# Patient Record
Sex: MALE | Race: OTHER | NOT HISPANIC OR LATINO | URBAN - METROPOLITAN AREA
[De-identification: names, ages, dates, MRNs, and addresses within clinical notes are randomized per-mention and may not be internally consistent; named-entity substitution may affect disease eponyms.]

---

## 2018-02-18 ENCOUNTER — INPATIENT (INPATIENT)
Facility: HOSPITAL | Age: 38
LOS: 10 days | Discharge: HOME | DRG: 291 | End: 2018-03-01
Attending: GENERAL ACUTE CARE HOSPITAL | Admitting: HOSPITALIST
Payer: MEDICARE

## 2018-02-18 VITALS — OXYGEN SATURATION: 87 % | TEMPERATURE: 98 F

## 2018-02-18 DIAGNOSIS — I50.9 HEART FAILURE, UNSPECIFIED: ICD-10-CM

## 2018-02-18 LAB
ALBUMIN SERPL ELPH-MCNC: 3.1 G/DL — LOW (ref 3.3–5.2)
ALP SERPL-CCNC: 64 U/L — SIGNIFICANT CHANGE UP (ref 40–120)
ALT FLD-CCNC: 33 U/L — SIGNIFICANT CHANGE UP
ANION GAP SERPL CALC-SCNC: 12 MMOL/L — SIGNIFICANT CHANGE UP (ref 5–17)
APTT BLD: 36.5 SEC — SIGNIFICANT CHANGE UP (ref 27.5–37.4)
AST SERPL-CCNC: 38 U/L — SIGNIFICANT CHANGE UP
BASE EXCESS BLDA CALC-SCNC: 4.9 MMOL/L — HIGH (ref -2–2)
BASOPHILS # BLD AUTO: 0 K/UL — SIGNIFICANT CHANGE UP (ref 0–0.2)
BASOPHILS NFR BLD AUTO: 0.4 % — SIGNIFICANT CHANGE UP (ref 0–2)
BILIRUB SERPL-MCNC: 1.6 MG/DL — SIGNIFICANT CHANGE UP (ref 0.4–2)
BUN SERPL-MCNC: 13 MG/DL — SIGNIFICANT CHANGE UP (ref 8–20)
CALCIUM SERPL-MCNC: 8.8 MG/DL — SIGNIFICANT CHANGE UP (ref 8.6–10.2)
CHLORIDE SERPL-SCNC: 98 MMOL/L — SIGNIFICANT CHANGE UP (ref 98–107)
CO2 SERPL-SCNC: 28 MMOL/L — SIGNIFICANT CHANGE UP (ref 22–29)
CREAT SERPL-MCNC: 1.09 MG/DL — SIGNIFICANT CHANGE UP (ref 0.5–1.3)
EOSINOPHIL # BLD AUTO: 0 K/UL — SIGNIFICANT CHANGE UP (ref 0–0.5)
EOSINOPHIL NFR BLD AUTO: 1 % — SIGNIFICANT CHANGE UP (ref 0–5)
GAS PNL BLDA: SIGNIFICANT CHANGE UP
GLUCOSE SERPL-MCNC: 130 MG/DL — HIGH (ref 70–115)
HCO3 BLDA-SCNC: 29 MMOL/L — HIGH (ref 20–26)
HCT VFR BLD CALC: 49.4 % — SIGNIFICANT CHANGE UP (ref 42–52)
HGB BLD-MCNC: 15.6 G/DL — SIGNIFICANT CHANGE UP (ref 14–18)
HOROWITZ INDEX BLDA+IHG-RTO: SIGNIFICANT CHANGE UP
INR BLD: 1.9 RATIO — HIGH (ref 0.88–1.16)
LYMPHOCYTES # BLD AUTO: 0.8 K/UL — LOW (ref 1–4.8)
LYMPHOCYTES # BLD AUTO: 14.8 % — LOW (ref 20–55)
MCHC RBC-ENTMCNC: 29.9 PG — SIGNIFICANT CHANGE UP (ref 27–31)
MCHC RBC-ENTMCNC: 31.6 G/DL — LOW (ref 32–36)
MCV RBC AUTO: 94.6 FL — HIGH (ref 80–94)
MONOCYTES # BLD AUTO: 0.8 K/UL — SIGNIFICANT CHANGE UP (ref 0–0.8)
MONOCYTES NFR BLD AUTO: 14.6 % — HIGH (ref 3–10)
NEUTROPHILS # BLD AUTO: 3.6 K/UL — SIGNIFICANT CHANGE UP (ref 1.8–8)
NEUTROPHILS NFR BLD AUTO: 69 % — SIGNIFICANT CHANGE UP (ref 37–73)
NT-PROBNP SERPL-SCNC: 1803 PG/ML — HIGH (ref 0–300)
PCO2 BLDA: 59 MMHG — HIGH (ref 35–45)
PH BLDA: 7.35 — SIGNIFICANT CHANGE UP (ref 7.35–7.45)
PLATELET # BLD AUTO: 193 K/UL — SIGNIFICANT CHANGE UP (ref 150–400)
PO2 BLDA: 77 MMHG — LOW (ref 83–108)
POTASSIUM SERPL-MCNC: 4 MMOL/L — SIGNIFICANT CHANGE UP (ref 3.5–5.3)
POTASSIUM SERPL-SCNC: 4 MMOL/L — SIGNIFICANT CHANGE UP (ref 3.5–5.3)
PROT SERPL-MCNC: 7.7 G/DL — SIGNIFICANT CHANGE UP (ref 6.6–8.7)
PROTHROM AB SERPL-ACNC: 21.2 SEC — HIGH (ref 9.8–12.7)
RBC # BLD: 5.22 M/UL — SIGNIFICANT CHANGE UP (ref 4.6–6.2)
RBC # FLD: 16.1 % — HIGH (ref 11–15.6)
SAO2 % BLDA: 94 % — LOW (ref 95–99)
SODIUM SERPL-SCNC: 138 MMOL/L — SIGNIFICANT CHANGE UP (ref 135–145)
TROPONIN T SERPL-MCNC: <0.01 NG/ML — SIGNIFICANT CHANGE UP (ref 0–0.06)
WBC # BLD: 5.2 K/UL — SIGNIFICANT CHANGE UP (ref 4.8–10.8)
WBC # FLD AUTO: 5.2 K/UL — SIGNIFICANT CHANGE UP (ref 4.8–10.8)

## 2018-02-18 PROCEDURE — 71045 X-RAY EXAM CHEST 1 VIEW: CPT | Mod: 26

## 2018-02-18 PROCEDURE — 99223 1ST HOSP IP/OBS HIGH 75: CPT | Mod: AI

## 2018-02-18 PROCEDURE — 99291 CRITICAL CARE FIRST HOUR: CPT

## 2018-02-18 RX ORDER — ATORVASTATIN CALCIUM 80 MG/1
40 TABLET, FILM COATED ORAL AT BEDTIME
Qty: 0 | Refills: 0 | Status: DISCONTINUED | OUTPATIENT
Start: 2018-02-18 | End: 2018-03-01

## 2018-02-18 RX ORDER — CARVEDILOL PHOSPHATE 80 MG/1
3.12 CAPSULE, EXTENDED RELEASE ORAL
Qty: 0 | Refills: 0 | Status: DISCONTINUED | OUTPATIENT
Start: 2018-02-18 | End: 2018-03-01

## 2018-02-18 RX ORDER — NITROGLYCERIN 6.5 MG
1 CAPSULE, EXTENDED RELEASE ORAL ONCE
Qty: 0 | Refills: 0 | Status: COMPLETED | OUTPATIENT
Start: 2018-02-18 | End: 2018-02-18

## 2018-02-18 RX ORDER — AMLODIPINE BESYLATE 2.5 MG/1
10 TABLET ORAL DAILY
Qty: 0 | Refills: 0 | Status: DISCONTINUED | OUTPATIENT
Start: 2018-02-18 | End: 2018-02-21

## 2018-02-18 RX ORDER — FUROSEMIDE 40 MG
60 TABLET ORAL ONCE
Qty: 0 | Refills: 0 | Status: COMPLETED | OUTPATIENT
Start: 2018-02-18 | End: 2018-02-18

## 2018-02-18 RX ORDER — ASPIRIN/CALCIUM CARB/MAGNESIUM 324 MG
325 TABLET ORAL ONCE
Qty: 0 | Refills: 0 | Status: COMPLETED | OUTPATIENT
Start: 2018-02-18 | End: 2018-02-18

## 2018-02-18 RX ORDER — HYDRALAZINE HCL 50 MG
25 TABLET ORAL EVERY 8 HOURS
Qty: 0 | Refills: 0 | Status: DISCONTINUED | OUTPATIENT
Start: 2018-02-18 | End: 2018-02-24

## 2018-02-18 RX ORDER — SODIUM CHLORIDE 9 MG/ML
3 INJECTION INTRAMUSCULAR; INTRAVENOUS; SUBCUTANEOUS ONCE
Qty: 0 | Refills: 0 | Status: COMPLETED | OUTPATIENT
Start: 2018-02-18 | End: 2018-02-18

## 2018-02-18 RX ORDER — FUROSEMIDE 40 MG
40 TABLET ORAL EVERY 12 HOURS
Qty: 0 | Refills: 0 | Status: DISCONTINUED | OUTPATIENT
Start: 2018-02-18 | End: 2018-02-20

## 2018-02-18 RX ADMIN — ATORVASTATIN CALCIUM 40 MILLIGRAM(S): 80 TABLET, FILM COATED ORAL at 21:47

## 2018-02-18 RX ADMIN — Medication 25 MILLIGRAM(S): at 21:47

## 2018-02-18 RX ADMIN — SODIUM CHLORIDE 3 MILLILITER(S): 9 INJECTION INTRAMUSCULAR; INTRAVENOUS; SUBCUTANEOUS at 13:47

## 2018-02-18 RX ADMIN — Medication 325 MILLIGRAM(S): at 13:47

## 2018-02-18 RX ADMIN — Medication 1 INCH(S): at 13:42

## 2018-02-18 RX ADMIN — Medication 60 MILLIGRAM(S): at 13:46

## 2018-02-18 RX ADMIN — CARVEDILOL PHOSPHATE 3.12 MILLIGRAM(S): 80 CAPSULE, EXTENDED RELEASE ORAL at 19:27

## 2018-02-18 NOTE — ED ADULT NURSE NOTE - OBJECTIVE STATEMENT
Code team, RT and Dr. Leblanc at bedside.  Pt a/ox3 complaining of shortness of breath that began today.  Uses 2-3 L NC at home, oxygen saturation 87% on 4L NC.  Tachypneic upon arrival.  Pt placed on venti 50%, oxygen sat up to 99%.  Denies chest pain.  Pt states dx with CHF but does not have PMD.

## 2018-02-18 NOTE — ED ADULT NURSE REASSESSMENT NOTE - NS ED NURSE REASSESS COMMENT FT1
Assumed pt care at 1330.  Pt a&ox3, tachycardic, medications given as per orders, will continue to monitor Assumed pt care at 1330.  Pt a&ox3, tachycardic, medications given as per orders, pt one 50%nonrebreather with O2sat 99%.  Will continue to monitor

## 2018-02-18 NOTE — H&P ADULT - ASSESSMENT
37yr old male with PMH of HTN, JAN on nocturnal CPAP, non-compliant with use, morbidly obese presents to ER with worsening sob over last 2 mths. He was admitted in [facility] end of last year with similar presentation, does not know his diagnosis does not follow with any PMD/cardiology. HE got very sob today, denies any chest pain/palpitations. Denies any fever/chills/cough. In the ER< he was found to be hypoxic on NC, required 50% VM, was in mild respiratory distress. CXR shows cardiomegaly and bilateral haziness. Labs show elevated BNP, trop - normal, EKG  - tachycardia. He is being admitted for acute repsiratory distress possibly 2/2 acute on chronic CHF.     1. SOB with acute hypoxic respiratory failure - possible has chronic component as well  most likely 2/2 Acute on chronic CHF with pulmonary HTN   r/o PE - add d-dimer   Morbidly obese - with JAN, OSH  ECHO    cardio consult  Lasix iv , coreg   F/u BMP,. daily I/O, weights  check A1C      2. HTN - poorly controlled   On Hydralazine and amlodipine, does not know his doses    3. JAN on CPAP - resume nocturnal cpap    4. Coagulopahty - ? 2/2 congestive hepatopathy, not on any AC  check repeat INR in am     4. DVT px   hold pharmacological px until INR mproves  IPCs for now    5. morbid obesity - needs weight loss  Dietary eval   check A1C

## 2018-02-18 NOTE — H&P ADULT - NSHPPHYSICALEXAM_GEN_ALL_CORE
PHYSICAL EXAM:    GENERAL: NAD, morbidly obese, mild respiratory distress, able to speak complete sentences  HEAD:  Atraumatic, Normocephalic  EYES: EOMI, PERRLA, conjunctiva and sclera clear  ENMT: Moist mucous membranes, Good dentition,   NERVOUS SYSTEM:  Alert & Oriented X3, Good concentration;  CHEST/LUNG: Clear to percussion bilaterally; No rales, rhonchi, difficult to auscultate  HEART: Regular rate and rhythm; No murmurs,   ABDOMEN: Soft, Nontender, Nondistended; Bowel sounds present  EXTREMITIES:  , No clubbing, cyanosis, or 2+ pedal edema. Chronic cahnges of venous stasis, hyperkeratosis.  SKIN: No rashes or lesions

## 2018-02-18 NOTE — ED ADULT NURSE REASSESSMENT NOTE - NS ED NURSE REASSESS COMMENT FT1
Report given t o [facility] RN CC.  Pt a&ox3 resting comfortably, denies any c/o pain, no acute s/s of respiratory distress noted or reported, pt remains on 50% nonrebreather

## 2018-02-18 NOTE — ED PROVIDER NOTE - MEDICAL DECISION MAKING DETAILS
35 y/o male with hx of CHF and HTN. concerning for exacerbation of CHF. Will xray, lab, re-evaluate, and most likely admit.

## 2018-02-18 NOTE — CONSULT NOTE ADULT - ASSESSMENT
36 y/o morbidly obese male with acute heart failure and cardiomegaly   Hx of prior heart failure and diagnosis of pulmonary hypertension and non obstructive CAD   agree with lasix 40 mg IV twice daily  continue antihypertensives BB, CCB and hydralazine  Echo  obtain prior records

## 2018-02-18 NOTE — CONSULT NOTE ADULT - SUBJECTIVE AND OBJECTIVE BOX
CARDIOLOGY CONSULTATION NOTE ([facility])  Consult requested by:      Reason for Consultation:     History obtained by: Patient and medical record     obtained: No    Chief complaint:    Patient is a 37y old  Male who presents with a chief complaint of sob (18 Feb 2018 15:59)      HPI:  37yr old morbidly obese male with JAN on nocturnal CPAP, non-compliant with use, presents with worsening sob over last 2 mths associated sometimes with orthopnea. He has progressive bilateral LE edema as well. He was admitted in 12/2017 in OSH with similar presentation where apparently he had RHC/LHC that showed pulmonary hypertension and he mentioned trial of reversibility that failed with drugs. he has no significant CAD as he was he doesn't need a stent. He is on hydralazine and amlodipine for BP control and appears non compliant with follow up as out patient as he usually go to ED when feels sick. . In the ER< he was found to be hypoxic on NC, required 50% VM, was in mild respiratory distress. CXR shows cardiomegaly and bilateral haziness. Labs show elevated BNP, trop -         REVIEW OF SYMPTOMS: Cardiovascular:  as per HPI;  Respiratory:  as per HPI  Genitourinary:  No dysuria, no hematuria; Gastrointestinal:  No nausea, no vomiting. No diarrhea.  No abdominal pain. No dark color stool, no melena ; Neurological: No headache, no dizziness, no slurred speech;  Psychiatric: No agitation, no anxiety.  ALL OTHER REVIEW OF SYSTEMS ARE NEGATIVE.    ALLERGIES: Allergies    No Known Allergies    Intolerances    MEDICATIONS  (STANDING):  amLODIPine   Tablet 10 milliGRAM(s) Oral daily  atorvastatin 40 milliGRAM(s) Oral at bedtime  carvedilol 3.125 milliGRAM(s) Oral two times a day  furosemide   Injectable 40 milliGRAM(s) IV Push every 12 hours  hydrALAZINE 25 milliGRAM(s) Oral every 8 hours    MEDICATIONS  (PRN):        PAST MEDICAL HISTORY  CHF (congestive heart failure)  Hypertension      PAST SURGICAL HISTORY  No significant past surgical history      FAMILY HISTORY:  Family history unknown      SOCIAL HISTORY: smokers marijuana      Vital Signs Last 24 Hrs  T(C): 36.6 (18 Feb 2018 13:17), Max: 36.6 (18 Feb 2018 13:17)  T(F): 97.9 (18 Feb 2018 13:17), Max: 97.9 (18 Feb 2018 13:17)  HR: 112 (18 Feb 2018 13:52) (112 - 120)  BP: 151/86 (18 Feb 2018 13:52) (137/92 - 151/86)  BP(mean): --  RR: 28 (18 Feb 2018 13:52) (26 - 28)  SpO2: 98% (18 Feb 2018 13:52) (87% - 98%)      PHYSICAL EXAM:  Constitutional: Comfortable . No acute distress.   HEENT: Atraumatic and normcephalic , neck is supple . no JVD. No carotid bruit.   CNS: A&Ox3. No focal deficits.  Lymph Nodes: Cervical : Not palpable.  Respiratory: diminished air entry bilaterally  Cardiovascular: S1S2 RRR. No murmur/rubs or gallop.  Gastrointestinal: Soft non-tender and non distended . +Bowel sounds, no HSM  Extremities: No edema , DP and PT +2   Psychiatric: Calm . no agitation., mood appropriate  Skin: No skin lesions    Intake and output:     LABS:                        15.6   5.2   )-----------( 193      ( 18 Feb 2018 13:41 )             49.4     02-18    138  |  98  |  13.0  ----------------------------<  130<H>  4.0   |  28.0  |  1.09    Ca    8.8      18 Feb 2018 13:41    TPro  7.7  /  Alb  3.1<L>  /  TBili  1.6  /  DBili  x   /  AST  38  /  ALT  33  /  AlkPhos  64  02-18    CARDIAC MARKERS ( 18 Feb 2018 13:41 )  x     / <0.01 ng/mL / x     / x     / x        ;p-BNP=Serum Pro-Brain Natriuretic Peptide: 1803 pg/mL (02-18 @ 13:41)    PT/INR - ( 18 Feb 2018 13:41 )   PT: 21.2 sec;   INR: 1.90 ratio         PTT - ( 18 Feb 2018 13:41 )  PTT:36.5 sec      INTERPRETATION OF TELEMETRY:  ECG: sinus tachycardia,     RADIOLOGY & ADDITIONAL STUDIES:    X-ray:  Cardiomegaly, mild pulmonary congestion

## 2018-02-18 NOTE — H&P ADULT - HISTORY OF PRESENT ILLNESS
37yr old male with PMH of HTN, JAN on nocturnal CPAP, non-compliant with use, morbidly obese presents to ER with worsening sob over last 2 mths. He was admitted in [facility] end of last year with similar presentation, does not know his diagnosis does not follow with any PMD/cardiology. HE got very sob today, denies any chest pain/palpitations. Denies any fever/chills/cough. In the ER< he was found to be hypoxic on NC, required 50% VM, was in mild respiratory distress. CXR shows cardiomegaly and bilateral haziness. Labs show elevated BNP, trop - normal, EKG  - tachycardia. He is being admitted for acute repsiratory distress possibly 2/2 acute on chronic CHF.

## 2018-02-19 LAB
ANION GAP SERPL CALC-SCNC: 9 MMOL/L — SIGNIFICANT CHANGE UP (ref 5–17)
BUN SERPL-MCNC: 13 MG/DL — SIGNIFICANT CHANGE UP (ref 8–20)
CALCIUM SERPL-MCNC: 8.6 MG/DL — SIGNIFICANT CHANGE UP (ref 8.6–10.2)
CHLORIDE SERPL-SCNC: 97 MMOL/L — LOW (ref 98–107)
CO2 SERPL-SCNC: 34 MMOL/L — HIGH (ref 22–29)
CREAT SERPL-MCNC: 1.11 MG/DL — SIGNIFICANT CHANGE UP (ref 0.5–1.3)
GLUCOSE BLDC GLUCOMTR-MCNC: 99 MG/DL — SIGNIFICANT CHANGE UP (ref 70–99)
GLUCOSE SERPL-MCNC: 94 MG/DL — SIGNIFICANT CHANGE UP (ref 70–115)
HBA1C BLD-MCNC: 7 % — HIGH (ref 4–5.6)
HCT VFR BLD CALC: 47 % — SIGNIFICANT CHANGE UP (ref 42–52)
HGB BLD-MCNC: 14.2 G/DL — SIGNIFICANT CHANGE UP (ref 14–18)
INR BLD: 1.85 RATIO — HIGH (ref 0.88–1.16)
MAGNESIUM SERPL-MCNC: 1.7 MG/DL — SIGNIFICANT CHANGE UP (ref 1.6–2.6)
MCHC RBC-ENTMCNC: 28.6 PG — SIGNIFICANT CHANGE UP (ref 27–31)
MCHC RBC-ENTMCNC: 30.2 G/DL — LOW (ref 32–36)
MCV RBC AUTO: 94.8 FL — HIGH (ref 80–94)
PLATELET # BLD AUTO: 176 K/UL — SIGNIFICANT CHANGE UP (ref 150–400)
POTASSIUM SERPL-MCNC: 4.4 MMOL/L — SIGNIFICANT CHANGE UP (ref 3.5–5.3)
POTASSIUM SERPL-SCNC: 4.4 MMOL/L — SIGNIFICANT CHANGE UP (ref 3.5–5.3)
PROTHROM AB SERPL-ACNC: 20.6 SEC — HIGH (ref 9.8–12.7)
RBC # BLD: 4.96 M/UL — SIGNIFICANT CHANGE UP (ref 4.6–6.2)
RBC # FLD: 17 % — HIGH (ref 11–15.6)
SODIUM SERPL-SCNC: 140 MMOL/L — SIGNIFICANT CHANGE UP (ref 135–145)
WBC # BLD: 3.9 K/UL — LOW (ref 4.8–10.8)
WBC # FLD AUTO: 3.9 K/UL — LOW (ref 4.8–10.8)

## 2018-02-19 PROCEDURE — 93970 EXTREMITY STUDY: CPT | Mod: 26

## 2018-02-19 PROCEDURE — 99233 SBSQ HOSP IP/OBS HIGH 50: CPT

## 2018-02-19 PROCEDURE — 99223 1ST HOSP IP/OBS HIGH 75: CPT

## 2018-02-19 RX ORDER — INSULIN LISPRO 100/ML
VIAL (ML) SUBCUTANEOUS
Qty: 0 | Refills: 0 | Status: DISCONTINUED | OUTPATIENT
Start: 2018-02-19 | End: 2018-03-01

## 2018-02-19 RX ORDER — DEXTROSE 50 % IN WATER 50 %
25 SYRINGE (ML) INTRAVENOUS ONCE
Qty: 0 | Refills: 0 | Status: DISCONTINUED | OUTPATIENT
Start: 2018-02-19 | End: 2018-03-01

## 2018-02-19 RX ORDER — SODIUM CHLORIDE 9 MG/ML
1000 INJECTION, SOLUTION INTRAVENOUS
Qty: 0 | Refills: 0 | Status: DISCONTINUED | OUTPATIENT
Start: 2018-02-19 | End: 2018-03-01

## 2018-02-19 RX ORDER — GLUCAGON INJECTION, SOLUTION 0.5 MG/.1ML
1 INJECTION, SOLUTION SUBCUTANEOUS ONCE
Qty: 0 | Refills: 0 | Status: DISCONTINUED | OUTPATIENT
Start: 2018-02-19 | End: 2018-03-01

## 2018-02-19 RX ORDER — ASPIRIN/CALCIUM CARB/MAGNESIUM 324 MG
81 TABLET ORAL DAILY
Qty: 0 | Refills: 0 | Status: DISCONTINUED | OUTPATIENT
Start: 2018-02-19 | End: 2018-03-01

## 2018-02-19 RX ORDER — DEXTROSE 50 % IN WATER 50 %
1 SYRINGE (ML) INTRAVENOUS ONCE
Qty: 0 | Refills: 0 | Status: DISCONTINUED | OUTPATIENT
Start: 2018-02-19 | End: 2018-03-01

## 2018-02-19 RX ORDER — INFLUENZA VIRUS VACCINE 15; 15; 15; 15 UG/.5ML; UG/.5ML; UG/.5ML; UG/.5ML
0.5 SUSPENSION INTRAMUSCULAR ONCE
Qty: 0 | Refills: 0 | Status: DISCONTINUED | OUTPATIENT
Start: 2018-02-19 | End: 2018-03-01

## 2018-02-19 RX ORDER — DEXTROSE 50 % IN WATER 50 %
12.5 SYRINGE (ML) INTRAVENOUS ONCE
Qty: 0 | Refills: 0 | Status: DISCONTINUED | OUTPATIENT
Start: 2018-02-19 | End: 2018-03-01

## 2018-02-19 RX ADMIN — CARVEDILOL PHOSPHATE 3.12 MILLIGRAM(S): 80 CAPSULE, EXTENDED RELEASE ORAL at 17:40

## 2018-02-19 RX ADMIN — AMLODIPINE BESYLATE 10 MILLIGRAM(S): 2.5 TABLET ORAL at 05:23

## 2018-02-19 RX ADMIN — Medication 25 MILLIGRAM(S): at 13:14

## 2018-02-19 RX ADMIN — Medication 25 MILLIGRAM(S): at 21:02

## 2018-02-19 RX ADMIN — CARVEDILOL PHOSPHATE 3.12 MILLIGRAM(S): 80 CAPSULE, EXTENDED RELEASE ORAL at 05:23

## 2018-02-19 RX ADMIN — ATORVASTATIN CALCIUM 40 MILLIGRAM(S): 80 TABLET, FILM COATED ORAL at 21:02

## 2018-02-19 RX ADMIN — Medication 25 MILLIGRAM(S): at 05:23

## 2018-02-19 RX ADMIN — Medication 40 MILLIGRAM(S): at 13:14

## 2018-02-19 RX ADMIN — Medication 40 MILLIGRAM(S): at 01:52

## 2018-02-19 NOTE — CONSULT NOTE ADULT - SUBJECTIVE AND OBJECTIVE BOX
PULMONARY CONSULT NOTE      RUDY GOYALN-[id number]    Patient is a 37y old  Male who presents with a chief complaint of sob (18 Feb 2018 15:59)  Patient with presentation for worsening dyspnea without fever, chest pain, cough.  Non smoker.  States just got CPAP machine>not using.  Patient just came out here from [place] where apparently he also go oxygen.  It is unclear if he has O2 that functions at home>"I'm trying to get a machine".  Patient is morbidly obese-no weight or BMI measured to this point and by exam is likely with BMI greater than 60.  Patient states he has CHF.      INTERVAL HPI/OVERNIGHT EVENTS:    MEDICATIONS  (STANDING):  amLODIPine   Tablet 10 milliGRAM(s) Oral daily  atorvastatin 40 milliGRAM(s) Oral at bedtime  carvedilol 3.125 milliGRAM(s) Oral two times a day  furosemide   Injectable 40 milliGRAM(s) IV Push every 12 hours  hydrALAZINE 25 milliGRAM(s) Oral every 8 hours  influenza   Vaccine 0.5 milliLiter(s) IntraMuscular once      MEDICATIONS  (PRN):      Allergies    No Known Allergies    Intolerances        PAST MEDICAL & SURGICAL HISTORY:  CHF (congestive heart failure)  Hypertension  No significant past surgical history      FAMILY HISTORY:  Family history unknown      SOCIAL HISTORY  Smoking History: Per HPI    REVIEW OF SYSTEMS:    CONSTITUTIONAL:  As per HPI.    HEENT:  Eyes:  No diplopia or blurred vision. ENT:  No earache, sore throat or runny nose.    CARDIOVASCULAR:  No pressure, squeezing, tightness, heaviness or aching about the chest; no palpitations.    RESPIRATORY:  Per HPI    GASTROINTESTINAL:  No nausea, vomiting or diarrhea.    GENITOURINARY:  No dysuria, frequency or urgency.    MUSCULOSKELETAL:  No joint pains    SKIN:  No new lesions.    NEUROLOGIC:  No paresthesias, fasciculations, seizures or weakness.    PSYCHIATRIC:  No disorder of thought or mood.    ENDOCRINE:  No heat or cold intolerance, polyuria or polydipsia.    HEMATOLOGICAL:  No easy bruising or bleeding.     Vital Signs Last 24 Hrs  T(C): 36.5 (19 Feb 2018 10:59), Max: 36.9 (18 Feb 2018 17:49)  T(F): 97.7 (19 Feb 2018 10:59), Max: 98.5 (18 Feb 2018 17:49)  HR: 92 (19 Feb 2018 10:59) (91 - 100)  BP: 125/79 (19 Feb 2018 10:59) (104/71 - 144/96)  BP(mean): --  RR: 17 (19 Feb 2018 10:59) (17 - 26)  SpO2: 95% (19 Feb 2018 10:59) (95% - 99%)    PHYSICAL EXAMINATION:    GENERAL: The patient is a well-developed, well-nourished _____in no apparent distress.     HEENT: Head is normocephalic and atraumatic. Extraocular muscles are intact. Mucous membranes are moist.     NECK: Supple.     LUNGS: Clear but diminished throughout    HEART: Regular rate and rhythm without murmur.    ABDOMEN: Soft, nontender, and nondistended.  No hepatosplenomegaly is noted.    EXTREMITIES: Without any cyanosis, clubbing, rash, lesions; + edema.    NEUROLOGIC: Grossly intact.    SKIN: No ulceration or induration present.      LABS:                        14.2   3.9   )-----------( 176      ( 19 Feb 2018 08:26 )             47.0     02-19    140  |  97<L>  |  13.0  ----------------------------<  94  4.4   |  34.0<H>  |  1.11    Ca    8.6      19 Feb 2018 08:26  Mg     1.7     02-19    TPro  7.7  /  Alb  3.1<L>  /  TBili  1.6  /  DBili  x   /  AST  38  /  ALT  33  /  AlkPhos  64  02-18    PT/INR - ( 19 Feb 2018 08:26 )   PT: 20.6 sec;   INR: 1.85 ratio         PTT - ( 18 Feb 2018 13:41 )  PTT:36.5 sec    ABG - ( 18 Feb 2018 14:36 )  pH: 7.35  /  pCO2: 59    /  pO2: 77    / HCO3: 29    / Base Excess: 4.9   /  SaO2: 94                CARDIAC MARKERS ( 18 Feb 2018 13:41 )  x     / <0.01 ng/mL / x     / x     / x            Serum Pro-Brain Natriuretic Peptide: 1803 pg/mL (02-18-18 @ 13:41)          MICROBIOLOGY:    RADIOLOGY & ADDITIONAL STUDIES:

## 2018-02-19 NOTE — PROGRESS NOTE ADULT - SUBJECTIVE AND OBJECTIVE BOX
RUDY GOYAL    [id number]    37y      Male    CC: SOB   only mildly better, swelling      INTERVAL HPI/OVERNIGHT EVENTS: no acute events    REVIEW OF SYSTEMS:    CONSTITUTIONAL: No fever,   RESPIRATORY: No cough  CARDIOVASCULAR: No chest pain, palpitations  GASTROINTESTINAL: No abdominal or epigastric pain. No nausea, vomiting        Vital Signs Last 24 Hrs  T(C): 36.5 (19 Feb 2018 10:59), Max: 36.9 (18 Feb 2018 17:49)  T(F): 97.7 (19 Feb 2018 10:59), Max: 98.5 (18 Feb 2018 17:49)  HR: 92 (19 Feb 2018 10:59) (91 - 100)  BP: 125/79 (19 Feb 2018 10:59) (104/71 - 144/96)  BP(mean): --  RR: 17 (19 Feb 2018 10:59) (17 - 26)  SpO2: 95% (19 Feb 2018 10:59) (95% - 99%)    PHYSICAL EXAM:    GENERAL: NAD, morbidly obese , sitting up   HEENT: PERRL, +EOMI  NECK: soft, Supple,   CHEST/LUNG: Clear to percussion bilaterally, b/l rales basal   HEART: S1S2+, Regular rate and rhythm; No murmurs  EXTREMITIES:   No clubbing, cyanosis. pedal  edema 2+  NEURO: AAOX3          LABS:                        14.2   3.9   )-----------( 176      ( 19 Feb 2018 08:26 )             47.0     02-19    140  |  97<L>  |  13.0  ----------------------------<  94  4.4   |  34.0<H>  |  1.11    Ca    8.6      19 Feb 2018 08:26  Mg     1.7     02-19    TPro  7.7  /  Alb  3.1<L>  /  TBili  1.6  /  DBili  x   /  AST  38  /  ALT  33  /  AlkPhos  64  02-18    PT/INR - ( 19 Feb 2018 08:26 )   PT: 20.6 sec;   INR: 1.85 ratio         PTT - ( 18 Feb 2018 13:41 )  PTT:36.5 sec        MEDICATIONS  (STANDING):  amLODIPine   Tablet 10 milliGRAM(s) Oral daily  atorvastatin 40 milliGRAM(s) Oral at bedtime  carvedilol 3.125 milliGRAM(s) Oral two times a day  furosemide   Injectable 40 milliGRAM(s) IV Push every 12 hours  hydrALAZINE 25 milliGRAM(s) Oral every 8 hours  influenza   Vaccine 0.5 milliLiter(s) IntraMuscular once    MEDICATIONS  (PRN):      RADIOLOGY & ADDITIONAL TESTS:

## 2018-02-19 NOTE — PROGRESS NOTE ADULT - ASSESSMENT
37yr old male with PMH of HTN, JAN on nocturnal CPAP, non-compliant with use, morbidly obese presents to ER with worsening sob over last 2 mths. He was admitted in [facility] end of last year with similar presentation, does not know his diagnosis does not follow with any PMD/cardiology. HE got very sob today, denies any chest pain/palpitations. Denies any fever/chills/cough. In the ER< he was found to be hypoxic on NC, required 50% VM, was in mild respiratory distress. CXR shows cardiomegaly and bilateral haziness. Labs show elevated BNP, trop - normal, EKG  - tachycardia. He is being admitted for acute repsiratory distress possibly 2/2 acute on chronic CHF.     1. SOB with acute hypoxic hypercarbic respiratory failure - possible has chronic component as well  most likely 2/2 Acute on chronic CHF with pulmonary HTN    PE - ruled out  Morbidly obese - with JAN, OSH  ECHO  pending   cardio recs  Pulmonary consult appreciated  BIPAP nocturnal   Lasix iv , coreg   F/u BMP,. daily I/O, weights  check A1C        2. HTN - poorly controlled   On Hydralazine and amlodipine, does not know his doses    3. JAN on CPAP - resume nocturnal cpap    4. Coagulopahty - ? 2/2 congestive hepatopathy, not on any AC  check repeat INR in am     4. DVT px   hold pharmacological px until INR mproves  IPCs for now    5. morbid obesity - needs weight loss  Dietary eval     6. DM - 2   A1C - 7 meets diagnosis of DM   SSI for now  needs weight loss

## 2018-02-19 NOTE — CONSULT NOTE ADULT - ASSESSMENT
Patient with acute on chronic hypercarbic/hypoxemic respiratory failure  H/O LV dysfunction  Morbid obesity    Plan:  1. BIPAP nocturnally and PRN  2.Bronchodilators empirically  3.Cardiology evaluation  4.New to area>care management needs to assess home needs before d/c  5.May need to obtain prior medical records if possible form [facility]

## 2018-02-20 LAB
ANION GAP SERPL CALC-SCNC: 10 MMOL/L — SIGNIFICANT CHANGE UP (ref 5–17)
BUN SERPL-MCNC: 15 MG/DL — SIGNIFICANT CHANGE UP (ref 8–20)
CALCIUM SERPL-MCNC: 8.8 MG/DL — SIGNIFICANT CHANGE UP (ref 8.6–10.2)
CHLORIDE SERPL-SCNC: 96 MMOL/L — LOW (ref 98–107)
CO2 SERPL-SCNC: 33 MMOL/L — HIGH (ref 22–29)
CREAT SERPL-MCNC: 0.97 MG/DL — SIGNIFICANT CHANGE UP (ref 0.5–1.3)
GLUCOSE BLDC GLUCOMTR-MCNC: 128 MG/DL — HIGH (ref 70–99)
GLUCOSE BLDC GLUCOMTR-MCNC: 93 MG/DL — SIGNIFICANT CHANGE UP (ref 70–99)
GLUCOSE BLDC GLUCOMTR-MCNC: 94 MG/DL — SIGNIFICANT CHANGE UP (ref 70–99)
GLUCOSE BLDC GLUCOMTR-MCNC: 98 MG/DL — SIGNIFICANT CHANGE UP (ref 70–99)
GLUCOSE SERPL-MCNC: 106 MG/DL — SIGNIFICANT CHANGE UP (ref 70–115)
HCT VFR BLD CALC: 48.2 % — SIGNIFICANT CHANGE UP (ref 42–52)
HGB BLD-MCNC: 14.7 G/DL — SIGNIFICANT CHANGE UP (ref 14–18)
MCHC RBC-ENTMCNC: 30 PG — SIGNIFICANT CHANGE UP (ref 27–31)
MCHC RBC-ENTMCNC: 30.5 G/DL — LOW (ref 32–36)
MCV RBC AUTO: 98.4 FL — HIGH (ref 80–94)
PLATELET # BLD AUTO: 181 K/UL — SIGNIFICANT CHANGE UP (ref 150–400)
POTASSIUM SERPL-MCNC: 4.8 MMOL/L — SIGNIFICANT CHANGE UP (ref 3.5–5.3)
POTASSIUM SERPL-SCNC: 4.8 MMOL/L — SIGNIFICANT CHANGE UP (ref 3.5–5.3)
RBC # BLD: 4.9 M/UL — SIGNIFICANT CHANGE UP (ref 4.6–6.2)
RBC # FLD: 16.3 % — HIGH (ref 11–15.6)
SODIUM SERPL-SCNC: 139 MMOL/L — SIGNIFICANT CHANGE UP (ref 135–145)
WBC # BLD: 3.7 K/UL — LOW (ref 4.8–10.8)
WBC # FLD AUTO: 3.7 K/UL — LOW (ref 4.8–10.8)

## 2018-02-20 PROCEDURE — 93306 TTE W/DOPPLER COMPLETE: CPT | Mod: 26

## 2018-02-20 PROCEDURE — 99233 SBSQ HOSP IP/OBS HIGH 50: CPT

## 2018-02-20 PROCEDURE — 71275 CT ANGIOGRAPHY CHEST: CPT | Mod: 26

## 2018-02-20 RX ORDER — FUROSEMIDE 40 MG
10 TABLET ORAL
Qty: 500 | Refills: 0 | Status: DISCONTINUED | OUTPATIENT
Start: 2018-02-20 | End: 2018-02-23

## 2018-02-20 RX ADMIN — Medication 81 MILLIGRAM(S): at 13:12

## 2018-02-20 RX ADMIN — ATORVASTATIN CALCIUM 40 MILLIGRAM(S): 80 TABLET, FILM COATED ORAL at 22:23

## 2018-02-20 RX ADMIN — Medication 40 MILLIGRAM(S): at 05:35

## 2018-02-20 RX ADMIN — Medication 5 MG/HR: at 16:25

## 2018-02-20 RX ADMIN — AMLODIPINE BESYLATE 10 MILLIGRAM(S): 2.5 TABLET ORAL at 05:35

## 2018-02-20 RX ADMIN — CARVEDILOL PHOSPHATE 3.12 MILLIGRAM(S): 80 CAPSULE, EXTENDED RELEASE ORAL at 19:13

## 2018-02-20 RX ADMIN — Medication 25 MILLIGRAM(S): at 13:11

## 2018-02-20 RX ADMIN — Medication 25 MILLIGRAM(S): at 22:23

## 2018-02-20 RX ADMIN — CARVEDILOL PHOSPHATE 3.12 MILLIGRAM(S): 80 CAPSULE, EXTENDED RELEASE ORAL at 05:35

## 2018-02-20 RX ADMIN — Medication 25 MILLIGRAM(S): at 05:35

## 2018-02-20 NOTE — PROGRESS NOTE ADULT - ASSESSMENT
36 y/o morbidly obese male with recurrent admission for SOB , heart failure   RHC in [facility] in [place] showed moderate pulmonary HTN and moderate elevatd pCWP   no response to Nitric oxide  mixed etiology of PH   presented with acute heart failure  not diuresing well .   will start IV lasix drip 10 mg /hr .   if not responding add metolazone 5 mg daily   continue hydralazine, BB for BP control   Patient mentioned that he had a cath showing no need for coronary stents.  Echo pending

## 2018-02-20 NOTE — PROGRESS NOTE ADULT - SUBJECTIVE AND OBJECTIVE BOX
CARDIOLOGY PROGRESS NOTE   ([facility] [place])                             Reason for follow up: acute heart failure, pulmonary hypertension                            Overnight: worsening LE and scrotal edema    Subjective: more swollen, SOB this moring  Review of symptoms: Cardiac:  No chest pain. + dyspnea. No palpitations.  Respiratory:no cough. No dyspnea  Gastrointestinal: No diarrhea. No abdominal pain. No bleeding.     Past medical history: No updates.     	  Vitals:  T(C): 36.2 (02-20-18 @ 11:00), Max: 36.8 (02-19-18 @ 21:01)  HR: 94 (02-20-18 @ 13:08) (94 - 97)  BP: 112/68 (02-20-18 @ 13:08) (102/73 - 128/70)  RR: 22 (02-20-18 @ 11:00) (20 - 22)  SpO2: 95% (02-20-18 @ 11:00) (95% - 100%)  Wt(kg): --  I&O's Summary    19 Feb 2018 07:01  -  20 Feb 2018 07:00  --------------------------------------------------------  IN: 0 mL / OUT: 350 mL / NET: -350 mL    20 Feb 2018 07:01  -  20 Feb 2018 15:42  --------------------------------------------------------  IN: 240 mL / OUT: 700 mL / NET: -460 mL          PHYSICAL EXAM:  Appearance: mobridly obese, . No acute distress  HEENT:  Head and neck: Atraumatic. Normocephalic.  Normal oral mucosa  Neck: Supple, unable to appreciate  Neurologic: A & O x 3, no focal deficits  Lymphatic: No cervical lymphadenopathy  Cardiovascular: Normal S1 S2, No murmur, rubs/gallops  Respiratory:  basilar rales  Gastrointestinal:  Soft, Non-tender, + BS, no HSM  Lower Extremities: No edema, DP and PT +2  Psychiatry: Patient is calm. No agitation. Mood & affect appropriate  Skin: No rashes, No ecchymoses, No cyanosis    CURRENT MEDICATIONS:  amLODIPine   Tablet 10 milliGRAM(s) Oral daily  carvedilol 3.125 milliGRAM(s) Oral two times a day  furosemide Infusion 10 mG/Hr IV Continuous <Continuous>  hydrALAZINE 25 milliGRAM(s) Oral every 8 hours    atorvastatin  dextrose 50% Injectable  dextrose 50% Injectable  dextrose 50% Injectable  insulin lispro (HumaLOG) corrective regimen sliding scale  aspirin  chewable  dextrose 5%.  influenza   Vaccine      LABS:	 	                            14.7   3.7   )-----------( 181      ( 20 Feb 2018 06:51 )             48.2     02-20    139  |  96<L>  |  15.0  ----------------------------<  106  4.8   |  33.0<H>  |  0.97    Ca    8.8      20 Feb 2018 06:48  Mg     1.7     02-19      proBNP: Serum Pro-Brain Natriuretic Peptide: 1803 pg/mL (02-18 @ 13:41)    Lipid Profile:   HgA1c: Hemoglobin A1C, Whole Blood: 7.0 %  TSH:     TELEMETRY: 	sinus tachycardia    	    DIAGNOSTIC TESTING:  [ ] Echocardiogram:pending

## 2018-02-20 NOTE — DIETITIAN INITIAL EVALUATION ADULT. - OTHER INFO
Pt currently sleeping, lunch tray not yet consumed.  Aware pt with CHF and likely new dx of DM- current hgba1c 7.  RD to follow up when pt is awake for nutrition education. Pt reports good po intake at meals.  Pt with limited compliance to DASH/TLC meal plan, although educated in the past per pt.  Pt with CHF exacerbation and likely new dx of DM- although pt seems unaware of dx at this time.  Reviewed heart failure nutrition guidelines with pt and family and discussed importance of strict low Na diet.  Also reviewed cons cho meal plan using plate model and encouraged smaller portions at meals.  Many questions answered and meal examples provided.  Physical activity as tolerated, and gradual wt loss also encouraged.  Pt and family verbalized understanding, literature provided.

## 2018-02-20 NOTE — PROGRESS NOTE ADULT - SUBJECTIVE AND OBJECTIVE BOX
PULMONARY PROGRESS NOTE      [id number]    Patient is a 37y old  Male who presents with a chief complaint of sob (18 Feb 2018 15:59)  Patient is a 37y old  Male who presents with a chief complaint of sob (18 Feb 2018 15:59)  Patient with presentation for worsening dyspnea without fever, chest pain, cough.  Non smoker.  States just got CPAP machine>not using.  Patient just came out here from [place] where apparently he also go oxygen.  It is unclear if he has O2 that functions at home>"I'm trying to get a machine".  Patient is morbidly obese-no weight or BMI measured to this point and by exam is likely with BMI greater than 60.  Patient states he has CHF.      INTERVAL HPI/OVERNIGHT EVENTS:    MEDICATIONS  (STANDING):  amLODIPine   Tablet 10 milliGRAM(s) Oral daily  aspirin  chewable 81 milliGRAM(s) Oral daily  atorvastatin 40 milliGRAM(s) Oral at bedtime  carvedilol 3.125 milliGRAM(s) Oral two times a day  dextrose 5%. 1000 milliLiter(s) (50 mL/Hr) IV Continuous <Continuous>  dextrose 50% Injectable 12.5 Gram(s) IV Push once  dextrose 50% Injectable 25 Gram(s) IV Push once  dextrose 50% Injectable 25 Gram(s) IV Push once  furosemide   Injectable 40 milliGRAM(s) IV Push every 12 hours  hydrALAZINE 25 milliGRAM(s) Oral every 8 hours  influenza   Vaccine 0.5 milliLiter(s) IntraMuscular once  insulin lispro (HumaLOG) corrective regimen sliding scale   SubCutaneous three times a day before meals      MEDICATIONS  (PRN):  dextrose Gel 1 Dose(s) Oral once PRN Blood Glucose LESS THAN 70 milliGRAM(s)/deciliter  glucagon  Injectable 1 milliGRAM(s) IntraMuscular once PRN Glucose LESS THAN 70 milligrams/deciliter      Allergies    No Known Allergies    Intolerances        PAST MEDICAL & SURGICAL HISTORY:  CHF (congestive heart failure)  Hypertension  No significant past surgical history      SOCIAL HISTORY  Smoking History:       REVIEW OF SYSTEMS:    CONSTITUTIONAL:  No distress    HEENT:  Eyes:  No diplopia or blurred vision. ENT:  No earache, sore throat or runny nose.    CARDIOVASCULAR:  No pressure, squeezing, tightness, heaviness or aching about the chest; no palpitations.    RESPIRATORY:  No cough, shortness of breath, PND or orthopnea. Mild SOBOE    GASTROINTESTINAL:  No nausea, vomiting or diarrhea.    GENITOURINARY:  No dysuria, frequency or urgency.    NEUROLOGIC:  No paresthesias, fasciculations, seizures or weakness.    PSYCHIATRIC:  No disorder of thought or mood.    Vital Signs Last 24 Hrs  T(C): 36.2 (20 Feb 2018 11:00), Max: 36.8 (19 Feb 2018 21:01)  T(F): 97.2 (20 Feb 2018 11:00), Max: 98.2 (19 Feb 2018 21:01)  HR: 97 (20 Feb 2018 11:00) (94 - 97)  BP: 116/82 (20 Feb 2018 11:00) (102/73 - 128/70)  BP(mean): --  RR: 22 (20 Feb 2018 11:00) (20 - 22)  SpO2: 95% (20 Feb 2018 11:00) (95% - 100%)    PHYSICAL EXAMINATION:    GENERAL: The patient is awake and alert in no apparent distress.     HEENT: Head is normocephalic and atraumatic. Extraocular muscles are intact. Mucous membranes are moist.    NECK: Supple.    LUNGS: Clear to auscultation without wheezing, rales or rhonchi; respirations unlabored    HEART: Regular rate and rhythm without murmur.    ABDOMEN: Soft, nontender, and nondistended.      EXTREMITIES: Without any cyanosis, clubbing, rash, lesions or edema.    NEUROLOGIC: Grossly intact.    LABS:                        14.7   3.7   )-----------( 181      ( 20 Feb 2018 06:51 )             48.2     02-20    139  |  96<L>  |  15.0  ----------------------------<  106  4.8   |  33.0<H>  |  0.97    Ca    8.8      20 Feb 2018 06:48  Mg     1.7     02-19    TPro  7.7  /  Alb  3.1<L>  /  TBili  1.6  /  DBili  x   /  AST  38  /  ALT  33  /  AlkPhos  64  02-18    PT/INR - ( 19 Feb 2018 08:26 )   PT: 20.6 sec;   INR: 1.85 ratio         PTT - ( 18 Feb 2018 13:41 )  PTT:36.5 sec    ABG - ( 18 Feb 2018 14:36 )  pH: 7.35  /  pCO2: 59    /  pO2: 77    / HCO3: 29    / Base Excess: 4.9   /  SaO2: 94                CARDIAC MARKERS ( 18 Feb 2018 13:41 )  x     / <0.01 ng/mL / x     / x     / x            Serum Pro-Brain Natriuretic Peptide: 1803 pg/mL (02-18-18 @ 13:41)          MICROBIOLOGY:    RADIOLOGY & ADDITIONAL STUDIES: PULMONARY PROGRESS NOTE      [facility][id number]    Patient is a 37y old  Male who presents with a chief complaint of sob (18 Feb 2018 15:59)  Patient is a 37y old  Male who presents with a chief complaint of sob (18 Feb 2018 15:59)  Patient with presentation for worsening dyspnea without fever, chest pain, cough.  Non smoker.  States just got CPAP machine>not using.  Patient just came out here from [place] where apparently he also go oxygen.  It is unclear if he has O2 that functions at home>"I'm trying to get a machine".  Patient is morbidly obese-no weight or BMI measured to this point and by exam is likely with BMI greater than 60.  Patient states he has CHF.      INTERVAL HPI/OVERNIGHT EVENTS:  feels more edematous  no fever, chills, chest pain  minimal smoking hx  no asthma  was told needed 02 in [place] yr ago  baseline poor athome  MEDICATIONS  (STANDING):  amLODIPine   Tablet 10 milliGRAM(s) Oral daily  aspirin  chewable 81 milliGRAM(s) Oral daily  atorvastatin 40 milliGRAM(s) Oral at bedtime  carvedilol 3.125 milliGRAM(s) Oral two times a day  dextrose 5%. 1000 milliLiter(s) (50 mL/Hr) IV Continuous <Continuous>  dextrose 50% Injectable 12.5 Gram(s) IV Push once  dextrose 50% Injectable 25 Gram(s) IV Push once  dextrose 50% Injectable 25 Gram(s) IV Push once  furosemide   Injectable 40 milliGRAM(s) IV Push every 12 hours  hydrALAZINE 25 milliGRAM(s) Oral every 8 hours  influenza   Vaccine 0.5 milliLiter(s) IntraMuscular once  insulin lispro (HumaLOG) corrective regimen sliding scale   SubCutaneous three times a day before meals      MEDICATIONS  (PRN):  dextrose Gel 1 Dose(s) Oral once PRN Blood Glucose LESS THAN 70 milliGRAM(s)/deciliter  glucagon  Injectable 1 milliGRAM(s) IntraMuscular once PRN Glucose LESS THAN 70 milligrams/deciliter      Allergies    No Known Allergies    Intolerances        PAST MEDICAL & SURGICAL HISTORY:  CHF (congestive heart failure)  Hypertension  No significant past surgical history      SOCIAL HISTORY  Smoking History:       REVIEW OF SYSTEMS:    CONSTITUTIONAL:  No distress    HEENT:  Eyes:  No diplopia or blurred vision. ENT:  No earache, sore throat or runny nose.    CARDIOVASCULAR:  No pressure, squeezing, tightness, heaviness or aching about the chest; no palpitations.    RESPIRATORY:  see hpi    GASTROINTESTINAL:  No nausea, vomiting or diarrhea.    GENITOURINARY:  No dysuria, frequency or urgency.    NEUROLOGIC:  No paresthesias, fasciculations, seizures or weakness.    PSYCHIATRIC:  No disorder of thought or mood.    Vital Signs Last 24 Hrs  T(C): 36.2 (20 Feb 2018 11:00), Max: 36.8 (19 Feb 2018 21:01)  T(F): 97.2 (20 Feb 2018 11:00), Max: 98.2 (19 Feb 2018 21:01)  HR: 97 (20 Feb 2018 11:00) (94 - 97)  BP: 116/82 (20 Feb 2018 11:00) (102/73 - 128/70)  BP(mean): --  RR: 22 (20 Feb 2018 11:00) (20 - 22)  SpO2: 95% (20 Feb 2018 11:00) (95% - 100%)    PHYSICAL EXAMINATION:    GENERAL: The patient is awake and alert in no apparent distress.     HEENT: Head is normocephalic and atraumatic. Extraocular muscles are intact. Mucous membranes are moist.    NECK: Supple.    LUNGS: diminished air entry bilat without wheezing, rales or rhonchi; respirations unlabored    HEART: Regular rate and rhythm without murmur.    ABDOMEN: Soft, nontender, and nondistended.      EXTREMITIES: With chronic 1-2 plus stasis brawny edema.    NEUROLOGIC: Grossly intact.    LABS:                        14.7   3.7   )-----------( 181      ( 20 Feb 2018 06:51 )             48.2     02-20    139  |  96<L>  |  15.0  ----------------------------<  106  4.8   |  33.0<H>  |  0.97    Ca    8.8      20 Feb 2018 06:48  Mg     1.7     02-19    TPro  7.7  /  Alb  3.1<L>  /  TBili  1.6  /  DBili  x   /  AST  38  /  ALT  33  /  AlkPhos  64  02-18    PT/INR - ( 19 Feb 2018 08:26 )   PT: 20.6 sec;   INR: 1.85 ratio         PTT - ( 18 Feb 2018 13:41 )  PTT:36.5 sec    ABG - ( 18 Feb 2018 14:36 )  pH: 7.35  /  pCO2: 59    /  pO2: 77    / HCO3: 29    / Base Excess: 4.9   /  SaO2: 94                CARDIAC MARKERS ( 18 Feb 2018 13:41 )  x     / <0.01 ng/mL / x     / x     / x            Serum Pro-Brain Natriuretic Peptide: 1803 pg/mL (02-18-18 @ 13:41)          MICROBIOLOGY:    RADIOLOGY & ADDITIONAL STUDIES:  cxr and duplex reviewed

## 2018-02-20 NOTE — PROGRESS NOTE ADULT - ASSESSMENT
OHS / JAN, chronic hypercapnic   and hypoxemic vent failure   mild D dimer, duplex negative, will check CTA if pt able ( table limit 450, V/Q 350)  suspect Cor pulmonale, although need to exclude  LV cardiomyopathy, echo pending  check T 4, TSH  nocturnal Bipap, will need trilogy at discharge  gentle diuresis  weight loss, bariatric surgery eval as out pt given age  home 02  pulmonary follow up out pt  prognosis guarded

## 2018-02-20 NOTE — PROGRESS NOTE ADULT - SUBJECTIVE AND OBJECTIVE BOX
RUDY GOYAL    [id number]    37y      Male    CC: SOB   only mildly better, pt feels  swelling is getting worse, cannot lie down in bed  scrotal edema      INTERVAL HPI/OVERNIGHT EVENTS: no acute events    REVIEW OF SYSTEMS:    CONSTITUTIONAL: No fever,   RESPIRATORY: No cough  CARDIOVASCULAR: No chest pain, palpitations  GASTROINTESTINAL: No abdominal or epigastric pain. No nausea, vomiting      Vital Signs Last 24 Hrs  T(C): 36.2 (20 Feb 2018 11:00), Max: 36.8 (19 Feb 2018 21:01)  T(F): 97.2 (20 Feb 2018 11:00), Max: 98.2 (19 Feb 2018 21:01)  HR: 94 (20 Feb 2018 13:08) (94 - 97)  BP: 112/68 (20 Feb 2018 13:08) (102/73 - 128/70)  BP(mean): --  RR: 22 (20 Feb 2018 11:00) (20 - 22)  SpO2: 95% (20 Feb 2018 11:00) (95% - 100%)      PHYSICAL EXAM:    GENERAL: NAD, morbidly obese , sitting up   HEENT: PERRL, +EOMI  NECK: soft, Supple,   CHEST/LUNG: Clear to percussion bilaterally, b/l rales basal , difficult ausculation   HEART: S1S2+, Regular rate and rhythm; No murmurs  EXTREMITIES:   No clubbing, cyanosis. pedal  edema 2+  NEURO: AAOX3          02-19 @ 07:01  -  02-20 @ 07:00  --------------------------------------------------------  IN: 0 mL / OUT: 350 mL / NET: -350 mL        LABS:                        14.7   3.7   )-----------( 181      ( 20 Feb 2018 06:51 )             48.2     02-20    139  |  96<L>  |  15.0  ----------------------------<  106  4.8   |  33.0<H>  |  0.97    Ca    8.8      20 Feb 2018 06:48  Mg     1.7     02-19      PT/INR - ( 19 Feb 2018 08:26 )   PT: 20.6 sec;   INR: 1.85 ratio                 MEDICATIONS  (STANDING):  amLODIPine   Tablet 10 milliGRAM(s) Oral daily  aspirin  chewable 81 milliGRAM(s) Oral daily  atorvastatin 40 milliGRAM(s) Oral at bedtime  carvedilol 3.125 milliGRAM(s) Oral two times a day  dextrose 5%. 1000 milliLiter(s) (50 mL/Hr) IV Continuous <Continuous>  dextrose 50% Injectable 12.5 Gram(s) IV Push once  dextrose 50% Injectable 25 Gram(s) IV Push once  dextrose 50% Injectable 25 Gram(s) IV Push once  furosemide   Injectable 40 milliGRAM(s) IV Push every 12 hours  hydrALAZINE 25 milliGRAM(s) Oral every 8 hours  influenza   Vaccine 0.5 milliLiter(s) IntraMuscular once  insulin lispro (HumaLOG) corrective regimen sliding scale   SubCutaneous three times a day before meals    MEDICATIONS  (PRN):  dextrose Gel 1 Dose(s) Oral once PRN Blood Glucose LESS THAN 70 milliGRAM(s)/deciliter  glucagon  Injectable 1 milliGRAM(s) IntraMuscular once PRN Glucose LESS THAN 70 milligrams/deciliter      RADIOLOGY & ADDITIONAL TESTS:

## 2018-02-21 LAB
ANION GAP SERPL CALC-SCNC: 10 MMOL/L — SIGNIFICANT CHANGE UP (ref 5–17)
BUN SERPL-MCNC: 17 MG/DL — SIGNIFICANT CHANGE UP (ref 8–20)
CALCIUM SERPL-MCNC: 8.5 MG/DL — LOW (ref 8.6–10.2)
CHLORIDE SERPL-SCNC: 92 MMOL/L — LOW (ref 98–107)
CO2 SERPL-SCNC: 35 MMOL/L — HIGH (ref 22–29)
CREAT SERPL-MCNC: 0.84 MG/DL — SIGNIFICANT CHANGE UP (ref 0.5–1.3)
GLUCOSE BLDC GLUCOMTR-MCNC: 106 MG/DL — HIGH (ref 70–99)
GLUCOSE BLDC GLUCOMTR-MCNC: 137 MG/DL — HIGH (ref 70–99)
GLUCOSE BLDC GLUCOMTR-MCNC: 147 MG/DL — HIGH (ref 70–99)
GLUCOSE BLDC GLUCOMTR-MCNC: 92 MG/DL — SIGNIFICANT CHANGE UP (ref 70–99)
GLUCOSE SERPL-MCNC: 111 MG/DL — SIGNIFICANT CHANGE UP (ref 70–115)
INR BLD: 1.9 RATIO — HIGH (ref 0.88–1.16)
MAGNESIUM SERPL-MCNC: 1.7 MG/DL — SIGNIFICANT CHANGE UP (ref 1.6–2.6)
POTASSIUM SERPL-MCNC: 4.1 MMOL/L — SIGNIFICANT CHANGE UP (ref 3.5–5.3)
POTASSIUM SERPL-SCNC: 4.1 MMOL/L — SIGNIFICANT CHANGE UP (ref 3.5–5.3)
PROTHROM AB SERPL-ACNC: 21.2 SEC — HIGH (ref 9.8–12.7)
SODIUM SERPL-SCNC: 137 MMOL/L — SIGNIFICANT CHANGE UP (ref 135–145)
T4 FREE SERPL-MCNC: 1 NG/DL — SIGNIFICANT CHANGE UP (ref 0.9–1.8)
TSH SERPL-MCNC: 2.38 UIU/ML — SIGNIFICANT CHANGE UP (ref 0.27–4.2)

## 2018-02-21 PROCEDURE — 99233 SBSQ HOSP IP/OBS HIGH 50: CPT

## 2018-02-21 RX ORDER — ENOXAPARIN SODIUM 100 MG/ML
40 INJECTION SUBCUTANEOUS DAILY
Qty: 0 | Refills: 0 | Status: DISCONTINUED | OUTPATIENT
Start: 2018-02-21 | End: 2018-02-21

## 2018-02-21 RX ORDER — MAGNESIUM SULFATE 500 MG/ML
2 VIAL (ML) INJECTION ONCE
Qty: 0 | Refills: 0 | Status: COMPLETED | OUTPATIENT
Start: 2018-02-21 | End: 2018-02-21

## 2018-02-21 RX ORDER — AMLODIPINE BESYLATE 2.5 MG/1
5 TABLET ORAL DAILY
Qty: 0 | Refills: 0 | Status: DISCONTINUED | OUTPATIENT
Start: 2018-02-21 | End: 2018-02-24

## 2018-02-21 RX ADMIN — ATORVASTATIN CALCIUM 40 MILLIGRAM(S): 80 TABLET, FILM COATED ORAL at 23:19

## 2018-02-21 RX ADMIN — CARVEDILOL PHOSPHATE 3.12 MILLIGRAM(S): 80 CAPSULE, EXTENDED RELEASE ORAL at 05:59

## 2018-02-21 RX ADMIN — Medication 25 MILLIGRAM(S): at 05:59

## 2018-02-21 RX ADMIN — Medication 81 MILLIGRAM(S): at 11:24

## 2018-02-21 RX ADMIN — Medication 50 GRAM(S): at 11:24

## 2018-02-21 RX ADMIN — AMLODIPINE BESYLATE 10 MILLIGRAM(S): 2.5 TABLET ORAL at 05:59

## 2018-02-21 RX ADMIN — Medication 5 MG/HR: at 09:49

## 2018-02-21 RX ADMIN — Medication 25 MILLIGRAM(S): at 13:59

## 2018-02-21 NOTE — PROGRESS NOTE ADULT - ASSESSMENT
36y/o female with PMH of morbid obesity, hypoventilation syndrome 36y/o female with PMH of morbid obesity,  Diabetes. hypoventilation syndrome, Right sided heart failure (normal ef) admitted with Hypercapnic resp insuff / fluid overload/chf    CHF RIGHT SIDED HEART FAILURE  continue Iv lasix  good output  continue to diurese  Hydralazine 25 q8h    JAN  patient needs to follow up with pulmonary out patient for sleep study  to get fitted for cpap mask    ESSENTIAL HYPERTENSION  would decrease norvasc to 10 mg which is associated with edema  and b/p is on the low side  continue hydralazine  low na diet  Dietician to see patient to  regarding low na low carb diet    AODM  Low carb diet  Sliding scale coverage 38y/o female with PMH of morbid obesity,  Diabetes. hypoventilation syndrome, Right sided heart failure (normal ef) admitted with Hypercapnic resp insuff / fluid overload/chf    CHF RIGHT SIDED HEART FAILURE  continue Iv lasix  good output  continue to diurese  Hydralazine 25 q8h    JAN  patient needs to follow up with pulmonary out patient for sleep study  to get fitted for cpap mask    ESSENTIAL HYPERTENSION  would decrease norvasc to 10 mg which is associated with edema  and b/p is on the low side  continue hydralazine  low na diet  Dietician to see patient to  regarding low na low carb diet      Coagulopathy  repeat inr 1.9  Likely Cardiac cirrhosis  but will sent hepatitis profile in am  consider sonogram    AODM  Low carb diet  Sliding scale coverage

## 2018-02-21 NOTE — PROGRESS NOTE ADULT - ASSESSMENT
37yr old male with PMH of HTN, JAN on nocturnal CPAP, non-compliant with use, morbidly obese presents to ER with worsening sob over last 2 mths. He was admitted in [facility] end of last year with similar presentation, does not know his diagnosis does not follow with any PMD/cardiology. HE got very sob today, denies any chest pain/palpitations. Denies any fever/chills/cough. In the ER< he was found to be hypoxic on NC, required 50% VM, was in mild respiratory distress. CXR shows cardiomegaly and bilateral haziness. Labs show elevated BNP, trop - normal, EKG  - tachycardia. He is being admitted for acute repsiratory distress possibly 2/2 acute on chronic CHF.     1. SOB with acute hypoxic hypercarbic respiratory failure -  chronic component as well  most likely 2/2 Acute on chronic CHF with pulmonary HTN   Ct angio chest neg for PE  Morbidly obese - with JAN, OSH  ECHO  - EF - normal, sever pulm HTN    cardio recs  Pulmonary consult appreciated  BIPAP nocturnal   ct Lasix  infusion   F/u BMP,. daily I/O, weights  Will need pulm HTN consult on outpt once fluid balance improves.         2. HTN - better now  On Hydralazine and amlodipine,     3. JAN on CPAP - resume nocturnal cpap    4. Coagulopahty - ? 2/2 congestive hepatopathy, not on any AC  repeat INR   US Liver to .look for MEDINA cirrhosis    4. DVT px   hold pharmacological px until INR improves  IPCs for now    5. morbid obesity - needs weight loss  Dietary eval     6. DM - 2   A1C - 7 meets diagnosis of DM - blood sugars are in acceptable range.   SSI for now  needs weight loss

## 2018-02-21 NOTE — PROGRESS NOTE ADULT - SUBJECTIVE AND OBJECTIVE BOX
RUDY GOYAL    [id number]    37y      Male    CC: SOB admitted with fluid overload, acute on chronic respiratory failure, acute on chronic CHF    swelling some better, he is urinating on lasix drip  scrotal edema       INTERVAL HPI/OVERNIGHT EVENTS: no acute events    REVIEW OF SYSTEMS:    CONSTITUTIONAL: No fever,   RESPIRATORY: No cough  CARDIOVASCULAR: No chest pain, palpitations  GASTROINTESTINAL: No abdominal or epigastric pain. No nausea, vomiting          Vital Signs Last 24 Hrs  T(C): 36.7 (21 Feb 2018 10:00), Max: 36.9 (20 Feb 2018 22:14)  T(F): 98 (21 Feb 2018 10:00), Max: 98.5 (20 Feb 2018 22:14)  HR: 93 (21 Feb 2018 13:58) (91 - 98)  BP: 108/66 (21 Feb 2018 13:58) (108/66 - 128/82)  BP(mean): --  RR: 19 (21 Feb 2018 13:58) (19 - 22)  SpO2: 93% (21 Feb 2018 13:58) (92% - 99%)    PHYSICAL EXAM:    GENERAL: NAD, morbidly obese , sitting up   HEENT: PERRL, +EOMI  NECK: soft, Supple,   CHEST/LUNG: Clear to percussion bilaterally, b/l rales basal , difficult ausculation   HEART: S1S2+, Regular rate and rhythm; No murmurs  EXTREMITIES:   No clubbing, cyanosis. pedal  edema 2+  NEURO: AAOX3            02-20 @ 07:01 - 02-21 @ 07:00  --------------------------------------------------------  IN: 495 mL / OUT: 3325 mL / NET: -2830 mL    02-21 @ 07:01 - 02-21 @ 14:45  --------------------------------------------------------  IN: 40 mL / OUT: 2000 mL / NET: -1960 mL        LABS:                        14.7   3.7   )-----------( 181      ( 20 Feb 2018 06:51 )             48.2     02-21    137  |  92<L>  |  17.0  ----------------------------<  111  4.1   |  35.0<H>  |  0.84    Ca    8.5<L>      21 Feb 2018 06:57  Mg     1.7     02-21      PT/INR - ( 21 Feb 2018 06:57 )   PT: 21.2 sec;   INR: 1.90 ratio                 MEDICATIONS  (STANDING):  amLODIPine   Tablet 10 milliGRAM(s) Oral daily  aspirin  chewable 81 milliGRAM(s) Oral daily  atorvastatin 40 milliGRAM(s) Oral at bedtime  carvedilol 3.125 milliGRAM(s) Oral two times a day  dextrose 5%. 1000 milliLiter(s) (50 mL/Hr) IV Continuous <Continuous>  dextrose 50% Injectable 12.5 Gram(s) IV Push once  dextrose 50% Injectable 25 Gram(s) IV Push once  dextrose 50% Injectable 25 Gram(s) IV Push once  furosemide Infusion 10 mG/Hr (5 mL/Hr) IV Continuous <Continuous>  hydrALAZINE 25 milliGRAM(s) Oral every 8 hours  influenza   Vaccine 0.5 milliLiter(s) IntraMuscular once  insulin lispro (HumaLOG) corrective regimen sliding scale   SubCutaneous three times a day before meals    MEDICATIONS  (PRN):  dextrose Gel 1 Dose(s) Oral once PRN Blood Glucose LESS THAN 70 milliGRAM(s)/deciliter  glucagon  Injectable 1 milliGRAM(s) IntraMuscular once PRN Glucose LESS THAN 70 milligrams/deciliter      RADIOLOGY & ADDITIONAL TESTS:

## 2018-02-21 NOTE — PROGRESS NOTE ADULT - SUBJECTIVE AND OBJECTIVE BOX
[place] CARDIOLOGY-[facility]                                                        Office: [address] [address]                                                       Telephone: [phone]. Fax:[phone]                                                                               PROGRESS NOTE      Reason for follow up:  Follow up on chf  Overnight:  uneventful night                         Update: Continues on iv Lasix drop    Subjective:      Complains of:   Review of symptoms:  Cardiac:  + sob  chest pain. No dyspnea. No palpitations.  Respiratory:   No cough. No dyspnea  Gastrointestinal:   No diarrhea. No abdominal pain. No bleeding.     Past medical history: No updates.      PMH  Morbid obesity  Venous inusff  Essential hypertension    	  Vitals:  T(C): 36.7 (02-21-18 @ 10:00), Max: 36.9 (02-20-18 @ 22:14)  HR: 93 (02-21-18 @ 13:58) (91 - 98)  BP: 108/66 (02-21-18 @ 13:58) (108/66 - 128/82)  RR: 19 (02-21-18 @ 13:58) (19 - 22)  SpO2: 93% (02-21-18 @ 13:58) (92% - 99%)  Wt(kg): --  I&O's Summary    20 Feb 2018 07:01  -  21 Feb 2018 07:00  --------------------------------------------------------  IN: 495 mL / OUT: 3325 mL / NET: -2830 mL    21 Feb 2018 07:01  -  21 Feb 2018 14:18  --------------------------------------------------------  IN: 40 mL / OUT: 2000 mL / NET: -1960 mL          PHYSICAL EXAM:  Appearance: Comfortable. No acute distress  HEENT:  Head and neck: Atraumatic. Normocephalic.  Normal oral mucosa, PERRL, Neck is supple. No JVD, No carotid bruit.   Neurologic: A & O x 3, no focal deficits. EOMI , Cranial nerves are intact.  Lymphatic: No cervical lymphadenopathy  Cardiovascular: Normal S1 S2, No murmur, rubs/gallops. No JVD, No edema  Respiratory: Lungs clear to auscultation  Gastrointestinal:  Soft, Non-tender, + BS  Lower Extremities: No edema  Psychiatry: Patient is calm. No agitation. Mood & affect appropriate  Skin: No rashes/ ecchymoses/cyanosis/ulcers visualized on the face, hands or feet.    CURRENT MEDICATIONS:  amLODIPine   Tablet 10 milliGRAM(s) Oral daily  carvedilol 3.125 milliGRAM(s) Oral two times a day  furosemide Infusion 10 mG/Hr IV Continuous <Continuous>  hydrALAZINE 25 milliGRAM(s) Oral every 8 hours    atorvastatin  dextrose 50% Injectable  dextrose 50% Injectable  dextrose 50% Injectable  insulin lispro (HumaLOG) corrective regimen sliding scale  aspirin  chewable  dextrose 5%.  influenza   Vaccine      LABS:	 	                              14.7   3.7   )-----------( 181      ( 20 Feb 2018 06:51 )             48.2     02-21    137  |  92<L>  |  17.0  ----------------------------<  111  4.1   |  35.0<H>  |  0.84    Ca    8.5<L>      21 Feb 2018 06:57  Mg     1.7     02-21      proBNP: Serum Pro-Brain Natriuretic Peptide: 1803 pg/mL (02-18 @ 13:41)    Lipid Profile:   HgA1c: Hemoglobin A1C, Whole Blood: 7.0 %  TSH: Thyroid Stimulating Hormone, Serum: 2.38 uIU/mL      TELEMETRY: Reviewed    ECG:  Reviewed by me. 	    DIAGNOSTIC TESTING:  [x ] Echocardiogram: 1. Technically difficult study.   2. Left ventricular ejection fraction, by visual estimation, is 55 to   60%.   3. Normal global left ventricular systolic function.   4. Mildly increased left ventricular internal cavity size.   5. Severely enlarged right ventricle. Severely reduced RV systolic   function.   6. Right ventricular volume and pressure overload.   7. Mild mitral valve regurgitation.   8. Normal trileaflet aortic valve with normal opening.   9. Moderate-severe tricuspid regurgitation.  10. Estimated pulmonary artery systolic pressure is 59.7 mmHg assuming a   right atrial pressure of 15 mmHg, which is consistent with severe   pulmonary hypertension.  11. There is no evidence of pericardial effusion.    Venous dopplers 2/19  negative for dvt      [ ]  Catheterization:  [ ] Stress Test:    OTHER: [place] CARDIOLOGY-[facility] Practice                                                        Office: [address] [address]                                                       Telephone: [phone]. Fax:[phone]                                                                               PROGRESS NOTE      Reason for follow up:  Follow up on chf  Overnight:  uneventful night                         Update: Continues on iv Lasix drop    Subjective:      Complains of:   Review of symptoms:  Cardiac:  + sob  chest pain. No dyspnea. No palpitations.  Respiratory:   No cough. No dyspnea  Gastrointestinal:   No diarrhea. No abdominal pain. No bleeding.     Past medical history: No updates.      PMH  Congestive heart failure  right sided heart failure/pulm htn  Morbid obesity  Venous inusff  Essential hypertension  Diabetic  Hgb aic 7  	  Vitals:  T(C): 36.7 (02-21-18 @ 10:00), Max: 36.9 (02-20-18 @ 22:14)  HR: 93 (02-21-18 @ 13:58) (91 - 98)  BP: 108/66 (02-21-18 @ 13:58) (108/66 - 128/82)  RR: 19 (02-21-18 @ 13:58) (19 - 22)  SpO2: 93% (02-21-18 @ 13:58) (92% - 99%)  Wt(kg): --  I&O's Summary    20 Feb 2018 07:01  -  21 Feb 2018 07:00  --------------------------------------------------------  IN: 495 mL / OUT: 3325 mL / NET: -2830 mL    21 Feb 2018 07:01  -  21 Feb 2018 14:18  --------------------------------------------------------  IN: 40 mL / OUT: 2000 mL / NET: -1960 mL          PHYSICAL EXAM:  Appearance: Comfortable. No acute distress  HEENT:  Head and neck: Atraumatic. Normocephalic.  Normal oral mucosa, PERRL, Neck is supple. No JVD, No carotid bruit.   Neurologic: A & O x 3, no focal deficits. EOMI , Cranial nerves are intact.  Lymphatic: No cervical lymphadenopathy  Cardiovascular: Normal S1 S2, No murmur, rubs/gallops. No JVD, No edema  Respiratory: Lungs clear to auscultation  Gastrointestinal:  Soft, Non-tender, + BS  Lower Extremities: No edema  Psychiatry: Patient is calm. No agitation. Mood & affect appropriate  Skin: No rashes/ ecchymoses/cyanosis/ulcers visualized on the face, hands or feet.    CURRENT MEDICATIONS:  amLODIPine   Tablet 10 milliGRAM(s) Oral daily  carvedilol 3.125 milliGRAM(s) Oral two times a day  furosemide Infusion 10 mG/Hr IV Continuous <Continuous>  hydrALAZINE 25 milliGRAM(s) Oral every 8 hours    atorvastatin  dextrose 50% Injectable  dextrose 50% Injectable  dextrose 50% Injectable  insulin lispro (HumaLOG) corrective regimen sliding scale  aspirin  chewable  dextrose 5%.  influenza   Vaccine      LABS:	 	                              14.7   3.7   )-----------( 181      ( 20 Feb 2018 06:51 )             48.2     02-21    137  |  92<L>  |  17.0  ----------------------------<  111  4.1   |  35.0<H>  |  0.84    Ca    8.5<L>      21 Feb 2018 06:57  Mg     1.7     02-21      proBNP: Serum Pro-Brain Natriuretic Peptide: 1803 pg/mL (02-18 @ 13:41)    Lipid Profile:   HgA1c: Hemoglobin A1C, Whole Blood: 7.0 %  TSH: Thyroid Stimulating Hormone, Serum: 2.38 uIU/mL      TELEMETRY: Reviewed    ECG:  Reviewed by me. 	    DIAGNOSTIC TESTING:  [x ] Echocardiogram: 1. Technically difficult study.   2. Left ventricular ejection fraction, by visual estimation, is 55 to   60%.   3. Normal global left ventricular systolic function.   4. Mildly increased left ventricular internal cavity size.   5. Severely enlarged right ventricle. Severely reduced RV systolic   function.   6. Right ventricular volume and pressure overload.   7. Mild mitral valve regurgitation.   8. Normal trileaflet aortic valve with normal opening.   9. Moderate-severe tricuspid regurgitation.  10. Estimated pulmonary artery systolic pressure is 59.7 mmHg assuming a   right atrial pressure of 15 mmHg, which is consistent with severe   pulmonary hypertension.  11. There is no evidence of pericardial effusion.    Venous dopplers 2/19  negative for dvt      [ ]  Catheterization:  [ ] Stress Test:    OTHER: [place] CARDIOLOGY-[facility] Faculty Practice                                                        Office: [address] [address]                                                       Telephone: [phone]. Fax:[phone]                                                                               PROGRESS NOTE      Reason for follow up:  Follow up on chf  Overnight:  uneventful night                         Update: Continues on iv Lasix drop  Output 1960      Subjective:      Complains of:   Review of symptoms:  Cardiac:  + sob  chest pain. No dyspnea. No palpitations.  Respiratory:   No cough. No dyspnea  Gastrointestinal:   No diarrhea. No abdominal pain. No bleeding.     Past medical history: No updates.      PMH  Congestive heart failure  right sided heart failure/pulm htn  Had reported rhc pulm htn  c -> told he did not need stents  JAN (does not have machine at home)/chronic hypoventilation   Morbid obesity  Venous inusff  Essential hypertension  Diabetic  Hgb aic 7  	  Vitals:  T(C): 36.7 (02-21-18 @ 10:00), Max: 36.9 (02-20-18 @ 22:14)  HR: 93 (02-21-18 @ 13:58) (91 - 98)  BP: 108/66 (02-21-18 @ 13:58) (108/66 - 128/82)  RR: 19 (02-21-18 @ 13:58) (19 - 22)  SpO2: 93% (02-21-18 @ 13:58) (92% - 99%)  Wt(kg): --  I&O's Summary    20 Feb 2018 07:01  -  21 Feb 2018 07:00  --------------------------------------------------------  IN: 495 mL / OUT: 3325 mL / NET: -2830 mL    21 Feb 2018 07:01  -  21 Feb 2018 14:18  --------------------------------------------------------  IN: 40 mL / OUT: 2000 mL / NET: -1960 mL    PHYSICAL EXAM:  Appearance in nad  HEENT: Lymphatic: No cervical lymphadenopathy  Cardiovascular: Normal S1 S2 decreased  Respiratory: Decreased lung sounds  Gastrointestinal:  Soft, Non-tender, + BS  Lower Extremities: No edema  Psychiatry: Patient is calm. No agitation. Mood & affect appropriate  Skin: No rashes/ ecchymoses/cyanosis/ulcers visualized on the face, hands or feet.  Neurologic: A & O x 3, no focal deficits. EOMI , Cranial nerves are intact.  Psychiatry: Patient is calm. No agitation. Mood & affect appropriate    CURRENT MEDICATIONS:  amLODIPine   Tablet 10 milliGRAM(s) Oral daily  carvedilol 3.125 milliGRAM(s) Oral two times a day  furosemide Infusion 10 mG/Hr IV Continuous <Continuous>  hydrALAZINE 25 milliGRAM(s) Oral every 8 hours  aspirin  chewable    atorvastatin  insulin lispro (HumaLOG) corrective regimen sliding scale      LABS:	 	                        14.7   3.7   )-----------( 181      ( 20 Feb 2018 06:51 )             48.2     02-21    137  |  92<L>  |  17.0  ----------------------------<  111  4.1   |  35.0<H>  |  0.84    Ca    8.5<L>      21 Feb 2018 06:57  Mg     1.7     02-21      ProBNP: Serum Pro-Brain Natriuretic Peptide: 1803 pg/mL (02-18 @ 13:41)    Lipid Profile:   HgA1c: Hemoglobin A1C, Whole Blood: 7.0 %  TSH: Thyroid Stimulating Hormone, Serum: 2.38 uIU/mL       Telemetry  Cm nsr  No arrhythmia   2 desaturations down to 89      DIAGNOSTIC TESTING:  [x ] Echocardiogram: 1. Technically difficult study.   2. Left ventricular ejection fraction, by visual estimation, is 55 to   60%.   3. Normal global left ventricular systolic function.   4. Mildly increased left ventricular internal cavity size.   5. Severely enlarged right ventricle. Severely reduced RV systolic   function.   6. Right ventricular volume and pressure overload.   7. Mild mitral valve regurgitation.   8. Normal trileaflet aortic valve with normal opening.   9. Moderate-severe tricuspid regurgitation.  10. Estimated pulmonary artery systolic pressure is 59.7 mmHg assuming a   right atrial pressure of 15 mmHg, which is consistent with severe   pulmonary hypertension.  11. There is no evidence of pericardial effusion.    Venous dopplers 2/19  negative for dvt      [ ]  Catheterization:  [ ] Stress Test:    OTHER: [place] CARDIOLOGY-[facility] Faculty Practice                                                        Office: [address] [address]                                                       Telephone: [phone]. Fax:[phone]                                                                               PROGRESS NOTE      Reason for follow up:  Follow up on chf  Overnight:  uneventful night                         Update: Continues on iv Lasix drop  Output 1960      Subjective:      Complains of:   Review of symptoms:  Cardiac:  + sob  chest pain. No dyspnea. No palpitations.  Respiratory:   No cough. No dyspnea  Gastrointestinal:   No diarrhea. No abdominal pain. No bleeding.     Past medical history: No updates.      PMH  Congestive heart failure  right sided heart failure/pulm htn  Had reported rhc pulm htn  c -> told he did not need stents  JAN (does not have machine at home)/chronic hypoventilation   Morbid obesity  Venous inusff  Essential hypertension  Diabetic  Hgb aic 7  	  Vitals:  T(C): 36.7 (02-21-18 @ 10:00), Max: 36.9 (02-20-18 @ 22:14)  HR: 93 (02-21-18 @ 13:58) (91 - 98)  BP: 108/66 (02-21-18 @ 13:58) (108/66 - 128/82)  RR: 19 (02-21-18 @ 13:58) (19 - 22)  SpO2: 93% (02-21-18 @ 13:58) (92% - 99%)  Wt(kg): --  I&O's Summary    20 Feb 2018 07:01  -  21 Feb 2018 07:00  --------------------------------------------------------  IN: 495 mL / OUT: 3325 mL / NET: -2830 mL    21 Feb 2018 07:01  -  21 Feb 2018 14:18  --------------------------------------------------------  IN: 40 mL / OUT: 2000 mL / NET: -1960 mL    PHYSICAL EXAM:  Appearance in nad  HEENT: Lymphatic: No cervical lymphadenopathy  Cardiovascular: Normal S1 S2 decreased  Respiratory: Decreased lung sounds  Gastrointestinal:  Soft, Non-tender, + BS  Lower Extremities: No edema  Psychiatry: Patient is calm. No agitation. Mood & affect appropriate  Skin: Warm dry  1 plus pitting edema  patient states much less than yesterday  Neurologic: A & O x 3, no focal deficits. EOMI , Cranial nerves are intact.  Psychiatry: Patient is calm. No agitation. Mood & affect appropriate    CURRENT MEDICATIONS:  amLODIPine   Tablet 10 milliGRAM(s) Oral daily  carvedilol 3.125 milliGRAM(s) Oral two times a day  furosemide Infusion 10 mG/Hr IV Continuous <Continuous>  hydrALAZINE 25 milliGRAM(s) Oral every 8 hours  aspirin  chewable    atorvastatin  insulin lispro (HumaLOG) corrective regimen sliding scale      LABS:	 	                        14.7   3.7   )-----------( 181      ( 20 Feb 2018 06:51 )             48.2     02-21    137  |  92<L>  |  17.0  ----------------------------<  111  4.1   |  35.0<H>  |  0.84    Ca    8.5<L>      21 Feb 2018 06:57  Mg     1.7     02-21      ProBNP: Serum Pro-Brain Natriuretic Peptide: 1803 pg/mL (02-18 @ 13:41)    Lipid Profile:   HgA1c: Hemoglobin A1C, Whole Blood: 7.0 %  TSH: Thyroid Stimulating Hormone, Serum: 2.38 uIU/mL       Telemetry  Cm nsr  No arrhythmia   2 desaturations down to 89    DIAGNOSTIC TESTING:  [x ] Echocardiogram: 1. Technically difficult study.   2. Left ventricular ejection fraction, by visual estimation, is 55 to   60%.   3. Normal global left ventricular systolic function.   4. Mildly increased left ventricular internal cavity size.   5. Severely enlarged right ventricle. Severely reduced RV systolic   function.   6. Right ventricular volume and pressure overload.   7. Mild mitral valve regurgitation.   8. Normal trileaflet aortic valve with normal opening.   9. Moderate-severe tricuspid regurgitation.  10. Estimated pulmonary artery systolic pressure is 59.7 mmHg assuming a   right atrial pressure of 15 mmHg, which is consistent with severe   pulmonary hypertension.  11. There is no evidence of pericardial effusion.    Venous dopplers 2/19  negative for dvt  Ct chest 2/20 no pe [place] CARDIOLOGY-[facility] Faculty Practice                                                        Office: [address] [address]                                                       Telephone: [phone]. Fax:[phone]                                                                               PROGRESS NOTE      Reason for follow up:  Follow up on chf  Overnight:  uneventful night                         Update: Continues on iv Lasix drop  Output 1960      Subjective:      Complains of:   Review of symptoms:  Cardiac:  + sob  chest pain. ++ dyspnea. No palpitations.  Respiratory:   No cough. No dyspnea  Gastrointestinal:   No diarrhea. No abdominal pain. No bleeding.     Past medical history: No updates.      PMH  Congestive heart failure  right sided heart failure/pulm htn  Had reported rhc pulm htn  [facility] -> told he did not need stents  JAN (does not have machine at home)/chronic hypoventilation   Morbid obesity  Venous inusff  Essential hypertension  Diabetic  Hgb aic 7  	  Vitals:  T(C): 36.7 (02-21-18 @ 10:00), Max: 36.9 (02-20-18 @ 22:14)  HR: 93 (02-21-18 @ 13:58) (91 - 98)  BP: 108/66 (02-21-18 @ 13:58) (108/66 - 128/82)  RR: 19 (02-21-18 @ 13:58) (19 - 22)  SpO2: 93% (02-21-18 @ 13:58) (92% - 99%)  Wt(kg): --  I&O's Summary    20 Feb 2018 07:01  -  21 Feb 2018 07:00  --------------------------------------------------------  IN: 495 mL / OUT: 3325 mL / NET: -2830 mL    21 Feb 2018 07:01  -  21 Feb 2018 14:18  --------------------------------------------------------  IN: 40 mL / OUT: 2000 mL / NET: -1960 mL    PHYSICAL EXAM:  Appearance in nad  HEENT: Lymphatic: No cervical lymphadenopathy  Cardiovascular: Normal S1 S2 decreased  Respiratory: Decreased lung sounds  Gastrointestinal:  Soft, Non-tender, + BS 2Plus scrotal swelling  Lower Extremities: No edema  Psychiatry: Patient is calm. No agitation. Mood & affect appropriate  Skin: Warm dry  1 plus pitting edema  patient states much less than yesterday  Neurologic: A & O x 3, no focal deficits. EOMI , Cranial nerves are intact.  Psychiatry: Patient is calm. No agitation. Mood & affect appropriate    CURRENT MEDICATIONS:  amLODIPine   Tablet 10 milliGRAM(s) Oral daily  carvedilol 3.125 milliGRAM(s) Oral two times a day  furosemide Infusion 10 mG/Hr IV Continuous <Continuous>  hydrALAZINE 25 milliGRAM(s) Oral every 8 hours  aspirin  chewable    atorvastatin  insulin lispro (HumaLOG) corrective regimen sliding scale      LABS:	 	                        14.7   3.7   )-----------( 181      ( 20 Feb 2018 06:51 )             48.2     02-21    137  |  92<L>  |  17.0  ----------------------------<  111  4.1   |  35.0<H>  |  0.84    Ca    8.5<L>      21 Feb 2018 06:57  Mg     1.7     02-21      ProBNP: Serum Pro-Brain Natriuretic Peptide: 1803 pg/mL (02-18 @ 13:41)    Lipid Profile:   HgA1c: Hemoglobin A1C, Whole Blood: 7.0 %  TSH: Thyroid Stimulating Hormone, Serum: 2.38 uIU/mL       Telemetry  Cm nsr  No arrhythmia   2 desaturations down to 89    DIAGNOSTIC TESTING:  [x ] Echocardiogram: 1. Technically difficult study.   2. Left ventricular ejection fraction, by visual estimation, is 55 to   60%.   3. Normal global left ventricular systolic function.   4. Mildly increased left ventricular internal cavity size.   5. Severely enlarged right ventricle. Severely reduced RV systolic   function.   6. Right ventricular volume and pressure overload.   7. Mild mitral valve regurgitation.   8. Normal trileaflet aortic valve with normal opening.   9. Moderate-severe tricuspid regurgitation.  10. Estimated pulmonary artery systolic pressure is 59.7 mmHg assuming a   right atrial pressure of 15 mmHg, which is consistent with severe   pulmonary hypertension.  11. There is no evidence of pericardial effusion.    Venous dopplers 2/19  negative for dvt  Ct chest 2/20 no pe

## 2018-02-22 LAB
ALBUMIN SERPL ELPH-MCNC: 2.8 G/DL — LOW (ref 3.3–5.2)
ALP SERPL-CCNC: 54 U/L — SIGNIFICANT CHANGE UP (ref 40–120)
ALT FLD-CCNC: 23 U/L — SIGNIFICANT CHANGE UP
ANION GAP SERPL CALC-SCNC: 8 MMOL/L — SIGNIFICANT CHANGE UP (ref 5–17)
AST SERPL-CCNC: 30 U/L — SIGNIFICANT CHANGE UP
BILIRUB DIRECT SERPL-MCNC: 0.4 MG/DL — HIGH (ref 0–0.3)
BILIRUB INDIRECT FLD-MCNC: 1.1 MG/DL — HIGH (ref 0.2–1)
BILIRUB SERPL-MCNC: 1.5 MG/DL — SIGNIFICANT CHANGE UP (ref 0.4–2)
BUN SERPL-MCNC: 15 MG/DL — SIGNIFICANT CHANGE UP (ref 8–20)
CALCIUM SERPL-MCNC: 8.5 MG/DL — LOW (ref 8.6–10.2)
CHLORIDE SERPL-SCNC: 92 MMOL/L — LOW (ref 98–107)
CO2 SERPL-SCNC: 38 MMOL/L — HIGH (ref 22–29)
CREAT SERPL-MCNC: 0.79 MG/DL — SIGNIFICANT CHANGE UP (ref 0.5–1.3)
GLUCOSE BLDC GLUCOMTR-MCNC: 103 MG/DL — HIGH (ref 70–99)
GLUCOSE BLDC GLUCOMTR-MCNC: 125 MG/DL — HIGH (ref 70–99)
GLUCOSE BLDC GLUCOMTR-MCNC: 81 MG/DL — SIGNIFICANT CHANGE UP (ref 70–99)
GLUCOSE BLDC GLUCOMTR-MCNC: 98 MG/DL — SIGNIFICANT CHANGE UP (ref 70–99)
GLUCOSE SERPL-MCNC: 109 MG/DL — SIGNIFICANT CHANGE UP (ref 70–115)
HBV SURFACE AB SER-ACNC: SIGNIFICANT CHANGE UP
HBV SURFACE AG SER-ACNC: SIGNIFICANT CHANGE UP
HCT VFR BLD CALC: 48 % — SIGNIFICANT CHANGE UP (ref 42–52)
HCV AB S/CO SERPL IA: 0.17 S/CO — SIGNIFICANT CHANGE UP
HCV AB SERPL-IMP: SIGNIFICANT CHANGE UP
HGB BLD-MCNC: 14.8 G/DL — SIGNIFICANT CHANGE UP (ref 14–18)
MAGNESIUM SERPL-MCNC: 1.8 MG/DL — SIGNIFICANT CHANGE UP (ref 1.6–2.6)
MCHC RBC-ENTMCNC: 28.6 PG — SIGNIFICANT CHANGE UP (ref 27–31)
MCHC RBC-ENTMCNC: 30.8 G/DL — LOW (ref 32–36)
MCV RBC AUTO: 92.8 FL — SIGNIFICANT CHANGE UP (ref 80–94)
PLATELET # BLD AUTO: 181 K/UL — SIGNIFICANT CHANGE UP (ref 150–400)
POTASSIUM SERPL-MCNC: 4 MMOL/L — SIGNIFICANT CHANGE UP (ref 3.5–5.3)
POTASSIUM SERPL-SCNC: 4 MMOL/L — SIGNIFICANT CHANGE UP (ref 3.5–5.3)
PROT SERPL-MCNC: 7.3 G/DL — SIGNIFICANT CHANGE UP (ref 6.6–8.7)
RBC # BLD: 5.17 M/UL — SIGNIFICANT CHANGE UP (ref 4.6–6.2)
RBC # FLD: 16.3 % — HIGH (ref 11–15.6)
SODIUM SERPL-SCNC: 138 MMOL/L — SIGNIFICANT CHANGE UP (ref 135–145)
WBC # BLD: 3.7 K/UL — LOW (ref 4.8–10.8)
WBC # FLD AUTO: 3.7 K/UL — LOW (ref 4.8–10.8)

## 2018-02-22 PROCEDURE — 99233 SBSQ HOSP IP/OBS HIGH 50: CPT

## 2018-02-22 PROCEDURE — 76705 ECHO EXAM OF ABDOMEN: CPT | Mod: 26

## 2018-02-22 PROCEDURE — 99232 SBSQ HOSP IP/OBS MODERATE 35: CPT

## 2018-02-22 RX ADMIN — Medication 81 MILLIGRAM(S): at 13:58

## 2018-02-22 RX ADMIN — ATORVASTATIN CALCIUM 40 MILLIGRAM(S): 80 TABLET, FILM COATED ORAL at 21:47

## 2018-02-22 RX ADMIN — Medication 25 MILLIGRAM(S): at 13:58

## 2018-02-22 RX ADMIN — CARVEDILOL PHOSPHATE 3.12 MILLIGRAM(S): 80 CAPSULE, EXTENDED RELEASE ORAL at 18:10

## 2018-02-22 RX ADMIN — Medication 5 MG/HR: at 18:10

## 2018-02-22 NOTE — PROGRESS NOTE ADULT - SUBJECTIVE AND OBJECTIVE BOX
RUDY GOYAL    [id number]    37y      Male    CC: SOB admitted with fluid overload, acute on chronic respiratory failure, acute on chronic CHF    swelling some better, he is urinating on lasix drip  scrotal edema is persistent, unable to get out of bed, not able to ambulate.       INTERVAL HPI/OVERNIGHT EVENTS: no acute events    REVIEW OF SYSTEMS:    CONSTITUTIONAL: No fever,   RESPIRATORY: No cough  CARDIOVASCULAR: No chest pain, palpitations  GASTROINTESTINAL: No abdominal or epigastric pain. No nausea, vomiting    Vital Signs Last 24 Hrs  T(C): 36.7 (22 Feb 2018 10:28), Max: 37.2 (21 Feb 2018 23:15)  T(F): 98.1 (22 Feb 2018 10:28), Max: 99 (21 Feb 2018 23:15)  HR: 98 (22 Feb 2018 10:28) (88 - 98)  BP: 120/88 (22 Feb 2018 10:28) (100/52 - 120/88)  BP(mean): --  RR: 20 (22 Feb 2018 10:28) (18 - 21)  SpO2: 95% (22 Feb 2018 09:12) (93% - 100%)          PHYSICAL EXAM:    GENERAL: NAD, morbidly obese , sitting up   HEENT: PERRL, +EOMI  NECK: soft, Supple,   CHEST/LUNG: Clear to percussion bilaterally, b/l rales basal , difficult ausculation   HEART: S1S2+, Regular rate and rhythm; No murmurs  EXTREMITIES:   No clubbing, cyanosis. pedal  edema 2+  NEURO: AAOX3        02-21 @ 07:01 - 02-22 @ 07:00  --------------------------------------------------------  IN: 340 mL / OUT: 4750 mL / NET: -4410 mL    02-22 @ 07:01 - 02-22 @ 13:39  --------------------------------------------------------  IN: 10 mL / OUT: 2050 mL / NET: -2040 mL        LABS:                        14.8   3.7   )-----------( 181      ( 22 Feb 2018 06:55 )             48.0     02-22    138  |  92<L>  |  15.0  ----------------------------<  109  4.0   |  38.0<H>  |  0.79    Ca    8.5<L>      22 Feb 2018 06:55  Mg     1.8     02-22    TPro  7.3  /  Alb  2.8<L>  /  TBili  1.5  /  DBili  0.4<H>  /  AST  30  /  ALT  23  /  AlkPhos  54  02-22    PT/INR - ( 21 Feb 2018 06:57 )   PT: 21.2 sec;   INR: 1.90 ratio                 MEDICATIONS  (STANDING):  amLODIPine   Tablet 5 milliGRAM(s) Oral daily  aspirin  chewable 81 milliGRAM(s) Oral daily  atorvastatin 40 milliGRAM(s) Oral at bedtime  carvedilol 3.125 milliGRAM(s) Oral two times a day  dextrose 5%. 1000 milliLiter(s) (50 mL/Hr) IV Continuous <Continuous>  dextrose 50% Injectable 12.5 Gram(s) IV Push once  dextrose 50% Injectable 25 Gram(s) IV Push once  dextrose 50% Injectable 25 Gram(s) IV Push once  furosemide Infusion 10 mG/Hr (5 mL/Hr) IV Continuous <Continuous>  hydrALAZINE 25 milliGRAM(s) Oral every 8 hours  influenza   Vaccine 0.5 milliLiter(s) IntraMuscular once  insulin lispro (HumaLOG) corrective regimen sliding scale   SubCutaneous three times a day before meals    MEDICATIONS  (PRN):  dextrose Gel 1 Dose(s) Oral once PRN Blood Glucose LESS THAN 70 milliGRAM(s)/deciliter  glucagon  Injectable 1 milliGRAM(s) IntraMuscular once PRN Glucose LESS THAN 70 milligrams/deciliter      RADIOLOGY & ADDITIONAL TESTS:

## 2018-02-22 NOTE — PROGRESS NOTE ADULT - ASSESSMENT
37yr old male with PMH of HTN, JAN on nocturnal CPAP, non-compliant with use, morbidly obese presents to ER with worsening sob over last 2 mths. He was admitted in [facility] end of last year with similar presentation, does not know his diagnosis does not follow with any PMD/cardiology. HE got very sob today, denies any chest pain/palpitations. Denies any fever/chills/cough. In the ER< he was found to be hypoxic on NC, required 50% VM, was in mild respiratory distress. CXR shows cardiomegaly and bilateral haziness. Labs show elevated BNP, trop - normal, EKG  - tachycardia. He is being admitted for acute repsiratory distress possibly 2/2 acute on chronic CHF.     1. SOB with acute hypoxic hypercarbic respiratory failure -  chronic component as well  most likely 2/2 Acute on chronic CHF with pulmonary HTN   Ct angio chest neg for PE  Morbidly obese - with JAN, OSH  ECHO  - EF - normal, sever pulm HTN    cardio recs  Pulmonary consult appreciated  BIPAP nocturnal   ct Lasix  infusion   F/u BMP,. daily I/O, weights      2. HTN - better now  On Hydralazine and amlodipine,     3. JAN on CPAP - resume nocturnal cpap  severe pulm HTN - consider pulm eval pulmonary vasodilators?     4. Coagulopahty - ? 2/2 congestive hepatopathy, not on any AC  repeat INR   US Liver show fatty change no e/o cirrhosis.     4. DVT px   hold pharmacological px until INR improves  IPCs for now    5. morbid obesity - needs weight loss  Dietary eval     6. DM - 2   A1C - 7 meets diagnosis of DM - blood sugars are in acceptable range.   SSI for now  needs weight loss

## 2018-02-22 NOTE — PROGRESS NOTE ADULT - SUBJECTIVE AND OBJECTIVE BOX
PULMONARY PROGRESS NOTE      [id number]    Patient is a 37y old  Male who presents with a chief complaint of sob (18 Feb 2018 15:59)      INTERVAL HPI/OVERNIGHT EVENTS:  On IV lasix  Diuresing well  ON nocturnal BIPAP  No dyspnea at this time  Far more alert than initial evaluation    MEDICATIONS  (STANDING):  amLODIPine   Tablet 5 milliGRAM(s) Oral daily  aspirin  chewable 81 milliGRAM(s) Oral daily  atorvastatin 40 milliGRAM(s) Oral at bedtime  carvedilol 3.125 milliGRAM(s) Oral two times a day  dextrose 5%. 1000 milliLiter(s) (50 mL/Hr) IV Continuous <Continuous>  dextrose 50% Injectable 12.5 Gram(s) IV Push once  dextrose 50% Injectable 25 Gram(s) IV Push once  dextrose 50% Injectable 25 Gram(s) IV Push once  furosemide Infusion 10 mG/Hr (5 mL/Hr) IV Continuous <Continuous>  hydrALAZINE 25 milliGRAM(s) Oral every 8 hours  influenza   Vaccine 0.5 milliLiter(s) IntraMuscular once  insulin lispro (HumaLOG) corrective regimen sliding scale   SubCutaneous three times a day before meals      MEDICATIONS  (PRN):  dextrose Gel 1 Dose(s) Oral once PRN Blood Glucose LESS THAN 70 milliGRAM(s)/deciliter  glucagon  Injectable 1 milliGRAM(s) IntraMuscular once PRN Glucose LESS THAN 70 milligrams/deciliter      Allergies    No Known Allergies    Intolerances        PAST MEDICAL & SURGICAL HISTORY:  CHF (congestive heart failure)  Hypertension  No significant past surgical history      SOCIAL HISTORY  Smoking History:       REVIEW OF SYSTEMS:    CONSTITUTIONAL:  No distress    HEENT:  Eyes:  No diplopia or blurred vision. ENT:  No earache, sore throat or runny nose.    CARDIOVASCULAR:  No pressure, squeezing, tightness, heaviness or aching about the chest; no palpitations.    RESPIRATORY: + MUÑOZ    GASTROINTESTINAL:  No nausea, vomiting or diarrhea.    GENITOURINARY:  No dysuria, frequency or urgency.    MUSCULOSKELETAL:  No joint pain    SKIN:  No new lesions.    NEUROLOGIC:  No paresthesias, fasciculations, seizures or weakness.    PSYCHIATRIC:  No disorder of thought or mood.    ENDOCRINE:  No heat or cold intolerance, polyuria or polydipsia.    HEMATOLOGICAL:  No easy bruising or bleeding.     Vital Signs Last 24 Hrs  T(C): 36.7 (22 Feb 2018 10:28), Max: 37.2 (21 Feb 2018 23:15)  T(F): 98.1 (22 Feb 2018 10:28), Max: 99 (21 Feb 2018 23:15)  HR: 98 (22 Feb 2018 10:28) (88 - 98)  BP: 120/88 (22 Feb 2018 10:28) (100/52 - 120/88)  BP(mean): --  RR: 20 (22 Feb 2018 10:28) (18 - 21)  SpO2: 95% (22 Feb 2018 09:12) (93% - 100%)    PHYSICAL EXAMINATION:    GENERAL: The patient is awake and alert in no apparent distress.     HEENT: Head is normocephalic and atraumatic. Extraocular muscles are intact. Mucous membranes are moist.    NECK: Supple.    LUNGS: Clear to auscultation without wheezing, rales or rhonchi; respirations unlabored    HEART: Regular rate and rhythm without murmur.    ABDOMEN: Soft, nontender, and nondistended.      EXTREMITIES: Without any cyanosis, clubbing, rash, lesions; +edema.    NEUROLOGIC: Grossly intact.    SKIN: No ulceration or induration present.      LABS:                        14.8   3.7   )-----------( 181      ( 22 Feb 2018 06:55 )             48.0     02-22    138  |  92<L>  |  15.0  ----------------------------<  109  4.0   |  38.0<H>  |  0.79    Ca    8.5<L>      22 Feb 2018 06:55  Mg     1.8     02-22    TPro  7.3  /  Alb  2.8<L>  /  TBili  1.5  /  DBili  0.4<H>  /  AST  30  /  ALT  23  /  AlkPhos  54  02-22    PT/INR - ( 21 Feb 2018 06:57 )   PT: 21.2 sec;   INR: 1.90 ratio                             MICROBIOLOGY:    RADIOLOGY & ADDITIONAL STUDIES:

## 2018-02-22 NOTE — PROGRESS NOTE ADULT - ASSESSMENT
Edematous state improved  OHS on nocturnal BIPAP  Pulmonary hypertension secondary to obesity, chronic hypoxemia and OHS poorly treated as outpatient    Plan:  1.Per cardiology  2.Continue nocturnal BIPAP

## 2018-02-23 LAB
ANION GAP SERPL CALC-SCNC: 9 MMOL/L — SIGNIFICANT CHANGE UP (ref 5–17)
BASE EXCESS BLDA CALC-SCNC: 18.6 MMOL/L — HIGH (ref -2–2)
BLOOD GAS COMMENTS ARTERIAL: SIGNIFICANT CHANGE UP
BUN SERPL-MCNC: 14 MG/DL — SIGNIFICANT CHANGE UP (ref 8–20)
CALCIUM SERPL-MCNC: 9 MG/DL — SIGNIFICANT CHANGE UP (ref 8.6–10.2)
CHLORIDE SERPL-SCNC: 89 MMOL/L — LOW (ref 98–107)
CO2 SERPL-SCNC: 41 MMOL/L — HIGH (ref 22–29)
CREAT SERPL-MCNC: 0.86 MG/DL — SIGNIFICANT CHANGE UP (ref 0.5–1.3)
GAS PNL BLDA: SIGNIFICANT CHANGE UP
GLUCOSE BLDC GLUCOMTR-MCNC: 100 MG/DL — HIGH (ref 70–99)
GLUCOSE BLDC GLUCOMTR-MCNC: 102 MG/DL — HIGH (ref 70–99)
GLUCOSE BLDC GLUCOMTR-MCNC: 109 MG/DL — HIGH (ref 70–99)
GLUCOSE BLDC GLUCOMTR-MCNC: 94 MG/DL — SIGNIFICANT CHANGE UP (ref 70–99)
GLUCOSE SERPL-MCNC: 100 MG/DL — SIGNIFICANT CHANGE UP (ref 70–115)
HCO3 BLDA-SCNC: 43 MMOL/L — HIGH (ref 20–26)
HOROWITZ INDEX BLDA+IHG-RTO: SIGNIFICANT CHANGE UP
INR BLD: 1.79 RATIO — HIGH (ref 0.88–1.16)
PCO2 BLDA: 74 MMHG — CRITICAL HIGH (ref 35–45)
PH BLDA: 7.42 — SIGNIFICANT CHANGE UP (ref 7.35–7.45)
PO2 BLDA: 72 MMHG — LOW (ref 83–108)
POTASSIUM SERPL-MCNC: 4 MMOL/L — SIGNIFICANT CHANGE UP (ref 3.5–5.3)
POTASSIUM SERPL-SCNC: 4 MMOL/L — SIGNIFICANT CHANGE UP (ref 3.5–5.3)
PROTHROM AB SERPL-ACNC: 19.9 SEC — HIGH (ref 9.8–12.7)
SAO2 % BLDA: 95 % — SIGNIFICANT CHANGE UP (ref 95–99)
SODIUM SERPL-SCNC: 139 MMOL/L — SIGNIFICANT CHANGE UP (ref 135–145)

## 2018-02-23 PROCEDURE — 99232 SBSQ HOSP IP/OBS MODERATE 35: CPT

## 2018-02-23 PROCEDURE — 99233 SBSQ HOSP IP/OBS HIGH 50: CPT

## 2018-02-23 RX ORDER — FUROSEMIDE 40 MG
80 TABLET ORAL
Qty: 0 | Refills: 0 | Status: DISCONTINUED | OUTPATIENT
Start: 2018-02-23 | End: 2018-02-25

## 2018-02-23 RX ADMIN — CARVEDILOL PHOSPHATE 3.12 MILLIGRAM(S): 80 CAPSULE, EXTENDED RELEASE ORAL at 18:44

## 2018-02-23 RX ADMIN — Medication 80 MILLIGRAM(S): at 18:44

## 2018-02-23 RX ADMIN — CARVEDILOL PHOSPHATE 3.12 MILLIGRAM(S): 80 CAPSULE, EXTENDED RELEASE ORAL at 05:24

## 2018-02-23 RX ADMIN — Medication 25 MILLIGRAM(S): at 05:24

## 2018-02-23 RX ADMIN — Medication 25 MILLIGRAM(S): at 14:36

## 2018-02-23 RX ADMIN — ATORVASTATIN CALCIUM 40 MILLIGRAM(S): 80 TABLET, FILM COATED ORAL at 21:50

## 2018-02-23 RX ADMIN — Medication 81 MILLIGRAM(S): at 14:36

## 2018-02-23 RX ADMIN — AMLODIPINE BESYLATE 5 MILLIGRAM(S): 2.5 TABLET ORAL at 05:24

## 2018-02-23 RX ADMIN — Medication 25 MILLIGRAM(S): at 21:50

## 2018-02-23 NOTE — CHART NOTE - NSCHARTNOTEFT_GEN_A_CORE
Aware nutrition consult for education.  Pt educated in detail about cons cho meal plan and heart failure nutrition education on (2/20)- refer to nutrition assessment.  Spoke with pt this morning to reinforce education and pt reports good understanding at this time and is without questions.  MD aware.  RD CDE to remain available for further nutrition/diabetes self management education once pt is fully aware of diabetes diagnosis.

## 2018-02-23 NOTE — PROGRESS NOTE ADULT - ASSESSMENT
Assess    OHS/JAN  MO with atelectasis and shunt  Cor pulmonale    Plan:    Diuresis  Nocturnal bipap here and cpap at home  OP FU  ABG and [place]

## 2018-02-23 NOTE — PROGRESS NOTE ADULT - SUBJECTIVE AND OBJECTIVE BOX
PULMONARY PROGRESS NOTE      [id number]    Patient is a 37y old  Male who presents with a chief complaint of sob (18 Feb 2018 15:59)  Non smoker  JAN - non-compliant with CPAP at home  Morbid obesity    INTERVAL HPI/OVERNIGHT EVENTS:  On IV lasix  Diuresing well  ON nocturnal BIPAP  No dyspnea at this time  Far more alert than initial evaluation    MEDICATIONS  (STANDING):  amLODIPine   Tablet 5 milliGRAM(s) Oral daily  aspirin  chewable 81 milliGRAM(s) Oral daily  atorvastatin 40 milliGRAM(s) Oral at bedtime  carvedilol 3.125 milliGRAM(s) Oral two times a day  dextrose 5%. 1000 milliLiter(s) (50 mL/Hr) IV Continuous <Continuous>  dextrose 50% Injectable 12.5 Gram(s) IV Push once  dextrose 50% Injectable 25 Gram(s) IV Push once  dextrose 50% Injectable 25 Gram(s) IV Push once  furosemide Infusion 10 mG/Hr (5 mL/Hr) IV Continuous <Continuous>  hydrALAZINE 25 milliGRAM(s) Oral every 8 hours  influenza   Vaccine 0.5 milliLiter(s) IntraMuscular once  insulin lispro (HumaLOG) corrective regimen sliding scale   SubCutaneous three times a day before meals      MEDICATIONS  (PRN):  dextrose Gel 1 Dose(s) Oral once PRN Blood Glucose LESS THAN 70 milliGRAM(s)/deciliter  glucagon  Injectable 1 milliGRAM(s) IntraMuscular once PRN Glucose LESS THAN 70 milligrams/deciliter      Allergies    No Known Allergies    Intolerances        PAST MEDICAL & SURGICAL HISTORY:  CHF (congestive heart failure)  Hypertension  No significant past surgical history      SOCIAL HISTORY  Smoking History:       REVIEW OF SYSTEMS:    CONSTITUTIONAL:  No distress    HEENT:  Eyes:  No diplopia or blurred vision. ENT:  No earache, sore throat or runny nose.    CARDIOVASCULAR:  No pressure, squeezing, tightness, heaviness or aching about the chest; no palpitations.    RESPIRATORY: + MUÑOZ    GASTROINTESTINAL:  No nausea, vomiting or diarrhea.    GENITOURINARY:  No dysuria, frequency or urgency.    MUSCULOSKELETAL:  No joint pain    SKIN:  No new lesions.    NEUROLOGIC:  No paresthesias, fasciculations, seizures or weakness.    PSYCHIATRIC:  No disorder of thought or mood.    ENDOCRINE:  No heat or cold intolerance, polyuria or polydipsia.    HEMATOLOGICAL:  No easy bruising or bleeding.     Vital Signs Last 24 Hrs  T(C): 36.7 (22 Feb 2018 10:28), Max: 37.2 (21 Feb 2018 23:15)  T(F): 98.1 (22 Feb 2018 10:28), Max: 99 (21 Feb 2018 23:15)  HR: 98 (22 Feb 2018 10:28) (88 - 98)  BP: 120/88 (22 Feb 2018 10:28) (100/52 - 120/88)  BP(mean): --  RR: 20 (22 Feb 2018 10:28) (18 - 21)  SpO2: 95% (22 Feb 2018 09:12) (93% - 100%)    PHYSICAL EXAMINATION:    GENERAL: The patient is awake and alert in no apparent distress.     HEENT: Head is normocephalic and atraumatic. Extraocular muscles are intact. Mucous membranes are moist.    NECK: Supple.    LUNGS: Clear to auscultation without wheezing, rales or rhonchi; respirations unlabored    HEART: Regular rate and rhythm without murmur.    ABDOMEN: Soft, nontender, and nondistended.      EXTREMITIES: Without any cyanosis, clubbing, rash, lesions; +edema.    NEUROLOGIC: Grossly intact.    SKIN: No ulceration or induration present.      LABS:                        14.8   3.7   )-----------( 181      ( 22 Feb 2018 06:55 )             48.0     02-22    138  |  92<L>  |  15.0  ----------------------------<  109  4.0   |  38.0<H>  |  0.79    Ca    8.5<L>      22 Feb 2018 06:55  Mg     1.8     02-22    TPro  7.3  /  Alb  2.8<L>  /  TBili  1.5  /  DBili  0.4<H>  /  AST  30  /  ALT  23  /  AlkPhos  54  02-22    PT/INR - ( 21 Feb 2018 06:57 )   PT: 21.2 sec;   INR: 1.90 ratio             RADIOLOGY & ADDITIONAL STUDIES:    CTA on 2/20/18  IMPRESSION:      Mild diffuse mediastinal lymphadenopathy.    No evidence of central pulmonary emboli. Cannot exclude subsegmental   emboli.    Diffuse pleural thickening at the right lung base with adjacent   atelectasis or fibrosis in the right lower lobe..      DAGOBERTO MINA M.D., ATTENDING RADIOLOGIST  This document has been electronically signed. Feb 20 2018  4:10PM      Echo on 2/20/18   1. Technically difficult study.   2. Left ventricular ejection fraction, by visual estimation, is 55 to   60%.   3. Normal global left ventricular systolicfunction.   4. Mildly increased left ventricular internal cavity size.   5. Severely enlarged right ventricle. Severely reduced RV systolic   function.   6. Right ventricular volume and pressure overload.   7. Mild mitral valve regurgitation.   8. Normal trileaflet aortic valve with normal opening.   9. Moderate-severe tricuspid regurgitation.  10. Estimated pulmonary artery systolic pressure is 59.7 mmHg assuming a   right atrial pressure of 15 mmHg, which is consistent with severe   pulmonary hypertension.  11. There is no evidence of pericardial effusion.    MD Kelvin Electronically signed on 2/20/2018 at 6:12:05 PM PULMONARY PROGRESS NOTE      [id number]    Patient is a 37y old  Male who presents with a chief complaint of sob (18 Feb 2018 15:59)  Non smoker  JAN - non-compliant with CPAP at home  Morbid obesity    INTERVAL HPI/OVERNIGHT EVENTS:  On IV lasix  Diuresing well  ON nocturnal BIPAP  No dyspnea at this time  Far more alert than initial evaluation    MEDICATIONS  (STANDING):  amLODIPine   Tablet 5 milliGRAM(s) Oral daily  aspirin  chewable 81 milliGRAM(s) Oral daily  atorvastatin 40 milliGRAM(s) Oral at bedtime  carvedilol 3.125 milliGRAM(s) Oral two times a day  dextrose 5%. 1000 milliLiter(s) (50 mL/Hr) IV Continuous <Continuous>  dextrose 50% Injectable 12.5 Gram(s) IV Push once  dextrose 50% Injectable 25 Gram(s) IV Push once  dextrose 50% Injectable 25 Gram(s) IV Push once  furosemide Infusion 10 mG/Hr (5 mL/Hr) IV Continuous <Continuous>  hydrALAZINE 25 milliGRAM(s) Oral every 8 hours  influenza   Vaccine 0.5 milliLiter(s) IntraMuscular once  insulin lispro (HumaLOG) corrective regimen sliding scale   SubCutaneous three times a day before meals      MEDICATIONS  (PRN):  dextrose Gel 1 Dose(s) Oral once PRN Blood Glucose LESS THAN 70 milliGRAM(s)/deciliter  glucagon  Injectable 1 milliGRAM(s) IntraMuscular once PRN Glucose LESS THAN 70 milligrams/deciliter      Allergies    No Known Allergies    Intolerances        PAST MEDICAL & SURGICAL HISTORY:  CHF (congestive heart failure)  Hypertension  No significant past surgical history      SOCIAL HISTORY  Smoking History:       REVIEW OF SYSTEMS:    CONSTITUTIONAL:  No distress    HEENT:  Eyes:  No diplopia or blurred vision. ENT:  No earache, sore throat or runny nose.    CARDIOVASCULAR:  No pressure, squeezing, tightness, heaviness or aching about the chest; no palpitations.    RESPIRATORY: + MUÑOZ    GASTROINTESTINAL:  No nausea, vomiting or diarrhea.    GENITOURINARY:  No dysuria, frequency or urgency.    MUSCULOSKELETAL:  No joint pain    SKIN:  No new lesions.    NEUROLOGIC:  No paresthesias, fasciculations, seizures or weakness.    PSYCHIATRIC:  No disorder of thought or mood.    ENDOCRINE:  No heat or cold intolerance, polyuria or polydipsia.    HEMATOLOGICAL:  No easy bruising or bleeding.     Vital Signs Last 24 Hrs  T(C): 36.7 (22 Feb 2018 10:28), Max: 37.2 (21 Feb 2018 23:15)  T(F): 98.1 (22 Feb 2018 10:28), Max: 99 (21 Feb 2018 23:15)  HR: 98 (22 Feb 2018 10:28) (88 - 98)  BP: 120/88 (22 Feb 2018 10:28) (100/52 - 120/88)  BP(mean): --  RR: 20 (22 Feb 2018 10:28) (18 - 21)  SpO2: 95% (22 Feb 2018 09:12) (93% - 100%)    PHYSICAL EXAMINATION:    GENERAL: The patient is awake and alert in no apparent distress.     HEENT: Head is normocephalic and atraumatic. Extraocular muscles are intact. Mucous membranes are moist.    NECK: Supple.    LUNGS: Clear to auscultation without wheezing, rales or rhonchi; respirations unlabored    HEART: Regular rate and rhythm without murmur.    ABDOMEN: Soft, nontender, and nondistended.      EXTREMITIES: Without any cyanosis, clubbing, rash, lesions; +edema.    NEUROLOGIC: Grossly intact.    SKIN: No ulceration or induration present.      LABS:                        14.8   3.7   )-----------( 181      ( 22 Feb 2018 06:55 )             48.0     02-22    138  |  92<L>  |  15.0  ----------------------------<  109  4.0   |  38.0<H>  |  0.79    Ca    8.5<L>      22 Feb 2018 06:55  Mg     1.8     02-22    TPro  7.3  /  Alb  2.8<L>  /  TBili  1.5  /  DBili  0.4<H>  /  AST  30  /  ALT  23  /  AlkPhos  54  02-22    PT/INR - ( 21 Feb 2018 06:57 )   PT: 21.2 sec;   INR: 1.90 ratio             RADIOLOGY & ADDITIONAL STUDIES:    Leg duplex on 2/19/18: Negative    CTA on 2/20/18  IMPRESSION:      Mild diffuse mediastinal lymphadenopathy.    No evidence of central pulmonary emboli. Cannot exclude subsegmental   emboli.    Diffuse pleural thickening at the right lung base with adjacent   atelectasis or fibrosis in the right lower lobe..      DAGOBERTO MINA M.D., ATTENDING RADIOLOGIST  This document has been electronically signed. Feb 20 2018  4:10PM      Echo on 2/20/18   1. Technically difficult study.   2. Left ventricular ejection fraction, by visual estimation, is 55 to   60%.   3. Normal global left ventricular systolicfunction.   4. Mildly increased left ventricular internal cavity size.   5. Severely enlarged right ventricle. Severely reduced RV systolic   function.   6. Right ventricular volume and pressure overload.   7. Mild mitral valve regurgitation.   8. Normal trileaflet aortic valve with normal opening.   9. Moderate-severe tricuspid regurgitation.  10. Estimated pulmonary artery systolic pressure is 59.7 mmHg assuming a   right atrial pressure of 15 mmHg, which is consistent with severe   pulmonary hypertension.  11. There is no evidence of pericardial effusion.    MD Kelvin Electronically signed on 2/20/2018 at 6:12:05 PM

## 2018-02-23 NOTE — PROGRESS NOTE ADULT - SUBJECTIVE AND OBJECTIVE BOX
CARDIOLOGY PROGRESS NOTE   ([facility])                             Reason for follow up: acute diastolic heart failure, pulmonary hypertension                            Overnight: worsening LE and scrotal edema    Subjective: more swollen, SOB this moring  Review of symptoms: Cardiac:  No chest pain. dyspnea improved. No palpitations.  Respiratory:no cough. No dyspnea  Gastrointestinal: No diarrhea. No abdominal pain. No bleeding.     Past medical history: No updates.     	  Vitals:  Vital Signs Last 24 Hrs  T(C): 37.2 (02-23-18 @ 16:59), Max: 37.3 (02-23-18 @ 08:11)  T(F): 98.9 (02-23-18 @ 16:59), Max: 99.2 (02-23-18 @ 08:11)  HR: 97 (02-23-18 @ 16:59) (87 - 97)  BP: 119/64 (02-23-18 @ 16:59) (111/74 - 127/72)  BP(mean): --  RR: 20 (02-23-18 @ 16:59) (20 - 22)  SpO2: 97% (02-23-18 @ 16:59) (95% - 97%)        I&O's Summary    22 Feb 2018 07:01  -  23 Feb 2018 07:00  --------------------------------------------------------  IN: 345 mL / OUT: 8850 mL / NET: -8505 mL    23 Feb 2018 07:01  -  23 Feb 2018 18:57  --------------------------------------------------------  IN: 15 mL / OUT: 2400 mL / NET: -2385 mL            PHYSICAL EXAM:  Appearance: mobridly obese, . No acute distress  HEENT:  Head and neck: Atraumatic. Normocephalic.  Normal oral mucosa  Neck: Supple, unable to appreciate  Neurologic: A & O x 3, no focal deficits  Lymphatic: No cervical lymphadenopathy  Cardiovascular: Normal S1 S2, No murmur, rubs/gallops  Respiratory:  no rales, rhonchi or wheezes  Gastrointestinal:  Soft, Non-tender, + BS, no HSM  Lower Extremities: =2 edema, DP and PT +2  Psychiatry: Patient is calm. No agitation. Mood & affect appropriate  Skin: No rashes, No ecchymoses, No cyanosis    CURRENT MEDICATIONS:  amLODIPine   Tablet 10 milliGRAM(s) Oral daily  carvedilol 3.125 milliGRAM(s) Oral two times a day  furosemide Infusion 10 mG/Hr IV Continuous <Continuous>  hydrALAZINE 25 milliGRAM(s) Oral every 8 hours    atorvastatin  dextrose 50% Injectable  dextrose 50% Injectable  dextrose 50% Injectable  insulin lispro (HumaLOG) corrective regimen sliding scale  aspirin  chewable  dextrose 5%.  influenza   Vaccine      LABS:	 	                            14.7   3.7   )-----------( 181      ( 20 Feb 2018 06:51 )             48.2     02-20    139  |  96<L>  |  15.0  ----------------------------<  106  4.8   |  33.0<H>  |  0.97    Ca    8.8      20 Feb 2018 06:48  Mg     1.7     02-19      proBNP: Serum Pro-Brain Natriuretic Peptide: 1803 pg/mL (02-18 @ 13:41)    Lipid Profile:   HgA1c: Hemoglobin A1C, Whole Blood: 7.0 %  TSH:     TELEMETRY: 	sinus tachycardia    	    DIAGNOSTIC TESTING:  [ ] Echocardiogram:pending CARDIOLOGY PROGRESS NOTE   ([facility])                             Reason for follow up: acute diastolic heart failure, pulmonary hypertension                            Overnight: worsening LE and scrotal edema    Subjective: more swollen, SOB this moring  Review of symptoms: Cardiac:  No chest pain. dyspnea improved. No palpitations.  Respiratory:no cough. No dyspnea  Gastrointestinal: No diarrhea. No abdominal pain. No bleeding.     Past medical history: No updates.     	  Vitals:  Vital Signs Last 24 Hrs  T(C): 37.2 (02-23-18 @ 16:59), Max: 37.3 (02-23-18 @ 08:11)  T(F): 98.9 (02-23-18 @ 16:59), Max: 99.2 (02-23-18 @ 08:11)  HR: 97 (02-23-18 @ 16:59) (87 - 97)  BP: 119/64 (02-23-18 @ 16:59) (111/74 - 127/72)  BP(mean): --  RR: 20 (02-23-18 @ 16:59) (20 - 22)  SpO2: 97% (02-23-18 @ 16:59) (95% - 97%)        I&O's Summary    22 Feb 2018 07:01  -  23 Feb 2018 07:00  --------------------------------------------------------  IN: 345 mL / OUT: 8850 mL / NET: -8505 mL    23 Feb 2018 07:01  -  23 Feb 2018 18:57  --------------------------------------------------------  IN: 15 mL / OUT: 2400 mL / NET: -2385 mL            PHYSICAL EXAM:  Appearance: mobridly obese, . No acute distress  HEENT:  Head and neck: Atraumatic. Normocephalic.  Normal oral mucosa  Neck: Supple, unable to appreciate  Neurologic: A & O x 3, no focal deficits  Lymphatic: No cervical lymphadenopathy  Cardiovascular: Normal S1 S2, No murmur, rubs/gallops  Respiratory:  no rales, rhonchi or wheezes  Gastrointestinal:  Soft, Non-tender, + BS, no HSM  Lower Extremities: =2 edema, DP and PT +2  Psychiatry: Patient is calm. No agitation. Mood & affect appropriate  Skin: No rashes, No ecchymoses, No cyanosis        LABS:	 	                                  14.8   3.7   )-----------( 181      ( 22 Feb 2018 06:55 )             48.0   N=x    ; L=x        23 Feb 2018 05:50    139    |  89     |  14.0   ----------------------------<  100    4.0     |  41.0   |  0.86     Ca    9.0        23 Feb 2018 05:50  Mg     1.8       22 Feb 2018 06:55    TPro  7.3    /  Alb  2.8    /  TBili  1.5    /  DBili  0.4    /  AST  30     /  ALT  23     /  AlkPhos  54     22 Feb 2018 06:55      Hepatic panel: 22 Feb 2018 06:55  7.3   | 2.8                            1.5   | 1.5  /0.4                            30    | 23                                /54   \par                                     proBNP: Serum Pro-Brain Natriuretic Peptide: 1803 pg/mL (02-18 @ 13:41)    Lipid Profile:   HgA1c: Hemoglobin A1C, Whole Blood: 7.0 %  TSH:     TELEMETRY: 	sinus tachycardia    	    DIAGNOSTIC TESTING:  [ ] Echocardiogram   1. Technically difficult study.   2. Left ventricular ejection fraction, by visual estimation, is 55 to   60%.   3. Normal global left ventricular systolic function.   4. Mildly increased left ventricular internal cavity size.   5. Severely enlarged right ventricle. Severely reduced RV systolic   function.   6. Right ventricular volume and pressure overload.   7. Mild mitral valve regurgitation.   8. Normal trileaflet aortic valve with normal opening.   9. Moderate-severe tricuspid regurgitation.  10. Estimated pulmonary artery systolic pressure is 59.7 mmHg assuming a   right atrial pressure of 15 mmHg, which is consistent with severe   pulmonary hypertension.  11. There is no evidence of pericardial effusion.

## 2018-02-23 NOTE — PROGRESS NOTE ADULT - ASSESSMENT
38 y/o morbidly obese male with recurrent admission for SOB , heart failure   RHC in [facility] in [place] showed moderate pulmonary HTN and moderate elevatd pCWP   no response to Nitric oxide  mixed etiology of PH   presented with acute heart failure  not diuresing well .   will start IV lasix drip 10 mg /hr .   if not responding add metolazone 5 mg daily   continue hydralazine, BB for BP control   Patient mentioned that he had a cath showing no need for coronary stents.  Echo pending 36 y/o morbidly obese male with recurrent admission for SOB , heart failure   Acute right sided heart failure likely 2nry to severe pulmonary HT  Right heart failure likely 2ry to JAN and morbid obesity , diastolic dysfunction is possible  ( given elevated PCWP on prior RHC)   switch to lasix 80 mg IV twice daily   if not responding add metolazone 5 mg daily   continue hydralazine, BB for BP control   , pulmonary workup ( sleep apnea) as outpatient  need to be compliant with outpatient follow up and diuretic therapy

## 2018-02-23 NOTE — PHYSICAL THERAPY INITIAL EVALUATION ADULT - ADDITIONAL COMMENTS
Pt lives alone in a private home. 3 steps to enter with handrails, no steps inside. Pt was independent PTA without assist device. Pt does not own DME.

## 2018-02-23 NOTE — PHYSICAL THERAPY INITIAL EVALUATION ADULT - ASSISTIVE DEVICE FOR TRANSFER: GAIT, REHAB EVAL
Pt ambulated 150 feet total, 20 feet without RW and 130 feet with RW. Pt independent for ambulation with and without RW, limited only by MUÑOZ (SPO2 85% on 6L after ambulation)

## 2018-02-23 NOTE — PROGRESS NOTE ADULT - ASSESSMENT
37yr old male with PMH of HTN, JAN on nocturnal CPAP, non-compliant with use, morbidly obese presents to ER with worsening sob over last 2 mths. He was admitted in [facility] end of last year with similar presentation, does not know his diagnosis does not follow with any PMD/cardiology. HE got very sob today,  In the ER< he was found to be hypoxic on NC, required 50% VM, was in mild respiratory distress. CXR shows cardiomegaly and bilateral haziness. Labs show elevated BNP, trop - normal, EKG  - tachycardia. He  was admitted for acute respiratory distress possibly 2/2 acute on chronic CHF. Did not respond well to lasix iv switched to infusion. He responded very well to iv drip, diuresed almost 18lbs down from admission. Cardio and pulmonary following. PT eval.   He is borderline DM, although A1C meets DM criteria- BS - only borderline high. Also, Coagulopathic - Cannot explain - has fatty liver not cirrhotic. F/u INR.      1. SOB with acute hypoxic hypercarbic respiratory failure -  chronic component as well  most likely 2/2 Acute on chronic CHF with pulmonary HTN   Ct angio chest neg for PE  Morbidly obese - with JAN, OSH  ECHO  - EF - normal, sever pulm HTN    cardio recs  Pulmonary consult appreciated  BIPAP nocturnal   ct Lasix  infusion - switch to IV today, cardio will see him   F/u BMP,. daily I/O, weights  PT eval       2. HTN - better now  On Hydralazine and amlodipine,     3. JAN on CPAP - resume nocturnal cpap  severe pulm HTN - consider pulm eval pulmonary vasodilators?     4. Coagulopahty - ? 2/2 congestive hepatopathy, not on any AC  repeat INR   US Liver show fatty change no e/o cirrhosis.     4. DVT px   hold pharmacological px until INR improves  IPCs for now    5. morbid obesity - needs weight loss  Dietary eval     6. DM - 2   A1C - 7 meets diagnosis of DM - blood sugars are in acceptable range.   SSI for now  needs weight loss

## 2018-02-23 NOTE — PROGRESS NOTE ADULT - SUBJECTIVE AND OBJECTIVE BOX
RUDY GOYAL    [id number]    37y      Male    Off service note:     HPI in brief:     CC:  swelling some better, he is urinating on lasix drip  scrotal edema is improving, unable to get out of bed, not able to ambulate.       INTERVAL HPI/OVERNIGHT EVENTS: no acute events    REVIEW OF SYSTEMS:    CONSTITUTIONAL: No fever,   RESPIRATORY: No cough  CARDIOVASCULAR: No chest pain, palpitations  GASTROINTESTINAL: No abdominal or epigastric pain. No nausea, vomiting    Vital Signs Last 24 Hrs  T(C): 36.7 (22 Feb 2018 10:28), Max: 37.2 (21 Feb 2018 23:15)  T(F): 98.1 (22 Feb 2018 10:28), Max: 99 (21 Feb 2018 23:15)  HR: 98 (22 Feb 2018 10:28) (88 - 98)  BP: 120/88 (22 Feb 2018 10:28) (100/52 - 120/88)  BP(mean): --  RR: 20 (22 Feb 2018 10:28) (18 - 21)  SpO2: 95% (22 Feb 2018 09:12) (93% - 100%)          PHYSICAL EXAM:    GENERAL: NAD, morbidly obese , sitting up   HEENT: PERRL, +EOMI  NECK: soft, Supple,   CHEST/LUNG: Clear to percussion bilaterally, b/l rales basal , difficult ausculation   HEART: S1S2+, Regular rate and rhythm; No murmurs  EXTREMITIES:   No clubbing, cyanosis. pedal  edema 2+  NEURO: AAOX3        INTERVAL HPI/OVERNIGHT EVENTS:    REVIEW OF SYSTEMS:    CONSTITUTIONAL: No fever, weight loss, or fatigue  RESPIRATORY: No cough, wheezing, hemoptysis; No shortness of breath  CARDIOVASCULAR: No chest pain, palpitations  GASTROINTESTINAL: No abdominal or epigastric pain. No nausea, vomiting  NEUROLOGICAL: No headaches, memory loss, loss of strength.  MISCELLANEOUS:      Vital Signs Last 24 Hrs  T(C): 36.7 (23 Feb 2018 05:21), Max: 37.2 (22 Feb 2018 21:43)  T(F): 98 (23 Feb 2018 05:21), Max: 99 (22 Feb 2018 21:43)  HR: 88 (23 Feb 2018 08:11) (87 - 98)  BP: 127/72 (23 Feb 2018 05:21) (110/70 - 129/77)  BP(mean): --  RR: 20 (23 Feb 2018 08:11) (20 - 22)  SpO2: 95% (23 Feb 2018 08:11) (95% - 97%)    PHYSICAL EXAM:    GENERAL: NAD, well-groomed  HEENT: PERRL, +EOMI  NECK: soft, Supple, No JVD,   CHEST/LUNG: Clear to percussion bilaterally; No wheezing  HEART: S1S2+, Regular rate and rhythm; No murmurs, rubs, or gallops  ABDOMEN: Soft, Nontender, Nondistended; Bowel sounds present  EXTREMITIES:  2+ Peripheral Pulses, No clubbing, cyanosis, or edema  SKIN: No rashes or lesions  NEURO: AAOX3, no focal deficits, no motor r sensory loss  PSYCH: normal mood      02-22 @ 07:01  -  02-23 @ 07:00  --------------------------------------------------------  IN: 345 mL / OUT: 8850 mL / NET: -8505 mL    02-23 @ 07:01  -  02-23 @ 10:22  --------------------------------------------------------  IN: 15 mL / OUT: 1500 mL / NET: -1485 mL        LABS:                        14.8   3.7   )-----------( 181      ( 22 Feb 2018 06:55 )             48.0     02-23    139  |  89<L>  |  14.0  ----------------------------<  100  4.0   |  41.0<H>  |  0.86    Ca    9.0      23 Feb 2018 05:50  Mg     1.8     02-22    TPro  7.3  /  Alb  2.8<L>  /  TBili  1.5  /  DBili  0.4<H>  /  AST  30  /  ALT  23  /  AlkPhos  54  02-22    PT/INR - ( 23 Feb 2018 05:50 )   PT: 19.9 sec;   INR: 1.79 ratio                 MEDICATIONS  (STANDING):  amLODIPine   Tablet 5 milliGRAM(s) Oral daily  aspirin  chewable 81 milliGRAM(s) Oral daily  atorvastatin 40 milliGRAM(s) Oral at bedtime  carvedilol 3.125 milliGRAM(s) Oral two times a day  dextrose 5%. 1000 milliLiter(s) (50 mL/Hr) IV Continuous <Continuous>  dextrose 50% Injectable 12.5 Gram(s) IV Push once  dextrose 50% Injectable 25 Gram(s) IV Push once  dextrose 50% Injectable 25 Gram(s) IV Push once  furosemide   Injectable 80 milliGRAM(s) IV Push two times a day  hydrALAZINE 25 milliGRAM(s) Oral every 8 hours  influenza   Vaccine 0.5 milliLiter(s) IntraMuscular once  insulin lispro (HumaLOG) corrective regimen sliding scale   SubCutaneous three times a day before meals    MEDICATIONS  (PRN):  dextrose Gel 1 Dose(s) Oral once PRN Blood Glucose LESS THAN 70 milliGRAM(s)/deciliter  glucagon  Injectable 1 milliGRAM(s) IntraMuscular once PRN Glucose LESS THAN 70 milligrams/deciliter      RADIOLOGY & ADDITIONAL TESTS:

## 2018-02-24 LAB
ANION GAP SERPL CALC-SCNC: 9 MMOL/L — SIGNIFICANT CHANGE UP (ref 5–17)
BASE EXCESS BLDA CALC-SCNC: 16 MMOL/L — HIGH (ref -2–2)
BLOOD GAS COMMENTS ARTERIAL: SIGNIFICANT CHANGE UP
BUN SERPL-MCNC: 18 MG/DL — SIGNIFICANT CHANGE UP (ref 8–20)
CALCIUM SERPL-MCNC: 8.7 MG/DL — SIGNIFICANT CHANGE UP (ref 8.6–10.2)
CHLORIDE SERPL-SCNC: 88 MMOL/L — LOW (ref 98–107)
CO2 SERPL-SCNC: 41 MMOL/L — HIGH (ref 22–29)
CREAT SERPL-MCNC: 0.97 MG/DL — SIGNIFICANT CHANGE UP (ref 0.5–1.3)
GAS PNL BLDA: SIGNIFICANT CHANGE UP
GLUCOSE BLDC GLUCOMTR-MCNC: 103 MG/DL — HIGH (ref 70–99)
GLUCOSE BLDC GLUCOMTR-MCNC: 106 MG/DL — HIGH (ref 70–99)
GLUCOSE BLDC GLUCOMTR-MCNC: 154 MG/DL — HIGH (ref 70–99)
GLUCOSE BLDC GLUCOMTR-MCNC: 155 MG/DL — HIGH (ref 70–99)
GLUCOSE SERPL-MCNC: 111 MG/DL — SIGNIFICANT CHANGE UP (ref 70–115)
HCO3 BLDA-SCNC: 40 MMOL/L — HIGH (ref 20–26)
HOROWITZ INDEX BLDA+IHG-RTO: SIGNIFICANT CHANGE UP
INR BLD: 1.77 RATIO — HIGH (ref 0.88–1.16)
PCO2 BLDA: 76 MMHG — CRITICAL HIGH (ref 35–45)
PH BLDA: 7.39 — SIGNIFICANT CHANGE UP (ref 7.35–7.45)
PO2 BLDA: 81 MMHG — LOW (ref 83–108)
POTASSIUM SERPL-MCNC: 3.8 MMOL/L — SIGNIFICANT CHANGE UP (ref 3.5–5.3)
POTASSIUM SERPL-SCNC: 3.8 MMOL/L — SIGNIFICANT CHANGE UP (ref 3.5–5.3)
PROTHROM AB SERPL-ACNC: 19.7 SEC — HIGH (ref 9.8–12.7)
SAO2 % BLDA: 96 % — SIGNIFICANT CHANGE UP (ref 95–99)
SODIUM SERPL-SCNC: 138 MMOL/L — SIGNIFICANT CHANGE UP (ref 135–145)

## 2018-02-24 PROCEDURE — 99233 SBSQ HOSP IP/OBS HIGH 50: CPT

## 2018-02-24 PROCEDURE — 99232 SBSQ HOSP IP/OBS MODERATE 35: CPT

## 2018-02-24 RX ORDER — ACETAZOLAMIDE 250 MG/1
500 TABLET ORAL ONCE
Qty: 0 | Refills: 0 | Status: COMPLETED | OUTPATIENT
Start: 2018-02-24 | End: 2018-02-24

## 2018-02-24 RX ORDER — HYDRALAZINE HCL 50 MG
10 TABLET ORAL THREE TIMES A DAY
Qty: 0 | Refills: 0 | Status: DISCONTINUED | OUTPATIENT
Start: 2018-02-24 | End: 2018-03-01

## 2018-02-24 RX ORDER — ENOXAPARIN SODIUM 100 MG/ML
40 INJECTION SUBCUTANEOUS DAILY
Qty: 0 | Refills: 0 | Status: DISCONTINUED | OUTPATIENT
Start: 2018-02-24 | End: 2018-02-24

## 2018-02-24 RX ADMIN — Medication 80 MILLIGRAM(S): at 17:55

## 2018-02-24 RX ADMIN — Medication 81 MILLIGRAM(S): at 11:33

## 2018-02-24 RX ADMIN — AMLODIPINE BESYLATE 5 MILLIGRAM(S): 2.5 TABLET ORAL at 05:16

## 2018-02-24 RX ADMIN — CARVEDILOL PHOSPHATE 3.12 MILLIGRAM(S): 80 CAPSULE, EXTENDED RELEASE ORAL at 05:16

## 2018-02-24 RX ADMIN — ATORVASTATIN CALCIUM 40 MILLIGRAM(S): 80 TABLET, FILM COATED ORAL at 21:40

## 2018-02-24 RX ADMIN — Medication 80 MILLIGRAM(S): at 05:16

## 2018-02-24 RX ADMIN — ACETAZOLAMIDE 110 MILLIGRAM(S): 250 TABLET ORAL at 14:11

## 2018-02-24 RX ADMIN — CARVEDILOL PHOSPHATE 3.12 MILLIGRAM(S): 80 CAPSULE, EXTENDED RELEASE ORAL at 17:55

## 2018-02-24 RX ADMIN — Medication 10 MILLIGRAM(S): at 21:41

## 2018-02-24 RX ADMIN — Medication 25 MILLIGRAM(S): at 05:16

## 2018-02-24 NOTE — PROGRESS NOTE ADULT - ASSESSMENT
Assess    OHS/JAN  MO with atelectasis and shunt  Cor pulmonale    Plan:    Diuresis  Nocturnal bipap here and cpap at home  OP FU  ABG and lytes  Diamox X one

## 2018-02-24 NOTE — PROGRESS NOTE ADULT - SUBJECTIVE AND OBJECTIVE BOX
CHIEF COMPLAINT/INTERVAL HISTORY:    Patient is a 37y old  Male who presents with a chief complaint of sob (18 Feb 2018 15:59)    SUBJECTIVE & OBJECTIVE: Pt seen and examined at bedside. No overnight events. Reports edema has significantly improved; UOP 5.1 liters/24 hours. Lasix switched to IVP yesterday. Serum bicarb > 41. ABG ordered. Diamox added today.    ROS: No chest pain, palpitations, SOB, light headedness, dizziness, headache, nausea/vomiting, fevers/chills, abdominal pain, dysuria or increased urinary frequency.    ICU Vital Signs Last 24 Hrs  T(C): 37.2 (23 Feb 2018 16:59), Max: 37.2 (23 Feb 2018 16:59)  T(F): 98.9 (23 Feb 2018 16:59), Max: 98.9 (23 Feb 2018 16:59)  HR: 87 (24 Feb 2018 11:14) (87 - 102)  BP: 96/54 (24 Feb 2018 11:14) (96/54 - 124/72)  RR: 18 (24 Feb 2018 11:14) (18 - 21)  SpO2: 97% (24 Feb 2018 11:14) (97% - 98%)      MEDICATIONS  (STANDING):  acetazolamide  IVPB 500 milliGRAM(s) IV Intermittent once  amLODIPine   Tablet 5 milliGRAM(s) Oral daily  aspirin  chewable 81 milliGRAM(s) Oral daily  atorvastatin 40 milliGRAM(s) Oral at bedtime  carvedilol 3.125 milliGRAM(s) Oral two times a day  dextrose 5%. 1000 milliLiter(s) (50 mL/Hr) IV Continuous <Continuous>  dextrose 50% Injectable 12.5 Gram(s) IV Push once  dextrose 50% Injectable 25 Gram(s) IV Push once  dextrose 50% Injectable 25 Gram(s) IV Push once  furosemide   Injectable 80 milliGRAM(s) IV Push two times a day  hydrALAZINE 25 milliGRAM(s) Oral every 8 hours  influenza   Vaccine 0.5 milliLiter(s) IntraMuscular once  insulin lispro (HumaLOG) corrective regimen sliding scale   SubCutaneous three times a day before meals    MEDICATIONS  (PRN):  dextrose Gel 1 Dose(s) Oral once PRN Blood Glucose LESS THAN 70 milliGRAM(s)/deciliter  glucagon  Injectable 1 milliGRAM(s) IntraMuscular once PRN Glucose LESS THAN 70 milligrams/deciliter      LABS:    02-24    138  |  88<L>  |  18.0  ----------------------------<  111  3.8   |  41.0<H>  |  0.97    Ca    8.7      24 Feb 2018 07:28      PT/INR - ( 24 Feb 2018 07:28 )   PT: 19.7 sec;   INR: 1.77 ratio        CAPILLARY BLOOD GLUCOSE      POCT Blood Glucose.: 106 mg/dL (24 Feb 2018 12:59)  POCT Blood Glucose.: 103 mg/dL (24 Feb 2018 08:37)  POCT Blood Glucose.: 109 mg/dL (23 Feb 2018 21:53)  POCT Blood Glucose.: 100 mg/dL (23 Feb 2018 17:53)      PHYSICAL EXAM:    GENERAL: morbidly obese male, laying in bed, NAD  HEAD:  Atraumatic, Normocephalic  EYES: EOMI, PERRLA   ENMT: Moist mucous membranes  NECK: Supple   NERVOUS SYSTEM:  Alert & Oriented X3  CHEST/LUNG: diminished breath sounds, no audible rales  HEART: Regular rate and rhythm; + S1/S2  ABDOMEN: Soft, Nontender, obese  : Scrotal edema  EXTREMITIES:  + LE edema

## 2018-02-24 NOTE — PROGRESS NOTE ADULT - SUBJECTIVE AND OBJECTIVE BOX
PULMONARY PROGRESS NOTE      [id number]    Patient is a 37y old  Male who presents with a chief complaint of sob (18 Feb 2018 15:59)  Non smoker  JAN - non-compliant with CPAP at home  Morbid obesity    INTERVAL HPI/OVERNIGHT EVENTS:  On IV lasix  Diuresing well - still edematous  ON nocturnal BIPAP  No dyspnea at this time      MEDICATIONS  (STANDING):  amLODIPine   Tablet 5 milliGRAM(s) Oral daily  aspirin  chewable 81 milliGRAM(s) Oral daily  atorvastatin 40 milliGRAM(s) Oral at bedtime  carvedilol 3.125 milliGRAM(s) Oral two times a day  dextrose 5%. 1000 milliLiter(s) (50 mL/Hr) IV Continuous <Continuous>  dextrose 50% Injectable 12.5 Gram(s) IV Push once  dextrose 50% Injectable 25 Gram(s) IV Push once  dextrose 50% Injectable 25 Gram(s) IV Push once  furosemide Infusion 10 mG/Hr (5 mL/Hr) IV Continuous <Continuous>  hydrALAZINE 25 milliGRAM(s) Oral every 8 hours  influenza   Vaccine 0.5 milliLiter(s) IntraMuscular once  insulin lispro (HumaLOG) corrective regimen sliding scale   SubCutaneous three times a day before meals      MEDICATIONS  (PRN):  dextrose Gel 1 Dose(s) Oral once PRN Blood Glucose LESS THAN 70 milliGRAM(s)/deciliter  glucagon  Injectable 1 milliGRAM(s) IntraMuscular once PRN Glucose LESS THAN 70 milligrams/deciliter      Allergies    No Known Allergies    Intolerances        PAST MEDICAL & SURGICAL HISTORY:  CHF (congestive heart failure)  Hypertension  No significant past surgical history      SOCIAL HISTORY  Smoking History:       REVIEW OF SYSTEMS:    CONSTITUTIONAL:  No distress    HEENT:  Eyes:  No diplopia or blurred vision. ENT:  No earache, sore throat or runny nose.    CARDIOVASCULAR:  No pressure, squeezing, tightness, heaviness or aching about the chest; no palpitations.    RESPIRATORY: + MUÑOZ    GASTROINTESTINAL:  No nausea, vomiting or diarrhea.    GENITOURINARY:  No dysuria, frequency or urgency.    MUSCULOSKELETAL:  No joint pain    SKIN:  No new lesions.    NEUROLOGIC:  No paresthesias, fasciculations, seizures or weakness.    PSYCHIATRIC:  No disorder of thought or mood.    ENDOCRINE:  No heat or cold intolerance, polyuria or polydipsia.    HEMATOLOGICAL:  No easy bruising or bleeding.     Vital Signs Last 24 Hrs  T(C): 36.7 (22 Feb 2018 10:28), Max: 37.2 (21 Feb 2018 23:15)  T(F): 98.1 (22 Feb 2018 10:28), Max: 99 (21 Feb 2018 23:15)  HR: 98 (22 Feb 2018 10:28) (88 - 98)  BP: 120/88 (22 Feb 2018 10:28) (100/52 - 120/88)  BP(mean): --  RR: 20 (22 Feb 2018 10:28) (18 - 21)  SpO2: 95% (22 Feb 2018 09:12) (93% - 100%)    PHYSICAL EXAMINATION:    GENERAL: The patient is awake and alert in no apparent distress.     HEENT: Head is normocephalic and atraumatic. Extraocular muscles are intact. Mucous membranes are moist.    NECK: Supple.    LUNGS: Clear to auscultation without wheezing, rales or rhonchi; respirations unlabored    HEART: Regular rate and rhythm without murmur.    ABDOMEN: Soft, nontender, and nondistended.      EXTREMITIES: Without any cyanosis, clubbing, rash, lesions; +edema.    NEUROLOGIC: Grossly intact.    SKIN: No ulceration or induration present.      LABS:                        14.8   3.7   )-----------( 181      ( 22 Feb 2018 06:55 )             48.0     02-22    138  |  92<L>  |  15.0  ----------------------------<  109  4.0   |  38.0<H>  |  0.79    Ca    8.5<L>      22 Feb 2018 06:55  Mg     1.8     02-22    TPro  7.3  /  Alb  2.8<L>  /  TBili  1.5  /  DBili  0.4<H>  /  AST  30  /  ALT  23  /  AlkPhos  54  02-22    PT/INR - ( 21 Feb 2018 06:57 )   PT: 21.2 sec;   INR: 1.90 ratio             RADIOLOGY & ADDITIONAL STUDIES:    Leg duplex on 2/19/18: Negative    CTA on 2/20/18  IMPRESSION:      Mild diffuse mediastinal lymphadenopathy.    No evidence of central pulmonary emboli. Cannot exclude subsegmental   emboli.    Diffuse pleural thickening at the right lung base with adjacent   atelectasis or fibrosis in the right lower lobe..      DAGOBERTO MINA M.D., ATTENDING RADIOLOGIST  This document has been electronically signed. Feb 20 2018  4:10PM      Echo on 2/20/18   1. Technically difficult study.   2. Left ventricular ejection fraction, by visual estimation, is 55 to   60%.   3. Normal global left ventricular systolicfunction.   4. Mildly increased left ventricular internal cavity size.   5. Severely enlarged right ventricle. Severely reduced RV systolic   function.   6. Right ventricular volume and pressure overload.   7. Mild mitral valve regurgitation.   8. Normal trileaflet aortic valve with normal opening.   9. Moderate-severe tricuspid regurgitation.  10. Estimated pulmonary artery systolic pressure is 59.7 mmHg assuming a   right atrial pressure of 15 mmHg, which is consistent with severe   pulmonary hypertension.  11. There is no evidence of pericardial effusion.    MD Kelvin Electronically signed on 2/20/2018 at 6:12:05 PM

## 2018-02-24 NOTE — PROGRESS NOTE ADULT - SUBJECTIVE AND OBJECTIVE BOX
Pt comfortable at rest. Reports edema is improving, still present in scrotum.   Has not been OOB today. denies SOB     MEDICATIONS  (STANDING):  acetazolamide  IVPB 500 milliGRAM(s) IV Intermittent once  amLODIPine   Tablet 5 milliGRAM(s) Oral daily  aspirin  chewable 81 milliGRAM(s) Oral daily  atorvastatin 40 milliGRAM(s) Oral at bedtime  carvedilol 3.125 milliGRAM(s) Oral two times a day  dextrose 5%. 1000 milliLiter(s) (50 mL/Hr) IV Continuous <Continuous>  dextrose 50% Injectable 12.5 Gram(s) IV Push once  dextrose 50% Injectable 25 Gram(s) IV Push once  dextrose 50% Injectable 25 Gram(s) IV Push once  furosemide   Injectable 80 milliGRAM(s) IV Push two times a day  hydrALAZINE 25 milliGRAM(s) Oral every 8 hours  influenza   Vaccine 0.5 milliLiter(s) IntraMuscular once  insulin lispro (HumaLOG) corrective regimen sliding scale   SubCutaneous three times a day before meals    MEDICATIONS  (PRN):  dextrose Gel 1 Dose(s) Oral once PRN Blood Glucose LESS THAN 70 milliGRAM(s)/deciliter  glucagon  Injectable 1 milliGRAM(s) IntraMuscular once PRN Glucose LESS THAN 70 milligrams/deciliter      Allergies    No Known Allergies    Intolerances      PAST MEDICAL & SURGICAL HISTORY:  CHF (congestive heart failure)  Hypertension  No significant past surgical history      Vital Signs Last 24 Hrs  T(C): 37.2 (23 Feb 2018 16:59), Max: 37.2 (23 Feb 2018 16:59)  T(F): 98.9 (23 Feb 2018 16:59), Max: 98.9 (23 Feb 2018 16:59)  HR: 87 (24 Feb 2018 11:14) (87 - 102)  BP: 96/54 (24 Feb 2018 11:14) (96/54 - 124/72)  BP(mean): --  RR: 18 (24 Feb 2018 11:14) (18 - 21)  SpO2: 97% (24 Feb 2018 11:14) (97% - 98%)    Physical Exam:  Constitutional: NAD, AAOx3  Cardiovascular: +S1S2 RRR  Pulmonary: CTA b/l, unlabored  GI: soft NTND +BS  Extremities: no pedal edema, +distal pulses b/l  Neuro: non focal, UBRROUGHS x4    LABS:    02-24    138  |  88<L>  |  18.0  ----------------------------<  111  3.8   |  41.0<H>  |  0.97    Ca    8.7      24 Feb 2018 07:28      PT/INR - ( 24 Feb 2018 07:28 )   PT: 19.7 sec;   INR: 1.77 ratio               RADIOLOGY & ADDITIONAL TESTS:  < from: TTE Echo Complete w/Doppler (02.20.18 @ 16:49) >   1. Technically difficult study.   2. Left ventricular ejection fraction, by visual estimation, is 55 to   60%.   3. Normal global left ventricular systolicfunction.   4. Mildly increased left ventricular internal cavity size.   5. Severely enlarged right ventricle. Severely reduced RV systolic   function.   6. Right ventricular volume and pressure overload.   7. Mild mitral valve regurgitation.   8. Normal trileaflet aortic valve with normal opening.   9. Moderate-severe tricuspid regurgitation.  10. Estimated pulmonary artery systolic pressure is 59.7 mmHg assuming a   right atrial pressure of 15 mmHg, which is consistent with severe   pulmonary hypertension.  11    < end of copied text >

## 2018-02-24 NOTE — PROGRESS NOTE ADULT - ASSESSMENT
Patient is a 37 year old male with PMH of HTN, JAN on nocturnal CPAP (non-compliant), Morbid obesity and CHF who presented to ER with worsening sob over last 2 months.  In the ED, he was found to be hypoxic on NC, required 50% VM.. CXR shows cardiomegaly and bilateral haziness. Labs show elevated BNP, trop - normal, EKG  - tachycardia. He  was admitted for acute respiratory distress possibly 2/2 acute on chronic CHF. Diuresed well with lasix gtt which was then changed to lasix 80 mg IVP. UOP 5.1 liters/24 hours. Diamox added today given elevated serum bicarb and ABG ordered.      1. Acute hypoxic-hypercarbic respiratory failure - improving  -most likely secondary decompensated right heart failure due to severe pulmonary HTN   -CT angio - negative for PE  -ECHO - normal EF; severe pulm HTN    -Cardio and pulmonary recommendations appreciated  -Continue nocturnal bipap  -Continue IV lasix   -Check ABG; Diamox x 1 given elevated serum bicarb  -Daily weights  and strict I/os     2. HTN   -BP marginal  -Hold Amlodipine and decrease hydralazine  -Continue coreg with holding paramters  -Continue IV lasix    3. JAN  -non-compliant with CPAP at home  -continue nocturnal bipap while inpatient  -severe pulm HTN - will need outpatient follow up with pulmonary     4. Coagulopathy - likely secondary to congestive hepatopathy, not on any AC  -repeat INR 1.77  -US Liver show fatty infiltration  -LFTS within normal range  -Monitor coags    5. Morbid obesity  -Dietary evaluation    6. DM - 2  -HbA1C - 7; meets diagnosis of DM   -continue ISS for now  -nutrition evaluation   -diabetic teaching    7. HLD  -continue statin     8. DVT prophylaxis - SCDs given coagulopathy

## 2018-02-24 NOTE — PROGRESS NOTE ADULT - ASSESSMENT
36 y/o morbidly obese male with recurrent admission for SOB , heart failure   Acute right sided heart failure 2nry to severe pulmonary HTN, JAN and morbid obesity , diastolic dysfunction is possible  ( given elevated PCWP on prior RHC). Improving with diuresis.   -continue lasix 80mg IV BID. Once edema resolving switch to PO.   measure daily weights and I.Os.   if not responding add metolazone 5 mg daily   -BP low nml. If remains low would wean off hydralazine.   -Pulm evaluation appreciated. CPAP/bipap  -establish pulm and cardiology followup as outpatient

## 2018-02-25 LAB
ALBUMIN SERPL ELPH-MCNC: 3.1 G/DL — LOW (ref 3.3–5.2)
ALP SERPL-CCNC: 52 U/L — SIGNIFICANT CHANGE UP (ref 40–120)
ALT FLD-CCNC: 19 U/L — SIGNIFICANT CHANGE UP
ANION GAP SERPL CALC-SCNC: 7 MMOL/L — SIGNIFICANT CHANGE UP (ref 5–17)
APTT BLD: 33.8 SEC — SIGNIFICANT CHANGE UP (ref 27.5–37.4)
AST SERPL-CCNC: 24 U/L — SIGNIFICANT CHANGE UP
BASOPHILS # BLD AUTO: 0 K/UL — SIGNIFICANT CHANGE UP (ref 0–0.2)
BASOPHILS NFR BLD AUTO: 2 % — SIGNIFICANT CHANGE UP (ref 0–2)
BILIRUB DIRECT SERPL-MCNC: 0.4 MG/DL — HIGH (ref 0–0.3)
BILIRUB INDIRECT FLD-MCNC: 0.9 MG/DL — SIGNIFICANT CHANGE UP (ref 0.2–1)
BILIRUB SERPL-MCNC: 1.3 MG/DL — SIGNIFICANT CHANGE UP (ref 0.4–2)
BUN SERPL-MCNC: 15 MG/DL — SIGNIFICANT CHANGE UP (ref 8–20)
CA-I BLD-SCNC: 1.16 MMOL/L — SIGNIFICANT CHANGE UP (ref 1.12–1.3)
CALCIUM SERPL-MCNC: 9.1 MG/DL — SIGNIFICANT CHANGE UP (ref 8.6–10.2)
CHLORIDE SERPL-SCNC: 90 MMOL/L — LOW (ref 98–107)
CO2 SERPL-SCNC: 39 MMOL/L — HIGH (ref 22–29)
CREAT SERPL-MCNC: 0.95 MG/DL — SIGNIFICANT CHANGE UP (ref 0.5–1.3)
EOSINOPHIL # BLD AUTO: 0.1 K/UL — SIGNIFICANT CHANGE UP (ref 0–0.5)
EOSINOPHIL NFR BLD AUTO: 5 % — SIGNIFICANT CHANGE UP (ref 0–6)
GLUCOSE BLDC GLUCOMTR-MCNC: 108 MG/DL — HIGH (ref 70–99)
GLUCOSE BLDC GLUCOMTR-MCNC: 114 MG/DL — HIGH (ref 70–99)
GLUCOSE BLDC GLUCOMTR-MCNC: 92 MG/DL — SIGNIFICANT CHANGE UP (ref 70–99)
GLUCOSE BLDC GLUCOMTR-MCNC: 94 MG/DL — SIGNIFICANT CHANGE UP (ref 70–99)
GLUCOSE SERPL-MCNC: 98 MG/DL — SIGNIFICANT CHANGE UP (ref 70–115)
HCT VFR BLD CALC: 50 % — SIGNIFICANT CHANGE UP (ref 42–52)
HGB BLD-MCNC: 15.5 G/DL — SIGNIFICANT CHANGE UP (ref 14–18)
INR BLD: 1.72 RATIO — HIGH (ref 0.88–1.16)
LYMPHOCYTES # BLD AUTO: 0.9 K/UL — LOW (ref 1–4.8)
LYMPHOCYTES # BLD AUTO: 16 % — LOW (ref 20–55)
MAGNESIUM SERPL-MCNC: 1.9 MG/DL — SIGNIFICANT CHANGE UP (ref 1.6–2.6)
MCHC RBC-ENTMCNC: 29.4 PG — SIGNIFICANT CHANGE UP (ref 27–31)
MCHC RBC-ENTMCNC: 31 G/DL — LOW (ref 32–36)
MCV RBC AUTO: 94.7 FL — HIGH (ref 80–94)
MONOCYTES # BLD AUTO: 0.5 K/UL — SIGNIFICANT CHANGE UP (ref 0–0.8)
MONOCYTES NFR BLD AUTO: 16 % — HIGH (ref 3–10)
NEUTROPHILS # BLD AUTO: 1.5 K/UL — LOW (ref 1.8–8)
NEUTROPHILS NFR BLD AUTO: 56 % — SIGNIFICANT CHANGE UP (ref 37–73)
NEUTS BAND # BLD: 1 % — SIGNIFICANT CHANGE UP (ref 0–8)
PHOSPHATE SERPL-MCNC: 3.8 MG/DL — SIGNIFICANT CHANGE UP (ref 2.4–4.7)
PLAT MORPH BLD: NORMAL — SIGNIFICANT CHANGE UP
PLATELET # BLD AUTO: 191 K/UL — SIGNIFICANT CHANGE UP (ref 150–400)
POTASSIUM SERPL-MCNC: 3.6 MMOL/L — SIGNIFICANT CHANGE UP (ref 3.5–5.3)
POTASSIUM SERPL-SCNC: 3.6 MMOL/L — SIGNIFICANT CHANGE UP (ref 3.5–5.3)
PROT SERPL-MCNC: 8.2 G/DL — SIGNIFICANT CHANGE UP (ref 6.6–8.7)
PROTHROM AB SERPL-ACNC: 19.1 SEC — HIGH (ref 9.8–12.7)
RBC # BLD: 5.28 M/UL — SIGNIFICANT CHANGE UP (ref 4.6–6.2)
RBC # FLD: 15.2 % — SIGNIFICANT CHANGE UP (ref 11–15.6)
RBC BLD AUTO: NORMAL — SIGNIFICANT CHANGE UP
SODIUM SERPL-SCNC: 136 MMOL/L — SIGNIFICANT CHANGE UP (ref 135–145)
VARIANT LYMPHS # BLD: 4 % — SIGNIFICANT CHANGE UP (ref 0–6)
WBC # BLD: 3.1 K/UL — LOW (ref 4.8–10.8)
WBC # FLD AUTO: 3.1 K/UL — LOW (ref 4.8–10.8)

## 2018-02-25 PROCEDURE — 99233 SBSQ HOSP IP/OBS HIGH 50: CPT

## 2018-02-25 RX ORDER — FUROSEMIDE 40 MG
60 TABLET ORAL
Qty: 0 | Refills: 0 | Status: DISCONTINUED | OUTPATIENT
Start: 2018-02-25 | End: 2018-02-26

## 2018-02-25 RX ADMIN — CARVEDILOL PHOSPHATE 3.12 MILLIGRAM(S): 80 CAPSULE, EXTENDED RELEASE ORAL at 17:42

## 2018-02-25 RX ADMIN — Medication 60 MILLIGRAM(S): at 17:41

## 2018-02-25 RX ADMIN — Medication 10 MILLIGRAM(S): at 05:20

## 2018-02-25 RX ADMIN — Medication 10 MILLIGRAM(S): at 14:13

## 2018-02-25 RX ADMIN — Medication 81 MILLIGRAM(S): at 14:13

## 2018-02-25 RX ADMIN — Medication 80 MILLIGRAM(S): at 05:20

## 2018-02-25 RX ADMIN — CARVEDILOL PHOSPHATE 3.12 MILLIGRAM(S): 80 CAPSULE, EXTENDED RELEASE ORAL at 05:20

## 2018-02-25 RX ADMIN — ATORVASTATIN CALCIUM 40 MILLIGRAM(S): 80 TABLET, FILM COATED ORAL at 21:40

## 2018-02-25 RX ADMIN — Medication 10 MILLIGRAM(S): at 21:40

## 2018-02-25 NOTE — PROGRESS NOTE ADULT - SUBJECTIVE AND OBJECTIVE BOX
CHIEF COMPLAINT/INTERVAL HISTORY:    Patient is a 37y old  Male who presents with a chief complaint of sob (18 Feb 2018 15:59)    SUBJECTIVE & OBJECTIVE: Pt seen and examined at bedside. No overnight events. Reports edema has significantly improved. OOB to chair today. Will decrease lasix to 60 BID today and titrate down as tolerated.    ROS: No chest pain, palpitations, SOB, light headedness, dizziness, headache, nausea/vomiting, fevers/chills, abdominal pain, dysuria or increased urinary frequency.    ICU Vital Signs Last 24 Hrs  T(C): 36.8 (25 Feb 2018 05:04), Max: 37.1 (24 Feb 2018 11:14)  T(F): 98.2 (25 Feb 2018 05:04), Max: 98.8 (24 Feb 2018 11:14)  HR: 81 (25 Feb 2018 05:04) (81 - 88)  BP: 122/71 (25 Feb 2018 05:04) (96/54 - 127/70)  RR: 18 (25 Feb 2018 05:04) (18 - 18)  SpO2: 99% (25 Feb 2018 05:04) (96% - 99%)    MEDICATIONS  (STANDING):  aspirin  chewable 81 milliGRAM(s) Oral daily  atorvastatin 40 milliGRAM(s) Oral at bedtime  carvedilol 3.125 milliGRAM(s) Oral two times a day  dextrose 5%. 1000 milliLiter(s) (50 mL/Hr) IV Continuous <Continuous>  dextrose 50% Injectable 12.5 Gram(s) IV Push once  dextrose 50% Injectable 25 Gram(s) IV Push once  dextrose 50% Injectable 25 Gram(s) IV Push once  furosemide   Injectable 80 milliGRAM(s) IV Push two times a day  hydrALAZINE 10 milliGRAM(s) Oral three times a day  influenza   Vaccine 0.5 milliLiter(s) IntraMuscular once  insulin lispro (HumaLOG) corrective regimen sliding scale   SubCutaneous three times a day before meals    MEDICATIONS  (PRN):  dextrose Gel 1 Dose(s) Oral once PRN Blood Glucose LESS THAN 70 milliGRAM(s)/deciliter  glucagon  Injectable 1 milliGRAM(s) IntraMuscular once PRN Glucose LESS THAN 70 milligrams/deciliter      LABS:                        15.5   3.1   )-----------( 191      ( 25 Feb 2018 07:17 )             50.0     02-25    136  |  90<L>  |  15.0  ----------------------------<  98  3.6   |  39.0<H>  |  0.95    Ca    9.1      25 Feb 2018 07:18  Phos  3.8     02-25  Mg     1.9     02-25    TPro  8.2  /  Alb  3.1<L>  /  TBili  1.3  /  DBili  0.4<H>  /  AST  24  /  ALT  19  /  AlkPhos  52  02-25    PT/INR - ( 25 Feb 2018 07:19 )   PT: 19.1 sec;   INR: 1.72 ratio         PTT - ( 25 Feb 2018 07:19 )  PTT:33.8 sec      CAPILLARY BLOOD GLUCOSE      POCT Blood Glucose.: 94 mg/dL (25 Feb 2018 08:34)  POCT Blood Glucose.: 154 mg/dL (24 Feb 2018 22:25)  POCT Blood Glucose.: 155 mg/dL (24 Feb 2018 17:36)  POCT Blood Glucose.: 106 mg/dL (24 Feb 2018 12:59)      GENERAL: morbidly obese male, laying in bed, NAD  HEAD:  Atraumatic, Normocephalic  EYES: EOMI, PERRLA   ENMT: Moist mucous membranes  NECK: Supple   NERVOUS SYSTEM:  Alert & Oriented X3  CHEST/LUNG: coarse breath sounds  HEART: Regular rate and rhythm; + S1/S2  ABDOMEN: Soft, Nontender, obese  : Scrotal edema  EXTREMITIES:  + LE edema

## 2018-02-25 NOTE — PROGRESS NOTE ADULT - ASSESSMENT
Patient is a 37 year old male with PMH of HTN, JAN on nocturnal CPAP (non-compliant), Morbid obesity and CHF who presented to ER with worsening sob over last 2 months.  In the ED, he was found to be hypoxic on NC, required 50% VM.. CXR shows cardiomegaly and bilateral haziness. Labs show elevated BNP, trop - normal, EKG  - tachycardia. He  was admitted for acute respiratory distress possibly 2/2 acute on chronic CHF. Diuresed well with lasix gtt which was then changed to lasix 80 mg IVP. UOP 5.1 liters/24 hours. s/p 1 dose of diamox on 2/24 with slight improvement in serum bicarb. pH acceptable. Decrease lasix as tolerated. OOB to chair and PT.    1. Acute hypoxic-hypercarbic respiratory failure - improving  -most likely secondary decompensated right heart failure due to severe pulmonary HTN   -CT angio - negative for PE  -ECHO - normal EF; severe pulm HTN    -Continue nocturnal bipap  -Continue IV lasix but decrease to 60 mg IV BID today  -ABG reviewed; pH acceptable. Received 1 dose of diamox on 2/24.    -Daily weights  and strict I/os  -Cardio and pulmonary recommendations appreciated     2. HTN   -BP improved by holding amlodipine and decreasing hydralazine  -Continue coreg with holding paramters  -Continue IV lasix as above  -low sodium diet    3. JAN  -non-compliant with CPAP at home  -continue nocturnal bipap while inpatient  -severe pulm HTN - will need to establish outpatient follow up with pulmonary and cardio    4. Coagulopathy - likely secondary to congestive hepatopathy, not on any AC  -repeat INR 1.77--> 1.72  -US Liver show fatty infiltration  -LFTS within acceptable range  -Monitor coags    5. Morbid obesity  -Dietary evaluation    6. DM - 2  -HbA1C - 7; meets diagnosis of DM   -continue ISS for now  -nutrition evaluation   -diabetic teaching evaluation     7. HLD  -continue statin     8. DVT prophylaxis - SCDs given coagulopathy     PT evaluation    Attending Attestation:   Plan discussed with patient, RN.

## 2018-02-26 LAB
ANION GAP SERPL CALC-SCNC: 9 MMOL/L — SIGNIFICANT CHANGE UP (ref 5–17)
APTT BLD: 33.8 SEC — SIGNIFICANT CHANGE UP (ref 27.5–37.4)
BUN SERPL-MCNC: 21 MG/DL — HIGH (ref 8–20)
CALCIUM SERPL-MCNC: 9.1 MG/DL — SIGNIFICANT CHANGE UP (ref 8.6–10.2)
CHLORIDE SERPL-SCNC: 89 MMOL/L — LOW (ref 98–107)
CO2 SERPL-SCNC: 38 MMOL/L — HIGH (ref 22–29)
CREAT SERPL-MCNC: 0.94 MG/DL — SIGNIFICANT CHANGE UP (ref 0.5–1.3)
GLUCOSE BLDC GLUCOMTR-MCNC: 106 MG/DL — HIGH (ref 70–99)
GLUCOSE BLDC GLUCOMTR-MCNC: 131 MG/DL — HIGH (ref 70–99)
GLUCOSE BLDC GLUCOMTR-MCNC: 145 MG/DL — HIGH (ref 70–99)
GLUCOSE BLDC GLUCOMTR-MCNC: 88 MG/DL — SIGNIFICANT CHANGE UP (ref 70–99)
GLUCOSE SERPL-MCNC: 95 MG/DL — SIGNIFICANT CHANGE UP (ref 70–115)
HCT VFR BLD CALC: 51.3 % — SIGNIFICANT CHANGE UP (ref 42–52)
HGB BLD-MCNC: 15.9 G/DL — SIGNIFICANT CHANGE UP (ref 14–18)
INR BLD: 1.59 RATIO — HIGH (ref 0.88–1.16)
MAGNESIUM SERPL-MCNC: 1.9 MG/DL — SIGNIFICANT CHANGE UP (ref 1.6–2.6)
MCHC RBC-ENTMCNC: 29.5 PG — SIGNIFICANT CHANGE UP (ref 27–31)
MCHC RBC-ENTMCNC: 31 G/DL — LOW (ref 32–36)
MCV RBC AUTO: 95.2 FL — HIGH (ref 80–94)
PHOSPHATE SERPL-MCNC: 3.8 MG/DL — SIGNIFICANT CHANGE UP (ref 2.4–4.7)
PLATELET # BLD AUTO: 178 K/UL — SIGNIFICANT CHANGE UP (ref 150–400)
POTASSIUM SERPL-MCNC: 4.2 MMOL/L — SIGNIFICANT CHANGE UP (ref 3.5–5.3)
POTASSIUM SERPL-SCNC: 4.2 MMOL/L — SIGNIFICANT CHANGE UP (ref 3.5–5.3)
PROTHROM AB SERPL-ACNC: 17.7 SEC — HIGH (ref 9.8–12.7)
RBC # BLD: 5.39 M/UL — SIGNIFICANT CHANGE UP (ref 4.6–6.2)
RBC # FLD: 15 % — SIGNIFICANT CHANGE UP (ref 11–15.6)
SODIUM SERPL-SCNC: 136 MMOL/L — SIGNIFICANT CHANGE UP (ref 135–145)
WBC # BLD: 3 K/UL — LOW (ref 4.8–10.8)
WBC # FLD AUTO: 3 K/UL — LOW (ref 4.8–10.8)

## 2018-02-26 PROCEDURE — 99233 SBSQ HOSP IP/OBS HIGH 50: CPT

## 2018-02-26 RX ORDER — FUROSEMIDE 40 MG
40 TABLET ORAL
Qty: 0 | Refills: 0 | Status: DISCONTINUED | OUTPATIENT
Start: 2018-02-26 | End: 2018-02-28

## 2018-02-26 RX ORDER — ACETAMINOPHEN 500 MG
650 TABLET ORAL EVERY 6 HOURS
Qty: 0 | Refills: 0 | Status: DISCONTINUED | OUTPATIENT
Start: 2018-02-26 | End: 2018-03-01

## 2018-02-26 RX ADMIN — Medication 650 MILLIGRAM(S): at 08:01

## 2018-02-26 RX ADMIN — Medication 10 MILLIGRAM(S): at 05:18

## 2018-02-26 RX ADMIN — Medication 81 MILLIGRAM(S): at 12:29

## 2018-02-26 RX ADMIN — Medication 10 MILLIGRAM(S): at 21:19

## 2018-02-26 RX ADMIN — Medication 650 MILLIGRAM(S): at 09:47

## 2018-02-26 RX ADMIN — CARVEDILOL PHOSPHATE 3.12 MILLIGRAM(S): 80 CAPSULE, EXTENDED RELEASE ORAL at 05:18

## 2018-02-26 RX ADMIN — Medication 60 MILLIGRAM(S): at 05:18

## 2018-02-26 RX ADMIN — ATORVASTATIN CALCIUM 40 MILLIGRAM(S): 80 TABLET, FILM COATED ORAL at 21:19

## 2018-02-26 RX ADMIN — Medication 10 MILLIGRAM(S): at 14:33

## 2018-02-26 NOTE — PROGRESS NOTE ADULT - SUBJECTIVE AND OBJECTIVE BOX
CHIEF COMPLAINT/INTERVAL HISTORY:    Patient is a 37y old  Male who presents with a chief complaint of sob (18 Feb 2018 15:59)    SUBJECTIVE & OBJECTIVE: Pt seen and examined at bedside. No overnight events. OOB to chair yesterday and reports feeling better. Weight down to 176 kg; titrate lasix down as tolerated. PT evaluation. Will need to check O2 to assess for home O2.     ROS: No chest pain, palpitations, SOB, light headedness, dizziness, headache, nausea/vomiting, fevers/chills, abdominal pain, dysuria or increased urinary frequency.    ICU Vital Signs Last 24 Hrs  T(C): 36.8 (26 Feb 2018 10:25), Max: 37 (25 Feb 2018 16:00)  T(F): 98.2 (26 Feb 2018 10:25), Max: 98.6 (25 Feb 2018 16:00)  HR: 89 (26 Feb 2018 10:25) (80 - 95)  BP: 134/66 (26 Feb 2018 10:25) (111/71 - 134/66)  RR: 18 (26 Feb 2018 10:25) (18 - 18)  SpO2: 96% (26 Feb 2018 10:25) (81% - 98%)    MEDICATIONS  (STANDING):  aspirin  chewable 81 milliGRAM(s) Oral daily  atorvastatin 40 milliGRAM(s) Oral at bedtime  carvedilol 3.125 milliGRAM(s) Oral two times a day  dextrose 5%. 1000 milliLiter(s) (50 mL/Hr) IV Continuous <Continuous>  dextrose 50% Injectable 12.5 Gram(s) IV Push once  dextrose 50% Injectable 25 Gram(s) IV Push once  dextrose 50% Injectable 25 Gram(s) IV Push once  furosemide   Injectable 40 milliGRAM(s) IV Push two times a day  hydrALAZINE 10 milliGRAM(s) Oral three times a day  influenza   Vaccine 0.5 milliLiter(s) IntraMuscular once  insulin lispro (HumaLOG) corrective regimen sliding scale   SubCutaneous three times a day before meals    MEDICATIONS  (PRN):  acetaminophen   Tablet. 650 milliGRAM(s) Oral every 6 hours PRN Moderate Pain (4 - 6)  dextrose Gel 1 Dose(s) Oral once PRN Blood Glucose LESS THAN 70 milliGRAM(s)/deciliter  glucagon  Injectable 1 milliGRAM(s) IntraMuscular once PRN Glucose LESS THAN 70 milligrams/deciliter      LABS:                        15.9   3.0   )-----------( 178      ( 26 Feb 2018 08:48 )             51.3     02-26    136  |  89<L>  |  21.0<H>  ----------------------------<  95  4.2   |  38.0<H>  |  0.94    Ca    9.1      26 Feb 2018 08:48  Phos  3.8     02-26  Mg     1.9     02-26    TPro  8.2  /  Alb  3.1<L>  /  TBili  1.3  /  DBili  0.4<H>  /  AST  24  /  ALT  19  /  AlkPhos  52  02-25    PT/INR - ( 26 Feb 2018 05:51 )   PT: 17.7 sec;   INR: 1.59 ratio         PTT - ( 26 Feb 2018 05:51 )  PTT:33.8 sec      CAPILLARY BLOOD GLUCOSE      POCT Blood Glucose.: 145 mg/dL (26 Feb 2018 12:13)  POCT Blood Glucose.: 88 mg/dL (26 Feb 2018 08:17)  POCT Blood Glucose.: 108 mg/dL (25 Feb 2018 22:05)  POCT Blood Glucose.: 92 mg/dL (25 Feb 2018 16:28)      PHYSICAL EXAM:  GENERAL: morbidly obese male, laying in bed, NAD  HEAD:  Atraumatic, Normocephalic  EYES: EOMI, PERRLA   ENMT: Moist mucous membranes  NECK: Supple   NERVOUS SYSTEM:  Alert & Oriented X3  CHEST/LUNG: bilateral air entry  HEART: Regular rate and rhythm; + S1/S2  ABDOMEN: Soft, Nontender, obese  : Scrotal edema  EXTREMITIES:  + LE edema improved

## 2018-02-26 NOTE — PROGRESS NOTE ADULT - SUBJECTIVE AND OBJECTIVE BOX
PULMONARY PROGRESS NOTE      RUDY GOYAL  MRN-[id number]    Patient is a 37y old  Male who presents with a chief complaint of sob (18 Feb 2018 15:59)      INTERVAL HPI/OVERNIGHT EVENTS:    Patient is awake and alert  Feels better  Uncomfortable with nocturnal BiPAP but willing to continue to try    MEDICATIONS  (STANDING):  aspirin  chewable 81 milliGRAM(s) Oral daily  atorvastatin 40 milliGRAM(s) Oral at bedtime  carvedilol 3.125 milliGRAM(s) Oral two times a day  dextrose 5%. 1000 milliLiter(s) (50 mL/Hr) IV Continuous <Continuous>  dextrose 50% Injectable 12.5 Gram(s) IV Push once  dextrose 50% Injectable 25 Gram(s) IV Push once  dextrose 50% Injectable 25 Gram(s) IV Push once  furosemide   Injectable 60 milliGRAM(s) IV Push two times a day  hydrALAZINE 10 milliGRAM(s) Oral three times a day  influenza   Vaccine 0.5 milliLiter(s) IntraMuscular once  insulin lispro (HumaLOG) corrective regimen sliding scale   SubCutaneous three times a day before meals      MEDICATIONS  (PRN):  acetaminophen   Tablet. 650 milliGRAM(s) Oral every 6 hours PRN Moderate Pain (4 - 6)  dextrose Gel 1 Dose(s) Oral once PRN Blood Glucose LESS THAN 70 milliGRAM(s)/deciliter  glucagon  Injectable 1 milliGRAM(s) IntraMuscular once PRN Glucose LESS THAN 70 milligrams/deciliter      Allergies    No Known Allergies    Intolerances        PAST MEDICAL & SURGICAL HISTORY:  CHF (congestive heart failure)  Hypertension  No significant past surgical history        REVIEW OF SYSTEMS:    CONSTITUTIONAL:  No distress    HEENT:  Eyes:  No diplopia or blurred vision. ENT:  No earache, sore throat or runny nose.    CARDIOVASCULAR:  No pressure, squeezing, tightness, heaviness or aching about the chest; no palpitations.    RESPIRATORY:  Improved cough, shortness of breath, no PND or orthopnea. Mild SOBOE    GASTROINTESTINAL:  No nausea, vomiting or diarrhea.    GENITOURINARY:  No dysuria, frequency or urgency.    NEUROLOGIC:  No paresthesias, fasciculations, seizures or weakness.    PSYCHIATRIC:  No disorder of thought or mood.    Vital Signs Last 24 Hrs  T(C): 36.8 (26 Feb 2018 10:25), Max: 37 (25 Feb 2018 16:00)  T(F): 98.2 (26 Feb 2018 10:25), Max: 98.6 (25 Feb 2018 16:00)  HR: 89 (26 Feb 2018 10:25) (80 - 95)  BP: 134/66 (26 Feb 2018 10:25) (111/71 - 134/66)  BP(mean): --  RR: 18 (26 Feb 2018 10:25) (18 - 18)  SpO2: 96% (26 Feb 2018 10:25) (81% - 98%)    PHYSICAL EXAMINATION:    GENERAL: The patient is awake and alert in no apparent distress.     HEENT: Head is normocephalic and atraumatic. Extraocular muscles are intact. Mucous membranes are moist.    NECK: Supple.    LUNGS: Clear to auscultation without wheezing, rales or rhonchi; respirations unlabored    HEART: Regular rate and rhythm without murmur.    ABDOMEN: Soft, nontender, and nondistended.      EXTREMITIES: Without any cyanosis, clubbing, rash, lesions or edema.    NEUROLOGIC: Grossly intact.    LABS:                        15.9   3.0   )-----------( 178      ( 26 Feb 2018 08:48 )             51.3     02-26    136  |  89<L>  |  21.0<H>  ----------------------------<  95  4.2   |  38.0<H>  |  0.94    Ca    9.1      26 Feb 2018 08:48  Phos  3.8     02-26  Mg     1.9     02-26    TPro  8.2  /  Alb  3.1<L>  /  TBili  1.3  /  DBili  0.4<H>  /  AST  24  /  ALT  19  /  AlkPhos  52  02-25    PT/INR - ( 26 Feb 2018 05:51 )   PT: 17.7 sec;   INR: 1.59 ratio         PTT - ( 26 Feb 2018 05:51 )  PTT:33.8 sec    ABG - ( 24 Feb 2018 14:25 )  pH: 7.39  /  pCO2: 76    /  pO2: 81    / HCO3: 40    / Base Excess: 16.0  /  SaO2: 96            RADIOLOGY & ADDITIONAL STUDIES:     EXAM:  CT ANGIO CHEST (W)AW IC                          PROCEDURE DATE:  02/20/2018          INTERPRETATION:  CT CHEST WITH IV CONTRAST:    HISTORY: Hypoxia. Elevated d-dimer level. Evaluate for pulmonary emboli..    Date and time of exam: 2/20/2018 2:32 PM.    TECHNIQUE:  Sections were obtained from the apices to the diaphragm   following intravenous contrast.  94 mls of omnipaque 350 was administered   intravenously without complication. MIP images were obtained and reviewed.    COMPARISON EXAMINATION:   No prior exam.    FINDINGS:  No evidence of central pulmonary emboli. Subsegmental emboli cannot be   excluded because of inadequate opacification of the distal pulmonary   arteries.    There is mild diffuse mediastinal lymphadenopathy. No significant hilar   adenopathy.    No evidence of pleural or pericardial effusion. No evidence of axillary   adenopathy.    There is diffuse pleural thickening at the right lung base. The left lung   is clear. Linear strands of atelectasis or fibrosis at the right lung   base adjacent to pleural thickening.    There are mild degenerative changes in the thoracic spine.          IMPRESSION:      Mild diffuse mediastinal lymphadenopathy.    No evidence of central pulmonary emboli. Cannot exclude subsegmental   emboli.    Diffuse pleural thickening at the right lung base with adjacent   atelectasis or fibrosis in the right lower lobe..        DAGOBERTO MINA M.D., ATTENDING RADIOLOGIST  This document has been electronically signed. Feb 20 2018  4:10PM      ECHO:    Summary:   1. Technically difficult study.   2. Left ventricular ejection fraction, by visual estimation, is 55 to   60%.   3. Normal global left ventricular systolic function.   4. Mildly increased left ventricular internal cavity size.   5. Severely enlarged right ventricle. Severely reduced RV systolic   function.   6. Right ventricular volume and pressure overload.   7. Mild mitral valve regurgitation.   8. Normal trileaflet aortic valve with normal opening.   9. Moderate-severe tricuspid regurgitation.  10. Estimated pulmonary artery systolic pressure is 59.7 mmHg assuming a   right atrial pressure of 15 mmHg, which is consistent with severe   pulmonary hypertension.  11. There is no evidence of pericardial effusion.    Robb Sunshine MD Electronically signed on 2/20/2018 at 6:12:05 PM

## 2018-02-26 NOTE — PROGRESS NOTE ADULT - ASSESSMENT
MO  Severe pulm HTN  [facility]  JAN  Chronic though compensated hypercarbia  ROJELIO- concern for sarcoidosis    Rec:    Diurese as tolerates  Optimize cardiac function  Consider R and L heart catheterization for pulm HTN  Nocturnal BiPAP - ? CPAP at home  Consider eventual mediastinoscopy for bx of ROJELIO to r/o sarcoidosis  Eventual outpt PFTs  Follow hypercarbia - if has CPAP at home, may benefit more from BiPAP given hypercarbia  D/w [facility] for possible change in home machine MO  Severe pulm HTN  [facility]  JAN  Chronic though compensated hypercarbia  ROJELIO- concern for sarcoidosis    Rec:    Diurese as tolerates  Optimize cardiac function  Consider R and L heart catheterization for pulm HTN  Nocturnal BiPAP - ? CPAP at home; decrease pressure for comfort per patient's request  Consider eventual mediastinoscopy for bx of ROJELIO to r/o sarcoidosis  Eventual outpt PFTs  Follow hypercarbia - if has CPAP at home, may benefit more from BiPAP given hypercarbia  D/w [facility] for possible change in home machine

## 2018-02-26 NOTE — PROGRESS NOTE ADULT - ASSESSMENT
Patient is a 37 year old male with PMH of HTN, JAN on nocturnal CPAP (non-compliant), Morbid obesity and CHF who presented to ER with worsening sob over last 2 months.  In the ED, he was found to be hypoxic on NC, required 50% VM.. CXR shows cardiomegaly and bilateral haziness. Labs show elevated BNP, trop - normal, EKG  - tachycardia. He  was admitted for acute respiratory distress possibly 2/2 acute on chronic CHF. Diuresed well with lasix gtt which was then changed to lasix 80 mg IVP. UOP 5.1 liters/24 hours. s/p 1 dose of diamox on 2/24 with slight improvement in serum bicarb. pH acceptable. Decrease lasix as tolerated. OOB to chair and PT.    1. Acute hypoxic-hypercarbic respiratory failure - improving  -most likely secondary decompensated right heart failure due to severe pulmonary HTN   -CT angio - negative for PE  -ECHO - normal EF; severe pulm HTN ; f/u cardio recommendations regarding R/L heart cath  -Continue nocturnal bipap; will benefit from bipap at home given hypercarbia  -Weight down to 176 kg; decrease IV lasix to 40 mg BID    -Repeat ABG to assess pCO2  -Daily weights  and strict I/os  -Cardio and pulmonary recommendations appreciated  -Will need outpatient PFTs and possible mediastinoscopy for lymphadenopathy     2. HTN   -BP stable  -Continue coreg and hydralazine with holding paramters  -Continue IV lasix as above  -low sodium diet    3. JAN  -assess for home O2  -non-compliant with CPAP at home  -continue nocturnal bipap while inpatient and will likely benefit from bipap at home given hypercarbia  -severe pulm HTN - will discuss plans for cardiac cath with cardio    4. Coagulopathy - likely secondary to congestive hepatopathy, not on any AC  -repeat INR 1.77--> 1.72 --> 1.59  -US Liver show fatty infiltration  -LFTS within acceptable range  -Monitor coags    5. Morbid obesity  -Dietary evaluation    6. DM - 2  -HbA1C - 7; meets diagnosis of DM   -continue ISS for now  -nutrition evaluation   -diabetic teaching evaluation     7. HLD  -continue statin     8. DVT prophylaxis - SCDs given coagulopathy     PT evaluation     Attending Attestation:   Plan discussed with patient, RN.

## 2018-02-27 LAB
ANION GAP SERPL CALC-SCNC: 9 MMOL/L — SIGNIFICANT CHANGE UP (ref 5–17)
ANISOCYTOSIS BLD QL: SLIGHT — SIGNIFICANT CHANGE UP
APTT BLD: 34.6 SEC — SIGNIFICANT CHANGE UP (ref 27.5–37.4)
BASE EXCESS BLDA CALC-SCNC: 9.4 MMOL/L — HIGH (ref -2–2)
BASOPHILS NFR BLD AUTO: 1 % — SIGNIFICANT CHANGE UP (ref 0–2)
BLOOD GAS COMMENTS ARTERIAL: SIGNIFICANT CHANGE UP
BUN SERPL-MCNC: 19 MG/DL — SIGNIFICANT CHANGE UP (ref 8–20)
CALCIUM SERPL-MCNC: 9 MG/DL — SIGNIFICANT CHANGE UP (ref 8.6–10.2)
CHLORIDE SERPL-SCNC: 93 MMOL/L — LOW (ref 98–107)
CO2 SERPL-SCNC: 35 MMOL/L — HIGH (ref 22–29)
CREAT SERPL-MCNC: 0.81 MG/DL — SIGNIFICANT CHANGE UP (ref 0.5–1.3)
EOSINOPHIL NFR BLD AUTO: 2 % — SIGNIFICANT CHANGE UP (ref 0–6)
GAS PNL BLDA: SIGNIFICANT CHANGE UP
GLUCOSE BLDC GLUCOMTR-MCNC: 100 MG/DL — HIGH (ref 70–99)
GLUCOSE BLDC GLUCOMTR-MCNC: 129 MG/DL — HIGH (ref 70–99)
GLUCOSE BLDC GLUCOMTR-MCNC: 135 MG/DL — HIGH (ref 70–99)
GLUCOSE BLDC GLUCOMTR-MCNC: 97 MG/DL — SIGNIFICANT CHANGE UP (ref 70–99)
GLUCOSE SERPL-MCNC: 97 MG/DL — SIGNIFICANT CHANGE UP (ref 70–115)
HCO3 BLDA-SCNC: 33 MMOL/L — HIGH (ref 20–26)
HCT VFR BLD CALC: 50.8 % — SIGNIFICANT CHANGE UP (ref 42–52)
HGB BLD-MCNC: 15.8 G/DL — SIGNIFICANT CHANGE UP (ref 14–18)
HOROWITZ INDEX BLDA+IHG-RTO: 5 — SIGNIFICANT CHANGE UP
HYPERCHROMIA BLD QL AUTO: SLIGHT — SIGNIFICANT CHANGE UP
HYPOCHROMIA BLD QL: SLIGHT — SIGNIFICANT CHANGE UP
INR BLD: 1.61 RATIO — HIGH (ref 0.88–1.16)
LYMPHOCYTES # BLD AUTO: 14 % — LOW (ref 20–55)
MAGNESIUM SERPL-MCNC: 1.9 MG/DL — SIGNIFICANT CHANGE UP (ref 1.6–2.6)
MCHC RBC-ENTMCNC: 29.6 PG — SIGNIFICANT CHANGE UP (ref 27–31)
MCHC RBC-ENTMCNC: 31.1 G/DL — LOW (ref 32–36)
MCV RBC AUTO: 95.1 FL — HIGH (ref 80–94)
MONOCYTES NFR BLD AUTO: 16 % — HIGH (ref 3–10)
NEUTROPHILS NFR BLD AUTO: 66 % — SIGNIFICANT CHANGE UP (ref 37–73)
NEUTS BAND # BLD: 1 % — SIGNIFICANT CHANGE UP (ref 0–8)
OVALOCYTES BLD QL SMEAR: SLIGHT — SIGNIFICANT CHANGE UP
PCO2 BLDA: 60 MMHG — HIGH (ref 35–45)
PH BLDA: 7.4 — SIGNIFICANT CHANGE UP (ref 7.35–7.45)
PHOSPHATE SERPL-MCNC: 3.4 MG/DL — SIGNIFICANT CHANGE UP (ref 2.4–4.7)
PLAT MORPH BLD: NORMAL — SIGNIFICANT CHANGE UP
PLATELET # BLD AUTO: 184 K/UL — SIGNIFICANT CHANGE UP (ref 150–400)
PO2 BLDA: 113 MMHG — HIGH (ref 83–108)
POIKILOCYTOSIS BLD QL AUTO: SLIGHT — SIGNIFICANT CHANGE UP
POTASSIUM SERPL-MCNC: 4.1 MMOL/L — SIGNIFICANT CHANGE UP (ref 3.5–5.3)
POTASSIUM SERPL-SCNC: 4.1 MMOL/L — SIGNIFICANT CHANGE UP (ref 3.5–5.3)
PROTHROM AB SERPL-ACNC: 17.9 SEC — HIGH (ref 9.8–12.7)
RBC # BLD: 5.34 M/UL — SIGNIFICANT CHANGE UP (ref 4.6–6.2)
RBC # FLD: 14.9 % — SIGNIFICANT CHANGE UP (ref 11–15.6)
RBC BLD AUTO: ABNORMAL
SAO2 % BLDA: 99 % — SIGNIFICANT CHANGE UP (ref 95–99)
SODIUM SERPL-SCNC: 137 MMOL/L — SIGNIFICANT CHANGE UP (ref 135–145)
STOMATOCYTES BLD QL SMEAR: PRESENT — SIGNIFICANT CHANGE UP
WBC # BLD: 3 K/UL — LOW (ref 4.8–10.8)
WBC # FLD AUTO: 3 K/UL — LOW (ref 4.8–10.8)

## 2018-02-27 PROCEDURE — 99233 SBSQ HOSP IP/OBS HIGH 50: CPT

## 2018-02-27 RX ADMIN — Medication 40 MILLIGRAM(S): at 05:19

## 2018-02-27 RX ADMIN — CARVEDILOL PHOSPHATE 3.12 MILLIGRAM(S): 80 CAPSULE, EXTENDED RELEASE ORAL at 17:46

## 2018-02-27 RX ADMIN — Medication 40 MILLIGRAM(S): at 17:46

## 2018-02-27 RX ADMIN — Medication 10 MILLIGRAM(S): at 14:21

## 2018-02-27 RX ADMIN — Medication 10 MILLIGRAM(S): at 05:19

## 2018-02-27 RX ADMIN — Medication 10 MILLIGRAM(S): at 21:11

## 2018-02-27 RX ADMIN — Medication 81 MILLIGRAM(S): at 12:53

## 2018-02-27 RX ADMIN — ATORVASTATIN CALCIUM 40 MILLIGRAM(S): 80 TABLET, FILM COATED ORAL at 21:11

## 2018-02-27 RX ADMIN — CARVEDILOL PHOSPHATE 3.12 MILLIGRAM(S): 80 CAPSULE, EXTENDED RELEASE ORAL at 05:19

## 2018-02-27 NOTE — PROGRESS NOTE ADULT - SUBJECTIVE AND OBJECTIVE BOX
PULMONARY PROGRESS NOTE      RUDY GOYAL  MRN-[id number]    Patient is a 37y old  Male who presents with a chief complaint of sob (18 Feb 2018 15:59)      INTERVAL HPI/OVERNIGHT EVENTS:    Feels better  Sitting up  Tolerating nocturnal BiPAP    MEDICATIONS  (STANDING):  aspirin  chewable 81 milliGRAM(s) Oral daily  atorvastatin 40 milliGRAM(s) Oral at bedtime  carvedilol 3.125 milliGRAM(s) Oral two times a day  dextrose 5%. 1000 milliLiter(s) (50 mL/Hr) IV Continuous <Continuous>  dextrose 50% Injectable 12.5 Gram(s) IV Push once  dextrose 50% Injectable 25 Gram(s) IV Push once  dextrose 50% Injectable 25 Gram(s) IV Push once  furosemide   Injectable 40 milliGRAM(s) IV Push two times a day  hydrALAZINE 10 milliGRAM(s) Oral three times a day  influenza   Vaccine 0.5 milliLiter(s) IntraMuscular once  insulin lispro (HumaLOG) corrective regimen sliding scale   SubCutaneous three times a day before meals      MEDICATIONS  (PRN):  acetaminophen   Tablet. 650 milliGRAM(s) Oral every 6 hours PRN Moderate Pain (4 - 6)  dextrose Gel 1 Dose(s) Oral once PRN Blood Glucose LESS THAN 70 milliGRAM(s)/deciliter  glucagon  Injectable 1 milliGRAM(s) IntraMuscular once PRN Glucose LESS THAN 70 milligrams/deciliter      Allergies    No Known Allergies    Intolerances        PAST MEDICAL & SURGICAL HISTORY:  CHF (congestive heart failure)  Hypertension  No significant past surgical history        REVIEW OF SYSTEMS:    CONSTITUTIONAL:  No distress    HEENT:  Eyes:  No diplopia or blurred vision. ENT:  No earache, sore throat or runny nose.    CARDIOVASCULAR:  No pressure, squeezing, tightness, heaviness or aching about the chest; no palpitations.    RESPIRATORY:  Improved cough, shortness of breath, no PND or orthopnea. Mild SOBOE    GASTROINTESTINAL:  No nausea, vomiting or diarrhea.    GENITOURINARY:  No dysuria, frequency or urgency.    NEUROLOGIC:  No paresthesias, fasciculations, seizures or weakness.    PSYCHIATRIC:  No disorder of thought or mood.    Vital Signs Last 24 Hrs  T(C): 36.8 (27 Feb 2018 05:13), Max: 36.8 (27 Feb 2018 05:13)  T(F): 98.2 (27 Feb 2018 05:13), Max: 98.2 (27 Feb 2018 05:13)  HR: 85 (27 Feb 2018 10:45) (82 - 89)  BP: 130/85 (27 Feb 2018 10:45) (99/62 - 134/80)  BP(mean): --  RR: 18 (27 Feb 2018 10:45) (18 - 20)  SpO2: 98% (27 Feb 2018 10:45) (96% - 100%)    PHYSICAL EXAMINATION:    GENERAL: The patient is awake and alert in no apparent distress.     HEENT: Head is normocephalic and atraumatic. Extraocular muscles are intact. Mucous membranes are moist.    NECK: Supple.    LUNGS: Decreased BS otherwise CTA without wheezing, rales or rhonchi; respirations unlabored    HEART: Regular rate and rhythm without murmur.    ABDOMEN: Soft, nontender, and nondistended.      EXTREMITIES: Without any cyanosis, clubbing, rash, lesions or edema.    NEUROLOGIC: Grossly intact.    LABS:                        15.8   3.0   )-----------( 184      ( 27 Feb 2018 05:51 )             50.8     02-27    137  |  93<L>  |  19.0  ----------------------------<  97  4.1   |  35.0<H>  |  0.81    Ca    9.0      27 Feb 2018 05:49  Phos  3.4     02-27  Mg     1.9     02-27      PT/INR - ( 27 Feb 2018 05:51 )   PT: 17.9 sec;   INR: 1.61 ratio         PTT - ( 27 Feb 2018 05:51 )  PTT:34.6 sec      RADIOLOGY & ADDITIONAL STUDIES:     EXAM:  CT ANGIO CHEST (W)AW IC                          PROCEDURE DATE:  02/20/2018          INTERPRETATION:  CT CHEST WITH IV CONTRAST:    HISTORY: Hypoxia. Elevated d-dimer level. Evaluate for pulmonary emboli..    Date and time of exam: 2/20/2018 2:32 PM.    TECHNIQUE:  Sections were obtained from the apices to the diaphragm   following intravenous contrast.  94 mls of omnipaque 350 was administered   intravenously without complication. MIP images were obtained and reviewed.    COMPARISON EXAMINATION:   No prior exam.    FINDINGS:  No evidence of central pulmonary emboli. Subsegmental emboli cannot be   excluded because of inadequate opacification of the distal pulmonary   arteries.    There is mild diffuse mediastinal lymphadenopathy. No significant hilar   adenopathy.    No evidence of pleural or pericardial effusion. No evidence of axillary   adenopathy.    There is diffuse pleural thickening at the right lung base. The left lung   is clear. Linear strands of atelectasis or fibrosis at the right lung   base adjacent to pleural thickening.    There are mild degenerative changes in the thoracic spine.          IMPRESSION:      Mild diffuse mediastinal lymphadenopathy.    No evidence of central pulmonary emboli. Cannot exclude subsegmental   emboli.    Diffuse pleural thickening at the right lung base with adjacent   atelectasis or fibrosis in the right lower lobe..        DAGOBERTO MINA M.D., ATTENDING RADIOLOGIST  This document has been electronically signed. Feb 20 2018  4:10PM        ECHO:      Summary:   1. Technically difficult study.   2. Left ventricular ejection fraction, by visual estimation, is 55 to   60%.   3. Normal global left ventricular systolic function.   4. Mildly increased left ventricular internal cavity size.   5. Severely enlarged right ventricle. Severely reduced RV systolic   function.   6. Right ventricular volume and pressure overload.   7. Mild mitral valve regurgitation.   8. Normal trileaflet aortic valve with normal opening.   9. Moderate-severe tricuspid regurgitation.  10. Estimated pulmonary artery systolic pressure is 59.7 mmHg assuming a   right atrial pressure of 15 mmHg, which is consistent with severe   pulmonary hypertension.  11. There is no evidence of pericardial effusion.    MD Kelvin Electronically signed on 2/20/2018 at 6:12:05 PM

## 2018-02-27 NOTE — PROGRESS NOTE ADULT - SUBJECTIVE AND OBJECTIVE BOX
CHIEF COMPLAINT/INTERVAL HISTORY:    Patient is a 37y old  Male who presents with a chief complaint of sob (18 Feb 2018 15:59)    SUBJECTIVE & OBJECTIVE: Pt seen and examined at bedside. No overnight events. Diuresing well; continue IV lasix today. Will repeat ABG given severe hypercapnia.     ROS: No chest pain, palpitations, light headedness, dizziness, headache, nausea/vomiting, fevers/chills, abdominal pain, dysuria or increased urinary frequency.    ICU Vital Signs Last 24 Hrs  T(C): 36.8 (27 Feb 2018 05:13), Max: 36.8 (27 Feb 2018 05:13)  T(F): 98.2 (27 Feb 2018 05:13), Max: 98.2 (27 Feb 2018 05:13)  HR: 85 (27 Feb 2018 10:45) (82 - 89)  BP: 130/85 (27 Feb 2018 10:45) (99/62 - 134/80)  RR: 18 (27 Feb 2018 10:45) (18 - 20)  SpO2: 98% (27 Feb 2018 10:45) (96% - 100%)    MEDICATIONS  (STANDING):  aspirin  chewable 81 milliGRAM(s) Oral daily  atorvastatin 40 milliGRAM(s) Oral at bedtime  carvedilol 3.125 milliGRAM(s) Oral two times a day  dextrose 5%. 1000 milliLiter(s) (50 mL/Hr) IV Continuous <Continuous>  dextrose 50% Injectable 12.5 Gram(s) IV Push once  dextrose 50% Injectable 25 Gram(s) IV Push once  dextrose 50% Injectable 25 Gram(s) IV Push once  furosemide   Injectable 40 milliGRAM(s) IV Push two times a day  hydrALAZINE 10 milliGRAM(s) Oral three times a day  influenza   Vaccine 0.5 milliLiter(s) IntraMuscular once  insulin lispro (HumaLOG) corrective regimen sliding scale   SubCutaneous three times a day before meals    MEDICATIONS  (PRN):  acetaminophen   Tablet. 650 milliGRAM(s) Oral every 6 hours PRN Moderate Pain (4 - 6)  dextrose Gel 1 Dose(s) Oral once PRN Blood Glucose LESS THAN 70 milliGRAM(s)/deciliter  glucagon  Injectable 1 milliGRAM(s) IntraMuscular once PRN Glucose LESS THAN 70 milligrams/deciliter      LABS:                        15.8   3.0   )-----------( 184      ( 27 Feb 2018 05:51 )             50.8     02-27    137  |  93<L>  |  19.0  ----------------------------<  97  4.1   |  35.0<H>  |  0.81    Ca    9.0      27 Feb 2018 05:49  Phos  3.4     02-27  Mg     1.9     02-27      PT/INR - ( 27 Feb 2018 05:51 )   PT: 17.9 sec;   INR: 1.61 ratio         PTT - ( 27 Feb 2018 05:51 )  PTT:34.6 sec      CAPILLARY BLOOD GLUCOSE      POCT Blood Glucose.: 97 mg/dL (27 Feb 2018 12:39)  POCT Blood Glucose.: 100 mg/dL (27 Feb 2018 08:33)  POCT Blood Glucose.: 131 mg/dL (26 Feb 2018 20:48)  POCT Blood Glucose.: 106 mg/dL (26 Feb 2018 17:40)      PHYSICAL EXAM:  GENERAL: morbidly obese male, laying in bed, NAD  HEAD:  Atraumatic, Normocephalic  EYES: EOMI, PERRLA   ENMT: Moist mucous membranes  NECK: Supple   NERVOUS SYSTEM:  Alert & Oriented X3  CHEST/LUNG: bilateral air entry  HEART: Regular rate and rhythm; + S1/S2  ABDOMEN: Soft, Nontender, obese  : Scrotal edema  EXTREMITIES:  + LE edema improving CHIEF COMPLAINT/INTERVAL HISTORY:    Patient is a 37y old  Male who presents with a chief complaint of sob (18 Feb 2018 15:59)    SUBJECTIVE & OBJECTIVE: Pt seen and examined at bedside. No overnight events. Diuresing well; continue IV lasix today. Will repeat ABG given severe hypercapnia.     ROS: No chest pain, palpitations, light headedness, dizziness, headache, nausea/vomiting, fevers/chills, abdominal pain, dysuria or increased urinary frequency.    ICU Vital Signs Last 24 Hrs  T(C): 36.8 (27 Feb 2018 05:13), Max: 36.8 (27 Feb 2018 05:13)  T(F): 98.2 (27 Feb 2018 05:13), Max: 98.2 (27 Feb 2018 05:13)  HR: 85 (27 Feb 2018 10:45) (82 - 89)  BP: 130/85 (27 Feb 2018 10:45) (99/62 - 134/80)  RR: 18 (27 Feb 2018 10:45) (18 - 20)  SpO2: 98% (27 Feb 2018 10:45) (96% - 100%)    MEDICATIONS  (STANDING):  aspirin  chewable 81 milliGRAM(s) Oral daily  atorvastatin 40 milliGRAM(s) Oral at bedtime  carvedilol 3.125 milliGRAM(s) Oral two times a day  dextrose 5%. 1000 milliLiter(s) (50 mL/Hr) IV Continuous <Continuous>  dextrose 50% Injectable 12.5 Gram(s) IV Push once  dextrose 50% Injectable 25 Gram(s) IV Push once  dextrose 50% Injectable 25 Gram(s) IV Push once  furosemide   Injectable 40 milliGRAM(s) IV Push two times a day  hydrALAZINE 10 milliGRAM(s) Oral three times a day  influenza   Vaccine 0.5 milliLiter(s) IntraMuscular once  insulin lispro (HumaLOG) corrective regimen sliding scale   SubCutaneous three times a day before meals    MEDICATIONS  (PRN):  acetaminophen   Tablet. 650 milliGRAM(s) Oral every 6 hours PRN Moderate Pain (4 - 6)  dextrose Gel 1 Dose(s) Oral once PRN Blood Glucose LESS THAN 70 milliGRAM(s)/deciliter  glucagon  Injectable 1 milliGRAM(s) IntraMuscular once PRN Glucose LESS THAN 70 milligrams/deciliter      LABS:                        15.8   3.0   )-----------( 184      ( 27 Feb 2018 05:51 )             50.8     02-27    137  |  93<L>  |  19.0  ----------------------------<  97  4.1   |  35.0<H>  |  0.81    Ca    9.0      27 Feb 2018 05:49  Phos  3.4     02-27  Mg     1.9     02-27      PT/INR - ( 27 Feb 2018 05:51 )   PT: 17.9 sec;   INR: 1.61 ratio         PTT - ( 27 Feb 2018 05:51 )  PTT:34.6 sec      CAPILLARY BLOOD GLUCOSE      POCT Blood Glucose.: 97 mg/dL (27 Feb 2018 12:39)  POCT Blood Glucose.: 100 mg/dL (27 Feb 2018 08:33)  POCT Blood Glucose.: 131 mg/dL (26 Feb 2018 20:48)  POCT Blood Glucose.: 106 mg/dL (26 Feb 2018 17:40)      PHYSICAL EXAM:  GENERAL: morbidly obese male, laying in bed, NAD  HEAD:  Atraumatic, Normocephalic  EYES: EOMI, PERRLA   ENMT: Moist mucous membranes  NECK: Supple   NERVOUS SYSTEM:  Alert & Oriented X3  CHEST/LUNG: bilateral air entry  HEART: Regular rate and rhythm; + S1/S2  ABDOMEN: Soft, Nontender, obese  : Scrotal edema  EXTREMITIES:  + LE edema improved

## 2018-02-27 NOTE — PROGRESS NOTE ADULT - ASSESSMENT
Patient is a 37 year old male with PMH of HTN, JAN on nocturnal CPAP (non-compliant), Morbid obesity and CHF who presented to ER with worsening sob over last 2 months.  In the ED, he was found to be hypoxic on NC, required 50% VM.. CXR shows cardiomegaly and bilateral haziness. Labs show elevated BNP, trop - normal, EKG  - tachycardia. He  was admitted for acute respiratory distress possibly 2/2 acute on chronic CHF. Diuresed well with lasix gtt which was then changed to lasix 80 mg IVP. UOP 5.1 liters/24 hours. s/p 1 dose of diamox on 2/24 with slight improvement in serum bicarb. pH acceptable. Decrease lasix as tolerated. OOB to chair and PT.    1. Acute hypoxic-hypercarbic respiratory failure - improved  -acute component secondary to decompensated right heart failure due to severe pulmonary HTN   -chronic component with severe hypercarbia secondary to OHS; bedside spirometer and early morning ABG ordered to assess for bipap  -CT angio - negative for PE  -ECHO - normal EF; severe pulm HTN ; discussed with cardio - recent outpatient cath with moderate moderate pulmonary HTN with no reversibility. No indication for repeat at this time.     -Continue nocturnal bipap; will benefit from bipap at home given hypercarbia. Will obtain ABG today and tomorrow and if pCO2 increase by 7 or more, patient will be eligible for bipap  -Weight down to 176 kg; continue IV lasix 40 mg BID and transition to PO tomorrow  -Daily weights  and strict I/os  -Cardio and pulmonary recommendations appreciated  -Will need outpatient PFTs and possible mediastinoscopy for lymphadenopathy     2. HTN   -BP stable  -Continue coreg and hydralazine with holding paramters  -Continue IV lasix as above  -low sodium diet    3. JAN  -pulse ox on RA 83%; home O2 to be arranged  -non-compliant with CPAP at home  -continue nocturnal bipap while inpatient and will benefit from bipap at home given hypercarbia  -severe pulm HTN - no indication for repeat cath    4. Coagulopathy - likely secondary to congestive hepatopathy, not on any AC  -INR improving, monitor coags  -US Liver show fatty infiltration  -LFTS within acceptable range    5. Morbid obesity  -Dietary evaluation    6. DM - 2  -HbA1C - 7; meets diagnosis of DM   -continue ISS for now  -diabetic teaching evaluation   -will d/c home on metformin    7. HLD  -continue statin     8. DVT prophylaxis - SCDs given coagulopathy       Attending Attestation:   Plan discussed with patient, RN, Dr. Flores Patient is a 37 year old male with PMH of HTN, JAN on nocturnal CPAP (non-compliant), Morbid obesity and CHF who presented to ER with worsening sob over last 2 months.  In the ED, he was found to be hypoxic on NC, required 50% VM.. CXR shows cardiomegaly and bilateral haziness. Labs show elevated BNP, trop - normal, EKG  - tachycardia. He was admitted for acute respiratory distress possibly 2/2 acute on chronic CHF. Diuresed well with lasix gtt which was then changed to IV lasix and is now transitioned to PO. Decrease lasix as tolerated. OOB to chair and PT. Pending arrangement of home O2 and bipap with likely discharge tomorrow.    1. Acute hypoxic-hypercarbic respiratory failure - improved  -acute component secondary to decompensated right heart failure due to severe pulmonary HTN   -chronic component with severe hypercarbia secondary to OHS; early morning ABG with rise in pCO2 by 8; will need bipap  -CT angio - negative for PE  -ECHO - normal EF; severe pulm HTN ; discussed with cardio - recent outpatient cath with moderate pulmonary HTN with no reversibility. No indication for repeat at this time as per cardio.     -Continue nocturnal bipap; needs bipap at home given hypercarbia. [facility] with [facility].  -Transition to PO lasix today  -Daily weights  and strict I/os  -Cardio and pulmonary recommendations appreciated  -Will need outpatient PFTs and possible mediastinoscopy for lymphadenopathy     2. HTN   -BP stable  -Continue coreg, lasix and hydralazine with holding parameters  -low sodium diet    3. JAN  -pulse ox on RA 87%; home O2 to be arranged  -non-compliant with CPAP at home  -will require bipap at home given severe hypercarbia and to prevent readmission  -severe pulm HTN - no indication for repeat cath as per cardio    4. Coagulopathy - likely secondary to congestive hepatopathy, not on any AC  -INR improving, repeat INR tomorrow  -US Liver show fatty infiltration  -LFTS within acceptable range    5. Morbid obesity  -Dietary evaluation    6. DM - 2  -HbA1C - 7; meets diagnosis of DM   -continue ISS for now  -diabetic teaching evaluation   -will d/c home on metformin    7. HLD  -continue statin     8. DVT prophylaxis - SCDs given coagulopathy     Discharge planning; awaiting home O2 and bipap to be arranged prior to discharge.      Attending Attestation:   Plan discussed with patient, RN Patient is a 37 year old male with PMH of HTN, JAN on nocturnal CPAP (non-compliant), Morbid obesity and CHF who presented to ER with worsening sob over last 2 months.  In the ED, he was found to be hypoxic on NC, required 50% VM.. CXR shows cardiomegaly and bilateral haziness. Labs show elevated BNP, trop - normal, EKG  - tachycardia. He was admitted for acute respiratory distress possibly 2/2 acute on chronic CHF. Diuresed well with lasix gtt which was then changed to IV lasix and is now transitioned to PO. Decrease lasix as tolerated. OOB to chair and PT. Pending arrangement of home O2 and bipap with likely discharge tomorrow.    1. Acute hypoxic-hypercarbic respiratory failure - improved  -acute component secondary to decompensated right heart failure due to severe pulmonary HTN   -chronic component with severe hypercarbia secondary to OHS; will need bipap to treat hypercarbic resp failure secondary to OHS as evidenced by ABGs showing an increase in pCO2 >8 mmhg in early morning from baseline.  -CT angio - negative for PE  -ECHO - normal EF; severe pulm HTN ; discussed with cardio - recent outpatient cath with moderate pulmonary HTN with no reversibility. No indication for repeat at this time as per cardio.     -Continue nocturnal bipap; needs bipap at home given hypercarbia. [facility]iscussed with [facility].  -Transition to PO lasix today  -Daily weights  and strict I/os  -Cardio and pulmonary recommendations appreciated  -Will need outpatient PFTs and possible mediastinoscopy for lymphadenopathy     2. HTN   -BP stable  -Continue coreg, lasix and hydralazine with holding parameters  -low sodium diet    3. JAN  -pulse ox on RA 87%; home O2 to be arranged  -will require bipap at home given severe hypercarbia and to prevent readmission  -JAN not contributing to hypercarbia  -severe pulm HTN - no indication for repeat cath as per cardio    4. Coagulopathy - likely secondary to congestive hepatopathy, not on any AC  -INR improving, repeat INR tomorrow  -US Liver show fatty infiltration  -LFTS within acceptable range    5. Morbid obesity  -Dietary evaluation    6. DM - 2  -HbA1C - 7; meets diagnosis of DM   -continue ISS for now  -diabetic teaching evaluation   -will d/c home on metformin    7. HLD  -continue statin     8. DVT prophylaxis - SCDs given coagulopathy     Discharge planning; awaiting home O2 and bipap to be arranged prior to discharge.      Attending Attestation:   Plan discussed with patient, RN

## 2018-02-27 NOTE — PROGRESS NOTE ADULT - ASSESSMENT
MO  Severe pulm HTN  OHS  JAN  Chronic though compensated hypercarbia  ROJELIO- ? sarcoidosis    Rec:    Diurese as tolerates  Optimize cardiac function  Consider R and L heart catheterization for pulm HTN  Nocturnal BiPAP - ? CPAP at home; decreased pressure for comfort per patient's request  Consider eventual mediastinoscopy for bx of ROJELIO to r/o sarcoidosis  Eventual outpt PFTs  Follow hypercarbia - if has CPAP at home, may benefit more from BiPAP or trilogy given chronic hypercarbic respiratory failure and to reduced re-admissions  [facility]/w [facility] previously for possible change in home machine

## 2018-02-28 LAB
ANION GAP SERPL CALC-SCNC: 7 MMOL/L — SIGNIFICANT CHANGE UP (ref 5–17)
BASE EXCESS BLDA CALC-SCNC: 10.6 MMOL/L — HIGH (ref -2–2)
BASOPHILS # BLD AUTO: 0 K/UL — SIGNIFICANT CHANGE UP (ref 0–0.2)
BASOPHILS NFR BLD AUTO: 0.6 % — SIGNIFICANT CHANGE UP (ref 0–2)
BLOOD GAS COMMENTS ARTERIAL: SIGNIFICANT CHANGE UP
BUN SERPL-MCNC: 20 MG/DL — SIGNIFICANT CHANGE UP (ref 8–20)
CALCIUM SERPL-MCNC: 8.8 MG/DL — SIGNIFICANT CHANGE UP (ref 8.6–10.2)
CHLORIDE SERPL-SCNC: 94 MMOL/L — LOW (ref 98–107)
CO2 SERPL-SCNC: 36 MMOL/L — HIGH (ref 22–29)
CREAT SERPL-MCNC: 0.82 MG/DL — SIGNIFICANT CHANGE UP (ref 0.5–1.3)
EOSINOPHIL # BLD AUTO: 0.1 K/UL — SIGNIFICANT CHANGE UP (ref 0–0.5)
EOSINOPHIL NFR BLD AUTO: 3.2 % — SIGNIFICANT CHANGE UP (ref 0–5)
GAS PNL BLDA: SIGNIFICANT CHANGE UP
GLUCOSE BLDC GLUCOMTR-MCNC: 102 MG/DL — HIGH (ref 70–99)
GLUCOSE BLDC GLUCOMTR-MCNC: 103 MG/DL — HIGH (ref 70–99)
GLUCOSE BLDC GLUCOMTR-MCNC: 106 MG/DL — HIGH (ref 70–99)
GLUCOSE BLDC GLUCOMTR-MCNC: 87 MG/DL — SIGNIFICANT CHANGE UP (ref 70–99)
GLUCOSE SERPL-MCNC: 105 MG/DL — SIGNIFICANT CHANGE UP (ref 70–115)
HCO3 BLDA-SCNC: 34 MMOL/L — HIGH (ref 20–26)
HCT VFR BLD CALC: 50.9 % — SIGNIFICANT CHANGE UP (ref 42–52)
HGB BLD-MCNC: 15.7 G/DL — SIGNIFICANT CHANGE UP (ref 14–18)
HOROWITZ INDEX BLDA+IHG-RTO: SIGNIFICANT CHANGE UP
LYMPHOCYTES # BLD AUTO: 1.1 K/UL — SIGNIFICANT CHANGE UP (ref 1–4.8)
LYMPHOCYTES # BLD AUTO: 35.4 % — SIGNIFICANT CHANGE UP (ref 20–55)
MAGNESIUM SERPL-MCNC: 1.9 MG/DL — SIGNIFICANT CHANGE UP (ref 1.6–2.6)
MCHC RBC-ENTMCNC: 29.2 PG — SIGNIFICANT CHANGE UP (ref 27–31)
MCHC RBC-ENTMCNC: 30.8 G/DL — LOW (ref 32–36)
MCV RBC AUTO: 94.6 FL — HIGH (ref 80–94)
MONOCYTES # BLD AUTO: 0.4 K/UL — SIGNIFICANT CHANGE UP (ref 0–0.8)
MONOCYTES NFR BLD AUTO: 13.9 % — HIGH (ref 3–10)
NEUTROPHILS # BLD AUTO: 1.5 K/UL — LOW (ref 1.8–8)
NEUTROPHILS NFR BLD AUTO: 46.9 % — SIGNIFICANT CHANGE UP (ref 37–73)
PCO2 BLDA: 68 MMHG — HIGH (ref 35–45)
PH BLDA: 7.37 — SIGNIFICANT CHANGE UP (ref 7.35–7.45)
PHOSPHATE SERPL-MCNC: 3.8 MG/DL — SIGNIFICANT CHANGE UP (ref 2.4–4.7)
PLATELET # BLD AUTO: 164 K/UL — SIGNIFICANT CHANGE UP (ref 150–400)
PO2 BLDA: 77 MMHG — LOW (ref 83–108)
POTASSIUM SERPL-MCNC: 4 MMOL/L — SIGNIFICANT CHANGE UP (ref 3.5–5.3)
POTASSIUM SERPL-SCNC: 4 MMOL/L — SIGNIFICANT CHANGE UP (ref 3.5–5.3)
PREALB SERPL-MCNC: 11 MG/DL — LOW (ref 18–38)
RBC # BLD: 5.38 M/UL — SIGNIFICANT CHANGE UP (ref 4.6–6.2)
RBC # FLD: 14.9 % — SIGNIFICANT CHANGE UP (ref 11–15.6)
SAO2 % BLDA: 96 % — SIGNIFICANT CHANGE UP (ref 95–99)
SODIUM SERPL-SCNC: 137 MMOL/L — SIGNIFICANT CHANGE UP (ref 135–145)
WBC # BLD: 3.2 K/UL — LOW (ref 4.8–10.8)
WBC # FLD AUTO: 3.2 K/UL — LOW (ref 4.8–10.8)

## 2018-02-28 PROCEDURE — 99232 SBSQ HOSP IP/OBS MODERATE 35: CPT

## 2018-02-28 PROCEDURE — 99233 SBSQ HOSP IP/OBS HIGH 50: CPT

## 2018-02-28 RX ORDER — FUROSEMIDE 40 MG
40 TABLET ORAL
Qty: 0 | Refills: 0 | Status: DISCONTINUED | OUTPATIENT
Start: 2018-02-28 | End: 2018-03-01

## 2018-02-28 RX ADMIN — CARVEDILOL PHOSPHATE 3.12 MILLIGRAM(S): 80 CAPSULE, EXTENDED RELEASE ORAL at 17:36

## 2018-02-28 RX ADMIN — Medication 81 MILLIGRAM(S): at 12:27

## 2018-02-28 RX ADMIN — Medication 40 MILLIGRAM(S): at 17:36

## 2018-02-28 RX ADMIN — ATORVASTATIN CALCIUM 40 MILLIGRAM(S): 80 TABLET, FILM COATED ORAL at 22:11

## 2018-02-28 RX ADMIN — Medication 10 MILLIGRAM(S): at 13:14

## 2018-02-28 NOTE — PROGRESS NOTE ADULT - SUBJECTIVE AND OBJECTIVE BOX
CHIEF COMPLAINT/INTERVAL HISTORY:    Patient is a 37y old  Male who presents with a chief complaint of sob (18 Feb 2018 15:59)    SUBJECTIVE & OBJECTIVE: Pt seen and examined at bedside. No overnight events. Diuresing well. Lasix switched to PO. Working with pulm to arrange for home O2 and bipap.    ROS: No chest pain, palpitations, SOB, light headedness, dizziness, headache, nausea/vomiting, fevers/chills, abdominal pain, dysuria or increased urinary frequency.    ICU Vital Signs Last 24 Hrs  T(C): 37 (01 Mar 2018 05:27), Max: 37 (28 Feb 2018 15:37)  T(F): 98.6 (01 Mar 2018 05:27), Max: 98.6 (28 Feb 2018 15:37)  HR: 88 (01 Mar 2018 07:51) (76 - 92)  BP: 96/58 (01 Mar 2018 07:51) (88/58 - 136/86)  RR: 20 (01 Mar 2018 05:27) (18 - 20)  SpO2: 100% (01 Mar 2018 05:27) (87% - 100%)    MEDICATIONS  (STANDING):  aspirin  chewable 81 milliGRAM(s) Oral daily  atorvastatin 40 milliGRAM(s) Oral at bedtime  carvedilol 3.125 milliGRAM(s) Oral two times a day  dextrose 5%. 1000 milliLiter(s) (50 mL/Hr) IV Continuous <Continuous>  dextrose 50% Injectable 12.5 Gram(s) IV Push once  dextrose 50% Injectable 25 Gram(s) IV Push once  dextrose 50% Injectable 25 Gram(s) IV Push once  furosemide    Tablet 40 milliGRAM(s) Oral two times a day  hydrALAZINE 10 milliGRAM(s) Oral three times a day  influenza   Vaccine 0.5 milliLiter(s) IntraMuscular once  insulin lispro (HumaLOG) corrective regimen sliding scale   SubCutaneous three times a day before meals    MEDICATIONS  (PRN):  acetaminophen   Tablet. 650 milliGRAM(s) Oral every 6 hours PRN Moderate Pain (4 - 6)  dextrose Gel 1 Dose(s) Oral once PRN Blood Glucose LESS THAN 70 milliGRAM(s)/deciliter  glucagon  Injectable 1 milliGRAM(s) IntraMuscular once PRN Glucose LESS THAN 70 milligrams/deciliter      LABS:                        15.7   3.2   )-----------( 164      ( 28 Feb 2018 06:21 )             50.9     02-28    137  |  94<L>  |  20.0  ----------------------------<  105  4.0   |  36.0<H>  |  0.82    Ca    8.8      28 Feb 2018 06:21  Phos  3.8     02-28  Mg     1.9     02-28      PT/INR - ( 01 Mar 2018 07:34 )   PT: 18.0 sec;   INR: 1.62 ratio         PTT - ( 01 Mar 2018 07:34 )  PTT:30.5 sec      CAPILLARY BLOOD GLUCOSE      POCT Blood Glucose.: 97 mg/dL (01 Mar 2018 08:03)  POCT Blood Glucose.: 103 mg/dL (28 Feb 2018 21:20)  POCT Blood Glucose.: 106 mg/dL (28 Feb 2018 17:22)  POCT Blood Glucose.: 102 mg/dL (28 Feb 2018 12:08)  POCT Blood Glucose.: 87 mg/dL (28 Feb 2018 08:40)    PHYSICAL EXAM:  GENERAL: morbidly obese male, laying in bed, NAD  HEAD:  Atraumatic, Normocephalic  EYES: EOMI, PERRLA   ENMT: Moist mucous membranes  NECK: Supple   NERVOUS SYSTEM:  Alert & Oriented X3  CHEST/LUNG: bilateral air entry  HEART: Regular rate and rhythm; + S1/S2  ABDOMEN: Soft, Nontender, obese  : Scrotal edema  EXTREMITIES:  + LE edema improved

## 2018-02-28 NOTE — PROGRESS NOTE ADULT - ASSESSMENT
Patient is a 37 year old male with PMH of HTN, JAN on nocturnal CPAP (non-compliant), Morbid obesity and CHF who presented to ER with worsening sob over last 2 months.  In the ED, he was found to be hypoxic on NC, required 50% VM.. CXR shows cardiomegaly and bilateral haziness. Labs show elevated BNP, trop - normal, EKG  - tachycardia. He was admitted for acute respiratory distress possibly 2/2 acute on chronic CHF. Diuresed well with lasix gtt which was then changed to IV lasix and is now transitioned to PO. Decrease lasix as tolerated. OOB to chair and PT. Pending arrangement of home O2 and bipap with likely discharge tomorrow.    1. Acute hypoxic-hypercarbic respiratory failure - improved  -acute component secondary to decompensated right heart failure due to severe pulmonary HTN   -chronic component with severe hypercarbia secondary to OHS; will need bipap to treat hypercarbic resp failure secondary to OHS as evidenced by ABGs showing an increase in pCO2 >8 mmhg in early morning from baseline.  -CT angio - negative for PE  -ECHO - normal EF; severe pulm HTN ; discussed with cardio - recent outpatient cath with moderate pulmonary HTN with no reversibility. No indication for repeat at this time as per cardio.     -Continue nocturnal bipap; needs bipap at home given hypercarbia. [facility]ed with [facility].  -Transition to PO lasix today  -Daily weights  and strict I/os  -Cardio and pulmonary recommendations appreciated  -Will need outpatient PFTs and possible mediastinoscopy for lymphadenopathy     2. HTN   -BP stable  -Continue coreg, lasix and hydralazine with holding parameters  -low sodium diet    3. JAN  -pulse ox on RA 87%; home O2 to be arranged  -will require bipap at home given severe hypercarbia and to prevent readmission  -JAN not contributing to hypercarbia  -severe pulm HTN - no indication for repeat cath as per cardio    4. Coagulopathy - likely secondary to congestive hepatopathy, not on any AC  -INR improving, repeat INR tomorrow  -US Liver show fatty infiltration  -LFTS within acceptable range    5. Morbid obesity  -Dietary evaluation    6. DM - 2  -HbA1C - 7; meets diagnosis of DM   -continue ISS for now  -diabetic teaching evaluation   -will d/c home on metformin    7. HLD  -continue statin     8. DVT prophylaxis - SCDs given coagulopathy     Discharge planning; awaiting home O2 and bipap to be arranged prior to discharge.      Attending Attestation:   Plan discussed with patient, RN, Dr. Juarez

## 2018-02-28 NOTE — PROGRESS NOTE ADULT - ASSESSMENT
MO  Severe pulm HTN  OHS  JAN  Chronic though compensated hypercarbia  ROJELIO- ? sarcoidosis    Rec:    Diurese as tolerates  Optimize cardiac function  Consider R and L heart catheterization for pulm HTN  Nocturnal BiPAP - ? CPAP at home; decreased pressure for comfort per patient's request  Will be difficult to get bipap at present - can't document restriction.  Spirometer is broken  [facility] will [facility] if [facility] have an available spirometer  If not patient's own CPAP may be all he can leave with for now.   Consider eventual mediastinoscopy for bx of ROJELIO to r/o sarcoidosis  Eventual outpt PFTs  D/w [facility] previously for possible change in home machine

## 2018-02-28 NOTE — PROGRESS NOTE ADULT - SUBJECTIVE AND OBJECTIVE BOX
PULMONARY PROGRESS NOTE      RUDY GOYAL  MRN-[id number]    Patient is a 37y old  Male who presents with a chief complaint of sob (18 Feb 2018 15:59)      INTERVAL HPI/OVERNIGHT EVENTS:    Feels better  Sitting up  Tolerating nocturnal BiPAP    MEDICATIONS  (STANDING):  aspirin  chewable 81 milliGRAM(s) Oral daily  atorvastatin 40 milliGRAM(s) Oral at bedtime  carvedilol 3.125 milliGRAM(s) Oral two times a day  dextrose 5%. 1000 milliLiter(s) (50 mL/Hr) IV Continuous <Continuous>  dextrose 50% Injectable 12.5 Gram(s) IV Push once  dextrose 50% Injectable 25 Gram(s) IV Push once  dextrose 50% Injectable 25 Gram(s) IV Push once  furosemide   Injectable 40 milliGRAM(s) IV Push two times a day  hydrALAZINE 10 milliGRAM(s) Oral three times a day  influenza   Vaccine 0.5 milliLiter(s) IntraMuscular once  insulin lispro (HumaLOG) corrective regimen sliding scale   SubCutaneous three times a day before meals      MEDICATIONS  (PRN):  acetaminophen   Tablet. 650 milliGRAM(s) Oral every 6 hours PRN Moderate Pain (4 - 6)  dextrose Gel 1 Dose(s) Oral once PRN Blood Glucose LESS THAN 70 milliGRAM(s)/deciliter  glucagon  Injectable 1 milliGRAM(s) IntraMuscular once PRN Glucose LESS THAN 70 milligrams/deciliter      Allergies    No Known Allergies    Intolerances        PAST MEDICAL & SURGICAL HISTORY:  CHF (congestive heart failure)  Hypertension  No significant past surgical history        REVIEW OF SYSTEMS:    CONSTITUTIONAL:  No distress    HEENT:  Eyes:  No diplopia or blurred vision. ENT:  No earache, sore throat or runny nose.    CARDIOVASCULAR:  No pressure, squeezing, tightness, heaviness or aching about the chest; no palpitations.    RESPIRATORY:  Improved cough, shortness of breath, no PND or orthopnea. Mild SOBOE    GASTROINTESTINAL:  No nausea, vomiting or diarrhea.    GENITOURINARY:  No dysuria, frequency or urgency.    NEUROLOGIC:  No paresthesias, fasciculations, seizures or weakness.    PSYCHIATRIC:  No disorder of thought or mood.    Vital Signs Last 24 Hrs  T(C): 36.8 (27 Feb 2018 05:13), Max: 36.8 (27 Feb 2018 05:13)  T(F): 98.2 (27 Feb 2018 05:13), Max: 98.2 (27 Feb 2018 05:13)  HR: 85 (27 Feb 2018 10:45) (82 - 89)  BP: 130/85 (27 Feb 2018 10:45) (99/62 - 134/80)  BP(mean): --  RR: 18 (27 Feb 2018 10:45) (18 - 20)  SpO2: 98% (27 Feb 2018 10:45) (96% - 100%)    PHYSICAL EXAMINATION:    GENERAL: The patient is awake and alert in no apparent distress.     HEENT: Head is normocephalic and atraumatic. Extraocular muscles are intact. Mucous membranes are moist.    NECK: Supple.    LUNGS: Decreased BS otherwise CTA without wheezing, rales or rhonchi; respirations unlabored    HEART: Regular rate and rhythm without murmur.    ABDOMEN: Soft, nontender, and nondistended.      EXTREMITIES: Without any cyanosis, clubbing, rash, lesions or edema.    NEUROLOGIC: Grossly intact.    LABS:                        15.8   3.0   )-----------( 184      ( 27 Feb 2018 05:51 )             50.8     02-27    137  |  93<L>  |  19.0  ----------------------------<  97  4.1   |  35.0<H>  |  0.81    Ca    9.0      27 Feb 2018 05:49  Phos  3.4     02-27  Mg     1.9     02-27      PT/INR - ( 27 Feb 2018 05:51 )   PT: 17.9 sec;   INR: 1.61 ratio         PTT - ( 27 Feb 2018 05:51 )  PTT:34.6 sec      RADIOLOGY & ADDITIONAL STUDIES:     EXAM:  CT ANGIO CHEST (W)AW IC                          PROCEDURE DATE:  02/20/2018          INTERPRETATION:  CT CHEST WITH IV CONTRAST:    HISTORY: Hypoxia. Elevated d-dimer level. Evaluate for pulmonary emboli..    Date and time of exam: 2/20/2018 2:32 PM.    TECHNIQUE:  Sections were obtained from the apices to the diaphragm   following intravenous contrast.  94 mls of omnipaque 350 was administered   intravenously without complication. MIP images were obtained and reviewed.    COMPARISON EXAMINATION:   No prior exam.    FINDINGS:  No evidence of central pulmonary emboli. Subsegmental emboli cannot be   excluded because of inadequate opacification of the distal pulmonary   arteries.    There is mild diffuse mediastinal lymphadenopathy. No significant hilar   adenopathy.    No evidence of pleural or pericardial effusion. No evidence of axillary   adenopathy.    There is diffuse pleural thickening at the right lung base. The left lung   is clear. Linear strands of atelectasis or fibrosis at the right lung   base adjacent to pleural thickening.    There are mild degenerative changes in the thoracic spine.          IMPRESSION:      Mild diffuse mediastinal lymphadenopathy.    No evidence of central pulmonary emboli. Cannot exclude subsegmental   emboli.    Diffuse pleural thickening at the right lung base with adjacent   atelectasis or fibrosis in the right lower lobe..        DAGOBERTO MINA M.D., ATTENDING RADIOLOGIST  This document has been electronically signed. Feb 20 2018  4:10PM        ECHO:      Summary:   1. Technically difficult study.   2. Left ventricular ejection fraction, by visual estimation, is 55 to   60%.   3. Normal global left ventricular systolic function.   4. Mildly increased left ventricular internal cavity size.   5. Severely enlarged right ventricle. Severely reduced RV systolic   function.   6. Right ventricular volume and pressure overload.   7. Mild mitral valve regurgitation.   8. Normal trileaflet aortic valve with normal opening.   9. Moderate-severe tricuspid regurgitation.  10. Estimated pulmonary artery systolic pressure is 59.7 mmHg assuming a   right atrial pressure of 15 mmHg, which is consistent with severe   pulmonary hypertension.  11. There is no evidence of pericardial effusion.    MD Kelvin Electronically signed on 2/20/2018 at 6:12:05 PM

## 2018-03-01 ENCOUNTER — TRANSCRIPTION ENCOUNTER (OUTPATIENT)
Age: 38
End: 2018-03-01

## 2018-03-01 VITALS — DIASTOLIC BLOOD PRESSURE: 81 MMHG | TEMPERATURE: 98 F | RESPIRATION RATE: 18 BRPM | SYSTOLIC BLOOD PRESSURE: 153 MMHG

## 2018-03-01 LAB
ANION GAP SERPL CALC-SCNC: 8 MMOL/L — SIGNIFICANT CHANGE UP (ref 5–17)
APTT BLD: 30.5 SEC — SIGNIFICANT CHANGE UP (ref 27.5–37.4)
BUN SERPL-MCNC: 20 MG/DL — SIGNIFICANT CHANGE UP (ref 8–20)
CALCIUM SERPL-MCNC: 9.3 MG/DL — SIGNIFICANT CHANGE UP (ref 8.6–10.2)
CHLORIDE SERPL-SCNC: 94 MMOL/L — LOW (ref 98–107)
CO2 SERPL-SCNC: 35 MMOL/L — HIGH (ref 22–29)
CREAT SERPL-MCNC: 0.79 MG/DL — SIGNIFICANT CHANGE UP (ref 0.5–1.3)
EOSINOPHIL # BLD AUTO: 0.1 K/UL — SIGNIFICANT CHANGE UP (ref 0–0.5)
EOSINOPHIL NFR BLD AUTO: 2 % — SIGNIFICANT CHANGE UP (ref 0–6)
GLUCOSE BLDC GLUCOMTR-MCNC: 100 MG/DL — HIGH (ref 70–99)
GLUCOSE BLDC GLUCOMTR-MCNC: 87 MG/DL — SIGNIFICANT CHANGE UP (ref 70–99)
GLUCOSE BLDC GLUCOMTR-MCNC: 97 MG/DL — SIGNIFICANT CHANGE UP (ref 70–99)
GLUCOSE SERPL-MCNC: 116 MG/DL — HIGH (ref 70–115)
HCT VFR BLD CALC: 52 % — SIGNIFICANT CHANGE UP (ref 42–52)
HGB BLD-MCNC: 15.8 G/DL — SIGNIFICANT CHANGE UP (ref 14–18)
INR BLD: 1.62 RATIO — HIGH (ref 0.88–1.16)
LYMPHOCYTES # BLD AUTO: 1 K/UL — SIGNIFICANT CHANGE UP (ref 1–4.8)
LYMPHOCYTES # BLD AUTO: 35 % — SIGNIFICANT CHANGE UP (ref 20–55)
MCHC RBC-ENTMCNC: 28.5 PG — SIGNIFICANT CHANGE UP (ref 27–31)
MCHC RBC-ENTMCNC: 30.4 G/DL — LOW (ref 32–36)
MCV RBC AUTO: 93.9 FL — SIGNIFICANT CHANGE UP (ref 80–94)
MONOCYTES # BLD AUTO: 0.6 K/UL — SIGNIFICANT CHANGE UP (ref 0–0.8)
MONOCYTES NFR BLD AUTO: 19 % — HIGH (ref 3–10)
NEUTROPHILS # BLD AUTO: 1.3 K/UL — LOW (ref 1.8–8)
NEUTROPHILS NFR BLD AUTO: 42 % — SIGNIFICANT CHANGE UP (ref 37–73)
NEUTS BAND # BLD: 2 % — SIGNIFICANT CHANGE UP (ref 0–8)
PLAT MORPH BLD: NORMAL — SIGNIFICANT CHANGE UP
PLATELET # BLD AUTO: 158 K/UL — SIGNIFICANT CHANGE UP (ref 150–400)
POTASSIUM SERPL-MCNC: 4.3 MMOL/L — SIGNIFICANT CHANGE UP (ref 3.5–5.3)
POTASSIUM SERPL-SCNC: 4.3 MMOL/L — SIGNIFICANT CHANGE UP (ref 3.5–5.3)
PROTHROM AB SERPL-ACNC: 18 SEC — HIGH (ref 9.8–12.7)
RBC # BLD: 5.54 M/UL — SIGNIFICANT CHANGE UP (ref 4.6–6.2)
RBC # FLD: 15.3 % — SIGNIFICANT CHANGE UP (ref 11–15.6)
RBC BLD AUTO: SIGNIFICANT CHANGE UP
SODIUM SERPL-SCNC: 137 MMOL/L — SIGNIFICANT CHANGE UP (ref 135–145)
WBC # BLD: 3 K/UL — LOW (ref 4.8–10.8)
WBC # FLD AUTO: 3 K/UL — LOW (ref 4.8–10.8)

## 2018-03-01 PROCEDURE — 99232 SBSQ HOSP IP/OBS MODERATE 35: CPT

## 2018-03-01 PROCEDURE — 85730 THROMBOPLASTIN TIME PARTIAL: CPT

## 2018-03-01 PROCEDURE — 97116 GAIT TRAINING THERAPY: CPT

## 2018-03-01 PROCEDURE — 93306 TTE W/DOPPLER COMPLETE: CPT

## 2018-03-01 PROCEDURE — 85379 FIBRIN DEGRADATION QUANT: CPT

## 2018-03-01 PROCEDURE — 87340 HEPATITIS B SURFACE AG IA: CPT

## 2018-03-01 PROCEDURE — 94660 CPAP INITIATION&MGMT: CPT

## 2018-03-01 PROCEDURE — 82803 BLOOD GASES ANY COMBINATION: CPT

## 2018-03-01 PROCEDURE — 99291 CRITICAL CARE FIRST HOUR: CPT | Mod: 25

## 2018-03-01 PROCEDURE — 71045 X-RAY EXAM CHEST 1 VIEW: CPT

## 2018-03-01 PROCEDURE — 94760 N-INVAS EAR/PLS OXIMETRY 1: CPT

## 2018-03-01 PROCEDURE — 71275 CT ANGIOGRAPHY CHEST: CPT

## 2018-03-01 PROCEDURE — 85027 COMPLETE CBC AUTOMATED: CPT

## 2018-03-01 PROCEDURE — 93005 ELECTROCARDIOGRAM TRACING: CPT

## 2018-03-01 PROCEDURE — 83735 ASSAY OF MAGNESIUM: CPT

## 2018-03-01 PROCEDURE — 76705 ECHO EXAM OF ABDOMEN: CPT

## 2018-03-01 PROCEDURE — 84439 ASSAY OF FREE THYROXINE: CPT

## 2018-03-01 PROCEDURE — 85610 PROTHROMBIN TIME: CPT

## 2018-03-01 PROCEDURE — 80053 COMPREHEN METABOLIC PANEL: CPT

## 2018-03-01 PROCEDURE — 86706 HEP B SURFACE ANTIBODY: CPT

## 2018-03-01 PROCEDURE — 97163 PT EVAL HIGH COMPLEX 45 MIN: CPT

## 2018-03-01 PROCEDURE — 83880 ASSAY OF NATRIURETIC PEPTIDE: CPT

## 2018-03-01 PROCEDURE — 80048 BASIC METABOLIC PNL TOTAL CA: CPT

## 2018-03-01 PROCEDURE — 84484 ASSAY OF TROPONIN QUANT: CPT

## 2018-03-01 PROCEDURE — 83036 HEMOGLOBIN GLYCOSYLATED A1C: CPT

## 2018-03-01 PROCEDURE — 93970 EXTREMITY STUDY: CPT

## 2018-03-01 PROCEDURE — 84443 ASSAY THYROID STIM HORMONE: CPT

## 2018-03-01 PROCEDURE — 99239 HOSP IP/OBS DSCHRG MGMT >30: CPT

## 2018-03-01 PROCEDURE — 84100 ASSAY OF PHOSPHORUS: CPT

## 2018-03-01 PROCEDURE — 86803 HEPATITIS C AB TEST: CPT

## 2018-03-01 PROCEDURE — 96374 THER/PROPH/DIAG INJ IV PUSH: CPT | Mod: XU

## 2018-03-01 PROCEDURE — 94010 BREATHING CAPACITY TEST: CPT

## 2018-03-01 PROCEDURE — 80076 HEPATIC FUNCTION PANEL: CPT

## 2018-03-01 PROCEDURE — 36415 COLL VENOUS BLD VENIPUNCTURE: CPT

## 2018-03-01 PROCEDURE — 84134 ASSAY OF PREALBUMIN: CPT

## 2018-03-01 PROCEDURE — 36600 WITHDRAWAL OF ARTERIAL BLOOD: CPT

## 2018-03-01 PROCEDURE — 82330 ASSAY OF CALCIUM: CPT

## 2018-03-01 PROCEDURE — 82962 GLUCOSE BLOOD TEST: CPT

## 2018-03-01 RX ORDER — CARVEDILOL PHOSPHATE 80 MG/1
1 CAPSULE, EXTENDED RELEASE ORAL
Qty: 60 | Refills: 0
Start: 2018-03-01 | End: 2018-03-30

## 2018-03-01 RX ORDER — FUROSEMIDE 40 MG
1 TABLET ORAL
Qty: 60 | Refills: 0
Start: 2018-03-01 | End: 2018-03-30

## 2018-03-01 RX ORDER — AMLODIPINE BESYLATE 2.5 MG/1
0 TABLET ORAL
Qty: 0 | Refills: 0 | COMMUNITY

## 2018-03-01 RX ORDER — METFORMIN HYDROCHLORIDE 850 MG/1
1 TABLET ORAL
Qty: 30 | Refills: 0
Start: 2018-03-01 | End: 2018-03-30

## 2018-03-01 RX ORDER — HYDRALAZINE HCL 50 MG
0 TABLET ORAL
Qty: 0 | Refills: 0 | COMMUNITY

## 2018-03-01 RX ORDER — ATORVASTATIN CALCIUM 80 MG/1
1 TABLET, FILM COATED ORAL
Qty: 30 | Refills: 0
Start: 2018-03-01 | End: 2018-03-30

## 2018-03-01 RX ORDER — ASPIRIN/CALCIUM CARB/MAGNESIUM 324 MG
1 TABLET ORAL
Qty: 30 | Refills: 0
Start: 2018-03-01 | End: 2018-03-30

## 2018-03-01 RX ADMIN — Medication 40 MILLIGRAM(S): at 17:43

## 2018-03-01 RX ADMIN — CARVEDILOL PHOSPHATE 3.12 MILLIGRAM(S): 80 CAPSULE, EXTENDED RELEASE ORAL at 17:43

## 2018-03-01 RX ADMIN — Medication 81 MILLIGRAM(S): at 11:08

## 2018-03-01 NOTE — PROGRESS NOTE ADULT - SUBJECTIVE AND OBJECTIVE BOX
PULMONARY PROGRESS NOTE      RUDY GOYAL  MRN-[id number]    Patient is a 37y old  Male who presents with a chief complaint of sob (18 Feb 2018 15:59)      INTERVAL HPI/OVERNIGHT EVENTS:    Feels better  Sitting up  Tolerating nocturnal BiPAP    MEDICATIONS  (STANDING):  aspirin  chewable 81 milliGRAM(s) Oral daily  atorvastatin 40 milliGRAM(s) Oral at bedtime  carvedilol 3.125 milliGRAM(s) Oral two times a day  dextrose 5%. 1000 milliLiter(s) (50 mL/Hr) IV Continuous <Continuous>  dextrose 50% Injectable 12.5 Gram(s) IV Push once  dextrose 50% Injectable 25 Gram(s) IV Push once  dextrose 50% Injectable 25 Gram(s) IV Push once  furosemide   Injectable 40 milliGRAM(s) IV Push two times a day  hydrALAZINE 10 milliGRAM(s) Oral three times a day  influenza   Vaccine 0.5 milliLiter(s) IntraMuscular once  insulin lispro (HumaLOG) corrective regimen sliding scale   SubCutaneous three times a day before meals      MEDICATIONS  (PRN):  acetaminophen   Tablet. 650 milliGRAM(s) Oral every 6 hours PRN Moderate Pain (4 - 6)  dextrose Gel 1 Dose(s) Oral once PRN Blood Glucose LESS THAN 70 milliGRAM(s)/deciliter  glucagon  Injectable 1 milliGRAM(s) IntraMuscular once PRN Glucose LESS THAN 70 milligrams/deciliter      Allergies    No Known Allergies    Intolerances        PAST MEDICAL & SURGICAL HISTORY:  CHF (congestive heart failure)  Hypertension  No significant past surgical history        REVIEW OF SYSTEMS:    CONSTITUTIONAL:  No distress    HEENT:  Eyes:  No diplopia or blurred vision. ENT:  No earache, sore throat or runny nose.    CARDIOVASCULAR:  No pressure, squeezing, tightness, heaviness or aching about the chest; no palpitations.    RESPIRATORY:  Improved cough, shortness of breath, no PND or orthopnea. Mild SOBOE    GASTROINTESTINAL:  No nausea, vomiting or diarrhea.    GENITOURINARY:  No dysuria, frequency or urgency.    NEUROLOGIC:  No paresthesias, fasciculations, seizures or weakness.    PSYCHIATRIC:  No disorder of thought or mood.    Vital Signs Last 24 Hrs  T(C): 36.8 (27 Feb 2018 05:13), Max: 36.8 (27 Feb 2018 05:13)  T(F): 98.2 (27 Feb 2018 05:13), Max: 98.2 (27 Feb 2018 05:13)  HR: 85 (27 Feb 2018 10:45) (82 - 89)  BP: 130/85 (27 Feb 2018 10:45) (99/62 - 134/80)  BP(mean): --  RR: 18 (27 Feb 2018 10:45) (18 - 20)  SpO2: 98% (27 Feb 2018 10:45) (96% - 100%)    PHYSICAL EXAMINATION:    GENERAL: The patient is awake and alert in no apparent distress.     HEENT: Head is normocephalic and atraumatic. Extraocular muscles are intact. Mucous membranes are moist.    NECK: Supple.    LUNGS: Decreased BS otherwise CTA without wheezing, rales or rhonchi; respirations unlabored    HEART: Regular rate and rhythm without murmur.    ABDOMEN: Soft, nontender, and nondistended.      EXTREMITIES: Without any cyanosis, clubbing, rash, lesions or edema.    NEUROLOGIC: Grossly intact.    LABS:                        15.8   3.0   )-----------( 184      ( 27 Feb 2018 05:51 )             50.8     02-27    137  |  93<L>  |  19.0  ----------------------------<  97  4.1   |  35.0<H>  |  0.81    Ca    9.0      27 Feb 2018 05:49  Phos  3.4     02-27  Mg     1.9     02-27      PT/INR - ( 27 Feb 2018 05:51 )   PT: 17.9 sec;   INR: 1.61 ratio         PTT - ( 27 Feb 2018 05:51 )  PTT:34.6 sec      RADIOLOGY & ADDITIONAL STUDIES:     EXAM:  CT ANGIO CHEST (W)AW IC                          PROCEDURE DATE:  02/20/2018          INTERPRETATION:  CT CHEST WITH IV CONTRAST:    HISTORY: Hypoxia. Elevated d-dimer level. Evaluate for pulmonary emboli..    Date and time of exam: 2/20/2018 2:32 PM.    TECHNIQUE:  Sections were obtained from the apices to the diaphragm   following intravenous contrast.  94 mls of omnipaque 350 was administered   intravenously without complication. MIP images were obtained and reviewed.    COMPARISON EXAMINATION:   No prior exam.    FINDINGS:  No evidence of central pulmonary emboli. Subsegmental emboli cannot be   excluded because of inadequate opacification of the distal pulmonary   arteries.    There is mild diffuse mediastinal lymphadenopathy. No significant hilar   adenopathy.    No evidence of pleural or pericardial effusion. No evidence of axillary   adenopathy.    There is diffuse pleural thickening at the right lung base. The left lung   is clear. Linear strands of atelectasis or fibrosis at the right lung   base adjacent to pleural thickening.    There are mild degenerative changes in the thoracic spine.          IMPRESSION:      Mild diffuse mediastinal lymphadenopathy.    No evidence of central pulmonary emboli. Cannot exclude subsegmental   emboli.    Diffuse pleural thickening at the right lung base with adjacent   atelectasis or fibrosis in the right lower lobe..        DAGOBERTO MINA M.D., ATTENDING RADIOLOGIST  This document has been electronically signed. Feb 20 2018  4:10PM        ECHO:      Summary:   1. Technically difficult study.   2. Left ventricular ejection fraction, by visual estimation, is 55 to   60%.   3. Normal global left ventricular systolic function.   4. Mildly increased left ventricular internal cavity size.   5. Severely enlarged right ventricle. Severely reduced RV systolic   function.   6. Right ventricular volume and pressure overload.   7. Mild mitral valve regurgitation.   8. Normal trileaflet aortic valve with normal opening.   9. Moderate-severe tricuspid regurgitation.  10. Estimated pulmonary artery systolic pressure is 59.7 mmHg assuming a   right atrial pressure of 15 mmHg, which is consistent with severe   pulmonary hypertension.  11. There is no evidence of pericardial effusion.    MD Kelvin Electronically signed on 2/20/2018 at 6:12:05 PM

## 2018-03-01 NOTE — DISCHARGE NOTE ADULT - PATIENT PORTAL LINK FT
You can access the [facility] Patient Portal, offered by [facility], by registering with the following website: [email or web address]

## 2018-03-01 NOTE — PROGRESS NOTE ADULT - SUBJECTIVE AND OBJECTIVE BOX
Moderate pulmonary hypertension documented by prior RHC with no reversibility   LHC done before as per patient with no need for stents  Can be discharged on furosemide 40 mg twice daily and carvedilol  Advised to loose weight and daily weight , if increase in weight or worsening LE edema or SOB , to call office for increase in dose of lasix  Follow up with cardiology in [address] [place] [address] (Dr. Santana)

## 2018-03-01 NOTE — DISCHARGE NOTE ADULT - MEDICATION SUMMARY - MEDICATIONS TO STOP TAKING
I will STOP taking the medications listed below when I get home from the hospital:    hydrALAZINE    amLODIPine

## 2018-03-01 NOTE — DISCHARGE NOTE ADULT - PLAN OF CARE
exacerbation resolved continue lasix 40 mg twice a day and follow up with Dr. Flores within 1 week  please follow up with your medical appointment at [facility] on 3/5 normotension continue coreg and lasix  hold coreg if SBP < 100 follow up with pulmonary as outpatient to be re-evaluated for bipap continue cpap and pulmonary follow up outpatient pulmonary and cardiology follow up

## 2018-03-01 NOTE — DISCHARGE NOTE ADULT - CARE PLAN
Principal Discharge DX:	CHF (congestive heart failure)  Goal:	exacerbation resolved  Assessment and plan of treatment:	continue lasix 40 mg twice a day and follow up with Dr. Flores within 1 week  please follow up with your medical appointment at [facility] on 3/5  Secondary Diagnosis:	Hypertension  Goal:	normotension  Assessment and plan of treatment:	continue coreg and lasix  hold coreg if SBP < 100  Secondary Diagnosis:	Obesity hypoventilation syndrome  Assessment and plan of treatment:	follow up with pulmonary as outpatient to be re-evaluated for bipap  Secondary Diagnosis:	Obstructive sleep apnea  Assessment and plan of treatment:	continue cpap and pulmonary follow up  Secondary Diagnosis:	Pulmonary hypertension  Assessment and plan of treatment:	outpatient pulmonary and cardiology follow up

## 2018-03-01 NOTE — PROGRESS NOTE ADULT - ASSESSMENT
MO  Severe pulm HTN  OHS  JAN  Chronic though compensated hypercarbia  ROJELIO- ? sarcoidosis    Rec:    Diurese as tolerates  Optimize cardiac function  Consider R and L heart catheterization for pulm HTN at some point  Nocturnal BiPAP - ? CPAP at home; decreased pressure for comfort per patient's request  Will be difficult to get bipap at present - can't document restriction.  Spirometer is broken (trying to get it operable)  If not patient's own CPAP may be all he can leave with for now.   Consider eventual mediastinoscopy for bx of ROJELIO to r/o sarcoidosis  Eventual outpt PFTs  D/w [facility] previously for possible change in home machine

## 2018-03-01 NOTE — PROGRESS NOTE ADULT - PROVIDER SPECIALTY LIST ADULT
Cardiology
Hospitalist
Pulmonology
Hospitalist
Pulmonology
Pulmonology

## 2018-03-01 NOTE — DISCHARGE NOTE ADULT - MEDICATION SUMMARY - MEDICATIONS TO TAKE
I will START or STAY ON the medications listed below when I get home from the hospital:    glucometer  -- Glucometer use three times daily.  -- Indication: For DM    glucometer  -- glucometer strips #100  -- Indication: For DM    glucometer   -- Lancets #100  -- Indication: For DM    aspirin 81 mg oral tablet, chewable  -- 1 tab(s) by mouth once a day  -- Indication: For Prophylaxis    metFORMIN 500 mg oral tablet  -- 1 tab(s) by mouth once a day   -- Check with your doctor before becoming pregnant.  Do not drink alcoholic beverages when taking this medication.  It is very important that you take or use this exactly as directed.  Do not skip doses or discontinue unless directed by your doctor.  Obtain medical advice before taking any non-prescription drugs as some may affect the action of this medication.  Take with food or milk.    -- Indication: For DM    atorvastatin 40 mg oral tablet  -- 1 tab(s) by mouth once a day (at bedtime)  -- Indication: For HLD    carvedilol 3.125 mg oral tablet  -- 1 tab(s) by mouth 2 times a day  -- Indication: For CHF (congestive heart failure)    furosemide 40 mg oral tablet  -- 1 tab(s) by mouth 2 times a day  -- Indication: For CHF (congestive heart failure)

## 2018-03-01 NOTE — DISCHARGE NOTE ADULT - CARE PROVIDER_API CALL
Jairon Santana ([facility]), Cardiology; Cardiovascular Disease; Interventional Cardiology  [address]  [place], [place] [id number]  Phone: [phone]  Fax: [phone]    Jorge Laguerre), Critical Care Medicine; Pulmonary Disease; Sleep Medicine  [address]  [place] [address]  Phone: [phone]  Fax: [phone]

## 2018-03-01 NOTE — DISCHARGE NOTE ADULT - HOSPITAL COURSE
Patient is a 37 year old male with PMH of HTN, JAN on nocturnal CPAP (non-compliant), Morbid obesity and CHF who presented to ER with worsening sob over last 2 months.  In the ED, he was found to be hypoxic on NC, required 50% VM. CXR showed cardiomegaly and bilateral haziness. Labs revealed elevated BNP, trop - normal, EKG  - tachycardia. He was admitted for acute respiratory distress possibly 2/2 acute on chronic CHF. Diuresed well with lasix gtt which was then changed to IV lasix and is now transitioned to PO with significant improvement in patient's volume status. Patient with hypercarbia, bipap could not be arranged prior to discharge since spirometry machine was broken and restrictive airway disease can not be documented. Patient will be discharged with home O2 and had a new cpap machine delivered 2 weeks ago. Outpatient follow up with cardiology and pulmonary upon discharge.     1. Acute hypoxic-hypercarbic respiratory failure   -acute component secondary to decompensated right heart failure due to severe pulmonary HTN; resolved  -chronic component with severe hypercarbia secondary to OHS; will need close pulmonary follow up as outpatient  -CT angio - negative for PE  -ECHO - normal EF; severe pulm HTN ; discussed with cardio - recent outpatient cath with moderate pulmonary HTN with no reversibility. No indication for repeat at this time as per cardio.     -Continue lasix  -Continue nocturnal cpap   -Cardio and pulmonary recommendations outpatient follow up  -Will need outpatient PFTs and possible mediastinoscopy for lymphadenopathy     2. HTN   -BP stable  -Continue coreg and lasix   -low sodium diet    3. JAN  -pulse ox on RA 87%; home O2 arranged  -JAN not contributing to hypercarbia  -severe pulm HTN - no indication for repeat cath as per cardio  -nocturnal cpap    4. Coagulopathy - likely secondary to congestive hepatopathyb  -INR improving   -US Liver show fatty infiltration  -LFTS within acceptable range  -Repeat INR as outpatient    5. Morbid obesity  -Diet and exercise  -bariatric evaluation as outpatient    6. DM - 2  -HbA1C - 7; meets diagnosis of DM   -diabetic teaching    -will d/c home on metformin  -follow up at [facility] on 3/5    7. HLD  -continue statin     Medically stable for discharge. Time spent on discharge 50 minutes.

## 2018-03-01 NOTE — DISCHARGE NOTE ADULT - ADDITIONAL INSTRUCTIONS
Please follow up on 3/5 at [facility] [phone]  Please follow up with cardiology and pulmonary within 1 week

## 2018-03-09 PROBLEM — I50.9 HEART FAILURE, UNSPECIFIED: Chronic | Status: ACTIVE | Noted: 2018-02-18

## 2018-03-09 PROBLEM — Z00.00 ENCOUNTER FOR PREVENTIVE HEALTH EXAMINATION: Status: ACTIVE | Noted: 2018-03-09

## 2018-03-09 PROBLEM — I10 ESSENTIAL (PRIMARY) HYPERTENSION: Chronic | Status: ACTIVE | Noted: 2018-02-18

## 2018-04-09 ENCOUNTER — APPOINTMENT (OUTPATIENT)
Dept: PULMONOLOGY | Facility: CLINIC | Age: 38
End: 2018-04-09

## 2019-03-15 ENCOUNTER — INPATIENT (INPATIENT)
Facility: HOSPITAL | Age: 39
LOS: 5 days | Discharge: HOME | DRG: 291 | End: 2019-03-21
Attending: HOSPITALIST | Admitting: HOSPITALIST
Payer: MEDICARE

## 2019-03-15 VITALS
HEIGHT: 67 IN | RESPIRATION RATE: 20 BRPM | OXYGEN SATURATION: 86 % | SYSTOLIC BLOOD PRESSURE: 119 MMHG | DIASTOLIC BLOOD PRESSURE: 82 MMHG | TEMPERATURE: 99 F | HEART RATE: 108 BPM | WEIGHT: 315 LBS

## 2019-03-15 LAB
APPEARANCE UR: CLEAR — SIGNIFICANT CHANGE UP
BASE EXCESS BLDA CALC-SCNC: 5.2 MMOL/L — HIGH (ref -2–2)
BILIRUB UR-MCNC: NEGATIVE — SIGNIFICANT CHANGE UP
BLOOD GAS COMMENTS ARTERIAL: SIGNIFICANT CHANGE UP
COLOR SPEC: YELLOW — SIGNIFICANT CHANGE UP
DIFF PNL FLD: NEGATIVE — SIGNIFICANT CHANGE UP
GAS PNL BLDA: SIGNIFICANT CHANGE UP
GLUCOSE UR QL: NEGATIVE MG/DL — SIGNIFICANT CHANGE UP
HCO3 BLDA-SCNC: 29 MMOL/L — HIGH (ref 20–26)
HOROWITZ INDEX BLDA+IHG-RTO: SIGNIFICANT CHANGE UP
KETONES UR-MCNC: NEGATIVE — SIGNIFICANT CHANGE UP
LEUKOCYTE ESTERASE UR-ACNC: NEGATIVE — SIGNIFICANT CHANGE UP
NITRITE UR-MCNC: NEGATIVE — SIGNIFICANT CHANGE UP
PCO2 BLDA: 43 MMHG — SIGNIFICANT CHANGE UP (ref 35–45)
PH BLDA: 7.45 — SIGNIFICANT CHANGE UP (ref 7.35–7.45)
PH UR: 6 — SIGNIFICANT CHANGE UP (ref 5–8)
PO2 BLDA: 51 MMHG — LOW (ref 83–108)
PROT UR-MCNC: 30 MG/DL
SAO2 % BLDA: 86 % — LOW (ref 95–99)
SP GR SPEC: 1.01 — SIGNIFICANT CHANGE UP (ref 1.01–1.02)
UROBILINOGEN FLD QL: 4 MG/DL

## 2019-03-15 PROCEDURE — 99285 EMERGENCY DEPT VISIT HI MDM: CPT

## 2019-03-15 PROCEDURE — 93010 ELECTROCARDIOGRAM REPORT: CPT

## 2019-03-15 PROCEDURE — 71046 X-RAY EXAM CHEST 2 VIEWS: CPT | Mod: 26

## 2019-03-15 NOTE — ED PROVIDER NOTE - NS ED ROS FT
(+) weight change, no fever or chills  no recent travel, no recent abox, no sick contacts  no recent change in medications  no rash, no bruises  no visual changes no eye discharge, (+) eye pressure  no cough cold, (+) congestion,   (+) sob, no chest pain  follows with cardiology  prior stress test  (+) orthopnea, no pnd  no abd pain, (+) nausea, no v/d, (+) abd distention  no endoscopy, no colonoscopy  no hematuria, (+) decreased urine output, no scrotal swelling  no joint pain, no deformity  no headache, no paresthesia

## 2019-03-15 NOTE — ED PROVIDER NOTE - PHYSICAL EXAMINATION
Constitutional : Appears uncomfortable, talking in 1-2 words, wearing nasal cannula 1-2L  Head :NC AT , no swelling  Eyes :eomi, no swelling  Mouth :mm moist,  Neck : short, supple, trachea in midline  Chest :[place]ash[place] air entry, poor symm chest expansion, no distress  Heart :S1 S2 distant  Abdomen :abd soft, large girth, edematous skin over abdomen, non tender  Musc/Skel :ext no swelling, no deformity, no spine tenderness, distal pulses present, b/l LE non-pitting edema, skin is dry and warm  Neuro  :AAO 3 no focal deficits

## 2019-03-15 NOTE — ED PROVIDER NOTE - OBJECTIVE STATEMENT
38 y.o M with PMHx NIDDM presents to ED c/o dyspnea and chest tightness. . Pt is on 5L home O2. Pt took lasix today as prescribed however has not urinated.  Compliant with medication. no recent abx. No recent endoscopy. Notes increased abdominal distention. Endorses dizziness, pressure around eyes, nasal congestion, cough. Denies fever, chills, headache  PMD:   Cardio: Source: Patient  38 y.o M with PMHx NIDDM, CHF presents to ED c/o worsening SOB and chest tightness. Pt increased home O2 to 5L home for relief, describes feeling unwell for several weeks. Pt took Lasix today as prescribed however has not urinated.   Follows with cardiology. has had stress tests in the past. Compliant with medication. No recent abx. No recent endoscopy. Notes possible weight gain and increased abdominal distention. Admits to recently quitting marijuana. Endorses dizziness, pressure around eyes, nasal congestion, cough. Denies fever, chills, headache, scrotal swelling

## 2019-03-15 NOTE — ED ADULT NURSE NOTE - OBJECTIVE STATEMENT
pt presents to ER for worsening trouble breathing, weeping edema to b/l legs, and abdominal distention. difficulty obtaining pulse oximetry due to patients fingers cool, pt does not know pulse oximetry range "when I have trouble breathing I just use 5l nc of oxygen to help."

## 2019-03-15 NOTE — ED ADULT TRIAGE NOTE - CHIEF COMPLAINT QUOTE
Pt states "I've been having worsening SOB and tightness with retaining fluid over the last couple weeks and it just got too bad", pt on home O2 for CHF, reports tightness to chest and abd 7/10, denies chest pain

## 2019-03-15 NOTE — ED PROVIDER NOTE - CLINICAL SUMMARY MEDICAL DECISION MAKING FREE TEXT BOX
38 y.o M with hx of dilated cardiomyopathy, on home O2 which pt has increased due to increased SOB, using more than rec, non-compliant with BIPAP due to apparatus not fitting well, non-compliant to pills checked patient's pill box, plan to check labs, EKG, CXR and re-evaluate.

## 2019-03-15 NOTE — ED ADULT NURSE NOTE - NSIMPLEMENTINTERV_GEN_ALL_ED
Implemented All Universal Safety Interventions:  [place] to call system. Call bell, personal items and telephone within reach. Instruct patient to call for assistance. Room bathroom lighting operational. Non-slip footwear when patient is off stretcher. Physically safe environment: no spills, clutter or unnecessary equipment. Stretcher in lowest position, wheels locked, appropriate side rails in place.

## 2019-03-16 DIAGNOSIS — I50.9 HEART FAILURE, UNSPECIFIED: ICD-10-CM

## 2019-03-16 LAB
ALBUMIN SERPL ELPH-MCNC: 3.4 G/DL — SIGNIFICANT CHANGE UP (ref 3.3–5.2)
ALP SERPL-CCNC: 104 U/L — SIGNIFICANT CHANGE UP (ref 40–120)
ALT FLD-CCNC: 23 U/L — SIGNIFICANT CHANGE UP
ANION GAP SERPL CALC-SCNC: 10 MMOL/L — SIGNIFICANT CHANGE UP (ref 5–17)
APTT BLD: 33.4 SEC — SIGNIFICANT CHANGE UP (ref 27.5–36.3)
AST SERPL-CCNC: 30 U/L — SIGNIFICANT CHANGE UP
BILIRUB SERPL-MCNC: 1.5 MG/DL — SIGNIFICANT CHANGE UP (ref 0.4–2)
BUN SERPL-MCNC: 19 MG/DL — SIGNIFICANT CHANGE UP (ref 8–20)
CALCIUM SERPL-MCNC: 8.8 MG/DL — SIGNIFICANT CHANGE UP (ref 8.6–10.2)
CHLORIDE SERPL-SCNC: 100 MMOL/L — SIGNIFICANT CHANGE UP (ref 98–107)
CO2 SERPL-SCNC: 30 MMOL/L — HIGH (ref 22–29)
CREAT SERPL-MCNC: 1.19 MG/DL — SIGNIFICANT CHANGE UP (ref 0.5–1.3)
D DIMER BLD IA.RAPID-MCNC: <150 NG/ML DDU — SIGNIFICANT CHANGE UP
GLUCOSE BLDC GLUCOMTR-MCNC: 105 MG/DL — HIGH (ref 70–99)
GLUCOSE SERPL-MCNC: 121 MG/DL — HIGH (ref 70–115)
HCT VFR BLD CALC: 44 % — SIGNIFICANT CHANGE UP (ref 42–52)
HGB BLD-MCNC: 14.1 G/DL — SIGNIFICANT CHANGE UP (ref 14–18)
INR BLD: 2.15 RATIO — HIGH (ref 0.88–1.16)
LACTATE BLDV-MCNC: 1.1 MMOL/L — SIGNIFICANT CHANGE UP (ref 0.5–2)
LIDOCAIN IGE QN: 32 U/L — SIGNIFICANT CHANGE UP (ref 22–51)
MAGNESIUM SERPL-MCNC: 1.7 MG/DL — SIGNIFICANT CHANGE UP (ref 1.6–2.6)
MCHC RBC-ENTMCNC: 30.3 PG — SIGNIFICANT CHANGE UP (ref 27–31)
MCHC RBC-ENTMCNC: 32 G/DL — SIGNIFICANT CHANGE UP (ref 32–36)
MCV RBC AUTO: 94.4 FL — HIGH (ref 80–94)
NT-PROBNP SERPL-SCNC: 1498 PG/ML — HIGH (ref 0–300)
PLATELET # BLD AUTO: 207 K/UL — SIGNIFICANT CHANGE UP (ref 150–400)
POTASSIUM SERPL-MCNC: 4 MMOL/L — SIGNIFICANT CHANGE UP (ref 3.5–5.3)
POTASSIUM SERPL-SCNC: 4 MMOL/L — SIGNIFICANT CHANGE UP (ref 3.5–5.3)
PROT SERPL-MCNC: 8.8 G/DL — HIGH (ref 6.6–8.7)
PROTHROM AB SERPL-ACNC: 25.3 SEC — HIGH (ref 10–12.9)
RBC # BLD: 4.66 M/UL — SIGNIFICANT CHANGE UP (ref 4.6–6.2)
RBC # FLD: 16 % — HIGH (ref 11–15.6)
SODIUM SERPL-SCNC: 140 MMOL/L — SIGNIFICANT CHANGE UP (ref 135–145)
TROPONIN T SERPL-MCNC: <0.01 NG/ML — SIGNIFICANT CHANGE UP (ref 0–0.06)
WBC # BLD: 4.9 K/UL — SIGNIFICANT CHANGE UP (ref 4.8–10.8)
WBC # FLD AUTO: 4.9 K/UL — SIGNIFICANT CHANGE UP (ref 4.8–10.8)

## 2019-03-16 PROCEDURE — 12345: CPT | Mod: NC

## 2019-03-16 RX ORDER — SODIUM CHLORIDE 9 MG/ML
1000 INJECTION, SOLUTION INTRAVENOUS
Qty: 0 | Refills: 0 | Status: DISCONTINUED | OUTPATIENT
Start: 2019-03-16 | End: 2019-03-21

## 2019-03-16 RX ORDER — DEXTROSE 50 % IN WATER 50 %
12.5 SYRINGE (ML) INTRAVENOUS ONCE
Qty: 0 | Refills: 0 | Status: DISCONTINUED | OUTPATIENT
Start: 2019-03-16 | End: 2019-03-21

## 2019-03-16 RX ORDER — HYDRALAZINE HCL 50 MG
25 TABLET ORAL EVERY 8 HOURS
Qty: 0 | Refills: 0 | Status: DISCONTINUED | OUTPATIENT
Start: 2019-03-16 | End: 2019-03-21

## 2019-03-16 RX ORDER — CARVEDILOL PHOSPHATE 80 MG/1
3.12 CAPSULE, EXTENDED RELEASE ORAL EVERY 12 HOURS
Qty: 0 | Refills: 0 | Status: DISCONTINUED | OUTPATIENT
Start: 2019-03-16 | End: 2019-03-21

## 2019-03-16 RX ORDER — INSULIN LISPRO 100/ML
VIAL (ML) SUBCUTANEOUS
Qty: 0 | Refills: 0 | Status: DISCONTINUED | OUTPATIENT
Start: 2019-03-16 | End: 2019-03-21

## 2019-03-16 RX ORDER — FUROSEMIDE 40 MG
40 TABLET ORAL ONCE
Qty: 0 | Refills: 0 | Status: COMPLETED | OUTPATIENT
Start: 2019-03-16 | End: 2019-03-16

## 2019-03-16 RX ORDER — FUROSEMIDE 40 MG
20 TABLET ORAL
Qty: 0 | Refills: 0 | Status: DISCONTINUED | OUTPATIENT
Start: 2019-03-16 | End: 2019-03-16

## 2019-03-16 RX ORDER — ONDANSETRON 8 MG/1
4 TABLET, FILM COATED ORAL EVERY 6 HOURS
Qty: 0 | Refills: 0 | Status: DISCONTINUED | OUTPATIENT
Start: 2019-03-16 | End: 2019-03-21

## 2019-03-16 RX ORDER — ASPIRIN/CALCIUM CARB/MAGNESIUM 324 MG
81 TABLET ORAL DAILY
Qty: 0 | Refills: 0 | Status: DISCONTINUED | OUTPATIENT
Start: 2019-03-16 | End: 2019-03-21

## 2019-03-16 RX ORDER — DEXTROSE 50 % IN WATER 50 %
25 SYRINGE (ML) INTRAVENOUS ONCE
Qty: 0 | Refills: 0 | Status: DISCONTINUED | OUTPATIENT
Start: 2019-03-16 | End: 2019-03-21

## 2019-03-16 RX ORDER — SODIUM CHLORIDE 9 MG/ML
3 INJECTION INTRAMUSCULAR; INTRAVENOUS; SUBCUTANEOUS EVERY 8 HOURS
Qty: 0 | Refills: 0 | Status: DISCONTINUED | OUTPATIENT
Start: 2019-03-16 | End: 2019-03-21

## 2019-03-16 RX ORDER — ATORVASTATIN CALCIUM 80 MG/1
40 TABLET, FILM COATED ORAL AT BEDTIME
Qty: 0 | Refills: 0 | Status: DISCONTINUED | OUTPATIENT
Start: 2019-03-16 | End: 2019-03-21

## 2019-03-16 RX ORDER — FUROSEMIDE 40 MG
60 TABLET ORAL
Qty: 0 | Refills: 0 | Status: DISCONTINUED | OUTPATIENT
Start: 2019-03-16 | End: 2019-03-20

## 2019-03-16 RX ORDER — GLUCAGON INJECTION, SOLUTION 0.5 MG/.1ML
1 INJECTION, SOLUTION SUBCUTANEOUS ONCE
Qty: 0 | Refills: 0 | Status: DISCONTINUED | OUTPATIENT
Start: 2019-03-16 | End: 2019-03-21

## 2019-03-16 RX ORDER — DEXTROSE 50 % IN WATER 50 %
15 SYRINGE (ML) INTRAVENOUS ONCE
Qty: 0 | Refills: 0 | Status: DISCONTINUED | OUTPATIENT
Start: 2019-03-16 | End: 2019-03-21

## 2019-03-16 RX ADMIN — Medication 2.5 MILLIGRAM(S): at 12:34

## 2019-03-16 RX ADMIN — CARVEDILOL PHOSPHATE 3.12 MILLIGRAM(S): 80 CAPSULE, EXTENDED RELEASE ORAL at 06:48

## 2019-03-16 RX ADMIN — Medication 81 MILLIGRAM(S): at 12:34

## 2019-03-16 RX ADMIN — Medication 40 MILLIGRAM(S): at 03:47

## 2019-03-16 RX ADMIN — Medication 60 MILLIGRAM(S): at 17:36

## 2019-03-16 RX ADMIN — Medication 25 MILLIGRAM(S): at 12:34

## 2019-03-16 RX ADMIN — Medication 20 MILLIGRAM(S): at 06:48

## 2019-03-16 RX ADMIN — SODIUM CHLORIDE 3 MILLILITER(S): 9 INJECTION INTRAMUSCULAR; INTRAVENOUS; SUBCUTANEOUS at 22:06

## 2019-03-16 RX ADMIN — ATORVASTATIN CALCIUM 40 MILLIGRAM(S): 80 TABLET, FILM COATED ORAL at 22:07

## 2019-03-16 RX ADMIN — SODIUM CHLORIDE 3 MILLILITER(S): 9 INJECTION INTRAMUSCULAR; INTRAVENOUS; SUBCUTANEOUS at 12:34

## 2019-03-16 RX ADMIN — CARVEDILOL PHOSPHATE 3.12 MILLIGRAM(S): 80 CAPSULE, EXTENDED RELEASE ORAL at 17:36

## 2019-03-16 NOTE — ED ADULT NURSE REASSESSMENT NOTE - COMFORT CARE
plan of care explained/po fluids offered/repositioned/treatment delay explained/wait time explained/side rails down

## 2019-03-16 NOTE — H&P ADULT - NSICDXPASTMEDICALHX_GEN_ALL_CORE_FT
PAST MEDICAL HISTORY:  CHF (congestive heart failure)     Hypertension     Obesity, morbid     Pulmonary hypertension

## 2019-03-16 NOTE — ED ADULT NURSE REASSESSMENT NOTE - NS ED NURSE REASSESS COMMENT FT1
Pt received in the stretcher resting comfortably, no distress noted ,no chest pain reported, breathing easy and unlabored, VSS, will continue to monitor

## 2019-03-16 NOTE — PROGRESS NOTE ADULT - SUBJECTIVE AND OBJECTIVE BOX
RUDY DAVISMERS  ----------------------------------------  The patient was seen and evaluated for heart failure.  The patient is in no acute distress.  Denied any chest pain, palpitations, or abdominal pain.  Reports dyspnea.    Vital Signs Last 24 Hrs  T(C): 36.8 (16 Mar 2019 12:13), Max: 37.2 (15 Mar 2019 20:19)  T(F): 98.2 (16 Mar 2019 12:13), Max: 98.9 (15 Mar 2019 20:19)  HR: 83 (16 Mar 2019 12:13) (83 - 110)  BP: 127/93 (16 Mar 2019 12:13) (105/77 - 147/98)  BP(mean): 98 (16 Mar 2019 05:39) (98 - 98)  RR: 20 (16 Mar 2019 12:13) (20 - 28)  SpO2: 96% (16 Mar 2019 12:) (86% - 96%)    PHYSICAL EXAMINATION:  ----------------------------------------  General appearance: No acute distress, Awake, Alert  HEENT: Normocephalic, Atraumatic, Conjunctiva clear, EOMI  Neck: Supple, No JVD, No tenderness  Lungs: No wheezes, No rales, Decreased breath sounds at the bases  Cardiovascular: S1S2, Regular rhythm  Abdomen: Soft, Nontender, Nondistended, No guarding/rebound, Positive bowel sounds  Extremities: No clubbing, No cyanosis, No calf tenderness, Lower extremity edema  Neuro: Strength equal bilaterally, No tremors  Psychiatric: Appropriate mood, Normal affect    LABORATORY STUDIES:  ----------------------------------------             14.1   4.9   )-----------( 207      ( 16 Mar 2019 01:52 )             44.0     03-16    140  |  100  |  19.0  ----------------------------<  121<H>  4.0   |  30.0<H>  |  1.19    Ca    8.8      16 Mar 2019 01:52  Mg     1.7     -    TPro  8.8<H>  /  Alb  3.4  /  TBili  1.5  /  DBili  x   /  AST  30  /  ALT  23  /  AlkPhos  104  03-16    LIVER FUNCTIONS - ( 16 Mar 2019 01:52 )  Alb: 3.4 g/dL / Pro: 8.8 g/dL / ALK PHOS: 104 U/L / ALT: 23 U/L / AST: 30 U/L / GGT: x           PT/INR - ( 16 Mar 2019 01:52 )   PT: 25.3 sec;   INR: 2.15 ratio         PTT - ( 16 Mar 2019 01:52 )  PTT:33.4 sec    CARDIAC MARKERS ( 16 Mar 2019 01:52 )  x     / <0.01 ng/mL / x     / x     / x        Urinalysis Basic - ( 15 Mar 2019 22:26 )  Color: Yellow / Appearance: Clear / S.015 / pH: x  Gluc: x / Ketone: Negative  / Bili: Negative / Urobili: 4 mg/dL   Blood: x / Protein: 30 mg/dL / Nitrite: Negative   Leuk Esterase: Negative / RBC: x / WBC x   Sq Epi: x / Non Sq Epi: x / Bacteria: x    MEDICATIONS  (STANDING):  aspirin enteric coated 81 milliGRAM(s) Oral daily  atorvastatin 40 milliGRAM(s) Oral at bedtime  carvedilol 3.125 milliGRAM(s) Oral every 12 hours  enalapril 2.5 milliGRAM(s) Oral daily  furosemide   Injectable 60 milliGRAM(s) IV Push two times a day  hydrALAZINE 25 milliGRAM(s) Oral every 8 hours  sodium chloride 0.9% lock flush 3 milliLiter(s) IV Push every 8 hours    MEDICATIONS  (PRN):  ondansetron Injectable 4 milliGRAM(s) IV Push every 6 hours PRN Nausea      ASSESSMENT / PLAN:  ----------------------------------------  Acute on chronic diastolic heart failure / Hypoxia - On intravenous furosemide for diuresis. On carvedilol and enalapril. Supplemental oxygen.    Diabetes - Insulin coverage, close monitoring of blood glucose levels.    Hypertension - Close blood pressure monitoring. On hydralazine.    Obstructive sleep apnea - Nocturnal Bipap. The patient reported that he was not using CPAP at home.    Coagulopathy - Not on anticoagulation. Review of the prior admission also noted coagulopathy. No bleeding noted on examination. RUDY DAVISMERS  ----------------------------------------  The patient was seen and evaluated for heart failure.  The patient is in no acute distress.  Denied any chest pain, palpitations, or abdominal pain.  Reports dyspnea.    Vital Signs Last 24 Hrs  T(C): 36.8 (16 Mar 2019 12:13), Max: 37.2 (15 Mar 2019 20:19)  T(F): 98.2 (16 Mar 2019 12:13), Max: 98.9 (15 Mar 2019 20:19)  HR: 83 (16 Mar 2019 12:13) (83 - 110)  BP: 127/93 (16 Mar 2019 12:13) (105/77 - 147/98)  BP(mean): 98 (16 Mar 2019 05:39) (98 - 98)  RR: 20 (16 Mar 2019 12:13) (20 - 28)  SpO2: 96% (16 Mar 2019 12:) (86% - 96%)    PHYSICAL EXAMINATION:  ----------------------------------------  General appearance: No acute distress, Awake, Alert  HEENT: Normocephalic, Atraumatic, Conjunctiva clear, EOMI  Neck: Supple, No JVD, No tenderness  Lungs: No wheezes, No rales, Decreased breath sounds at the bases  Cardiovascular: S1S2, Regular rhythm  Abdomen: Soft, Nontender, Nondistended, No guarding/rebound, Positive bowel sounds  Extremities: No clubbing, No cyanosis, No calf tenderness, Lower extremity edema  Neuro: Strength equal bilaterally, No tremors  Psychiatric: Appropriate mood, Normal affect    LABORATORY STUDIES:  ----------------------------------------             14.1   4.9   )-----------( 207      ( 16 Mar 2019 01:52 )             44.0     03-16    140  |  100  |  19.0  ----------------------------<  121<H>  4.0   |  30.0<H>  |  1.19    Ca    8.8      16 Mar 2019 01:52  Mg     1.7     -    TPro  8.8<H>  /  Alb  3.4  /  TBili  1.5  /  DBili  x   /  AST  30  /  ALT  23  /  AlkPhos  104  03-16    LIVER FUNCTIONS - ( 16 Mar 2019 01:52 )  Alb: 3.4 g/dL / Pro: 8.8 g/dL / ALK PHOS: 104 U/L / ALT: 23 U/L / AST: 30 U/L / GGT: x           PT/INR - ( 16 Mar 2019 01:52 )   PT: 25.3 sec;   INR: 2.15 ratio         PTT - ( 16 Mar 2019 01:52 )  PTT:33.4 sec    CARDIAC MARKERS ( 16 Mar 2019 01:52 )  x     / <0.01 ng/mL / x     / x     / x        Urinalysis Basic - ( 15 Mar 2019 22:26 )  Color: Yellow / Appearance: Clear / S.015 / pH: x  Gluc: x / Ketone: Negative  / Bili: Negative / Urobili: 4 mg/dL   Blood: x / Protein: 30 mg/dL / Nitrite: Negative   Leuk Esterase: Negative / RBC: x / WBC x   Sq Epi: x / Non Sq Epi: x / Bacteria: x    MEDICATIONS  (STANDING):  aspirin enteric coated 81 milliGRAM(s) Oral daily  atorvastatin 40 milliGRAM(s) Oral at bedtime  carvedilol 3.125 milliGRAM(s) Oral every 12 hours  enalapril 2.5 milliGRAM(s) Oral daily  furosemide   Injectable 60 milliGRAM(s) IV Push two times a day  hydrALAZINE 25 milliGRAM(s) Oral every 8 hours  sodium chloride 0.9% lock flush 3 milliLiter(s) IV Push every 8 hours    MEDICATIONS  (PRN):  ondansetron Injectable 4 milliGRAM(s) IV Push every 6 hours PRN Nausea      ASSESSMENT / PLAN:  ----------------------------------------  Acute on chronic diastolic heart failure / Hypoxia - On intravenous furosemide for diuresis. On carvedilol and enalapril. Supplemental oxygen.    Diabetes - Insulin coverage to be initiated, close monitoring of blood glucose levels.    Hypertension - Close blood pressure monitoring. On hydralazine.    Obstructive sleep apnea - Nocturnal Bipap. The patient reported that he was not using CPAP at home.    Coagulopathy - Not on anticoagulation. Review of the prior admission also noted coagulopathy. No bleeding noted on examination.

## 2019-03-16 NOTE — PATIENT PROFILE ADULT - NSPROHMCARDIOMGMTSTRAT_GEN_A_NUR
fluid modification/routine screening/weight management/coping strategies/medication therapy/diet modification/exercise

## 2019-03-16 NOTE — H&P ADULT - ASSESSMENT
39 y/o male with acute on chronic diastolic CHF due to dietary non-compliance, Hypoxia, Severe Pulm htn, JAN, Morbid obesity, DM-2    Acute on chronic diastolic CHF:  IV lasix BID  Cont. coreg  add ACE/hydralazine   supplemental 02  DASH diet  daily weights  Monitor lytes on lasix  Spirometer    Hypoxia:  Due to pulm edema from CHF/JAN    Severe Pulm:  Add torsemide?  Pulm Hypertension eval as o/p    Morbid Obesity:  Educated on diet    DM2:  ADA diet  HISS  Accuchecks AC/HS    DVT-P  Coagulopathic, INR >2, previous INR elevated. Cirrhosis from passive congestion? no reports of bleeding

## 2019-03-16 NOTE — H&P ADULT - HISTORY OF PRESENT ILLNESS
39 y/o male recently dcd from [facility] after admission for diastolic CHF exacerbation, severe pulm htn eval, MO, JAN, DM-2. Patient states he has not followed up with anyone since DC and admits to not using his cpap as his machine broke, admits to not being compliant with his diet. He has been taking his medications as prescribed. He states he does not make much urine despite taking his lasix twice a day. In the ED patient with + 4 pitting edema up to his abdomen, rales on exam, and hypoxia. He received IV lasix and feels slightly better after diuresis. No reports of fever, chills, N/V, Focal weakness.

## 2019-03-17 LAB
ANION GAP SERPL CALC-SCNC: 11 MMOL/L — SIGNIFICANT CHANGE UP (ref 5–17)
BUN SERPL-MCNC: 19 MG/DL — SIGNIFICANT CHANGE UP (ref 8–20)
CALCIUM SERPL-MCNC: 8.6 MG/DL — SIGNIFICANT CHANGE UP (ref 8.6–10.2)
CHLORIDE SERPL-SCNC: 100 MMOL/L — SIGNIFICANT CHANGE UP (ref 98–107)
CO2 SERPL-SCNC: 30 MMOL/L — HIGH (ref 22–29)
CREAT SERPL-MCNC: 0.89 MG/DL — SIGNIFICANT CHANGE UP (ref 0.5–1.3)
GLUCOSE BLDC GLUCOMTR-MCNC: 103 MG/DL — HIGH (ref 70–99)
GLUCOSE BLDC GLUCOMTR-MCNC: 126 MG/DL — HIGH (ref 70–99)
GLUCOSE BLDC GLUCOMTR-MCNC: 95 MG/DL — SIGNIFICANT CHANGE UP (ref 70–99)
GLUCOSE BLDC GLUCOMTR-MCNC: 97 MG/DL — SIGNIFICANT CHANGE UP (ref 70–99)
GLUCOSE SERPL-MCNC: 111 MG/DL — SIGNIFICANT CHANGE UP (ref 70–115)
HBA1C BLD-MCNC: 6.8 % — HIGH (ref 4–5.6)
MAGNESIUM SERPL-MCNC: 1.7 MG/DL — SIGNIFICANT CHANGE UP (ref 1.6–2.6)
PHOSPHATE SERPL-MCNC: 3.8 MG/DL — SIGNIFICANT CHANGE UP (ref 2.4–4.7)
POTASSIUM SERPL-MCNC: 4 MMOL/L — SIGNIFICANT CHANGE UP (ref 3.5–5.3)
POTASSIUM SERPL-SCNC: 4 MMOL/L — SIGNIFICANT CHANGE UP (ref 3.5–5.3)
SODIUM SERPL-SCNC: 141 MMOL/L — SIGNIFICANT CHANGE UP (ref 135–145)

## 2019-03-17 PROCEDURE — 99232 SBSQ HOSP IP/OBS MODERATE 35: CPT

## 2019-03-17 RX ADMIN — CARVEDILOL PHOSPHATE 3.12 MILLIGRAM(S): 80 CAPSULE, EXTENDED RELEASE ORAL at 17:07

## 2019-03-17 RX ADMIN — Medication 25 MILLIGRAM(S): at 04:28

## 2019-03-17 RX ADMIN — Medication 2.5 MILLIGRAM(S): at 04:28

## 2019-03-17 RX ADMIN — Medication 25 MILLIGRAM(S): at 22:18

## 2019-03-17 RX ADMIN — SODIUM CHLORIDE 3 MILLILITER(S): 9 INJECTION INTRAMUSCULAR; INTRAVENOUS; SUBCUTANEOUS at 11:13

## 2019-03-17 RX ADMIN — Medication 60 MILLIGRAM(S): at 04:26

## 2019-03-17 RX ADMIN — Medication 81 MILLIGRAM(S): at 11:10

## 2019-03-17 RX ADMIN — SODIUM CHLORIDE 3 MILLILITER(S): 9 INJECTION INTRAMUSCULAR; INTRAVENOUS; SUBCUTANEOUS at 22:18

## 2019-03-17 RX ADMIN — CARVEDILOL PHOSPHATE 3.12 MILLIGRAM(S): 80 CAPSULE, EXTENDED RELEASE ORAL at 04:28

## 2019-03-17 RX ADMIN — SODIUM CHLORIDE 3 MILLILITER(S): 9 INJECTION INTRAMUSCULAR; INTRAVENOUS; SUBCUTANEOUS at 04:31

## 2019-03-17 RX ADMIN — ATORVASTATIN CALCIUM 40 MILLIGRAM(S): 80 TABLET, FILM COATED ORAL at 22:18

## 2019-03-17 RX ADMIN — Medication 25 MILLIGRAM(S): at 11:10

## 2019-03-17 RX ADMIN — Medication 60 MILLIGRAM(S): at 17:42

## 2019-03-17 NOTE — PROGRESS NOTE ADULT - SUBJECTIVE AND OBJECTIVE BOX
RUDY DAVISMERS  ----------------------------------------  The patient was seen and evaluated for heart failure.  The patient is in no acute distress.  Denied any chest pain, palpitations, or abdominal pain.  Reports some improvement in the dyspnea. Noted non-productive cough.    Vital Signs Last 24 Hrs  T(C): 36.4 (17 Mar 2019 16:14), Max: 37.1 (16 Mar 2019 23:27)  T(F): 97.6 (17 Mar 2019 16:14), Max: 98.7 (16 Mar 2019 23:27)  HR: 79 (17 Mar 2019 16:14) (78 - 87)  BP: 119/86 (17 Mar 2019 16:14) (97/78 - 144/105)  BP(mean): --  RR: 18 (17 Mar 2019 16:14) (18 - 20)  SpO2: 98% (17 Mar 2019 16:14) (92% - 98%)    CAPILLARY BLOOD GLUCOSE  POCT Blood Glucose.: 95 mg/dL (17 Mar 2019 11:08)  POCT Blood Glucose.: 97 mg/dL (17 Mar 2019 07:43)  POCT Blood Glucose.: 105 mg/dL (16 Mar 2019 17:11)    PHYSICAL EXAMINATION:  ----------------------------------------  General appearance: No acute distress, Awake, Alert  HEENT: Normocephalic, Atraumatic, Conjunctiva clear, EOMI  Neck: Supple, No JVD, No tenderness  Lungs: No wheezes, No rales, Decreased breath sounds at the bases  Cardiovascular: S1S2, Regular rhythm  Abdomen: Soft, Nontender, Nondistended, No guarding/rebound, Positive bowel sounds  Extremities: No clubbing, No cyanosis, No calf tenderness, Lower extremity edema  Neuro: Strength equal bilaterally, No tremors  Psychiatric: Appropriate mood, Normal affect    LABORATORY STUDIES:  ----------------------------------------             14.1   4.9   )-----------( 207      ( 16 Mar 2019 01:52 )             44.0     03-17    141  |  100  |  19.0  ----------------------------<  111  4.0   |  30.0<H>  |  0.89    Ca    8.6      17 Mar 2019 08:20  Phos  3.8     03-17  Mg     1.7     03-17    TPro  8.8<H>  /  Alb  3.4  /  TBili  1.5  /  DBili  x   /  AST  30  /  ALT  23  /  AlkPhos  104  03-16    LIVER FUNCTIONS - ( 16 Mar 2019 01:52 )  Alb: 3.4 g/dL / Pro: 8.8 g/dL / ALK PHOS: 104 U/L / ALT: 23 U/L / AST: 30 U/L / GGT: x           PT/INR - ( 16 Mar 2019 01:52 )   PT: 25.3 sec;   INR: 2.15 ratio    PTT - ( 16 Mar 2019 01:52 )  PTT:33.4 sec    CARDIAC MARKERS ( 16 Mar 2019 01:52 )  x     / <0.01 ng/mL / x     / x     / x        Urinalysis Basic - ( 15 Mar 2019 22:26 )  Color: Yellow / Appearance: Clear / S.015 / pH: x  Gluc: x / Ketone: Negative  / Bili: Negative / Urobili: 4 mg/dL   Blood: x / Protein: 30 mg/dL / Nitrite: Negative   Leuk Esterase: Negative / RBC: x / WBC x   Sq Epi: x / Non Sq Epi: x / Bacteria: x    MEDICATIONS  (STANDING):  aspirin enteric coated 81 milliGRAM(s) Oral daily  atorvastatin 40 milliGRAM(s) Oral at bedtime  carvedilol 3.125 milliGRAM(s) Oral every 12 hours  dextrose 5%. 1000 milliLiter(s) (50 mL/Hr) IV Continuous <Continuous>  dextrose 50% Injectable 12.5 Gram(s) IV Push once  dextrose 50% Injectable 25 Gram(s) IV Push once  dextrose 50% Injectable 25 Gram(s) IV Push once  enalapril 2.5 milliGRAM(s) Oral daily  furosemide   Injectable 60 milliGRAM(s) IV Push two times a day  hydrALAZINE 25 milliGRAM(s) Oral every 8 hours  insulin lispro (HumaLOG) corrective regimen sliding scale   SubCutaneous three times a day before meals  metolazone 5 milliGRAM(s) Oral daily  sodium chloride 0.9% lock flush 3 milliLiter(s) IV Push every 8 hours    MEDICATIONS  (PRN):  dextrose 40% Gel 15 Gram(s) Oral once PRN Blood Glucose LESS THAN 70 milliGRAM(s)/deciliter  glucagon  Injectable 1 milliGRAM(s) IntraMuscular once PRN Glucose LESS THAN 70 milligrams/deciliter  ondansetron Injectable 4 milliGRAM(s) IV Push every 6 hours PRN Nausea      ASSESSMENT / PLAN:  ----------------------------------------  Acute on chronic diastolic heart failure / Hypoxia - On intravenous furosemide for diuresis. Metolazone added for further diuresis. On carvedilol and enalapril. Supplemental oxygen for hypoxia. Oxygen saturation was 87% on ambient air at rest.    Diabetes - Insulin coverage to be initiated, close monitoring of blood glucose levels.    Hypertension - Close blood pressure monitoring. On hydralazine.    Obstructive sleep apnea - Nocturnal Bipap. The patient reported that he was not using CPAP at home.    Coagulopathy - Not on anticoagulation. Review of the prior admission also noted coagulopathy. No bleeding noted on examination.

## 2019-03-18 LAB
ANION GAP SERPL CALC-SCNC: 12 MMOL/L — SIGNIFICANT CHANGE UP (ref 5–17)
BUN SERPL-MCNC: 20 MG/DL — SIGNIFICANT CHANGE UP (ref 8–20)
CALCIUM SERPL-MCNC: 8.9 MG/DL — SIGNIFICANT CHANGE UP (ref 8.6–10.2)
CHLORIDE SERPL-SCNC: 97 MMOL/L — LOW (ref 98–107)
CO2 SERPL-SCNC: 32 MMOL/L — HIGH (ref 22–29)
CREAT SERPL-MCNC: 0.87 MG/DL — SIGNIFICANT CHANGE UP (ref 0.5–1.3)
GLUCOSE BLDC GLUCOMTR-MCNC: 102 MG/DL — HIGH (ref 70–99)
GLUCOSE BLDC GLUCOMTR-MCNC: 111 MG/DL — HIGH (ref 70–99)
GLUCOSE BLDC GLUCOMTR-MCNC: 94 MG/DL — SIGNIFICANT CHANGE UP (ref 70–99)
GLUCOSE BLDC GLUCOMTR-MCNC: 94 MG/DL — SIGNIFICANT CHANGE UP (ref 70–99)
GLUCOSE SERPL-MCNC: 108 MG/DL — SIGNIFICANT CHANGE UP (ref 70–115)
POTASSIUM SERPL-MCNC: 3.8 MMOL/L — SIGNIFICANT CHANGE UP (ref 3.5–5.3)
POTASSIUM SERPL-SCNC: 3.8 MMOL/L — SIGNIFICANT CHANGE UP (ref 3.5–5.3)
SODIUM SERPL-SCNC: 141 MMOL/L — SIGNIFICANT CHANGE UP (ref 135–145)

## 2019-03-18 PROCEDURE — 99232 SBSQ HOSP IP/OBS MODERATE 35: CPT

## 2019-03-18 RX ADMIN — Medication 60 MILLIGRAM(S): at 17:21

## 2019-03-18 RX ADMIN — Medication 81 MILLIGRAM(S): at 11:30

## 2019-03-18 RX ADMIN — SODIUM CHLORIDE 3 MILLILITER(S): 9 INJECTION INTRAMUSCULAR; INTRAVENOUS; SUBCUTANEOUS at 05:27

## 2019-03-18 RX ADMIN — Medication 25 MILLIGRAM(S): at 13:09

## 2019-03-18 RX ADMIN — Medication 2.5 MILLIGRAM(S): at 05:28

## 2019-03-18 RX ADMIN — CARVEDILOL PHOSPHATE 3.12 MILLIGRAM(S): 80 CAPSULE, EXTENDED RELEASE ORAL at 17:21

## 2019-03-18 RX ADMIN — CARVEDILOL PHOSPHATE 3.12 MILLIGRAM(S): 80 CAPSULE, EXTENDED RELEASE ORAL at 05:28

## 2019-03-18 RX ADMIN — Medication 25 MILLIGRAM(S): at 05:28

## 2019-03-18 RX ADMIN — Medication 60 MILLIGRAM(S): at 06:03

## 2019-03-18 RX ADMIN — ATORVASTATIN CALCIUM 40 MILLIGRAM(S): 80 TABLET, FILM COATED ORAL at 21:02

## 2019-03-18 RX ADMIN — SODIUM CHLORIDE 3 MILLILITER(S): 9 INJECTION INTRAMUSCULAR; INTRAVENOUS; SUBCUTANEOUS at 21:01

## 2019-03-18 RX ADMIN — SODIUM CHLORIDE 3 MILLILITER(S): 9 INJECTION INTRAMUSCULAR; INTRAVENOUS; SUBCUTANEOUS at 11:30

## 2019-03-18 NOTE — CDI QUERY NOTE - NSCDIOTHERTXTBX_GEN_ALL_CORE_HH
37 y/o  morbid obesity male with BMI of 62.6  presented  with acute on chronic diastolic CHF due to dietary non-compliance, Hypoxia, Severe Pulm htn, JAN, and  DM-2  Patient currently on  Supplemental oxygen for hypoxia. Oxygen saturation was 87% on ambient air at rest.    In addition    HR  108  RR 24  SPO2 86%                        Please clarify    A) Acute on chronic  respiratory failure   B) Acute respiratory failure   C) Chronic respiratory failure    D) Other, please clarify  E) Clinically insignificant.

## 2019-03-18 NOTE — PROGRESS NOTE ADULT - SUBJECTIVE AND OBJECTIVE BOX
RUDY GOYAL  ----------------------------------------  The patient was seen and evaluated for heart failure.  The patient is in no acute distress.  Denied any chest pain, palpitations, or abdominal pain.  Reported dyspnea had improved somewhat.    Vital Signs Last 24 Hrs  T(C): 36.7 (18 Mar 2019 11:28), Max: 37.1 (17 Mar 2019 22:14)  T(F): 98 (18 Mar 2019 11:28), Max: 98.7 (17 Mar 2019 22:14)  HR: 85 (18 Mar 2019 13:08) (79 - 95)  BP: 120/78 (18 Mar 2019 13:08) (114/79 - 130/85)  BP(mean): --  RR: 22 (18 Mar 2019 13:08) (18 - 23)  SpO2: 94% (18 Mar 2019 13:08) (91% - 100%)    CAPILLARY BLOOD GLUCOSE  POCT Blood Glucose.: 111 mg/dL (18 Mar 2019 12:10)  POCT Blood Glucose.: 102 mg/dL (18 Mar 2019 08:17)  POCT Blood Glucose.: 126 mg/dL (17 Mar 2019 21:23)  POCT Blood Glucose.: 103 mg/dL (17 Mar 2019 17:02)    PHYSICAL EXAMINATION:  ----------------------------------------  General appearance: No acute distress, Awake, Alert  HEENT: Normocephalic, Atraumatic, Conjunctiva clear, EOMI  Neck: Supple, No JVD, No tenderness  Lungs: No wheezes, No rales, Decreased breath sounds at the bases  Cardiovascular: S1S2, Regular rhythm  Abdomen: Soft, Nontender, Nondistended, No guarding/rebound, Positive bowel sounds  Extremities: No clubbing, No cyanosis, No calf tenderness, Lower extremity edema  Neuro: Strength equal bilaterally, No tremors  Psychiatric: Appropriate mood, Normal affect    LABORATORY STUDIES:  ----------------------------------------  03-18    141  |  97<L>  |  20.0  ----------------------------<  108  3.8   |  32.0<H>  |  0.87    Ca    8.9      18 Mar 2019 06:52  Phos  3.8     03-17  Mg     1.7     03-17    MEDICATIONS  (STANDING):  aspirin enteric coated 81 milliGRAM(s) Oral daily  atorvastatin 40 milliGRAM(s) Oral at bedtime  carvedilol 3.125 milliGRAM(s) Oral every 12 hours  dextrose 5%. 1000 milliLiter(s) (50 mL/Hr) IV Continuous <Continuous>  dextrose 50% Injectable 12.5 Gram(s) IV Push once  dextrose 50% Injectable 25 Gram(s) IV Push once  dextrose 50% Injectable 25 Gram(s) IV Push once  enalapril 2.5 milliGRAM(s) Oral daily  furosemide   Injectable 60 milliGRAM(s) IV Push two times a day  hydrALAZINE 25 milliGRAM(s) Oral every 8 hours  insulin lispro (HumaLOG) corrective regimen sliding scale   SubCutaneous three times a day before meals  metolazone 5 milliGRAM(s) Oral daily  sodium chloride 0.9% lock flush 3 milliLiter(s) IV Push every 8 hours    MEDICATIONS  (PRN):  dextrose 40% Gel 15 Gram(s) Oral once PRN Blood Glucose LESS THAN 70 milliGRAM(s)/deciliter  glucagon  Injectable 1 milliGRAM(s) IntraMuscular once PRN Glucose LESS THAN 70 milligrams/deciliter  ondansetron Injectable 4 milliGRAM(s) IV Push every 6 hours PRN Nausea      ASSESSMENT / PLAN:  ----------------------------------------  Acute on chronic diastolic heart failure / Acute on chronic respiratoryfailure - On intravenous furosemide for diuresis with addition of metolazone. Monitoring urine output. On carvedilol and enalapril. Supplemental oxygen for hypoxia.    Diabetes - Insulin coverage, close monitoring of blood glucose levels.    Hypertension - Close blood pressure monitoring. On hydralazine.    Obstructive sleep apnea - Nocturnal Bipap. The patient reported that he was not using CPAP at home for the past year.    Coagulopathy - Not on anticoagulation. Review of the prior admission also noted coagulopathy. No bleeding noted on examination. RUDY GOYAL  ----------------------------------------  The patient was seen and evaluated for heart failure.  The patient is in no acute distress.  Denied any chest pain, palpitations, or abdominal pain.  Reported dyspnea had improved somewhat.    Vital Signs Last 24 Hrs  T(C): 36.7 (18 Mar 2019 11:28), Max: 37.1 (17 Mar 2019 22:14)  T(F): 98 (18 Mar 2019 11:28), Max: 98.7 (17 Mar 2019 22:14)  HR: 85 (18 Mar 2019 13:08) (79 - 95)  BP: 120/78 (18 Mar 2019 13:08) (114/79 - 130/85)  BP(mean): --  RR: 22 (18 Mar 2019 13:08) (18 - 23)  SpO2: 94% (18 Mar 2019 13:08) (91% - 100%)    CAPILLARY BLOOD GLUCOSE  POCT Blood Glucose.: 111 mg/dL (18 Mar 2019 12:10)  POCT Blood Glucose.: 102 mg/dL (18 Mar 2019 08:17)  POCT Blood Glucose.: 126 mg/dL (17 Mar 2019 21:23)  POCT Blood Glucose.: 103 mg/dL (17 Mar 2019 17:02)    PHYSICAL EXAMINATION:  ----------------------------------------  General appearance: No acute distress, Awake, Alert  HEENT: Normocephalic, Atraumatic, Conjunctiva clear, EOMI  Neck: Supple, No JVD, No tenderness  Lungs: No wheezes, No rales, Decreased breath sounds at the bases  Cardiovascular: S1S2, Regular rhythm  Abdomen: Soft, Nontender, Nondistended, No guarding/rebound, Positive bowel sounds  Extremities: No clubbing, No cyanosis, No calf tenderness, Lower extremity edema  Neuro: Strength equal bilaterally, No tremors  Psychiatric: Appropriate mood, Normal affect    LABORATORY STUDIES:  ----------------------------------------  03-18    141  |  97<L>  |  20.0  ----------------------------<  108  3.8   |  32.0<H>  |  0.87    Ca    8.9      18 Mar 2019 06:52  Phos  3.8     03-17  Mg     1.7     03-17    MEDICATIONS  (STANDING):  aspirin enteric coated 81 milliGRAM(s) Oral daily  atorvastatin 40 milliGRAM(s) Oral at bedtime  carvedilol 3.125 milliGRAM(s) Oral every 12 hours  dextrose 5%. 1000 milliLiter(s) (50 mL/Hr) IV Continuous <Continuous>  dextrose 50% Injectable 12.5 Gram(s) IV Push once  dextrose 50% Injectable 25 Gram(s) IV Push once  dextrose 50% Injectable 25 Gram(s) IV Push once  enalapril 2.5 milliGRAM(s) Oral daily  furosemide   Injectable 60 milliGRAM(s) IV Push two times a day  hydrALAZINE 25 milliGRAM(s) Oral every 8 hours  insulin lispro (HumaLOG) corrective regimen sliding scale   SubCutaneous three times a day before meals  metolazone 5 milliGRAM(s) Oral daily  sodium chloride 0.9% lock flush 3 milliLiter(s) IV Push every 8 hours    MEDICATIONS  (PRN):  dextrose 40% Gel 15 Gram(s) Oral once PRN Blood Glucose LESS THAN 70 milliGRAM(s)/deciliter  glucagon  Injectable 1 milliGRAM(s) IntraMuscular once PRN Glucose LESS THAN 70 milligrams/deciliter  ondansetron Injectable 4 milliGRAM(s) IV Push every 6 hours PRN Nausea      ASSESSMENT / PLAN:  ----------------------------------------  Acute on chronic diastolic heart failure / Acute on chronic respiratory failure - On intravenous furosemide for diuresis with addition of metolazone. Monitoring urine output. On carvedilol and enalapril. Supplemental oxygen for hypoxia.    Diabetes - Insulin coverage, close monitoring of blood glucose levels.    Hypertension - Close blood pressure monitoring. On hydralazine.    Obstructive sleep apnea - Nocturnal Bipap. The patient reported that he was not using CPAP at home for the past year.    Coagulopathy - Not on anticoagulation. Review of the prior admission also noted coagulopathy. No bleeding noted on examination.

## 2019-03-19 LAB
ANION GAP SERPL CALC-SCNC: 13 MMOL/L — SIGNIFICANT CHANGE UP (ref 5–17)
BUN SERPL-MCNC: 22 MG/DL — HIGH (ref 8–20)
CALCIUM SERPL-MCNC: 9 MG/DL — SIGNIFICANT CHANGE UP (ref 8.6–10.2)
CHLORIDE SERPL-SCNC: 89 MMOL/L — LOW (ref 98–107)
CO2 SERPL-SCNC: 32 MMOL/L — HIGH (ref 22–29)
CREAT SERPL-MCNC: 0.98 MG/DL — SIGNIFICANT CHANGE UP (ref 0.5–1.3)
GLUCOSE BLDC GLUCOMTR-MCNC: 101 MG/DL — HIGH (ref 70–99)
GLUCOSE BLDC GLUCOMTR-MCNC: 91 MG/DL — SIGNIFICANT CHANGE UP (ref 70–99)
GLUCOSE BLDC GLUCOMTR-MCNC: 97 MG/DL — SIGNIFICANT CHANGE UP (ref 70–99)
GLUCOSE BLDC GLUCOMTR-MCNC: 97 MG/DL — SIGNIFICANT CHANGE UP (ref 70–99)
GLUCOSE SERPL-MCNC: 107 MG/DL — SIGNIFICANT CHANGE UP (ref 70–115)
POTASSIUM SERPL-MCNC: 3.5 MMOL/L — SIGNIFICANT CHANGE UP (ref 3.5–5.3)
POTASSIUM SERPL-SCNC: 3.5 MMOL/L — SIGNIFICANT CHANGE UP (ref 3.5–5.3)
SODIUM SERPL-SCNC: 134 MMOL/L — LOW (ref 135–145)

## 2019-03-19 PROCEDURE — 99232 SBSQ HOSP IP/OBS MODERATE 35: CPT

## 2019-03-19 RX ADMIN — Medication 81 MILLIGRAM(S): at 10:13

## 2019-03-19 RX ADMIN — Medication 25 MILLIGRAM(S): at 05:28

## 2019-03-19 RX ADMIN — SODIUM CHLORIDE 3 MILLILITER(S): 9 INJECTION INTRAMUSCULAR; INTRAVENOUS; SUBCUTANEOUS at 21:31

## 2019-03-19 RX ADMIN — SODIUM CHLORIDE 3 MILLILITER(S): 9 INJECTION INTRAMUSCULAR; INTRAVENOUS; SUBCUTANEOUS at 12:25

## 2019-03-19 RX ADMIN — Medication 100 MILLIGRAM(S): at 22:23

## 2019-03-19 RX ADMIN — CARVEDILOL PHOSPHATE 3.12 MILLIGRAM(S): 80 CAPSULE, EXTENDED RELEASE ORAL at 17:16

## 2019-03-19 RX ADMIN — Medication 2.5 MILLIGRAM(S): at 05:28

## 2019-03-19 RX ADMIN — Medication 60 MILLIGRAM(S): at 17:16

## 2019-03-19 RX ADMIN — SODIUM CHLORIDE 3 MILLILITER(S): 9 INJECTION INTRAMUSCULAR; INTRAVENOUS; SUBCUTANEOUS at 05:29

## 2019-03-19 RX ADMIN — CARVEDILOL PHOSPHATE 3.12 MILLIGRAM(S): 80 CAPSULE, EXTENDED RELEASE ORAL at 05:28

## 2019-03-19 RX ADMIN — Medication 60 MILLIGRAM(S): at 05:54

## 2019-03-19 RX ADMIN — ATORVASTATIN CALCIUM 40 MILLIGRAM(S): 80 TABLET, FILM COATED ORAL at 21:32

## 2019-03-19 NOTE — PROGRESS NOTE ADULT - SUBJECTIVE AND OBJECTIVE BOX
RUDY GOYAL  ----------------------------------------  The patient was seen and evaluated for heart failure.  The patient is in no acute distress.  Denied any chest pain, palpitations, or abdominal pain.  Reports that the dyspnea is improved.    Vital Signs Last 24 Hrs  T(C): 37.1 (19 Mar 2019 09:57), Max: 37.1 (19 Mar 2019 09:57)  T(F): 98.7 (19 Mar 2019 09:57), Max: 98.7 (19 Mar 2019 09:57)  HR: 87 (19 Mar 2019 13:49) (76 - 87)  BP: 107/60 (19 Mar 2019 13:49) (102/71 - 125/89)  BP(mean): --  RR: 18 (19 Mar 2019 13:49) (16 - 22)  SpO2: 94% (19 Mar 2019 13:49) (92% - 97%)    CAPILLARY BLOOD GLUCOSE  POCT Blood Glucose.: 91 mg/dL (19 Mar 2019 12:11)  POCT Blood Glucose.: 97 mg/dL (19 Mar 2019 08:13)  POCT Blood Glucose.: 94 mg/dL (18 Mar 2019 21:45)  POCT Blood Glucose.: 94 mg/dL (18 Mar 2019 17:16)    PHYSICAL EXAMINATION:  ----------------------------------------  General appearance: No acute distress, Awake, Alert  HEENT: Normocephalic, Atraumatic, Conjunctiva clear, EOMI  Neck: Supple, No JVD, No tenderness  Lungs: No wheezes, No rales, Decreased breath sounds at the bases  Cardiovascular: S1S2, Regular rhythm  Abdomen: Soft, Nontender, Nondistended, No guarding/rebound, Positive bowel sounds  Extremities: No clubbing, No cyanosis, No calf tenderness, Lower extremity edema  Neuro: Strength equal bilaterally, No tremors  Psychiatric: Appropriate mood, Normal affect    LABORATORY STUDIES:  ----------------------------------------  03-19    134<L>  |  89<L>  |  22.0<H>  ----------------------------<  107  3.5   |  32.0<H>  |  0.98    Ca    9.0      19 Mar 2019 06:15    MEDICATIONS  (STANDING):  aspirin enteric coated 81 milliGRAM(s) Oral daily  atorvastatin 40 milliGRAM(s) Oral at bedtime  carvedilol 3.125 milliGRAM(s) Oral every 12 hours  dextrose 5%. 1000 milliLiter(s) (50 mL/Hr) IV Continuous <Continuous>  dextrose 50% Injectable 12.5 Gram(s) IV Push once  dextrose 50% Injectable 25 Gram(s) IV Push once  dextrose 50% Injectable 25 Gram(s) IV Push once  enalapril 2.5 milliGRAM(s) Oral daily  furosemide   Injectable 60 milliGRAM(s) IV Push two times a day  hydrALAZINE 25 milliGRAM(s) Oral every 8 hours  insulin lispro (HumaLOG) corrective regimen sliding scale   SubCutaneous three times a day before meals  metolazone 5 milliGRAM(s) Oral daily  sodium chloride 0.9% lock flush 3 milliLiter(s) IV Push every 8 hours    MEDICATIONS  (PRN):  dextrose 40% Gel 15 Gram(s) Oral once PRN Blood Glucose LESS THAN 70 milliGRAM(s)/deciliter  glucagon  Injectable 1 milliGRAM(s) IntraMuscular once PRN Glucose LESS THAN 70 milligrams/deciliter  ondansetron Injectable 4 milliGRAM(s) IV Push every 6 hours PRN Nausea      ASSESSMENT / PLAN:  ----------------------------------------  Acute on chronic diastolic heart failure / Acute on chronic respiratory failure - On diuretics, to decrease the dose of furosemide. Monitoring urine output. On carvedilol and enalapril. Supplemental oxygen for hypoxia.    Diabetes - Insulin coverage, close monitoring of blood glucose levels.    Hypertension - Close blood pressure monitoring. On hydralazine.    Obstructive sleep apnea - Nocturnal Bipap. The patient reported that he was not using CPAP at home for the past year.    Coagulopathy - Not on anticoagulation. Review of the prior admission also noted coagulopathy. No bleeding noted on examination.    Depression - Discussed with the patient, he reports that he has been feeling depressed due to his current medical issues and the dyspnea limiting his activity. He reports that his condition is improving with treatment and he feels better overall. He has never been on antidepressant medications in the past and was not interested in starting any medications for his mood. He reports feeling better as his symptoms have been improving and believes that he will be in a better mood as he continues have less symptoms.

## 2019-03-20 LAB
ANION GAP SERPL CALC-SCNC: 13 MMOL/L — SIGNIFICANT CHANGE UP (ref 5–17)
BUN SERPL-MCNC: 26 MG/DL — HIGH (ref 8–20)
CALCIUM SERPL-MCNC: 9.3 MG/DL — SIGNIFICANT CHANGE UP (ref 8.6–10.2)
CHLORIDE SERPL-SCNC: 88 MMOL/L — LOW (ref 98–107)
CO2 SERPL-SCNC: 32 MMOL/L — HIGH (ref 22–29)
CREAT SERPL-MCNC: 0.93 MG/DL — SIGNIFICANT CHANGE UP (ref 0.5–1.3)
GLUCOSE BLDC GLUCOMTR-MCNC: 100 MG/DL — HIGH (ref 70–99)
GLUCOSE BLDC GLUCOMTR-MCNC: 107 MG/DL — HIGH (ref 70–99)
GLUCOSE BLDC GLUCOMTR-MCNC: 86 MG/DL — SIGNIFICANT CHANGE UP (ref 70–99)
GLUCOSE BLDC GLUCOMTR-MCNC: 91 MG/DL — SIGNIFICANT CHANGE UP (ref 70–99)
GLUCOSE SERPL-MCNC: 102 MG/DL — SIGNIFICANT CHANGE UP (ref 70–115)
POTASSIUM SERPL-MCNC: 3.7 MMOL/L — SIGNIFICANT CHANGE UP (ref 3.5–5.3)
POTASSIUM SERPL-SCNC: 3.7 MMOL/L — SIGNIFICANT CHANGE UP (ref 3.5–5.3)
SODIUM SERPL-SCNC: 133 MMOL/L — LOW (ref 135–145)

## 2019-03-20 PROCEDURE — 99232 SBSQ HOSP IP/OBS MODERATE 35: CPT

## 2019-03-20 RX ORDER — FUROSEMIDE 40 MG
40 TABLET ORAL
Qty: 0 | Refills: 0 | Status: DISCONTINUED | OUTPATIENT
Start: 2019-03-20 | End: 2019-03-21

## 2019-03-20 RX ADMIN — Medication 2.5 MILLIGRAM(S): at 05:31

## 2019-03-20 RX ADMIN — Medication 100 MILLIGRAM(S): at 12:01

## 2019-03-20 RX ADMIN — SODIUM CHLORIDE 3 MILLILITER(S): 9 INJECTION INTRAMUSCULAR; INTRAVENOUS; SUBCUTANEOUS at 21:51

## 2019-03-20 RX ADMIN — CARVEDILOL PHOSPHATE 3.12 MILLIGRAM(S): 80 CAPSULE, EXTENDED RELEASE ORAL at 18:11

## 2019-03-20 RX ADMIN — Medication 40 MILLIGRAM(S): at 18:12

## 2019-03-20 RX ADMIN — Medication 81 MILLIGRAM(S): at 12:01

## 2019-03-20 RX ADMIN — Medication 60 MILLIGRAM(S): at 05:30

## 2019-03-20 RX ADMIN — SODIUM CHLORIDE 3 MILLILITER(S): 9 INJECTION INTRAMUSCULAR; INTRAVENOUS; SUBCUTANEOUS at 12:01

## 2019-03-20 RX ADMIN — ATORVASTATIN CALCIUM 40 MILLIGRAM(S): 80 TABLET, FILM COATED ORAL at 21:51

## 2019-03-20 RX ADMIN — SODIUM CHLORIDE 3 MILLILITER(S): 9 INJECTION INTRAMUSCULAR; INTRAVENOUS; SUBCUTANEOUS at 05:30

## 2019-03-20 RX ADMIN — Medication 25 MILLIGRAM(S): at 05:31

## 2019-03-20 RX ADMIN — Medication 25 MILLIGRAM(S): at 21:51

## 2019-03-20 RX ADMIN — CARVEDILOL PHOSPHATE 3.12 MILLIGRAM(S): 80 CAPSULE, EXTENDED RELEASE ORAL at 05:30

## 2019-03-20 NOTE — PHYSICAL THERAPY INITIAL EVALUATION ADULT - ADDITIONAL COMMENTS
Pt reports he lives in a house with his aunt with 4 JORDI/HR and flight of stairs inside to his bedroom and bathroom. Pt. aunt is able to provide assist if necessary. Pt. reports he was independent PTA without AD, does not own any DME and has home O2. Pt. states he only needs to ambulate household distances.

## 2019-03-20 NOTE — PROGRESS NOTE ADULT - SUBJECTIVE AND OBJECTIVE BOX
RUDY GOYAL  ----------------------------------------  The patient was seen and evaluated for heart failure.  The patient is in no acute distress.  Denied any chest pain, palpitations, or abdominal pain.  Reports improvement in dyspnea. Denied any orthopnea.    Vital Signs Last 24 Hrs  T(C): 37.1 (20 Mar 2019 09:46), Max: 37.1 (20 Mar 2019 09:46)  T(F): 98.7 (20 Mar 2019 09:46), Max: 98.7 (20 Mar 2019 09:46)  HR: 84 (20 Mar 2019 09:46) (80 - 89)  BP: 115/72 (20 Mar 2019 09:46) (92/64 - 132/85)  BP(mean): --  RR: 15 (20 Mar 2019 09:46) (15 - 19)  SpO2: 95% (20 Mar 2019 09:46) (94% - 98%)    CAPILLARY BLOOD GLUCOSE  POCT Blood Glucose.: 107 mg/dL (20 Mar 2019 11:56)  POCT Blood Glucose.: 100 mg/dL (20 Mar 2019 08:05)  POCT Blood Glucose.: 97 mg/dL (19 Mar 2019 20:58)  POCT Blood Glucose.: 101 mg/dL (19 Mar 2019 17:13)    PHYSICAL EXAMINATION:  ----------------------------------------  General appearance: No acute distress, Awake, Alert  HEENT: Normocephalic, Atraumatic, Conjunctiva clear, EOMI  Neck: Supple, No JVD, No tenderness  Lungs: No wheezes, No rales, Decreased breath sounds at the bases  Cardiovascular: S1S2, Regular rhythm  Abdomen: Soft, Nontender, Nondistended, No guarding/rebound, Positive bowel sounds  Extremities: No clubbing, No cyanosis, No calf tenderness, Lower extremity edema  Neuro: Strength equal bilaterally, No tremors  Psychiatric: Appropriate mood, Normal affect    LABORATORY STUDIES:  ----------------------------------------  03-20    133<L>  |  88<L>  |  26.0<H>  ----------------------------<  102  3.7   |  32.0<H>  |  0.93    Ca    9.3      20 Mar 2019 06:38    MEDICATIONS  (STANDING):  aspirin enteric coated 81 milliGRAM(s) Oral daily  atorvastatin 40 milliGRAM(s) Oral at bedtime  carvedilol 3.125 milliGRAM(s) Oral every 12 hours  dextrose 5%. 1000 milliLiter(s) (50 mL/Hr) IV Continuous <Continuous>  dextrose 50% Injectable 12.5 Gram(s) IV Push once  dextrose 50% Injectable 25 Gram(s) IV Push once  dextrose 50% Injectable 25 Gram(s) IV Push once  enalapril 2.5 milliGRAM(s) Oral daily  furosemide   Injectable 40 milliGRAM(s) IV Push two times a day  hydrALAZINE 25 milliGRAM(s) Oral every 8 hours  insulin lispro (HumaLOG) corrective regimen sliding scale   SubCutaneous three times a day before meals  metolazone 5 milliGRAM(s) Oral daily  sodium chloride 0.9% lock flush 3 milliLiter(s) IV Push every 8 hours    MEDICATIONS  (PRN):  dextrose 40% Gel 15 Gram(s) Oral once PRN Blood Glucose LESS THAN 70 milliGRAM(s)/deciliter  glucagon  Injectable 1 milliGRAM(s) IntraMuscular once PRN Glucose LESS THAN 70 milligrams/deciliter  guaiFENesin   Syrup  (Sugar-Free) 100 milliGRAM(s) Oral every 6 hours PRN Cough  ondansetron Injectable 4 milliGRAM(s) IV Push every 6 hours PRN Nausea      ASSESSMENT / PLAN:  ----------------------------------------  Acute on chronic diastolic heart failure / Acute on chronic respiratory failure - Furosemide dose decreased with plans to transition to oral formulation upon discharge. On carvedilol and enalapril. Supplemental oxygen for hypoxia.    Diabetes - Insulin coverage, close monitoring of blood glucose levels.    Hypertension - Close blood pressure monitoring. On hydralazine.    Obstructive sleep apnea - Nocturnal Bipap. The patient reported that he was not using CPAP at home for the past year. Will require repeat sleep study if he is to resume the use of nocturnal CPAP.    Coagulopathy - Not on anticoagulation. Review of the prior admission also noted coagulopathy. No bleeding noted on examination.

## 2019-03-20 NOTE — DIETITIAN INITIAL EVALUATION ADULT. - OTHER INFO
Pt admitted with heart failure. Pt h/o CHF, obesity, HTN, hypoxia and DM. Pt reports no wt changes PTA. Pt reports po intake of 100% of all meals. RD offered education on DM and low-sodium diet, pt accepted and handouts were provided. Pt admits to not following a diabetic diet or a low salt diet at home. Pt is not on insulin and he does not check his blood sugars, although he has the glucose meter at home. Pt is aware that he needs to make changes, however, he appeared unmotivated to change. He was receptive and understanding of the information provided to him. Continue to monitor wt. RD to remain available.

## 2019-03-21 ENCOUNTER — TRANSCRIPTION ENCOUNTER (OUTPATIENT)
Age: 39
End: 2019-03-21

## 2019-03-21 VITALS
OXYGEN SATURATION: 96 % | SYSTOLIC BLOOD PRESSURE: 95 MMHG | DIASTOLIC BLOOD PRESSURE: 66 MMHG | RESPIRATION RATE: 19 BRPM | TEMPERATURE: 98 F | HEART RATE: 96 BPM

## 2019-03-21 PROBLEM — E66.01 MORBID (SEVERE) OBESITY DUE TO EXCESS CALORIES: Chronic | Status: ACTIVE | Noted: 2019-03-16

## 2019-03-21 PROBLEM — I27.20 PULMONARY HYPERTENSION, UNSPECIFIED: Chronic | Status: ACTIVE | Noted: 2019-03-16

## 2019-03-21 LAB
GLUCOSE BLDC GLUCOMTR-MCNC: 95 MG/DL — SIGNIFICANT CHANGE UP (ref 70–99)
GLUCOSE BLDC GLUCOMTR-MCNC: 96 MG/DL — SIGNIFICANT CHANGE UP (ref 70–99)

## 2019-03-21 PROCEDURE — 85730 THROMBOPLASTIN TIME PARTIAL: CPT

## 2019-03-21 PROCEDURE — 82962 GLUCOSE BLOOD TEST: CPT

## 2019-03-21 PROCEDURE — 81001 URINALYSIS AUTO W/SCOPE: CPT

## 2019-03-21 PROCEDURE — 99239 HOSP IP/OBS DSCHRG MGMT >30: CPT

## 2019-03-21 PROCEDURE — 83690 ASSAY OF LIPASE: CPT

## 2019-03-21 PROCEDURE — 36415 COLL VENOUS BLD VENIPUNCTURE: CPT

## 2019-03-21 PROCEDURE — 71046 X-RAY EXAM CHEST 2 VIEWS: CPT

## 2019-03-21 PROCEDURE — 85379 FIBRIN DEGRADATION QUANT: CPT

## 2019-03-21 PROCEDURE — 83735 ASSAY OF MAGNESIUM: CPT

## 2019-03-21 PROCEDURE — 96374 THER/PROPH/DIAG INJ IV PUSH: CPT

## 2019-03-21 PROCEDURE — 80048 BASIC METABOLIC PNL TOTAL CA: CPT

## 2019-03-21 PROCEDURE — 83036 HEMOGLOBIN GLYCOSYLATED A1C: CPT

## 2019-03-21 PROCEDURE — 99285 EMERGENCY DEPT VISIT HI MDM: CPT | Mod: 25

## 2019-03-21 PROCEDURE — 94760 N-INVAS EAR/PLS OXIMETRY 1: CPT

## 2019-03-21 PROCEDURE — 82803 BLOOD GASES ANY COMBINATION: CPT

## 2019-03-21 PROCEDURE — 94660 CPAP INITIATION&MGMT: CPT

## 2019-03-21 PROCEDURE — 83605 ASSAY OF LACTIC ACID: CPT

## 2019-03-21 PROCEDURE — 85610 PROTHROMBIN TIME: CPT

## 2019-03-21 PROCEDURE — 97163 PT EVAL HIGH COMPLEX 45 MIN: CPT

## 2019-03-21 PROCEDURE — 93005 ELECTROCARDIOGRAM TRACING: CPT

## 2019-03-21 PROCEDURE — 84484 ASSAY OF TROPONIN QUANT: CPT

## 2019-03-21 PROCEDURE — 80053 COMPREHEN METABOLIC PANEL: CPT

## 2019-03-21 PROCEDURE — 84100 ASSAY OF PHOSPHORUS: CPT

## 2019-03-21 PROCEDURE — 83880 ASSAY OF NATRIURETIC PEPTIDE: CPT

## 2019-03-21 PROCEDURE — 85027 COMPLETE CBC AUTOMATED: CPT

## 2019-03-21 RX ORDER — HYDRALAZINE HCL 50 MG
1 TABLET ORAL
Qty: 90 | Refills: 0
Start: 2019-03-21 | End: 2019-04-19

## 2019-03-21 RX ADMIN — Medication 100 MILLIGRAM(S): at 00:46

## 2019-03-21 RX ADMIN — Medication 25 MILLIGRAM(S): at 06:29

## 2019-03-21 RX ADMIN — SODIUM CHLORIDE 3 MILLILITER(S): 9 INJECTION INTRAMUSCULAR; INTRAVENOUS; SUBCUTANEOUS at 06:28

## 2019-03-21 RX ADMIN — Medication 40 MILLIGRAM(S): at 06:28

## 2019-03-21 RX ADMIN — Medication 81 MILLIGRAM(S): at 11:04

## 2019-03-21 RX ADMIN — Medication 2.5 MILLIGRAM(S): at 06:29

## 2019-03-21 RX ADMIN — CARVEDILOL PHOSPHATE 3.12 MILLIGRAM(S): 80 CAPSULE, EXTENDED RELEASE ORAL at 06:29

## 2019-03-21 NOTE — DISCHARGE NOTE PROVIDER - HOSPITAL COURSE
38M with a prior admission for heart failure requiring a furosemide infusion presented with shortness of breath and leg swelling. The patient reported that his CPAP machine was broken and he had been non-compliant with his diet. Intravenous furosemide was initiated for acute on chronic diastolic heart failure. The patient was restarted on nocturnal Bipap as he reported that he was previously on nocturnal CPAP at home but was not using it and the machine was returned to the company. The patient had slow improvement in his symptoms and reported that his urine output had not significantly increased despite the administration of furosemide. Metolazone was initiated to improve diuresis. The patient had improvement in the symptoms and decreased in his oxygen requirements. The patient was discharged home with instructions to continue on the cardiac medications and his home oxygen. Compliance with medications and diet were discussed. The patient was also advised to follow up with his primary care physician for possible repeat sleep study to restart nocturnal CPAP if indicated.        35 minutes total time 38M with a prior admission for heart failure requiring a furosemide infusion presented with shortness of breath and leg swelling. The patient reported that his CPAP machine was broken and he had been non-compliant with his diet. Intravenous furosemide was initiated for acute on chronic diastolic heart failure. The patient was restarted on nocturnal Bipap as he reported that he was previously on nocturnal CPAP at home but was not using it and the machine was returned to the company. The patient had slow improvement in his symptoms and reported that his urine output had not significantly increased despite the administration of furosemide. Metolazone was initiated to improve diuresis but the patient had hyponatremia. The patient had improvement in the symptoms with further adjustment in the medications and decreased in his oxygen requirements. The patient was discharged home with instructions to continue on the cardiac medications and his home oxygen. Compliance with medications and diet were discussed. The patient was also advised to follow up with his primary care physician for possible repeat sleep study to restart nocturnal CPAP if indicated.        35 minutes total time

## 2019-03-21 NOTE — DISCHARGE NOTE NURSING/CASE MANAGEMENT/SOCIAL WORK - NSDCFUADDAPPT_GEN_ALL_CORE_FT
Patient has a prescheduled appointment with Pulmonologist , Dr Pillai ( ( [phone]) on March 29, 2019 @ 9:30 AM. Pt given this info verbally and in writing

## 2019-03-21 NOTE — DISCHARGE NOTE PROVIDER - NSDCCPCAREPLAN_GEN_ALL_CORE_FT
PRINCIPAL DISCHARGE DIAGNOSIS  Diagnosis: CHF (congestive heart failure)  Assessment and Plan of Treatment: Continue on the cardiac medications. Follow up with your primary care physician for further management.      SECONDARY DISCHARGE DIAGNOSES  Diagnosis: Sleep apnea, obstructive  Assessment and Plan of Treatment: Follow up with your primary care physician for repeat sleep study to evaluate for necessaity of nocturnal CPAP    Diagnosis: Hypoxia  Assessment and Plan of Treatment: Continue on home oxygen supplementation

## 2019-03-21 NOTE — PROGRESS NOTE ADULT - SUBJECTIVE AND OBJECTIVE BOX
RUDY GOYAL  ----------------------------------------  The patient was seen and evaluated for heart failure.  The patient is in no acute distress.  Denied any chest pain, palpitations, or abdominal pain.  Reports some improvement in the dyspnea.    Vital Signs Last 24 Hrs  T(C): 36.4 (21 Mar 2019 06:26), Max: 37.2 (20 Mar 2019 15:44)  T(F): 97.5 (21 Mar 2019 06:26), Max: 98.9 (20 Mar 2019 15:44)  HR: 83 (21 Mar 2019 06:26) (83 - 93)  BP: 127/84 (21 Mar 2019 06:26) (105/70 - 127/84)  BP(mean): --  RR: 19 (21 Mar 2019 06:26) (15 - 19)  SpO2: 94% (21 Mar 2019 06:26) (93% - 95%)    CAPILLARY BLOOD GLUCOSE  POCT Blood Glucose.: 96 mg/dL (21 Mar 2019 08:05)  POCT Blood Glucose.: 86 mg/dL (20 Mar 2019 21:42)  POCT Blood Glucose.: 91 mg/dL (20 Mar 2019 17:09)  POCT Blood Glucose.: 107 mg/dL (20 Mar 2019 11:56)    PHYSICAL EXAMINATION:  ----------------------------------------  General appearance: No acute distress, Awake, Alert  HEENT: Normocephalic, Atraumatic, Conjunctiva clear, EOMI  Neck: Supple, No JVD, No tenderness  Lungs: No wheezes, No rales, Decreased breath sounds at the bases  Cardiovascular: S1S2, Regular rhythm  Abdomen: Soft, Nontender, Nondistended, No guarding/rebound, Positive bowel sounds  Extremities: No clubbing, No cyanosis, No calf tenderness, Lower extremity edema improved  Neuro: Strength equal bilaterally, No tremors  Psychiatric: Appropriate mood, Normal affect    LABORATORY STUDIES:  ----------------------------------------  03-20    133<L>  |  88<L>  |  26.0<H>  ----------------------------<  102  3.7   |  32.0<H>  |  0.93    Ca    9.3      20 Mar 2019 06:38    MEDICATIONS  (STANDING):  aspirin enteric coated 81 milliGRAM(s) Oral daily  atorvastatin 40 milliGRAM(s) Oral at bedtime  carvedilol 3.125 milliGRAM(s) Oral every 12 hours  dextrose 5%. 1000 milliLiter(s) (50 mL/Hr) IV Continuous <Continuous>  dextrose 50% Injectable 12.5 Gram(s) IV Push once  dextrose 50% Injectable 25 Gram(s) IV Push once  dextrose 50% Injectable 25 Gram(s) IV Push once  enalapril 2.5 milliGRAM(s) Oral daily  furosemide   Injectable 40 milliGRAM(s) IV Push two times a day  hydrALAZINE 25 milliGRAM(s) Oral every 8 hours  insulin lispro (HumaLOG) corrective regimen sliding scale   SubCutaneous three times a day before meals  metolazone 5 milliGRAM(s) Oral daily  sodium chloride 0.9% lock flush 3 milliLiter(s) IV Push every 8 hours    MEDICATIONS  (PRN):  dextrose 40% Gel 15 Gram(s) Oral once PRN Blood Glucose LESS THAN 70 milliGRAM(s)/deciliter  glucagon  Injectable 1 milliGRAM(s) IntraMuscular once PRN Glucose LESS THAN 70 milligrams/deciliter  guaiFENesin   Syrup  (Sugar-Free) 100 milliGRAM(s) Oral every 6 hours PRN Cough  ondansetron Injectable 4 milliGRAM(s) IV Push every 6 hours PRN Nausea      ASSESSMENT / PLAN:  ----------------------------------------  Acute on chronic diastolic heart failure / Acute on chronic respiratory failure - On furosemide and metolazone for diuresis. Discussed compliance with medications diet, and follow up.    Diabetes - Insulin coverage, close monitoring of blood glucose levels.    Hypertension - Close blood pressure monitoring. On hydralazine.    Obstructive sleep apnea - Nocturnal Bipap. The patient reported that he was not using CPAP at home for the past year. Will require repeat sleep study if he is to resume the use of nocturnal CPAP.    Coagulopathy - Not on anticoagulation. Review of the prior admission also noted coagulopathy. No bleeding noted on examination.

## 2019-03-21 NOTE — DISCHARGE NOTE NURSING/CASE MANAGEMENT/SOCIAL WORK - NSDCDPATPORTLINK_GEN_ALL_CORE
You can access the [facility], offered by [facility], by registering with the following website: [email or web address]

## 2019-03-21 NOTE — PROGRESS NOTE ADULT - REASON FOR ADMISSION
SOB, CHF exacerbation

## 2019-03-29 ENCOUNTER — APPOINTMENT (OUTPATIENT)
Dept: PULMONOLOGY | Facility: CLINIC | Age: 39
End: 2019-03-29

## 2019-04-16 NOTE — PATIENT PROFILE ADULT - NSASFALLNEEDSASSISTWITH_GEN_A_NUR
4/16/2019         RE: Libertad Sanchez  [address] [place] [id number]        Dear Colleague,    Thank you for referring your patient, Libertad Sanchez, to the [facility]. Please see a copy of my visit note below.    Endocrinology Clinic Visit    Chief Complaint: Consult (adrenal insufficiency)     Information obtained from:Patient here with her daughter today.    Subjective:         HPI: Libertad Sanchez is a 80 year old female with history of possibly polymyalgia rheumatica currently on steroid therapy who is seen in consultation at Allan Beaulieu's request for possible adrenal insufficiency.    She has been having muscle pain, joint pains mainly involving upper extremity shoulder area.  This symptoms that started in October 2018.  In November her symptoms got worse to the point that she was able to barely walk to the bathroom due to muscle cramps and in December 2018 she was started on prednisone 20 mg daily and was treated with this dose for 1 week.  Later on prednisone was dropped to 5 mg daily.  She was recently evaluated by rheumatology clinic and during that visits her dose of prednisone was increased to 10 mg daily due to continued muscle pain joint pains and also dizziness.  She has been on prednisone 10 mg daily since February 2019.  At this visit patient denies any dizziness, muscle pain, joint pains, fatigue or tiredness.    When she was on 5 mg of prednisone her main symptoms were muscle aches, joint pain and occasional dizziness which was positional(assuming standing up position from sitting down position).  However, she did not have any nausea, vomiting, diarrhea or weight loss.  She added that she had lost about 10 pounds prior to December; before she was started on the steroid.  However she has gained back 4 pounds since.    Reviewed her outside records; her primary care physician is Dr. Lopez with [facility].  Her basic metabolic panel from November 2018 showed normal  electrolytes and creatinine level.  This was prior to she was started on a steroid therapy.  Complete blood count was within the normal limits.  She is currently on levothyroxine 75 mcg daily; has a past history of hypothyroidism her last TSH in October of thousand 18 was normal at 3.15.    History of osteoporosis last DEXA done in 2017.  She was treated with Actonel however stopped it in April 2018 due to muscle cramps.      CHELSEY positive.      No Known Allergies    Current Outpatient Medications   Medication Sig Dispense Refill     acetaminophen (TYLENOL) 325 MG tablet Take 325 mg by mouth as needed       Calcium Carb-Cholecalciferol 500-400 MG-UNIT CHEW Take 1 tablet by mouth daily       ibuprofen (ADVIL/MOTRIN) 200 MG tablet Take 200 mg by mouth every 4 hours as needed       levothyroxine (SYNTHROID/LEVOTHROID) 75 MCG tablet Take 75 mcg by mouth daily       predniSONE (DELTASONE) 5 MG tablet Take 10 mg by mouth daily. (Patient taking differently: Take 5 mg by mouth 2 times daily .) 180 tablet 1       Review of Systems     11 point review system (Constitutional, HENT, Eyes, Respiratory, Cardiovascular, Gastrointestinal, Genitourinary, Musculoskeletal,Neurological, Psychiatric/Behavioural, Endocrine) is negative or is as per HPI above    Past Medical History:   Diagnosis Date     Colitis      Hypothyroidism      Osteoporosis    Normal thyroid exam findings and no nodules appreciated    Past Surgical History:   Procedure Laterality Date     TONSILLECTOMY         Family History   Problem Relation Age of Onset     Alzheimer Disease Mother        Social History     Socioeconomic History     Marital status:      Spouse name: None     Number of children: None     Years of education: None     Highest education level: None   Occupational History     None   Social Needs     Financial resource strain: None     Food insecurity:     Worry: None     Inability: None     Transportation needs:     Medical: None      Non-medical: None   Tobacco Use     Smoking status: Never Smoker     Smokeless tobacco: Never Used   Substance and Sexual Activity     Alcohol use: None     Drug use: None     Sexual activity: None   Lifestyle     Physical activity:     Days per week: None     Minutes per session: None     Stress: None   Relationships     Social connections:     Talks on phone: None     Gets together: None     Attends Hinduism service: None     Active member of club or organization: None     Attends meetings of clubs or organizations: None     Relationship status: None     Intimate partner violence:     Fear of current or ex partner: None     Emotionally abused: None     Physically abused: None     Forced sexual activity: None   Other Topics Concern     None   Social History Narrative     None       Objective:   /70 (BP Location: Left arm, Patient Position: Sitting, Cuff Size: Adult Regular)   Pulse 63   Wt 65.2 kg (143 lb 11.2 oz)   SpO2 96%   BMI 25.46 kg/m     Constitutional: Pleasant no acute cardiopulmonary distress.   EYES: anicteric, normal extra-ocular movements, no lid lag or retraction.  HEENT: Mouth/Throat: Mucous membrane is moist. Oropharynx is clear.  Thyroid examination: Thyroid is atrophic.  No nodules appreciated  Cardiovascular: RRR, S1, S2 normal.   Pulmonary/Chest: CTAB. No wheezing or rales.   Abdominal: +BS. Non tender to palpation.    Neurological: Alert and oriented.  No tremor and reflexes are symmetrical bilaterally and within the normal limits. Muscle strength 5/5.   Extremities: No edema.  Psychological: appropriate mood and affect     In House Labs:      Ref. Range 2/15/2019 10:16   Sodium Latest Ref Range: 133 - 144 mmol/L 140   Potassium Latest Ref Range: 3.4 - 5.3 mmol/L 3.7   Chloride Latest Ref Range: 94 - 109 mmol/L 105   Carbon Dioxide Latest Ref Range: 20 - 32 mmol/L 29   Urea Nitrogen Latest Ref Range: 7 - 30 mg/dL 25   Creatinine Latest Ref Range: 0.52 - 1.04 mg/dL 1.30 (H)   GFR  Estimate Latest Ref Range: >60 mL/min/1.73_m2 39 (L)   GFR Estimate If Black Latest Ref Range: >60 mL/min/1.73_m2 45 (L)   Calcium Latest Ref Range: 8.5 - 10.1 mg/dL 9.3   Anion Gap Latest Ref Range: 3 - 14 mmol/L 6   Albumin Latest Ref Range: 3.4 - 5.0 g/dL 3.9   Protein Total Latest Ref Range: 6.8 - 8.8 g/dL 8.1   Bilirubin Total Latest Ref Range: 0.2 - 1.3 mg/dL 0.5   Alkaline Phosphatase Latest Ref Range: 40 - 150 U/L 61   ALT Latest Ref Range: 0 - 50 U/L 29   AST Latest Ref Range: 0 - 45 U/L 21   25 OH Vit D total Latest Ref Range: 20 - 75 ug/L <63   25 OH Vit D2 Latest Units: ug/L <5   25 OH Vit D3 Latest Units: ug/L 58   CK Total Latest Ref Range: 30 - 225 U/L 136       Creatinine   Date Value Ref Range Status   02/15/2019 1.30 (H) 0.52 - 1.04 mg/dL Final       Assessment/Treatment Plan:      Current chronic steroid use; concern for possible adrenal insufficiency: patient with history of possible polymyalgia rheumatica vis-à-vis connective tissue disorder with antinuclear antibody positive currently on a steroid therapy.  Therapy with prednisone was started in December 2018 mainly for muscle cramps, joint pains and stiffness.  Prednisone initial dose was 20 mg daily and after a week it was dropped to 5 mg daily.  Patient did not feel well on prednisone 5 mg daily therefore her dose was increased to 10 mg daily.  She has been on prednisone 10 mg daily at least for the last 6 weeks.    Patient's signs and symptoms prior to steroid therapy and also after reduction dose of prednisone 5 mg daily; not consistent with adrenal insufficiency.  In addition, prednisone 5 mg daily is more than physiologic dose; therefore would not expect the patient to have adrenal insufficiency symptoms on this dose.  The fact that patient continues to have symptoms while taking prednisone 5 mg daily makes me think of adrenal insufficiency this patient is less likely.  I have reviewed her outside records particularly basic metabolic  panels from November 2018 before she was started on steroid which were normal.    Even though the likelihood of adrenal insufficiency is low in this patient; would be reasonable completely rule out by checking the hypothalamus pituitary axis.  Currently the hypothalamus pituitary axis would be suppressed due to the super-physiologic exogenous steroid she is getting.  In order to test, the first step would be to slowly taper down her prednisone to the lowest possible dose and check the HPA axis.  Discussed with Dr. Beaulieu; would let us know once patient is on a lower dose of prednisone.      Once she is on the lowest possible dose of prednisone; = or less than 5 mg; will check a morning cortisol with ACTH level or will perform a ACTH stim test to screen for adrenal insufficiency.    Osteoporosis: History of osteoporosis last DEXA done in 2017.  She was treated with Actonel however stopped it in April 2018 due to muscle cramps.  She stated that regarding this issue she would like to follow-up with her primary care physician.      Patient Instructions   We will contact Dr. Cooper regarding tapering of steroid; prednisone dose.     Once you are on low dose prednisone; less than or equal to 5 mg daily; we will check you for possibility of adrenal insufficiency by doing blood work.  You will hear from us.       I will contact the patient with the test results.  Return to clinic in 6 months.    Test and/or medications prescribed today:  No orders of the defined types were placed in this encounter.        Marvel Ignacio MD  Staff Endocrinologist    [phone]  Division of Endocrinology and Diabetes            Again, thank you for allowing me to participate in the care of your patient.        Sincerely,        Marvel Ignacio MD     standing/walking

## 2019-12-20 ENCOUNTER — INPATIENT (INPATIENT)
Facility: HOSPITAL | Age: 39
LOS: 7 days | Discharge: HOME | DRG: 291 | End: 2019-12-28
Attending: HOSPITALIST | Admitting: HOSPITALIST
Payer: MEDICARE

## 2019-12-20 VITALS
HEART RATE: 109 BPM | OXYGEN SATURATION: 98 % | TEMPERATURE: 98 F | HEIGHT: 69 IN | SYSTOLIC BLOOD PRESSURE: 112 MMHG | RESPIRATION RATE: 20 BRPM | WEIGHT: 169.98 LBS | DIASTOLIC BLOOD PRESSURE: 76 MMHG

## 2019-12-20 DIAGNOSIS — J18.9 PNEUMONIA, UNSPECIFIED ORGANISM: ICD-10-CM

## 2019-12-20 LAB
ALBUMIN SERPL ELPH-MCNC: 3.5 G/DL — SIGNIFICANT CHANGE UP (ref 3.3–5.2)
ALP SERPL-CCNC: 78 U/L — SIGNIFICANT CHANGE UP (ref 40–120)
ALT FLD-CCNC: 12 U/L — SIGNIFICANT CHANGE UP
ANION GAP SERPL CALC-SCNC: 13 MMOL/L — SIGNIFICANT CHANGE UP (ref 5–17)
APTT BLD: 33.2 SEC — SIGNIFICANT CHANGE UP (ref 27.5–36.3)
AST SERPL-CCNC: 18 U/L — SIGNIFICANT CHANGE UP
BASOPHILS # BLD AUTO: 0.03 K/UL — SIGNIFICANT CHANGE UP (ref 0–0.2)
BASOPHILS NFR BLD AUTO: 0.5 % — SIGNIFICANT CHANGE UP (ref 0–2)
BILIRUB SERPL-MCNC: 1.4 MG/DL — SIGNIFICANT CHANGE UP (ref 0.4–2)
BUN SERPL-MCNC: 22 MG/DL — HIGH (ref 8–20)
CALCIUM SERPL-MCNC: 9.3 MG/DL — SIGNIFICANT CHANGE UP (ref 8.6–10.2)
CHLORIDE SERPL-SCNC: 98 MMOL/L — SIGNIFICANT CHANGE UP (ref 98–107)
CO2 SERPL-SCNC: 28 MMOL/L — SIGNIFICANT CHANGE UP (ref 22–29)
CREAT SERPL-MCNC: 1.34 MG/DL — HIGH (ref 0.5–1.3)
EOSINOPHIL # BLD AUTO: 0.2 K/UL — SIGNIFICANT CHANGE UP (ref 0–0.5)
EOSINOPHIL NFR BLD AUTO: 3.1 % — SIGNIFICANT CHANGE UP (ref 0–6)
GLUCOSE SERPL-MCNC: 99 MG/DL — SIGNIFICANT CHANGE UP (ref 70–115)
HCT VFR BLD CALC: 41.3 % — SIGNIFICANT CHANGE UP (ref 39–50)
HGB BLD-MCNC: 12.9 G/DL — LOW (ref 13–17)
IMM GRANULOCYTES NFR BLD AUTO: 0.2 % — SIGNIFICANT CHANGE UP (ref 0–1.5)
INR BLD: 1.79 RATIO — HIGH (ref 0.88–1.16)
LACTATE BLDV-MCNC: 1 MMOL/L — SIGNIFICANT CHANGE UP (ref 0.5–2)
LYMPHOCYTES # BLD AUTO: 0.77 K/UL — LOW (ref 1–3.3)
LYMPHOCYTES # BLD AUTO: 12 % — LOW (ref 13–44)
MAGNESIUM SERPL-MCNC: 1.7 MG/DL — SIGNIFICANT CHANGE UP (ref 1.6–2.6)
MCHC RBC-ENTMCNC: 29.9 PG — SIGNIFICANT CHANGE UP (ref 27–34)
MCHC RBC-ENTMCNC: 31.2 GM/DL — LOW (ref 32–36)
MCV RBC AUTO: 95.8 FL — SIGNIFICANT CHANGE UP (ref 80–100)
MONOCYTES # BLD AUTO: 0.57 K/UL — SIGNIFICANT CHANGE UP (ref 0–0.9)
MONOCYTES NFR BLD AUTO: 8.9 % — SIGNIFICANT CHANGE UP (ref 2–14)
NEUTROPHILS # BLD AUTO: 4.82 K/UL — SIGNIFICANT CHANGE UP (ref 1.8–7.4)
NEUTROPHILS NFR BLD AUTO: 75.3 % — SIGNIFICANT CHANGE UP (ref 43–77)
NT-PROBNP SERPL-SCNC: 1584 PG/ML — HIGH (ref 0–300)
PLATELET # BLD AUTO: 178 K/UL — SIGNIFICANT CHANGE UP (ref 150–400)
POTASSIUM SERPL-MCNC: 3.8 MMOL/L — SIGNIFICANT CHANGE UP (ref 3.5–5.3)
POTASSIUM SERPL-SCNC: 3.8 MMOL/L — SIGNIFICANT CHANGE UP (ref 3.5–5.3)
PROT SERPL-MCNC: 8.6 G/DL — SIGNIFICANT CHANGE UP (ref 6.6–8.7)
PROTHROM AB SERPL-ACNC: 20.9 SEC — HIGH (ref 10–12.9)
RBC # BLD: 4.31 M/UL — SIGNIFICANT CHANGE UP (ref 4.2–5.8)
RBC # FLD: 14.7 % — HIGH (ref 10.3–14.5)
SODIUM SERPL-SCNC: 139 MMOL/L — SIGNIFICANT CHANGE UP (ref 135–145)
TROPONIN T SERPL-MCNC: <0.01 NG/ML — SIGNIFICANT CHANGE UP (ref 0–0.06)
WBC # BLD: 6.4 K/UL — SIGNIFICANT CHANGE UP (ref 3.8–10.5)
WBC # FLD AUTO: 6.4 K/UL — SIGNIFICANT CHANGE UP (ref 3.8–10.5)

## 2019-12-20 PROCEDURE — 99223 1ST HOSP IP/OBS HIGH 75: CPT

## 2019-12-20 PROCEDURE — 71045 X-RAY EXAM CHEST 1 VIEW: CPT | Mod: 26

## 2019-12-20 PROCEDURE — 99291 CRITICAL CARE FIRST HOUR: CPT

## 2019-12-20 RX ORDER — FUROSEMIDE 40 MG
40 TABLET ORAL ONCE
Refills: 0 | Status: COMPLETED | OUTPATIENT
Start: 2019-12-20 | End: 2019-12-20

## 2019-12-20 RX ORDER — GLUCAGON INJECTION, SOLUTION 0.5 MG/.1ML
1 INJECTION, SOLUTION SUBCUTANEOUS ONCE
Refills: 0 | Status: DISCONTINUED | OUTPATIENT
Start: 2019-12-20 | End: 2019-12-28

## 2019-12-20 RX ORDER — HYDRALAZINE HCL 50 MG
25 TABLET ORAL EVERY 8 HOURS
Refills: 0 | Status: DISCONTINUED | OUTPATIENT
Start: 2019-12-20 | End: 2019-12-22

## 2019-12-20 RX ORDER — DEXTROSE 50 % IN WATER 50 %
12.5 SYRINGE (ML) INTRAVENOUS ONCE
Refills: 0 | Status: DISCONTINUED | OUTPATIENT
Start: 2019-12-20 | End: 2019-12-28

## 2019-12-20 RX ORDER — DEXTROSE 50 % IN WATER 50 %
15 SYRINGE (ML) INTRAVENOUS ONCE
Refills: 0 | Status: DISCONTINUED | OUTPATIENT
Start: 2019-12-20 | End: 2019-12-28

## 2019-12-20 RX ORDER — ASPIRIN/CALCIUM CARB/MAGNESIUM 324 MG
81 TABLET ORAL DAILY
Refills: 0 | Status: DISCONTINUED | OUTPATIENT
Start: 2019-12-20 | End: 2019-12-28

## 2019-12-20 RX ORDER — ATORVASTATIN CALCIUM 80 MG/1
40 TABLET, FILM COATED ORAL AT BEDTIME
Refills: 0 | Status: DISCONTINUED | OUTPATIENT
Start: 2019-12-20 | End: 2019-12-28

## 2019-12-20 RX ORDER — ENOXAPARIN SODIUM 100 MG/ML
40 INJECTION SUBCUTANEOUS DAILY
Refills: 0 | Status: DISCONTINUED | OUTPATIENT
Start: 2019-12-20 | End: 2019-12-21

## 2019-12-20 RX ORDER — FUROSEMIDE 40 MG
40 TABLET ORAL
Refills: 0 | Status: DISCONTINUED | OUTPATIENT
Start: 2019-12-20 | End: 2019-12-22

## 2019-12-20 RX ORDER — CARVEDILOL PHOSPHATE 80 MG/1
3.12 CAPSULE, EXTENDED RELEASE ORAL EVERY 12 HOURS
Refills: 0 | Status: DISCONTINUED | OUTPATIENT
Start: 2019-12-20 | End: 2019-12-28

## 2019-12-20 RX ORDER — AZITHROMYCIN 500 MG/1
500 TABLET, FILM COATED ORAL ONCE
Refills: 0 | Status: COMPLETED | OUTPATIENT
Start: 2019-12-20 | End: 2019-12-20

## 2019-12-20 RX ORDER — SODIUM CHLORIDE 9 MG/ML
1000 INJECTION, SOLUTION INTRAVENOUS
Refills: 0 | Status: DISCONTINUED | OUTPATIENT
Start: 2019-12-20 | End: 2019-12-28

## 2019-12-20 RX ORDER — INSULIN LISPRO 100/ML
VIAL (ML) SUBCUTANEOUS AT BEDTIME
Refills: 0 | Status: DISCONTINUED | OUTPATIENT
Start: 2019-12-20 | End: 2019-12-28

## 2019-12-20 RX ORDER — CEFTRIAXONE 500 MG/1
1000 INJECTION, POWDER, FOR SOLUTION INTRAMUSCULAR; INTRAVENOUS ONCE
Refills: 0 | Status: COMPLETED | OUTPATIENT
Start: 2019-12-20 | End: 2019-12-20

## 2019-12-20 RX ORDER — MAGNESIUM SULFATE 500 MG/ML
1 VIAL (ML) INJECTION ONCE
Refills: 0 | Status: COMPLETED | OUTPATIENT
Start: 2019-12-20 | End: 2019-12-20

## 2019-12-20 RX ORDER — INSULIN LISPRO 100/ML
VIAL (ML) SUBCUTANEOUS
Refills: 0 | Status: DISCONTINUED | OUTPATIENT
Start: 2019-12-20 | End: 2019-12-28

## 2019-12-20 RX ORDER — IPRATROPIUM/ALBUTEROL SULFATE 18-103MCG
3 AEROSOL WITH ADAPTER (GRAM) INHALATION EVERY 6 HOURS
Refills: 0 | Status: DISCONTINUED | OUTPATIENT
Start: 2019-12-20 | End: 2019-12-21

## 2019-12-20 RX ORDER — DEXTROSE 50 % IN WATER 50 %
25 SYRINGE (ML) INTRAVENOUS ONCE
Refills: 0 | Status: DISCONTINUED | OUTPATIENT
Start: 2019-12-20 | End: 2019-12-28

## 2019-12-20 RX ADMIN — CEFTRIAXONE 100 MILLIGRAM(S): 500 INJECTION, POWDER, FOR SOLUTION INTRAMUSCULAR; INTRAVENOUS at 21:34

## 2019-12-20 RX ADMIN — AZITHROMYCIN 255 MILLIGRAM(S): 500 TABLET, FILM COATED ORAL at 21:36

## 2019-12-20 RX ADMIN — Medication 100 GRAM(S): at 22:41

## 2019-12-20 RX ADMIN — ATORVASTATIN CALCIUM 40 MILLIGRAM(S): 80 TABLET, FILM COATED ORAL at 22:42

## 2019-12-20 RX ADMIN — Medication 40 MILLIGRAM(S): at 21:32

## 2019-12-20 RX ADMIN — Medication 25 MILLIGRAM(S): at 22:42

## 2019-12-20 NOTE — ED PROVIDER NOTE - OBJECTIVE STATEMENT
Pertinent PMH/PSH/FHx/SHx and Review of Systems contained within:  Patient presents to the ED for difficulty breathing and chest pain.  Per patietn and chart, PMH of diastolic CHF, severe pulm htn eval, morbid obesity, JAN, DM-2, CPAP use.  Patient states for 3-4 days, worsneing cough with "feeling sick."  VSS but mildly hypoxic to 86% on RA and tachy to 110s.  Bipap intiated and HR decreased to 90s and O2 increased to 96%.  Patient speaking in full sentences.  He states "maybe it's my CHF but I do have this cough with mucous."  no other concerns.  Non toxic.  No aggravating or relieving factors. No other pertinent PMH.  No other pertinent PSH.  No other pertinent FHx.  Patient denies EtOH/tobacco/illicit substance use. No fever/chills, No photophobia/eye pain/changes in vision, No ear pain/sore throat/dysphagia, No chest pain/palpitations, no wheeze/stridor, No abdominal pain, No N/V/D, no dysuria/frequency/discharge, No neck/back pain, no rash, no changes in neurological status/function. Pertinent PMH/PSH/FHx/SHx and Review of Systems contained within:  Patient presents to the ED for difficulty breathing and chest pain.  Per patient and chart, PMH of diastolic CHF, severe pulm htn eval, morbid obesity, JAN, DM-2, CPAP use.  Patient states for 3-4 days, worsening cough with "feeling sick."  VSS but mildly hypoxic to 86% on RA and tachy to 110s.  Bipap intiated and HR decreased to 90s and O2 increased to 96%.  Patient speaking in full sentences.  He states "maybe it's my CHF but I do have this cough with mucous."  no other concerns.  Non toxic.  No aggravating or relieving factors. No other pertinent PMH.  No other pertinent PSH.  No other pertinent FHx.  Patient denies EtOH/tobacco/illicit substance use. No fever/chills, No photophobia/eye pain/changes in vision, No ear pain/sore throat/dysphagia, No palpitations, no wheeze/stridor, No abdominal pain, No N/V/D, no dysuria/frequency/discharge, No neck/back pain, no rash, no changes in neurological status/function.

## 2019-12-20 NOTE — ED PROVIDER NOTE - CARE PLAN
Principal Discharge DX:	Pneumonia  Secondary Diagnosis:	CHF (congestive heart failure) Principal Discharge DX:	Pneumonia  Secondary Diagnosis:	CHF (congestive heart failure)  Secondary Diagnosis:	Hypoxia

## 2019-12-20 NOTE — H&P ADULT - ASSESSMENT
pt. is admitted for     - acute on chronic diastolic chf. dyspnea likely multifactorial, chf, pulmonary ht, csa, obesity all playing role. diuresis with iv lasix 4o mg q 12 hrs, follow echo, trop, cardio consult [facility].     - Pulmonary htn, follow echo, will continue with pt's sildenafil.    - Essential htn, controlled, continue coreg, hydralazine and enalapril.     - JAN, bipap, feels better with bipap, compliance ? will request for pulmonology consult.     - Possible bronchitis, pt. has no elevated  wbc, no fever, non toxic looking, will keep on iv levaquin for now and follow blood cx.    - Coagulopathy, pt's old labs also show that his INR has been elevated , pt. is not on AC, possible passive liver congestion from chf contributing, will benefit from hematology consult as an out-pt.

## 2019-12-20 NOTE — H&P ADULT - HISTORY OF PRESENT ILLNESS
40 y/o male with h/o chf, pulmonary htn, JAN appears non compliant with bipap, Dm and obesity was visiting some friends in [place] and for past 2 days was getting more water retention in his belly and legs associated with exertional sob , better with rest, some orthopnea, some cough / phlegm, no fever, no chills. no abd. pain. no n/v/d. chest wall hurts with cough, no radiation of chest wall pain reported. no dizziness, no syncopy. As per pt, he is compliant with meds and diet, appears questionable ? pt. was admitted in a hospital in [place] 3 weeks ago for chf as per record on it. pt's discharge medicine list had sildenafil 20 mg po tid which is not in our record.

## 2019-12-20 NOTE — ED ADULT TRIAGE NOTE - CHIEF COMPLAINT QUOTE
"I got pressure in my chest" c/o midsternal chest pressure starting yesterday, hx fo CHF wears 5L/min via NC daily. Received 325mg ASA PTA, #20G IV noted to left hand, site asymp. Able to ambulate with steady gait noted

## 2019-12-20 NOTE — ED PROVIDER NOTE - CLINICAL SUMMARY MEDICAL DECISION MAKING FREE TEXT BOX
Magda presents with hypoxia and tachycardiai nthe setting of CHF and productive cough.  CXR with PNA.  abx given.  lactate normal and trop normla.  Patient comfortable on bipap. Magda presents with hypoxia and tachycardia in the setting of CHF and productive cough.  CXR with PNA.  abx given.  lactate normal and trop normal.  Patient comfortable on bipap.  d/w Dr. Epps and will admit.  Uneventful ED observation period. Non toxic.

## 2019-12-20 NOTE — ED ADULT NURSE NOTE - ED STAT RN HANDOFF DETAILS
Report given to CDU RN, FLOR. Pt transported on BiPAP with Resp therapist. MG infusing on pump. Pt put on portable cardiac monitor in CDU. Call bell and urinal within reach.

## 2019-12-20 NOTE — ED PROVIDER NOTE - PHYSICAL EXAMINATION
Gen: Alert, NAD  Head: NC, AT, PERRL, EOMI, normal lids/conjunctiva  ENT: normal hearing, patent oropharynx without erythema/exudate, uvula midline  Neck: +supple, no tenderness/meningismus/JVD, +Trachea midline  Pulm: Bilateral BS, slightly increased resp effort at baseline, normal resp effort on bipap, no wheeze/stridor/retractions  CV: RRR, no M/R/G, +dist pulses  Abd: soft, NT/ND, +BS, no hepatosplenomegaly  Mskel: moderate to severe BLE edema, no erythema/cyanosis  Skin: no rash, secondary skin changes to to BLE edema  Neuro: AAOx3, no gross sensory/motor deficits,

## 2019-12-20 NOTE — ED ADULT NURSE NOTE - OBJECTIVE STATEMENT
LAte entry : Pt presented to ED c/o chest pressure and difficulty breathing , on 5 L O2 @ home, Hx of CHF, pt visiting from [facility], " I think I was drinking too much" Pt eval by MD , RT called for BIPAP , pt continue to dropped O 2 to 86 % on 5 L O2 via n/c

## 2019-12-20 NOTE — H&P ADULT - NSHPPHYSICALEXAM_GEN_ALL_CORE
General: Obese AA male lying in bed with bipap on not in distress.   HEENT: AT, NC. PERRL. intact EOM. dry lips.  Neck: supple. no JVD.   Chest: Basal crackles bilaterally, somewhat diminished BS. no wheeze.  Heart: S1,S2. RRR. no heart murmur. 1 + edema of B/L LE.   Abdomen: soft. non-tender. obese, + BS.   Ext: no calf tenderness. ROM of all ext. intact. distal pulses intact.  Neuro: AAO x3. no focal weakness. no speech disorder. CNs intact.   Skin: chronic venous stasis skin changes over B/L LE, no diaphoresis. warm and dry.  Psych: normal affect, co-operative. General: Obese AA male lying in bed with bipap on not in distress.   HEENT: AT, NC. PERRL. intact EOM. dry lips.  Neck: supple. no JVD.   Chest: Basal crackles bilaterally, somewhat diminished BS. no wheeze.  Heart: S1,S2. RRR. systolic murmur. 1 + edema of B/L LE.   Abdomen: soft. non-tender. obese, + BS.   Ext: no calf tenderness. ROM of all ext. intact. distal pulses intact.  Neuro: AAO x3. no focal weakness. no speech disorder. CNs intact.   Skin: chronic venous stasis skin changes over B/L LE, no diaphoresis. warm and dry.  Psych: normal affect, co-operative.

## 2019-12-21 DIAGNOSIS — E66.01 MORBID (SEVERE) OBESITY DUE TO EXCESS CALORIES: ICD-10-CM

## 2019-12-21 DIAGNOSIS — I10 ESSENTIAL (PRIMARY) HYPERTENSION: ICD-10-CM

## 2019-12-21 DIAGNOSIS — I50.33 ACUTE ON CHRONIC DIASTOLIC (CONGESTIVE) HEART FAILURE: ICD-10-CM

## 2019-12-21 LAB
ANION GAP SERPL CALC-SCNC: 13 MMOL/L — SIGNIFICANT CHANGE UP (ref 5–17)
APTT BLD: 32.7 SEC — SIGNIFICANT CHANGE UP (ref 27.5–36.3)
BASOPHILS # BLD AUTO: 0.03 K/UL — SIGNIFICANT CHANGE UP (ref 0–0.2)
BASOPHILS NFR BLD AUTO: 0.6 % — SIGNIFICANT CHANGE UP (ref 0–2)
BUN SERPL-MCNC: 23 MG/DL — HIGH (ref 8–20)
CALCIUM SERPL-MCNC: 8.9 MG/DL — SIGNIFICANT CHANGE UP (ref 8.6–10.2)
CHLORIDE SERPL-SCNC: 97 MMOL/L — LOW (ref 98–107)
CHOLEST SERPL-MCNC: 67 MG/DL — LOW (ref 110–199)
CO2 SERPL-SCNC: 30 MMOL/L — HIGH (ref 22–29)
CREAT SERPL-MCNC: 1.16 MG/DL — SIGNIFICANT CHANGE UP (ref 0.5–1.3)
EOSINOPHIL # BLD AUTO: 0.18 K/UL — SIGNIFICANT CHANGE UP (ref 0–0.5)
EOSINOPHIL NFR BLD AUTO: 3.6 % — SIGNIFICANT CHANGE UP (ref 0–6)
GLUCOSE BLDC GLUCOMTR-MCNC: 113 MG/DL — HIGH (ref 70–99)
GLUCOSE BLDC GLUCOMTR-MCNC: 114 MG/DL — HIGH (ref 70–99)
GLUCOSE BLDC GLUCOMTR-MCNC: 97 MG/DL — SIGNIFICANT CHANGE UP (ref 70–99)
GLUCOSE BLDC GLUCOMTR-MCNC: 98 MG/DL — SIGNIFICANT CHANGE UP (ref 70–99)
GLUCOSE BLDC GLUCOMTR-MCNC: 99 MG/DL — SIGNIFICANT CHANGE UP (ref 70–99)
GLUCOSE SERPL-MCNC: 110 MG/DL — SIGNIFICANT CHANGE UP (ref 70–115)
HBA1C BLD-MCNC: 6.3 % — HIGH (ref 4–5.6)
HCT VFR BLD CALC: 41.2 % — SIGNIFICANT CHANGE UP (ref 39–50)
HDLC SERPL-MCNC: 31 MG/DL — LOW
HGB BLD-MCNC: 12.8 G/DL — LOW (ref 13–17)
IMM GRANULOCYTES NFR BLD AUTO: 0.4 % — SIGNIFICANT CHANGE UP (ref 0–1.5)
INR BLD: 1.71 RATIO — HIGH (ref 0.88–1.16)
LIPID PNL WITH DIRECT LDL SERPL: 29 MG/DL — SIGNIFICANT CHANGE UP
LYMPHOCYTES # BLD AUTO: 0.75 K/UL — LOW (ref 1–3.3)
LYMPHOCYTES # BLD AUTO: 15.2 % — SIGNIFICANT CHANGE UP (ref 13–44)
MCHC RBC-ENTMCNC: 29.8 PG — SIGNIFICANT CHANGE UP (ref 27–34)
MCHC RBC-ENTMCNC: 31.1 GM/DL — LOW (ref 32–36)
MCV RBC AUTO: 95.8 FL — SIGNIFICANT CHANGE UP (ref 80–100)
MONOCYTES # BLD AUTO: 0.39 K/UL — SIGNIFICANT CHANGE UP (ref 0–0.9)
MONOCYTES NFR BLD AUTO: 7.9 % — SIGNIFICANT CHANGE UP (ref 2–14)
NEUTROPHILS # BLD AUTO: 3.58 K/UL — SIGNIFICANT CHANGE UP (ref 1.8–7.4)
NEUTROPHILS NFR BLD AUTO: 72.3 % — SIGNIFICANT CHANGE UP (ref 43–77)
PLATELET # BLD AUTO: 164 K/UL — SIGNIFICANT CHANGE UP (ref 150–400)
POTASSIUM SERPL-MCNC: 3.5 MMOL/L — SIGNIFICANT CHANGE UP (ref 3.5–5.3)
POTASSIUM SERPL-SCNC: 3.5 MMOL/L — SIGNIFICANT CHANGE UP (ref 3.5–5.3)
PROTHROM AB SERPL-ACNC: 20 SEC — HIGH (ref 10–12.9)
RAPID RVP RESULT: SIGNIFICANT CHANGE UP
RBC # BLD: 4.3 M/UL — SIGNIFICANT CHANGE UP (ref 4.2–5.8)
RBC # FLD: 15 % — HIGH (ref 10.3–14.5)
SODIUM SERPL-SCNC: 140 MMOL/L — SIGNIFICANT CHANGE UP (ref 135–145)
TOTAL CHOLESTEROL/HDL RATIO MEASUREMENT: 2 RATIO — LOW (ref 3.4–9.6)
TRIGL SERPL-MCNC: 37 MG/DL — SIGNIFICANT CHANGE UP (ref 10–200)
TROPONIN T SERPL-MCNC: <0.01 NG/ML — SIGNIFICANT CHANGE UP (ref 0–0.06)
TROPONIN T SERPL-MCNC: <0.01 NG/ML — SIGNIFICANT CHANGE UP (ref 0–0.06)
WBC # BLD: 4.95 K/UL — SIGNIFICANT CHANGE UP (ref 3.8–10.5)
WBC # FLD AUTO: 4.95 K/UL — SIGNIFICANT CHANGE UP (ref 3.8–10.5)

## 2019-12-21 PROCEDURE — 99233 SBSQ HOSP IP/OBS HIGH 50: CPT | Mod: GC

## 2019-12-21 PROCEDURE — 99223 1ST HOSP IP/OBS HIGH 75: CPT

## 2019-12-21 PROCEDURE — 99222 1ST HOSP IP/OBS MODERATE 55: CPT

## 2019-12-21 RX ORDER — POTASSIUM CHLORIDE 20 MEQ
40 PACKET (EA) ORAL ONCE
Refills: 0 | Status: COMPLETED | OUTPATIENT
Start: 2019-12-21 | End: 2019-12-21

## 2019-12-21 RX ORDER — FLUTICASONE PROPIONATE 50 MCG
1 SPRAY, SUSPENSION NASAL
Refills: 0 | Status: DISCONTINUED | OUTPATIENT
Start: 2019-12-21 | End: 2019-12-28

## 2019-12-21 RX ORDER — ENOXAPARIN SODIUM 100 MG/ML
40 INJECTION SUBCUTANEOUS
Refills: 0 | Status: DISCONTINUED | OUTPATIENT
Start: 2019-12-21 | End: 2019-12-28

## 2019-12-21 RX ADMIN — Medication 25 MILLIGRAM(S): at 22:42

## 2019-12-21 RX ADMIN — ENOXAPARIN SODIUM 40 MILLIGRAM(S): 100 INJECTION SUBCUTANEOUS at 17:18

## 2019-12-21 RX ADMIN — Medication 40 MILLIGRAM(S): at 05:19

## 2019-12-21 RX ADMIN — Medication 25 MILLIGRAM(S): at 13:49

## 2019-12-21 RX ADMIN — Medication 3 MILLILITER(S): at 03:35

## 2019-12-21 RX ADMIN — Medication 81 MILLIGRAM(S): at 11:33

## 2019-12-21 RX ADMIN — Medication 25 MILLIGRAM(S): at 05:19

## 2019-12-21 RX ADMIN — CARVEDILOL PHOSPHATE 3.12 MILLIGRAM(S): 80 CAPSULE, EXTENDED RELEASE ORAL at 17:18

## 2019-12-21 RX ADMIN — ATORVASTATIN CALCIUM 40 MILLIGRAM(S): 80 TABLET, FILM COATED ORAL at 22:09

## 2019-12-21 RX ADMIN — Medication 20 MILLIGRAM(S): at 13:49

## 2019-12-21 RX ADMIN — Medication 40 MILLIEQUIVALENT(S): at 11:32

## 2019-12-21 RX ADMIN — Medication 20 MILLIGRAM(S): at 22:09

## 2019-12-21 RX ADMIN — Medication 40 MILLIGRAM(S): at 17:18

## 2019-12-21 RX ADMIN — Medication 5 MILLIGRAM(S): at 05:19

## 2019-12-21 RX ADMIN — Medication 3 MILLILITER(S): at 08:29

## 2019-12-21 RX ADMIN — Medication 20 MILLIGRAM(S): at 05:19

## 2019-12-21 NOTE — PATIENT PROFILE ADULT - NSPROHMCARDIOMGMTSTRAT_GEN_A_NUR
diet modification/exercise/medication therapy/routine screening/fluid modification/weight management/coping strategies

## 2019-12-21 NOTE — CONSULT NOTE ADULT - PROBLEM SELECTOR RECOMMENDATION 9
Underlying "severe pulmonary hypertension" on recent TTE also contributing factor. Admit to Tele, CBC, BMP, pro-BNP, trend CE x2, ECG and CXR  Resume Lasix 40mg IV BID, Coreg 3.125mg BID, Hydralazine 25mg BID  On Nitric oxide support for Pulm HTN (Sildenafil)    Monitor urine output, daily weights, strict I's & O's, currently on BIPAP  Repeat TTE in AM to assess structural & functional status   Patient education provided for dietary restrictions and optimal weight management Underlying "severe pulmonary hypertension" on recent TTE also contributing factor. Admit to Tele, CBC, BMP, pro-BNP, trend CE x2, ECG and CXR  Resume Lasix 40mg IV BID, Coreg 3.125mg BID, Hydralazine 25mg BID  On Sildenafil for pulm smooth muscle relaxation for severe Pulm HTN  Monitor urine output, daily weights, strict I's & O's, currently on BIPAP  Repeat TTE in AM to assess structural & functional status   Patient education provided for dietary restrictions and optimal weight management

## 2019-12-21 NOTE — CONSULT NOTE ADULT - ATTENDING COMMENTS
Pt is seen, examined, chart reviewed, d/w NP/PA.    39M with morbid obesity, R sided heart failure (normal EF), RHC at [facility] in 2008 with moderate pulmonary HTN and moderate elevated pCWP, admitted after experiencing MUÑOZ, orthopnea, fatigue and hypoxia likely due to acute on chronic diastolic heart failure, non compliance with meds and diet    Acute on chronic diastolic CHF (congestive heart failure)  Severe pulmonary hypertension   Echo, Serial CE  Lasix 40mg IV BID, Coreg 3.125mg BID, Hydralazine 25mg BID, Sildenafil  Cont BIPAP, Monitor SpO2

## 2019-12-21 NOTE — CONSULT NOTE ADULT - SUBJECTIVE AND OBJECTIVE BOX
[place] [facility]                                                        Office: [address] [address]                                                       Telephone: [phone]. Fax:[phone]                                                              CARDIOLOGY CONSULTATION NOTE                                                                                             Consult requested by:      Reason for Consultation:     History obtained by: Patient and medical record     obtained: No    Chief complaint:    Patient is a 39y old  Male who presents with a chief complaint of difficulty breathing (20 Dec 2019 21:08)      HPI:  38 y/o male with h/o chf, pulmonary htn, JAN appears non compliant with bipap, Dm and obesity was visiting some friends in [place] and for past 2 days was getting more water retention in his belly and legs associated with exertional sob , better with rest, some orthopnea, some cough / phlegm, no fever, no chills. no abd. pain. no n/v/d. chest wall hurts with cough, no radiation of chest wall pain reported. no dizziness, no syncopy. As per pt, he is compliant with meds and diet, appears questionable ? pt. was admitted in a hospital in [place] 3 weeks ago for chf as per record on it. pt's discharge medicine list had sildenafil 20 mg po tid which is not in our record. (20 Dec 2019 21:08)        REVIEW OF SYMPTOMS: Cardiovascular:  See HPI. No chest pain,  No dyspnea,  No syncope,  No palpitations, No dizziness, No Orthopnea,      No Paroxsymal nocturnal dyspnea;  Respiratory:  No Dyspnea, No cough,     Genitourinary:  No dysuria, no hematuria; Gastrointestinal:  No nausea, no vomiting. No diarrhea.  No abdominal pain. No dark color stool, no melena ; Neurological: No headache, no dizziness, no slurred speech;  Psychiatric: No agitation, no anxiety.  ALL OTHER REVIEW OF SYSTEMS ARE NEGATIVE.    ALLERGIES: Allergies    No Known Allergies    Intolerances          CURRENT MEDICATIONS:  carvedilol 3.125 milliGRAM(s) Oral every 12 hours  enalapril 5 milliGRAM(s) Oral daily  furosemide   Injectable 40 milliGRAM(s) IV Push two times a day  hydrALAZINE 25 milliGRAM(s) Oral every 8 hours  sildenafil (REVATIO) 20 milliGRAM(s) Oral three times a day    albuterol/ipratropium for Nebulization   aspirin  chewable  atorvastatin  dextrose 5%.  dextrose 50% Injectable  dextrose 50% Injectable  dextrose 50% Injectable  enoxaparin Injectable  insulin lispro (HumaLOG) corrective regimen sliding scale  insulin lispro (HumaLOG) corrective regimen sliding scale  levoFLOXacin IVPB      HOME MEDICATIONS:    PAST MEDICAL HISTORY  Obesity, morbid  Pulmonary hypertension  CHF (congestive heart failure)  Hypertension      PAST SURGICAL HISTORY  No significant past surgical history      FAMILY HISTORY:  Family history of diabetes mellitus (DM): grandmother.      SOCIAL HISTORY:  Denies smoking/alcohol/drugs    CIGARETTES:       ALCOHOL:    DRUGS:    Vital Signs Last 24 Hrs  T(C): 36.9 (20 Dec 2019 23:21), Max: 37.2 (20 Dec 2019 20:00)  T(F): 98.4 (20 Dec 2019 23:21), Max: 98.9 (20 Dec 2019 20:00)  HR: 82 (20 Dec 2019 23:21) (82 - 109)  BP: 105/70 (20 Dec 2019 23:21) (105/70 - 112/76)  BP(mean): 82 (20 Dec 2019 20:00) (82 - 82)  RR: 18 (20 Dec 2019 23:21) (16 - 26)  SpO2: 97% (20 Dec 2019 23:21) (86% - 98%)      PHYSICAL EXAM:  Constitutional: Comfortable . No acute distress.   HEENT: Atraumatic and normcephalic , neck is supple . no JVD. No carotid bruit. PEERL   CNS: A&Ox3. No focal deficits. EOMI. Cranial nerves II-IX are intact.   Lymph Nodes: Cervical : Not palpable.  Respiratory: CTAB  Cardiovascular: S1S2 RRR. No murmur/rubs or gallop.  Gastrointestinal: Soft non-tender and non distended . +Bowel sounds. negative Collins's sign.  Extremities: No edema.   Psychiatric: Calm . no agitation.  Skin: No skin rash/ulcers visualized to face, hands or feet.    Intake and output:   12-20 @ 07:01  -  12-21 @ 01:17  --------------------------------------------------------  IN: 0 mL / OUT: 300 mL / NET: -300 mL        LABS:                        12.9   6.40  )-----------( 178      ( 20 Dec 2019 18:40 )             41.3     12-20    139  |  98  |  22.0<H>  ----------------------------<  99  3.8   |  28.0  |  1.34<H>    Ca    9.3      20 Dec 2019 18:40  Mg     1.7     12-20    TPro  8.6  /  Alb  3.5  /  TBili  1.4  /  DBili  x   /  AST  18  /  ALT  12  /  AlkPhos  78  12-20    CARDIAC MARKERS ( 20 Dec 2019 18:40 )  x     / <0.01 ng/mL / x     / x     / x        ;p-BNP=Serum Pro-Brain Natriuretic Peptide: 1584 pg/mL (12-20 @ 18:40)    PT/INR - ( 20 Dec 2019 18:40 )   PT: 20.9 sec;   INR: 1.79 ratio         PTT - ( 20 Dec 2019 18:40 )  PTT:33.2 sec      INTERPRETATION OF TELEMETRY: Reviewed by me.   ECG: Reviewed by me.     RADIOLOGY & ADDITIONAL STUDIES:    X-ray:  reviewed by me.   CT scan:   MRI:   ECHO FINDINGS: Date:                : LVEF=          ; RV function:       ; Valvular abnormalities: No significant valvular abnormality.  Mitral valve:           ;  Aortic valve:              ;Tricuspid valve:         ; Pulmonary pressures:        mm Hg. Pericardium:      STRESS  FINDINGS: Date:            ;      CATHETERIZATION FINDINGS:  Date:              :  LAD:                       ;  LCx:                        ; RCA:                ; [place] [facility]                                                        Office: [address] [address]                                                       Telephone: [phone]. Fax:[phone]                                                              CARDIOLOGY CONSULTATION NOTE                                                                                             Consult requested by:      Reason for Consultation:     History obtained by: Patient and medical record     obtained: No    Chief complaint:  difficulty breathing     HPI:  Patient is a 38 yo M arrives from home after experiencing difficulty breathing and chest pain.  States he developed these symptoms slowly over past few weeks and are   Per patient and chart, PMH of diastolic CHF, severe pulm htn eval, morbid obesity, JAN, DM-2, CPAP use.  Patient states for 3-4 days, worsening cough with "feeling sick."  VSS but mildly hypoxic to 86% on RA and tachy to 110s.  Bipap intiated and HR decreased to 90s and O2 increased to 96%.  Patient speaking in full sentences.  He states "maybe it's my CHF but I do have this cough with mucous."  no other concerns.  Non toxic.  No aggravating or relieving factors. No other pertinent PMH.  No other pertinent PSH.  No other pertinent FHx.  Patient denies EtOH/tobacco/illi visiting some friends in [place] and for past 2 days was getting more water retention in his belly and legs associated with exertional sob , better with rest, some orthopnea, some cough / phlegm, no fever, no chills. no abd. pain. no n/v/d. chest wall hurts with cough, no radiation of chest wall pain reported. no dizziness, no syncopy. As per pt, he is compliant with meds and diet, appears questionable ? pt. was admitted in a hospital in [place] 3 weeks ago for chf as per record on it. pt's discharge medicine list had sildenafil 20 mg po tid which is not in our record.     REVIEW OF SYMPTOMS: Cardiovascular:  See HPI. No chest pain,  No dyspnea,  No syncope,  No palpitations, No dizziness, No Orthopnea,      No Paroxsymal nocturnal dyspnea;  Respiratory:  No Dyspnea, No cough,     Genitourinary:  No dysuria, no hematuria; Gastrointestinal:  No nausea, no vomiting. No diarrhea.  No abdominal pain. No dark color stool, no melena ; Neurological: No headache, no dizziness, no slurred speech;  Psychiatric: No agitation, no anxiety.  ALL OTHER REVIEW OF SYSTEMS ARE NEGATIVE.    ALLERGIES: No Known Allergies    CURRENT MEDICATIONS:  carvedilol 3.125 milliGRAM(s) Oral every 12 hours  enalapril 5 milliGRAM(s) Oral daily  furosemide   Injectable 40 milliGRAM(s) IV Push two times a day  hydrALAZINE 25 milliGRAM(s) Oral every 8 hours  sildenafil (REVATIO) 20 milliGRAM(s) Oral three times a day  albuterol/ipratropium for Nebulization  aspirin  chewable  atorvastatin  enoxaparin Injectable  insulin lispro (HumaLOG) corrective regimen sliding scale  levoFLOXacin IVPB    HOME MEDICATIONS:  enalapril 5 mg oral tablet: 1 tab(s) orally once a day   hydrALAZINE 25 mg oral tablet: 1 tab(s) orally every 8 hours  furosemide 40 mg oral tablet: 1 tab(s) orally 2 times a day  carvedilol 3.125 mg oral tablet: 1 tab(s) orally 2 times a day  aspirin 81 mg oral tablet, chewable: 1 tab(s) orally once a day  atorvastatin 40 mg oral tablet: 1 tab(s) orally once a day (at bedtime)  metFORMIN 500 mg oral tablet: 1 tab(s) orally once a day    PAST MEDICAL HISTORY  Obesity, morbid  Pulmonary hypertension  CHF (congestive heart failure)  Hypertension  Obstructive sleep apnea on BIPAP  Diabetes mellitus    PAST SURGICAL HISTORY  No significant past surgical history    FAMILY HISTORY:  Family history of diabetes mellitus (DM): grandmother.    SOCIAL HISTORY:  Denies smoking/alcohol/drugs    Vital Signs Last 24 Hrs  T(C): 36.9 (20 Dec 2019 23:21), Max: 37.2 (20 Dec 2019 20:00)  T(F): 98.4 (20 Dec 2019 23:21), Max: 98.9 (20 Dec 2019 20:00)  HR: 82 (20 Dec 2019 23:21) (82 - 109)  BP: 105/70 (20 Dec 2019 23:21) (105/70 - 112/76)  BP(mean): 82 (20 Dec 2019 20:00) (82 - 82)  RR: 18 (20 Dec 2019 23:21) (16 - 26)  SpO2: 97% (20 Dec 2019 23:21) (86% - 98%)      PHYSICAL EXAM:  Constitutional: Comfortable . No acute distress.   HEENT: Atraumatic and normcephalic , neck is supple . no JVD. No carotid bruit. PEERL   CNS: A&Ox3. No focal deficits. EOMI. Cranial nerves II-IX are intact.   Lymph Nodes: Cervical : Not palpable.  Respiratory: CTAB  Cardiovascular: S1S2 RRR. No murmur/rubs or gallop.  Gastrointestinal: Soft non-tender and non distended . +Bowel sounds. negative Collins's sign.  Extremities: No edema.   Psychiatric: Calm . no agitation.  Skin: No skin rash/ulcers visualized to face, hands or feet.    Intake and output:   12-20 @ 07:01  -  12-21 @ 01:17  --------------------------------------------------------  IN: 0 mL / OUT: 300 mL / NET: -300 mL        LABS:                        12.9   6.40  )-----------( 178      ( 20 Dec 2019 18:40 )             41.3     12-20    139  |  98  |  22.0<H>  ----------------------------<  99  3.8   |  28.0  |  1.34<H>    Ca    9.3      20 Dec 2019 18:40  Mg     1.7     12-20    TPro  8.6  /  Alb  3.5  /  TBili  1.4  /  DBili  x   /  AST  18  /  ALT  12  /  AlkPhos  78  12-20    CARDIAC MARKERS ( 20 Dec 2019 18:40 )  x     / <0.01 ng/mL / x     / x     / x        ;p-BNP=Serum Pro-Brain Natriuretic Peptide: 1584 pg/mL (12-20 @ 18:40)    PT/INR - ( 20 Dec 2019 18:40 )   PT: 20.9 sec;   INR: 1.79 ratio         PTT - ( 20 Dec 2019 18:40 )  PTT:33.2 sec      INTERPRETATION OF TELEMETRY: Reviewed by me.   ECG: Reviewed by me.     RADIOLOGY & ADDITIONAL STUDIES:    X-ray:  reviewed by me.   CT scan:   MRI:   ECHO FINDINGS: Date:                : LVEF=          ; RV function:       ; Valvular abnormalities: No significant valvular abnormality.  Mitral valve:           ;  Aortic valve:              ;Tricuspid valve:         ; Pulmonary pressures:        mm Hg. Pericardium:      STRESS  FINDINGS: Date:            ;      CATHETERIZATION FINDINGS:  Date:              :  LAD:                       ;  LCx:                        ; RCA:                ; [place] [facility]                                                        Office: [address] [address]                                                       Telephone: [phone]. Fax:[phone]                                                              CARDIOLOGY CONSULTATION NOTE                                                                                             Consult requested by:      Reason for Consultation:     History obtained by: Patient and medical record     obtained: No    Chief complaint:  difficulty breathing     HPI:  Patient is a 40 yo M arrives from friend's home after experiencing difficulty breathing and "couldn't breath while lying down."  States he developed these symptoms slowly over three week period and feels worse for past three to four days.  Symptoms improved somewhat with rest, but returned with exertion.  Just discharged three weeks ago from [facility] after receiving treatment for heart failure.  Past few days patient developed severe leg swelling and worsening SOB while walking around.  Also reports substantial weight gain, fatigue, and water retention in his stomach along with worsening cough and "feeling sick."  In the EDU patient found to be hypoxic to 86% on room air and tachycardic to 110s.  Started on BIPAP and HR decreased to 90s and O2 increased to 96%.  Patient looks comfortable in bed right now, holds a full conversation and is diuresing well.  Admits to dietary indiscretion and "drinking lots of water" since his last discharge three weeks ago.  Denies EtOH/tobacco/illicit drug use, CP, palpitations, diaphoresis, fever, chills, N/V or dizziness.  Admits to intermittent cough, and slight chest wall tenderness with cough.  States that he takes his prescribed medications daily.    REVIEW OF SYMPTOMS:   General: + weight gain and fatigue  Cardiovascular: See HPI. No chest pain, + dyspnea, No syncope, No palpitations, No dizziness, + Orthopnea, + Paroxsymal nocturnal dyspnea, + leg swelling  Respiratory: +Dyspnea, +cough, no fever or chills     Genitourinary: No dysuria, no hematuria;   Gastrointestinal: No nausea, no vomiting. No diarrhea.  No abdominal pain. No dark color stool, no melena;   Neurological: No headache, no dizziness, no slurred speech;   Psychiatric: No agitation, no anxiety.  ALL OTHER REVIEW OF SYSTEMS ARE NEGATIVE.    ALLERGIES: No Known Allergies    CURRENT MEDICATIONS:  carvedilol 3.125 milliGRAM(s) Oral every 12 hours  enalapril 5 milliGRAM(s) Oral daily  furosemide   Injectable 40 milliGRAM(s) IV Push two times a day  hydrALAZINE 25 milliGRAM(s) Oral every 8 hours  sildenafil (REVATIO) 20 milliGRAM(s) Oral three times a day  albuterol/ipratropium for Nebulization  aspirin  chewable  atorvastatin  enoxaparin Injectable  insulin lispro (HumaLOG) corrective regimen sliding scale  levoFLOXacin IVPB    HOME MEDICATIONS:  enalapril 5 mg oral tablet: 1 tab(s) orally once a day   hydrALAZINE 25 mg oral tablet: 1 tab(s) orally every 8 hours  furosemide 40 mg oral tablet: 1 tab(s) orally 2 times a day  carvedilol 3.125 mg oral tablet: 1 tab(s) orally 2 times a day  aspirin 81 mg oral tablet, chewable: 1 tab(s) orally once a day  atorvastatin 40 mg oral tablet: 1 tab(s) orally once a day (at bedtime)  metFORMIN 500 mg oral tablet: 1 tab(s) orally once a day    PAST MEDICAL HISTORY  Obesity, morbid  Pulmonary hypertension  CHF (congestive heart failure)  Hypertension  Obstructive sleep apnea on BIPAP  Diabetes mellitus    PAST SURGICAL HISTORY  No significant past surgical history    FAMILY HISTORY:  Family history of diabetes mellitus (DM): grandmother.    SOCIAL HISTORY:  Denies smoking/alcohol/drugs    Vital Signs Last 24 Hrs  T(C): 36.9 (20 Dec 2019 23:21), Max: 37.2 (20 Dec 2019 20:00)  T(F): 98.4 (20 Dec 2019 23:21), Max: 98.9 (20 Dec 2019 20:00)  HR: 82 (20 Dec 2019 23:21) (82 - 109)  BP: 105/70 (20 Dec 2019 23:21) (105/70 - 112/76)  BP(mean): 82 (20 Dec 2019 20:00) (82 - 82)  RR: 18 (20 Dec 2019 23:21) (16 - 26)  SpO2: 97% (20 Dec 2019 23:21) (86% - 98%)    PHYSICAL EXAM:  Constitutional: Mild respiratory distress on BIPAP    HEENT: Atraumatic, EOMI, neck is supple, + JVD   CNS: A & O x 3, Motor 5/5 b/l, No focal deficits, Cranial nerves II-IX are intact   Respiratory: +crackles at bases b/l with no wheeze  Cardiovascular: S1S2 RRR, No murmur/rubs  Gastrointestinal: Soft, obese abdomen, +Bowel sounds  Extremities: warm to touch, 2+ b/l LE pitting edema    Psychiatric: Calm, no agitation  Skin: Dry skin, no rash/ulcers visualized to face, hands or feet    Intake and output:   12-20 @ 07:01  -  12-21 @ 01:17  --------------------------------------------------------  IN: 0 mL / OUT: 300 mL / NET: -300 mL    LABS:                        12.9   6.40  )-----------( 178      ( 20 Dec 2019 18:40 )             41.3     12-20    139  |  98  |  22.0<H>  ----------------------------<  99  3.8   |  28.0  |  1.34<H>    Ca    9.3      20 Dec 2019 18:40  Mg     1.7     12-20    TPro  8.6  /  Alb  3.5  /  TBili  1.4  /  DBili  x   /  AST  18  /  ALT  12  /  AlkPhos  78  12-20    CARDIAC MARKERS ( 20 Dec 2019 18:40 )  x     / <0.01 ng/mL / x     / x     / x        ;p-BNP=Serum Pro-Brain Natriuretic Peptide: 1584 pg/mL (12-20 @ 18:40)    PT: 20.9 sec;   INR: 1.79 ratio     PTT:33.2 sec    INTERPRETATION OF TELEMETRY: Reviewed by me.   ECG: Sinus tachycardia @ 109bpm, with non specific T and ST changes    RADIOLOGY & ADDITIONAL STUDIES:   X-ray:  IMPRESSION: Cardiomegaly. Suspect early RIGHT lower lobe airspace consolidation. Small RIGHT effusion.      ECHO FINDINGS: Date: Summary:   1. Technically difficult study.   2. Left ventricular ejection fraction, by visual estimation, is 55 to 60%.   3. Normal global left ventricular systolic function.   4. Mildly increased left ventricular internal cavity size.   5. Severely enlarged right ventricle. Severely reduced RV systolic function.   6. Right ventricular volume and pressure overload.   7. Mild mitral valve regurgitation.   8. Normal trileaflet aortic valve with normal opening.   9. Moderate-severe tricuspid regurgitation.  10. Estimated pulmonary artery systolic pressure is 59.7 mmHg assuming a   right atrial pressure of 15 mmHg, which is consistent with severe pulmonary hypertension.  11. There is no evidence of pericardial effusion.    MD Kelvin Electronically signed on 2/20/2018

## 2019-12-21 NOTE — CONSULT NOTE ADULT - SUBJECTIVE AND OBJECTIVE BOX
PULMONARY CONSULT NOTE      [id number]    Patient is a 39y old  Male who presents with a chief complaint of difficulty breathing (21 Dec 2019 01:16)      HISTORY OF PRESENT ILLNESS: Hx JAN; not compliant with BPAP; gets treated in [place], here in [place] visiting; all doctors in [place]. Also h/o chf, pulmonary htn, DM and obesity was visiting some friends in [place] and for past 2 days was getting more water retention in his belly and legs associated with exertional sob , better with rest, some orthopnea, some cough / phlegm, no fever, no chills. no abd. pain. no n/v/d. chest wall hurts with cough, no radiation of chest wall pain reported. no dizziness, no syncopy. As per pt, he is compliant with meds and diet, appears questionable ? pt. was admitted in a hospital in [place] 3 weeks ago for chf as per record on it. pt's discharge medicine list had sildenafil 20 mg po tid which is not in our record.  Per cardiologist, R sided heart failure (normal EF), RHC at [facility] in 2008 with moderate pulmonary HTN and moderate elevated pCWP, admitted after experiencing MUÑOZ, orthopnea, fatigue and hypoxia likely due to acute on chronic diastolic heart failure, non compliance with meds and diet    He is on NCO2 which he says he has at home.     MEDICATIONS  (STANDING):  albuterol/ipratropium for Nebulization 3 milliLiter(s) Nebulizer every 6 hours  aspirin  chewable 81 milliGRAM(s) Oral daily  atorvastatin 40 milliGRAM(s) Oral at bedtime  carvedilol 3.125 milliGRAM(s) Oral every 12 hours  dextrose 5%. 1000 milliLiter(s) (50 mL/Hr) IV Continuous <Continuous>  dextrose 50% Injectable 12.5 Gram(s) IV Push once  dextrose 50% Injectable 25 Gram(s) IV Push once  dextrose 50% Injectable 25 Gram(s) IV Push once  enalapril 5 milliGRAM(s) Oral daily  enoxaparin Injectable 40 milliGRAM(s) SubCutaneous two times a day  furosemide   Injectable 40 milliGRAM(s) IV Push two times a day  hydrALAZINE 25 milliGRAM(s) Oral every 8 hours  insulin lispro (HumaLOG) corrective regimen sliding scale   SubCutaneous three times a day before meals  insulin lispro (HumaLOG) corrective regimen sliding scale   SubCutaneous at bedtime  potassium chloride    Tablet ER 40 milliEquivalent(s) Oral once  sildenafil (REVATIO) 20 milliGRAM(s) Oral three times a day      MEDICATIONS  (PRN):  dextrose 40% Gel 15 Gram(s) Oral once PRN Blood Glucose LESS THAN 70 milliGRAM(s)/deciliter  glucagon  Injectable 1 milliGRAM(s) IntraMuscular once PRN Glucose LESS THAN 70 milligrams/deciliter      Allergies    No Known Allergies    Intolerances        PAST MEDICAL & SURGICAL HISTORY:  Obesity, morbid  Pulmonary hypertension  CHF (congestive heart failure)  Hypertension  No significant past surgical history      FAMILY HISTORY:  Family history of diabetes mellitus (DM): grandmother.      SOCIAL HISTORY  Smoking History: former smoker    REVIEW OF SYSTEMS:    CONSTITUTIONAL:  No fevers, chills, sweats    HEENT:  Eyes:  No diplopia or blurred vision. ENT:  No earache, sore throat or runny nose.    CARDIOVASCULAR:  per HPI.    RESPIRATORY: per HPI      GASTROINTESTINAL:  No abdominal pain, nausea, vomiting or diarrhea.    GENITOURINARY:  No dysuria, frequency or urgency.    NEUROLOGIC:  No paresthesias, fasciculations, seizures or weakness.    PSYCHIATRIC:  No disorder of thought or mood.    Vital Signs Last 24 Hrs  T(C): 36.7 (21 Dec 2019 07:32), Max: 37.2 (20 Dec 2019 20:00)  T(F): 98 (21 Dec 2019 07:32), Max: 98.9 (20 Dec 2019 20:00)  HR: 82 (21 Dec 2019 08:37) (71 - 109)  BP: 100/67 (21 Dec 2019 07:32) (100/67 - 112/76)  BP(mean): 82 (20 Dec 2019 20:00) (82 - 82)  RR: 19 (21 Dec 2019 07:32) (16 - 26)  SpO2: 97% (21 Dec 2019 08:37) (86% - 99%)    PHYSICAL EXAMINATION:    GENERAL: The patient is sleepy, in no apparent distress.     HEENT: Head is normocephalic and atraumatic. Mucous membranes are moist.     NECK: Supple.     LUNGS: rales, scattered wheeze, respirations unlabored    HEART: Regular rate and rhythm; extra heart sound, systolic murmur.    ABDOMEN: Soft, nontender, and obese    EXTREMITIES: Without any cyanosis, clubbing, rash, lesions; chronic stasis changes, dry; + edema.    NEUROLOGIC: Grossly intact.      LABS:                        12.8   4.95  )-----------( 164      ( 21 Dec 2019 05:57 )             41.2     12-21    140  |  97<L>  |  23.0<H>  ----------------------------<  110  3.5   |  30.0<H>  |  1.16    Ca    8.9      21 Dec 2019 05:57  Mg     1.7     12-20    TPro  8.6  /  Alb  3.5  /  TBili  1.4  /  DBili  x   /  AST  18  /  ALT  12  /  AlkPhos  78  12-20    PT/INR - ( 21 Dec 2019 05:57 )   PT: 20.0 sec;   INR: 1.71 ratio         PTT - ( 21 Dec 2019 05:57 )  PTT:32.7 sec      CARDIAC MARKERS ( 21 Dec 2019 05:57 )  x     / <0.01 ng/mL / x     / x     / x      CARDIAC MARKERS ( 20 Dec 2019 18:40 )  x     / <0.01 ng/mL / x     / x     / x            Serum Pro-Brain Natriuretic Peptide: 1584 pg/mL (12-20-19 @ 18:40)         RADIOLOGY & ADDITIONAL STUDIES:  < from: Xray Chest 1 View- PORTABLE-Urgent (12.20.19 @ 18:34) >   EXAM:  XR CHEST PORTABLE URGENT 1V                          PROCEDURE DATE:  12/20/2019          INTERPRETATION:  Portable chest radiograph        CLINICAL INFORMATION:   Abnormal breath sounds.    TECHNIQUE:  Portable  AP view of the chest was obtained.    COMPARISON: 3/15/2019 available for review.    FINDINGS:   The lungs  ill-defined opacities within the RIGHT lung base with blunting of the costophrenic angle indicating small effusion at.] [place] LEFT lung parenchyma clear.    The  heart is enlarged in transverse diameter. No hilar mass. Trachea midline.   Marginal osteophytes are noted at multiple disc spaces in the spine.        IMPRESSION:   Cardiomegaly. Suspect early RIGHT lower lobe airspace consolidation. Small RIGHT effusion.                CECY RODRIGEZ M.D., ATTENDING RADIOLOGIST    < end of copied text >

## 2019-12-21 NOTE — PROGRESS NOTE ADULT - ASSESSMENT
40yo M with morbid obesity, R sided heart failure (normal EF), RHC at [facility] in 2008 with moderate pulmonary HTN and moderate elevated pCWP, admitted after experiencing MUÑOZ, orthopnea, fatigue and hypoxia likely due to acute on chronic diastolic heart failure, in the setting of poof compliance with meds and diet.  At the ED patient received Zithromax and Rocephin x1 and was transitioned to Levaquin, however respiratory symptoms less likely to be caused by infectious etiology, likely associated with CHF exacerbation, RVP (-)      Acute on chronic diastolic CHF (congestive heart failure), NYHA class 2.  C/w Lasix 40mg IV BID,   c/w Coreg 3.125mg BID,   c/w Hydralazine 25mg BID  On Sildenafil for pulm smooth muscle relaxation for severe Pulm HTN  Monitor urine output, daily weights, strict I's & O's,   C/w nocturnal BIPAP  TTE pending   Patient education provided for dietary restrictions and optimal weight management.    HTN  Dash/TLC diet  Lasix 40 mg BID,   c/w Hydralazine and Cored  Weight loss education provided and need for daily exercise.     Pulmonary Hypertension  C/w Sildenafil as per home regimen     Morbid Obesity  Weight loss counseling provided     DVT prophylaxis  Lovenox 40mg SC BID (Patient obese)     Dispo   Patient likely to be discharge in the next 24-48 hours, when medically stable

## 2019-12-21 NOTE — PROGRESS NOTE ADULT - SUBJECTIVE AND OBJECTIVE BOX
Patient is a 39y old  Male who presents with a chief complaint of difficulty breathing (21 Dec 2019 10:30)      HOSPITALIZATION DAY: 1    INTERVAL/OVERNIGHT EVENTS:    Patient examined at beside. No acute events over night. Patient on BiPap overnight, this morning reports feeling better, stated his respiratory symtoms including his SOB have improved.   Patient is tolerating PO diet w/o nausea/vomiting/diarrhea/abdominal pain. Patient is voiding actively and last BM  on yesterday. Patient is ambulating w/o difficulty.   Patient denies chest pain, calf pain, abdominal pain, headaches, fever, chills, dysuria, hematuria, diarrhea, constipation, palpitations. Rest of ROS not contributory except for above.      I&O's Summary    20 Dec 2019 07:01  -  21 Dec 2019 07:00  --------------------------------------------------------  IN: 0 mL / OUT: 1200 mL / NET: -1200 mL    Vital Signs Last 24 Hrs  T(C): 36.8 (21 Dec 2019 15:43), Max: 37.2 (20 Dec 2019 20:00)  T(F): 98.3 (21 Dec 2019 15:43), Max: 98.9 (20 Dec 2019 20:00)  HR: 95 (21 Dec 2019 15:43) (71 - 109)  BP: 108/73 (21 Dec 2019 15:43) (100/67 - 126/76)  BP(mean): 82 (20 Dec 2019 20:00) (82 - 82)  RR: 18 (21 Dec 2019 15:43) (16 - 26)  SpO2: 90% (21 Dec 2019 15:43) (86% - 99%)    General: Well nourished/Well developed, NAD  Neck:  Supple, no sinuses or palpable masses  Respiratory: B/L crackles   CV: RRR, S1S2, no murmur  Abdominal: Soft, NT, ND no palpable mass  Extremities: ++ edema, + peripheral pulses, FROM all 4 extremity  Neurology: A&Ox3, no focalization signs      CAPILLARY BLOOD GLUCOSE    POCT Blood Glucose.: 98 mg/dL (21 Dec 2019 11:32)  POCT Blood Glucose.: 97 mg/dL (21 Dec 2019 07:52)  POCT Blood Glucose.: 114 mg/dL (21 Dec 2019 01:22)              LABS                          12.8   4.95  )-----------( 164      ( 21 Dec 2019 05:57 )             41.2         12-21    140  |  97<L>  |  23.0<H>  ----------------------------<  110  3.5   |  30.0<H>  |  1.16    Ca    8.9      21 Dec 2019 05:57  Mg     1.7     12-20    TPro  8.6  /  Alb  3.5  /  TBili  1.4  /  DBili  x   /  AST  18  /  ALT  12  /  AlkPhos  78  12-20      CARDIAC MARKERS ( 21 Dec 2019 13:21 )  x     / <0.01 ng/mL / x     / x     / x      CARDIAC MARKERS ( 21 Dec 2019 05:57 )  x     / <0.01 ng/mL / x     / x     / x      CARDIAC MARKERS ( 20 Dec 2019 18:40 )  x     / <0.01 ng/mL / x     / x     / x            LIVER FUNCTIONS - ( 20 Dec 2019 18:40 )  Alb: 3.5 g/dL / Pro: 8.6 g/dL / ALK PHOS: 78 U/L / ALT: 12 U/L / AST: 18 U/L / GGT: x             PT/INR - ( 21 Dec 2019 05:57 )   PT: 20.0 sec;   INR: 1.71 ratio         PTT - ( 21 Dec 2019 05:57 )  PTT:32.7 sec        IMAGING      DIET:    MEDICATIONS  (STANDING):  aspirin  chewable 81 milliGRAM(s) Oral daily  atorvastatin 40 milliGRAM(s) Oral at bedtime  carvedilol 3.125 milliGRAM(s) Oral every 12 hours  dextrose 5%. 1000 milliLiter(s) (50 mL/Hr) IV Continuous <Continuous>  dextrose 50% Injectable 12.5 Gram(s) IV Push once  dextrose 50% Injectable 25 Gram(s) IV Push once  dextrose 50% Injectable 25 Gram(s) IV Push once  enalapril 5 milliGRAM(s) Oral daily  enoxaparin Injectable 40 milliGRAM(s) SubCutaneous two times a day  furosemide   Injectable 40 milliGRAM(s) IV Push two times a day  hydrALAZINE 25 milliGRAM(s) Oral every 8 hours  insulin lispro (HumaLOG) corrective regimen sliding scale   SubCutaneous three times a day before meals  insulin lispro (HumaLOG) corrective regimen sliding scale   SubCutaneous at bedtime  sildenafil (REVATIO) 20 milliGRAM(s) Oral three times a day    MEDICATIONS  (PRN):  dextrose 40% Gel 15 Gram(s) Oral once PRN Blood Glucose LESS THAN 70 milliGRAM(s)/deciliter  glucagon  Injectable 1 milliGRAM(s) IntraMuscular once PRN Glucose LESS THAN 70 milligrams/deciliter

## 2019-12-21 NOTE — CONSULT NOTE ADULT - PROBLEM SELECTOR RECOMMENDATION 2
Low Na diet, Lasix 40 mg BID, Hydralazine and Cored  Weight loss education provided and need for daily exercise

## 2019-12-21 NOTE — CONSULT NOTE ADULT - ASSESSMENT
-JAN; not compliant with BPAP  -per hx, HFpEF, diastolic dysfxn, elevated PCWP, RV failure; possible noncompliance with diet  -Pulm HTN; see above  -Morbid obesity  -Acute on chronic hypoxic resp failure  -Possible hypercarbia given elevated HCO3  -Coagulopathy    RECC:  Encouraged use of BPAP whenever sleeping. Will check ABG. Cardiology f/u and mgmt. Diurese. Continue therapy for pulm HTN. Weight loss essential. Follow INR.
38yo M with morbid obesity, R sided heart failure (normal EF), RHC at [facility] in 2008 with moderate pulmonary HTN and moderate elevated pCWP, admitted after experiencing MUÑOZ, orthopnea, fatigue and hypoxia likely due to acute on chronic diastolic heart failure, non compliance with meds and diet

## 2019-12-21 NOTE — CONSULT NOTE ADULT - PROBLEM SELECTOR RECOMMENDATION 3
Patient educated on need of small or moderate weight loss and dietary adherance  DVT prophylaxis  case d/w Dr. Moralez

## 2019-12-22 ENCOUNTER — TRANSCRIPTION ENCOUNTER (OUTPATIENT)
Age: 39
End: 2019-12-22

## 2019-12-22 LAB
ANION GAP SERPL CALC-SCNC: 13 MMOL/L — SIGNIFICANT CHANGE UP (ref 5–17)
APTT BLD: 32.9 SEC — SIGNIFICANT CHANGE UP (ref 27.5–36.3)
BUN SERPL-MCNC: 19 MG/DL — SIGNIFICANT CHANGE UP (ref 8–20)
CALCIUM SERPL-MCNC: 9.4 MG/DL — SIGNIFICANT CHANGE UP (ref 8.6–10.2)
CHLORIDE SERPL-SCNC: 99 MMOL/L — SIGNIFICANT CHANGE UP (ref 98–107)
CO2 SERPL-SCNC: 28 MMOL/L — SIGNIFICANT CHANGE UP (ref 22–29)
CREAT SERPL-MCNC: 1.02 MG/DL — SIGNIFICANT CHANGE UP (ref 0.5–1.3)
GLUCOSE BLDC GLUCOMTR-MCNC: 101 MG/DL — HIGH (ref 70–99)
GLUCOSE BLDC GLUCOMTR-MCNC: 105 MG/DL — HIGH (ref 70–99)
GLUCOSE BLDC GLUCOMTR-MCNC: 132 MG/DL — HIGH (ref 70–99)
GLUCOSE BLDC GLUCOMTR-MCNC: 96 MG/DL — SIGNIFICANT CHANGE UP (ref 70–99)
GLUCOSE SERPL-MCNC: 103 MG/DL — SIGNIFICANT CHANGE UP (ref 70–115)
INR BLD: 1.79 RATIO — HIGH (ref 0.88–1.16)
MAGNESIUM SERPL-MCNC: 1.9 MG/DL — SIGNIFICANT CHANGE UP (ref 1.6–2.6)
PHOSPHATE SERPL-MCNC: 3.5 MG/DL — SIGNIFICANT CHANGE UP (ref 2.4–4.7)
POTASSIUM SERPL-MCNC: 3.9 MMOL/L — SIGNIFICANT CHANGE UP (ref 3.5–5.3)
POTASSIUM SERPL-SCNC: 3.9 MMOL/L — SIGNIFICANT CHANGE UP (ref 3.5–5.3)
PROCALCITONIN SERPL-MCNC: 0.09 NG/ML — SIGNIFICANT CHANGE UP (ref 0.02–0.1)
PROTHROM AB SERPL-ACNC: 21 SEC — HIGH (ref 10–12.9)
SODIUM SERPL-SCNC: 140 MMOL/L — SIGNIFICANT CHANGE UP (ref 135–145)

## 2019-12-22 PROCEDURE — 99233 SBSQ HOSP IP/OBS HIGH 50: CPT

## 2019-12-22 PROCEDURE — 99232 SBSQ HOSP IP/OBS MODERATE 35: CPT

## 2019-12-22 RX ORDER — FUROSEMIDE 40 MG
40 TABLET ORAL
Refills: 0 | Status: DISCONTINUED | OUTPATIENT
Start: 2019-12-22 | End: 2019-12-24

## 2019-12-22 RX ORDER — POTASSIUM CHLORIDE 20 MEQ
40 PACKET (EA) ORAL ONCE
Refills: 0 | Status: COMPLETED | OUTPATIENT
Start: 2019-12-22 | End: 2019-12-22

## 2019-12-22 RX ORDER — HYDRALAZINE HCL 50 MG
25 TABLET ORAL EVERY 8 HOURS
Refills: 0 | Status: DISCONTINUED | OUTPATIENT
Start: 2019-12-22 | End: 2019-12-28

## 2019-12-22 RX ADMIN — Medication 40 MILLIEQUIVALENT(S): at 12:33

## 2019-12-22 RX ADMIN — Medication 5 MILLIGRAM(S): at 05:56

## 2019-12-22 RX ADMIN — Medication 20 MILLIGRAM(S): at 13:52

## 2019-12-22 RX ADMIN — CARVEDILOL PHOSPHATE 3.12 MILLIGRAM(S): 80 CAPSULE, EXTENDED RELEASE ORAL at 05:56

## 2019-12-22 RX ADMIN — Medication 25 MILLIGRAM(S): at 05:56

## 2019-12-22 RX ADMIN — Medication 1 SPRAY(S): at 05:56

## 2019-12-22 RX ADMIN — Medication 20 MILLIGRAM(S): at 05:56

## 2019-12-22 RX ADMIN — ATORVASTATIN CALCIUM 40 MILLIGRAM(S): 80 TABLET, FILM COATED ORAL at 21:19

## 2019-12-22 RX ADMIN — Medication 81 MILLIGRAM(S): at 12:33

## 2019-12-22 RX ADMIN — Medication 20 MILLIGRAM(S): at 21:19

## 2019-12-22 RX ADMIN — Medication 40 MILLIGRAM(S): at 18:16

## 2019-12-22 RX ADMIN — Medication 40 MILLIGRAM(S): at 05:56

## 2019-12-22 NOTE — PROGRESS NOTE ADULT - ASSESSMENT
39M with morbid obesity, R sided heart failure (normal EF), [facility] with moderate pulmonary HTN and moderate elevated pCWP, admitted after experiencing MUÑOZ, orthopnea, fatigue and hypoxia likely due to acute on chronic diastolic heart failure, non compliance with meds and diet    Acute on chronic diastolic heart failure  RV failure  Severe pulmonary hypertension   Cont meds  Improving    HTN  Stable  Monitor     JAN  not compliant with BPAP  Acute on chronic hypoxic resp failure  Possible hypercarbia given elevated HCO3  Pulm on the case      CARDIO MEDS  carvedilol 3.125 milliGRAM(s) Oral every 12 hours  enalapril 5 milliGRAM(s) Oral daily  furosemide   Injectable 40 milliGRAM(s) IV Push two times a day  hydrALAZINE 25 milliGRAM(s) Oral every 8 hours  sildenafil (REVATIO) 20 milliGRAM(s) Oral three times a day  atorvastatin 40 milliGRAM(s) Oral at bedtime  aspirin  chewable 81 milliGRAM(s) Oral daily  enoxaparin Injectable 40 milliGRAM(s) SubCutaneous two times a day

## 2019-12-22 NOTE — DISCHARGE NOTE PROVIDER - NSDCCPCAREPLAN_GEN_ALL_CORE_FT
PRINCIPAL DISCHARGE DIAGNOSIS  Diagnosis: Acute on chronic diastolic CHF (congestive heart failure), NYHA class 2  Assessment and Plan of Treatment: Continue all medications as prescribed. Be sure to follow up with your Primary Care Doctor and/or Cardiologist. Follow a low salt diet. Check your weight regularly, if you find that you have suddenly gained a large amount of weight, are having trouble breathing, can no longer lay flat on your back, or that you are becoming swollen (ex. swollen legs), be sure to contact your doctor or come to the ER.      SECONDARY DISCHARGE DIAGNOSES  Diagnosis: Obesity, morbid  Assessment and Plan of Treatment: Follow a healthy calorie restricted diet. Try to participate in physical activity regularly, at least 150min per week. You may benefit from following up with a nutritionist. Be sure to follow up with your Primary doctor regularly for weight checks to track your progress and to discuss other options for weight loss. Weight loss is not easy! For every person who successfully loses weight there are many more who struggle to do so and to maintain it. Be sure to treat each day and each meal as an opportunity to make better decisions, good luck!    Diagnosis: Essential hypertension  Assessment and Plan of Treatment: Be sure to follow a low salt diet. If you have been prescribed antihypertensive medications to control your blood pressure, be sure to take them every day as prescribed and do not miss any doses, the medications do not work if they are not taken consistently. Follow up with your Primary Care Doctor and have your Blood Pressure checked. PRINCIPAL DISCHARGE DIAGNOSIS  Diagnosis: Acute on chronic diastolic CHF (congestive heart failure), NYHA class 2  Assessment and Plan of Treatment: Continue all medications as prescribed. Be sure to follow up with your Primary Care Doctor and/or Cardiologist. Follow a low salt diet. Check your weight regularly, if you find that you have suddenly gained a large amount of weight, are having trouble breathing, can no longer lay flat on your back, or that you are becoming swollen (ex. swollen legs), be sure to contact your doctor or come to the ER.      SECONDARY DISCHARGE DIAGNOSES  Diagnosis: Acute on chronic diastolic CHF (congestive heart failure), NYHA class 2  Assessment and Plan of Treatment: Continue all medications as prescribed. Be sure to follow up with your Primary Care Doctor and/or Cardiologist. Follow a low salt diet. Check your weight regularly, if you find that you have suddenly gained a large amount of weight, are having trouble breathing, can no longer lay flat on your back, or that you are becoming swollen (ex. swollen legs), be sure to contact your doctor or come to the ER.    Diagnosis: JAN (obstructive sleep apnea)  Assessment and Plan of Treatment: You have a history of Obstructive Sleep Apnea. This is a common condition that is characterized by periods of apnea, otherwise known as pauses in breathing, while sleeping. Some complications of sleep apnea include daytime sleepiness, high blood pressure or  heart problems such as heart attacks and abnormal heart beats, stroke, Type 2 Diabetes, Liver disease such as Non alcholic Fatty Liver, and complications with anesthesia during surgery to name a few. Please follow up with a sleep study. This can be ordered and followed up by a Pulmonologist that you should see for a referral to have this study done. If you are diagnosed with this condition, it can be treated with an oxygen delivery device known as a CPAP machine that you were while sleeping.    Diagnosis: Obesity, morbid  Assessment and Plan of Treatment: Follow a healthy calorie restricted diet. Try to participate in physical activity regularly, at least 150min per week. You may benefit from following up with a nutritionist. Be sure to follow up with your Primary doctor regularly for weight checks to track your progress and to discuss other options for weight loss. Weight loss is not easy! For every person who successfully loses weight there are many more who struggle to do so and to maintain it. Be sure to treat each day and each meal as an opportunity to make better decisions, good luck!    Diagnosis: Essential hypertension  Assessment and Plan of Treatment: Be sure to follow a low salt diet. If you have been prescribed antihypertensive medications to control your blood pressure, be sure to take them every day as prescribed and do not miss any doses, the medications do not work if they are not taken consistently. Follow up with your Primary Care Doctor and have your Blood Pressure checked.    Diagnosis: Mediastinal lymphadenopathy  Assessment and Plan of Treatment: Noted on CT scan, please follow up with Rheumatology to rule out Sarcoidosis or other diagnosis. PRINCIPAL DISCHARGE DIAGNOSIS  Diagnosis: Acute on chronic diastolic CHF (congestive heart failure), NYHA class 2  Assessment and Plan of Treatment: Patient likely had heart failure exacerbation due to non compliance with medications and diet.   Given IV lasix 40mg IV BID which was deescalated to oral dosage with improvement in clinical picture.   Medications changed from torsemide to furosemide. STOP TAKING TORSEMIDE   Daily weights recommended. If your weight increases by 2-3lb, please inform your doctor.      SECONDARY DISCHARGE DIAGNOSES  Diagnosis: Mediastinal lymphadenopathy  Assessment and Plan of Treatment: Noted on CT scan, please follow up with Rheumatology to rule out Sarcoidosis. (Scan result below)    Diagnosis: JAN (obstructive sleep apnea)  Assessment and Plan of Treatment: along with likely Obesity Hypoventilation Syndrome. You were seen by a Pulmonologist who recommended that you continue to follow up and start the workup to get a nocturnal machine if needed.   Continue with your as needed Oxygen on discharge    Diagnosis: Essential hypertension  Assessment and Plan of Treatment: Low salt diet   Please take your medications   weight loss recommneded   Exercise program AFTER speaking to your doctor    Diagnosis: Pulmonary hypertension  Assessment and Plan of Treatment: You were noted to have high blood pressures in the blood vessels that go into the lungs. This high blood pressure could cause accumulation of fluid in the lungs that make breathing difficult. This could be due to a combination of factors, including Obstructive Sleep Apnea, Obesity, Chronic Heart Failure.   Please comply with your medications.    Diagnosis: Obesity, morbid  Assessment and Plan of Treatment: Low salt diet   Please take your medications   weight loss recommneded   Exercise program AFTER speaking to your doctor

## 2019-12-22 NOTE — PROGRESS NOTE ADULT - SUBJECTIVE AND OBJECTIVE BOX
PULMONARY PROGRESS NOTE      [id number]    Patient is a 39y old  Male who presents with a chief complaint of difficulty breathing (22 Dec 2019 12:46)      INTERVAL HPI/OVERNIGHT EVENTS: Feeling better c/w yesterday. Less sob. On NCO2. Completely awake and alert. Used BPAP last pm.    MEDICATIONS  (STANDING):  aspirin  chewable 81 milliGRAM(s) Oral daily  atorvastatin 40 milliGRAM(s) Oral at bedtime  carvedilol 3.125 milliGRAM(s) Oral every 12 hours  dextrose 5%. 1000 milliLiter(s) (50 mL/Hr) IV Continuous <Continuous>  dextrose 50% Injectable 12.5 Gram(s) IV Push once  dextrose 50% Injectable 25 Gram(s) IV Push once  dextrose 50% Injectable 25 Gram(s) IV Push once  enalapril 5 milliGRAM(s) Oral daily  enoxaparin Injectable 40 milliGRAM(s) SubCutaneous two times a day  fluticasone propionate 50 MICROgram(s)/spray Nasal Spray 1 Spray(s) Both Nostrils two times a day  furosemide   Injectable 40 milliGRAM(s) IV Push two times a day  hydrALAZINE 25 milliGRAM(s) Oral every 8 hours  insulin lispro (HumaLOG) corrective regimen sliding scale   SubCutaneous three times a day before meals  insulin lispro (HumaLOG) corrective regimen sliding scale   SubCutaneous at bedtime  sildenafil (REVATIO) 20 milliGRAM(s) Oral three times a day      MEDICATIONS  (PRN):  dextrose 40% Gel 15 Gram(s) Oral once PRN Blood Glucose LESS THAN 70 milliGRAM(s)/deciliter  glucagon  Injectable 1 milliGRAM(s) IntraMuscular once PRN Glucose LESS THAN 70 milligrams/deciliter      Allergies    No Known Allergies    Intolerances        PAST MEDICAL & SURGICAL HISTORY:  Obesity, morbid  Pulmonary hypertension  CHF (congestive heart failure)  Hypertension  No significant past surgical history             REVIEW OF SYSTEMS:    CONSTITUTIONAL:  No distress    HEENT:  Eyes:  No diplopia or blurred vision. ENT:  No earache, sore throat or runny nose.    CARDIOVASCULAR:  No pressure, squeezing, tightness, heaviness or aching about the chest; no palpitations.    RESPIRATORY:  per HPI     GASTROINTESTINAL:  No nausea, vomiting or diarrhea.    GENITOURINARY:  No dysuria, frequency or urgency.    NEUROLOGIC:  No paresthesias, fasciculations, seizures or weakness.    PSYCHIATRIC:  No disorder of thought or mood.    Vital Signs Last 24 Hrs  T(C): 36.8 (22 Dec 2019 12:38), Max: 36.8 (21 Dec 2019 15:43)  T(F): 98.3 (22 Dec 2019 12:38), Max: 98.3 (21 Dec 2019 15:43)  HR: 80 (22 Dec 2019 07:39) (78 - 95)  BP: 87/57 (22 Dec 2019 12:38) (87/57 - 122/90)  BP(mean): 67 (22 Dec 2019 12:38) (67 - 67)  RR: 18 (22 Dec 2019 07:39) (18 - 18)  SpO2: 92% (22 Dec 2019 12:38) (90% - 92%)    PHYSICAL EXAMINATION:    GENERAL: The patient is awake and alert in no apparent distress.     HEENT: Head is normocephalic and atraumatic. Mucous membranes are moist.    NECK: Supple.    LUNGS: rales at bases, no wheeze,  respirations unlabored    HEART: Regular rate and rhythm     ABDOMEN: Soft, nontender, obese.      EXTREMITIES: Without any cyanosis, clubbing, rash, lesions     NEUROLOGIC: Grossly intact.    LABS:                        12.8   4.95  )-----------( 164      ( 21 Dec 2019 05:57 )             41.2     12-22    140  |  99  |  19.0  ----------------------------<  103  3.9   |  28.0  |  1.02    Ca    9.4      22 Dec 2019 07:57  Phos  3.5     12-22  Mg     1.9     12-22    TPro  8.6  /  Alb  3.5  /  TBili  1.4  /  DBili  x   /  AST  18  /  ALT  12  /  AlkPhos  78  12-20    PT/INR - ( 22 Dec 2019 07:57 )   PT: 21.0 sec;   INR: 1.79 ratio         PTT - ( 22 Dec 2019 07:57 )  PTT:32.9 sec      CARDIAC MARKERS ( 21 Dec 2019 13:21 )  x     / <0.01 ng/mL / x     / x     / x      CARDIAC MARKERS ( 21 Dec 2019 05:57 )  x     / <0.01 ng/mL / x     / x     / x      CARDIAC MARKERS ( 20 Dec 2019 18:40 )  x     / <0.01 ng/mL / x     / x     / x            Serum Pro-Brain Natriuretic Peptide: 1584 pg/mL (12-20-19 @ 18:40)      Procalcitonin, Serum: 0.09 ng/mL (12-22-19 @ 07:57)      MICROBIOLOGY:

## 2019-12-22 NOTE — DISCHARGE NOTE PROVIDER - NSDCFUADDINST_GEN_ALL_CORE_FT
-Be sure to continue care with your Primary Care Doctor, pneumologist. You should call to make an appointment for hospital follow up within 7 days, be sure to tell them that you were just released from [facility] when making your appointment. Contact information for a suggested provider is available on this paperwork.    -Continue with all medications as prescribed.    -If symptoms return, become worse, and/or if new symptoms appear please seek medical attention immediately with Primary Care Physician and/or Emergency Department (as you may deem appropriate). -Be sure to continue care with your Primary Care Doctor, Pneumologist. You should call to make an appointment for hospital follow up within 7 days, be sure to tell them that you were just released from [facility] when making your appointment. Contact information for a suggested provider is available on this paperwork.    -Please be sure to follow up with your pulmonologist for a sleep study ASAP    -Please use BIPAP machine every night at the recommended setting.     -Please follow up with Rheumatology for the Mediastinal Lymphadenopathy noted in CT chest.     -Continue with all medications as prescribed.    -If symptoms return, become worse, and/or if new symptoms appear please seek medical attention immediately with Primary Care Physician and/or Emergency Department (as you may deem appropriate). 12-28    138  |  100  |  24.0<H>  ----------------------------<  104  4.4   |  25.0  |  1.02    Ca    8.9      28 Dec 2019 07:56  Phos  3.7     12-26  Mg     2.0     12-26               12.8   4.95  )-----------( 164      ( 21 Dec 2019 05:57 )             41.2       Summary:   1. Technically fair study.   2. Left ventricular ejection fraction, by visual estimation, is 65 to 70%.   3. There is moderate septal left ventricular hypertrophy.   4. Severely enlarged right ventricle.   5. Severely reduced RV systolic function.   6. Right ventricular volume and pressure overload.   7. Moderate-severe tricuspid regurgitation.   8. Estimated pulmonary artery systolic pressure is 89.6 mmHg assuming a right atrial pressure of 15 mmHg, which is consistent with severe pulmonary hypertension.   9. Moderately dilated pulmonary artery.    < end of copied text >    IMPRESSION:     No pulmonary embolism.    Pulmonary findings as detailed above likely represent a combination of pulmonary vascular congestion with superimposed infectious or inflammatory process.    Diffuse bulky mediastinal and hilar lymphadenopathy is unchanged from 2018.

## 2019-12-22 NOTE — DISCHARGE NOTE PROVIDER - NSDCFUADDAPPT_GEN_ALL_CORE_FT
Please see your Primary Care doctor in 3 days after discharge. You should also see your Lung doctor in 1-2 weeks after discharge   -We suggest that you speak to your doctor about a referral to a Rheumatologist on discharge Please see your Primary Care doctor in 3 days after discharge. You should also see your Lung doctor in 1-2 weeks after discharge   -We suggest that you speak to your doctor about a referral to a Rheumatologist on discharge  You do not know the name of the cardiologist but you have a contact at [facility] who you can see. In the event that you are unable to see that cardiologist, we have given you the name of ours. (Dr. Horton)

## 2019-12-22 NOTE — PROGRESS NOTE ADULT - SUBJECTIVE AND OBJECTIVE BOX
CARDIOLOGY PROGRESS NOTE   ([facility])                                                                                                        Subjective: improved breathing, nad, denied cp    Vitals:  T(C): 36.8 (12-22-19 @ 12:38), Max: 36.8 (12-21-19 @ 15:43)  HR: 80 (12-22-19 @ 07:39) (78 - 95)  BP: 87/57 (12-22-19 @ 12:38) (87/57 - 122/90)  RR: 18 (12-22-19 @ 07:39) (18 - 18)  SpO2: 92% (12-22-19 @ 07:39) (90% - 92%)  Wt(kg): --  I&O's Summary    21 Dec 2019 07:01  -  22 Dec 2019 07:00  --------------------------------------------------------  IN: 480 mL / OUT: 1500 mL / NET: -1020 mL    22 Dec 2019 07:01  -  22 Dec 2019 12:46  --------------------------------------------------------  IN: 120 mL / OUT: 900 mL / NET: -780 mL          PHYSICAL EXAM:  Appearance: Normal	  HEENT:   Atraumatic  Cardiovascular: Normal S1 S2, No JVD, N  Respiratory: bibasilar rales, occasional wheezes 	  Gastrointestinal:  Soft, Non-tender, + BS  Skin: No rashes, No ecchymoses, No cyanosis  Neurologic: Alert and awake, able to move extremities  Extremities: + bl ankle  edema        CURRENT MEDICATIONS:  carvedilol 3.125 milliGRAM(s) Oral every 12 hours  enalapril 5 milliGRAM(s) Oral daily  furosemide   Injectable 40 milliGRAM(s) IV Push two times a day  hydrALAZINE 25 milliGRAM(s) Oral every 8 hours  sildenafil (REVATIO) 20 milliGRAM(s) Oral three times a day            atorvastatin 40 milliGRAM(s) Oral at bedtime  dextrose 40% Gel 15 Gram(s) Oral once PRN  dextrose 50% Injectable 12.5 Gram(s) IV Push once  dextrose 50% Injectable 25 Gram(s) IV Push once  dextrose 50% Injectable 25 Gram(s) IV Push once  glucagon  Injectable 1 milliGRAM(s) IntraMuscular once PRN  insulin lispro (HumaLOG) corrective regimen sliding scale   SubCutaneous three times a day before meals  insulin lispro (HumaLOG) corrective regimen sliding scale   SubCutaneous at bedtime    aspirin  chewable 81 milliGRAM(s) Oral daily  dextrose 5%. 1000 milliLiter(s) IV Continuous <Continuous>  enoxaparin Injectable 40 milliGRAM(s) SubCutaneous two times a day  fluticasone propionate 50 MICROgram(s)/spray Nasal Spray 1 Spray(s) Both Nostrils two times a day      LABS:	 	                          12.8   4.95  )-----------( 164      ( 21 Dec 2019 05:57 )             41.2     12-22    140  |  99  |  19.0  ----------------------------<  103  3.9   |  28.0  |  1.02    Ca    9.4      22 Dec 2019 07:57  Phos  3.5     12-22  Mg     1.9     12-22    TPro  8.6  /  Alb  3.5  /  TBili  1.4  /  DBili  x   /  AST  18  /  ALT  12  /  AlkPhos  78  12-20    proBNP: Serum Pro-Brain Natriuretic Peptide: 1584 pg/mL (12-20 @ 18:40)    Lipid Profile: Date: 12-21 @ 05:57  Total cholesterol 67; Direct LDL: 29; HDL: 31; Triglycerides:37

## 2019-12-22 NOTE — PROGRESS NOTE ADULT - ASSESSMENT
-JAN; not compliant with BPAP at home; using here  -per hx, HFpEF, diastolic dysfxn, elevated PCWP, RV failure; possible noncompliance with diet  -Pulm HTN; see above  -Morbid obesity  -Acute on chronic hypoxic resp failure  -Possible hypercarbia given elevated HCO3  -Coagulopathy    RECC:  Encouraged use of BPAP whenever sleeping. Will re-order ABG. Cardiology f/u and mgmt. Diurese. Continue therapy for pulm HTN. Weight loss essential. Follow INR.

## 2019-12-22 NOTE — PROGRESS NOTE ADULT - SUBJECTIVE AND OBJECTIVE BOX
Patient is a 39y old  Male who presents with a chief complaint of difficulty breathing (21 Dec 2019 10:30)      HOSPITALIZATION DAY: 2    INTERVAL/OVERNIGHT EVENTS:    Patient examined at beside. No acute events over night. Patient on BiPap overnight, this morning reports feeling better, SOB have resolved.    Patient is tolerating PO diet w/o nausea/vomiting/diarrhea/abdominal pain. Patient is voiding actively and last BM  on two days ago. Patient is ambulating w/o difficulty.   Patient denies chest pain, calf pain, abdominal pain, headaches, fever, chills, dysuria, hematuria, diarrhea, constipation, palpitations. Rest of ROS not contributory except for above.    I&O's Summary    21 Dec 2019 07:01  -  22 Dec 2019 07:00  --------------------------------------------------------  IN: 480 mL / OUT: 1500 mL / NET: -1020 mL    22 Dec 2019 07:01  -  22 Dec 2019 12:28  --------------------------------------------------------  IN: 0 mL / OUT: 650 mL / NET: -650 mL    Vital Signs Last 24 Hrs  T(C): 36.6 (22 Dec 2019 07:39), Max: 36.8 (21 Dec 2019 15:43)  T(F): 97.9 (22 Dec 2019 07:39), Max: 98.3 (21 Dec 2019 15:43)  HR: 80 (22 Dec 2019 07:39) (78 - 95)  BP: 97/64 (22 Dec 2019 07:39) (97/64 - 122/90)  RR: 18 (22 Dec 2019 07:39) (18 - 18)  SpO2: 92% (22 Dec 2019 07:39) (90% - 92%)    General: Well nourished/Well developed, NAD  Neck:  Supple, no sinuses or palpable masses  Respiratory: B/L crackles   CV: RRR, S1S2, no murmur  Abdominal: Soft, NT, ND no palpable mass  Extremities: ++ edema, + peripheral pulses, FROM all 4 extremity  Neurology: A&Ox3, no focalization signs    CAPILLARY BLOOD GLUCOSE      POCT Blood Glucose.: 105 mg/dL (22 Dec 2019 12:26)  POCT Blood Glucose.: 96 mg/dL (22 Dec 2019 08:56)  POCT Blood Glucose.: 113 mg/dL (21 Dec 2019 22:05)  POCT Blood Glucose.: 99 mg/dL (21 Dec 2019 17:20)        LABS             12.8   4.95  )-----------( 164      ( 21 Dec 2019 05:57 )             41.2         12-21    140  |  97<L>  |  23.0<H>  ----------------------------<  110  3.5   |  30.0<H>  |  1.16    Ca    8.9      21 Dec 2019 05:57  Mg     1.7     12-20    TPro  8.6  /  Alb  3.5  /  TBili  1.4  /  DBili  x   /  AST  18  /  ALT  12  /  AlkPhos  78  12-20      CARDIAC MARKERS ( 21 Dec 2019 13:21 )  x     / <0.01 ng/mL / x     / x     / x      CARDIAC MARKERS ( 21 Dec 2019 05:57 )  x     / <0.01 ng/mL / x     / x     / x      CARDIAC MARKERS ( 20 Dec 2019 18:40 )  x     / <0.01 ng/mL / x     / x     / x            LIVER FUNCTIONS - ( 20 Dec 2019 18:40 )  Alb: 3.5 g/dL / Pro: 8.6 g/dL / ALK PHOS: 78 U/L / ALT: 12 U/L / AST: 18 U/L / GGT: x             PT/INR - ( 21 Dec 2019 05:57 )   PT: 20.0 sec;   INR: 1.71 ratio         PTT - ( 21 Dec 2019 05:57 )  PTT:32.7 sec        DIET:    MEDICATIONS  (STANDING):  aspirin  chewable 81 milliGRAM(s) Oral daily  atorvastatin 40 milliGRAM(s) Oral at bedtime  carvedilol 3.125 milliGRAM(s) Oral every 12 hours  dextrose 5%. 1000 milliLiter(s) (50 mL/Hr) IV Continuous <Continuous>  dextrose 50% Injectable 12.5 Gram(s) IV Push once  dextrose 50% Injectable 25 Gram(s) IV Push once  dextrose 50% Injectable 25 Gram(s) IV Push once  enalapril 5 milliGRAM(s) Oral daily  enoxaparin Injectable 40 milliGRAM(s) SubCutaneous two times a day  furosemide   Injectable 40 milliGRAM(s) IV Push two times a day  hydrALAZINE 25 milliGRAM(s) Oral every 8 hours  insulin lispro (HumaLOG) corrective regimen sliding scale   SubCutaneous three times a day before meals  insulin lispro (HumaLOG) corrective regimen sliding scale   SubCutaneous at bedtime  sildenafil (REVATIO) 20 milliGRAM(s) Oral three times a day    MEDICATIONS  (PRN):  dextrose 40% Gel 15 Gram(s) Oral once PRN Blood Glucose LESS THAN 70 milliGRAM(s)/deciliter  glucagon  Injectable 1 milliGRAM(s) IntraMuscular once PRN Glucose LESS THAN 70 milligrams/deciliter

## 2019-12-22 NOTE — DISCHARGE NOTE PROVIDER - INSTRUCTIONS
Follow a heart healthy diet. And make sure to limit the salt (sodium) in your diet. Max 2 grams of salt daily. Salt causes your body to hold water. This makes your heart work harder as there is more fluid for the heart to pump. Limit your salt by doing the following:  Limit canned, dried, packaged, and fast foods, don't add salt to your food, season foods with herbs instead of salt. Watch how much liquids you drink, drinking too much can make heart failure worse. Do not drink more than 1 L of fluid per day. Limit the amount of alcohol you drink. It may harm your heart, women should have no more than 1 drink a day and men should have no more than 2.  When you eat out, request that your meals have no added salt. Follow a heart healthy diet. And make sure to limit the salt (sodium) in your diet. Max 2 grams of salt daily.

## 2019-12-22 NOTE — DISCHARGE NOTE PROVIDER - HOSPITAL COURSE
38yo M with morbid obesity, R sided heart failure (normal EF), RHC at [facility] in 2008 with moderate pulmonary HTN and moderate elevated pCWP, admitted after experiencing MUÑOZ, orthopnea, fatigue and hypoxia likely due to acute on chronic diastolic heart failure, in the setting of poof compliance with meds and diet.    At the ED patient received Zithromax and Rocephin x1 and was transitioned to Levaquin, however respiratory symptoms less likely to be caused by infectious etiology, likely associated with CHF exacerbation, RVP (-).     In terms of CHF, patient was continued with his BB (Coreg), hydralazine, in regards to his HTN Dash/TLC diet was ordered, BP was well controlled with home regimen.    For his pulmonary Hypertension, his home dose of Sindenafil was continued       Patient morbid obese, counseling about weight loss was provided 40yo M with morbid obesity, R sided heart failure (normal EF), RHC at [facility] in 2008 with moderate pulmonary HTN and moderate elevated pCWP, admitted after experiencing MUÑOZ, orthopnea, fatigue and hypoxia likely due to acute on chronic diastolic heart failure, in the setting of poof compliance with meds and diet.    At the ED patient received Zithromax and Rocephin x1 and was transitioned to Levaquin, however respiratory symptoms less likely to be caused by infectious etiology, likely associated with CHF exacerbation, RVP (-).     In terms of CHF, patient was continued with his BB (Coreg), hydralazine, in regards to his HTN Dash/TLC diet was ordered, BP was well controlled with home regimen.    For his pulmonary Hypertension, his home dose of Sindenafil was continued       Patient morbid obese, counseling about weight loss was provided        Pt was started on diuresis with IV Lasix 40 mg BID, then transitioned to Lasix 40 mg daily and finally PO Lasix. Pt had slow clinical improvement.         Pt was also started on nocturnal bipap by pulmonology. Vendor set up for new machine for home.     Noted to have poor medication and BIPAP compliance at home and hospital. Encouraged to have compliance with meds and machine to prevent further exacerbation of medical conditions.     Of note, mediastinal lymphadenopathy was seen in CT scan of the chest, pt is informed of results and recommended to follow up as outpt with rheumatology to rule out possible sarcoidosis.        Patient is medically optimized for discharge home & will follow up with primary care physician, cardiology, pulmonology. Pt will follow up with his own doctors at [place].          All electrolyte abnormalities were monitored carefully and repleted as necessary during this hospitalization. At the time of discharge patient was hemodynamically stable and amenable to all terms of discharge. The patient has received verbal instructions from myself regarding discharge plans. Anticipatory guidance provided        Minutes spent: 45 mins 40yo M with morbid obesity, R sided heart failure (normal EF), RHC at [facility] in 2008 with moderate pulmonary HTN and moderate elevated pCWP, admitted after experiencing MUÑOZ, orthopnea, fatigue and hypoxia likely due to acute on chronic diastolic heart failure, in the setting of poof compliance with meds and diet.    At the ED patient received Zithromax and Rocephin x1 and was transitioned to Levaquin, however respiratory symptoms less likely to be caused by infectious etiology, likely associated with CHF exacerbation, RVP (-).     In terms of CHF, patient was continued with his BB (Coreg), hydralazine, in regards to his HTN Dash/TLC diet was ordered, BP was well controlled with home regimen.    For his pulmonary Hypertension, his home dose of Sindenafil was continued       Patient morbid obese, counseling about weight loss was provided        Pt was started on diuresis with IV Lasix 40 mg BID, then transitioned to Lasix 40 mg daily and finally Lasix 40 mg PO BID. Pt had slow clinical improvement during hospitalization.         Pt was also started on nocturnal bipap by pulmonology. Vendor set up for new machine for home.     Noted to have poor medication and BIPAP compliance at home and hospital. Encouraged to have compliance with meds and machine to prevent further exacerbation of medical conditions.     Of note, mediastinal lymphadenopathy was seen in CT scan of the chest, pt is informed of results and recommended to follow up as outpt with rheumatology to rule out possible sarcoidosis.        Patient is medically optimized for discharge home & will follow up with primary care physician, cardiology, pulmonology. Pt will follow up with his own doctors at [place].          All electrolyte abnormalities were monitored carefully and repleted as necessary during this hospitalization. At the time of discharge patient was hemodynamically stable and amenable to all terms of discharge. The patient has received verbal instructions from myself regarding discharge plans. Anticipatory guidance provided        Minutes spent: 45 mins 40yo M with morbid obesity, R sided heart failure (normal EF), RHC at [facility] in 2008 with moderate pulmonary HTN and moderate elevated pCWP, admitted after experiencing MUÑOZ, orthopnea, fatigue and hypoxia likely due to acute on chronic diastolic heart failure, in the setting of poof compliance with meds and diet.    At the ED patient received Zithromax and Rocephin x1 and was transitioned to Levaquin, however respiratory symptoms less likely to be caused by infectious etiology, likely associated with CHF exacerbation, RVP (-).     In terms of CHF, patient was continued with his BB (Coreg), hydralazine, in regards to his HTN Dash/TLC diet was ordered, BP was well controlled with home regimen.    For his pulmonary Hypertension, his home dose of Sindenafil was continued       Patient morbid obese, counseling about weight loss was provided        Pt was started on diuresis with IV Lasix 40 mg BID, then transitioned to Lasix 40 mg daily and finally Lasix 40 mg PO BID. Pt had slow clinical improvement during hospitalization.         Pt was also started on nocturnal bipap by pulmonology. Vendor set up for new machine for home.     Noted to have poor medication and BIPAP compliance at home and hospital. Encouraged to have compliance with meds and machine to prevent further exacerbation of medical conditions.     Of note, mediastinal lymphadenopathy was seen in CT scan of the chest, pt is informed of results and recommended to follow up as outpt with rheumatology to rule out possible sarcoidosis.        Patient is medically optimized for discharge home & will follow up with primary care physician, cardiology, pulmonology. Pt will follow up with his own doctors at [place].          All electrolyte abnormalities were monitored carefully and repleted as necessary during this hospitalization. At the time of discharge patient was hemodynamically stable and amenable to all terms of discharge. The patient has received verbal instructions from myself regarding discharge plans. Anticipatory guidance provided        Minutes spent: 45 mins         Vital Signs Last 24 Hrs    T(C): 37.2 (27 Dec 2019 23:48), Max: 37.2 (27 Dec 2019 23:48)    T(F): 98.9 (27 Dec 2019 23:48), Max: 98.9 (27 Dec 2019 23:48)    HR: 82 (28 Dec 2019 04:31) (82 - 92)    BP: 92/60 (28 Dec 2019 04:31) (92/60 - 112/67)    RR: 18 (27 Dec 2019 23:48) (18 - 18)    SpO2: 99% (28 Dec 2019 03:58) (90% - 99%)        General: Well nourished/Well developed, NAD, Morbidly Obese Male    Neck:  Supple, no sinuses or palpable masses, No JVD noted    Respiratory: Distant lung sounds, B/L crackles in bases improving    CV: RRR, S1S2, no murmur.    Abdominal: Obese, Soft, NT, ND, no palpable mass    Extremities: + edema, improvement since admission, + peripheral pulses, FROM all 4 extremity    Neurology: A&Ox3, no focalization signs 38yo M with morbid obesity, R sided heart failure (normal EF), RHC at [facility] in 2008 with moderate pulmonary HTN and moderate elevated pCWP, admitted after experiencing MUÑOZ, orthopnea, fatigue and hypoxia likely due to acute on chronic diastolic heart failure, in the setting of poof compliance with meds and diet.    At the ED patient received Zithromax and Rocephin x1 and was transitioned to Levaquin, however respiratory symptoms less likely to be caused by infectious etiology, likely associated with CHF exacerbation, RVP (-).         In terms of CHF, patient was continued with his BB (Coreg), hydralazine, in regards to his HTN Dash/TLC diet was ordered, BP was well controlled with home regimen. Pt was started on diuresis with IV Lasix 40 mg BID, then transitioned to Lasix 40 mg daily and finally Lasix 40 mg PO BID. Pt had slow clinical improvement during hospitalization.         For his pulmonary Hypertension, his home dose of Sindenafil was continued       Patient is morbidly obese, counseling about weight loss was provided. Also likely to have OHS + JAN from his morbid obesity. Pt was also started on nocturnal bipap by pulmonology. Unable to get a machine on d/c because patient was not an inpatient candidate. Should see Pulm on d/c to start w/up for likely eventual need        Of note, mediastinal lymphadenopathy was seen in CT scan of the chest, pt is informed of results and recommended to follow up as outpt with rheumatology to rule out possible sarcoidosis.        Patient is medically optimized for discharge home & will follow up with primary care physician, cardiology, pulmonology. Pt will follow up with his own doctors at [place].          All electrolyte abnormalities were monitored carefully and repleted as necessary during this hospitalization. At the time of discharge patient was hemodynamically stable and amenable to all terms of discharge. The patient has received verbal instructions from myself regarding discharge plans. Anticipatory guidance provided        Minutes spent: 45 mins         VS and Exam on the day of discharge:     Vital Signs Last 24 Hrs    T(C): 36.8 (28 Dec 2019 09:06), Max: 37.2 (27 Dec 2019 23:48)    T(F): 98.2 (28 Dec 2019 09:06), Max: 98.9 (27 Dec 2019 23:48)    HR: 80 (28 Dec 2019 09:06) (80 - 92)    BP: 110/60 (28 Dec 2019 09:06) (92/60 - 112/67)    RR: 19 (28 Dec 2019 09:06) (18 - 19)    SpO2: 98% (28 Dec 2019 09:06) (90% - 99%)        General: Well nourished/Well developed, NAD, Morbidly Obese Male    Neck:  Supple, no sinuses or palpable masses, No JVD noted    Respiratory: fair b/l air entry     CV: RRR, S1S2, no murmur.    Abdominal: Obese, Soft, NT, ND, no palpable mass    Extremities: + edema, improvement since admission, + peripheral pulses, FROM all 4 extremity    Neurology: A&Ox3, no focalization signs    Skin: back with healed acne, chronic skin changes near b/l breasts

## 2019-12-22 NOTE — DISCHARGE NOTE PROVIDER - CARE PROVIDER_API CALL
Alberto Peter)  Critical Care Medicine; HospicePalliative Medicine; Pulmonary Disease; Sleep Medicine  [address] [id number]  Phone: [phone]  Fax: [phone]  Follow Up Time: 1-3 days Alberto Peter (MD)  Critical Care Medicine; [facility]; Pulmonary Disease; Sleep Medicine  [address] [facility], [address]  [place], [place] [id number]  Phone: [phone]  Fax: [phone]  Follow Up Time: 1-3 days    Krys Horton (DO)  Cardiology; Internal Medicine  [address] [facility], [address]  [place] [address]  Phone: [phone]  Fax: [phone]  Follow Up Time:     [facility], [place]  Britney Olson)  Family Medicine  [address] [place] [address]  Phone: [phone]  Fax: [phone]  Phone: (   )    -  Fax: (   )    -  Follow Up Time: 1-3 days [facility], [place]  Britney Olson)  Family Medicine  [address] [place] [address]  Phone: [phone]  Fax: [phone]  Phone: (   )    -  Fax: (   )    -  Follow Up Time: 1-3 days    Dr. Talia Hayden Primary Care Doctor,   Phone: (   )    -  Fax: (   )    -  Follow Up Time:     Dr. Abbi Franklin (Pulmonologist),   Phone: (   )    -  Fax: (   )    -  Follow Up Time: [facility], [place]  Britney Oslon (MD)  Family Medicine  [address] [place] [address]  Phone: [phone]  Fax: [phone]  Phone: (   )    -  Fax: (   )    -  Follow Up Time: 1-3 days    Dr. Talia Hayden Primary Care Doctor,   Phone: (   )    -  Fax: (   )    -  Follow Up Time:     Dr. Abbi Franklin (Pulmonologist),   Phone: (   )    -  Fax: (   )    -  Follow Up Time:     Krys Horton (DO)  Cardiology; Internal Medicine  [address] [facility], [address]  [place] [address]  Phone: [phone]  Fax: [phone]  Follow Up Time:

## 2019-12-22 NOTE — DISCHARGE NOTE PROVIDER - PROVIDER TOKENS
PROVIDER:[TOKEN:[[id number]],FOLLOWUP:[1-3 days]] PROVIDER:[TOKEN:[[id number]],FOLLOWUP:[1-3 days]],PROVIDER:[TOKEN:[[id number]]],FREE:[LAST:[[facility]],FIRST:[[place]],PHONE:[(   )    -],FAX:[(   )    -],ADDRESS:[Britney Olson)  Family Medicine  [address] [place] [address]  Phone: [phone]  Fax: [phone]],FOLLOWUP:[1-3 days]] FREE:[LAST:[[facility]],FIRST:[[place]],PHONE:[(   )    -],FAX:[(   )    -],ADDRESS:[Britney Olson (MD)  Family Medicine  [address] [place] [address]  Phone: [phone]  Fax: [phone]],FOLLOWUP:[1-3 days]],FREE:[LAST:[Dr. Talia Hayden Primary Care Doctor],PHONE:[(   )    -],FAX:[(   )    -]],FREE:[LAST:[Dr. Abbi Franklin (Pulmonologist)],PHONE:[(   )    -],FAX:[(   )    -]] FREE:[LAST:[HRH],FIRST:[[place]],PHONE:[(   )    -],FAX:[(   )    -],ADDRESS:[Britney Olson (MD)  Family Medicine  [address] [place] [address]  Phone: [phone]  Fax: [phone]],FOLLOWUP:[1-3 days]],FREE:[LAST:[Dr. Talia Hayden Primary Care Doctor],PHONE:[(   )    -],FAX:[(   )    -]],FREE:[LAST:[Dr. Abbi Franklin (Pulmonologist)],PHONE:[(   )    -],FAX:[(   )    -]],PROVIDER:[TOKEN:[[id number]]]

## 2019-12-22 NOTE — DISCHARGE NOTE PROVIDER - NSDCMRMEDTOKEN_GEN_ALL_CORE_FT
aspirin 81 mg oral tablet, chewable: 1 tab(s) orally once a day  atorvastatin 40 mg oral tablet: 1 tab(s) orally once a day (at bedtime)  carvedilol 3.125 mg oral tablet: 1 tab(s) orally 2 times a day  enalapril 5 mg oral tablet: 1 tab(s) orally once a day   furosemide 40 mg oral tablet: 1 tab(s) orally 2 times a day  hydrALAZINE 25 mg oral tablet: 1 tab(s) orally every 8 hours  metFORMIN 500 mg oral tablet: 1 tab(s) orally once a day   sildenafil 20 mg oral tablet: 1 tab(s) orally 3 times a day

## 2019-12-23 LAB
ANION GAP SERPL CALC-SCNC: 13 MMOL/L — SIGNIFICANT CHANGE UP (ref 5–17)
BUN SERPL-MCNC: 25 MG/DL — HIGH (ref 8–20)
CALCIUM SERPL-MCNC: 8.9 MG/DL — SIGNIFICANT CHANGE UP (ref 8.6–10.2)
CHLORIDE SERPL-SCNC: 100 MMOL/L — SIGNIFICANT CHANGE UP (ref 98–107)
CO2 SERPL-SCNC: 26 MMOL/L — SIGNIFICANT CHANGE UP (ref 22–29)
CREAT SERPL-MCNC: 1 MG/DL — SIGNIFICANT CHANGE UP (ref 0.5–1.3)
GLUCOSE BLDC GLUCOMTR-MCNC: 105 MG/DL — HIGH (ref 70–99)
GLUCOSE BLDC GLUCOMTR-MCNC: 87 MG/DL — SIGNIFICANT CHANGE UP (ref 70–99)
GLUCOSE BLDC GLUCOMTR-MCNC: 97 MG/DL — SIGNIFICANT CHANGE UP (ref 70–99)
GLUCOSE BLDC GLUCOMTR-MCNC: 98 MG/DL — SIGNIFICANT CHANGE UP (ref 70–99)
GLUCOSE SERPL-MCNC: 98 MG/DL — SIGNIFICANT CHANGE UP (ref 70–115)
MAGNESIUM SERPL-MCNC: 2 MG/DL — SIGNIFICANT CHANGE UP (ref 1.6–2.6)
PHOSPHATE SERPL-MCNC: 3.3 MG/DL — SIGNIFICANT CHANGE UP (ref 2.4–4.7)
POTASSIUM SERPL-MCNC: 4.1 MMOL/L — SIGNIFICANT CHANGE UP (ref 3.5–5.3)
POTASSIUM SERPL-SCNC: 4.1 MMOL/L — SIGNIFICANT CHANGE UP (ref 3.5–5.3)
SODIUM SERPL-SCNC: 139 MMOL/L — SIGNIFICANT CHANGE UP (ref 135–145)

## 2019-12-23 PROCEDURE — 99232 SBSQ HOSP IP/OBS MODERATE 35: CPT

## 2019-12-23 PROCEDURE — 99233 SBSQ HOSP IP/OBS HIGH 50: CPT

## 2019-12-23 PROCEDURE — 99232 SBSQ HOSP IP/OBS MODERATE 35: CPT | Mod: GC

## 2019-12-23 RX ADMIN — CARVEDILOL PHOSPHATE 3.12 MILLIGRAM(S): 80 CAPSULE, EXTENDED RELEASE ORAL at 17:36

## 2019-12-23 RX ADMIN — Medication 5 MILLIGRAM(S): at 05:07

## 2019-12-23 RX ADMIN — ENOXAPARIN SODIUM 40 MILLIGRAM(S): 100 INJECTION SUBCUTANEOUS at 17:36

## 2019-12-23 RX ADMIN — Medication 20 MILLIGRAM(S): at 16:42

## 2019-12-23 RX ADMIN — Medication 1 SPRAY(S): at 17:35

## 2019-12-23 RX ADMIN — Medication 20 MILLIGRAM(S): at 23:45

## 2019-12-23 RX ADMIN — ATORVASTATIN CALCIUM 40 MILLIGRAM(S): 80 TABLET, FILM COATED ORAL at 21:29

## 2019-12-23 RX ADMIN — Medication 1 SPRAY(S): at 05:09

## 2019-12-23 RX ADMIN — CARVEDILOL PHOSPHATE 3.12 MILLIGRAM(S): 80 CAPSULE, EXTENDED RELEASE ORAL at 05:07

## 2019-12-23 RX ADMIN — Medication 20 MILLIGRAM(S): at 05:07

## 2019-12-23 RX ADMIN — Medication 81 MILLIGRAM(S): at 11:26

## 2019-12-23 RX ADMIN — Medication 40 MILLIGRAM(S): at 05:07

## 2019-12-23 NOTE — PROGRESS NOTE ADULT - SUBJECTIVE AND OBJECTIVE BOX
PULMONARY PROGRESS NOTE      [id number]    Patient is a 39y old  Male who presents with a chief complaint of difficulty breathing (23 Dec 2019 08:25)      INTERVAL HPI/OVERNIGHT EVENTS:  Ongoing SOB, improved though.  Sleeping with BiPAP in hosp-sleeping well.  States does not have BiPAP at home.  Scheduled for sleep study in [place] in January.  Denies cough, wheeze.  MEDICATIONS  (STANDING):  aspirin  chewable 81 milliGRAM(s) Oral daily  atorvastatin 40 milliGRAM(s) Oral at bedtime  carvedilol 3.125 milliGRAM(s) Oral every 12 hours  dextrose 5%. 1000 milliLiter(s) (50 mL/Hr) IV Continuous <Continuous>  dextrose 50% Injectable 12.5 Gram(s) IV Push once  dextrose 50% Injectable 25 Gram(s) IV Push once  dextrose 50% Injectable 25 Gram(s) IV Push once  enalapril 5 milliGRAM(s) Oral daily  enoxaparin Injectable 40 milliGRAM(s) SubCutaneous two times a day  fluticasone propionate 50 MICROgram(s)/spray Nasal Spray 1 Spray(s) Both Nostrils two times a day  furosemide   Injectable 40 milliGRAM(s) IV Push two times a day  hydrALAZINE 25 milliGRAM(s) Oral every 8 hours  insulin lispro (HumaLOG) corrective regimen sliding scale   SubCutaneous three times a day before meals  insulin lispro (HumaLOG) corrective regimen sliding scale   SubCutaneous at bedtime  sildenafil (REVATIO) 20 milliGRAM(s) Oral three times a day      MEDICATIONS  (PRN):  dextrose 40% Gel 15 Gram(s) Oral once PRN Blood Glucose LESS THAN 70 milliGRAM(s)/deciliter  glucagon  Injectable 1 milliGRAM(s) IntraMuscular once PRN Glucose LESS THAN 70 milligrams/deciliter      Allergies    No Known Allergies    Intolerances        PAST MEDICAL & SURGICAL HISTORY:  Obesity, morbid  Pulmonary hypertension  CHF (congestive heart failure)  Hypertension  No significant past surgical history      SOCIAL HISTORY  Smoking History:       REVIEW OF SYSTEMS:    CONSTITUTIONAL:  No distress    HEENT:  Eyes:  No diplopia or blurred vision. ENT:  No earache, sore throat or runny nose.    CARDIOVASCULAR:  No pressure, squeezing, tightness, heaviness or aching about the chest; no palpitations.    RESPIRATORY:  per hpi    GASTROINTESTINAL:  No nausea, vomiting or diarrhea.    GENITOURINARY:  No dysuria, frequency or urgency.    MUSCULOSKELETAL:  No joint pain    SKIN:  No new lesions.    NEUROLOGIC:  No paresthesias, fasciculations, seizures or weakness.    PSYCHIATRIC:  No disorder of thought or mood.    ENDOCRINE:  No heat or cold intolerance, polyuria or polydipsia.    HEMATOLOGICAL:  No easy bruising or bleeding.     Vital Signs Last 24 Hrs  T(C): 36.9 (23 Dec 2019 08:18), Max: 37 (22 Dec 2019 23:41)  T(F): 98.4 (23 Dec 2019 08:18), Max: 98.6 (22 Dec 2019 23:41)  HR: 78 (23 Dec 2019 08:18) (78 - 94)  BP: 110/62 (23 Dec 2019 08:18) (83/59 - 112/74)  BP(mean): 82 (22 Dec 2019 17:36) (67 - 82)  RR: 18 (23 Dec 2019 08:18) (18 - 18)  SpO2: 94% (23 Dec 2019 08:18) (92% - 98%)    PHYSICAL EXAMINATION:    GENERAL: The patient is awake and alert in no apparent distress.     HEENT: Head is normocephalic and atraumatic. Extraocular muscles are intact. Mucous membranes are moist.    NECK: Supple.    LUNGS: Clear to auscultation without wheezing, rales or rhonchi; respirations unlabored    HEART: Regular rate and rhythm without murmur.    ABDOMEN: Soft, nontender, and nondistended.      EXTREMITIES: Without any cyanosis, clubbing, rash, lesions or edema.    NEUROLOGIC: Grossly intact.    SKIN: No ulceration or induration present.      LABS:    12-23    139  |  100  |  25.0<H>  ----------------------------<  98  4.1   |  26.0  |  1.00    Ca    8.9      23 Dec 2019 08:01  Phos  3.3     12-23  Mg     2.0     12-23      PT/INR - ( 22 Dec 2019 07:57 )   PT: 21.0 sec;   INR: 1.79 ratio         PTT - ( 22 Dec 2019 07:57 )  PTT:32.9 sec      CARDIAC MARKERS ( 21 Dec 2019 13:21 )  x     / <0.01 ng/mL / x     / x     / x            Serum Pro-Brain Natriuretic Peptide: 1584 pg/mL (12-20-19 @ 18:40)      Procalcitonin, Serum: 0.09 ng/mL (12-22-19 @ 07:57)      MICROBIOLOGY:    RADIOLOGY & ADDITIONAL STUDIES:

## 2019-12-23 NOTE — PROGRESS NOTE ADULT - SUBJECTIVE AND OBJECTIVE BOX
Patient is a 39y old  Male who presents with a chief complaint of difficulty breathing (21 Dec 2019 10:30)    HOSPITALIZATION DAY: 3    INTERVAL/OVERNIGHT EVENTS:  Patient examined at beside. No acute events over night. Patient on BiPap overnight, this morning reports feeling better. Breathing well.  Patient is tolerating PO diet w/o nausea/vomiting/diarrhea/abdominal pain. Patient is voiding actively. Patient is ambulating w/o difficulty.   Patient denies chest pain, calf pain, abdominal pain, headaches, fever, chills, dysuria, hematuria, diarrhea, constipation, palpitations. Rest of ROS not contributory except for above.    I&O's Summary    22 Dec 2019 07:01  -  23 Dec 2019 07:00  --------------------------------------------------------  IN: 360 mL / OUT: 1275 mL / NET: -915 mL    23 Dec 2019 07:01  -  23 Dec 2019 15:35  --------------------------------------------------------  IN: 0 mL / OUT: 300 mL / NET: -300 mL    Vital Signs Last 24 Hrs  T(C): 37.1 (23 Dec 2019 15:15), Max: 37.1 (23 Dec 2019 15:15)  T(F): 98.7 (23 Dec 2019 15:15), Max: 98.7 (23 Dec 2019 15:15)  HR: 88 (23 Dec 2019 15:15) (78 - 94)  BP: 101/64 (23 Dec 2019 15:15) (83/59 - 112/74)  BP(mean): 82 (22 Dec 2019 17:36) (82 - 82)  RR: 18 (23 Dec 2019 08:18) (18 - 18)  SpO2: 89% (23 Dec 2019 15:15) (89% - 98%)    General: Well nourished/Well developed, NAD, Obese  Neck:  Supple, no sinuses or palpable masses, No JVD noted  Respiratory: Distant lung sounds, B/L crackles in bases   CV: RRR, S1S2, no murmur  Abdominal: Obese, Soft, NT, ND no palpable mass  Extremities: ++ edema, improving, + peripheral pulses, FROM all 4 extremity  Neurology: A&Ox3, no focalization signs    LABS              CAPILLARY BLOOD GLUCOSE    POCT Blood Glucose.: 98 mg/dL (23 Dec 2019 12:56)  POCT Blood Glucose.: 87 mg/dL (23 Dec 2019 08:53)  POCT Blood Glucose.: 132 mg/dL (22 Dec 2019 21:18)  POCT Blood Glucose.: 101 mg/dL (22 Dec 2019 17:30)    12-23    139  |  100  |  25.0<H>  ----------------------------<  98  4.1   |  26.0  |  1.00    Ca    8.9      23 Dec 2019 08:01  Phos  3.3     12-23  Mg     2.0     12-23    MEDICATIONS  (STANDING):  aspirin  chewable 81 milliGRAM(s) Oral daily  atorvastatin 40 milliGRAM(s) Oral at bedtime  carvedilol 3.125 milliGRAM(s) Oral every 12 hours  dextrose 5%. 1000 milliLiter(s) (50 mL/Hr) IV Continuous <Continuous>  dextrose 50% Injectable 12.5 Gram(s) IV Push once  dextrose 50% Injectable 25 Gram(s) IV Push once  dextrose 50% Injectable 25 Gram(s) IV Push once  enalapril 5 milliGRAM(s) Oral daily  enoxaparin Injectable 40 milliGRAM(s) SubCutaneous two times a day  fluticasone propionate 50 MICROgram(s)/spray Nasal Spray 1 Spray(s) Both Nostrils two times a day  furosemide   Injectable 40 milliGRAM(s) IV Push two times a day  hydrALAZINE 25 milliGRAM(s) Oral every 8 hours  insulin lispro (HumaLOG) corrective regimen sliding scale   SubCutaneous three times a day before meals  insulin lispro (HumaLOG) corrective regimen sliding scale   SubCutaneous at bedtime  sildenafil (REVATIO) 20 milliGRAM(s) Oral three times a day    MEDICATIONS  (PRN):  dextrose 40% Gel 15 Gram(s) Oral once PRN Blood Glucose LESS THAN 70 milliGRAM(s)/deciliter  glucagon  Injectable 1 milliGRAM(s) IntraMuscular once PRN Glucose LESS THAN 70 milligrams/deciliter

## 2019-12-23 NOTE — PROGRESS NOTE ADULT - ASSESSMENT
Imp--Morbid obesity.  Prob JAN, hypoventilation as indicated by elevated CO2 on chem. Unable to obtain ABG.   Will need home noct AutoBiPAP to prevent readmission.    Plan--Continue O2, Noct biPAP, diuresis, sildenafil.    Will try to arrange empiric noct AutoBiPAP, EPAP min 10, IPAP max 25, ps4.

## 2019-12-23 NOTE — PROGRESS NOTE ADULT - SUBJECTIVE AND OBJECTIVE BOX
[place] [facility]                                                               Office: [address] [address]                                                              Telephone: [phone]. Fax:[phone]                                                                             PROGRESS NOTE  Reason for follow up: Right heart failure, acute on chronic decompensated HF  Overnight: No new events.   Update: Patient states that his breathing and swelling have mildly improved, but he is still coughing and feeling short of breath.     Subjective: "I'm still having difficulty breathing."      	  Vitals:  T(C): 36.9 (12-23-19 @ 08:18), Max: 37 (12-22-19 @ 23:41)  HR: 78 (12-23-19 @ 08:18) (78 - 94)  BP: 110/62 (12-23-19 @ 08:18) (83/59 - 112/74)  RR: 18 (12-23-19 @ 08:18) (18 - 18)  SpO2: 94% (12-23-19 @ 08:18) (92% - 98%)  Wt(kg): --  I&O's Summary    22 Dec 2019 07:01  -  23 Dec 2019 07:00  --------------------------------------------------------  IN: 360 mL / OUT: 1275 mL / NET: -915 mL      Weight (kg): 149.685 (12-21 @ 01:17)      PHYSICAL EXAM:  Appearance: Comfortable. No acute distress  HEENT:  Head and neck: Atraumatic. Normocephalic.  Normal oral mucosa, PERRL, Neck is supple. No JVD, No carotid bruit.   Neurologic: A & O x 3, no focal deficits. EOMI.  Lymphatic: No cervical lymphadenopathy  Cardiovascular: Normal S1 S2, II/VI systolic murmur lsb, rubs/gallops. No JVD,  Respiratory: Trace bibasilar rales  Gastrointestinal:  Soft, Non-tender, + BS  Lower Extremities: 2+ pitting edema, R>L  Psychiatry: Patient is calm. No agitation. Mood & affect appropriate  Skin: No rashes/ ecchymoses/cyanosis/ulcers visualized on the face, hands or feet.      CURRENT MEDICATIONS:  carvedilol 3.125 milliGRAM(s) Oral every 12 hours  enalapril 5 milliGRAM(s) Oral daily  furosemide   Injectable 40 milliGRAM(s) IV Push two times a day  hydrALAZINE 25 milliGRAM(s) Oral every 8 hours  sildenafil (REVATIO) 20 milliGRAM(s) Oral three times a day    atorvastatin  dextrose 50% Injectable  dextrose 50% Injectable  dextrose 50% Injectable  insulin lispro (HumaLOG) corrective regimen sliding scale  insulin lispro (HumaLOG) corrective regimen sliding scale  aspirin  chewable  dextrose 5%.  enoxaparin Injectable  fluticasone propionate 50 MICROgram(s)/spray Nasal Spray      DIAGNOSTIC TESTING:  [ ] Echocardiogram: Pending      OTHER: 	  < from: Xray Chest 1 View- PORTABLE-Urgent (12.20.19 @ 18:34) >  EXAM:  XR CHEST PORTABLE URGENT 1V                          PROCEDURE DATE:  12/20/2019          INTERPRETATION:  Portable chest radiograph        CLINICAL INFORMATION:   Abnormal breath sounds.    TECHNIQUE:  Portable  AP view of the chest was obtained.    COMPARISON: 3/15/2019 available for review.    FINDINGS:   The lungs  ill-defined opacities within the RIGHT lung base with blunting of the costophrenic angle indicating small effusion at.] [place] LEFT lung parenchyma clear.    The  heart is enlarged in transverse diameter. No hilar mass. Trachea midline.   Marginal osteophytes are noted at multiple disc spaces in the spine.        IMPRESSION:   Cardiomegaly. Suspect early RIGHT lower lobe airspace consolidation. Small RIGHT effusion.      < end of copied text >      LABS:	 	  CARDIAC MARKERS ( 21 Dec 2019 13:21 )  x     / <0.01 ng/mL / x     / x     / x      p-BNP 21 Dec 2019 13:21: x    , CARDIAC MARKERS ( 21 Dec 2019 05:57 )  x     / <0.01 ng/mL / x     / x     / x      p-BNP 21 Dec 2019 05:57: x    , CARDIAC MARKERS ( 20 Dec 2019 18:40 )  x     / <0.01 ng/mL / x     / x     / x      p-BNP 20 Dec 2019 18:40: 1584 pg/mL      12-22    140  |  99  |  19.0  ----------------------------<  103  3.9   |  28.0  |  1.02    Ca    9.4      22 Dec 2019 07:57  Phos  3.5     12-22  Mg     1.9     12-22      proBNP: Serum Pro-Brain Natriuretic Peptide: 1584 pg/mL (12-20 @ 18:40)    Lipid Profile: Date: 12-21 @ 05:57  Total cholesterol 67; Direct LDL: 29; HDL: 31; Triglycerides:37    HgA1c: Hemoglobin A1C, Whole Blood: 6.3 %    TSH:       TELEMETRY: Reviewed  Not on telemetry

## 2019-12-23 NOTE — PROGRESS NOTE ADULT - ASSESSMENT
39M with morbid obesity, R sided heart failure (normal EF), RHC at [facility] in 2008 with moderate pulmonary HTN and moderate elevated pCWP, admitted after experiencing MUÑOZ, orthopnea, fatigue and hypoxia likely due to acute on chronic diastolic heart failure, non compliance with meds and diet  \  1. Acute on chronic diastolic and right heart failure  - Still overloaded clinically  - Continue Lasix 40mg IV BID today, likely transition to once daily IV tomorrow  - Continue coreg, enalapril, hydralazine, aspirin, and sildenafil  - Monitor on telemetry.    - Strict i/o and daily standing weights.    - Keep K > 4, Mg > 2.    - Monitor renal function with ongoing diuresis with twice daily BMP.    2. HTN  - Well controlled  - Continue current regimen.     3. JAN  - Pulm following  - Patient refusing ABG yesterday  - Not compliant with Bipap      Preliminary evaluation, please await official recommendations by Dr. Moralez

## 2019-12-23 NOTE — DIETITIAN INITIAL EVALUATION ADULT. - OTHER INFO
39M with morbid obesity, R sided heart failure (normal EF), RHC at [facility] in 2008 with moderate pulmonary HTN and moderate elevated pCWP, admitted after experiencing MUÑOZ, orthopnea, fatigue and hypoxia likely due to acute on chronic diastolic heart failure, non compliance with meds and diet. Pt reported good po intake PTA and currently. Pt unsure of weight 6 months and a 1 year ago but reports no recent weight changes. Pt reports following a low sodium diet at home. Pt provided with verbal in depth heart healthy nutrition education. Pt receptive and verbally expressed understanding. Aware of right and left ankle trace edema and dependent left and right foot mild edema. Last BM 12/22 documented. Pt encouraged to consume HBV protein.

## 2019-12-23 NOTE — DIETITIAN INITIAL EVALUATION ADULT. - PERTINENT LABORATORY DATA
12-23 Na139 mmol/L Glu 98 mg/dL K+ 4.1 mmol/L Cr  1.00 mg/dL BUN 25.0 mg/dL<H> Phos 3.3 mg/dL Alb n/a   PAB n/a no

## 2019-12-23 NOTE — PROGRESS NOTE ADULT - ASSESSMENT
38yo M with morbid obesity, R sided heart failure (normal EF), RHC at [facility] in 2008 with moderate pulmonary HTN and moderate elevated pCWP, admitted after experiencing MUÑOZ, orthopnea, fatigue and hypoxia likely due to acute on chronic diastolic heart failure, in the setting of poof compliance with meds and diet.  At the ED patient received Zithromax and Rocephin x1 and was transitioned to Levaquin, however respiratory symptoms less likely to be caused by infectious etiology, likely associated with CHF exacerbation, RVP (-)  Patient clinically improving, continues to be diuresed with Laxis, lung exam improving as well as lower extremity edema. Cardio and pulmonology consulted.    Acute on chronic diastolic CHF (congestive heart failure), NYHA class 2.  c/w Coreg 3.125mg BID,   c/w Hydralazine 25mg BID  On Sildenafil for pulm smooth muscle relaxation for severe Pulm HTN  Monitor urine output, daily weights, strict I's & O's,   C/w nocturnal BIPAP  Pulmonology consult noted  TTE reading pending   Patient education provided for dietary restrictions and optimal weight management.  Cardio consult noted  decrease Lasix 40mg IV BID to Lasix 40mg daily  Monitor electrolytes in AM    HTN  -Dash/TLC diet  -Lasix 40 mg daily  -c/w Hydralazine and Cored  -Weight loss education provided and need for daily exercise.     Pulmonary Hypertension  -C/w Sildenafil as per home regimen     Morbid Obesity  -Weight loss counseling provided     DVT prophylaxis  -Lovenox 40mg SC BID (Patient obese)     Dispo   Patient likely to be discharge in the next 24-48 hours, when medically stable

## 2019-12-24 DIAGNOSIS — I27.20 PULMONARY HYPERTENSION, UNSPECIFIED: ICD-10-CM

## 2019-12-24 LAB
GLUCOSE BLDC GLUCOMTR-MCNC: 106 MG/DL — HIGH (ref 70–99)
GLUCOSE BLDC GLUCOMTR-MCNC: 80 MG/DL — SIGNIFICANT CHANGE UP (ref 70–99)
GLUCOSE BLDC GLUCOMTR-MCNC: 85 MG/DL — SIGNIFICANT CHANGE UP (ref 70–99)
GLUCOSE BLDC GLUCOMTR-MCNC: 97 MG/DL — SIGNIFICANT CHANGE UP (ref 70–99)
PROCALCITONIN SERPL-MCNC: 0.1 NG/ML — SIGNIFICANT CHANGE UP (ref 0.02–0.1)

## 2019-12-24 PROCEDURE — 71275 CT ANGIOGRAPHY CHEST: CPT | Mod: 26

## 2019-12-24 PROCEDURE — 99232 SBSQ HOSP IP/OBS MODERATE 35: CPT

## 2019-12-24 PROCEDURE — 99233 SBSQ HOSP IP/OBS HIGH 50: CPT | Mod: GC

## 2019-12-24 RX ORDER — KETOROLAC TROMETHAMINE 30 MG/ML
15 SYRINGE (ML) INJECTION EVERY 8 HOURS
Refills: 0 | Status: DISCONTINUED | OUTPATIENT
Start: 2019-12-24 | End: 2019-12-28

## 2019-12-24 RX ORDER — FUROSEMIDE 40 MG
40 TABLET ORAL DAILY
Refills: 0 | Status: DISCONTINUED | OUTPATIENT
Start: 2019-12-24 | End: 2019-12-25

## 2019-12-24 RX ORDER — IBUPROFEN 200 MG
400 TABLET ORAL EVERY 6 HOURS
Refills: 0 | Status: DISCONTINUED | OUTPATIENT
Start: 2019-12-24 | End: 2019-12-28

## 2019-12-24 RX ORDER — ACETAMINOPHEN 500 MG
650 TABLET ORAL EVERY 6 HOURS
Refills: 0 | Status: DISCONTINUED | OUTPATIENT
Start: 2019-12-24 | End: 2019-12-28

## 2019-12-24 RX ADMIN — Medication 5 MILLIGRAM(S): at 05:05

## 2019-12-24 RX ADMIN — Medication 25 MILLIGRAM(S): at 05:05

## 2019-12-24 RX ADMIN — Medication 1 SPRAY(S): at 16:51

## 2019-12-24 RX ADMIN — CARVEDILOL PHOSPHATE 3.12 MILLIGRAM(S): 80 CAPSULE, EXTENDED RELEASE ORAL at 05:05

## 2019-12-24 RX ADMIN — Medication 20 MILLIGRAM(S): at 09:01

## 2019-12-24 RX ADMIN — Medication 20 MILLIGRAM(S): at 16:51

## 2019-12-24 RX ADMIN — ATORVASTATIN CALCIUM 40 MILLIGRAM(S): 80 TABLET, FILM COATED ORAL at 22:14

## 2019-12-24 RX ADMIN — Medication 81 MILLIGRAM(S): at 16:51

## 2019-12-24 RX ADMIN — Medication 40 MILLIGRAM(S): at 05:05

## 2019-12-24 RX ADMIN — CARVEDILOL PHOSPHATE 3.12 MILLIGRAM(S): 80 CAPSULE, EXTENDED RELEASE ORAL at 17:00

## 2019-12-24 RX ADMIN — Medication 20 MILLIGRAM(S): at 22:13

## 2019-12-24 NOTE — PROGRESS NOTE ADULT - SUBJECTIVE AND OBJECTIVE BOX
PULMONARY PROGRESS NOTE      [id number]    Patient is a 39y old  Male who presents with a chief complaint of difficulty breathing (24 Dec 2019 10:49)      INTERVAL HPI/OVERNIGHT EVENTS:    MEDICATIONS  (STANDING):  aspirin  chewable 81 milliGRAM(s) Oral daily  atorvastatin 40 milliGRAM(s) Oral at bedtime  carvedilol 3.125 milliGRAM(s) Oral every 12 hours  dextrose 5%. 1000 milliLiter(s) (50 mL/Hr) IV Continuous <Continuous>  dextrose 50% Injectable 12.5 Gram(s) IV Push once  dextrose 50% Injectable 25 Gram(s) IV Push once  dextrose 50% Injectable 25 Gram(s) IV Push once  enalapril 5 milliGRAM(s) Oral daily  enoxaparin Injectable 40 milliGRAM(s) SubCutaneous two times a day  fluticasone propionate 50 MICROgram(s)/spray Nasal Spray 1 Spray(s) Both Nostrils two times a day  furosemide   Injectable 40 milliGRAM(s) IV Push daily  hydrALAZINE 25 milliGRAM(s) Oral every 8 hours  insulin lispro (HumaLOG) corrective regimen sliding scale   SubCutaneous three times a day before meals  insulin lispro (HumaLOG) corrective regimen sliding scale   SubCutaneous at bedtime  sildenafil (REVATIO) 20 milliGRAM(s) Oral three times a day      MEDICATIONS  (PRN):  dextrose 40% Gel 15 Gram(s) Oral once PRN Blood Glucose LESS THAN 70 milliGRAM(s)/deciliter  glucagon  Injectable 1 milliGRAM(s) IntraMuscular once PRN Glucose LESS THAN 70 milligrams/deciliter  ibuprofen  Tablet. 400 milliGRAM(s) Oral every 6 hours PRN Moderate Pain (4 - 6)      Allergies    No Known Allergies    Intolerances        PAST MEDICAL & SURGICAL HISTORY:  Obesity, morbid  Pulmonary hypertension  CHF (congestive heart failure)  Hypertension  No significant past surgical history      SOCIAL HISTORY  Smoking History:       REVIEW OF SYSTEMS:    CONSTITUTIONAL:  No distress    HEENT:  Eyes:  No diplopia or blurred vision. ENT:  No earache, sore throat or runny nose.    CARDIOVASCULAR:  No pressure, squeezing, tightness, heaviness or aching about the chest; no palpitations.    RESPIRATORY:  No cough, shortness of breath, PND or orthopnea. Mild SOBOE    GASTROINTESTINAL:  No nausea, vomiting or diarrhea.    GENITOURINARY:  No dysuria, frequency or urgency.    MUSCULOSKELETAL:  No joint pain    SKIN:  No new lesions.    NEUROLOGIC:  No paresthesias, fasciculations, seizures or weakness.    PSYCHIATRIC:  No disorder of thought or mood.    ENDOCRINE:  No heat or cold intolerance, polyuria or polydipsia.    HEMATOLOGICAL:  No easy bruising or bleeding.     Vital Signs Last 24 Hrs  T(C): 36.8 (24 Dec 2019 08:30), Max: 37.2 (23 Dec 2019 23:41)  T(F): 98.2 (24 Dec 2019 08:30), Max: 99 (23 Dec 2019 23:41)  HR: 80 (24 Dec 2019 08:30) (80 - 96)  BP: 110/74 (24 Dec 2019 08:30) (101/64 - 122/82)  BP(mean): --  RR: 18 (24 Dec 2019 08:30) (18 - 18)  SpO2: 94% (24 Dec 2019 08:30) (89% - 95%)    PHYSICAL EXAMINATION:    GENERAL: The patient is awake and alert in no apparent distress.     HEENT: Head is normocephalic and atraumatic. Extraocular muscles are intact. Mucous membranes are moist.    NECK: Supple.    LUNGS: Clear to auscultation without wheezing, rales or rhonchi; respirations unlabored    HEART: Regular rate and rhythm without murmur.    ABDOMEN: Soft, nontender, and nondistended.      EXTREMITIES: Without any cyanosis, clubbing, rash, lesions or edema.    NEUROLOGIC: Grossly intact.    SKIN: No ulceration or induration present.      LABS:    12-23    139  |  100  |  25.0<H>  ----------------------------<  98  4.1   |  26.0  |  1.00    Ca    8.9      23 Dec 2019 08:01  Phos  3.3     12-23  Mg     2.0     12-23                      Procalcitonin, Serum: 0.09 ng/mL (12-22-19 @ 07:57)      MICROBIOLOGY:    RADIOLOGY & ADDITIONAL STUDIES:< from: Xray Chest 1 View- PORTABLE-Urgent (12.20.19 @ 18:34) >    CLINICAL INFORMATION:   Abnormal breath sounds.    TECHNIQUE:  Portable  AP view of the chest was obtained.    COMPARISON: 3/15/2019 available for review.    FINDINGS:   The lungs  ill-defined opacities within the RIGHT lung base with blunting of the costophrenic angle indicating small effusion at.] [place] LEFT lung parenchyma clear.    The  heart is enlarged in transverse diameter. No hilar mass. Trachea midline.   Marginal osteophytes are noted at multiple disc spaces in the spine.        IMPRESSION:   Cardiomegaly. Suspect early RIGHT lower lobe airspace consolidation. Small RIGHT effusion.    < end of copied text >

## 2019-12-24 NOTE — PROGRESS NOTE ADULT - ASSESSMENT
40yo M with morbid obesity, R sided heart failure (normal EF), RHC at [facility] in 2008 with moderate pulmonary HTN and moderate elevated pCWP, admitted after experiencing MUÑOZ, orthopnea, fatigue and hypoxia likely due to acute on chronic diastolic heart failure, in the setting of poof compliance with meds and diet.  At the ED patient received Zithromax and Rocephin x1 and was transitioned to Levaquin, however respiratory symptoms less likely to be caused by infectious etiology, likely associated with CHF exacerbation, RVP (-)  Patient clinically improving, continues to be diuresed with Laxis, lung exam improving as well as lower extremity edema. Cardio and pulmonology consulted.    Noted to have R sided chest pain on 24 December, not reproducible on exam, also noted to have better resolution of swelling in left calf more than right. Chart review revealed pt has refused several doses of lovenox in past days. Suspicious for PE raised, will order CTA chest.    Acute on chronic diastolic CHF (congestive heart failure), NYHA class 2.; CHFpEF, severely reduced RV systolic dysfunction and Severe Pulm HTN, RV volume and pressure overload, slow progression, PE?  c/w Coreg 3.125mg BID,   c/w Hydralazine 25mg BID  On Sildenafil for pulm smooth muscle relaxation for severe Pulm HTN  Monitor urine output, daily weights, strict I's & O's,   C/w nocturnal BIPAP  Pulmonology consult noted  TTE: CHFpEF, severely reduced RV systolic dysfunction and Severe Pulm HTN.   Patient education provided for dietary restrictions and optimal weight management.  Cardio consult noted  decrease Lasix 40mg IV BID to Lasix 40mg daily  -Pleuritic  R chest pain, non reproducible on exam and R LE has slower reduction than L LE, concerning for DVT/PE; will order CTA chest.    HTN  -Dash/TLC diet  -Lasix 40 mg daily  -c/w Hydralazine and Coreg  -Weight loss education provided and need for daily exercise.     Pulmonary Hypertension  -C/w Sildenafil as per home regimen     Morbid Obesity  -Weight loss counseling provided     JAN  -Pulm consult noted  -started on CPAP HS in hospital  -will likely require CPAP at home    DVT prophylaxis  -Lovenox 40mg SC BID (Patient obese)     Dispo   Patient likely to be discharge in the next 24-48 hours, when medically stable 38yo M with morbid obesity, R sided heart failure (normal EF), RHC at [facility] in 2008 with moderate pulmonary HTN and moderate elevated pCWP, admitted after experiencing MUÑOZ, orthopnea, fatigue and hypoxia likely due to acute on chronic diastolic heart failure, in the setting of poof compliance with meds and diet.  At the ED patient received Zithromax and Rocephin x1 and was transitioned to Levaquin, however respiratory symptoms less likely to be caused by infectious etiology, likely associated with CHF exacerbation, RVP (-)  Patient clinically improving, continues to be diuresed with Laxis, lung exam improving as well as lower extremity edema. Cardio and pulmonology consulted.    Noted to have R sided chest pain on 24 December, not reproducible on exam, also noted to have better resolution of swelling in left calf more than right. Chart review revealed pt has refused several doses of lovenox in past days. Suspicious for PE raised, will order CTA chest.    Acute on chronic diastolic CHF (congestive heart failure), NYHA class 2.; CHFpEF, severely reduced RV systolic dysfunction and Severe Pulm HTN, RV volume and pressure overload, slow progression, PE ruled out  c/w Coreg 3.125mg BID,   c/w Hydralazine 25mg BID  On Sildenafil for pulm smooth muscle relaxation for severe Pulm HTN  Monitor urine output, daily weights, strict I's & O's,   C/w nocturnal BIPAP  Pulmonology consult noted  TTE: CHFpEF, severely reduced RV systolic dysfunction and Severe Pulm HTN.   Patient education provided for dietary restrictions and optimal weight management.  Cardio consult noted  decrease Lasix 40mg IV BID to Lasix 40mg daily  -Pleuritic  R chest pain, non reproducible on exam and R LE has slower reduction than L LE, concerning for DVT/PE; will order CTA chest.  -CTA: no PE, noted pulm vasc congestion, superimposed infx vs inflammation. Noted chronic lymphadenopathy (unchanged since 2018)     HTN  -Dash/TLC diet  -Lasix 40 mg daily  -c/w Hydralazine and Coreg  -Weight loss education provided and need for daily exercise.     Pulmonary Hypertension  -C/w Sildenafil as per home regimen     Morbid Obesity  -Weight loss counseling provided     JAN  -Pulm consult noted  -started on CPAP HS in hospital  -will likely require CPAP at home    DVT prophylaxis  -Lovenox 40mg SC BID (Patient obese)     Dispo   Patient likely to be discharge in the next 24-48 hours, when medically stable 40yo M with morbid obesity, R sided heart failure (normal EF), RHC at [facility] in 2008 with moderate pulmonary HTN and moderate elevated pCWP, admitted after experiencing MUÑOZ, orthopnea, fatigue and hypoxia likely due to acute on chronic diastolic heart failure, in the setting of poof compliance with meds and diet.  At the ED patient received Zithromax and Rocephin x1 and was transitioned to Levaquin, however respiratory symptoms less likely to be caused by infectious etiology, likely associated with CHF exacerbation, RVP (-)  Patient clinically improving, continues to be diuresed with Laxis, lung exam improving as well as lower extremity edema. Cardio and pulmonology consulted.    Noted to have R sided chest pain on 24 December, not reproducible on exam, also noted to have better resolution of swelling in left calf more than right. Chart review revealed pt has refused several doses of lovenox in past days. Suspicious for PE raised, will order CTA chest.    Acute on chronic diastolic CHF (congestive heart failure), NYHA class 2.; CHFpEF, severely reduced RV systolic dysfunction and Severe Pulm HTN, RV volume and pressure overload, slow progression, PE ruled out  c/w Coreg 3.125mg BID,   c/w Hydralazine 25mg BID  On Sildenafil for pulm smooth muscle relaxation for severe Pulm HTN  Monitor urine output, daily weights, strict I's & O's,   C/w nocturnal BIPAP  Pulmonology consult noted  TTE: CHFpEF, severely reduced RV systolic dysfunction and Severe Pulm HTN.   Patient education provided for dietary restrictions and optimal weight management.  Cardio consult noted  decrease Lasix 40mg IV BID to Lasix 40mg daily  -Pleuritic  R chest pain, non reproducible on exam and R LE has slower reduction than L LE, concerning for DVT/PE; will order CTA chest.  -CTA: no PE, noted pulm vasc congestion, superimposed infx vs inflammation. Noted chronic lymphadenopathy (unchanged since 2018)   -Findings concordant with Pulm HTN in TTE  -Will send procal to r/o infx  -Will consider rheum consult for possible sarcoidosis? found in CT, outpt    HTN  -Dash/TLC diet  -Lasix 40 mg daily  -c/w Hydralazine and Coreg  -Weight loss education provided and need for daily exercise.     Pulmonary Hypertension  -C/w Sildenafil as per home regimen     Morbid Obesity  -Weight loss counseling provided     JAN  -Pulm consult noted  -started on CPAP HS in hospital  -will likely require CPAP at home    DVT prophylaxis  -Lovenox 40mg SC BID (Patient obese)     Dispo   Patient likely to be discharge in the next 24-48 hours, when medically stable

## 2019-12-24 NOTE — PROGRESS NOTE ADULT - SUBJECTIVE AND OBJECTIVE BOX
CARDIOLOGY PROGRESS NOTE   ([facility])                                                                                                        Subjective: improved but still, some moody    Vitals:  T(C): 36.8 (12-24-19 @ 08:30), Max: 37.2 (12-23-19 @ 23:41)  HR: 80 (12-24-19 @ 08:30) (80 - 96)  BP: 110/74 (12-24-19 @ 08:30) (101/64 - 122/82)  RR: 18 (12-24-19 @ 08:30) (18 - 18)  SpO2: 94% (12-24-19 @ 08:30) (89% - 95%)  Wt(kg): --  I&O's Summary    23 Dec 2019 07:01  -  24 Dec 2019 07:00  --------------------------------------------------------  IN: 30 mL / OUT: 1160 mL / NET: -1130 mL          PHYSICAL EXAM:  Appearance: Normal	  HEENT:   Atraumatic  Cardiovascular: Normal S1 S2, No JVD, 2/6 SM   Respiratory: Lungs clear to auscultation	  Gastrointestinal:  Soft, Non-tender, + BS	  Skin: No rashes, No ecchymoses, No cyanosis  Neurologic: Alert and awake, able to move extremities  Extremities: + bl ankle  edema        CURRENT MEDICATIONS:  carvedilol 3.125 milliGRAM(s) Oral every 12 hours  enalapril 5 milliGRAM(s) Oral daily  furosemide   Injectable 40 milliGRAM(s) IV Push daily  hydrALAZINE 25 milliGRAM(s) Oral every 8 hours  sildenafil (REVATIO) 20 milliGRAM(s) Oral three times a day        ibuprofen  Tablet. 400 milliGRAM(s) Oral every 6 hours PRN      atorvastatin 40 milliGRAM(s) Oral at bedtime  dextrose 40% Gel 15 Gram(s) Oral once PRN  dextrose 50% Injectable 12.5 Gram(s) IV Push once  dextrose 50% Injectable 25 Gram(s) IV Push once  dextrose 50% Injectable 25 Gram(s) IV Push once  glucagon  Injectable 1 milliGRAM(s) IntraMuscular once PRN  insulin lispro (HumaLOG) corrective regimen sliding scale   SubCutaneous three times a day before meals  insulin lispro (HumaLOG) corrective regimen sliding scale   SubCutaneous at bedtime    aspirin  chewable 81 milliGRAM(s) Oral daily  dextrose 5%. 1000 milliLiter(s) IV Continuous <Continuous>  enoxaparin Injectable 40 milliGRAM(s) SubCutaneous two times a day  fluticasone propionate 50 MICROgram(s)/spray Nasal Spray 1 Spray(s) Both Nostrils two times a day      LABS:	 	          12-23    139  |  100  |  25.0<H>  ----------------------------<  98  4.1   |  26.0  |  1.00    Ca    8.9      23 Dec 2019 08:01  Phos  3.3     12-23  Mg     2.0     12-23      proBNP: Serum Pro-Brain Natriuretic Peptide: 1584 pg/mL (12-20 @ 18:40)    Lipid Profile: Date: 12-21 @ 05:57  Total cholesterol 67; Direct LDL: 29; HDL: 31; Triglycerides:37

## 2019-12-24 NOTE — PROGRESS NOTE ADULT - SUBJECTIVE AND OBJECTIVE BOX
Patient is a 39y old  Male who presents with a chief complaint of difficulty breathing (21 Dec 2019 10:30)    HOSPITALIZATION DAY: 3    INTERVAL/OVERNIGHT EVENTS:  Patient examined at beside. No acute events over night. Patient on BiPap overnight.  This morning reports feeling right chest tightness, increased by coughing and deep breathing. Breathing well.  Patient is tolerating PO diet w/o nausea/vomiting/diarrhea/abdominal pain. Patient is voiding actively. Patient is ambulating.   Rest of ROS not contributory except for above.    I&O's Summary    23 Dec 2019 07:01  -  24 Dec 2019 07:00  --------------------------------------------------------  IN: 30 mL / OUT: 1160 mL / NET: -1130 mL    24 Dec 2019 07:01  -  24 Dec 2019 15:30  --------------------------------------------------------  IN: 0 mL / OUT: 300 mL / NET: -300 mL    Vital Signs Last 24 Hrs  T(C): 36.8 (24 Dec 2019 08:30), Max: 37.2 (23 Dec 2019 23:41)  T(F): 98.2 (24 Dec 2019 08:30), Max: 99 (23 Dec 2019 23:41)  HR: 80 (24 Dec 2019 08:30) (80 - 96)  BP: 110/74 (24 Dec 2019 08:30) (105/71 - 122/82)  RR: 18 (24 Dec 2019 08:30) (18 - 18)  SpO2: 94% (24 Dec 2019 08:30) (89% - 95%)    General: Well nourished/Well developed, NAD, Obese  Neck:  Supple, no sinuses or palpable masses, No JVD noted  Respiratory: Distant lung sounds, B/L crackles in bases, poor inspiratory effort on exam.   CV: RRR, S1S2, no murmur. Right sided pain not reproducible on exam.   Abdominal: Obese, Soft, NT, ND, no palpable mass  Extremities: ++ edema, R>L, mild improvement, + peripheral pulses, FROM all 4 extremity  Neurology: A&Ox3, no focalization signs    LABS    CAPILLARY BLOOD GLUCOSE    POCT Blood Glucose.: 97 mg/dL (24 Dec 2019 12:12)  POCT Blood Glucose.: 85 mg/dL (24 Dec 2019 08:51)  POCT Blood Glucose.: 97 mg/dL (23 Dec 2019 21:22)  POCT Blood Glucose.: 105 mg/dL (23 Dec 2019 17:33)    < from: TTE Echo Complete w/Doppler (12.21.19 @ 08:48) >  Summary:   1. Technically fair study.   2. Left ventricular ejection fraction, by visual estimation, is 65 to 70%.   3. There is moderate septal left ventricular hypertrophy.   4. Severely enlarged right ventricle.   5. Severely reduced RV systolic function.   6. Right ventricular volume and pressure overload.   7. Moderate-severe tricuspid regurgitation.   8. Estimated pulmonary artery systolic pressure is 89.6 mmHg assuming a right atrial pressure of 15 mmHg, which is consistent with severe pulmonary hypertension.   9. Moderately dilated pulmonary artery.    MD Josefina Electronically signed on 12/24/2019 at 2:54:21 PM  < end of copied text >      MEDICATIONS  (STANDING):  aspirin  chewable 81 milliGRAM(s) Oral daily  atorvastatin 40 milliGRAM(s) Oral at bedtime  carvedilol 3.125 milliGRAM(s) Oral every 12 hours  dextrose 5%. 1000 milliLiter(s) (50 mL/Hr) IV Continuous <Continuous>  dextrose 50% Injectable 12.5 Gram(s) IV Push once  dextrose 50% Injectable 25 Gram(s) IV Push once  dextrose 50% Injectable 25 Gram(s) IV Push once  enalapril 5 milliGRAM(s) Oral daily  enoxaparin Injectable 40 milliGRAM(s) SubCutaneous two times a day  fluticasone propionate 50 MICROgram(s)/spray Nasal Spray 1 Spray(s) Both Nostrils two times a day  furosemide   Injectable 40 milliGRAM(s) IV Push daily  hydrALAZINE 25 milliGRAM(s) Oral every 8 hours  insulin lispro (HumaLOG) corrective regimen sliding scale   SubCutaneous three times a day before meals  insulin lispro (HumaLOG) corrective regimen sliding scale   SubCutaneous at bedtime  sildenafil (REVATIO) 20 milliGRAM(s) Oral three times a day    MEDICATIONS  (PRN):  dextrose 40% Gel 15 Gram(s) Oral once PRN Blood Glucose LESS THAN 70 milliGRAM(s)/deciliter  glucagon  Injectable 1 milliGRAM(s) IntraMuscular once PRN Glucose LESS THAN 70 milligrams/deciliter  ibuprofen  Tablet. 400 milliGRAM(s) Oral every 6 hours PRN Moderate Pain (4 - 6) Patient is a 39y old  Male who presents with a chief complaint of difficulty breathing (21 Dec 2019 10:30)      INTERVAL/OVERNIGHT EVENTS:  Patient examined at beside. No acute events over night. Patient on BiPap overnight.  This morning reports feeling right chest tightness, increased by coughing and deep breathing. Breathing well.  Patient is tolerating PO diet w/o nausea/vomiting/diarrhea/abdominal pain. Patient is voiding actively. Patient is ambulating.   Rest of ROS not contributory except for above.    I&O's Summary    23 Dec 2019 07:01  -  24 Dec 2019 07:00  --------------------------------------------------------  IN: 30 mL / OUT: 1160 mL / NET: -1130 mL    24 Dec 2019 07:01  -  24 Dec 2019 15:30  --------------------------------------------------------  IN: 0 mL / OUT: 300 mL / NET: -300 mL    Vital Signs Last 24 Hrs  T(C): 36.8 (24 Dec 2019 08:30), Max: 37.2 (23 Dec 2019 23:41)  T(F): 98.2 (24 Dec 2019 08:30), Max: 99 (23 Dec 2019 23:41)  HR: 80 (24 Dec 2019 08:30) (80 - 96)  BP: 110/74 (24 Dec 2019 08:30) (105/71 - 122/82)  RR: 18 (24 Dec 2019 08:30) (18 - 18)  SpO2: 94% (24 Dec 2019 08:30) (89% - 95%)    General: Well nourished/Well developed, NAD, Obese  Neck:  Supple, no sinuses or palpable masses, No JVD noted  Respiratory: Distant lung sounds, B/L crackles in bases, poor inspiratory effort on exam.   CV: RRR, S1S2, no murmur. Right sided pain not reproducible on exam.   Abdominal: Obese, Soft, NT, ND, no palpable mass  Extremities: ++ edema, R>L, mild improvement, + peripheral pulses, FROM all 4 extremity  Neurology: A&Ox3, no focalization signs    LABS    CAPILLARY BLOOD GLUCOSE    POCT Blood Glucose.: 97 mg/dL (24 Dec 2019 12:12)  POCT Blood Glucose.: 85 mg/dL (24 Dec 2019 08:51)  POCT Blood Glucose.: 97 mg/dL (23 Dec 2019 21:22)  POCT Blood Glucose.: 105 mg/dL (23 Dec 2019 17:33)    < from: TTE Echo Complete w/Doppler (12.21.19 @ 08:48) >  Summary:   1. Technically fair study.   2. Left ventricular ejection fraction, by visual estimation, is 65 to 70%.   3. There is moderate septal left ventricular hypertrophy.   4. Severely enlarged right ventricle.   5. Severely reduced RV systolic function.   6. Right ventricular volume and pressure overload.   7. Moderate-severe tricuspid regurgitation.   8. Estimated pulmonary artery systolic pressure is 89.6 mmHg assuming a right atrial pressure of 15 mmHg, which is consistent with severe pulmonary hypertension.   9. Moderately dilated pulmonary artery.    MD Josefina Electronically signed on 12/24/2019 at 2:54:21 PM  < end of copied text >    < from: CT Angio Chest w/ IV Cont (12.24.19 @ 16:27) >  IMPRESSION:     No pulmonary embolism.    Pulmonary findings as detailed above likely represent a combination of pulmonary vascular congestion with superimposed infectious or inflammatory process.    Diffuse bulky mediastinal and hilar lymphadenopathy is unchanged from 2018.    NED DEGROOT M.D. ATTENDING RADIOLOGIST  This document has been electronically signed. Dec [id number]  4:47PM  < end of copied text >    MEDICATIONS  (STANDING):  aspirin  chewable 81 milliGRAM(s) Oral daily  atorvastatin 40 milliGRAM(s) Oral at bedtime  carvedilol 3.125 milliGRAM(s) Oral every 12 hours  dextrose 5%. 1000 milliLiter(s) (50 mL/Hr) IV Continuous <Continuous>  dextrose 50% Injectable 12.5 Gram(s) IV Push once  dextrose 50% Injectable 25 Gram(s) IV Push once  dextrose 50% Injectable 25 Gram(s) IV Push once  enalapril 5 milliGRAM(s) Oral daily  enoxaparin Injectable 40 milliGRAM(s) SubCutaneous two times a day  fluticasone propionate 50 MICROgram(s)/spray Nasal Spray 1 Spray(s) Both Nostrils two times a day  furosemide   Injectable 40 milliGRAM(s) IV Push daily  hydrALAZINE 25 milliGRAM(s) Oral every 8 hours  insulin lispro (HumaLOG) corrective regimen sliding scale   SubCutaneous three times a day before meals  insulin lispro (HumaLOG) corrective regimen sliding scale   SubCutaneous at bedtime  sildenafil (REVATIO) 20 milliGRAM(s) Oral three times a day    MEDICATIONS  (PRN):  dextrose 40% Gel 15 Gram(s) Oral once PRN Blood Glucose LESS THAN 70 milliGRAM(s)/deciliter  glucagon  Injectable 1 milliGRAM(s) IntraMuscular once PRN Glucose LESS THAN 70 milligrams/deciliter  ibuprofen  Tablet. 400 milliGRAM(s) Oral every 6 hours PRN Moderate Pain (4 - 6)

## 2019-12-24 NOTE — PROGRESS NOTE ADULT - ASSESSMENT
39M with morbid obesity, R sided heart failure (normal EF), RHC at [facility] in 2008 with moderate pulmonary HTN and moderate elevated pCWP, admitted after experiencing MUÑOZ, orthopnea, fatigue and hypoxia likely due to acute on chronic diastolic heart failure, non compliance with meds and diet    Acute on chronic diastolic and right heart failure  Improved, still needs IV lasix with eventual transition to PO  Continue coreg, enalapril, hydralazine, aspirin, and sildenafil .     JAN  Pulm following  Not compliant with Bipap      CARDIO MEDS  carvedilol 3.125 milliGRAM(s) Oral every 12 hours  enalapril 5 milliGRAM(s) Oral daily  furosemide   Injectable 40 milliGRAM(s) IV Push daily  hydrALAZINE 25 milliGRAM(s) Oral every 8 hours  sildenafil (REVATIO) 20 milliGRAM(s) Oral three times a day  atorvastatin 40 milliGRAM(s) Oral at bedtime  aspirin  chewable 81 milliGRAM(s) Oral daily  enoxaparin Injectable 40 milliGRAM(s) SubCutaneous two times a day

## 2019-12-24 NOTE — PROGRESS NOTE ADULT - ASSESSMENT
39M with morbid obesity, R sided heart failure (normal EF), RHC at [facility] in 2008 with moderate pulmonary HTN and moderate elevated pCWP, admitted after experiencing MUÑOZ, orthopnea, fatigue and hypoxia likely due to acute on chronic diastolic heart failure, non compliance with meds and diet    Acute on chronic diastolic and right heart failure    Feeling a bit better, ambulating  Patient says that ultimately he will return to [facility] for medical care (few weeks)  Will likely require CPAP for home use

## 2019-12-25 DIAGNOSIS — G47.33 OBSTRUCTIVE SLEEP APNEA (ADULT) (PEDIATRIC): ICD-10-CM

## 2019-12-25 LAB
ANION GAP SERPL CALC-SCNC: 14 MMOL/L — SIGNIFICANT CHANGE UP (ref 5–17)
BUN SERPL-MCNC: 25 MG/DL — HIGH (ref 8–20)
CALCIUM SERPL-MCNC: 9.6 MG/DL — SIGNIFICANT CHANGE UP (ref 8.6–10.2)
CHLORIDE SERPL-SCNC: 97 MMOL/L — LOW (ref 98–107)
CO2 SERPL-SCNC: 26 MMOL/L — SIGNIFICANT CHANGE UP (ref 22–29)
CREAT SERPL-MCNC: 0.98 MG/DL — SIGNIFICANT CHANGE UP (ref 0.5–1.3)
GLUCOSE BLDC GLUCOMTR-MCNC: 108 MG/DL — HIGH (ref 70–99)
GLUCOSE BLDC GLUCOMTR-MCNC: 127 MG/DL — HIGH (ref 70–99)
GLUCOSE BLDC GLUCOMTR-MCNC: 91 MG/DL — SIGNIFICANT CHANGE UP (ref 70–99)
GLUCOSE BLDC GLUCOMTR-MCNC: 99 MG/DL — SIGNIFICANT CHANGE UP (ref 70–99)
GLUCOSE SERPL-MCNC: 101 MG/DL — SIGNIFICANT CHANGE UP (ref 70–115)
MAGNESIUM SERPL-MCNC: 2 MG/DL — SIGNIFICANT CHANGE UP (ref 1.6–2.6)
PHOSPHATE SERPL-MCNC: 4 MG/DL — SIGNIFICANT CHANGE UP (ref 2.4–4.7)
POTASSIUM SERPL-MCNC: 4.2 MMOL/L — SIGNIFICANT CHANGE UP (ref 3.5–5.3)
POTASSIUM SERPL-SCNC: 4.2 MMOL/L — SIGNIFICANT CHANGE UP (ref 3.5–5.3)
SODIUM SERPL-SCNC: 137 MMOL/L — SIGNIFICANT CHANGE UP (ref 135–145)

## 2019-12-25 PROCEDURE — 99232 SBSQ HOSP IP/OBS MODERATE 35: CPT

## 2019-12-25 PROCEDURE — 99232 SBSQ HOSP IP/OBS MODERATE 35: CPT | Mod: GC

## 2019-12-25 PROCEDURE — 93971 EXTREMITY STUDY: CPT | Mod: 26,RT

## 2019-12-25 RX ORDER — FUROSEMIDE 40 MG
40 TABLET ORAL DAILY
Refills: 0 | Status: DISCONTINUED | OUTPATIENT
Start: 2019-12-25 | End: 2019-12-25

## 2019-12-25 RX ORDER — FUROSEMIDE 40 MG
40 TABLET ORAL DAILY
Refills: 0 | Status: DISCONTINUED | OUTPATIENT
Start: 2019-12-25 | End: 2019-12-27

## 2019-12-25 RX ADMIN — Medication 81 MILLIGRAM(S): at 11:34

## 2019-12-25 RX ADMIN — Medication 20 MILLIGRAM(S): at 05:20

## 2019-12-25 RX ADMIN — Medication 20 MILLIGRAM(S): at 16:32

## 2019-12-25 RX ADMIN — CARVEDILOL PHOSPHATE 3.12 MILLIGRAM(S): 80 CAPSULE, EXTENDED RELEASE ORAL at 18:00

## 2019-12-25 RX ADMIN — Medication 20 MILLIGRAM(S): at 22:32

## 2019-12-25 RX ADMIN — Medication 25 MILLIGRAM(S): at 22:32

## 2019-12-25 RX ADMIN — Medication 40 MILLIGRAM(S): at 12:58

## 2019-12-25 RX ADMIN — Medication 1 SPRAY(S): at 05:20

## 2019-12-25 RX ADMIN — ATORVASTATIN CALCIUM 40 MILLIGRAM(S): 80 TABLET, FILM COATED ORAL at 22:32

## 2019-12-25 NOTE — PROGRESS NOTE ADULT - PROBLEM SELECTOR PLAN 1
CHF exacerbation   still volume overloaded   continue iv lasix   daily weights and strict i/o not completed, reinforced with nursing staff   Continue coreg, enalapril, hydralazine, aspirin, and sildenafil   Diet/lifestyle modifications and medication compliance heavily reinforced

## 2019-12-25 NOTE — PROGRESS NOTE ADULT - SUBJECTIVE AND OBJECTIVE BOX
PULMONARY PROGRESS NOTE      [facility] [id number]    Patient is a 39y old  Male who presents with a chief complaint of difficulty breathing (24 Dec 2019 15:26)  Morbid obesity   JAN/OHS  Pulmonary HTN    INTERVAL HPI/OVERNIGHT EVENTS:  Little change  Marginal     MEDICATIONS  (STANDING):  aspirin  chewable 81 milliGRAM(s) Oral daily  atorvastatin 40 milliGRAM(s) Oral at bedtime  carvedilol 3.125 milliGRAM(s) Oral every 12 hours  dextrose 5%. 1000 milliLiter(s) (50 mL/Hr) IV Continuous <Continuous>  dextrose 50% Injectable 12.5 Gram(s) IV Push once  dextrose 50% Injectable 25 Gram(s) IV Push once  dextrose 50% Injectable 25 Gram(s) IV Push once  enalapril 5 milliGRAM(s) Oral daily  enoxaparin Injectable 40 milliGRAM(s) SubCutaneous two times a day  fluticasone propionate 50 MICROgram(s)/spray Nasal Spray 1 Spray(s) Both Nostrils two times a day  furosemide   Injectable 40 milliGRAM(s) IV Push daily  hydrALAZINE 25 milliGRAM(s) Oral every 8 hours  insulin lispro (HumaLOG) corrective regimen sliding scale   SubCutaneous three times a day before meals  insulin lispro (HumaLOG) corrective regimen sliding scale   SubCutaneous at bedtime  sildenafil (REVATIO) 20 milliGRAM(s) Oral three times a day      MEDICATIONS  (PRN):  acetaminophen   Tablet .. 650 milliGRAM(s) Oral every 6 hours PRN Temp greater or equal to 38C (100.4F), Mild Pain (1 - 3)  dextrose 40% Gel 15 Gram(s) Oral once PRN Blood Glucose LESS THAN 70 milliGRAM(s)/deciliter  glucagon  Injectable 1 milliGRAM(s) IntraMuscular once PRN Glucose LESS THAN 70 milligrams/deciliter  ibuprofen  Tablet. 400 milliGRAM(s) Oral every 6 hours PRN Moderate Pain (4 - 6)  ketorolac   Injectable 15 milliGRAM(s) IV Push every 8 hours PRN Severe Pain (7 - 10)      Allergies    No Known Allergies    Intolerances        PAST MEDICAL & SURGICAL HISTORY:  Obesity, morbid  Pulmonary hypertension  CHF (congestive heart failure)  Hypertension  No significant past surgical history      SOCIAL HISTORY  Smoking History:       REVIEW OF SYSTEMS:    CONSTITUTIONAL:  Mild distress    HEENT:  Eyes:  No diplopia or blurred vision. ENT:  No earache, sore throat or runny nose.    CARDIOVASCULAR:  No pressure, squeezing, tightness, heaviness or aching about the chest; no palpitations.    RESPIRATORY:  No cough, shortness of breath, PND or orthopnea. Mild SOBOE    GASTROINTESTINAL:  No nausea, vomiting or diarrhea.    GENITOURINARY:  No dysuria, frequency or urgency.    NEUROLOGIC:  No paresthesias, fasciculations, seizures or weakness.    PSYCHIATRIC:  No disorder of thought or mood.    Vital Signs Last 24 Hrs  T(C): 36.3 (25 Dec 2019 07:20), Max: 36.9 (24 Dec 2019 15:26)  T(F): 97.4 (25 Dec 2019 07:20), Max: 98.4 (24 Dec 2019 15:26)  HR: 80 (25 Dec 2019 07:20) (78 - 87)  BP: 119/72 (25 Dec 2019 07:20) (97/67 - 119/72)  BP(mean): --  RR: 17 (25 Dec 2019 07:20) (17 - 18)  SpO2: 92% (25 Dec 2019 07:20) (90% - 96%)    PHYSICAL EXAMINATION:    GENERAL: The patient is awake and alert in no apparent distress.     HEENT: Head is normocephalic and atraumatic. Extraocular muscles are intact. Mucous membranes are moist.    NECK: Supple.    LUNGS: Clear to auscultation without wheezing, rales or rhonchi; respirations unlabored    HEART: Regular rate and rhythm without murmur.    ABDOMEN: Soft, nontender, and nondistended.      EXTREMITIES: Without any cyanosis, clubbing, rash, lesions or edema.    NEUROLOGIC: Grossly intact.    LABS:          Procalcitonin, Serum: 0.10 ng/mL (12-24-19 @ 18:20)      MICROBIOLOGY: RVP neg    RADIOLOGY & ADDITIONAL STUDIES:    CTA on 12/29/19  FINDINGS:    LUNGS AND AIRWAYS: Patent central airways.  There is diffuse groundglass attenuation with areas of sparing, particularly in the lung bases. There is mild interlobular septal thickening. These findings likely represent pulmonary vascular congestion with possible superimposed infectious or inflammatory process.    PLEURA: Small right pleural effusion.    MEDIASTINUM AND CONTRERAS: Diffuse bulky mediastinal and hilar lymphadenopathy is similar to 2/20/2018. Example is a subcarinal lymph node which measures 4.8 x 2.2 cm on image #65/series 6. High right paratracheal lymph node which measures 3.5 x 2.7 cm on image #34/series 6.    VESSELS: There is no pulmonary embolism. The thoracic aorta and main pulmonary artery are normal in caliber.    HEART: Cardiomegaly. No pericardial effusion.    CHEST WALL AND LOWER NECK: The thyroid gland is within normal limits. No definite supraclavicular or axillary lymphadenopathy.    VISUALIZED UPPER ABDOMEN: The adrenal glands are within normal limits. The imaged portions of the liver, spleen, pancreas and kidneys are within normal limits.    BONES: Multilevel degenerative change of the thoracic spine with prominent osteophytes.    IMPRESSION:     No pulmonary embolism.    Pulmonary findings as detailed above likely represent a combination of pulmonary vascular congestion with superimposed infectious or inflammatory process.    Diffuse bulky mediastinal and hilar lymphadenopathy is unchanged from 2018.      NED DEGROOT M.D. ATTENDING RADIOLOGIST  This document has been electronically signed. Dec [id number]  4:47PM

## 2019-12-25 NOTE — PROGRESS NOTE ADULT - ASSESSMENT
Assess    Morbid obesity   Non-compliance with Bipap and other Rx  Chronic hypoxic and hypercarbic respiratory failue  No PE  HFpEF - diastolic dysfunction  Dying of obesity with non-compliance  Guarded outlook    Rec    Bipap or NIV as able  Bariatric referral on DC as able  Diuresis  Lovenox  02  Weight loss as able  Consider Palliative opinion

## 2019-12-25 NOTE — PROGRESS NOTE ADULT - SUBJECTIVE AND OBJECTIVE BOX
[facility]                          [address] [place] [address]                       Phone: [phone]. Fax:[phone]                      ________________________________________________           Reason for follow up/Overnight events: follow up, still volume overloaded.     HPI:  40 y/o male with h/o chf, pulmonary htn, JAN appears non compliant with bipap, Dm and obesity was visiting some friends in [place] and for past 2 days was getting more water retention in his belly and legs associated with exertional sob , better with rest, some orthopnea, some cough / phlegm, no fever, no chills. no abd. pain. no n/v/d. chest wall hurts with cough, no radiation of chest wall pain reported. no dizziness, no syncopy. As per pt, he is compliant with meds and diet, appears questionable ? pt. was admitted in a hospital in [place] 3 weeks ago for chf as per record on it. pt's discharge medicine list had sildenafil 20 mg po tid which is not in our record. (20 Dec 2019 21:08)      ROS: All review of systems negative unless indicated otherwise below.                          LAB RESULTS                   COMPLETE BLOOD COUNT( 21 Dec 2019 05:57 )                            12.8 g/dL<L>  4.95 K/uL )---------------( 164 K/uL                        41.2 %      Automated Differential     Auto Basophil # - 0.03 K/uL  Auto Basophil % - 0.6 %  Auto Eosinophil # - 0.18 K/uL  Auto Eosinophil % - 3.6 %  Auto Immature Granulocyte # - X      Auto Immature Granulocyte % - 0.4 %  Auto Lymphocyte # - 0.75 K/uL<L>  Auto Lymphocyte % - 15.2 %  Auto Monocyte # - 0.39 K/uL  Auto Monocyte % - 7.9 %  Auto Neutrophil # - 3.58 K/uL  Auto Neutrophil % - 72.3 %                                  CHEMISTRY                 Basic Metabolic Panel (12-23-19 @ 08:01)    139  |  100  |  25.0<H>  ----------------------------<  98  4.1   |  26.0  |  1.00    Ca    8.9      23 Dec 2019 08:01  Phos  3.3     12-23  Mg     2.0     12-23                    Liver Functions (12-20-19 @ 18:40))  TPro  8.6  /  Alb  3.5  /  TBili  1.4  /  DBili  x   /  AST  18  /  ALT  12  /  AlkPhos  78     PT/INR/PTT ( 22 Dec 2019 07:57 )                        :                       :      21.0         :       32.9                  .        .                   .              .           .       1.79        .                                                                   Cardiac Enzymes   ( 21 Dec 2019 13:21 )  Troponin T  <0.01,  CPK  X    , CKMB  X    , BNP X        , ( 21 Dec 2019 05:57 )  Troponin T  <0.01,  CPK  X    , CKMB  X    , BNP X        , ( 20 Dec 2019 18:40 )  Troponin T  <0.01,  CPK  X    , CKMB  X    , BNP 1584<H>                              MICROBIOLOGY/CULTURES:    Rapid RVP Result: NotDetec (12-21-19 @ 11:54)                          RADIOLOGY RESULTS: Personally visualized   < from: CT Angio Chest w/ IV Cont (12.24.19 @ 16:27) >  IMPRESSION:     No pulmonary embolism.    Pulmonary findings as detailed above likely represent a combination of pulmonary vascular congestion with superimposed infectious or inflammatory process.    Diffuse bulky mediastinal and hilar lymphadenopathy is unchanged from 2018.    < end of copied text >                          CARDIOLOGY RESULTS: Official Report/Preliminary Verbal Reports    ECHO:   < from: TTE Echo Complete w/Doppler (12.21.19 @ 08:48) >  Summary:   1. Technically fair study.   2. Left ventricular ejection fraction, by visual estimation, is 65 to 70%.   3. There is moderate septal left ventricular hypertrophy.   4. Severely enlarged right ventricle.   5. Severely reduced RV systolic function.   6. Right ventricular volume and pressure overload.   7. Moderate-severe tricuspid regurgitation.   8. Estimated pulmonary artery systolic pressure is 89.6 mmHg assuming a right atrial pressure of 15 mmHg, which is consistent with severe pulmonary hypertension.   9. Moderately dilated pulmonary artery.    MD Josefina Electronically signed on 12/24/2019 at 2:54:21 PM    < end of copied text >                                INTAKE AND OUTPUT - 48 HOUR TREND     12-23-19 @ 07:01  -  12-24-19 @ 07:00  --------------------------------------------------------  IN:  Total IN: 0 mL    OUT:    Voided: 1160 mL  Total OUT: 1160 mL    Total NET: -1160 mL      12-24-19 @ 07:01  -  12-25-19 @ 07:00  --------------------------------------------------------  IN:  Total IN: 0 mL    OUT:    Voided: 1600 mL  Total OUT: 1600 mL    Total NET: -1600 mL      HOME MEDICATIONS:  sildenafil 20 mg oral tablet: 1 tab(s) orally 3 times a day (20 Dec 2019 21:18)                             Current Admission Active Medications    acetaminophen   Tablet .. 650 milliGRAM(s) Oral every 6 hours PRN Temp greater or equal to 38C (100.4F), Mild Pain (1 - 3)  aspirin  chewable 81 milliGRAM(s) Oral daily  atorvastatin 40 milliGRAM(s) Oral at bedtime  carvedilol 3.125 milliGRAM(s) Oral every 12 hours  dextrose 40% Gel 15 Gram(s) Oral once PRN Blood Glucose LESS THAN 70 milliGRAM(s)/deciliter  dextrose 5%. 1000 milliLiter(s) (50 mL/Hr) IV Continuous <Continuous>  dextrose 50% Injectable 12.5 Gram(s) IV Push once  dextrose 50% Injectable 25 Gram(s) IV Push once  dextrose 50% Injectable 25 Gram(s) IV Push once  enalapril 5 milliGRAM(s) Oral daily  enoxaparin Injectable 40 milliGRAM(s) SubCutaneous two times a day  fluticasone propionate 50 MICROgram(s)/spray Nasal Spray 1 Spray(s) Both Nostrils two times a day  furosemide   Injectable 40 milliGRAM(s) IV Push daily  glucagon  Injectable 1 milliGRAM(s) IntraMuscular once PRN Glucose LESS THAN 70 milligrams/deciliter  hydrALAZINE 25 milliGRAM(s) Oral every 8 hours  ibuprofen  Tablet. 400 milliGRAM(s) Oral every 6 hours PRN Moderate Pain (4 - 6)  insulin lispro (HumaLOG) corrective regimen sliding scale   SubCutaneous three times a day before meals  insulin lispro (HumaLOG) corrective regimen sliding scale   SubCutaneous at bedtime  ketorolac   Injectable 15 milliGRAM(s) IV Push every 8 hours PRN Severe Pain (7 - 10)  sildenafil (REVATIO) 20 milliGRAM(s) Oral three times a day                        PHYSICAL EXAM:    Vital Signs Last 24 Hrs  T(C): 36.3 (25 Dec 2019 07:20), Max: 36.9 (24 Dec 2019 15:26)  T(F): 97.4 (25 Dec 2019 07:20), Max: 98.4 (24 Dec 2019 15:26)  HR: 80 (25 Dec 2019 07:20) (78 - 87)  BP: 119/72 (25 Dec 2019 07:20) (97/67 - 119/72)  BP(mean): --  RR: 17 (25 Dec 2019 07:20) (17 - 18)  SpO2: 92% (25 Dec 2019 07:20) (90% - 96%)    GENERAL: NAD  NECK: Supple, No JVD  NERVOUS SYSTEM:  Alert & Oriented X3, non focal neuro exam.   CHEST/LUNG: diminished bibasilar crackles, coarse lungs likely some atelectasis   HEART: Regular rate and rhythm; s1 and s2 auscultated, No murmurs, rubs, or gallops  ABDOMEN: Soft, Nontender, Nondistended; Bowel sounds present and normoactive.   EXTREMITIES:  2+ Peripheral Pulses, No clubbing, cyanosis, +3 b/l LE edema [facility]                          [address] [place] [address]                       Phone: [phone]. Fax:[phone]                      ________________________________________________           Reason for follow up/Overnight events: follow up, still volume overloaded.     HPI:  38 y/o male with h/o chf, pulmonary htn, JAN appears non compliant with bipap, Dm and obesity was visiting some friends in [place] and for past 2 days was getting more water retention in his belly and legs associated with exertional sob , better with rest, some orthopnea, some cough / phlegm, no fever, no chills. no abd. pain. no n/v/d. chest wall hurts with cough, no radiation of chest wall pain reported. no dizziness, no syncopy. As per pt, he is compliant with meds and diet, appears questionable ? pt. was admitted in a hospital in [place] 3 weeks ago for chf as per record on it. pt's discharge medicine list had sildenafil 20 mg po tid which is not in our record. (20 Dec 2019 21:08)      ROS: All review of systems negative unless indicated otherwise below.                          LAB RESULTS                   COMPLETE BLOOD COUNT( 21 Dec 2019 05:57 )                            12.8 g/dL<L>  4.95 K/uL )---------------( 164 K/uL                        41.2 %      Automated Differential     Auto Basophil # - 0.03 K/uL  Auto Basophil % - 0.6 %  Auto Eosinophil # - 0.18 K/uL  Auto Eosinophil % - 3.6 %  Auto Immature Granulocyte # - X      Auto Immature Granulocyte % - 0.4 %  Auto Lymphocyte # - 0.75 K/uL<L>  Auto Lymphocyte % - 15.2 %  Auto Monocyte # - 0.39 K/uL  Auto Monocyte % - 7.9 %  Auto Neutrophil # - 3.58 K/uL  Auto Neutrophil % - 72.3 %                                  CHEMISTRY                 Basic Metabolic Panel (12-23-19 @ 08:01)    139  |  100  |  25.0<H>  ----------------------------<  98  4.1   |  26.0  |  1.00    Ca    8.9      23 Dec 2019 08:01  Phos  3.3     12-23  Mg     2.0     12-23                    Liver Functions (12-20-19 @ 18:40))  TPro  8.6  /  Alb  3.5  /  TBili  1.4  /  DBili  x   /  AST  18  /  ALT  12  /  AlkPhos  78     PT/INR/PTT ( 22 Dec 2019 07:57 )                        :                       :      21.0         :       32.9                  .        .                   .              .           .       1.79        .                                                                   Cardiac Enzymes   ( 21 Dec 2019 13:21 )  Troponin T  <0.01,  CPK  X    , CKMB  X    , BNP X        , ( 21 Dec 2019 05:57 )  Troponin T  <0.01,  CPK  X    , CKMB  X    , BNP X        , ( 20 Dec 2019 18:40 )  Troponin T  <0.01,  CPK  X    , CKMB  X    , BNP 1584<H>                              MICROBIOLOGY/CULTURES:    Rapid RVP Result: NotDetec (12-21-19 @ 11:54)                          RADIOLOGY RESULTS: Personally visualized   < from: CT Angio Chest w/ IV Cont (12.24.19 @ 16:27) >  IMPRESSION:     No pulmonary embolism.    Pulmonary findings as detailed above likely represent a combination of pulmonary vascular congestion with superimposed infectious or inflammatory process.    Diffuse bulky mediastinal and hilar lymphadenopathy is unchanged from 2018.    < end of copied text >                          CARDIOLOGY RESULTS: Official Report/Preliminary Verbal Reports    ECHO:   < from: TTE Echo Complete w/Doppler (12.21.19 @ 08:48) >  Summary:   1. Technically fair study.   2. Left ventricular ejection fraction, by visual estimation, is 65 to 70%.   3. There is moderate septal left ventricular hypertrophy.   4. Severely enlarged right ventricle.   5. Severely reduced RV systolic function.   6. Right ventricular volume and pressure overload.   7. Moderate-severe tricuspid regurgitation.   8. Estimated pulmonary artery systolic pressure is 89.6 mmHg assuming a right atrial pressure of 15 mmHg, which is consistent with severe pulmonary hypertension.   9. Moderately dilated pulmonary artery.    MD Josefina Electronically signed on 12/24/2019 at 2:54:21 PM    < end of copied text >                                INTAKE AND OUTPUT - 48 HOUR TREND     12-23-19 @ 07:01  -  12-24-19 @ 07:00  --------------------------------------------------------  IN:  Total IN: 0 mL    OUT:    Voided: 1160 mL  Total OUT: 1160 mL    Total NET: -1160 mL      12-24-19 @ 07:01  -  12-25-19 @ 07:00  --------------------------------------------------------  IN:  Total IN: 0 mL    OUT:    Voided: 1600 mL  Total OUT: 1600 mL    Total NET: -1600 mL      HOME MEDICATIONS:  sildenafil 20 mg oral tablet: 1 tab(s) orally 3 times a day (20 Dec 2019 21:18)                             Current Admission Active Medications    acetaminophen   Tablet .. 650 milliGRAM(s) Oral every 6 hours PRN Temp greater or equal to 38C (100.4F), Mild Pain (1 - 3)  aspirin  chewable 81 milliGRAM(s) Oral daily  atorvastatin 40 milliGRAM(s) Oral at bedtime  carvedilol 3.125 milliGRAM(s) Oral every 12 hours  dextrose 40% Gel 15 Gram(s) Oral once PRN Blood Glucose LESS THAN 70 milliGRAM(s)/deciliter  dextrose 5%. 1000 milliLiter(s) (50 mL/Hr) IV Continuous <Continuous>  dextrose 50% Injectable 12.5 Gram(s) IV Push once  dextrose 50% Injectable 25 Gram(s) IV Push once  dextrose 50% Injectable 25 Gram(s) IV Push once  enalapril 5 milliGRAM(s) Oral daily  enoxaparin Injectable 40 milliGRAM(s) SubCutaneous two times a day  fluticasone propionate 50 MICROgram(s)/spray Nasal Spray 1 Spray(s) Both Nostrils two times a day  furosemide   Injectable 40 milliGRAM(s) IV Push daily  glucagon  Injectable 1 milliGRAM(s) IntraMuscular once PRN Glucose LESS THAN 70 milligrams/deciliter  hydrALAZINE 25 milliGRAM(s) Oral every 8 hours  ibuprofen  Tablet. 400 milliGRAM(s) Oral every 6 hours PRN Moderate Pain (4 - 6)  insulin lispro (HumaLOG) corrective regimen sliding scale   SubCutaneous three times a day before meals  insulin lispro (HumaLOG) corrective regimen sliding scale   SubCutaneous at bedtime  ketorolac   Injectable 15 milliGRAM(s) IV Push every 8 hours PRN Severe Pain (7 - 10)  sildenafil (REVATIO) 20 milliGRAM(s) Oral three times a day                        PHYSICAL EXAM:    Vital Signs Last 24 Hrs  T(C): 36.3 (25 Dec 2019 07:20), Max: 36.9 (24 Dec 2019 15:26)  T(F): 97.4 (25 Dec 2019 07:20), Max: 98.4 (24 Dec 2019 15:26)  HR: 80 (25 Dec 2019 07:20) (78 - 87)  BP: 119/72 (25 Dec 2019 07:20) (97/67 - 119/72)  BP(mean): --  RR: 17 (25 Dec 2019 07:20) (17 - 18)  SpO2: 92% (25 Dec 2019 07:20) (90% - 96%)    GENERAL: NAD  NECK: Supple, No JVD  NERVOUS SYSTEM:  Alert & Oriented X3, non focal neuro exam.   CHEST/LUNG: diminished bibasilar crackles, coarse lungs likely some atelectasis   HEART: Regular rate and rhythm; s1 and s2 auscultated, No murmurs, rubs, or gallops  ABDOMEN: Soft, Nontender, Nondistended; Bowel sounds present and normoactive.   EXTREMITIES:  2+ Peripheral Pulses, No clubbing, cyanosis, +3 b/l LE edema       < from: CT Angio Chest w/ IV Cont (12.24.19 @ 16:27) >    No pulmonary embolism.    Pulmonary findings as detailed above likely represent a combination of pulmonary vascular congestion with superimposed infectious or inflammatory process.    Diffuse bulky mediastinal and hilar lymphadenopathy is unchanged from 2018.      < end of copied text >

## 2019-12-25 NOTE — PROGRESS NOTE ADULT - PROBLEM SELECTOR PLAN 3
cta negative  symptoms likely chf exacerbation w/ atelectasis  continue diuresis   incentive spirometry

## 2019-12-25 NOTE — PROGRESS NOTE ADULT - ASSESSMENT
38yo M with morbid obesity, R sided heart failure (normal EF), RHC at [facility] in 2008 with moderate pulmonary HTN and moderate elevated pCWP, admitted after experiencing MUÑOZ, orthopnea, fatigue and hypoxia likely due to acute on chronic diastolic heart failure, in the setting of poof compliance with meds and diet.  At the ED patient received Zithromax and Rocephin x1 and was transitioned to Levaquin, however respiratory symptoms less likely to be caused by infectious etiology, likely associated with CHF exacerbation, RVP (-)  Patient clinically improving, continues to be diuresed with Laxis, lung exam improving as well as lower extremity edema. Cardio and pulmonology consulted.    Noted to have R sided chest pain on 24 December, not reproducible on exam, also noted to have better resolution of swelling in left calf more than right. Chart review revealed pt has refused several doses of lovenox in past days. Suspicious for PE raised, will order CTA chest.    Acute on chronic diastolic CHF (congestive heart failure), NYHA class 2.; CHFpEF, severely reduced RV systolic dysfunction and Severe Pulm HTN, RV volume and pressure overload, slow progression, PE ruled out  -c/w Coreg 3.125mg BID,   -c/w Hydralazine 25mg BID  -On Sildenafil for pulm smooth muscle relaxation for severe Pulm HTN  -Monitor urine output, daily weights, strict I's & O's,   -C/w nocturnal BIPAP  -Pulmonology consult noted  -TTE: CHFpEF, severely reduced RV systolic dysfunction and Severe Pulm HTN.   -Patient education provided for dietary restrictions and optimal weight management.  -Bariatric surgery recommended  -Cardio consult noted  -c/w Lasix 40mg daily  -Pleuritic  R chest pain, non reproducible on exam and R LE has slower reduction than L LE, concerning for DVT/PE; will order CTA chest.  -CTA: no PE, noted pulm vasc congestion, superimposed infx vs inflammation. Noted chronic lymphadenopathy (unchanged since 2018)   -Findings concordant with Pulm HTN in TTE  -Procal WNL    HTN  -Dash/TLC diet  -Lasix 40 mg daily  -c/w Hydralazine and Coreg  -Weight loss education provided and need for daily exercise.   -Bariatric sx recommended    JAN, chronic hypoxic and hypercarbic failure  -Pulm consult noted  -started on CPAP HS in hospital  -will likely require CPAP at home    Pulmonary Hypertension  -C/w Sildenafil as per home regimen     Pulmonary Sarcoidosis?  -Will consider rheum consult for possible sarcoidosis? found in CT, outpt    Morbid Obesity  -Weight loss counseling provided  -Bariatric surgery recommended     DVT prophylaxis  -Lovenox 40mg SC BID (Patient obese)     Dispo   Patient likely to be discharge in the next 24-48 hours, when medically stable  High risk of readmission, given noncompliance to medications and nocturnal BIPAP    F/U  PCP, Cardio, Pulm, Rheum    Full code

## 2019-12-25 NOTE — PROGRESS NOTE ADULT - SUBJECTIVE AND OBJECTIVE BOX
Patient is a 39y old  Male who presents with a chief complaint of difficulty breathing (21 Dec 2019 10:30)    INTERVAL/OVERNIGHT EVENTS:  Patient examined at beside. No acute events over night. Patient on BiPap overnight.  Patient is tolerating PO diet w/o nausea/vomiting/diarrhea/abdominal pain. Patient is voiding actively. Patient is ambulating.   Rest of ROS not contributory except for above.    I&O's Summary    24 Dec 2019 07:01  -  25 Dec 2019 07:00  --------------------------------------------------------  IN: 0 mL / OUT: 1600 mL / NET: -1600 mL    25 Dec 2019 07:01  -  25 Dec 2019 19:28  --------------------------------------------------------  IN: 413 mL / OUT: 2100 mL / NET: -1687 mL    Vital Signs Last 24 Hrs  T(C): 36 (25 Dec 2019 16:29), Max: 36.7 (24 Dec 2019 23:10)  T(F): 96.8 (25 Dec 2019 16:29), Max: 98 (24 Dec 2019 23:10)  HR: 95 (25 Dec 2019 17:58) (78 - 95)  BP: 110/73 (25 Dec 2019 17:58) (97/67 - 119/72)  RR: 20 (25 Dec 2019 17:58) (17 - 20)  SpO2: 92% (25 Dec 2019 17:58) (90% - 96%)    General: Well nourished/Well developed, NAD, Obese  Neck:  Supple, no sinuses or palpable masses, No JVD noted  Respiratory: Distant lung sounds, B/L crackles in bases, poor inspiratory effort on exam.   CV: RRR, S1S2, no murmur.  Abdominal: Obese, Soft, NT, ND, no palpable mass  Extremities: ++ edema, mild improvement, + peripheral pulses, FROM all 4 extremity  Neurology: A&Ox3, no focalization signs    12-25    137  |  97<L>  |  25.0<H>  ----------------------------<  101  4.2   |  26.0  |  0.98    Ca    9.6      25 Dec 2019 13:46  Phos  4.0     12-25  Mg     2.0     12-25    CAPILLARY BLOOD GLUCOSE    POCT Blood Glucose.: 127 mg/dL (25 Dec 2019 16:45)  POCT Blood Glucose.: 99 mg/dL (25 Dec 2019 11:33)  POCT Blood Glucose.: 91 mg/dL (25 Dec 2019 08:15)  POCT Blood Glucose.: 106 mg/dL (24 Dec 2019 22:09)    Procalcitonin, Serum (12.24.19 @ 18:20)    Procalcitonin, Serum: 0.10 ng/mL    MEDICATIONS  (STANDING):  aspirin  chewable 81 milliGRAM(s) Oral daily  atorvastatin 40 milliGRAM(s) Oral at bedtime  carvedilol 3.125 milliGRAM(s) Oral every 12 hours  dextrose 5%. 1000 milliLiter(s) (50 mL/Hr) IV Continuous <Continuous>  dextrose 50% Injectable 12.5 Gram(s) IV Push once  dextrose 50% Injectable 25 Gram(s) IV Push once  dextrose 50% Injectable 25 Gram(s) IV Push once  enalapril 5 milliGRAM(s) Oral daily  enoxaparin Injectable 40 milliGRAM(s) SubCutaneous two times a day  fluticasone propionate 50 MICROgram(s)/spray Nasal Spray 1 Spray(s) Both Nostrils two times a day  furosemide   Injectable 40 milliGRAM(s) IV Push daily  hydrALAZINE 25 milliGRAM(s) Oral every 8 hours  insulin lispro (HumaLOG) corrective regimen sliding scale   SubCutaneous three times a day before meals  insulin lispro (HumaLOG) corrective regimen sliding scale   SubCutaneous at bedtime  sildenafil (REVATIO) 20 milliGRAM(s) Oral three times a day    MEDICATIONS  (PRN):  acetaminophen   Tablet .. 650 milliGRAM(s) Oral every 6 hours PRN Temp greater or equal to 38C (100.4F), Mild Pain (1 - 3)  dextrose 40% Gel 15 Gram(s) Oral once PRN Blood Glucose LESS THAN 70 milliGRAM(s)/deciliter  glucagon  Injectable 1 milliGRAM(s) IntraMuscular once PRN Glucose LESS THAN 70 milligrams/deciliter  ibuprofen  Tablet. 400 milliGRAM(s) Oral every 6 hours PRN Moderate Pain (4 - 6)  ketorolac   Injectable 15 milliGRAM(s) IV Push every 8 hours PRN Severe Pain (7 - 10)

## 2019-12-25 NOTE — PROGRESS NOTE ADULT - ASSESSMENT
39M with morbid obesity, R sided heart failure (normal EF), RHC at [facility] in 2008 with moderate pulmonary HTN and moderate elevated pCWP, admitted after experiencing MUÑOZ, orthopnea, fatigue and hypoxia likely due to acute on chronic diastolic heart failure, non compliance with meds and diet 39M with morbid obesity, R sided heart failure (normal EF), RHC at [facility] in 2008 with moderate pulmonary HTN and moderate elevated pCWP, admitted after experiencing MUÑOZ, orthopnea, fatigue and hypoxia likely due to acute on chronic diastolic heart failure, non compliance with meds and diet. CT negative for PE.

## 2019-12-26 DIAGNOSIS — I10 ESSENTIAL (PRIMARY) HYPERTENSION: ICD-10-CM

## 2019-12-26 DIAGNOSIS — R59.0 LOCALIZED ENLARGED LYMPH NODES: ICD-10-CM

## 2019-12-26 LAB
ANION GAP SERPL CALC-SCNC: 12 MMOL/L — SIGNIFICANT CHANGE UP (ref 5–17)
BUN SERPL-MCNC: 24 MG/DL — HIGH (ref 8–20)
CALCIUM SERPL-MCNC: 9.5 MG/DL — SIGNIFICANT CHANGE UP (ref 8.6–10.2)
CHLORIDE SERPL-SCNC: 97 MMOL/L — LOW (ref 98–107)
CO2 SERPL-SCNC: 27 MMOL/L — SIGNIFICANT CHANGE UP (ref 22–29)
CREAT SERPL-MCNC: 1.02 MG/DL — SIGNIFICANT CHANGE UP (ref 0.5–1.3)
GLUCOSE BLDC GLUCOMTR-MCNC: 107 MG/DL — HIGH (ref 70–99)
GLUCOSE BLDC GLUCOMTR-MCNC: 131 MG/DL — HIGH (ref 70–99)
GLUCOSE BLDC GLUCOMTR-MCNC: 97 MG/DL — SIGNIFICANT CHANGE UP (ref 70–99)
GLUCOSE BLDC GLUCOMTR-MCNC: 98 MG/DL — SIGNIFICANT CHANGE UP (ref 70–99)
GLUCOSE SERPL-MCNC: 113 MG/DL — SIGNIFICANT CHANGE UP (ref 70–115)
MAGNESIUM SERPL-MCNC: 2 MG/DL — SIGNIFICANT CHANGE UP (ref 1.8–2.6)
PHOSPHATE SERPL-MCNC: 3.7 MG/DL — SIGNIFICANT CHANGE UP (ref 2.4–4.7)
POTASSIUM SERPL-MCNC: 4.1 MMOL/L — SIGNIFICANT CHANGE UP (ref 3.5–5.3)
POTASSIUM SERPL-SCNC: 4.1 MMOL/L — SIGNIFICANT CHANGE UP (ref 3.5–5.3)
SODIUM SERPL-SCNC: 136 MMOL/L — SIGNIFICANT CHANGE UP (ref 135–145)

## 2019-12-26 PROCEDURE — 99232 SBSQ HOSP IP/OBS MODERATE 35: CPT | Mod: GC

## 2019-12-26 PROCEDURE — 99232 SBSQ HOSP IP/OBS MODERATE 35: CPT

## 2019-12-26 PROCEDURE — 99233 SBSQ HOSP IP/OBS HIGH 50: CPT

## 2019-12-26 RX ADMIN — Medication 20 MILLIGRAM(S): at 21:08

## 2019-12-26 RX ADMIN — ATORVASTATIN CALCIUM 40 MILLIGRAM(S): 80 TABLET, FILM COATED ORAL at 21:08

## 2019-12-26 RX ADMIN — Medication 1 SPRAY(S): at 17:43

## 2019-12-26 RX ADMIN — Medication 5 MILLIGRAM(S): at 05:10

## 2019-12-26 RX ADMIN — CARVEDILOL PHOSPHATE 3.12 MILLIGRAM(S): 80 CAPSULE, EXTENDED RELEASE ORAL at 05:10

## 2019-12-26 RX ADMIN — Medication 1 SPRAY(S): at 05:10

## 2019-12-26 RX ADMIN — Medication 40 MILLIGRAM(S): at 05:10

## 2019-12-26 RX ADMIN — Medication 20 MILLIGRAM(S): at 14:17

## 2019-12-26 RX ADMIN — Medication 20 MILLIGRAM(S): at 05:10

## 2019-12-26 RX ADMIN — Medication 25 MILLIGRAM(S): at 05:10

## 2019-12-26 RX ADMIN — Medication 81 MILLIGRAM(S): at 13:31

## 2019-12-26 RX ADMIN — Medication 400 MILLIGRAM(S): at 14:18

## 2019-12-26 RX ADMIN — CARVEDILOL PHOSPHATE 3.12 MILLIGRAM(S): 80 CAPSULE, EXTENDED RELEASE ORAL at 17:46

## 2019-12-26 RX ADMIN — ENOXAPARIN SODIUM 40 MILLIGRAM(S): 100 INJECTION SUBCUTANEOUS at 17:43

## 2019-12-26 NOTE — PROGRESS NOTE ADULT - SUBJECTIVE AND OBJECTIVE BOX
PULMONARY PROGRESS NOTE      [id number]    Patient is a 39y old  Male who presents with a chief complaint of Chf (26 Dec 2019 11:31)      INTERVAL HPI/OVERNIGHT EVENTS: Feeling better though still MUÑOZ above baseline. No CP. On NCO2. Using BPAP at night.     MEDICATIONS  (STANDING):  aspirin  chewable 81 milliGRAM(s) Oral daily  atorvastatin 40 milliGRAM(s) Oral at bedtime  carvedilol 3.125 milliGRAM(s) Oral every 12 hours  dextrose 5%. 1000 milliLiter(s) (50 mL/Hr) IV Continuous <Continuous>  dextrose 50% Injectable 12.5 Gram(s) IV Push once  dextrose 50% Injectable 25 Gram(s) IV Push once  dextrose 50% Injectable 25 Gram(s) IV Push once  enalapril 5 milliGRAM(s) Oral daily  enoxaparin Injectable 40 milliGRAM(s) SubCutaneous two times a day  fluticasone propionate 50 MICROgram(s)/spray Nasal Spray 1 Spray(s) Both Nostrils two times a day  furosemide   Injectable 40 milliGRAM(s) IV Push daily  hydrALAZINE 25 milliGRAM(s) Oral every 8 hours  insulin lispro (HumaLOG) corrective regimen sliding scale   SubCutaneous three times a day before meals  insulin lispro (HumaLOG) corrective regimen sliding scale   SubCutaneous at bedtime  sildenafil (REVATIO) 20 milliGRAM(s) Oral three times a day      MEDICATIONS  (PRN):  acetaminophen   Tablet .. 650 milliGRAM(s) Oral every 6 hours PRN Temp greater or equal to 38C (100.4F), Mild Pain (1 - 3)  dextrose 40% Gel 15 Gram(s) Oral once PRN Blood Glucose LESS THAN 70 milliGRAM(s)/deciliter  glucagon  Injectable 1 milliGRAM(s) IntraMuscular once PRN Glucose LESS THAN 70 milligrams/deciliter  ibuprofen  Tablet. 400 milliGRAM(s) Oral every 6 hours PRN Moderate Pain (4 - 6)  ketorolac   Injectable 15 milliGRAM(s) IV Push every 8 hours PRN Severe Pain (7 - 10)      Allergies    No Known Allergies    Intolerances        PAST MEDICAL & SURGICAL HISTORY:  Obesity, morbid  Pulmonary hypertension  CHF (congestive heart failure)  Hypertension  No significant past surgical history             REVIEW OF SYSTEMS:    CONSTITUTIONAL:  No distress    HEENT:  Eyes:  No diplopia or blurred vision. ENT:  No earache, sore throat or runny nose.    CARDIOVASCULAR:  per HPI.    RESPIRATORY:  per HPI     GASTROINTESTINAL:  No nausea, vomiting or diarrhea.    GENITOURINARY:  No dysuria, frequency or urgency.    NEUROLOGIC:  No paresthesias, fasciculations, seizures or weakness.    PSYCHIATRIC:  No disorder of thought or mood.    Vital Signs Last 24 Hrs  T(C): 36.9 (26 Dec 2019 07:30), Max: 36.9 (26 Dec 2019 07:30)  T(F): 98.4 (26 Dec 2019 07:30), Max: 98.4 (26 Dec 2019 07:30)  HR: 83 (26 Dec 2019 07:30) (77 - 95)  BP: 92/55 (26 Dec 2019 07:30) (92/55 - 126/82)  BP(mean): --  RR: 18 (26 Dec 2019 07:30) (18 - 20)  SpO2: 97% (26 Dec 2019 07:30) (92% - 97%)    PHYSICAL EXAMINATION:    GENERAL: The patient is awake and alert in no apparent distress.     HEENT: Head is normocephalic and atraumatic. Mucous membranes are moist.    NECK: Supple.    LUNGS: rales at bases, good air entry, no wheeze, respirations unlabored    HEART: Regular rate and rhythm      ABDOMEN: Soft, nontender, and nondistended.      EXTREMITIES: Without any cyanosis, clubbing, rash, lesions or edema.    NEUROLOGIC: Grossly intact.    LABS:    12-25    137  |  97<L>  |  25.0<H>  ----------------------------<  101  4.2   |  26.0  |  0.98    Ca    9.6      25 Dec 2019 13:46  Phos  4.0     12-25  Mg     2.0     12-25         Serum Pro-Brain Natriuretic Peptide (12.20.19 @ 18:40)    Serum Pro-Brain Natriuretic Peptide: 1584: NT-proBNP Interpretive comments:             Procalcitonin, Serum: 0.10 ng/mL (12-24-19 @ 18:20)      MICROBIOLOGY:    RADIOLOGY & ADDITIONAL STUDIES:  < from: CT Angio Chest w/ IV Cont (12.24.19 @ 16:27) >   EXAM:  CT ANGIO CHEST (W)AW IC                          PROCEDURE DATE:  12/24/2019          INTERPRETATION:  CLINICAL INFORMATION: Shortness of breath. Suspected pulmonary embolism.    COMPARISON: Chest CT 2/20/2018.    PROCEDURE:   CT Angiography of the Chest.  90 ml of Omnipaque 350 was injected intravenously. 10 ml were discarded.  Sagittal and coronal reformats were performed as well as 3D (MIP) reconstructions.      FINDINGS:    LUNGS AND AIRWAYS: Patent central airways.  There is diffuse groundglass attenuation with areas of sparing, particularly in the lung bases. There is mild interlobular septal thickening. These findings likely represent pulmonary vascular congestion with possible superimposed infectious or inflammatory process.    PLEURA: Small right pleural effusion.    MEDIASTINUM AND CONTRERAS: Diffuse bulky mediastinal and hilar lymphadenopathy is similar to 2/20/2018. Example is a subcarinal lymph node which measures 4.8 x 2.2 cm on image #65/series 6. High right paratracheal lymph node which measures 3.5 x 2.7 cm on image #34/series 6.    VESSELS: There is no pulmonary embolism. The thoracic aorta and main pulmonary artery are normal in caliber.    HEART: Cardiomegaly. No pericardial effusion.    CHEST WALL AND LOWER NECK: The thyroid gland is within normal limits. No definite supraclavicular or axillary lymphadenopathy.    VISUALIZED UPPER ABDOMEN: The adrenal glands are within normal limits. The imaged portions of the liver, spleen, pancreas and kidneys are within normal limits.    BONES: Multilevel degenerative change of the thoracic spine with prominent osteophytes.    IMPRESSION:     No pulmonary embolism.    Pulmonary findings as detailed above likely represent a combination of pulmonary vascular congestion with superimposed infectious or inflammatory process.    Diffuse bulky mediastinal and hilar lymphadenopathy is unchanged from 2018.                    NED DEGROOT M.D. ATTENDING RADIOLOGIST    < end of copied text >

## 2019-12-26 NOTE — PROGRESS NOTE ADULT - ASSESSMENT
39 male  with PMH of morbid obesity, R sided heart failure (normal EF), RHC at [facility] in 2008 with moderate pulmonary HTN and moderate elevated pCWP, admitted after experiencing MUÑOZ, orthopnea, fatigue and hypoxia likely due to acute on chronic diastolic heart failure, non compliance with meds and diet. CT negative for PE.

## 2019-12-26 NOTE — PROGRESS NOTE ADULT - SUBJECTIVE AND OBJECTIVE BOX
Patient is a 39y old  Male who presents with a chief complaint of difficulty breathing (21 Dec 2019 10:30)    INTERVAL/OVERNIGHT EVENTS:  Patient examined at beside. No acute events over night. Patient on BiPap overnight. Sleeping in a semiseated position with right leg on ground and left leg in bed, dependent edema?  Patient is tolerating PO diet w/o nausea/vomiting/diarrhea/abdominal pain. Patient is voiding actively. Patient is ambulating.   Rest of ROS not contributory except for above.    I&O's Summary    25 Dec 2019 07:01  -  26 Dec 2019 07:00  --------------------------------------------------------  IN: 413 mL / OUT: 3900 mL / NET: -3487 mL    26 Dec 2019 07:01  -  26 Dec 2019 17:59  --------------------------------------------------------  IN: 0 mL / OUT: 650 mL / NET: -650 mL    Vital Signs Last 24 Hrs  T(C): 36.6 (26 Dec 2019 15:23), Max: 36.9 (26 Dec 2019 07:30)  T(F): 97.8 (26 Dec 2019 15:23), Max: 98.4 (26 Dec 2019 07:30)  HR: 105 (26 Dec 2019 15:23) (77 - 105)  BP: 107/73 (26 Dec 2019 15:23) (92/55 - 126/82)  RR: 18 (26 Dec 2019 15:23) (18 - 18)  SpO2: 87% (26 Dec 2019 15:23) (87% - 97%)    General: Well nourished/Well developed, NAD, Obese  Neck:  Supple, no sinuses or palpable masses, No JVD noted  Respiratory: Distant lung sounds, B/L crackles in bases, poor inspiratory effort on exam.   CV: RRR, S1S2, no murmur.  Abdominal: Obese, Soft, NT, ND, no palpable mass  Extremities: ++ edema, mild improvement, mostly on right, + peripheral pulses, FROM all 4 extremity  Neurology: A&Ox3, no focalization signs    12-26    136  |  97<L>  |  24.0<H>  ----------------------------<  113  4.1   |  27.0  |  1.02    Ca    9.5      26 Dec 2019 13:50  Phos  3.7     12-26  Mg     2.0     12-26    CAPILLARY BLOOD GLUCOSE  POCT Blood Glucose.: 98 mg/dL (26 Dec 2019 17:28)  POCT Blood Glucose.: 131 mg/dL (26 Dec 2019 12:05)  MEDICATIONS  (STANDING):  aspirin  chewable 81 milliGRAM(s) Oral daily  atorvastatin 40 milliGRAM(s) Oral at bedtime  carvedilol 3.125 milliGRAM(s) Oral every 12 hours  dextrose 5%. 1000 milliLiter(s) (50 mL/Hr) IV Continuous <Continuous>  dextrose 50% Injectable 12.5 Gram(s) IV Push once  dextrose 50% Injectable 25 Gram(s) IV Push once  dextrose 50% Injectable 25 Gram(s) IV Push once  enalapril 5 milliGRAM(s) Oral daily  enoxaparin Injectable 40 milliGRAM(s) SubCutaneous two times a day  fluticasone propionate 50 MICROgram(s)/spray Nasal Spray 1 Spray(s) Both Nostrils two times a day  furosemide   Injectable 40 milliGRAM(s) IV Push daily  hydrALAZINE 25 milliGRAM(s) Oral every 8 hours  insulin lispro (HumaLOG) corrective regimen sliding scale   SubCutaneous three times a day before meals  insulin lispro (HumaLOG) corrective regimen sliding scale   SubCutaneous at bedtime  sildenafil (REVATIO) 20 milliGRAM(s) Oral three times a day    MEDICATIONS  (PRN):  acetaminophen   Tablet .. 650 milliGRAM(s) Oral every 6 hours PRN Temp greater or equal to 38C (100.4F), Mild Pain (1 - 3)  dextrose 40% Gel 15 Gram(s) Oral once PRN Blood Glucose LESS THAN 70 milliGRAM(s)/deciliter  glucagon  Injectable 1 milliGRAM(s) IntraMuscular once PRN Glucose LESS THAN 70 milligrams/deciliter  ibuprofen  Tablet. 400 milliGRAM(s) Oral every 6 hours PRN Moderate Pain (4 - 6)  ketorolac   Injectable 15 milliGRAM(s) IV Push every 8 hours PRN Severe Pain (7 - 10)  POCT Blood Glucose.: 97 mg/dL (26 Dec 2019 08:38)  POCT Blood Glucose.: 108 mg/dL (25 Dec 2019 22:08)    < from: US Duplex Venous Lower Ext Ltd, Right (12.25.19 @ 20:29) >  IMPRESSION:   No evidence of right lower extremity deep venous thrombosis. Please note, however, that the distal right femoral vein was not adequately visualized due to technical factors.    WEST STUBBS M.D., ATTENDING RADIOLOGIST  This document has been electronically signed. Dec 25 2019  9:03PM  < end of copied text >

## 2019-12-26 NOTE — PROGRESS NOTE ADULT - ASSESSMENT
-JAN; not compliant with BPAP at home; using here; has f/u sleep study planned  -HFpEF, diastolic dysfxn, elevated PCWP, RV failure; possible noncompliance with diet and meds  -Pulm HTN; see above  -Morbid obesity  -Acute on chronic hypoxic resp failure  -Possible hypercarbia given elevated HCO3; refused ABG  -Coagulopathy    RECC:  Encouraged use of BPAP whenever sleeping.  Cardiology f/u and mgmt. Diurese. Continue therapy for pulm HTN. Weight loss essential. Follow INR. Follow CT chest to resolution.     Reviewed with patient importance of taking meds, f/u with doctors and weight loss.    His doctors are in [place]. He will f/u there.    Call prn in hospital.

## 2019-12-26 NOTE — PROGRESS NOTE ADULT - ASSESSMENT
38yo M with morbid obesity, R sided heart failure (normal EF), RHC at [facility] in 2008 with moderate pulmonary HTN and moderate elevated pCWP, admitted after experiencing MUÑOZ, orthopnea, fatigue and hypoxia likely due to acute on chronic diastolic heart failure, in the setting of poof compliance with meds and diet.  At the ED patient received Zithromax and Rocephin x1 and was transitioned to Levaquin, however respiratory symptoms less likely to be caused by infectious etiology, likely associated with CHF exacerbation, RVP (-)  Patient slowly improving on clinical exam, continues to diurese. More compliant on BIPAP. Cardio and pulmonology consulted.      Acute on chronic diastolic CHF (congestive heart failure), NYHA class 2.; CHFpEF, severely reduced RV systolic dysfunction and Severe Pulm HTN, RV volume and pressure overload, slow progression, PE ruled out  -c/w Coreg 3.125mg BID,   -c/w Hydralazine 25mg BID  -On Sildenafil for pulm smooth muscle relaxation for severe Pulm HTN  -Monitor urine output, daily weights, strict I's & O's,   -C/w nocturnal BIPAP  -Pulmonology consult noted  -TTE: CHFpEF, severely reduced RV systolic dysfunction and Severe Pulm HTN.   -Patient education provided for dietary restrictions and optimal weight management.  -Bariatric surgery recommended  -Cardio consult noted  -c/w Lasix 40mg daily, planning on transition to PO in AM  -Pleuritic  R chest pain, non reproducible on exam and R LE has slower reduction than L LE, concerning for DVT/PE; will order CTA chest, dupplex DVT  -CTA: no PE, noted pulm vasc congestion, superimposed infx vs inflammation. Noted chronic lymphadenopathy (unchanged since 2018)   -Findings concordant with Pulm HTN in TTE  -Dupplex US: Negative for DVT  -Procal WNL    HTN  -Dash/TLC diet  -Lasix 40 mg daily  -c/w Hydralazine and Coreg  -Weight loss education provided and need for daily exercise.   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  -Will consider rheum consult for possible sarcoidosis? found in CT, outpt    Morbid Obesity  -Weight loss counseling provided  -Bariatric surgery recommended     DVT prophylaxis  -Lovenox 40mg SC BID (Patient obese)     Dispo   Patient likely to be discharge in the next 24-48 hours, when medically stable  High risk of readmission, given noncompliance to medications and nocturnal BIPAP    F/U  PCP, Cardio, Pulm, Rheum, Sleep medicine    Full code

## 2019-12-26 NOTE — PROGRESS NOTE ADULT - PROBLEM SELECTOR PLAN 1
Low na diet advised  Would change to po Lasix tomorrow  Lengthy conversation with patient regarding the importance of following with Cardiology and importance of weight loss  c/w coreg, enalapril and hydralazine  b/p too low to add nitrate

## 2019-12-26 NOTE — PROGRESS NOTE ADULT - SUBJECTIVE AND OBJECTIVE BOX
[facility]  [address], [place] [address]    REASON FOR VISIT:  Follow up on chf  UPDATE:  Feeling less short of breath    12-25    137  |  97<L>  |  25.0<H>  ----------------------------<  101  4.2   |  26.0  |  0.98    Ca    9.6      25 Dec 2019 13:46  Phos  4.0     12-25  Mg     2.0     12-25 12-25-19 @ 07:01  -  12-26-19 @ 07:00  --------------------------------------------------------  IN: 413 mL / OUT: 3900 mL / NET: -3487 mL    12-26-19 @ 07:01  - 12-26-19 @ 11:32  --------------------------------------------------------  IN: 0 mL / OUT: 650 mL / NET: -650 mL    MEDICATIONS  (STANDING):  aspirin  chewable 81 milliGRAM(s) Oral daily  atorvastatin 40 milliGRAM(s) Oral at bedtime  carvedilol 3.125 milliGRAM(s) Oral every 12 hours  enalapril 5 milliGRAM(s) Oral daily  enoxaparin Injectable 40 milliGRAM(s) SubCutaneous two times a day  fluticasone propionate 50 MICROgram(s)/spray Nasal Spray 1 Spray(s) Both Nostrils two times a day  furosemide   Injectable 40 milliGRAM(s) IV Push daily  hydrALAZINE 25 milliGRAM(s) Oral every 8 hours  insulin lispro (HumaLOG) corrective regimen sliding scale   SubCutaneous three times a day before meals  insulin lispro (HumaLOG) corrective regimen sliding scale   SubCutaneous at bedtime  sildenafil (REVATIO) 20 milliGRAM(s) Oral three times a day    ROS:  No fever chills  Cardiac  + sob with exertion  Resp  no cough no mucus production  Gi  no abd pain no melana  Ext No calf tenderness, no edema    Vital Signs Last 24 Hrs  T(C): 36.9 (26 Dec 2019 07:30), Max: 36.9 (26 Dec 2019 07:30)  T(F): 98.4 (26 Dec 2019 07:30), Max: 98.4 (26 Dec 2019 07:30)  HR: 83 (26 Dec 2019 07:30) (77 - 95)  BP: 92/55 (26 Dec 2019 07:30) (92/55 - 126/82)  BP(mean): --  RR: 18 (26 Dec 2019 07:30) (18 - 20)  SpO2: 97% (26 Dec 2019 07:30) (92% - 97%)  T(C): 36.9 (12-26-19 @ 07:30), Max: 36.9 (12-26-19 @ 07:30)  HR: 83 (12-26-19 @ 07:30) (77 - 95)  BP: 92/55 (12-26-19 @ 07:30) (92/55 - 126/82)  RR: 18 (12-26-19 @ 07:30) (18 - 20)  SpO2: 97% (12-26-19 @ 07:30) (92% - 97%)    HEENT Head atraumatic eomi, oral mucosa moist  CV S1&S2 regular 2/6 [facility]  RESP  Decreased breath sounds  GI  Soft active bowel sounds non tender  EXT  No clubbing/Cyanosis /  Decreased edema  NEURO  Alert oriented  No gross motor or sensory deficits    < from: TTE Echo Complete w/Doppler (12.21.19 @ 08:48) >   1. Technically fair study.   2. Left ventricular ejection fraction, by visual estimation, is 65 to 70%.   3. There is moderate septal left ventricular hypertrophy.   4. Severely enlarged right ventricle.   5. Severely reduced RV systolic function.   6. Right ventricular volume and pressure overload.   7. Moderate-severe tricuspid regurgitation.   8. Estimated pulmonary artery systolic pressure is 89.6 mmHg assuming a right atrial pressure of 15 mmHg, which is consistent with severe pulmonary hypertension.   9. Moderately dilated pulmonary artery.

## 2019-12-27 LAB
GLUCOSE BLDC GLUCOMTR-MCNC: 103 MG/DL — HIGH (ref 70–99)
GLUCOSE BLDC GLUCOMTR-MCNC: 88 MG/DL — SIGNIFICANT CHANGE UP (ref 70–99)
GLUCOSE BLDC GLUCOMTR-MCNC: 98 MG/DL — SIGNIFICANT CHANGE UP (ref 70–99)

## 2019-12-27 PROCEDURE — 99232 SBSQ HOSP IP/OBS MODERATE 35: CPT | Mod: GC

## 2019-12-27 PROCEDURE — 99232 SBSQ HOSP IP/OBS MODERATE 35: CPT

## 2019-12-27 RX ORDER — FUROSEMIDE 40 MG
40 TABLET ORAL
Refills: 0 | Status: DISCONTINUED | OUTPATIENT
Start: 2019-12-27 | End: 2019-12-28

## 2019-12-27 RX ORDER — FUROSEMIDE 40 MG
40 TABLET ORAL DAILY
Refills: 0 | Status: DISCONTINUED | OUTPATIENT
Start: 2019-12-27 | End: 2019-12-27

## 2019-12-27 RX ADMIN — Medication 25 MILLIGRAM(S): at 05:04

## 2019-12-27 RX ADMIN — Medication 81 MILLIGRAM(S): at 14:30

## 2019-12-27 RX ADMIN — ENOXAPARIN SODIUM 40 MILLIGRAM(S): 100 INJECTION SUBCUTANEOUS at 05:06

## 2019-12-27 RX ADMIN — Medication 1 SPRAY(S): at 18:00

## 2019-12-27 RX ADMIN — ATORVASTATIN CALCIUM 40 MILLIGRAM(S): 80 TABLET, FILM COATED ORAL at 21:13

## 2019-12-27 RX ADMIN — Medication 20 MILLIGRAM(S): at 21:25

## 2019-12-27 RX ADMIN — CARVEDILOL PHOSPHATE 3.12 MILLIGRAM(S): 80 CAPSULE, EXTENDED RELEASE ORAL at 05:04

## 2019-12-27 RX ADMIN — Medication 20 MILLIGRAM(S): at 05:04

## 2019-12-27 RX ADMIN — Medication 5 MILLIGRAM(S): at 05:04

## 2019-12-27 RX ADMIN — Medication 20 MILLIGRAM(S): at 14:34

## 2019-12-27 RX ADMIN — Medication 40 MILLIGRAM(S): at 05:04

## 2019-12-27 RX ADMIN — CARVEDILOL PHOSPHATE 3.12 MILLIGRAM(S): 80 CAPSULE, EXTENDED RELEASE ORAL at 18:03

## 2019-12-27 RX ADMIN — Medication 40 MILLIGRAM(S): at 18:03

## 2019-12-27 RX ADMIN — Medication 25 MILLIGRAM(S): at 14:30

## 2019-12-27 NOTE — PROGRESS NOTE ADULT - SUBJECTIVE AND OBJECTIVE BOX
[facility]                                                        Office: [address] [place] [address]                                                       Telephone: [phone]. Fax:[phone]      CARDIOLOGY PROGRESS NOTE   ([facility])    Subjective: Patient seen and examined.  Breathing is back to his baseline.  Slight lightheadedness when he got up to walk.     ROS: No headache, no chest pain, no SOB, no palpitations, no nausea, no bleeding    Chronic Conditions:     CURRENT MEDICATIONS  carvedilol 3.125 milliGRAM(s) Oral every 12 hours  enalapril 5 milliGRAM(s) Oral daily  furosemide    Tablet 40 milliGRAM(s) Oral daily  hydrALAZINE 25 milliGRAM(s) Oral every 8 hours  sildenafil (REVATIO) 20 milliGRAM(s) Oral three times a day        acetaminophen   Tablet .. 650 milliGRAM(s) Oral every 6 hours PRN  ibuprofen  Tablet. 400 milliGRAM(s) Oral every 6 hours PRN  ketorolac   Injectable 15 milliGRAM(s) IV Push every 8 hours PRN      atorvastatin 40 milliGRAM(s) Oral at bedtime  dextrose 40% Gel 15 Gram(s) Oral once PRN  dextrose 50% Injectable 12.5 Gram(s) IV Push once  dextrose 50% Injectable 25 Gram(s) IV Push once  dextrose 50% Injectable 25 Gram(s) IV Push once  glucagon  Injectable 1 milliGRAM(s) IntraMuscular once PRN  insulin lispro (HumaLOG) corrective regimen sliding scale   SubCutaneous three times a day before meals  insulin lispro (HumaLOG) corrective regimen sliding scale   SubCutaneous at bedtime    aspirin  chewable 81 milliGRAM(s) Oral daily  dextrose 5%. 1000 milliLiter(s) IV Continuous <Continuous>  enoxaparin Injectable 40 milliGRAM(s) SubCutaneous two times a day  fluticasone propionate 50 MICROgram(s)/spray Nasal Spray 1 Spray(s) Both Nostrils two times a day    	  TELEMETRY:   Vitals:  T(C): 36.6 (12-27-19 @ 08:25), Max: 37.3 (12-26-19 @ 23:34)  HR: 70 (12-27-19 @ 08:25) (70 - 105)  BP: 114/77 (12-27-19 @ 08:25) (100/61 - 119/74)  RR: 18 (12-27-19 @ 08:25) (18 - 18)  SpO2: 97% (12-27-19 @ 08:25) (84% - 98%)  Wt(kg): --  I&O's Summary    26 Dec 2019 07:01  -  27 Dec 2019 07:00  --------------------------------------------------------  IN: 0 mL / OUT: 1250 mL / NET: -1250 mL        Daily T(C): 36.6 (12-27-19 @ 08:25), Max: 37.3 (12-26-19 @ 23:34)  HR: 70 (12-27-19 @ 08:25) (70 - 105)  BP: 114/77 (12-27-19 @ 08:25) (100/61 - 119/74)  RR: 18 (12-27-19 @ 08:25) (18 - 18)  SpO2: 97% (12-27-19 @ 08:25) (84% - 98%)  Wt(kg): --    Daily     PHYSICAL EXAM:  Appearance: obese  HEENT:   Normal oral mucosa, PERRL, EOMI	  Lymphatic: No lymphadenopathy  Cardiovascular: Normal S1 S2, No JVD, III/VI systolic murmur, trace bilateral LE edema (R>L)  Respiratory: Lungs clear to auscultation	  Psychiatry: A & O x 3, Mood & affect appropriate  Gastrointestinal:  Soft, Non-tender, + BS	  Skin: No rashes, No ecchymoses, No cyanosis  Neurologic: Non-focal  Extremities: Normal range of motion, No clubbing, cyanosis; + lymphedema  Vascular: Peripheral pulses palpable 2+ bilaterally, warm      ECG (tracing reviewed by me):  	    DIAGNOSTIC TESTING:  Echocardiogram (images reviewed by me): < from: TTE Echo Complete w/Doppler (12.21.19 @ 08:48) >  Summary:   1. Technically fair study.   2. Left ventricular ejection fraction, by visual estimation, is 65 to 70%.   3. There is moderate septal left ventricular hypertrophy.   4. Severely enlarged right ventricle.   5. Severely reduced RV systolic function.   6. Right ventricular volume and pressure overload.   7. Moderate-severe tricuspid regurgitation.   8. Estimated pulmonary artery systolic pressure is 89.6 mmHg assuming a right atrial pressure of 15 mmHg, which is consistent with severe pulmonary hypertension.   9. Moderately dilated pulmonary artery.    MD Josefina Electronically signed on 12/24/2019 at 2:54:21 PM       < end of copied text >    12-26    136  |  97<L>  |  24.0<H>  ----------------------------<  113  4.1   |  27.0  |  1.02    Ca    9.5      26 Dec 2019 13:50  Phos  3.7     12-26  Mg     2.0     12-26

## 2019-12-27 NOTE — PROGRESS NOTE ADULT - ASSESSMENT
38yo M with morbid obesity, R sided heart failure (normal EF), RHC at [facility] in 2008 with moderate pulmonary HTN and moderate elevated pCWP, admitted after experiencing MUÑOZ, orthopnea, fatigue and hypoxia likely due to acute on chronic diastolic heart failure, in the setting of poof compliance with meds and diet.  At the ED patient received Zithromax and Rocephin x1 and was transitioned to Levaquin, however respiratory symptoms less likely to be caused by infectious etiology, likely associated with CHF exacerbation, RVP (-)  Patient slowly improving on clinical exam, continues to diurese. More compliant on BIPAP. Cardio and pulmonology consulted. Likely discharge for 24 hours.       Acute on chronic diastolic CHF (congestive heart failure), NYHA class 2.; CHFpEF, severely reduced RV systolic dysfunction and Severe Pulm HTN, RV volume and pressure overload, slow progression, PE ruled out  -c/w Coreg 3.125mg BID,   -c/w Hydralazine 25mg BID  -On Sildenafil for pulm smooth muscle relaxation for severe Pulm HTN  -Monitor urine output, daily weights, strict I's & O's,   -C/w nocturnal BIPAP  -Pulmonology consult noted  -TTE: CHFpEF, severely reduced RV systolic dysfunction and Severe Pulm HTN.   -Patient education provided for dietary restrictions and optimal weight management.  -Bariatric surgery recommended  -Cardio consult noted  -c/w Lasix 40mg daily, planning on transition to PO in AM  -Pleuritic  R chest pain, non reproducible on exam and R LE has slower reduction than L LE, concerning for DVT/PE; will order CTA chest, dupplex DVT  -CTA: no PE, noted pulm vasc congestion, superimposed infx vs inflammation. Noted chronic lymphadenopathy (unchanged since 2018)   -Findings concordant with Pulm HTN in TTE  -Dupplex US: Negative for DVT  -Procal WNL    HTN  -Dash/TLC diet  -Lasix 40 mg daily switched from IV to oral BID.  -c/w Hydralazine and Coreg  -Weight loss education provided and need for daily exercise.   -Bariatric sx recommended    JAN, chronic hypoxic and hypercarbic failure  -Pulm consult noted  -started on CPAP HS in hospital  -will likely require CPAP at home but is not a candidate currently  -outpt follow up sleep study planned in outpt  -refused ABGs    Pulmonary Hypertension  -C/w Sildenafil as per home regimen   -Educated in importance of compliance with medication  -BP too low to add nitrate    Pulmonary Sarcoidosis?  -Possible rheum consult as outpatient    Morbid Obesity  -Weight loss counseling provided  -Bariatric surgery recommended     DVT prophylaxis  -Lovenox 40mg SC BID (Patient obese)     Dispo    - Patient likely to be discharge in the next 24 hours       Full code

## 2019-12-27 NOTE — PROGRESS NOTE ADULT - REASON FOR ADMISSION
difficulty breathing
Chf
difficulty breathing
difficulty breathing

## 2019-12-27 NOTE — PROGRESS NOTE ADULT - ASSESSMENT
39 male  with PMH of morbid obesity, R sided heart failure (normal EF), RHC at [facility] in 2008 with moderate pulmonary HTN and moderate elevated pCWP, admitted after experiencing MUÑOZ, orthopnea, fatigue and hypoxia likely due to acute on chronic diastolic heart failure, non compliance with meds and diet. CT negative for PE.     # Acute on chronic diastolic CHF - close to euvolemia.  Change back to oral lasix (40 mg bid is home dose per chart).  Low sodium diet, weight loss, compliance with meds and medical follow up.   c/w coreg, enalapril and hydralazine  b/p too low to add nitrate.   Cardiology follow up in 2 weeks    # Hypertension - controlled on current regimen    # pulmonary hypertension - at baseline, continue revatio    Thank you for allowing me to participate in care of your patient.   Please call as needed. 39 male  with PMH of morbid obesity, R sided heart failure (normal EF), RHC at [facility] in 2008 with moderate pulmonary HTN and moderate elevated pCWP, admitted after experiencing MUÑOZ, orthopnea, fatigue and hypoxia likely due to acute on chronic diastolic heart failure, non compliance with meds and diet. CT negative for PE.     # Acute on chronic diastolic CHF - close to euvolemia.  Change back to oral lasix (40 mg bid is home dose per chart). May need potassium to go home as well.   Low sodium diet, weight loss, compliance with meds and medical follow up.   c/w coreg, enalapril and hydralazine  b/p too low to add nitrate.   Cardiology follow up in 2 weeks    # Hypertension - controlled on current regimen    # pulmonary hypertension - at baseline, continue revatio    Thank you for allowing me to participate in care of your patient.   Please call as needed.

## 2019-12-27 NOTE — PROGRESS NOTE ADULT - SUBJECTIVE AND OBJECTIVE BOX
Patient is a 39y old  Male who presents with a chief complaint of difficulty breathing (21 Dec 2019 10:30)    INTERVAL/OVERNIGHT EVENTS:  Patient examined at beside. Pt feels improvement in breathing. Currently not a candidate for bipap machine. Lasix switched to PO BID. Likely discharge tomorrow. Patient is tolerating PO diet w/o nausea/vomiting/diarrhea/abdominal pain. Patient is voiding actively. Patient is ambulating.   Rest of ROS not contributory except for above.    I&O's Summary    I&O's Detail    26 Dec 2019 07:01  -  27 Dec 2019 07:00  --------------------------------------------------------  IN:  Total IN: 0 mL    OUT:    Voided: 1250 mL  Total OUT: 1250 mL    Total NET: -1250 mL      27 Dec 2019 07:01  -  27 Dec 2019 17:02  --------------------------------------------------------  IN:  Total IN: 0 mL    OUT:    Voided: 1200 mL  Total OUT: 1200 mL    Total NET: -1200 mL    Vital Signs Last 24 Hrs  T(C): 37 (27 Dec 2019 16:14), Max: 37.3 (26 Dec 2019 23:34)  T(F): 98.6 (27 Dec 2019 16:14), Max: 99.1 (26 Dec 2019 23:34)  HR: 92 (27 Dec 2019 16:14) (70 - 103)  BP: 97/62 (27 Dec 2019 16:14) (97/62 - 119/74)  RR: 18 (27 Dec 2019 16:14) (18 - 18)  SpO2: 92% (27 Dec 2019 16:14) (84% - 98%)    General: Well nourished/Well developed, NAD, Obese  Neck:  Supple, no sinuses or palpable masses, No JVD noted  Respiratory: Distant lung sounds, B/L crackles in bases, poor inspiratory effort on exam.   CV: RRR, S1S2, no murmur.  Abdominal: Obese, Soft, NT, ND, no palpable mass  Extremities: ++ edema, mild improvement, mostly on right, + peripheral pulses, FROM all 4 extremity  Neurology: A&Ox3, no focalization signs    MEDICATIONS  (STANDING):  aspirin  chewable 81 milliGRAM(s) Oral daily  atorvastatin 40 milliGRAM(s) Oral at bedtime  carvedilol 3.125 milliGRAM(s) Oral every 12 hours  dextrose 5%. 1000 milliLiter(s) (50 mL/Hr) IV Continuous <Continuous>  dextrose 50% Injectable 12.5 Gram(s) IV Push once  dextrose 50% Injectable 25 Gram(s) IV Push once  dextrose 50% Injectable 25 Gram(s) IV Push once  enalapril 5 milliGRAM(s) Oral daily  enoxaparin Injectable 40 milliGRAM(s) SubCutaneous two times a day  fluticasone propionate 50 MICROgram(s)/spray Nasal Spray 1 Spray(s) Both Nostrils two times a day  furosemide    Tablet 40 milliGRAM(s) Oral two times a day  hydrALAZINE 25 milliGRAM(s) Oral every 8 hours  insulin lispro (HumaLOG) corrective regimen sliding scale   SubCutaneous three times a day before meals  insulin lispro (HumaLOG) corrective regimen sliding scale   SubCutaneous at bedtime  sildenafil (REVATIO) 20 milliGRAM(s) Oral three times a day    MEDICATIONS  (PRN):  acetaminophen   Tablet .. 650 milliGRAM(s) Oral every 6 hours PRN Temp greater or equal to 38C (100.4F), Mild Pain (1 - 3)  dextrose 40% Gel 15 Gram(s) Oral once PRN Blood Glucose LESS THAN 70 milliGRAM(s)/deciliter  glucagon  Injectable 1 milliGRAM(s) IntraMuscular once PRN Glucose LESS THAN 70 milligrams/deciliter  ibuprofen  Tablet. 400 milliGRAM(s) Oral every 6 hours PRN Moderate Pain (4 - 6)  ketorolac   Injectable 15 milliGRAM(s) IV Push every 8 hours PRN Severe Pain (7 - 10)      < from: US Duplex Venous Lower Ext Ltd, Right (12.25.19 @ 20:29) >  IMPRESSION:   No evidence of right lower extremity deep venous thrombosis. Please note, however, that the distal right femoral vein was not adequately visualized due to technical factors.    WEST STUBBS M.D., ATTENDING RADIOLOGIST  This document has been electronically signed. Dec 25 2019  9:03PM  < end of copied text >

## 2019-12-28 ENCOUNTER — TRANSCRIPTION ENCOUNTER (OUTPATIENT)
Age: 39
End: 2019-12-28

## 2019-12-28 VITALS
HEART RATE: 92 BPM | RESPIRATION RATE: 17 BRPM | SYSTOLIC BLOOD PRESSURE: 112 MMHG | DIASTOLIC BLOOD PRESSURE: 79 MMHG | OXYGEN SATURATION: 90 %

## 2019-12-28 LAB
ANION GAP SERPL CALC-SCNC: 13 MMOL/L — SIGNIFICANT CHANGE UP (ref 5–17)
BUN SERPL-MCNC: 24 MG/DL — HIGH (ref 8–20)
CALCIUM SERPL-MCNC: 8.9 MG/DL — SIGNIFICANT CHANGE UP (ref 8.6–10.2)
CHLORIDE SERPL-SCNC: 100 MMOL/L — SIGNIFICANT CHANGE UP (ref 98–107)
CO2 SERPL-SCNC: 25 MMOL/L — SIGNIFICANT CHANGE UP (ref 22–29)
CREAT SERPL-MCNC: 1.02 MG/DL — SIGNIFICANT CHANGE UP (ref 0.5–1.3)
GLUCOSE BLDC GLUCOMTR-MCNC: 101 MG/DL — HIGH (ref 70–99)
GLUCOSE BLDC GLUCOMTR-MCNC: 93 MG/DL — SIGNIFICANT CHANGE UP (ref 70–99)
GLUCOSE SERPL-MCNC: 104 MG/DL — SIGNIFICANT CHANGE UP (ref 70–115)
POTASSIUM SERPL-MCNC: 4.4 MMOL/L — SIGNIFICANT CHANGE UP (ref 3.5–5.3)
POTASSIUM SERPL-SCNC: 4.4 MMOL/L — SIGNIFICANT CHANGE UP (ref 3.5–5.3)
SODIUM SERPL-SCNC: 138 MMOL/L — SIGNIFICANT CHANGE UP (ref 135–145)

## 2019-12-28 PROCEDURE — 87798 DETECT AGENT NOS DNA AMP: CPT

## 2019-12-28 PROCEDURE — 85027 COMPLETE CBC AUTOMATED: CPT

## 2019-12-28 PROCEDURE — 93005 ELECTROCARDIOGRAM TRACING: CPT

## 2019-12-28 PROCEDURE — 87633 RESP VIRUS 12-25 TARGETS: CPT

## 2019-12-28 PROCEDURE — 94640 AIRWAY INHALATION TREATMENT: CPT

## 2019-12-28 PROCEDURE — 93306 TTE W/DOPPLER COMPLETE: CPT

## 2019-12-28 PROCEDURE — 84145 PROCALCITONIN (PCT): CPT

## 2019-12-28 PROCEDURE — 85610 PROTHROMBIN TIME: CPT

## 2019-12-28 PROCEDURE — 83036 HEMOGLOBIN GLYCOSYLATED A1C: CPT

## 2019-12-28 PROCEDURE — 94660 CPAP INITIATION&MGMT: CPT

## 2019-12-28 PROCEDURE — 87486 CHLMYD PNEUM DNA AMP PROBE: CPT

## 2019-12-28 PROCEDURE — 83880 ASSAY OF NATRIURETIC PEPTIDE: CPT

## 2019-12-28 PROCEDURE — 83735 ASSAY OF MAGNESIUM: CPT

## 2019-12-28 PROCEDURE — 85730 THROMBOPLASTIN TIME PARTIAL: CPT

## 2019-12-28 PROCEDURE — 99291 CRITICAL CARE FIRST HOUR: CPT | Mod: 25

## 2019-12-28 PROCEDURE — 99239 HOSP IP/OBS DSCHRG MGMT >30: CPT

## 2019-12-28 PROCEDURE — 83605 ASSAY OF LACTIC ACID: CPT

## 2019-12-28 PROCEDURE — 93971 EXTREMITY STUDY: CPT

## 2019-12-28 PROCEDURE — 80053 COMPREHEN METABOLIC PANEL: CPT

## 2019-12-28 PROCEDURE — 84484 ASSAY OF TROPONIN QUANT: CPT

## 2019-12-28 PROCEDURE — 71275 CT ANGIOGRAPHY CHEST: CPT

## 2019-12-28 PROCEDURE — 36415 COLL VENOUS BLD VENIPUNCTURE: CPT

## 2019-12-28 PROCEDURE — 82962 GLUCOSE BLOOD TEST: CPT

## 2019-12-28 PROCEDURE — 71045 X-RAY EXAM CHEST 1 VIEW: CPT

## 2019-12-28 PROCEDURE — 94760 N-INVAS EAR/PLS OXIMETRY 1: CPT

## 2019-12-28 PROCEDURE — 80048 BASIC METABOLIC PNL TOTAL CA: CPT

## 2019-12-28 PROCEDURE — 80061 LIPID PANEL: CPT

## 2019-12-28 PROCEDURE — 87581 M.PNEUMON DNA AMP PROBE: CPT

## 2019-12-28 PROCEDURE — 84100 ASSAY OF PHOSPHORUS: CPT

## 2019-12-28 RX ORDER — ATORVASTATIN CALCIUM 80 MG/1
1 TABLET, FILM COATED ORAL
Qty: 30 | Refills: 0
Start: 2019-12-28 | End: 2020-01-26

## 2019-12-28 RX ORDER — FUROSEMIDE 40 MG
1 TABLET ORAL
Qty: 60 | Refills: 0
Start: 2019-12-28 | End: 2020-01-26

## 2019-12-28 RX ORDER — METFORMIN HYDROCHLORIDE 850 MG/1
1 TABLET ORAL
Qty: 30 | Refills: 0
Start: 2019-12-28 | End: 2020-01-26

## 2019-12-28 RX ADMIN — Medication 400 MILLIGRAM(S): at 10:36

## 2019-12-28 RX ADMIN — Medication 20 MILLIGRAM(S): at 05:03

## 2019-12-28 RX ADMIN — ENOXAPARIN SODIUM 40 MILLIGRAM(S): 100 INJECTION SUBCUTANEOUS at 05:03

## 2019-12-28 RX ADMIN — Medication 81 MILLIGRAM(S): at 13:17

## 2019-12-28 RX ADMIN — Medication 25 MILLIGRAM(S): at 13:21

## 2019-12-28 RX ADMIN — Medication 400 MILLIGRAM(S): at 09:27

## 2019-12-28 RX ADMIN — Medication 20 MILLIGRAM(S): at 13:21

## 2019-12-28 NOTE — DISCHARGE NOTE NURSING/CASE MANAGEMENT/SOCIAL WORK - NSDCFUADDAPPT_GEN_ALL_CORE_FT
Please see your Primary Care doctor in 3 days after discharge. You should also see your Lung doctor in 1-2 weeks after discharge   -We suggest that you speak to your doctor about a referral to a Rheumatologist on discharge  You do not know the name of the cardiologist but you have a contact at [facility] who you can see. In the event that you are unable to see that cardiologist, we have given you the name of ours. (Dr. Horton)

## 2019-12-28 NOTE — DISCHARGE NOTE NURSING/CASE MANAGEMENT/SOCIAL WORK - PATIENT PORTAL LINK FT
You can access the FollowMyHealth Patient Portal offered by [facility] by registering at the following website: [email or web address]. By joining [employer]’s FollowMyHealth portal, you will also be able to view your health information using other applications (apps) compatible with our system.

## 2020-01-10 ENCOUNTER — INPATIENT (INPATIENT)
Facility: HOSPITAL | Age: 40
LOS: 5 days | Discharge: HOME | DRG: 287 | End: 2020-01-16
Attending: INTERNAL MEDICINE | Admitting: HOSPITALIST
Payer: MEDICARE

## 2020-01-10 VITALS
HEART RATE: 86 BPM | TEMPERATURE: 98 F | DIASTOLIC BLOOD PRESSURE: 74 MMHG | OXYGEN SATURATION: 92 % | SYSTOLIC BLOOD PRESSURE: 118 MMHG | RESPIRATION RATE: 22 BRPM | HEIGHT: 69 IN | WEIGHT: 315 LBS

## 2020-01-10 DIAGNOSIS — I50.9 HEART FAILURE, UNSPECIFIED: ICD-10-CM

## 2020-01-10 LAB
ALBUMIN SERPL ELPH-MCNC: 3.3 G/DL — SIGNIFICANT CHANGE UP (ref 3.3–5.2)
ALP SERPL-CCNC: 99 U/L — SIGNIFICANT CHANGE UP (ref 40–120)
ALT FLD-CCNC: 27 U/L — SIGNIFICANT CHANGE UP
ANION GAP SERPL CALC-SCNC: 13 MMOL/L — SIGNIFICANT CHANGE UP (ref 5–17)
APTT BLD: 32.7 SEC — SIGNIFICANT CHANGE UP (ref 27.5–36.3)
AST SERPL-CCNC: 31 U/L — SIGNIFICANT CHANGE UP
BASOPHILS # BLD AUTO: 0.04 K/UL — SIGNIFICANT CHANGE UP (ref 0–0.2)
BASOPHILS NFR BLD AUTO: 0.8 % — SIGNIFICANT CHANGE UP (ref 0–2)
BILIRUB SERPL-MCNC: 1.3 MG/DL — SIGNIFICANT CHANGE UP (ref 0.4–2)
BUN SERPL-MCNC: 30 MG/DL — HIGH (ref 8–20)
CALCIUM SERPL-MCNC: 9.3 MG/DL — SIGNIFICANT CHANGE UP (ref 8.6–10.2)
CHLORIDE SERPL-SCNC: 102 MMOL/L — SIGNIFICANT CHANGE UP (ref 98–107)
CK SERPL-CCNC: 70 U/L — SIGNIFICANT CHANGE UP (ref 30–200)
CO2 SERPL-SCNC: 24 MMOL/L — SIGNIFICANT CHANGE UP (ref 22–29)
CREAT SERPL-MCNC: 1.53 MG/DL — HIGH (ref 0.5–1.3)
EOSINOPHIL # BLD AUTO: 0.2 K/UL — SIGNIFICANT CHANGE UP (ref 0–0.5)
EOSINOPHIL NFR BLD AUTO: 4.1 % — SIGNIFICANT CHANGE UP (ref 0–6)
GLUCOSE SERPL-MCNC: 84 MG/DL — SIGNIFICANT CHANGE UP (ref 70–115)
HCT VFR BLD CALC: 42.2 % — SIGNIFICANT CHANGE UP (ref 39–50)
HGB BLD-MCNC: 12.9 G/DL — LOW (ref 13–17)
IMM GRANULOCYTES NFR BLD AUTO: 0.2 % — SIGNIFICANT CHANGE UP (ref 0–1.5)
INR BLD: 2.07 RATIO — HIGH (ref 0.88–1.16)
LYMPHOCYTES # BLD AUTO: 0.71 K/UL — LOW (ref 1–3.3)
LYMPHOCYTES # BLD AUTO: 14.6 % — SIGNIFICANT CHANGE UP (ref 13–44)
MCHC RBC-ENTMCNC: 29.4 PG — SIGNIFICANT CHANGE UP (ref 27–34)
MCHC RBC-ENTMCNC: 30.6 GM/DL — LOW (ref 32–36)
MCV RBC AUTO: 96.1 FL — SIGNIFICANT CHANGE UP (ref 80–100)
MONOCYTES # BLD AUTO: 0.49 K/UL — SIGNIFICANT CHANGE UP (ref 0–0.9)
MONOCYTES NFR BLD AUTO: 10.1 % — SIGNIFICANT CHANGE UP (ref 2–14)
NEUTROPHILS # BLD AUTO: 3.41 K/UL — SIGNIFICANT CHANGE UP (ref 1.8–7.4)
NEUTROPHILS NFR BLD AUTO: 70.2 % — SIGNIFICANT CHANGE UP (ref 43–77)
NT-PROBNP SERPL-SCNC: 2022 PG/ML — HIGH (ref 0–300)
PLATELET # BLD AUTO: 187 K/UL — SIGNIFICANT CHANGE UP (ref 150–400)
POTASSIUM SERPL-MCNC: 4.2 MMOL/L — SIGNIFICANT CHANGE UP (ref 3.5–5.3)
POTASSIUM SERPL-SCNC: 4.2 MMOL/L — SIGNIFICANT CHANGE UP (ref 3.5–5.3)
PROT SERPL-MCNC: 8.6 G/DL — SIGNIFICANT CHANGE UP (ref 6.6–8.7)
PROTHROM AB SERPL-ACNC: 24.3 SEC — HIGH (ref 10–12.9)
RBC # BLD: 4.39 M/UL — SIGNIFICANT CHANGE UP (ref 4.2–5.8)
RBC # FLD: 16 % — HIGH (ref 10.3–14.5)
SODIUM SERPL-SCNC: 139 MMOL/L — SIGNIFICANT CHANGE UP (ref 135–145)
TROPONIN T SERPL-MCNC: <0.01 NG/ML — SIGNIFICANT CHANGE UP (ref 0–0.06)
WBC # BLD: 4.86 K/UL — SIGNIFICANT CHANGE UP (ref 3.8–10.5)
WBC # FLD AUTO: 4.86 K/UL — SIGNIFICANT CHANGE UP (ref 3.8–10.5)

## 2020-01-10 PROCEDURE — 99285 EMERGENCY DEPT VISIT HI MDM: CPT

## 2020-01-10 PROCEDURE — 71045 X-RAY EXAM CHEST 1 VIEW: CPT | Mod: 26

## 2020-01-10 PROCEDURE — 93010 ELECTROCARDIOGRAM REPORT: CPT

## 2020-01-10 PROCEDURE — 99223 1ST HOSP IP/OBS HIGH 75: CPT

## 2020-01-10 RX ORDER — ASPIRIN/CALCIUM CARB/MAGNESIUM 324 MG
81 TABLET ORAL DAILY
Refills: 0 | Status: DISCONTINUED | OUTPATIENT
Start: 2020-01-10 | End: 2020-01-16

## 2020-01-10 RX ORDER — DEXTROSE 50 % IN WATER 50 %
12.5 SYRINGE (ML) INTRAVENOUS ONCE
Refills: 0 | Status: DISCONTINUED | OUTPATIENT
Start: 2020-01-10 | End: 2020-01-16

## 2020-01-10 RX ORDER — ENOXAPARIN SODIUM 100 MG/ML
40 INJECTION SUBCUTANEOUS
Refills: 0 | Status: DISCONTINUED | OUTPATIENT
Start: 2020-01-10 | End: 2020-01-16

## 2020-01-10 RX ORDER — FUROSEMIDE 40 MG
60 TABLET ORAL EVERY 12 HOURS
Refills: 0 | Status: DISCONTINUED | OUTPATIENT
Start: 2020-01-10 | End: 2020-01-11

## 2020-01-10 RX ORDER — INSULIN LISPRO 100/ML
VIAL (ML) SUBCUTANEOUS
Refills: 0 | Status: DISCONTINUED | OUTPATIENT
Start: 2020-01-10 | End: 2020-01-16

## 2020-01-10 RX ORDER — SODIUM CHLORIDE 9 MG/ML
3 INJECTION INTRAMUSCULAR; INTRAVENOUS; SUBCUTANEOUS ONCE
Refills: 0 | Status: COMPLETED | OUTPATIENT
Start: 2020-01-10 | End: 2020-01-10

## 2020-01-10 RX ORDER — ATORVASTATIN CALCIUM 80 MG/1
40 TABLET, FILM COATED ORAL AT BEDTIME
Refills: 0 | Status: DISCONTINUED | OUTPATIENT
Start: 2020-01-10 | End: 2020-01-16

## 2020-01-10 RX ORDER — DEXTROSE 50 % IN WATER 50 %
25 SYRINGE (ML) INTRAVENOUS ONCE
Refills: 0 | Status: DISCONTINUED | OUTPATIENT
Start: 2020-01-10 | End: 2020-01-16

## 2020-01-10 RX ORDER — SODIUM CHLORIDE 9 MG/ML
1000 INJECTION, SOLUTION INTRAVENOUS
Refills: 0 | Status: DISCONTINUED | OUTPATIENT
Start: 2020-01-10 | End: 2020-01-16

## 2020-01-10 RX ORDER — DEXTROSE 50 % IN WATER 50 %
15 SYRINGE (ML) INTRAVENOUS ONCE
Refills: 0 | Status: DISCONTINUED | OUTPATIENT
Start: 2020-01-10 | End: 2020-01-16

## 2020-01-10 RX ORDER — GLUCAGON INJECTION, SOLUTION 0.5 MG/.1ML
1 INJECTION, SOLUTION SUBCUTANEOUS ONCE
Refills: 0 | Status: DISCONTINUED | OUTPATIENT
Start: 2020-01-10 | End: 2020-01-16

## 2020-01-10 RX ORDER — HYDRALAZINE HCL 50 MG
25 TABLET ORAL EVERY 8 HOURS
Refills: 0 | Status: DISCONTINUED | OUTPATIENT
Start: 2020-01-10 | End: 2020-01-11

## 2020-01-10 RX ORDER — CARVEDILOL PHOSPHATE 80 MG/1
3.12 CAPSULE, EXTENDED RELEASE ORAL EVERY 12 HOURS
Refills: 0 | Status: DISCONTINUED | OUTPATIENT
Start: 2020-01-10 | End: 2020-01-16

## 2020-01-10 RX ORDER — FUROSEMIDE 40 MG
40 TABLET ORAL ONCE
Refills: 0 | Status: COMPLETED | OUTPATIENT
Start: 2020-01-10 | End: 2020-01-10

## 2020-01-10 RX ADMIN — SODIUM CHLORIDE 3 MILLILITER(S): 9 INJECTION INTRAMUSCULAR; INTRAVENOUS; SUBCUTANEOUS at 20:11

## 2020-01-10 RX ADMIN — ATORVASTATIN CALCIUM 40 MILLIGRAM(S): 80 TABLET, FILM COATED ORAL at 23:37

## 2020-01-10 RX ADMIN — Medication 40 MILLIGRAM(S): at 20:09

## 2020-01-10 NOTE — ED PROVIDER NOTE - CARDIAC, MLM
Normal rate, regular rhythm.  Heart sounds S1, S2.  No murmurs, rubs or gallops. 4+ MICKEY lower extremity pitting edema.

## 2020-01-10 NOTE — ED ADULT NURSE NOTE - NSIMPLEMENTINTERV_GEN_ALL_ED
Implemented All Universal Safety Interventions:  [place] to call system. Call bell, personal items and telephone within reach. Instruct patient to call for assistance. Room bathroom lighting operational. Non-slip footwear when patient is off stretcher. Physically safe environment: no spills, clutter or unnecessary equipment. Stretcher in lowest position, wheels locked, appropriate side rails in place.

## 2020-01-10 NOTE — H&P ADULT - HISTORY OF PRESENT ILLNESS
38yo M with morbid obesity, chronic HFpEF, RHC at [facility] in 2008 with moderate pulmonary HTN, recently admitted to [facility] + dc on 12/28/19 for hf exac presenting w/ recurrent symptoms. 38yo M with morbid obesity, dm2 not on long term insulin, chronic HFpEF, RHC at [facility] in 2008 with moderate pulmonary HTN, recently admitted to [facility] + [place] on 12/28/19 for hf exac presenting w/ recurrent symptoms. complaining of progressively worsening sob + BL LE edema to the point where he could no longer fit his shoes on. denies dietary non compliance and states he has been taking meds as prescribed. has not seen cardio as recommended on dc yet. denies fevers/chills, ha, lightheadedness, dizziness, cp, palpitations, abd pain, n/v/d. denies recent illness. denies med + dietary non adherence.

## 2020-01-10 NOTE — ED PROVIDER NOTE - OBJECTIVE STATEMENT
40 y/o M pt with significant PMHx of CHF, HTN, PE and obesity presents to the ED c/o SOB for a few days, as well as, MICKEY lower extremity edema. After taking a few steps in the house he begins to get short of breath. Pt states he does not follow up with his PMD or cardiologist as needed. Originally pt is from [place] and states that all his doctors are out there, but pt has been in [place] since the end of November. 2 weeks after arriving to [place] he was seen at an unknown hospital, and 2 weeks later he visited [facility] for similar complaints. Every time he left the hospital he was able to sleep laying flat, but for the past couple of weeks he has to sleep sitting up. Denies N/V, CP. No further complaints at this time. 38 y/o M pt with significant PMHx of CHF, HTN, PE and obesity presents to the ED c/o exertional dyspnea for a few days, as well as, MICKEY lower extremity edema. On a "good day" he states he is able to walk a few blocks, however, for the past few days after taking a few steps in the house he begins to get short of breath. Pt states he does not follow up with his PMD or cardiologist as needed. Originally pt is from [place] and states that all his doctors are out there, but pt has been in [place] since the end of November. 2 weeks after arriving to [place] he was seen at an unknown hospital, and 2 weeks later he visited [facility] for similar complaints. Every time he left the hospital he was able to sleep laying flat, but for the past couple of weeks he has to sleep sitting up. Denies N/V, CP. No further complaints at this time.

## 2020-01-10 NOTE — H&P ADULT - NSHPOUTPATIENTPROVIDERS_GEN_ALL_CORE
ALESSANDRO, Britney Vergara (MD)  Dr. Talia Hayden Primary Care Doctor, christiano Franklin (Pulmonologist),   Krys Horton (DO) - cardio

## 2020-01-10 NOTE — ED ADULT NURSE NOTE - OBJECTIVE STATEMENT
39y Male A&Ox4 c/o shortness of breath. Pt states he has had gradual progression of shortness of breath with b/l LE edema x's "a few days." Pt with no other complaints at this time.

## 2020-01-10 NOTE — ED PROVIDER NOTE - AGGRAVATING FACTORS
Lactation note:  Initial visit. This is mother's second child. She  her first for about 9 months. She was able to latch this infant in L&D without difficulty. Maternal grandmother is a current IBCLC here at [facility]. Per grandmother, infant latched about at a 9 or 10 and mother denies pain with latch, audible swallows heard.     MOB has no other questions or concerns regarding breastfeeding. Encouraged to call for assistance as needed.     lying down

## 2020-01-11 LAB
ANION GAP SERPL CALC-SCNC: 12 MMOL/L — SIGNIFICANT CHANGE UP (ref 5–17)
ANION GAP SERPL CALC-SCNC: 14 MMOL/L — SIGNIFICANT CHANGE UP (ref 5–17)
BUN SERPL-MCNC: 27 MG/DL — HIGH (ref 8–20)
BUN SERPL-MCNC: 29 MG/DL — HIGH (ref 8–20)
CALCIUM SERPL-MCNC: 8.8 MG/DL — SIGNIFICANT CHANGE UP (ref 8.6–10.2)
CALCIUM SERPL-MCNC: 9.1 MG/DL — SIGNIFICANT CHANGE UP (ref 8.6–10.2)
CHLORIDE SERPL-SCNC: 100 MMOL/L — SIGNIFICANT CHANGE UP (ref 98–107)
CHLORIDE SERPL-SCNC: 102 MMOL/L — SIGNIFICANT CHANGE UP (ref 98–107)
CO2 SERPL-SCNC: 24 MMOL/L — SIGNIFICANT CHANGE UP (ref 22–29)
CO2 SERPL-SCNC: 24 MMOL/L — SIGNIFICANT CHANGE UP (ref 22–29)
CREAT SERPL-MCNC: 1.37 MG/DL — HIGH (ref 0.5–1.3)
CREAT SERPL-MCNC: 1.5 MG/DL — HIGH (ref 0.5–1.3)
GLUCOSE BLDC GLUCOMTR-MCNC: 100 MG/DL — HIGH (ref 70–99)
GLUCOSE BLDC GLUCOMTR-MCNC: 101 MG/DL — HIGH (ref 70–99)
GLUCOSE BLDC GLUCOMTR-MCNC: 102 MG/DL — HIGH (ref 70–99)
GLUCOSE BLDC GLUCOMTR-MCNC: 121 MG/DL — HIGH (ref 70–99)
GLUCOSE BLDC GLUCOMTR-MCNC: 79 MG/DL — SIGNIFICANT CHANGE UP (ref 70–99)
GLUCOSE SERPL-MCNC: 113 MG/DL — SIGNIFICANT CHANGE UP (ref 70–115)
GLUCOSE SERPL-MCNC: 116 MG/DL — HIGH (ref 70–115)
HIV 1 & 2 AB SERPL IA.RAPID: SIGNIFICANT CHANGE UP
MAGNESIUM SERPL-MCNC: 1.6 MG/DL — SIGNIFICANT CHANGE UP (ref 1.6–2.6)
MAGNESIUM SERPL-MCNC: 1.7 MG/DL — SIGNIFICANT CHANGE UP (ref 1.6–2.6)
PHOSPHATE SERPL-MCNC: 3.2 MG/DL — SIGNIFICANT CHANGE UP (ref 2.4–4.7)
PHOSPHATE SERPL-MCNC: 3.3 MG/DL — SIGNIFICANT CHANGE UP (ref 2.4–4.7)
POTASSIUM SERPL-MCNC: 3.8 MMOL/L — SIGNIFICANT CHANGE UP (ref 3.5–5.3)
POTASSIUM SERPL-MCNC: 4 MMOL/L — SIGNIFICANT CHANGE UP (ref 3.5–5.3)
POTASSIUM SERPL-SCNC: 3.8 MMOL/L — SIGNIFICANT CHANGE UP (ref 3.5–5.3)
POTASSIUM SERPL-SCNC: 4 MMOL/L — SIGNIFICANT CHANGE UP (ref 3.5–5.3)
SODIUM SERPL-SCNC: 138 MMOL/L — SIGNIFICANT CHANGE UP (ref 135–145)
SODIUM SERPL-SCNC: 138 MMOL/L — SIGNIFICANT CHANGE UP (ref 135–145)

## 2020-01-11 PROCEDURE — 99223 1ST HOSP IP/OBS HIGH 75: CPT

## 2020-01-11 PROCEDURE — 99233 SBSQ HOSP IP/OBS HIGH 50: CPT | Mod: GC

## 2020-01-11 RX ORDER — BUMETANIDE 0.25 MG/ML
0.25 INJECTION INTRAMUSCULAR; INTRAVENOUS
Qty: 20 | Refills: 0 | Status: DISCONTINUED | OUTPATIENT
Start: 2020-01-11 | End: 2020-01-14

## 2020-01-11 RX ORDER — IPRATROPIUM/ALBUTEROL SULFATE 18-103MCG
3 AEROSOL WITH ADAPTER (GRAM) INHALATION EVERY 6 HOURS
Refills: 0 | Status: DISCONTINUED | OUTPATIENT
Start: 2020-01-11 | End: 2020-01-16

## 2020-01-11 RX ORDER — PHYTONADIONE (VIT K1) 5 MG
5 TABLET ORAL ONCE
Refills: 0 | Status: COMPLETED | OUTPATIENT
Start: 2020-01-11 | End: 2020-01-11

## 2020-01-11 RX ORDER — MAGNESIUM SULFATE 500 MG/ML
2 VIAL (ML) INJECTION ONCE
Refills: 0 | Status: COMPLETED | OUTPATIENT
Start: 2020-01-11 | End: 2020-01-11

## 2020-01-11 RX ORDER — POTASSIUM CHLORIDE 20 MEQ
40 PACKET (EA) ORAL DAILY
Refills: 0 | Status: DISCONTINUED | OUTPATIENT
Start: 2020-01-11 | End: 2020-01-12

## 2020-01-11 RX ADMIN — Medication 20 MILLIGRAM(S): at 05:56

## 2020-01-11 RX ADMIN — Medication 50 GRAM(S): at 12:25

## 2020-01-11 RX ADMIN — Medication 3 MILLILITER(S): at 21:27

## 2020-01-11 RX ADMIN — Medication 3 MILLILITER(S): at 13:51

## 2020-01-11 RX ADMIN — Medication 25 MILLIGRAM(S): at 05:56

## 2020-01-11 RX ADMIN — Medication 0: at 22:01

## 2020-01-11 RX ADMIN — Medication 20 MILLIGRAM(S): at 12:27

## 2020-01-11 RX ADMIN — Medication 5 MILLIGRAM(S): at 05:56

## 2020-01-11 RX ADMIN — ATORVASTATIN CALCIUM 40 MILLIGRAM(S): 80 TABLET, FILM COATED ORAL at 22:01

## 2020-01-11 RX ADMIN — Medication 5 MILLIGRAM(S): at 12:25

## 2020-01-11 RX ADMIN — Medication 81 MILLIGRAM(S): at 12:24

## 2020-01-11 RX ADMIN — ENOXAPARIN SODIUM 40 MILLIGRAM(S): 100 INJECTION SUBCUTANEOUS at 18:20

## 2020-01-11 RX ADMIN — BUMETANIDE 2.5 MG/HR: 0.25 INJECTION INTRAMUSCULAR; INTRAVENOUS at 18:21

## 2020-01-11 RX ADMIN — ENOXAPARIN SODIUM 40 MILLIGRAM(S): 100 INJECTION SUBCUTANEOUS at 05:56

## 2020-01-11 RX ADMIN — Medication 20 MILLIGRAM(S): at 22:01

## 2020-01-11 RX ADMIN — CARVEDILOL PHOSPHATE 3.12 MILLIGRAM(S): 80 CAPSULE, EXTENDED RELEASE ORAL at 05:56

## 2020-01-11 RX ADMIN — Medication 40 MILLIEQUIVALENT(S): at 12:25

## 2020-01-11 RX ADMIN — Medication 60 MILLIGRAM(S): at 05:56

## 2020-01-11 RX ADMIN — Medication 3 MILLILITER(S): at 11:11

## 2020-01-11 NOTE — CONSULT NOTE ADULT - ASSESSMENT
A/P:  40 y/o morbidly obese M with a PMHx of Right sided heart failure (12/19 EF 65-70% with severely reduced RV fxn), severe pulmonary HTN (PA systolic 89.6) on home oxygen (4L NC), JAN, DMII on insuline who presented to the ED complaining of worsening leg edema, and shortness of breath. Patient was recently admitted to [facility] back in December for similar presentation, and states that he has been taking all of his medications as prescribed, but has been drinking a lot more fluids then he "should." Patient states he also felt like torsemide worked better for him in the past, and that the lasix isn't as strong. Patient didn't follow up with Dr. Horton after discharge as recommended. Patient states that his legs have been getting more and more swollen, and he could no longer fit in his shoes. Patient also with dyspnea now at rest, and had to increase his oxygen to 5L at home, so he came to the ED to be evaluated. Patient denies fevers, chills, CP, palpitations, syncope, near syncope, abdominal pain, N/V/D, headache, or dizziness.   Troponin neg x 1  BNP 2002    1. Acute decompensated Right Heart Failure  - Exacerbation likely secondary to poor diet adherence  - Lasix 60mg IV BID, will consider transitioning to Bumex gtt  - Continue Coreg, sildenafil, enalapril, and hydralazine, will uptitrate when BP allows  - Monitor on telemetry.    - Strict i/o and daily standing weights.     - Keep K > 4, Mg > 2.    - Monitor renal function with BID BMP due to ongoing diuresis.    2. HTN  - Well controlled on current regimen    3. JAN  - Continue BiPAP at night    Preliminary evaluation, please await official recommendations by Dr. Cox A/P:  40 y/o morbidly obese M with a PMHx of Right sided heart failure (12/19 EF 65-70% with severely reduced RV fxn), severe pulmonary HTN (PA systolic 89.6) on home oxygen (4L NC), JAN, DMII on insuline who presented to the ED complaining of worsening leg edema, and shortness of breath. Patient was recently admitted to [facility] back in December for similar presentation, and states that he has been taking all of his medications as prescribed, but has been drinking a lot more fluids then he "should." Patient states he also felt like torsemide worked better for him in the past, and that the lasix isn't as strong. Patient didn't follow up with Dr. Horton after discharge as recommended. Patient states that his legs have been getting more and more swollen, and he could no longer fit in his shoes. Patient also with dyspnea now at rest, and had to increase his oxygen to 5L at home, so he came to the ED to be evaluated. Patient denies fevers, chills, CP, palpitations, syncope, near syncope, abdominal pain, N/V/D, headache, or dizziness.   Troponin neg x 1  BNP 2002    1. Acute decompensated Right Heart Failure  - Exacerbation likely secondary to poor diet adherence  - D/C Lasix   - Start Bumex gtt 0.5mg/hr  - Potassium 40 mEQ qday  - Magnesium 2 G IV   - Add on for RHC Monday  - Continue Coreg, sildenafil, enalapril, will uptitrate when BP allows  - Will check MARTITA, RF, C-ANCA, P-ANCA, SCL-70  - Will check PYP test for Amyloid   - Monitor on telemetry.    - Strict i/o and daily standing weights.     - Keep K > 4, Mg > 2.    - Monitor renal function with BID BMP due to ongoing diuresis.    2. HTN  - Well controlled on current regimen    3. JAN  - Continue BiPAP at night    Preliminary evaluation, please await official recommendations by Dr. Cox

## 2020-01-11 NOTE — CONSULT NOTE ADULT - SUBJECTIVE AND OBJECTIVE BOX
[place] [facility]                                                               Office:  [address] [address]                                                              Telephone: [phone]. Fax:[phone]                                                                        CARDIOLOGY CONSULTATION NOTE                                                                                             Consult requested by:    Reason for Consultation:   History obtained by: Patient and medical record   obtained: No    Chief complaint:    Patient is a 39y old  Male who presents with a chief complaint of hf exac (10 Tyrone 2020 22:23)        HPI: 38 y/o morbidly obese M with a PMHx of Right sided heart failure (12/19 EF 65-70% with severely reduced RV fxn), severe pulmonary HTN (PA systolic 89.6) on home oxygen (4L NC), JAN, DMII on insuline who presented to the ED complaining of worsening leg edema, and shortness of breath. Patient was recently admitted to [facility] back in December for similar presentation, and states that he has been taking all of his medications as prescribed, but has been drinking a lot more fluids then he "should." Patient states he also felt like torsemide worked better for him in the past, and that the lasix isn't as strong. Patient didn't follow up with Dr. Horton after discharge as recommended. Patient states that his legs have been getting more and more swollen, and he could no longer fit in his shoes. Patient also with dyspnea now at rest, and had to increase his oxygen to 5L at home, so he came to the ED to be evaluated. Patient denies fevers, chills, CP, palpitations, syncope, near syncope, abdominal pain, N/V/D, headache, or dizziness.     REVIEW OF SYMPTOMS:     CONSTITUTIONAL: No fever, weight loss, or fatigue  ENMT:  No difficulty hearing, tinnitus, vertigo; No sinus or throat pain  NECK: No pain or stiffness  CARDIOVASCULAR: AS PER HPI  RESPIRATORY: AS PER HPI  : No dysuria, no hematuria   GI: No dark color stool, no melena, no diarrhea, no constipation, no abdominal pain   NEURO: No headache, no dizziness, no slurred speech   MUSCULOSKELETAL: No joint pain or swelling; No muscle, back, or extremity pain  PSYCH: No agitation, no anxiety.    ALL OTHER REVIEW OF SYSTEMS ARE NEGATIVE.      PREVIOUS DIAGNOSTIC TESTING  ECHO FINDINGS:  < from: TTE Echo Complete w/Doppler (12.21.19 @ 08:48) >     PHYSICIAN INTERPRETATION:  Left Ventricle: The left ventricular internal cavity size is normal.  Left ventricular ejection fraction, by visual estimation, is 65 to 70%. The interventricular septum is flattened in systole and diastole, consistent with right ventricular pressure and volume overload.  Right Ventricle: The right ventricular size is severely enlarged. RV systolic function is severely reduced. TV S' 0.2 m/s.  Left Atrium: The left atrium is normal in size.  Right Atrium: Severely enlarged right atrium.  Pericardium: There is no evidence of pericardial effusion.  Mitral Valve: The mitral valve is normal in structure. Mild mitral valve regurgitation is seen.  Tricuspid Valve: Moderate-severe tricuspid regurgitation is visualized. Estimated pulmonary artery systolic pressure is 89.6 mmHg assuming a right atrial pressure of 15 mmHg, which is consistent with severe pulmonary hypertension.  Aortic Valve: The aortic valve is normal. No evidence of aortic valve regurgitation is seen.  Pulmonic Valve: The pulmonic valve was not well visualized. Mild pulmonic valve regurgitation.  Aorta: The aortic root is normal in size and structure.  Pulmonary Artery: The pulmonary artery is moderately dilated.  Venous: The inferior vena cava was dilated, with respiratory size variation less than 50%. The inferior vena cava and the hepatic vein show a pattern of systolic flow reversal, suggestive of tricuspid regurgitation.       Summary:   1. Technically fair study.   2. Left ventricular ejection fraction, by visual estimation, is 65 to 70%.   3. There is moderate septal left ventricular hypertrophy.   4. Severely enlarged right ventricle.   5. Severely reduced RV systolic function.   6. Right ventricular volume and pressure overload.   7. Moderate-severe tricuspid regurgitation.   8. Estimated pulmonary artery systolic pressure is 89.6 mmHg assuming a right atrial pressure of 15 mmHg, which is consistent with severe pulmonary hypertension.   9. Moderately dilated pulmonary artery.    MD Josefina Electronically signed on 12/24/2019 at 2:54:21 PM    < end of copied text >    CATHETERIZATION FINDINGS:   Per patient had RHC/LHC at [facility]- Normal coronaries, severe pulmonary HTN       ALLERGIES: Allergies    No Known Allergies    Intolerances    PAST MEDICAL HISTORY  Obesity, morbid  Pulmonary hypertension  CHF (congestive heart failure)  Hypertension      PAST SURGICAL HISTORY  No significant past surgical history      FAMILY HISTORY:  Family history of diabetes mellitus (DM): grandmother.      SOCIAL HISTORY:    CIGARETTES:   Former smoker, states smoked socially. quit in the 1990s  ALCOHOL: Denies  DRUGS: Denies    CURRENT MEDICATIONS:  carvedilol 3.125 milliGRAM(s) Oral every 12 hours  enalapril 5 milliGRAM(s) Oral daily  furosemide   Injectable 60 milliGRAM(s) IV Push every 12 hours  hydrALAZINE 25 milliGRAM(s) Oral every 8 hours  sildenafil (REVATIO) 20 milliGRAM(s) Oral three times a day    albuterol/ipratropium for Nebulization   aspirin  chewable  atorvastatin  dextrose 5%.  dextrose 50% Injectable  dextrose 50% Injectable  dextrose 50% Injectable  enoxaparin Injectable  insulin lispro (HumaLOG) corrective regimen sliding scale    HOME MEDICATIONS:  aspirin 81 mg oral tablet, chewable: 1 tab(s) orally once a day  atorvastatin 40 mg oral tablet: 1 tab(s) orally once a day (at bedtime)  carvedilol 3.125 mg oral tablet: 1 tab(s) orally 2 times a day  enalapril 5 mg oral tablet: 1 tab(s) orally once a day   furosemide 40 mg oral tablet: 1 tab(s) orally 2 times a day  hydrALAZINE 25 mg oral tablet: 1 tab(s) orally every 8 hours  metFORMIN 500 mg oral tablet: 1 tab(s) orally once a day   sildenafil 20 mg oral tablet: 1 tab(s) orally 3 times a day    Vital Signs Last 24 Hrs  T(C): 36.7 (10 Tyrone 2020 18:56), Max: 36.7 (10 Tyrone 2020 18:56)  T(F): 98 (10 Tyrone 2020 18:56), Max: 98 (10 Tyrone 2020 18:56)  HR: 68 (11 Jan 2020 04:48) (68 - 86)  BP: 111/78 (11 Jan 2020 04:48) (105/58 - 118/74)  BP(mean): --  RR: 18 (11 Jan 2020 04:48) (18 - 22)  SpO2: 99% (11 Jan 2020 04:48) (91% - 99%)    PHYSICAL EXAM:  Constitutional: Comfortable . No acute distress.   HEENT: Atraumatic and normocephalic , neck is supple . + JVD. No carotid bruit. PEERL   CNS: A&Ox3. No focal deficits. EOMI. Cranial nerves II-IX are intact.   Lymph Nodes: Cervical : Not palpable.  Respiratory: Decreased at b/l bases  Cardiovascular: S1S2 RRR. No rubs or gallop. II/VI systolic murmur lsb  Gastrointestinal: obese, Soft non-tender and non distended . +Bowel sounds. negative Collins's sign.  Extremities: 2+ LE edema.   Psychiatric: Calm . no agitation.  Skin: No skin rash/ulcers visualized to face, hands or feet.    Intake and output:   01-10 @ 07:01  -  01-11 @ 07:00  --------------------------------------------------------  IN: 450 mL / OUT: 350 mL / NET: 100 mL      LABS:                        12.9   4.86  )-----------( 187      ( 10 Tyrone 2020 20:29 )             42.2     01-11    138  |  102  |  27.0<H>  ----------------------------<  116<H>  3.8   |  24.0  |  1.37<H>    Ca    8.8      11 Jan 2020 06:48  Phos  3.3     01-11  Mg     1.6     01-11    TPro  8.6  /  Alb  3.3  /  TBili  1.3  /  DBili  x   /  AST  31  /  ALT  27  /  AlkPhos  99  01-10    CARDIAC MARKERS ( 10 Tyrone 2020 20:29 )  x     / <0.01 ng/mL / 70 U/L / x     / x        ;p-BNP=Serum Pro-Brain Natriuretic Peptide: 2022 pg/mL (01-10 @ 22:33)    PT/INR - ( 10 Tyrone 2020 20:29 )   PT: 24.3 sec;   INR: 2.07 ratio         PTT - ( 10 Tyrone 2020 20:29 )  PTT:32.7 sec      INTERPRETATION OF TELEMETRY: Reviewed by me. NSR  ECG: Reviewed by me. SR, IRBBB, NSST/T wave abnormalities     RADIOLOGY & ADDITIONAL STUDIES:    X-ray:  reviewed by me. Pulmonary vascular congestion [place] [facility]                                                               Office:  [address] [address]                                                              Telephone: [phone]. Fax:[phone]                                                                        CARDIOLOGY CONSULTATION NOTE                                                                                             Consult requested by:    Reason for Consultation: Right sided failure.   History obtained by: Patient and medical record   obtained: No    Chief complaint:    Patient is a 39y old  Male who presents with a chief complaint of hf exac (10 Tyrone 2020 22:23)        HPI: 40 y/o morbidly obese M with a PMHx of Right sided heart failure (12/19 EF 65-70% with severely reduced RV fxn), severe pulmonary HTN (PA systolic 89.6) on home oxygen (4L NC), JAN, DMII on insuline who presented to the ED complaining of worsening leg edema, and shortness of breath. Patient was recently admitted to [facility] back in December for similar presentation, and states that he has been taking all of his medications as prescribed, but has been drinking a lot more fluids then he "should." Patient states he also felt like torsemide worked better for him in the past, and that the lasix isn't as strong. Patient didn't follow up with Dr. Horton after discharge as recommended. Patient states that his legs have been getting more and more swollen, and he could no longer fit in his shoes. Patient also with dyspnea now at rest, and had to increase his oxygen to 5L at home, so he came to the ED to be evaluated. Patient denies fevers, chills, CP, palpitations, syncope, near syncope, abdominal pain, N/V/D, headache, or dizziness.     REVIEW OF SYMPTOMS:     CONSTITUTIONAL: No fever, weight loss, or fatigue  ENMT:  No difficulty hearing, tinnitus, vertigo; No sinus or throat pain  NECK: No pain or stiffness  CARDIOVASCULAR: AS PER HPI  RESPIRATORY: AS PER HPI  : No dysuria, no hematuria   GI: No dark color stool, no melena, no diarrhea, no constipation, no abdominal pain   NEURO: No headache, no dizziness, no slurred speech   MUSCULOSKELETAL: No joint pain or swelling; No muscle, back, or extremity pain  PSYCH: No agitation, no anxiety.    ALL OTHER REVIEW OF SYSTEMS ARE NEGATIVE.      PREVIOUS DIAGNOSTIC TESTING  ECHO FINDINGS:  < from: TTE Echo Complete w/Doppler (12.21.19 @ 08:48) >     PHYSICIAN INTERPRETATION:  Left Ventricle: The left ventricular internal cavity size is normal.  Left ventricular ejection fraction, by visual estimation, is 65 to 70%. The interventricular septum is flattened in systole and diastole, consistent with right ventricular pressure and volume overload.  Right Ventricle: The right ventricular size is severely enlarged. RV systolic function is severely reduced. TV S' 0.2 m/s.  Left Atrium: The left atrium is normal in size.  Right Atrium: Severely enlarged right atrium.  Pericardium: There is no evidence of pericardial effusion.  Mitral Valve: The mitral valve is normal in structure. Mild mitral valve regurgitation is seen.  Tricuspid Valve: Moderate-severe tricuspid regurgitation is visualized. Estimated pulmonary artery systolic pressure is 89.6 mmHg assuming a right atrial pressure of 15 mmHg, which is consistent with severe pulmonary hypertension.  Aortic Valve: The aortic valve is normal. No evidence of aortic valve regurgitation is seen.  Pulmonic Valve: The pulmonic valve was not well visualized. Mild pulmonic valve regurgitation.  Aorta: The aortic root is normal in size and structure.  Pulmonary Artery: The pulmonary artery is moderately dilated.  Venous: The inferior vena cava was dilated, with respiratory size variation less than 50%. The inferior vena cava and the hepatic vein show a pattern of systolic flow reversal, suggestive of tricuspid regurgitation.       Summary:   1. Technically fair study.   2. Left ventricular ejection fraction, by visual estimation, is 65 to 70%.   3. There is moderate septal left ventricular hypertrophy.   4. Severely enlarged right ventricle.   5. Severely reduced RV systolic function.   6. Right ventricular volume and pressure overload.   7. Moderate-severe tricuspid regurgitation.   8. Estimated pulmonary artery systolic pressure is 89.6 mmHg assuming a right atrial pressure of 15 mmHg, which is consistent with severe pulmonary hypertension.   9. Moderately dilated pulmonary artery.    MD Josefina Electronically signed on 12/24/2019 at 2:54:21 PM    < end of copied text >    CATHETERIZATION FINDINGS:   Per patient had RHC/LHC at [facility]- Normal coronaries, severe pulmonary HTN       ALLERGIES: Allergies    No Known Allergies    Intolerances    PAST MEDICAL HISTORY  Obesity, morbid  Pulmonary hypertension  CHF (congestive heart failure)  Hypertension      PAST SURGICAL HISTORY  No significant past surgical history      FAMILY HISTORY:  Family history of diabetes mellitus (DM): grandmother.      SOCIAL HISTORY:    CIGARETTES:   Former smoker, states smoked socially. quit in the 1990s  ALCOHOL: Denies  DRUGS: Denies    CURRENT MEDICATIONS:  carvedilol 3.125 milliGRAM(s) Oral every 12 hours  enalapril 5 milliGRAM(s) Oral daily  furosemide   Injectable 60 milliGRAM(s) IV Push every 12 hours  hydrALAZINE 25 milliGRAM(s) Oral every 8 hours  sildenafil (REVATIO) 20 milliGRAM(s) Oral three times a day    albuterol/ipratropium for Nebulization   aspirin  chewable  atorvastatin  dextrose 5%.  dextrose 50% Injectable  dextrose 50% Injectable  dextrose 50% Injectable  enoxaparin Injectable  insulin lispro (HumaLOG) corrective regimen sliding scale    HOME MEDICATIONS:  aspirin 81 mg oral tablet, chewable: 1 tab(s) orally once a day  atorvastatin 40 mg oral tablet: 1 tab(s) orally once a day (at bedtime)  carvedilol 3.125 mg oral tablet: 1 tab(s) orally 2 times a day  enalapril 5 mg oral tablet: 1 tab(s) orally once a day   furosemide 40 mg oral tablet: 1 tab(s) orally 2 times a day  hydrALAZINE 25 mg oral tablet: 1 tab(s) orally every 8 hours  metFORMIN 500 mg oral tablet: 1 tab(s) orally once a day   sildenafil 20 mg oral tablet: 1 tab(s) orally 3 times a day    Vital Signs Last 24 Hrs  T(C): 36.7 (10 Tyrone 2020 18:56), Max: 36.7 (10 Tyrone 2020 18:56)  T(F): 98 (10 Tyrone 2020 18:56), Max: 98 (10 Tyrone 2020 18:56)  HR: 68 (11 Jan 2020 04:48) (68 - 86)  BP: 111/78 (11 Jan 2020 04:48) (105/58 - 118/74)  BP(mean): --  RR: 18 (11 Jan 2020 04:48) (18 - 22)  SpO2: 99% (11 Jan 2020 04:48) (91% - 99%)    PHYSICAL EXAM:  Constitutional: Comfortable . No acute distress.   HEENT: Atraumatic and normocephalic , neck is supple . + JVD. No carotid bruit. PEERL   CNS: A&Ox3. No focal deficits. EOMI. Cranial nerves II-IX are intact.   Lymph Nodes: Cervical : Not palpable.  Respiratory: Decreased at b/l bases  Cardiovascular: S1S2 RRR. No rubs or gallop. II/VI systolic murmur lsb  Gastrointestinal: obese, Soft non-tender and non distended . +Bowel sounds. negative Collins's sign.  Extremities: 2+ LE edema.   Psychiatric: Calm . no agitation.  Skin: No skin rash/ulcers visualized to face, hands or feet.    Intake and output:   01-10 @ 07:01  -  01-11 @ 07:00  --------------------------------------------------------  IN: 450 mL / OUT: 350 mL / NET: 100 mL      LABS:                        12.9   4.86  )-----------( 187      ( 10 Tyrone 2020 20:29 )             42.2     01-11    138  |  102  |  27.0<H>  ----------------------------<  116<H>  3.8   |  24.0  |  1.37<H>    Ca    8.8      11 Jan 2020 06:48  Phos  3.3     01-11  Mg     1.6     01-11    TPro  8.6  /  Alb  3.3  /  TBili  1.3  /  DBili  x   /  AST  31  /  ALT  27  /  AlkPhos  99  01-10    CARDIAC MARKERS ( 10 Tyrone 2020 20:29 )  x     / <0.01 ng/mL / 70 U/L / x     / x        ;p-BNP=Serum Pro-Brain Natriuretic Peptide: 2022 pg/mL (01-10 @ 22:33)    PT/INR - ( 10 Tyrone 2020 20:29 )   PT: 24.3 sec;   INR: 2.07 ratio         PTT - ( 10 Tyrone 2020 20:29 )  PTT:32.7 sec      INTERPRETATION OF TELEMETRY: Reviewed by me. NSR  ECG: Reviewed by me. SR, IRBBB, NSST/T wave abnormalities     RADIOLOGY & ADDITIONAL STUDIES:    X-ray:  reviewed by me. Pulmonary vascular congestion

## 2020-01-11 NOTE — PROGRESS NOTE ADULT - ASSESSMENT
40yo M with morbid obesity, alisha, ohs, dm2 not on long term insulin, mediastinal lymphadenopathy, chronic HFpEF, RHC at [facility] in 2008 with moderate pulmonary HTN w/ chronic resp failure w/ hypoxia on 3-4L baseline, recently admitted to [facility] + dc on 12/28/19 for hf exac presenting w/ recurrent symptoms.     Acute on chronic hfpef  -hx of [facility] readmissions for hf, known non adherence to lifestyle and dietary recs, denies med + dietary non adherence @ this time  -failed to fu w/ cardio from last admit as recommended  -iv diuresis, titrate to po when euvolemic, s/p Lasix 60 mg, currenthy on Bumex gtt 0.5 mg/hr   -ss cardio eval noted  - Potassium 40 mEQ qday  - RHC planed for Monday  - Continue Coreg, sildenafil, enalapril, will uptitrate when BP allows  - Will check MARTITA, RF, C-ANCA, P-ANCA, SCL-70  - Will check PYP test for Amyloid   - Strict i/o and daily standing weights.     - Keep K > 4, Mg > 2.    - Monitor renal function with BID BMP due to ongoing diuresis     HAYES  -Baseline cr 0.9-1, 1.5 on arrival  -Tolerate worsening for diuresis, avoid other nephrotoxic meds  -BMP BID while on drip    DM II not on long term insulin   -Stable  -a1c 6.3 in 12/2019, controlled  -iss + fs achs, current BG controlled     ALISHA  -declined for bipap last admit due to history of non adherence in past w/ machine  -nocturnal bipap, outpt fu w/ pulm  -Pt states has an appointment for sleep study on Jan 14, 2020    Mediastinal lymphadenopathy  -incidentally noted last admit, advised to fu w/ rheum outpt for sarcoid w/u    Morbid obesity  -Counseling provided  -Will need to f/u outpt    DVT ppx.   -Lovenox bid    Dispo.   DC in 3-4d when euvolemic. high likelihood of readmission for same symptoms given chronic non adherence 40yo M with morbid obesity, alisha, ohs, dm2 not on long term insulin, mediastinal lymphadenopathy, chronic HFpEF, RHC at [facility] in 2008 with moderate pulmonary HTN w/ chronic resp failure w/ hypoxia on 3-4L baseline, recently admitted to [facility] + dc on 12/28/19 for hf exac presenting w/ recurrent symptoms.     Acute on chronic hfpef/ diastolic chf / pulm htn   -hx of [facility] readmissions for hf, known non adherence to lifestyle and dietary recs, denies med + dietary non adherence @ this time  -failed to fu w/ cardio from last admit as recommended  -iv diuresis, titrate to po when euvolemic, s/p Lasix 60 mg, currenthy on Bumex gtt 0.5 mg/hr   -ss cardio eval noted  - Potassium 40 mEQ qday  - RHC planed for Monday  - Continue Coreg, sildenafil, enalapril, will uptitrate when BP allows  - Will check MARTITA, RF, C-ANCA, P-ANCA, SCL-70  - Will check PYP test for Amyloid   - Strict i/o and daily standing weights.     - Keep K > 4, Mg > 2.    - Monitor renal function with BID BMP due to ongoing diuresis     HAYES  -Baseline cr 0.9-1, 1.5 on arrival  -Tolerate worsening for diuresis, avoid other nephrotoxic meds  -BMP BID while on drip    DM II not on long term insulin   -Stable  -a1c 6.3 in 12/2019, controlled  -iss + fs achs, current BG controlled     ALISHA  -declined for bipap last admit due to history of non adherence in past w/ machine  -nocturnal bipap, outpt fu w/ pulm  -Pt states has an appointment for sleep study on Jan 14, 2020    Mediastinal lymphadenopathy  -incidentally noted last admit, advised to fu w/ rheum outpt for sarcoid w/u    Morbid obesity  -Counseling provided  -Will need to f/u outpt    DVT ppx.   -Lovenox bid    Dispo.   DC in 3-4d when euvolemic. high likelihood of readmission for same symptoms given chronic non adherence

## 2020-01-11 NOTE — CONSULT NOTE ADULT - ATTENDING COMMENTS
pt seen and examined. pt seen and examined.   plan of care dw pt and NP.  Pt with pulmonary HTN, etiology not known, RV failure, not on CPAP with PAH.  Pt will need RHC and diuretics  SANTA for PAH started as well  CT chest reviewed.   Agree with bumex, meds adjusted.  We will follow with u  RHC on monday  PYP test for amyloid CM  I will follow with u.

## 2020-01-11 NOTE — PROGRESS NOTE ADULT - SUBJECTIVE AND OBJECTIVE BOX
Patient is a 39y old  Male who presents with a chief complaint of difficulty breathing (21 Dec 2019 10:30)    INTERVAL/OVERNIGHT EVENTS:  Patient examined at beside. Reports SOB has improved after using Bipap, currently being diuresed with Lasix.  States LE swelling has gotten worse in the past 2 days.   Patient is tolerating PO diet w/o nausea/vomiting/diarrhea/abdominal pain. Patient is voiding actively. Patient is ambulating.   Rest of ROS not contributory except for above.    I&O's Detail    10 Tyrone 2020 07:01  -  11 Jan 2020 07:00  --------------------------------------------------------  IN:    Oral Fluid: 450 mL  Total IN: 450 mL    OUT:    Voided: 350 mL  Total OUT: 350 mL    Total NET: 100 mL    Vital Signs Last 24 Hrs  T(C): 36.7 (11 Jan 2020 15:14), Max: 36.7 (10 Tyrone 2020 18:56)  T(F): 98 (11 Jan 2020 15:14), Max: 98 (10 Tyrone 2020 18:56)  HR: 80 (11 Jan 2020 15:14) (63 - 86)  BP: 87/62 (11 Jan 2020 15:14) (87/62 - 118/74)  RR: 21 (11 Jan 2020 15:14) (18 - 28)  SpO2: 96% (11 Jan 2020 15:14) (91% - 100%)      General: Well nourished/Well developed, NAD, Obese  Neck:  Supple, no sinuses or palpable masses, No JVD noted  Respiratory: Distant lung sounds, B/L crackles in bases, mild expiratory wheezing, poor inspiratory effort on exam.   CV: RRR, S1S2, no murmur.  Abdominal: Obese, Soft, NT, ND, no palpable mass  Extremities: +++ edema, + peripheral pulses, FROM all 4 extremity  Neurology: A&Ox3, no focalization signs    LABS                        12.9   4.86  )-----------( 187      ( 10 Tyrone 2020 20:29 )             42.2     01-11    138  |  102  |  27.0<H>  ----------------------------<  116<H>  3.8   |  24.0  |  1.37<H>    Ca    8.8      11 Jan 2020 06:48  Phos  3.3     01-11  Mg     1.6     01-11    TPro  8.6  /  Alb  3.3  /  TBili  1.3  /  DBili  x   /  AST  31  /  ALT  27  /  AlkPhos  99  01-10

## 2020-01-12 ENCOUNTER — TRANSCRIPTION ENCOUNTER (OUTPATIENT)
Age: 40
End: 2020-01-12

## 2020-01-12 LAB
ANION GAP SERPL CALC-SCNC: 12 MMOL/L — SIGNIFICANT CHANGE UP (ref 5–17)
ANION GAP SERPL CALC-SCNC: 15 MMOL/L — SIGNIFICANT CHANGE UP (ref 5–17)
BASOPHILS # BLD AUTO: 0.05 K/UL — SIGNIFICANT CHANGE UP (ref 0–0.2)
BASOPHILS NFR BLD AUTO: 1.3 % — SIGNIFICANT CHANGE UP (ref 0–2)
BUN SERPL-MCNC: 28 MG/DL — HIGH (ref 8–20)
BUN SERPL-MCNC: 29 MG/DL — HIGH (ref 8–20)
CALCIUM SERPL-MCNC: 9 MG/DL — SIGNIFICANT CHANGE UP (ref 8.6–10.2)
CALCIUM SERPL-MCNC: 9.1 MG/DL — SIGNIFICANT CHANGE UP (ref 8.6–10.2)
CHLORIDE SERPL-SCNC: 96 MMOL/L — LOW (ref 98–107)
CHLORIDE SERPL-SCNC: 98 MMOL/L — SIGNIFICANT CHANGE UP (ref 98–107)
CO2 SERPL-SCNC: 27 MMOL/L — SIGNIFICANT CHANGE UP (ref 22–29)
CO2 SERPL-SCNC: 31 MMOL/L — HIGH (ref 22–29)
CREAT SERPL-MCNC: 1.29 MG/DL — SIGNIFICANT CHANGE UP (ref 0.5–1.3)
CREAT SERPL-MCNC: 1.41 MG/DL — HIGH (ref 0.5–1.3)
EOSINOPHIL # BLD AUTO: 0.16 K/UL — SIGNIFICANT CHANGE UP (ref 0–0.5)
EOSINOPHIL NFR BLD AUTO: 4.2 % — SIGNIFICANT CHANGE UP (ref 0–6)
GLUCOSE BLDC GLUCOMTR-MCNC: 100 MG/DL — HIGH (ref 70–99)
GLUCOSE BLDC GLUCOMTR-MCNC: 93 MG/DL — SIGNIFICANT CHANGE UP (ref 70–99)
GLUCOSE BLDC GLUCOMTR-MCNC: 94 MG/DL — SIGNIFICANT CHANGE UP (ref 70–99)
GLUCOSE BLDC GLUCOMTR-MCNC: 97 MG/DL — SIGNIFICANT CHANGE UP (ref 70–99)
GLUCOSE SERPL-MCNC: 129 MG/DL — HIGH (ref 70–115)
GLUCOSE SERPL-MCNC: 95 MG/DL — SIGNIFICANT CHANGE UP (ref 70–115)
HCT VFR BLD CALC: 42.2 % — SIGNIFICANT CHANGE UP (ref 39–50)
HGB BLD-MCNC: 13.2 G/DL — SIGNIFICANT CHANGE UP (ref 13–17)
IMM GRANULOCYTES NFR BLD AUTO: 0.3 % — SIGNIFICANT CHANGE UP (ref 0–1.5)
LYMPHOCYTES # BLD AUTO: 0.79 K/UL — LOW (ref 1–3.3)
LYMPHOCYTES # BLD AUTO: 20.5 % — SIGNIFICANT CHANGE UP (ref 13–44)
MAGNESIUM SERPL-MCNC: 1.7 MG/DL — SIGNIFICANT CHANGE UP (ref 1.6–2.6)
MCHC RBC-ENTMCNC: 29.7 PG — SIGNIFICANT CHANGE UP (ref 27–34)
MCHC RBC-ENTMCNC: 31.3 GM/DL — LOW (ref 32–36)
MCV RBC AUTO: 95 FL — SIGNIFICANT CHANGE UP (ref 80–100)
MONOCYTES # BLD AUTO: 0.4 K/UL — SIGNIFICANT CHANGE UP (ref 0–0.9)
MONOCYTES NFR BLD AUTO: 10.4 % — SIGNIFICANT CHANGE UP (ref 2–14)
NEUTROPHILS # BLD AUTO: 2.44 K/UL — SIGNIFICANT CHANGE UP (ref 1.8–7.4)
NEUTROPHILS NFR BLD AUTO: 63.3 % — SIGNIFICANT CHANGE UP (ref 43–77)
PHOSPHATE SERPL-MCNC: 3.4 MG/DL — SIGNIFICANT CHANGE UP (ref 2.4–4.7)
PLATELET # BLD AUTO: 177 K/UL — SIGNIFICANT CHANGE UP (ref 150–400)
POTASSIUM SERPL-MCNC: 3.6 MMOL/L — SIGNIFICANT CHANGE UP (ref 3.5–5.3)
POTASSIUM SERPL-MCNC: 3.8 MMOL/L — SIGNIFICANT CHANGE UP (ref 3.5–5.3)
POTASSIUM SERPL-SCNC: 3.6 MMOL/L — SIGNIFICANT CHANGE UP (ref 3.5–5.3)
POTASSIUM SERPL-SCNC: 3.8 MMOL/L — SIGNIFICANT CHANGE UP (ref 3.5–5.3)
RBC # BLD: 4.44 M/UL — SIGNIFICANT CHANGE UP (ref 4.2–5.8)
RBC # FLD: 15.9 % — HIGH (ref 10.3–14.5)
RHEUMATOID FACT SERPL-ACNC: 10 IU/ML — SIGNIFICANT CHANGE UP (ref 0–13)
SODIUM SERPL-SCNC: 139 MMOL/L — SIGNIFICANT CHANGE UP (ref 135–145)
SODIUM SERPL-SCNC: 140 MMOL/L — SIGNIFICANT CHANGE UP (ref 135–145)
WBC # BLD: 3.85 K/UL — SIGNIFICANT CHANGE UP (ref 3.8–10.5)
WBC # FLD AUTO: 3.85 K/UL — SIGNIFICANT CHANGE UP (ref 3.8–10.5)

## 2020-01-12 PROCEDURE — 99233 SBSQ HOSP IP/OBS HIGH 50: CPT | Mod: GC

## 2020-01-12 PROCEDURE — 99232 SBSQ HOSP IP/OBS MODERATE 35: CPT

## 2020-01-12 RX ORDER — INFLUENZA VIRUS VACCINE 15; 15; 15; 15 UG/.5ML; UG/.5ML; UG/.5ML; UG/.5ML
0.5 SUSPENSION INTRAMUSCULAR ONCE
Refills: 0 | Status: COMPLETED | OUTPATIENT
Start: 2020-01-12 | End: 2020-01-12

## 2020-01-12 RX ORDER — POTASSIUM CHLORIDE 20 MEQ
40 PACKET (EA) ORAL ONCE
Refills: 0 | Status: COMPLETED | OUTPATIENT
Start: 2020-01-12 | End: 2020-01-12

## 2020-01-12 RX ORDER — POTASSIUM CHLORIDE 20 MEQ
40 PACKET (EA) ORAL EVERY 12 HOURS
Refills: 0 | Status: DISCONTINUED | OUTPATIENT
Start: 2020-01-12 | End: 2020-01-16

## 2020-01-12 RX ADMIN — Medication 3 MILLILITER(S): at 14:55

## 2020-01-12 RX ADMIN — ATORVASTATIN CALCIUM 40 MILLIGRAM(S): 80 TABLET, FILM COATED ORAL at 21:40

## 2020-01-12 RX ADMIN — Medication 3 MILLILITER(S): at 20:40

## 2020-01-12 RX ADMIN — Medication 40 MILLIEQUIVALENT(S): at 21:41

## 2020-01-12 RX ADMIN — CARVEDILOL PHOSPHATE 3.12 MILLIGRAM(S): 80 CAPSULE, EXTENDED RELEASE ORAL at 17:24

## 2020-01-12 RX ADMIN — ENOXAPARIN SODIUM 40 MILLIGRAM(S): 100 INJECTION SUBCUTANEOUS at 21:42

## 2020-01-12 RX ADMIN — Medication 81 MILLIGRAM(S): at 08:35

## 2020-01-12 RX ADMIN — BUMETANIDE 2.5 MG/HR: 0.25 INJECTION INTRAMUSCULAR; INTRAVENOUS at 21:40

## 2020-01-12 RX ADMIN — CARVEDILOL PHOSPHATE 3.12 MILLIGRAM(S): 80 CAPSULE, EXTENDED RELEASE ORAL at 06:02

## 2020-01-12 RX ADMIN — Medication 40 MILLIEQUIVALENT(S): at 08:35

## 2020-01-12 RX ADMIN — Medication 3 MILLILITER(S): at 03:55

## 2020-01-12 RX ADMIN — Medication 5 MILLIGRAM(S): at 06:02

## 2020-01-12 RX ADMIN — Medication 10 MILLIGRAM(S): at 21:41

## 2020-01-12 RX ADMIN — Medication 3 MILLILITER(S): at 08:25

## 2020-01-12 RX ADMIN — Medication 10 MILLIGRAM(S): at 14:36

## 2020-01-12 RX ADMIN — ENOXAPARIN SODIUM 40 MILLIGRAM(S): 100 INJECTION SUBCUTANEOUS at 06:03

## 2020-01-12 RX ADMIN — Medication 20 MILLIGRAM(S): at 06:02

## 2020-01-12 NOTE — DISCHARGE NOTE PROVIDER - NSDCCPCAREPLAN_GEN_ALL_CORE_FT
PRINCIPAL DISCHARGE DIAGNOSIS  Diagnosis: CHF exacerbation  Assessment and Plan of Treatment: You were found to have an exacerbation of your heart failure, likely related to increase amout of fluid intake.  Upon discharge please limit your fluid intake to no more than 1000cc.  Please follow with your PMD in 1 week      SECONDARY DISCHARGE DIAGNOSES  Diagnosis: HAYES (acute kidney injury)  Assessment and Plan of Treatment: You were noted to have an abnormal lab results in your kidneys.  We have monitored your kidney function with blood work during your time here and you are at a level that no longer requires continued hospital level care, but we do recommend that you follow up to continually have your kidney function checked.   You must follow up with your primary care doctor in 1 week    Diagnosis: Pulmonary hypertension  Assessment and Plan of Treatment: You were found to have pulmonary hypertension during examination of your heart. Pulmonary hypertension is a condition that causes high blood pressure in the blood vessels that carry blood to the lungs.   Follow up with the that followed you within 2 weeks.    Diagnosis: JAN (obstructive sleep apnea)  Assessment and Plan of Treatment: Please make sure to follow up with your Pulmonologist, to schedule a sleep study       Diagnosis: DM (diabetes mellitus)  Assessment and Plan of Treatment: Follow a low carb low sugar diet. Continue to take all antidiabetic medications/insulin as prescribed. Follow up with your Primary Care Doctor regularly for blood sugar/A1c checks. Be sure to see an eye doctor and foot doctor on an annual basis. PRINCIPAL DISCHARGE DIAGNOSIS  Diagnosis: CHF exacerbation  Assessment and Plan of Treatment: Chronic diastolic heart failure with severe pulmonary hypertension. . You were found to have an exacerbation of your heart failure, likely related to increase amout of fluid intake.  Upon discharge please limit your fluid intake to no more than 1000cc. Continue to take bumex 1mg orally twice a day, with potasisum supplementation. Also continue with coreg, enalapril and revatio.   Please follow with your PMD in 1 week and cardiologist in 1-2 weeks.      SECONDARY DISCHARGE DIAGNOSES  Diagnosis: JAN on CPAP  Assessment and Plan of Treatment:     Diagnosis: Pulmonary hypertension  Assessment and Plan of Treatment: You were found to have pulmonary hypertension during examination of your heart. Pulmonary hypertension is a condition that causes high blood pressure in the blood vessels that carry blood to the lungs.   Follow up with the that followed you within 2 weeks.    Diagnosis: HAYES (acute kidney injury)  Assessment and Plan of Treatment: You were noted to have abnormal lab results in your kidneys.  We have monitored your kidney function with blood work during your time here and you are at a level that no longer requires continued hospital level care, but we do recommend that you follow up to continually have your kidney function checked.   You must follow up with your primary care doctor in 1 week    Diagnosis: JAN (obstructive sleep apnea)  Assessment and Plan of Treatment: Please make sure to follow up with your Pulmonologist, to schedule a sleep study       Diagnosis: DM (diabetes mellitus)  Assessment and Plan of Treatment: Follow a low carb low sugar diet. Continue to take all antidiabetic medications/insulin as prescribed. Follow up with your Primary Care Doctor regularly for blood sugar/A1c checks. Be sure to see an eye doctor and foot doctor on an annual basis. PRINCIPAL DISCHARGE DIAGNOSIS  Diagnosis: CHF exacerbation  Assessment and Plan of Treatment: Chronic diastolic heart failure with severe pulmonary hypertension. . You were found to have an exacerbation of your heart failure, likely related to increase amout of fluid intake.  Upon discharge please limit your fluid intake to no more than 1000cc. Continue to take bumex 1mg orally twice a day, with potasisum supplementation. Also continue with coreg, enalapril and revatio. Revatio was decreased to 10mg orally three times a day.   Please follow with your PMD in 1 week and cardiologist in 1-2 weeks.      SECONDARY DISCHARGE DIAGNOSES  Diagnosis: Pulmonary hypertension  Assessment and Plan of Treatment: You were found to have pulmonary hypertension during examination of your heart. Pulmonary hypertension is a condition that causes high blood pressure in the blood vessels that carry blood to the lungs.   Follow up with the that followed you within 2 weeks.    Diagnosis: HAYES (acute kidney injury)  Assessment and Plan of Treatment: You were noted to have abnormal lab results in your kidneys.  We have monitored your kidney function with blood work during your time here and you are at a level that no longer requires continued hospital level care, but we do recommend that you follow up to continually have your kidney function checked.   You must follow up with your primary care doctor in 1 week    Diagnosis: JAN (obstructive sleep apnea)  Assessment and Plan of Treatment: Please make sure to follow up with your Pulmonologist, to schedule a sleep study       Diagnosis: DM (diabetes mellitus)  Assessment and Plan of Treatment: Follow a low carb low sugar diet. Continue to take all antidiabetic medications/insulin as prescribed. Follow up with your Primary Care Doctor regularly for blood sugar/A1c checks. Be sure to see an eye doctor and foot doctor on an annual basis.

## 2020-01-12 NOTE — DISCHARGE NOTE PROVIDER - CARE PROVIDER_API CALL
KIP, Dr. Talia Hayden Primary  Phone: (   )    -  Fax: (   )    -  Follow Up Time:     Dr. Abbi Franklin, (Pulmonologist)  Phone: (   )    -  Fax: (   )    -  Follow Up Time: KIP, Dr. Talia Hayden Primary  Phone: (   )    -  Fax: (   )    -  Follow Up Time:     Dr. Abbi Franklin, (Pulmonologist)  Phone: (   )    -  Fax: (   )    -  Follow Up Time:     Alton Cox)  Cardiovascular Disease  [address] [facility], [place] [address]  Phone: [phone]  Fax: [phone]  Follow Up Time:

## 2020-01-12 NOTE — DISCHARGE NOTE PROVIDER - HOSPITAL COURSE
40yo M with morbid obesity, alisha, ohs, dm2 not on long term insulin, mediastinal lymphadenopathy, chronic HFpEF, RHC at [facility] [place] in 2008 with moderate pulmonary HTN w/ chronic resp failure w/ hypoxia on 3-4L baseline, recently admitted to [facility] + [place] on 12/28/19 for hf exac presenting w/ recurrent symptoms. Patient recieved lasix IV and then started on IV bumex drip, diuresing well. Cardiology consulted with plan for Right heart cath on 1/13. Also, ordered amyloidosis workup including NM amyloidosis SPECT scan ordered; PYP test; MARTITA, RF, C-anca, P-anca, SCL-70. Patient started on duonebs, BIPAP treatment for CHF/COPD. 40yo M with morbid obesity, jan, ohs, dm2 not on long term insulin, mediastinal lymphadenopathy, chronic HFpEF, RHC at [facility] in 2008 with moderate pulmonary HTN w/ chronic resp failure w/ hypoxia on 3-4L baseline, recently admitted to [facility] + [place] on 12/28/19 for hf exac presenting w/ recurrent symptoms. Patient recieved lasix IV and then started on IV bumex drip, switched to PO. Cardiology consulted with plan for Right heart cath on 1/13. Also, ordered amyloidosis workup including NM amyloidosis SPECT scan (-); PYP test; MARTITA, RF, C-anca, P-anca, SCL-70, pending. Patient started on duonebs, BIPAP treatment for CHF/COPD.    Patient with hx of [facility] readmissions for Chf, known non adherence to lifestyle and dietary recs, counselled on compliance with diet and fluid restriction;    In term of HAYES, likely secondary to hypervolemia in the setting of HF flare, nephrotoxic meds were avoided and by the time of discharge, renal function was back to patients baseline    In regards of JAN, patient has not been complaint at home, educated on importance of compliance, strongly recommended to follow up outpatient to schedule a sleep study.    His DM II, noted to be stable with a HbA1c of 6.3, his oral meds were held as per protocol and patient was covered with sliding scale as needed     During prior admission Mediastinal lymphadenopathy, was found on CT scan, patient advised to follow up outpatient with Rheumatologist to rule out Sarcoidosis         All electrolyte abnormalities were monitored carefully and corrected as necessary during this hospitalization. At the time of discharge patient was hemodynamically stable and amenable to all terms of discharge. The patient has received verbal instructions from myself regarding discharge plans.         Length of Discharge: 45MIN        Vital Signs Last 24 Hrs    T(C): 36.7 (15 Tyrone 2020 16:15), Max: 36.7 (15 Tyrone 2020 05:43)    T(F): 98 (15 Tyrone 2020 16:15), Max: 98.1 (15 Tyrone 2020 05:43)    HR: 84 (15 Tyrone 2020 16:15) (74 - 88)    BP: 128/86 (15 Tyrone 2020 15:29) (92/61 - 128/86)    RR: 18 (15 Tyrone 2020 16:15) (18 - 26)    SpO2: 92% (15 Tyrone 2020 16:15) (92% - 100%)        General: Well nourished/Well developed, NAD, Obese    Neck:  Supple, no sinuses or palpable masses, No JVD noted    Respiratory: Good air entry, remote intermittent crackles, mild expiratory wheezing    CV: RRR, S1S2, no murmur.    Abdominal: Obese, Soft, NT, ND, no palpable mass    Extremities: +2 b/l le edema, + peripheral pulses, FROM all 4 extremity       Neurology: A&Ox3, no focalization signs 38 yo M with morbid obesity, alisha, ohs, dm2 not on long term insulin, mediastinal lymphadenopathy, chronic HFpEF, RHC at [facility] in 2008 with moderate pulmonary HTN w/ chronic resp failure w/ hypoxia on 3-4L baseline, recently admitted to [facility] + [place] on 12/28/19 for hf exac presenting w/ recurrent symptoms. Patient recieved lasix IV and then started on IV bumex drip, switched to PO. Cardiology consulted with plan for Right heart cath on 1/13 which showed severe pulm htn. Also, ordered amyloidosis workup including NM amyloidosis SPECT scan (-); PYP test; MARTITA, RF, C-anca, P-anca, SCL-70, pending. Patient started on duonebs, BIPAP treatment for CHF/COPD. Pt improved clinically, dietary indiscretion counselling completed. Pt cleared by cardio for dispo. Pt to f/u with cardio outpt in 1-2 weeks.     Patient with hx of [facility] hospital readmissions for Chf, known non adherence to lifestyle and dietary recs, counselled on compliance with diet and fluid restriction;    In term of HAYES, likely secondary to hypervolemia in the setting of HF flare, nephrotoxic meds were avoided and by the time of discharge, renal function was back to patients baseline    In regards of ALISHA, patient has not been complaint at home, educated on importance of compliance, strongly recommended to follow up outpatient to schedule a sleep study.    His DM II, noted to be stable with a HbA1c of 6.3, his oral meds were held as per protocol and patient was covered with sliding scale as needed     During prior admission Mediastinal lymphadenopathy, was found on CT scan, patient advised to follow up outpatient with Rheumatologist to rule out Sarcoidosis, outpt pulm is Dr. Hammonds.         All electrolyte abnormalities were monitored carefully and corrected as necessary during this hospitalization. At the time of discharge patient was hemodynamically stable and amenable to all terms of discharge. The patient has received verbal instructions from myself regarding discharge plans.         High risk for recurrent hospitalizations due to non compliance with fluid and diet intake         Length of Discharge: 45MIN        Vital Signs Last 24 Hrs    T(C): 36.6 (16 Jan 2020 09:42), Max: 36.9 (16 Jan 2020 06:25)    T(F): 97.8 (16 Jan 2020 09:42), Max: 98.4 (16 Jan 2020 06:25)    HR: 73 (16 Jan 2020 09:42) (66 - 87)    BP: 92/60 (16 Jan 2020 09:42) (92/50 - 128/86)    BP(mean): --    RR: 18 (16 Jan 2020 09:42) (18 - 18)    SpO2: 92% (16 Jan 2020 09:42) (91% - 100%)        General: Well nourished/Well developed, NAD, Obese    Neck:  Supple, no sinuses or palpable masses, No JVD noted    Respiratory: Good air entry, remote intermittent crackles, mild expiratory wheezing    CV: RRR, S1S2, no murmur.    Abdominal: Obese, Soft, NT, ND, no palpable mass    Extremities: +2 b/l le edema,     Neurology: A&Ox3, no focalization signs

## 2020-01-12 NOTE — PROGRESS NOTE ADULT - SUBJECTIVE AND OBJECTIVE BOX
[facility]                                                               Office: [address], [place] [address]                                                              Telephone: [phone]. Fax:[phone]                                                                             PROGRESS NOTE  Reason for follow up: RV Failure   Overnight: No new events.   Update: Patient states that his breathing has improved slightly, and he has been urinating a lot. Patient states that he had a RHC in November in [place], but doesn't remember the results. Patient is currently visiting a friend, but lives and actively follows up in [place].    Subjective: "I'm a little better."      	  Vitals:  T(C): 36.9 (01-12-20 @ 09:46), Max: 36.9 (01-12-20 @ 09:46)  HR: 80 (01-12-20 @ 09:46) (71 - 86)  BP: 105/64 (01-12-20 @ 09:46) (87/62 - 105/64)  RR: 16 (01-12-20 @ 09:46) (16 - 22)  SpO2: 96% (01-12-20 @ 09:46) (91% - 100%)  Wt(kg): --  I&O's Summary    11 Jan 2020 07:01  -  12 Jan 2020 07:00  --------------------------------------------------------  IN: 465 mL / OUT: 2650 mL / NET: -2185 mL    12 Jan 2020 07:01  -  12 Jan 2020 10:28  --------------------------------------------------------  IN: 0 mL / OUT: 350 mL / NET: -350 mL      Weight (kg): 170.8 (01-11 @ 22:10)    PHYSICAL EXAM:  Constitutional: Comfortable . No acute distress.   HEENT: Atraumatic and normocephalic , neck is supple . + JVD. No carotid bruit. PEERL   CNS: A&Ox3. No focal deficits. EOMI. Cranial nerves II-IX are intact.   Lymph Nodes: Cervical : Not palpable.  Respiratory: Decreased at b/l bases  Cardiovascular: S1S2 RRR. No rubs or gallop. II/VI systolic murmur lsb  Gastrointestinal: obese, Soft non-tender and non distended . +Bowel sounds. negative Collins's sign.  Extremities: 2+ LE edema.   Psychiatric: Calm . no agitation.  Skin: No skin rash/ulcers visualized to face, hands or feet.    CURRENT MEDICATIONS:  buMETAnide Infusion 0.5 mG/Hr IV Continuous <Continuous>  carvedilol 3.125 milliGRAM(s) Oral every 12 hours  enalapril 5 milliGRAM(s) Oral daily  sildenafil (REVATIO) 20 milliGRAM(s) Oral three times a day    albuterol/ipratropium for Nebulization  atorvastatin  dextrose 50% Injectable  dextrose 50% Injectable  dextrose 50% Injectable  insulin lispro (HumaLOG) corrective regimen sliding scale  aspirin  chewable  dextrose 5%.  enoxaparin Injectable  influenza   Vaccine  potassium chloride    Tablet ER      DIAGNOSTIC TESTING:  [ ] Echocardiogram:   < from: TTE Echo Complete w/Doppler (12.21.19 @ 08:48) >  PHYSICIAN INTERPRETATION:  Left Ventricle: The left ventricular internal cavity size is normal.  Left ventricular ejection fraction, by visual estimation, is 65 to 70%. The interventricular septum is flattened in systole and diastole, consistent with right ventricular pressure and volume overload.  Right Ventricle: The right ventricular size is severely enlarged. RV systolic function is severely reduced. TV S' 0.2 m/s.  Left Atrium: The left atrium is normal in size.  Right Atrium: Severely enlarged right atrium.  Pericardium: There is no evidence of pericardial effusion.  Mitral Valve: The mitral valve is normal in structure. Mild mitral valve regurgitation is seen.  Tricuspid Valve: Moderate-severe tricuspid regurgitation is visualized. Estimated pulmonary artery systolic pressure is 89.6 mmHg assuming a right atrial pressure of 15 mmHg, which is consistent with severe pulmonary hypertension.  Aortic Valve: The aortic valve is normal. No evidence of aortic valve regurgitation is seen.  Pulmonic Valve: The pulmonic valve was not well visualized. Mild pulmonic valve regurgitation.  Aorta: The aortic root is normal in size and structure.  Pulmonary Artery: The pulmonary artery is moderately dilated.  Venous: The inferior vena cava was dilated, with respiratory size variation less than 50%. The inferior vena cava and the hepatic vein show a pattern of systolic flow reversal, suggestive of tricuspid regurgitation.       Summary:   1. Technically fair study.   2. Left ventricular ejection fraction, by visual estimation, is 65 to 70%.   3. There is moderate septal left ventricular hypertrophy.   4. Severely enlarged right ventricle.   5. Severely reduced RV systolic function.   6. Right ventricular volume and pressure overload.   7. Moderate-severe tricuspid regurgitation.   8. Estimated pulmonary artery systolic pressure is 89.6 mmHg assuming a right atrial pressure of 15 mmHg, which is consistent with severe pulmonary hypertension.   9. Moderately dilated pulmonary artery.    MD Josefina Electronically signed on 12/24/2019 at 2:54:21 PM       OTHER: 	  < from: Xray Chest 1 View- PORTABLE-Urgent (01.10.20 @ 20:26) >     EXAM:  XR CHEST PORTABLE URGENT 1V                          PROCEDURE DATE:  01/10/2020          INTERPRETATION:  Portable chest radiograph        CLINICAL INFORMATION:   Short of breath.    TECHNIQUE:  Portable  AP view of the chest was obtained.    COMPARISON: 12/20/2019 available for review.    FINDINGS:   The lungs  are clear.  No pleural abnormality is seen.    The  heart is enlarged in transverse diameter. No hilar mass. Trachea midline.   Visualized osseous structures are intact.        IMPRESSION: Cardiomegaly No evidence of active chest disease.    < end of copied text >      LABS:	 	  CARDIAC MARKERS ( 10 [place] 2020 22:33 )  x     / x     / x     / x     / x      p-BNP 10 [place]one 2020 22:33: 2022 pg/mL, CARDIAC MARKERS ( 10 [place] 2020 20:29 )  x     / <0.01 ng/mL / 70 U/L / x     / x      p-BNP 10 [place] 2020 20:29: x                              13.2   3.85  )-----------( 177      ( 12 Jan 2020 06:52 )             42.2     01-12    140  |  98  |  28.0<H>  ----------------------------<  95  3.8   |  27.0  |  1.41<H>    Ca    9.0      12 Jan 2020 06:52  Phos  3.4     01-12  Mg     1.7     01-12    TPro  8.6  /  Alb  3.3  /  TBili  1.3  /  DBili  x   /  AST  31  /  ALT  27  /  AlkPhos  99  01-10    proBNP: Serum Pro-Brain Natriuretic Peptide: 2022 pg/mL (01-10 @ 22:33)    Lipid Profile:   HgA1c:   TSH:       TELEMETRY:  Not on tele [place] [facility]                                                               Office: [address] [address] [facility], [place] [address]                                                              Telephone: [phone]. Fax:[phone]                                                                             PROGRESS NOTE  Reason for follow up: RV Failure   Overnight: No new events.   Update: Patient states that his breathing has improved slightly, and he has been urinating a lot. Patient states that he had a RHC in November in [place], but doesn't remember the results. Patient is currently visiting a friend, but lives and actively follows up in [place].    Subjective: "I'm a little better."      	  Vitals:  T(C): 36.9 (01-12-20 @ 09:46), Max: 36.9 (01-12-20 @ 09:46)  HR: 80 (01-12-20 @ 09:46) (71 - 86)  BP: 105/64 (01-12-20 @ 09:46) (87/62 - 105/64)  RR: 16 (01-12-20 @ 09:46) (16 - 22)  SpO2: 96% (01-12-20 @ 09:46) (91% - 100%)  Wt(kg): --  I&O's Summary    11 Jan 2020 07:01  -  12 Jan 2020 07:00  --------------------------------------------------------  IN: 465 mL / OUT: 2650 mL / NET: -2185 mL    12 Jan 2020 07:01  -  12 Jan 2020 10:28  --------------------------------------------------------  IN: 0 mL / OUT: 350 mL / NET: -350 mL      Weight (kg): 170.8 (01-11 @ 22:10)    PHYSICAL EXAM:  Constitutional: Comfortable . No acute distress.   HEENT: Atraumatic and normocephalic , neck is supple . + JVD. No carotid bruit. PEERL   CNS: A&Ox3. No focal deficits. EOMI. Cranial nerves II-IX are intact.   Lymph Nodes: Cervical : Not palpable.  Respiratory: Decreased at b/l bases  Cardiovascular: S1S2 RRR. No rubs or gallop. II/VI systolic murmur lsb  Gastrointestinal: obese, Soft non-tender and non distended . +Bowel sounds. negative Collins's sign.  Extremities: 2+ LE edema.   Psychiatric: Calm . no agitation.  Skin: No skin rash/ulcers visualized to face, hands or feet.    CURRENT MEDICATIONS:  buMETAnide Infusion 0.5 mG/Hr IV Continuous <Continuous>  carvedilol 3.125 milliGRAM(s) Oral every 12 hours  enalapril 5 milliGRAM(s) Oral daily  sildenafil (REVATIO) 20 milliGRAM(s) Oral three times a day    albuterol/ipratropium for Nebulization  atorvastatin  dextrose 50% Injectable  dextrose 50% Injectable  dextrose 50% Injectable  insulin lispro (HumaLOG) corrective regimen sliding scale  aspirin  chewable  dextrose 5%.  enoxaparin Injectable  influenza   Vaccine  potassium chloride    Tablet ER      DIAGNOSTIC TESTING:  [ ] Echocardiogram:   < from: TTE Echo Complete w/Doppler (12.21.19 @ 08:48) >  PHYSICIAN INTERPRETATION:  Left Ventricle: The left ventricular internal cavity size is normal.  Left ventricular ejection fraction, by visual estimation, is 65 to 70%. The interventricular septum is flattened in systole and diastole, consistent with right ventricular pressure and volume overload.  Right Ventricle: The right ventricular size is severely enlarged. RV systolic function is severely reduced. TV S' 0.2 m/s.  Left Atrium: The left atrium is normal in size.  Right Atrium: Severely enlarged right atrium.  Pericardium: There is no evidence of pericardial effusion.  Mitral Valve: The mitral valve is normal in structure. Mild mitral valve regurgitation is seen.  Tricuspid Valve: Moderate-severe tricuspid regurgitation is visualized. Estimated pulmonary artery systolic pressure is 89.6 mmHg assuming a right atrial pressure of 15 mmHg, which is consistent with severe pulmonary hypertension.  Aortic Valve: The aortic valve is normal. No evidence of aortic valve regurgitation is seen.  Pulmonic Valve: The pulmonic valve was not well visualized. Mild pulmonic valve regurgitation.  Aorta: The aortic root is normal in size and structure.  Pulmonary Artery: The pulmonary artery is moderately dilated.  Venous: The inferior vena cava was dilated, with respiratory size variation less than 50%. The inferior vena cava and the hepatic vein show a pattern of systolic flow reversal, suggestive of tricuspid regurgitation.       Summary:   1. Technically fair study.   2. Left ventricular ejection fraction, by visual estimation, is 65 to 70%.   3. There is moderate septal left ventricular hypertrophy.   4. Severely enlarged right ventricle.   5. Severely reduced RV systolic function.   6. Right ventricular volume and pressure overload.   7. Moderate-severe tricuspid regurgitation.   8. Estimated pulmonary artery systolic pressure is 89.6 mmHg assuming a right atrial pressure of 15 mmHg, which is consistent with severe pulmonary hypertension.   9. Moderately dilated pulmonary artery.    MD Josefina Electronically signed on 12/24/2019 at 2:54:21 PM       OTHER: 	  < from: Xray Chest 1 View- PORTABLE-Urgent (01.10.20 @ 20:26) >     EXAM:  XR CHEST PORTABLE URGENT 1V                          PROCEDURE DATE:  01/10/2020          INTERPRETATION:  Portable chest radiograph        CLINICAL INFORMATION:   Short of breath.    TECHNIQUE:  Portable  AP view of the chest was obtained.    COMPARISON: 12/20/2019 available for review.    FINDINGS:   The lungs  are clear.  No pleural abnormality is seen.    The  heart is enlarged in transverse diameter. No hilar mass. Trachea midline.   Visualized osseous structures are intact.        IMPRESSION: Cardiomegaly No evidence of active chest disease.    < end of copied text >      LABS:	 	  CARDIAC MARKERS ( 10 [place] 2020 22:33 )  x     / x     / x     / x     / x      p-BNP 10 [place] 2020 22:33: 2022 pg/mL, CARDIAC MARKERS ( 10 [place] 2020 20:29 )  x     / <0.01 ng/mL / 70 U/L / x     / x      p-BNP 10 [place] 2020 20:29: x                              13.2   3.85  )-----------( 177      ( 12 Jan 2020 06:52 )             42.2     01-12    140  |  98  |  28.0<H>  ----------------------------<  95  3.8   |  27.0  |  1.41<H>    Ca    9.0      12 Jan 2020 06:52  Phos  3.4     01-12  Mg     1.7     01-12    TPro  8.6  /  Alb  3.3  /  TBili  1.3  /  DBili  x   /  AST  31  /  ALT  27  /  AlkPhos  99  01-10    proBNP: Serum Pro-Brain Natriuretic Peptide: 2022 pg/mL (01-10 @ 22:33)    TELEMETRY:  Not on tele

## 2020-01-12 NOTE — DISCHARGE NOTE PROVIDER - NSDCMRMEDTOKEN_GEN_ALL_CORE_FT
aspirin 81 mg oral tablet, chewable: 1 tab(s) orally once a day  atorvastatin 40 mg oral tablet: 1 tab(s) orally once a day (at bedtime)  carvedilol 3.125 mg oral tablet: 1 tab(s) orally 2 times a day  enalapril 5 mg oral tablet: 1 tab(s) orally once a day   furosemide 40 mg oral tablet: 1 tab(s) orally 2 times a day  hydrALAZINE 25 mg oral tablet: 1 tab(s) orally every 8 hours  metFORMIN 500 mg oral tablet: 1 tab(s) orally once a day   sildenafil 20 mg oral tablet: 1 tab(s) orally 3 times a day aspirin 81 mg oral tablet, chewable: 1 tab(s) orally once a day  atorvastatin 40 mg oral tablet: 1 tab(s) orally once a day (at bedtime)  bumetanide 1 mg oral tablet: 1 tab(s) orally 2 times a day  carvedilol 3.125 mg oral tablet: 1 tab(s) orally every 12 hours  enalapril 5 mg oral tablet: 1 tab(s) orally once a day  K-Tab 20 mEq oral tablet, extended release: 2 tab(s) orally once a day   metFORMIN 500 mg oral tablet: 1 tab(s) orally once a day   sildenafil 20 mg oral tablet: 0.5 tab(s) orally 3 times a day    cut the pill in half with a pill cutter     you need to take 10mg orally daily

## 2020-01-12 NOTE — DISCHARGE NOTE PROVIDER - PROVIDER TOKENS
FREE:[LAST:[PMD],FIRST:[Dr. Talia Hayden Primary],PHONE:[(   )    -],FAX:[(   )    -]],FREE:[LAST:[Dr. Abbi Franklin],FIRST:[(Pulmonologist)],PHONE:[(   )    -],FAX:[(   )    -]] FREE:[LAST:[PMD],FIRST:[Dr. Talia Hayden Primary],PHONE:[(   )    -],FAX:[(   )    -]],FREE:[LAST:[Dr. Abbi Franklin],FIRST:[(Pulmonologist)],PHONE:[(   )    -],FAX:[(   )    -]],PROVIDER:[TOKEN:[[id number]]]

## 2020-01-12 NOTE — PROGRESS NOTE ADULT - ASSESSMENT
A/P:  40 y/o morbidly obese M with a PMHx of Right sided heart failure (12/19 EF 65-70% with severely reduced RV fxn), severe pulmonary HTN (PA systolic 89.6) on home oxygen (4L NC), JAN, DMII on insuline who presented to the ED complaining of worsening leg edema, and shortness of breath. Patient was recently admitted to [facility] back in December for similar presentation, and states that he has been taking all of his medications as prescribed, but has been drinking a lot more fluids then he "should." Patient states he also felt like torsemide worked better for him in the past, and that the lasix isn't as strong. Patient didn't follow up with Dr. Horton after discharge as recommended. Patient states that his legs have been getting more and more swollen, and he could no longer fit in his shoes. Patient also with dyspnea now at rest, and had to increase his oxygen to 5L at home, so he came to the ED to be evaluated. Patient denies fevers, chills, CP, palpitations, syncope, near syncope, abdominal pain, N/V/D, headache, or dizziness.   Troponin neg x 1  BNP 2002    1. Acute decompensated Right Heart Failure  - Exacerbation likely secondary to poor diet adherence  - Patient negative 2185 cc yesterday  - Continue Bumex gtt 0.5mg/hr  - Continue Potassium 40 mEQ qday  - Scheduled for RHC on Monday, but try to get outpatient cath report from [facility] tomorrow  - Continue Coreg, sildenafil, enalapril, will uptitrate when BP allows  - MARTITA, RF, C-ANCA, P-ANCA, SCL-70 results pending  - Will check PYP test for Amyloid   - Monitor on telemetry.    - Strict i/o and daily standing weights.     - Keep K > 4, Mg > 2.    - Monitor renal function with BID BMP due to ongoing diuresis.    2. HTN  - BP well controlled off Hydralazine    3. JAN  - Continue BiPAP at night    Preliminary evaluation, please await official recommendations by Dr. Cox

## 2020-01-12 NOTE — PROGRESS NOTE ADULT - SUBJECTIVE AND OBJECTIVE BOX
Patient is a 39y old  Male who presents with a chief complaint of difficulty breathing (21 Dec 2019 10:30)    INTERVAL/OVERNIGHT EVENTS:  Patient examined at beside. Pt reports improvement in SOB and LE edema. Pt seen initially with NC and then during rounds pt was attempting to put the BIPAP on.  Patient is tolerating PO diet w/o nausea/vomiting/diarrhea/abdominal pain. Patient is voiding actively.      Rest of ROS not contributory except for above.    I&O's Summary    11 Jan 2020 07:01  -  12 Jan 2020 07:00  --------------------------------------------------------  IN: 465 mL / OUT: 2650 mL / NET: -2185 mL    12 Jan 2020 07:01  -  12 Jan 2020 14:12  --------------------------------------------------------  IN: 0 mL / OUT: 1800 mL / NET: -1800 mL      Vital Signs Last 24 Hrs  T(C): 36.9 (12 Jan 2020 09:46), Max: 36.9 (12 Jan 2020 09:46)  T(F): 98.4 (12 Jan 2020 09:46), Max: 98.4 (12 Jan 2020 09:46)  HR: 80 (12 Jan 2020 09:46) (71 - 86)  BP: 105/64 (12 Jan 2020 09:46) (87/62 - 105/64)  RR: 16 (12 Jan 2020 09:46) (16 - 21)  SpO2: 96% (12 Jan 2020 09:46) (94% - 100%)    General: Well nourished/Well developed, NAD, Obese  Neck:  Supple, no sinuses or palpable masses, No JVD noted  Respiratory: Good air entry, remote intermittent crackles, mild expiratory wheezing  CV: RRR, S1S2, no murmur.  Abdominal: Obese, Soft, NT, ND, no palpable mass  Extremities: ++ edema, + peripheral pulses, FROM all 4 extremity  Neurology: A&Ox3, no focalization signs    LABS                            13.2   3.85  )-----------( 177      ( 12 Jan 2020 06:52 )             42.2     01-12    140  |  98  |  28.0<H>  ----------------------------<  95  3.8   |  27.0  |  1.41<H>    Ca    9.0      12 Jan 2020 06:52  Phos  3.4     01-12  Mg     1.7     01-12    TPro  8.6  /  Alb  3.3  /  TBili  1.3  /  DBili  x   /  AST  31  /  ALT  27  /  AlkPhos  99  01-10

## 2020-01-12 NOTE — PATIENT PROFILE ADULT - JOB HELP
"[facility]    4/9/2018 2:46 PM    Max heart rate: 155  80    62year old    Wt Readings from Last 1 Encounters:   04/09/18 129.6 kg      Ht Readings from Last 1 Encounters:   04/09/18 5' 10.87"" (1.8 m)       Patient Type: 2 Day    Cardiac Markers: No    Reason for test: abn. echo    Primary MD: Kirk Wills   Surgeon:    Ordering MD:  Kirk Wills   Other:     Cardiologist Performing Test:cony    --------------------------------------------------------------------------------------------------------------------  HISTORY OF: HTN       PATIENT HAS HAD THE FOLLOWING IN THE LAST 24 HOURS:  Medication / Inhalers. Current Outpatient Prescriptions   Medication Sig Dispense Refill   â¢ UNKNOWN TO PATIENT      â¢ MAGNESIUM OXIDE PO Take by mouth daily. â¢ Omega-3 Fatty Acids (FISH OIL PO) Take by mouth daily. â¢ GLUCOSAMINE-CHONDROITIN-MSM PO Take by mouth daily. No current facility-administered medications for this encounter.         ALLERGIES:  No Known Allergies    MEETS CRITERIA FOR STRESS TEST: Yes         NOTES:     TYPE OF TEST: Exercise stress test and Myocardial Perfusion    ABLE TO WALK: Yes     NEEDED: No  " no

## 2020-01-12 NOTE — DISCHARGE NOTE PROVIDER - CARE PROVIDERS DIRECT ADDRESSES
,DirectAddress_Unknown,DirectAddress_Unknown ,DirectAddress_Unknown,DirectAddress_Unknown,[email or web address]

## 2020-01-12 NOTE — PROGRESS NOTE ADULT - ASSESSMENT
38yo M with morbid obesity, alisha, ohs, dm2 not on long term insulin, mediastinal lymphadenopathy, chronic HFpEF, RHC at [facility] in 2008 with moderate pulmonary HTN w/ chronic resp failure w/ hypoxia on 3-4L baseline, recently admitted to [facility] + dc on 12/28/19 for hf exac presenting w/ recurrent symptoms. Pt on IV bumex drip, diuresing well, pending RHC on Monday    Acute on chronic hfpef/ diastolic chf / pulm htn   -hx of [facility] readmissions for hf, known non adherence to lifestyle and dietary recs, denies med + dietary non adherence @ this time  -failed to fu w/ cardio from last admit as recommended  -IV diuresis, titrate to po when euvolemic, s/p Lasix 60 mg,   -c/w on Bumex gtt 0.5 mg/hr   - cardio eval noted  - Potassium 40 mEQ BID  - RHC planed for Monday - NPO after MN  - Continue Coreg, sildenafil, enalapril  - NM Amyloidosis SPECT scan ordered  - Will check MARTITA, RF, C-ANCA, P-ANCA, SCL-70  - Will check PYP test for Amyloid   - Strict i/o and daily standing weights.     - Keep K > 4, Mg > 2.    - Monitor renal function with BID BMP due to ongoing diuresis     HAYES  -Baseline cr 0.9-1, 1.5 on arrival  -Tolerate worsening for diuresis, avoid other nephrotoxic meds  -BMP BID while on drip    DM II not on long term insulin   -Stable  -a1c 6.3 in 12/2019, controlled  -iss + fs achs, current BG controlled     ALISHA  -non compliant with BIPAP  -c/w nocturnal bipap, outpt fu w/ pulm  -pt educated on importance of compliance  -Pt states has an appointment for sleep study on Jan 14, 2020    Mediastinal lymphadenopathy  -incidentally noted last admit, advised to fu w/ rheum outpt for sarcoid w/u    Morbid obesity  -Counseling provided  -Will need to f/u outpt    DVT ppx.   -Lovenox bid    Dispo.   pending RHC cath on monday, DC likely in 48-72hrs when euvolemic. high likelihood of readmission for same symptoms given chronic non adherence

## 2020-01-13 LAB
ANION GAP SERPL CALC-SCNC: 12 MMOL/L — SIGNIFICANT CHANGE UP (ref 5–17)
ANION GAP SERPL CALC-SCNC: 13 MMOL/L — SIGNIFICANT CHANGE UP (ref 5–17)
BUN SERPL-MCNC: 31 MG/DL — HIGH (ref 8–20)
BUN SERPL-MCNC: 32 MG/DL — HIGH (ref 8–20)
CALCIUM SERPL-MCNC: 9.2 MG/DL — SIGNIFICANT CHANGE UP (ref 8.6–10.2)
CALCIUM SERPL-MCNC: 9.6 MG/DL — SIGNIFICANT CHANGE UP (ref 8.6–10.2)
CHLORIDE SERPL-SCNC: 94 MMOL/L — LOW (ref 98–107)
CHLORIDE SERPL-SCNC: 95 MMOL/L — LOW (ref 98–107)
CO2 SERPL-SCNC: 31 MMOL/L — HIGH (ref 22–29)
CO2 SERPL-SCNC: 33 MMOL/L — HIGH (ref 22–29)
CREAT SERPL-MCNC: 1.24 MG/DL — SIGNIFICANT CHANGE UP (ref 0.5–1.3)
CREAT SERPL-MCNC: 1.31 MG/DL — HIGH (ref 0.5–1.3)
GLUCOSE BLDC GLUCOMTR-MCNC: 120 MG/DL — HIGH (ref 70–99)
GLUCOSE BLDC GLUCOMTR-MCNC: 137 MG/DL — HIGH (ref 70–99)
GLUCOSE BLDC GLUCOMTR-MCNC: 83 MG/DL — SIGNIFICANT CHANGE UP (ref 70–99)
GLUCOSE BLDC GLUCOMTR-MCNC: 96 MG/DL — SIGNIFICANT CHANGE UP (ref 70–99)
GLUCOSE SERPL-MCNC: 106 MG/DL — SIGNIFICANT CHANGE UP (ref 70–115)
GLUCOSE SERPL-MCNC: 94 MG/DL — SIGNIFICANT CHANGE UP (ref 70–115)
MAGNESIUM SERPL-MCNC: 1.4 MG/DL — LOW (ref 1.6–2.6)
MAGNESIUM SERPL-MCNC: 1.6 MG/DL — SIGNIFICANT CHANGE UP (ref 1.6–2.6)
PHOSPHATE SERPL-MCNC: 3.8 MG/DL — SIGNIFICANT CHANGE UP (ref 2.4–4.7)
PHOSPHATE SERPL-MCNC: 3.9 MG/DL — SIGNIFICANT CHANGE UP (ref 2.4–4.7)
POTASSIUM SERPL-MCNC: 3.8 MMOL/L — SIGNIFICANT CHANGE UP (ref 3.5–5.3)
POTASSIUM SERPL-MCNC: 4 MMOL/L — SIGNIFICANT CHANGE UP (ref 3.5–5.3)
POTASSIUM SERPL-SCNC: 3.8 MMOL/L — SIGNIFICANT CHANGE UP (ref 3.5–5.3)
POTASSIUM SERPL-SCNC: 4 MMOL/L — SIGNIFICANT CHANGE UP (ref 3.5–5.3)
SODIUM SERPL-SCNC: 138 MMOL/L — SIGNIFICANT CHANGE UP (ref 135–145)
SODIUM SERPL-SCNC: 140 MMOL/L — SIGNIFICANT CHANGE UP (ref 135–145)

## 2020-01-13 PROCEDURE — 99233 SBSQ HOSP IP/OBS HIGH 50: CPT | Mod: GC

## 2020-01-13 PROCEDURE — 78803 RP LOCLZJ TUM SPECT 1 AREA: CPT | Mod: 26

## 2020-01-13 PROCEDURE — 99232 SBSQ HOSP IP/OBS MODERATE 35: CPT

## 2020-01-13 RX ORDER — MAGNESIUM SULFATE 500 MG/ML
2 VIAL (ML) INJECTION ONCE
Refills: 0 | Status: DISCONTINUED | OUTPATIENT
Start: 2020-01-13 | End: 2020-01-13

## 2020-01-13 RX ORDER — SODIUM CHLORIDE 0.65 %
1 AEROSOL, SPRAY (ML) NASAL THREE TIMES A DAY
Refills: 0 | Status: DISCONTINUED | OUTPATIENT
Start: 2020-01-13 | End: 2020-01-16

## 2020-01-13 RX ORDER — POTASSIUM CHLORIDE 20 MEQ
40 PACKET (EA) ORAL EVERY 4 HOURS
Refills: 0 | Status: COMPLETED | OUTPATIENT
Start: 2020-01-13 | End: 2020-01-13

## 2020-01-13 RX ORDER — MAGNESIUM SULFATE 500 MG/ML
2 VIAL (ML) INJECTION ONCE
Refills: 0 | Status: COMPLETED | OUTPATIENT
Start: 2020-01-13 | End: 2020-01-13

## 2020-01-13 RX ORDER — MAGNESIUM OXIDE 400 MG ORAL TABLET 241.3 MG
400 TABLET ORAL
Refills: 0 | Status: DISCONTINUED | OUTPATIENT
Start: 2020-01-13 | End: 2020-01-14

## 2020-01-13 RX ADMIN — Medication 40 MILLIEQUIVALENT(S): at 09:39

## 2020-01-13 RX ADMIN — Medication 3 MILLILITER(S): at 15:11

## 2020-01-13 RX ADMIN — Medication 81 MILLIGRAM(S): at 17:47

## 2020-01-13 RX ADMIN — MAGNESIUM OXIDE 400 MG ORAL TABLET 400 MILLIGRAM(S): 241.3 TABLET ORAL at 21:44

## 2020-01-13 RX ADMIN — ATORVASTATIN CALCIUM 40 MILLIGRAM(S): 80 TABLET, FILM COATED ORAL at 21:41

## 2020-01-13 RX ADMIN — Medication 10 MILLIGRAM(S): at 14:38

## 2020-01-13 RX ADMIN — Medication 40 MILLIEQUIVALENT(S): at 21:41

## 2020-01-13 RX ADMIN — Medication 50 GRAM(S): at 09:38

## 2020-01-13 RX ADMIN — Medication 3 MILLILITER(S): at 09:34

## 2020-01-13 RX ADMIN — BUMETANIDE 1.25 MG/HR: 0.25 INJECTION INTRAMUSCULAR; INTRAVENOUS at 21:40

## 2020-01-13 RX ADMIN — Medication 5 MILLIGRAM(S): at 06:01

## 2020-01-13 RX ADMIN — Medication 3 MILLILITER(S): at 21:11

## 2020-01-13 RX ADMIN — CARVEDILOL PHOSPHATE 3.12 MILLIGRAM(S): 80 CAPSULE, EXTENDED RELEASE ORAL at 06:01

## 2020-01-13 RX ADMIN — Medication 3 MILLILITER(S): at 03:37

## 2020-01-13 RX ADMIN — CARVEDILOL PHOSPHATE 3.12 MILLIGRAM(S): 80 CAPSULE, EXTENDED RELEASE ORAL at 17:47

## 2020-01-13 RX ADMIN — ENOXAPARIN SODIUM 40 MILLIGRAM(S): 100 INJECTION SUBCUTANEOUS at 21:41

## 2020-01-13 RX ADMIN — Medication 10 MILLIGRAM(S): at 06:01

## 2020-01-13 NOTE — PROGRESS NOTE ADULT - SUBJECTIVE AND OBJECTIVE BOX
[facility]                                                               Office: [address] [address]                                                              Telephone: [phone]. Fax:[phone]                                                                             PROGRESS NOTE  Reason for follow up:   Overnight: No new events.   Update: As of 1/13/2020, pt denies SOB, chest pain, palpitations. Pt c/o of unchanged lower extremity edema. Bilateral basilar rales auscultated, +lower extremity edema      Subjective: " My legs are still swollen"    40 y/o morbidly obese M with a PMHx of Right sided heart failure (12/19 EF 65-70% with severely reduced RV fxn), severe pulmonary HTN (PA systolic 89.6) on home oxygen (4L NC), JAN, DMII on insuline who presented to the ED complaining of worsening leg edema, and shortness of breath. Patient was recently admitted to [facility] back in December for similar presentation, and states that he has been taking all of his medications as prescribed, but has been drinking a lot more fluids then he "should." Patient states he also felt like torsemide worked better for him in the past, and that the lasix isn't as strong. Patient didn't follow up with Dr. Horton after discharge as recommended. Patient states that his legs have been getting more and more swollen, and he could no longer fit in his shoes. Patient also with dyspnea now at rest, and had to increase his oxygen to 5L at home, so he came to the ED to be evaluated. Patient denies fevers, chills, CP, palpitations, syncope, near syncope, abdominal pain, N/V/D, headache, or dizziness.   As of 1/13/2020, pt denies SOB, chest pain, palpitations. Pt c/o of unchanged lower extremity edema. Bilateral basilar rales auscultated, +lower extremity edema, +4/+4    	  Vitals:  T(C): 36.5 (01-13-20 @ 09:33), Max: 37.1 (01-12-20 @ 21:45)  HR: 86 (01-13-20 @ 09:33) (51 - 86)  BP: 112/77 (01-13-20 @ 09:33) (112/77 - 133/84)  RR: 18 (01-13-20 @ 09:33) (16 - 18)  SpO2: 98% (01-13-20 @ 09:33) (94% - 99%)    I&O's Summary    12 Jan 2020 07:01  -  13 Jan 2020 07:00  --------------------------------------------------------  IN: 840 mL / OUT: 7475 mL / NET: -6635 mL    13 Jan 2020 07:01  -  13 Jan 2020 12:09  --------------------------------------------------------  IN: 260 mL / OUT: 900 mL / NET: -640 mL      Weight (kg): 170.8 (01-11 @ 22:10)      PHYSICAL EXAM:  Appearance: Comfortable. No acute distress  HEENT:  Head and neck: Atraumatic. Normocephalic.  Normal oral mucosa, PERRL, Neck is supple. No JVD, No carotid bruit.   Neurologic: A & O x 3, no focal deficits. EOMI.  Lymphatic: No cervical lymphadenopathy  Cardiovascular: Normal S1 S2, No murmur, rubs/gallops. No JVD, No edema  Respiratory: Bilateral basilar rales   Gastrointestinal:  Soft, Non-tender, + BS  Lower Extremities: +4/+4 non pitting edema  Psychiatry: Patient is calm. No agitation. Mood & affect appropriate  Skin:Scaling dry skin bilateral lower extremities     CURRENT MEDICATIONS:  buMETAnide Infusion 0.5 mG/Hr IV Continuous <Continuous>  carvedilol 3.125 milliGRAM(s) Oral every 12 hours  enalapril 5 milliGRAM(s) Oral daily  sildenafil (REVATIO) 10 milliGRAM(s) Oral three times a day  albuterol/ipratropium for Nebulization  atorvastatin  dextrose 50% Injectable  dextrose 50% Injectable  dextrose 50% Injectable  insulin lispro (HumaLOG) corrective regimen sliding scale  aspirin  chewable  dextrose 5%.  enoxaparin Injectable  influenza   Vaccine  potassium chloride    Tablet ER  potassium chloride    Tablet ER      DIAGNOSTIC TESTING:  [ ] Echocardiogram: < from: TTE Echo Complete w/Doppler (12.21.19 @ 08:48) >  Summary:   1. Technically fair study.   2. Left ventricular ejection fraction, by visual estimation, is 65 to 70%.   3. There is moderate septal left ventricular hypertrophy.   4. Severely enlarged right ventricle.   5. Severely reduced RV systolic function.   6. Right ventricular volume and pressure overload.   7. Moderate-severe tricuspid regurgitation.   8. Estimated pulmonary artery systolic pressure is 89.6 mmHg assuming a right atrial pressure of 15 mmHg, which is consistent with severe pulmonary hypertension.   9. Moderately dilated pulmonary artery.    < end of copied text >    OTHER: 	    < from: Xray Chest 1 View- PORTABLE-Urgent (01.10.20 @ 20:26) >  IMPRESSION: Cardiomegaly No evidence of active chest disease.    < end of copied text >      LABS:	 	  CARDIAC MARKERS ( 10 [place] 2020 22:33 )  x     / x     / x     / x     / x      p-BNP 10 [place] 2020 22:33: 2022 pg/mL, CARDIAC MARKERS ( 10 [place]one 2020 20:29 )  x     / <0.01 ng/mL / 70 U/L / x     / x      p-BNP 10 [place] 2020 20:29: x                              13.2   3.85  )-----------( 177      ( 12 Jan 2020 06:52 )             42.2     01-13    138  |  95<L>  |  32.0<H>  ----------------------------<  94  3.8   |  31.0<H>  |  1.31<H>    Ca    9.2      13 Jan 2020 05:56  Phos  3.9     01-13  Mg     1.4     01-13      proBNP: Serum Pro-Brain Natriuretic Peptide: 2022 pg/mL (01-10 @ 22:33)    Lipid Profile:   HgA1c:   TSH:       TELEMETRY: NSR @  86

## 2020-01-13 NOTE — PROGRESS NOTE ADULT - ASSESSMENT
40 y/o morbidly obese M with a PMHx of Right sided heart failure (12/19 EF 65-70% with severely reduced RV fxn), severe pulmonary HTN (PA systolic 89.6) on home oxygen (4L NC), JAN, DMII on insuline who presented to the ED complaining of worsening leg edema, and shortness of breath. Patient was recently admitted to [facility] back in December for similar presentation, and states that he has been taking all of his medications as prescribed, but has been drinking a lot more fluids then he "should." Patient states he also felt like torsemide worked better for him in the past, and that the lasix isn't as strong. Patient didn't follow up with Dr. Horton after discharge as recommended. Patient states that his legs have been getting more and more swollen, and he could no longer fit in his shoes. Patient also with dyspnea now at rest, and had to increase his oxygen to 5L at home, so he came to the ED to be evaluated. Patient denies fevers, chills, CP, palpitations, syncope, near syncope, abdominal pain, N/V/D, headache, or dizziness.   As of 1/13/2020, pt denies SOB, chest pain, palpitations. Pt c/o of unchanged lower extremity edema. Bilateral basilar rales auscultated, +lower extremity edema, +4/+4     1. Acute decompensated Right Heart Failure  - Exacerbation likely secondary to poor diet adherence  - Continue Bumex gtt 0.5mg/hr- no change until RHC completed  - Potassium 40 mEQ qday  - Magnesium 2 G IV   - [facility] Today 1/13/2020  - Continue Coreg, sildenafil, enalapril, will uptitrate when BP allows  - Will check MARTITA, RF, C-ANCA, P-ANCA, SCL-70  - Will check PYP test for Amyloid   - Monitor on telemetry.    - Strict i/o and daily standing weights.     - Keep K > 4, Mg > 2.    - Monitor renal function with BID BMP due to ongoing diuresis.    2. HTN  - Well controlled on current regimen    3. JAN  - Continue BiPAP at night    Preliminary evaluation, please await official recommendations by Dr. Cox

## 2020-01-13 NOTE — PROGRESS NOTE ADULT - SUBJECTIVE AND OBJECTIVE BOX
Patient is a 39y old  Male who presents with a chief complaint of difficulty breathing (21 Dec 2019 10:30)    INTERVAL/OVERNIGHT EVENTS:  Patient examined at beside. Pt reports improvement in SOB as well as LE edema. Had used Bipap overnight, states feeling better overall.   Awaiting for cardiac cath planed for today  Patient is tolerating PO diet w/o nausea/vomiting/diarrhea/abdominal pain. Patient is voiding actively.      Rest of ROS not contributory except for above.    I&O's Summary    12 Jan 2020 07:01  -  13 Jan 2020 07:00  --------------------------------------------------------  IN: 790 mL / OUT: 7475 mL / NET: -6685 mL    Vital Signs Last 24 Hrs  T(C): 36.9 (13 Jan 2020 05:26), Max: 37.1 (12 Jan 2020 21:45)  T(F): 98.4 (13 Jan 2020 05:26), Max: 98.8 (12 Jan 2020 21:45)  HR: 85 (13 Jan 2020 05:26) (51 - 85)  BP: 133/84 (13 Jan 2020 05:26) (105/64 - 133/84)  RR: 18 (13 Jan 2020 05:26) (16 - 18)  SpO2: 96% (13 Jan 2020 05:26) (94% - 99%)    General: Well nourished/Well developed, NAD, Obese  Neck:  Supple, no sinuses or palpable masses, No JVD noted  Respiratory: Good air entry, remote intermittent crackles, mild expiratory wheezing  CV: RRR, S1S2, no murmur.  Abdominal: Obese, Soft, NT, ND, no palpable mass  Extremities: ++ edema, + peripheral pulses, FROM all 4 extremity  Neurology: A&Ox3, no focalization signs    LABS                                 13.2   3.85  )-----------( 177      ( 12 Jan 2020 06:52 )             42.2     01-12    140  |  98  |  28.0<H>  ----------------------------<  95  3.8   |  27.0  |  1.41<H>    Ca    9.0      12 Jan 2020 06:52  Phos  3.4     01-12  Mg     1.7     01-12    TPro  8.6  /  Alb  3.3  /  TBili  1.3  /  DBili  x   /  AST  31  /  ALT  27  /  AlkPhos  99  01-10

## 2020-01-13 NOTE — PROGRESS NOTE ADULT - ASSESSMENT
40yo M with morbid obesity, alisha, ohs, dm2 not on long term insulin, mediastinal lymphadenopathy, chronic HFpEF, RHC at [facility] in 2008 with moderate pulmonary HTN w/ chronic resp failure w/ hypoxia on 3-4L baseline, recently admitted to [facility] + dc on 12/28/19 for hf exac presenting w/ recurrent symptoms. Pt on IV bumex drip, diuresing well, renal function improving as well as respiratory symptoms. RHC planed for today.    Acute on chronic hfpef/ diastolic chf / pulm htn   -hx of [facility] readmissions for hf, known non adherence to lifestyle and dietary recs, denies med + dietary non adherence @ this time  -failed to fu w/ cardio from last admit as recommended  -IV diuresis, titrate to po when euvolemic, s/p Lasix 60 mg,   -c/w on Bumex gtt 0.5 mg/hr   -ss cardio eval noted  - Potassium 40 mEQ BID  - RHC planed for Monday - NPO after MN  - Continue Coreg, sildenafil, enalapril  - NM Amyloidosis SPECT scan ordered  - Will check MARTITA, RF, C-ANCA, P-ANCA, SCL-70  - Will check PYP test for Amyloid   - Strict i/o and daily standing weights.     - Keep K > 4, Mg > 2.    - Monitor renal function with BID BMP due to ongoing diuresis     HAYES  -Improved 1.5 on arrival, Baseline cr 0.9-1,4  -Today cr: 1.2  -Tolerate worsening for diuresis, avoid other nephrotoxic meds  -BMP BID while on drip    DM II not on long term insulin   -Stable  -A1c 6.3 in 12/2019, controlled  -iss + fs achs, current BG controlled     ALISHA  -non compliant with BIPAP  -c/w nocturnal bipap, outpt fu w/ pulm  -pt educated on importance of compliance  -Pt states has an appointment for sleep study on Jan 14, 2020    Mediastinal lymphadenopathy  -incidentally noted last admit, advised to fu w/ rheum outpt for sarcoid w/u    Morbid obesity  -Counseling provided  -Will need to f/u outpt    DVT ppx.   -Lovenox bid    Dispo.   Pending RHC cath today, DC likely in 48-72hrs when euvolemic. High likelihood of readmission for same symptoms given chronic non adherence 40yo M with morbid obesity, alisha, ohs, dm2 not on long term insulin, mediastinal lymphadenopathy, chronic HFpEF, RHC at [facility] in 2008 with moderate pulmonary HTN w/ chronic resp failure w/ hypoxia on 3-4L baseline, recently admitted to [facility] + dc on 12/28/19 for hf exac presenting w/ recurrent symptoms. Pt on IV bumex drip, diuresing well, renal function improving as well as respiratory symptoms. RHC planed for today.    Acute on chronic hfpef/ diastolic chf / pulm htn   -hx of [facility] readmissions for hf, known non adherence to lifestyle and dietary recs, denies med + dietary non adherence @ this time  -failed to fu w/ cardio from last admit as recommended  -IV diuresis, titrate to po when euvolemic, s/p Lasix 60 mg,   -c/w on Bumex gtt 0.5 mg/hr   -ss cardio eval noted  - Potassium 40 mEQ BID  - RHC planed for Monday - NPO after MN  - Continue Coreg, sildenafil, enalapril  - NM Amyloidosis SPECT scan ordered  - Will check MARTITA, RF, C-ANCA, P-ANCA, SCL-70  - Will check PYP test for Amyloid   - Strict i/o and daily standing weights.     - Keep K > 4, Mg > 2.    - Monitor renal function with BID BMP due to ongoing diuresis     HAYES  -Improving 1.5 on arrival, Baseline cr 0.9-1,4  -Today cr: 1.3  -Tolerate worsening for diuresis, avoid other nephrotoxic meds  -BMP BID while on drip    DM II not on long term insulin   -Stable  -A1c 6.3 in 12/2019, controlled  -iss + fs achs, current BG controlled     ALISHA  -non compliant with BIPAP  -c/w nocturnal bipap, outpt fu w/ pulm  -pt educated on importance of compliance  -Pt states has an appointment for sleep study on Jan 14, 2020    Mediastinal lymphadenopathy  -incidentally noted last admit, advised to fu w/ rheum outpt for sarcoid w/u    Morbid obesity  -Counseling provided  -Will need to f/u outpt    DVT ppx.   -Lovenox bid    Dispo.   Pending RHC cath today, DC likely in 48-72hrs when euvolemic. High likelihood of readmission for same symptoms given chronic non adherence 40yo M with morbid obesity, alisha, ohs, dm2 not on long term insulin, mediastinal lymphadenopathy, chronic HFpEF, RHC at [facility] in 2008 with moderate pulmonary HTN w/ chronic resp failure w/ hypoxia on 3-4L baseline, recently admitted to [facility] + dc on 12/28/19 for hf exac presenting w/ recurrent symptoms. Pt on IV bumex drip, diuresing well, renal function improving as well as respiratory symptoms. RHC planed for today.    Acute on chronic hfpef/ diastolic chf / pulm htn   -hx of [facility] readmissions for hf, known non adherence to lifestyle and dietary recs, denies med + dietary non adherence @ this time  -failed to fu w/ cardio from last admit as recommended  -IV diuresis, titrate to po when euvolemic, s/p Lasix 60 mg,   -c/w on Bumex gtt 0.5 mg/hr until RHC as per cardio  - cardio eval noted  - Potassium 40 mEQ BID  - RHC planed for today, cancel, will have cath after NM  - Continue Coreg, sildenafil, enalapril  - NM Amyloidosis SPECT scan ordered  - Will check MARTITA, RF, C-ANCA, P-ANCA, SCL-70  - Will check PYP test for Amyloid   - Strict i/o and daily standing weights.     - Keep K > 4, Mg > 2.    - Monitor renal function with BID BMP due to ongoing diuresis     HAYES  -Improving 1.5 on arrival, Baseline cr 0.9-1,4  -Today cr: 1.3  -Tolerate worsening for diuresis, avoid other nephrotoxic meds  -BMP BID while on drip    DM II not on long term insulin   -Stable  -A1c 6.3 in 12/2019, controlled  -iss + fs achs, current BG controlled     ALISHA  -non compliant with BIPAP  -c/w nocturnal bipap, outpt fu w/ pulm  -pt educated on importance of compliance  -Pt states has an appointment for sleep study on Jan 14, 2020    Mediastinal lymphadenopathy  -incidentally noted last admit, advised to fu w/ rheum outpt for sarcoid w/u    Morbid obesity  -Counseling provided  -Will need to f/u outpt    DVT ppx.   -Lovenox bid    Dispo.   Pending NM and RHC, DC likely in 48-72hrs when euvolemic. High likelihood of readmission for same symptoms given chronic non adherence

## 2020-01-14 DIAGNOSIS — I27.20 PULMONARY HYPERTENSION, UNSPECIFIED: ICD-10-CM

## 2020-01-14 LAB
ANION GAP SERPL CALC-SCNC: 14 MMOL/L — SIGNIFICANT CHANGE UP (ref 5–17)
BUN SERPL-MCNC: 34 MG/DL — HIGH (ref 8–20)
CALCIUM SERPL-MCNC: 9.3 MG/DL — SIGNIFICANT CHANGE UP (ref 8.6–10.2)
CENTROMERE AB SER-ACNC: <0.2 AI — SIGNIFICANT CHANGE UP
CHLORIDE SERPL-SCNC: 95 MMOL/L — LOW (ref 98–107)
CO2 SERPL-SCNC: 32 MMOL/L — HIGH (ref 22–29)
CREAT SERPL-MCNC: 1.3 MG/DL — SIGNIFICANT CHANGE UP (ref 0.5–1.3)
ENA SCL70 AB SER-ACNC: 0.6 AI — SIGNIFICANT CHANGE UP
GLUCOSE BLDC GLUCOMTR-MCNC: 133 MG/DL — HIGH (ref 70–99)
GLUCOSE BLDC GLUCOMTR-MCNC: 92 MG/DL — SIGNIFICANT CHANGE UP (ref 70–99)
GLUCOSE BLDC GLUCOMTR-MCNC: 97 MG/DL — SIGNIFICANT CHANGE UP (ref 70–99)
GLUCOSE BLDC GLUCOMTR-MCNC: 99 MG/DL — SIGNIFICANT CHANGE UP (ref 70–99)
GLUCOSE SERPL-MCNC: 104 MG/DL — SIGNIFICANT CHANGE UP (ref 70–115)
MAGNESIUM SERPL-MCNC: 1.6 MG/DL — LOW (ref 1.8–2.6)
PHOSPHATE SERPL-MCNC: 4.4 MG/DL — SIGNIFICANT CHANGE UP (ref 2.4–4.7)
POTASSIUM SERPL-MCNC: 4.1 MMOL/L — SIGNIFICANT CHANGE UP (ref 3.5–5.3)
POTASSIUM SERPL-SCNC: 4.1 MMOL/L — SIGNIFICANT CHANGE UP (ref 3.5–5.3)
SODIUM SERPL-SCNC: 141 MMOL/L — SIGNIFICANT CHANGE UP (ref 135–145)

## 2020-01-14 PROCEDURE — 99152 MOD SED SAME PHYS/QHP 5/>YRS: CPT

## 2020-01-14 PROCEDURE — 99232 SBSQ HOSP IP/OBS MODERATE 35: CPT

## 2020-01-14 PROCEDURE — 93451 RIGHT HEART CATH: CPT | Mod: 26

## 2020-01-14 PROCEDURE — 99232 SBSQ HOSP IP/OBS MODERATE 35: CPT | Mod: GC

## 2020-01-14 RX ORDER — MAGNESIUM OXIDE 400 MG ORAL TABLET 241.3 MG
400 TABLET ORAL
Refills: 0 | Status: DISCONTINUED | OUTPATIENT
Start: 2020-01-14 | End: 2020-01-16

## 2020-01-14 RX ORDER — SODIUM CHLORIDE 9 MG/ML
250 INJECTION INTRAMUSCULAR; INTRAVENOUS; SUBCUTANEOUS ONCE
Refills: 0 | Status: COMPLETED | OUTPATIENT
Start: 2020-01-14 | End: 2020-01-14

## 2020-01-14 RX ORDER — MAGNESIUM SULFATE 500 MG/ML
1 VIAL (ML) INJECTION ONCE
Refills: 0 | Status: DISCONTINUED | OUTPATIENT
Start: 2020-01-14 | End: 2020-01-14

## 2020-01-14 RX ORDER — MAGNESIUM SULFATE 500 MG/ML
4 VIAL (ML) INJECTION ONCE
Refills: 0 | Status: DISCONTINUED | OUTPATIENT
Start: 2020-01-14 | End: 2020-01-14

## 2020-01-14 RX ADMIN — Medication 40 MILLIEQUIVALENT(S): at 10:47

## 2020-01-14 RX ADMIN — MAGNESIUM OXIDE 400 MG ORAL TABLET 400 MILLIGRAM(S): 241.3 TABLET ORAL at 10:47

## 2020-01-14 RX ADMIN — Medication 10 MILLIGRAM(S): at 18:29

## 2020-01-14 RX ADMIN — CARVEDILOL PHOSPHATE 3.12 MILLIGRAM(S): 80 CAPSULE, EXTENDED RELEASE ORAL at 05:25

## 2020-01-14 RX ADMIN — CARVEDILOL PHOSPHATE 3.12 MILLIGRAM(S): 80 CAPSULE, EXTENDED RELEASE ORAL at 18:29

## 2020-01-14 RX ADMIN — Medication 10 MILLIGRAM(S): at 05:25

## 2020-01-14 RX ADMIN — Medication 3 MILLILITER(S): at 20:19

## 2020-01-14 RX ADMIN — ENOXAPARIN SODIUM 40 MILLIGRAM(S): 100 INJECTION SUBCUTANEOUS at 21:44

## 2020-01-14 RX ADMIN — ATORVASTATIN CALCIUM 40 MILLIGRAM(S): 80 TABLET, FILM COATED ORAL at 21:44

## 2020-01-14 RX ADMIN — Medication 5 MILLIGRAM(S): at 05:25

## 2020-01-14 RX ADMIN — Medication 3 MILLILITER(S): at 16:14

## 2020-01-14 RX ADMIN — Medication 3 MILLILITER(S): at 03:18

## 2020-01-14 RX ADMIN — Medication 40 MILLIEQUIVALENT(S): at 18:29

## 2020-01-14 RX ADMIN — MAGNESIUM OXIDE 400 MG ORAL TABLET 400 MILLIGRAM(S): 241.3 TABLET ORAL at 17:03

## 2020-01-14 RX ADMIN — Medication 3 MILLILITER(S): at 09:54

## 2020-01-14 RX ADMIN — Medication 81 MILLIGRAM(S): at 10:47

## 2020-01-14 NOTE — PROGRESS NOTE ADULT - ASSESSMENT
40yo M with morbid obesity, alihsa, ohs, dm2 not on long term insulin, mediastinal lymphadenopathy, chronic HFpEF, RHC at [facility] in 2008 with moderate pulmonary HTN w/ chronic resp failure w/ hypoxia on 3-4L baseline, recently admitted to [facility] + dc on 12/28/19 for hf exac presenting w/ recurrent symptoms. Pt on IV bumex drip, diuresing well, renal function improving as well as respiratory symptoms. RHC planed for today.  NM scan for Amyloidosis Negative, anti-bodies pending, cardiac cath plan for today      Acute on chronic hfpef/ diastolic chf / pulm htn   -hx of [facility] readmissions for hf, known non adherence to lifestyle and dietary recs, denies med + dietary non adherence @ this time  -failed to fu w/ cardio from last admit as recommended  -IV diuresis, titrate to po when euvolemic, s/p Lasix 60 mg,   -c/w on Bumex gtt 0.5 mg/hr until RHC as per cardio, will consider to switch to PO depending on cardio input   - cardio eval noted  - Potassium 40 mEQ BID  - Continue Coreg, sildenafil, enalapril  - NM Amyloidosis SPECT scan ordered  - Will check MARTITA, RF, C-ANCA, P-ANCA, SCL-70  - Will check PYP test for Amyloid   - Strict i/o and daily standing weights.     - Keep K > 4, Mg > 2.    - Monitor renal function with BID BMP due to ongoing diuresis     HAYES  -Improving 1.5 on arrival, Baseline cr 0.9-1,4  -Today cr: 1.3  -Tolerate worsening for diuresis, avoid other nephrotoxic meds  -BMP BID while on drip    DM II not on long term insulin   -Stable  -A1c 6.3 in 12/2019, controlled  -iss + fs achs, current BG controlled     ALISHA  -non compliant with BIPAP  -c/w nocturnal bipap, outpt fu w/ pulm  -pt educated on importance of compliance  -Pt states has an appointment for sleep study on Jan 14, 2020    Mediastinal lymphadenopathy  -incidentally noted last admit, advised to fu w/ rheum outpt for sarcoid w/u    Morbid obesity  -Counseling provided  -Will need to f/u outpt    DVT ppx.   -Lovenox bid    Dispo.   DC likely in 24-48 hrs when euvolemic. High likelihood of readmission for same symptoms given chronic non adherence 38 yo M with morbid obesity, chronic respiratory failure with hypoxia 2/2 JAN, OHS on bipap and on 3-4 liters baseline home o2,  DM2 not on long term insulin, mediastinal lymphadenopathy, chronic diastolic HFpEF, RHC at [facility] in 2008 with moderate pulmonary HTN, recently admitted to [facility] and [place] on 12/28/19 for DHF exacerbation and currently presenting w/ recurrent symptoms of worsening sob/ peripheral edema in the setting of dietary indiscretion. Pt placed on IV bumex drip, diuresd well with improving sx. S/p negative NM scan for amyloidosis and s/p RHC today showing severe pulm htn. Pt changed to po torsemide, which was held today given low bp. Overall improving fluid status. Anticipated discharge in 24 hours if pt remains medically stable.     Acute on chronic diastolic hfpef exacerbation with severe pulm htn   -hx of [facility] hospital readmissions for hf, known non adherence to lifestyle and dietary recs  - counselled on compliance with diet and fluid restriction; noted gallon of water that the pt was drinking at bedside   - d/c bumex drip and changed to po torsemide, todays dose to be held given hypotension and possible overdiuresis, will re evaluate tomorrow to restart po diuretic   - c/w Potassium 40 mEQ BID - monitoring electrolytes closely   - C/w Coreg, sildenafil, enalapril  - NM Amyloidosis SPECT scan negative   - Strict i/o and daily standing weight   - fluid restriction    - Keep K > 4, Mg > 2   - Monitor renal function with BID BMP due to ongoing diuresis   - cardio f/u noted and appreciated, d/w Dr. Cox the plan of care     HAYES  - secondary to hypervolemia in the setting of HF flare   - resolved   -Today cr: 1.3 and at baseline   - Baseline cr 0.9-1.4  - avoiding nephrotoxic medications     JAN  -non compliant with BIPAP  -c/w nocturnal bipap  -pt educated on importance of compliance  -Pt states has an appointment for sleep study outpt   - outpt f/u w/ pulm    DM II not on long term insulin   -Stable  -A1c 6.3 in 12/2019, controlled  -c/w HSS  - c/w hypoglyemia protocol     Mediastinal lymphadenopathy  -incidentally noted last admit, advised to fu w/ rheum outpt for sarcoid w/u    Morbid obesity  -Counseling provided  -Will need to f/u outpt    DVT ppx.   -c/w Lovenox bid    Dispo. Anticipated discharge in 24 hours to home, will need home care on discharge. Pt has bipap and o2 at home.   High risk for re admission given non compliance. 38 yo M with morbid obesity, chronic respiratory failure with hypoxia 2/2 JAN, OHS on bipap and on 3-4 liters baseline home o2,  DM2 not on long term insulin, mediastinal lymphadenopathy, chronic diastolic HFpEF, RHC at [facility] in 2008 with moderate pulmonary HTN, recently admitted to [facility] [place] on 12/28/19 for DHF exacerbation and currently presenting w/ recurrent symptoms of worsening sob/ peripheral edema in the setting of dietary indiscretion. Pt placed on IV bumex drip, diuresd well with improving sx. S/p negative NM scan for amyloidosis and s/p RHC today showing severe pulm htn. Pt changed to po torsemide, which was held today given low bp. Overall improving fluid status. Anticipated discharge in 24 hours if pt remains medically stable.     Acute on chronic diastolic hfpef exacerbation with severe pulm htn   -hx of [facility] hospital readmissions for hf, known non adherence to lifestyle and dietary recs  - counselled on compliance with diet and fluid restriction; noted gallon of water that the pt was drinking at bedside   - d/c bumex drip and changed to po torsemide, todays dose to be held given hypotension and possible overdiuresis, will re evaluate tomorrow to restart po diuretic   - c/w Potassium 40 mEQ BID - monitoring electrolytes closely   - C/w Coreg, sildenafil, enalapril  - NM Amyloidosis SPECT scan negative   - Strict i/o and daily standing weight   - fluid restriction    - Keep K > 4, Mg > 2   - Monitor renal function with BID BMP due to ongoing diuresis   - cardio f/u noted and appreciated, d/w Dr. Cox the plan of care     HAYES  - secondary to hypervolemia in the setting of HF flare   - resolved   -Today cr: 1.3 and at baseline   - Baseline cr 0.9-1.4  - avoiding nephrotoxic medications     JAN  -non compliant with BIPAP  -c/w nocturnal bipap  -pt educated on importance of compliance  -Pt states has an appointment for sleep study outpt   - outpt f/u w/ pulm    DM II not on long term insulin   -Stable  -A1c 6.3 in 12/2019, controlled  -c/w HSS  - c/w hypoglyemia protocol     Mediastinal lymphadenopathy  -incidentally noted last admit, advised to fu w/ rheum outpt for sarcoid w/u    Morbid obesity  BMI: 55   -Counseling provided  -Will need to f/u outpt    DVT ppx.   -c/w Lovenox bid    Dispo. Anticipated discharge in 24 hours to home, will need home care on discharge. Pt has bipap and o2 at home.   High risk for re admission given non compliance.

## 2020-01-14 NOTE — PROGRESS NOTE ADULT - PROBLEM SELECTOR PLAN 1
Aggressive medical management per cardiology  Podiatry for care of feet  Monitor electrolytes K+>4.0  Mg+ 2.0  Low sodium diet  Weight loss stressed

## 2020-01-14 NOTE — PROGRESS NOTE ADULT - ASSESSMENT
A/P:  40 y/o morbidly obese M with a PMHx of Right sided heart failure (12/19 EF 65-70% with severely reduced RV fxn), severe pulmonary HTN (PA systolic 89.6) on home oxygen (4L NC), JAN, DMII on insuline who presented to the ED complaining of worsening leg edema, and shortness of breath. Patient was recently admitted to [facility] back in December for similar presentation, and states that he has been taking all of his medications as prescribed, but has been drinking a lot more fluids then he "should." Patient states he also felt like torsemide worked better for him in the past, and that the lasix isn't as strong. Patient didn't follow up with Dr. Horton after discharge as recommended. Patient states that his legs have been getting more and more swollen, and he could no longer fit in his shoes. Patient also with dyspnea now at rest, and had to increase his oxygen to 5L at home, so he came to the ED to be evaluated. Patient denies fevers, chills, CP, palpitations, syncope, near syncope, abdominal pain, N/V/D, headache, or dizziness.   Troponin neg x 1  BNP 2002    1. Acute decompensated Right Heart Failure  - Exacerbation likely secondary to poor diet adherence  - Patient negative 3 L yesterday  - D/C bumex gtt  - Start Demadex 100mg PO qday  - Continue potassium 40 mEQ BID  - Plan for RHC today  - PYP negative for amyloid  - RF factor negative   - Still waiting results of MARTITA, C-ANCA, P-ANCA, SCL-70  - Continue Coreg, sildenafil, enalapril, will uptitrate when BP allows  - Monitor on telemetry.    - Strict i/o and daily standing weights.     - Keep K > 4, Mg > 2.    - Monitor renal function with BID BMP due to ongoing diuresis.    2. HTN  - BP well controlled off Hydralazine    3. JAN  - Continue BiPAP at night    Preliminary evaluation, please await official recommendations by Dr. Cox

## 2020-01-14 NOTE — PROGRESS NOTE ADULT - SUBJECTIVE AND OBJECTIVE BOX
[place] [facility] Faculty Practice                                                               Office: [address] [address]                                                              Telephone: [phone]. Fax:[phone]                                                                             PROGRESS NOTE  Reason for follow up: RV failure, severe pulmonary HTN  Overnight: No new events.   Update: Patient seen resting comfortably, states his breathing has greatly improved. Patient should be able to lay down flat for RHC today. Patient is negative almost 13 L since admission.     Subjective: "I'm feeling better"      	  Vitals:  T(C): 36.3 (01-14-20 @ 05:17), Max: 36.8 (01-13-20 @ 15:28)  HR: 75 (01-14-20 @ 05:17) (71 - 88)  BP: 113/74 (01-14-20 @ 05:17) (98/64 - 133/82)  RR: 22 (01-14-20 @ 05:17) (18 - 27)  SpO2: 96% (01-14-20 @ 05:17) (92% - 100%)  Wt(kg): --  I&O's Summary    13 Jan 2020 07:01  -  14 Jan 2020 07:00  --------------------------------------------------------  IN: 716.7 mL / OUT: 4425 mL / NET: -3708.3 mL      Weight (kg): 170.8 (01-11 @ 22:10)    PHYSICAL EXAM:  Constitutional: Comfortable . No acute distress.   HEENT: Atraumatic and normocephalic , neck is supple . + JVD. No carotid bruit. PEERL   CNS: A&Ox3. No focal deficits. EOMI. Cranial nerves II-IX are intact.   Lymph Nodes: Cervical : Not palpable.  Respiratory: Decreased at b/l bases  Cardiovascular: S1S2 RRR. No rubs or gallop. II/VI systolic murmur lsb  Gastrointestinal: obese, Soft non-tender and non distended . +Bowel sounds. negative Collins's sign.  Extremities: 1+ LE edema.   Psychiatric: Calm . no agitation.  Skin: No skin rash/ulcers visualized to face, hands or feet.    CURRENT MEDICATIONS:  carvedilol 3.125 milliGRAM(s) Oral every 12 hours  enalapril 5 milliGRAM(s) Oral daily  sildenafil (REVATIO) 10 milliGRAM(s) Oral three times a day  torsemide 100 milliGRAM(s) Oral daily    albuterol/ipratropium for Nebulization  atorvastatin  dextrose 50% Injectable  dextrose 50% Injectable  dextrose 50% Injectable  insulin lispro (HumaLOG) corrective regimen sliding scale  aspirin  chewable  dextrose 5%.  enoxaparin Injectable  influenza   Vaccine  magnesium oxide  potassium chloride    Tablet ER      DIAGNOSTIC TESTING:  [ ] Echocardiogram:   < from: TTE Echo Complete w/Doppler (12.21.19 @ 08:48) >  PHYSICIAN INTERPRETATION:  Left Ventricle: The left ventricular internal cavity size is normal.  Left ventricular ejection fraction, by visual estimation, is 65 to 70%. The interventricular septum is flattened in systole and diastole, consistent with right ventricular pressure and volume overload.  Right Ventricle: The right ventricular size is severely enlarged. RV systolic function is severely reduced. TV S' 0.2 m/s.  Left Atrium: The left atrium is normal in size.  Right Atrium: Severely enlarged right atrium.  Pericardium: There is no evidence of pericardial effusion.  Mitral Valve: The mitral valve is normal in structure. Mild mitral valve regurgitation is seen.  Tricuspid Valve: Moderate-severe tricuspid regurgitation is visualized. Estimated pulmonary artery systolic pressure is 89.6 mmHg assuming a right atrial pressure of 15 mmHg, which is consistent with severe pulmonary hypertension.  Aortic Valve: The aortic valve is normal. No evidence of aortic valve regurgitation is seen.  Pulmonic Valve: The pulmonic valve was not well visualized. Mild pulmonic valve regurgitation.  Aorta: The aortic root is normal in size and structure.  Pulmonary Artery: The pulmonary artery is moderately dilated.  Venous: The inferior vena cava was dilated, with respiratory size variation less than 50%. The inferior vena cava and the hepatic vein show a pattern of systolic flow reversal, suggestive of tricuspid regurgitation.       Summary:   1. Technically fair study.   2. Left ventricular ejection fraction, by visual estimation, is 65 to 70%.   3. There is moderate septal left ventricular hypertrophy.   4. Severely enlarged right ventricle.   5. Severely reduced RV systolic function.   6. Right ventricular volume and pressure overload.   7. Moderate-severe tricuspid regurgitation.   8. Estimated pulmonary artery systolic pressure is 89.6 mmHg assuming a right atrial pressure of 15 mmHg, which is consistent with severe pulmonary hypertension.   9. Moderately dilated pulmonary artery.    MD Josefina Electronically signed on 12/24/2019 at 2:54:21 PM         < end of copied text >    OTHER: 	  < from: Xray Chest 1 View- PORTABLE-Urgent (01.10.20 @ 20:26) >   EXAM:  XR CHEST PORTABLE URGENT 1V                          PROCEDURE DATE:  01/10/2020          INTERPRETATION:  Portable chest radiograph        CLINICAL INFORMATION:   Short of breath.    TECHNIQUE:  Portable  AP view of the chest was obtained.    COMPARISON: 12/20/2019 available for review.    FINDINGS:   The lungs  are clear.  No pleural abnormality is seen.    The  heart is enlarged in transverse diameter. No hilar mass. Trachea midline.   Visualized osseous structures are intact.        IMPRESSION: Cardiomegaly No evidence of active chest disease.    < end of copied text >    LABS:	 	    01-14    141  |  95<L>  |  34.0<H>  ----------------------------<  104  4.1   |  32.0<H>  |  1.30    Ca    9.3      14 Jan 2020 05:50  Phos  4.4     01-14  Mg     1.6     01-14      proBNP: Serum Pro-Brain Natriuretic Peptide: 2022 pg/mL (01-10 @ 22:33)    Lipid Profile:   HgA1c:   TSH:       TELEMETRY: Reviewed  [facility] [place] [facility] Faculty Practice                                                               Office: [address] [address]                                                              Telephone: [phone]. Fax:[phone]                                                                             PROGRESS NOTE  Reason for follow up: RV failure, severe pulmonary HTN  Overnight: No new events.   Update: Patient seen resting comfortably, states his breathing has greatly improved. Patient should be able to lay down flat for RHC today. Patient is negative almost 13 L since admission.     Subjective: "I'm feeling better"    	  Vitals:  T(C): 36.3 (01-14-20 @ 05:17), Max: 36.8 (01-13-20 @ 15:28)  HR: 75 (01-14-20 @ 05:17) (71 - 88)  BP: 113/74 (01-14-20 @ 05:17) (98/64 - 133/82)  RR: 22 (01-14-20 @ 05:17) (18 - 27)  SpO2: 96% (01-14-20 @ 05:17) (92% - 100%)  Wt(kg): --  I&O's Summary    13 Jan 2020 07:01  -  14 Jan 2020 07:00  --------------------------------------------------------  IN: 716.7 mL / OUT: 4425 mL / NET: -3708.3 mL      Weight (kg): 170.8 (01-11 @ 22:10)    PHYSICAL EXAM:  Constitutional: Comfortable . No acute distress.   HEENT: Atraumatic and normocephalic , neck is supple . + JVD. No carotid bruit. PEERL   CNS: A&Ox3. No focal deficits. EOMI. Cranial nerves II-IX are intact.   Lymph Nodes: Cervical : Not palpable.  Respiratory: Decreased at b/l bases  Cardiovascular: S1S2 RRR. No rubs or gallop. II/VI systolic murmur lsb  Gastrointestinal: obese, Soft non-tender and non distended . +Bowel sounds. negative Collins's sign.  Extremities: 1+ LE edema.   Psychiatric: Calm . no agitation.  Skin: No skin rash/ulcers visualized to face, hands or feet.    CURRENT MEDICATIONS:  carvedilol 3.125 milliGRAM(s) Oral every 12 hours  enalapril 5 milliGRAM(s) Oral daily  sildenafil (REVATIO) 10 milliGRAM(s) Oral three times a day  torsemide 100 milliGRAM(s) Oral daily    albuterol/ipratropium for Nebulization  atorvastatin  dextrose 50% Injectable  dextrose 50% Injectable  dextrose 50% Injectable  insulin lispro (HumaLOG) corrective regimen sliding scale  aspirin  chewable  dextrose 5%.  enoxaparin Injectable  influenza   Vaccine  magnesium oxide  potassium chloride    Tablet ER      DIAGNOSTIC TESTING:  [ ] Echocardiogram:   < from: TTE Echo Complete w/Doppler (12.21.19 @ 08:48) >  PHYSICIAN INTERPRETATION:  Left Ventricle: The left ventricular internal cavity size is normal.  Left ventricular ejection fraction, by visual estimation, is 65 to 70%. The interventricular septum is flattened in systole and diastole, consistent with right ventricular pressure and volume overload.  Right Ventricle: The right ventricular size is severely enlarged. RV systolic function is severely reduced. TV S' 0.2 m/s.  Left Atrium: The left atrium is normal in size.  Right Atrium: Severely enlarged right atrium.  Pericardium: There is no evidence of pericardial effusion.  Mitral Valve: The mitral valve is normal in structure. Mild mitral valve regurgitation is seen.  Tricuspid Valve: Moderate-severe tricuspid regurgitation is visualized. Estimated pulmonary artery systolic pressure is 89.6 mmHg assuming a right atrial pressure of 15 mmHg, which is consistent with severe pulmonary hypertension.  Aortic Valve: The aortic valve is normal. No evidence of aortic valve regurgitation is seen.  Pulmonic Valve: The pulmonic valve was not well visualized. Mild pulmonic valve regurgitation.  Aorta: The aortic root is normal in size and structure.  Pulmonary Artery: The pulmonary artery is moderately dilated.  Venous: The inferior vena cava was dilated, with respiratory size variation less than 50%. The inferior vena cava and the hepatic vein show a pattern of systolic flow reversal, suggestive of tricuspid regurgitation.       Summary:   1. Technically fair study.   2. Left ventricular ejection fraction, by visual estimation, is 65 to 70%.   3. There is moderate septal left ventricular hypertrophy.   4. Severely enlarged right ventricle.   5. Severely reduced RV systolic function.   6. Right ventricular volume and pressure overload.   7. Moderate-severe tricuspid regurgitation.   8. Estimated pulmonary artery systolic pressure is 89.6 mmHg assuming a right atrial pressure of 15 mmHg, which is consistent with severe pulmonary hypertension.   9. Moderately dilated pulmonary artery.    MD Josefina Electronically signed on 12/24/2019 at 2:54:21 PM     OTHER: 	  < from: Xray Chest 1 View- PORTABLE-Urgent (01.10.20 @ 20:26) >   EXAM:  XR CHEST PORTABLE URGENT 1V                          PROCEDURE DATE:  01/10/2020          INTERPRETATION:  Portable chest radiograph        CLINICAL INFORMATION:   Short of breath.    TECHNIQUE:  Portable  AP view of the chest was obtained.    COMPARISON: 12/20/2019 available for review.    FINDINGS:   The lungs  are clear.  No pleural abnormality is seen.    The  heart is enlarged in transverse diameter. No hilar mass. Trachea midline.   Visualized osseous structures are intact.        IMPRESSION: Cardiomegaly No evidence of active chest disease.    < end of copied text >    LABS:	 	    01-14    141  |  95<L>  |  34.0<H>  ----------------------------<  104  4.1   |  32.0<H>  |  1.30    Ca    9.3      14 Jan 2020 05:50  Phos  4.4     01-14  Mg     1.6     01-14      proBNP: Serum Pro-Brain Natriuretic Peptide: 2022 pg/mL (01-10 @ 22:33)    TELEMETRY: Reviewed  SR

## 2020-01-14 NOTE — PROGRESS NOTE ADULT - SUBJECTIVE AND OBJECTIVE BOX
NPO>8 hours  Last lovenox last PM  DR Santana to consent  Reason for follow up: RV failure, severe pulmonary HTN  Overnight: No new events.   Update: Patient seen resting comfortably, states his breathing has greatly improved. Patient should be able to lay down flat for RHC today. Patient is negative almost 13 L since admission.   FINDINGS: The technical quality of the images was satisfactory. There is no abnormal myocardial uptake identified.    Heart to Contralateral Chest Ratio (1 hr.): = 1.1; (normal: less than or equal to 1.0)    Myocardium to Bone (visual at 3 hrs): Grade: 0  (normal: < 2)    IMPRESSION: Normal cardiac amyloid Imaging study; findings are not suggestive of transthyretin cardiac amyloidosis.     Summary:   1. Technically fair study.   2. Left ventricular ejection fraction, by visual estimation, is 65 to 70%.   3. There is moderate septal left ventricular hypertrophy.   4. Severely enlarged right ventricle.   5. Severely reduced RV systolic function.   6. Right ventricular volume and pressure overload.   7. Moderate-severe tricuspid regurgitation.   8. Estimated pulmonary artery systolic pressure is 89.6 mmHg assuming a right atrial pressure of 15 mmHg, which is consistent with severe pulmonary hypertension.   9. Moderately dilated pulmonary artery.  · Assessment		  A/P:  38 y/o morbidly obese M with a PMHx of Right sided heart failure (12/19 EF 65-70% with severely reduced RV fxn), severe pulmonary HTN (PA systolic 89.6) on home oxygen (4L NC), JAN, DMII on insuline who presented to the ED complaining of worsening leg edema, and shortness of breath. Patient was recently admitted to [facility] back in December for similar presentation, and states that he has been taking all of his medications as prescribed, but has been drinking a lot more fluids then he "should." Patient states he also felt like torsemide worked better for him in the past, and that the lasix isn't as strong. Patient didn't follow up with Dr. Horton after discharge as recommended. Patient states that his legs have been getting more and more swollen, and he could no longer fit in his shoes. Patient also with dyspnea now at rest, and had to increase his oxygen to 5L at home, so he came to the ED to be evaluated. Patient denies fevers, chills, CP, palpitations, syncope, near syncope, abdominal pain, N/V/D, headache, or dizziness.   Troponin neg x 1  BNP 2002    1. Acute decompensated Right Heart Failure  - Exacerbation likely secondary to poor diet adherence  - Patient negative 3 L yesterday  - D/C bumex gtt  - Start Demadex 100mg PO qday  - Continue potassium 40 mEQ BID  - Plan for RHC today  - PYP negative for amyloid  - RF factor negative   - Still waiting results of MARTITA, C-ANCA, P-ANCA, SCL-70  - Continue Coreg, sildenafil, enalapril, will uptitrate when BP allows  - Monitor on telemetry.    - Strict i/o and daily standing weights.     - Keep K > 4, Mg > 2.    - Monitor renal function with BID BMP due to ongoing diuresis.    2. HTN  - BP well controlled off Hydralazine    3. JAN  - Continue BiPAP at night    Preliminary evaluation, please await official recommendations by Dr. Cox      ASA 3 Mallampati 2      REVIEW OF SYSTEMS:  Denies SOB, CP, NV, HA, dizziness, palpitations, site pain    PHYSICAL EXAM: A&Ox3 NAD Skin warm and dry  NEURO: Speech intact +gag +swallow Tongue midline BURROUGHS  NECK: No JVD, trachea midline. Eupneic  HEART: 2/6 SM SR on tele  PULMONARY:  CTA rashard  ABDOMEN: Soft nontender X4 +BS Vdg  EXTREMITIES: Rashard LE edema elephantitis features with hypertrophic overgrown toenails

## 2020-01-14 NOTE — PROGRESS NOTE ADULT - SUBJECTIVE AND OBJECTIVE BOX
s/p RHC RBV with nitric oxide  Centricity to follow:    Pre NO Cardiac output 8L/min Post: 7.7 L/min  CO 5/CI 2  No response to nitric oxide  Wedge 16-17 mmHg  PA Mean 48  PASP 80 mmHg  Severe Pulmonary HTN    REVIEW OF SYSTEMS:  Denies SOB, CP, NV, HA, dizziness, palpitations, site pain    PHYSICAL EXAM: A&Ox3 NAD Skin warm and dry  NEURO: Speech intact +gag +swallow Tongue midline BURROUGHS  NECK: No JVD, trachea midline. Eupneic  HEART: SR 90s bpm no ectopy  PULMONARY:  CTA clint  ABDOMEN: Soft nontender X4 +BS Vdg/eating  EXTREMITIES: Rt brachia site: Rt radial pulse + w/pulse ox on right index finger SaO2>95% RUE w/oneurovascular deficit. Capillary refill <3 sec  DSD rt brachial site with pt care instructions to pt.

## 2020-01-14 NOTE — PROGRESS NOTE ADULT - SUBJECTIVE AND OBJECTIVE BOX
Patient is a 39y old  Male who presents with a chief complaint of difficulty breathing (21 Dec 2019 10:30)    INTERVAL/OVERNIGHT EVENTS:  Patient examined at beside. Pt reports improvement in SOB as well as LE edema. Had used Bipap overnight, states feeling better overall.   Cardiac cath planed for today  Patient is tolerating PO diet w/o nausea/vomiting/diarrhea/abdominal pain. Patient is voiding actively.   Rest of ROS not contributory except for above.    I&O's Summary    13 Jan 2020 07:01  -  14 Jan 2020 07:00  --------------------------------------------------------  IN: 716.7 mL / OUT: 4425 mL / NET: -3708.3 mL    14 Jan 2020 07:01  -  14 Jan 2020 16:01  --------------------------------------------------------  IN: 0 mL / OUT: 1700 mL / NET: -1700 mL    Vital Signs Last 24 Hrs  T(C): 36.4 (14 Jan 2020 10:08), Max: 36.7 (13 Jan 2020 21:24)  T(F): 97.6 (14 Jan 2020 10:08), Max: 98.1 (13 Jan 2020 21:24)  HR: 95 (14 Jan 2020 15:15) (71 - 96)  BP: 110/56 (14 Jan 2020 15:15) (94/50 - 133/82)  RR: 17 (14 Jan 2020 15:15) (16 - 27)  SpO2: 92% (14 Jan 2020 15:15) (90% - 100%)    General: Well nourished/Well developed, NAD, Obese  Neck:  Supple, no sinuses or palpable masses, No JVD noted  Respiratory: Good air entry, remote intermittent crackles, mild expiratory wheezing  CV: RRR, S1S2, no murmur.  Abdominal: Obese, Soft, NT, ND, no palpable mass  Extremities: ++ edema, + peripheral pulses, FROM all 4 extremity  Neurology: A&Ox3, no focalization signs    LABS                                 13.2   3.85  )-----------( 177      ( 12 Jan 2020 06:52 )             42.2     01-12    140  |  98  |  28.0<H>  ----------------------------<  95  3.8   |  27.0  |  1.41<H>    Ca    9.0      12 Jan 2020 06:52  Phos  3.4     01-12  Mg     1.7     01-12    TPro  8.6  /  Alb  3.3  /  TBili  1.3  /  DBili  x   /  AST  31  /  ALT  27  /  AlkPhos  99  01-10 Patient is a 39y old  Male who presents with a chief complaint of difficulty breathing (21 Dec 2019 10:30) improving sob/ peripheral edema     INTERVAL/OVERNIGHT EVENTS:  Patient seen and examined at beside. Pt reports improvement in SOB as well as LE edema. Had used Bipap overnight, states feeling better overall.   Cardiac cath planed for today. Seen in the am and then again post cath, tolerated procedure well. D/w him bc noted gallon of water at bedside that the pt has to follow fluid restriction and have dietary compliance otherwise pt will c/w having recurrent hospitalizations for HF. Pt upset over the fact that he has to reduce his intake of fluid but understands and will be more compliant. As per RN lower bp so diuretic dose held today.   Patient is tolerating PO diet w/o nausea/vomiting/diarrhea/abdominal pain. Patient is voiding actively.   Rest of ROS not contributory except for above.    I&O's Summary    13 Jan 2020 07:01  -  14 Jan 2020 07:00  --------------------------------------------------------  IN: 716.7 mL / OUT: 4425 mL / NET: -3708.3 mL    14 Jan 2020 07:01  -  14 Jan 2020 16:01  --------------------------------------------------------  IN: 0 mL / OUT: 1700 mL / NET: -1700 mL    Vital Signs Last 24 Hrs  T(C): 36.4 (14 Jan 2020 10:08), Max: 36.7 (13 Jan 2020 21:24)  T(F): 97.6 (14 Jan 2020 10:08), Max: 98.1 (13 Jan 2020 21:24)  HR: 95 (14 Jan 2020 15:15) (71 - 96)  BP: 110/56 (14 Jan 2020 15:15) (94/50 - 133/82)  RR: 17 (14 Jan 2020 15:15) (16 - 27)  SpO2: 92% (14 Jan 2020 15:15) (90% - 100%)    General: Well nourished/Well developed, NAD, Obese  Neck:  Supple, no sinuses or palpable masses, No JVD noted  Respiratory: Good air entry, remote intermittent crackles, mild expiratory wheezing  CV: RRR, S1S2, no murmur.  Abdominal: Obese, Soft, NT, ND, no palpable mass  Extremities: +2 b/l le edema, + peripheral pulses, FROM all 4 extremity   R brachial with dressin C/D/I no hematoma noted   Neurology: A&Ox3, no focalization signs    LABS                                 13.2   3.85  )-----------( 177      ( 12 Jan 2020 06:52 )             42.2     01-12    140  |  98  |  28.0<H>  ----------------------------<  95  3.8   |  27.0  |  1.41<H>    Ca    9.0      12 Jan 2020 06:52  Phos  3.4     01-12  Mg     1.7     01-12    TPro  8.6  /  Alb  3.3  /  TBili  1.3  /  DBili  x   /  AST  31  /  ALT  27  /  AlkPhos  99  01-10

## 2020-01-15 LAB
ANA PAT FLD IF-IMP: ABNORMAL
ANA TITR SER: ABNORMAL
ANION GAP SERPL CALC-SCNC: 11 MMOL/L — SIGNIFICANT CHANGE UP (ref 5–17)
BUN SERPL-MCNC: 32 MG/DL — HIGH (ref 8–20)
CALCIUM SERPL-MCNC: 9.5 MG/DL — SIGNIFICANT CHANGE UP (ref 8.6–10.2)
CHLORIDE SERPL-SCNC: 97 MMOL/L — LOW (ref 98–107)
CO2 SERPL-SCNC: 34 MMOL/L — HIGH (ref 22–29)
CREAT SERPL-MCNC: 1.18 MG/DL — SIGNIFICANT CHANGE UP (ref 0.5–1.3)
GLUCOSE BLDC GLUCOMTR-MCNC: 118 MG/DL — HIGH (ref 70–99)
GLUCOSE BLDC GLUCOMTR-MCNC: 92 MG/DL — SIGNIFICANT CHANGE UP (ref 70–99)
GLUCOSE BLDC GLUCOMTR-MCNC: 94 MG/DL — SIGNIFICANT CHANGE UP (ref 70–99)
GLUCOSE BLDC GLUCOMTR-MCNC: 97 MG/DL — SIGNIFICANT CHANGE UP (ref 70–99)
GLUCOSE SERPL-MCNC: 105 MG/DL — SIGNIFICANT CHANGE UP (ref 70–115)
MAGNESIUM SERPL-MCNC: 1.7 MG/DL — LOW (ref 1.8–2.6)
PHOSPHATE SERPL-MCNC: 3.4 MG/DL — SIGNIFICANT CHANGE UP (ref 2.4–4.7)
POTASSIUM SERPL-MCNC: 4.4 MMOL/L — SIGNIFICANT CHANGE UP (ref 3.5–5.3)
POTASSIUM SERPL-SCNC: 4.4 MMOL/L — SIGNIFICANT CHANGE UP (ref 3.5–5.3)
SODIUM SERPL-SCNC: 142 MMOL/L — SIGNIFICANT CHANGE UP (ref 135–145)

## 2020-01-15 PROCEDURE — 99232 SBSQ HOSP IP/OBS MODERATE 35: CPT

## 2020-01-15 PROCEDURE — 99232 SBSQ HOSP IP/OBS MODERATE 35: CPT | Mod: GC

## 2020-01-15 RX ORDER — MAGNESIUM SULFATE 500 MG/ML
2 VIAL (ML) INJECTION ONCE
Refills: 0 | Status: DISCONTINUED | OUTPATIENT
Start: 2020-01-15 | End: 2020-01-15

## 2020-01-15 RX ORDER — BUMETANIDE 0.25 MG/ML
1 INJECTION INTRAMUSCULAR; INTRAVENOUS
Refills: 0 | Status: DISCONTINUED | OUTPATIENT
Start: 2020-01-15 | End: 2020-01-16

## 2020-01-15 RX ADMIN — Medication 10 MILLIGRAM(S): at 05:50

## 2020-01-15 RX ADMIN — Medication 10 MILLIGRAM(S): at 21:44

## 2020-01-15 RX ADMIN — Medication 3 MILLILITER(S): at 20:25

## 2020-01-15 RX ADMIN — Medication 3 MILLILITER(S): at 10:01

## 2020-01-15 RX ADMIN — Medication 3 MILLILITER(S): at 14:53

## 2020-01-15 RX ADMIN — Medication 10 MILLIGRAM(S): at 15:04

## 2020-01-15 RX ADMIN — Medication 5 MILLIGRAM(S): at 05:45

## 2020-01-15 RX ADMIN — MAGNESIUM OXIDE 400 MG ORAL TABLET 400 MILLIGRAM(S): 241.3 TABLET ORAL at 08:56

## 2020-01-15 RX ADMIN — ATORVASTATIN CALCIUM 40 MILLIGRAM(S): 80 TABLET, FILM COATED ORAL at 21:45

## 2020-01-15 RX ADMIN — MAGNESIUM OXIDE 400 MG ORAL TABLET 400 MILLIGRAM(S): 241.3 TABLET ORAL at 11:07

## 2020-01-15 RX ADMIN — Medication 40 MILLIEQUIVALENT(S): at 21:44

## 2020-01-15 RX ADMIN — MAGNESIUM OXIDE 400 MG ORAL TABLET 400 MILLIGRAM(S): 241.3 TABLET ORAL at 17:41

## 2020-01-15 RX ADMIN — Medication 81 MILLIGRAM(S): at 11:06

## 2020-01-15 RX ADMIN — ENOXAPARIN SODIUM 40 MILLIGRAM(S): 100 INJECTION SUBCUTANEOUS at 11:06

## 2020-01-15 RX ADMIN — CARVEDILOL PHOSPHATE 3.12 MILLIGRAM(S): 80 CAPSULE, EXTENDED RELEASE ORAL at 05:45

## 2020-01-15 RX ADMIN — CARVEDILOL PHOSPHATE 3.12 MILLIGRAM(S): 80 CAPSULE, EXTENDED RELEASE ORAL at 17:41

## 2020-01-15 RX ADMIN — Medication 40 MILLIEQUIVALENT(S): at 08:56

## 2020-01-15 RX ADMIN — BUMETANIDE 1 MILLIGRAM(S): 0.25 INJECTION INTRAMUSCULAR; INTRAVENOUS at 17:41

## 2020-01-15 RX ADMIN — Medication 3 MILLILITER(S): at 02:24

## 2020-01-15 NOTE — PROGRESS NOTE ADULT - ASSESSMENT
A/P:  38 y/o morbidly obese M with a PMHx of Right sided heart failure (12/19 EF 65-70% with severely reduced RV fxn), severe pulmonary HTN (PA systolic 89.6) on home oxygen (4L NC), JAN, DMII on insuline who presented to the ED complaining of worsening leg edema, and shortness of breath. Patient was recently admitted to [facility] back in December for similar presentation, and states that he has been taking all of his medications as prescribed, but has been drinking a lot more fluids then he "should." Patient states he also felt like torsemide worked better for him in the past, and that the lasix isn't as strong. Patient didn't follow up with Dr. Horton after discharge as recommended. Patient states that his legs have been getting more and more swollen, and he could no longer fit in his shoes. Patient also with dyspnea now at rest, and had to increase his oxygen to 5L at home, so he came to the ED to be evaluated. Patient denies fevers, chills, CP, palpitations, syncope, near syncope, abdominal pain, N/V/D, headache, or dizziness.   Troponin neg x 1  BNP 2002    1. Acute decompensated Right Heart Failure  - Exacerbation likely secondary to poor diet adherence  - Patient required some IV fluids yesterday for hypotension  - D/C Demadex  - Start Bumex 1mg PO BID  - Continue potassium 40 mEQ BID  - PYP negative for amyloid  - RF factor negative, scl-17 negative, centromere negative   - Still waiting results of MARTITA, C-ANCA, P-ANCA,   - Continue Coreg, sildenafil, enalapril  - Monitor on telemetry.    - Strict i/o and daily standing weights.     - Keep K > 4, Mg > 2.    - Monitor renal function with ongoing diuresis.  - Hopefully D/C home tomorrow     2. HTN  - BP well controlled off Hydralazine    3. JAN  - Continue BiPAP at night    Preliminary evaluation, please await official recommendations by Dr. Cox

## 2020-01-15 NOTE — PROGRESS NOTE ADULT - SUBJECTIVE AND OBJECTIVE BOX
[facility]                                                       [facility]                                                               Office: [address] [address]                                                              Telephone: [phone]. Fax:[phone]                                                                             PROGRESS NOTE  Reason for follow up: RV failure, severe pulmonary HTN  Overnight: No new events.   Update: Patient underwent a RHC yesterday with CO 5/CI 2, wedge 16-17, PA mean 48, PA systolic 80 not responsive to nitric oxide. Patient was a little bit dry, demadex held, gentle hydration given. Patient states he is feeling back to his baselin.     Subjective: "I'm feeling better, when can I go home."    	  Vital Signs Last 24 Hrs  T(C): 36.7 (15 Tyrone 2020 05:43), Max: 36.7 (15 Tyrone 2020 05:43)  T(F): 98.1 (15 Tyrone 2020 05:43), Max: 98.1 (15 Tyrone 2020 05:43)  HR: 74 (15 Tyrone 2020 05:43) (74 - 101)  BP: 105/68 (15 Tyrone 2020 05:43) (91/56 - 118/55)  BP(mean): --  RR: 20 (15 Tryone 2020 05:43) (16 - 26)  SpO2: 100% (15 Tyrone 2020 05:43) (90% - 100%)    Weight (kg): 170.8 (01-11 @ 22:10)    I/O: 120/2500  -2380    PHYSICAL EXAM:  Constitutional: Comfortable . No acute distress.   HEENT: Atraumatic and normocephalic , neck is supple . + JVD. No carotid bruit. PEERL   CNS: A&Ox3. No focal deficits. EOMI. Cranial nerves II-IX are intact.   Lymph Nodes: Cervical : Not palpable.  Respiratory: Decreased at b/l bases  Cardiovascular: S1S2 RRR. No rubs or gallop. II/VI systolic murmur lsb  Gastrointestinal: obese, Soft non-tender and non distended . +Bowel sounds. negative Collins's sign.  Extremities: 1+ LE edema.   Psychiatric: Calm . no agitation.  Skin: No skin rash/ulcers visualized to face, hands or feet.    MEDICATIONS  (STANDING):  albuterol/ipratropium for Nebulization 3 milliLiter(s) Nebulizer every 6 hours  aspirin  chewable 81 milliGRAM(s) Oral daily  atorvastatin 40 milliGRAM(s) Oral at bedtime  buMETAnide 1 milliGRAM(s) Oral two times a day  carvedilol 3.125 milliGRAM(s) Oral every 12 hours  dextrose 5%. 1000 milliLiter(s) (50 mL/Hr) IV Continuous <Continuous>  dextrose 50% Injectable 12.5 Gram(s) IV Push once  dextrose 50% Injectable 25 Gram(s) IV Push once  dextrose 50% Injectable 25 Gram(s) IV Push once  enalapril 5 milliGRAM(s) Oral daily  enoxaparin Injectable 40 milliGRAM(s) SubCutaneous two times a day  influenza   Vaccine 0.5 milliLiter(s) IntraMuscular once  insulin lispro (HumaLOG) corrective regimen sliding scale   SubCutaneous Before meals and at bedtime  magnesium oxide 400 milliGRAM(s) Oral three times a day with meals  magnesium sulfate  IVPB 2 Gram(s) IV Intermittent once  potassium chloride    Tablet ER 40 milliEquivalent(s) Oral every 12 hours  sildenafil (REVATIO) 10 milliGRAM(s) Oral three times a day    MEDICATIONS  (PRN):  dextrose 40% Gel 15 Gram(s) Oral once PRN Blood Glucose LESS THAN 70 milliGRAM(s)/deciliter  glucagon  Injectable 1 milliGRAM(s) IntraMuscular once PRN Glucose LESS THAN 70 milligrams/deciliter  sodium chloride 0.65% Nasal 1 Spray(s) Both Nostrils three times a day PRN Nasal Congestion      DIAGNOSTIC TESTING:  [ ] Echocardiogram:   < from: TTE Echo Complete w/Doppler (12.21.19 @ 08:48) >  PHYSICIAN INTERPRETATION:  Left Ventricle: The left ventricular internal cavity size is normal.  Left ventricular ejection fraction, by visual estimation, is 65 to 70%. The interventricular septum is flattened in systole and diastole, consistent with right ventricular pressure and volume overload.  Right Ventricle: The right ventricular size is severely enlarged. RV systolic function is severely reduced. TV S' 0.2 m/s.  Left Atrium: The left atrium is normal in size.  Right Atrium: Severely enlarged right atrium.  Pericardium: There is no evidence of pericardial effusion.  Mitral Valve: The mitral valve is normal in structure. Mild mitral valve regurgitation is seen.  Tricuspid Valve: Moderate-severe tricuspid regurgitation is visualized. Estimated pulmonary artery systolic pressure is 89.6 mmHg assuming a right atrial pressure of 15 mmHg, which is consistent with severe pulmonary hypertension.  Aortic Valve: The aortic valve is normal. No evidence of aortic valve regurgitation is seen.  Pulmonic Valve: The pulmonic valve was not well visualized. Mild pulmonic valve regurgitation.  Aorta: The aortic root is normal in size and structure.  Pulmonary Artery: The pulmonary artery is moderately dilated.  Venous: The inferior vena cava was dilated, with respiratory size variation less than 50%. The inferior vena cava and the hepatic vein show a pattern of systolic flow reversal, suggestive of tricuspid regurgitation.       Summary:   1. Technically fair study.   2. Left ventricular ejection fraction, by visual estimation, is 65 to 70%.   3. There is moderate septal left ventricular hypertrophy.   4. Severely enlarged right ventricle.   5. Severely reduced RV systolic function.   6. Right ventricular volume and pressure overload.   7. Moderate-severe tricuspid regurgitation.   8. Estimated pulmonary artery systolic pressure is 89.6 mmHg assuming a right atrial pressure of 15 mmHg, which is consistent with severe pulmonary hypertension.   9. Moderately dilated pulmonary artery.    MD Josefina Electronically signed on 12/24/2019 at 2:54:21 PM     OTHER: 	  < from: Xray Chest 1 View- PORTABLE-Urgent (01.10.20 @ 20:26) >   EXAM:  XR CHEST PORTABLE URGENT 1V                          PROCEDURE DATE:  01/10/2020          INTERPRETATION:  Portable chest radiograph        CLINICAL INFORMATION:   Short of breath.    TECHNIQUE:  Portable  AP view of the chest was obtained.    COMPARISON: 12/20/2019 available for review.    FINDINGS:   The lungs  are clear.  No pleural abnormality is seen.    The  heart is enlarged in transverse diameter. No hilar mass. Trachea midline.   Visualized osseous structures are intact.        IMPRESSION: Cardiomegaly No evidence of active chest disease.    < end of copied text >        LABS:	 	    01-15    142  |  97<L>  |  32.0<H>  ----------------------------<  105  4.4   |  34.0<H>  |  1.18    Ca    9.5      15 Tyrone 2020 06:08  Phos  3.4     01-15  Mg     1.7     01-15      Mg     1.6     01-14      proBNP: Serum Pro-Brain Natriuretic Peptide: 2022 pg/mL (01-10 @ 22:33)    TELEMETRY: Reviewed  SR

## 2020-01-15 NOTE — PROGRESS NOTE ADULT - NSHPATTENDINGPLANDISCUSS_GEN_ALL_CORE
patient , resident team, rn
patient, resident team, cardio. rn
patient rn pgy-2-3 cm sw cardio
patient,  resident team, rn
Patient RN pgy-2-3 cardio

## 2020-01-15 NOTE — PROGRESS NOTE ADULT - SUBJECTIVE AND OBJECTIVE BOX
Patient is a 39y old  Male who presents with a chief complaint of difficulty breathing (21 Dec 2019 10:30) improving sob/ peripheral edema     INTERVAL/OVERNIGHT EVENTS:  Patient seen and examined at beside. Pt reports improvement in SOB as well as LE edema. Had used Bipap overnight, states feeling better overall.   Discussed with patient the importance of proper meds and diet adherence, strongly advised to follow fluid restricted diet  Patient is tolerating PO diet w/o nausea/vomiting/diarrhea/abdominal pain. Patient is voiding actively.   Rest of ROS not contributory except for above.    I&O's Summary    14 Jan 2020 07:01  -  15 Tyrone 2020 07:00  --------------------------------------------------------  IN: 120 mL / OUT: 2500 mL / NET: -2380 mL    15 Tyrone 2020 07:01  -  15 Tyrone 2020 13:14  --------------------------------------------------------  IN: 0 mL / OUT: 250 mL / NET: -250 mL      Vital Signs Last 24 Hrs  T(C): 36.6 (15 Tyrone 2020 10:40), Max: 36.7 (15 Tyrone 2020 05:43)  T(F): 97.9 (15 Tyrone 2020 10:40), Max: 98.1 (15 Tyrone 2020 05:43)  HR: 78 (15 Tyrnoe 2020 10:40) (74 - 101)  BP: 116/69 (15 Tyrone 2020 10:40) (91/56 - 118/55)  RR: 18 (15 Tyrone 2020 10:40) (17 - 26)  SpO2: 98% (15 Tyrone 2020 10:40) (90% - 100%)    General: Well nourished/Well developed, NAD, Obese  Neck:  Supple, no sinuses or palpable masses, No JVD noted  Respiratory: Good air entry, remote intermittent crackles, mild expiratory wheezing  CV: RRR, S1S2, no murmur.  Abdominal: Obese, Soft, NT, ND, no palpable mass  Extremities: +2 b/l le edema, + peripheral pulses, FROM all 4 extremity   R brachial with dressin C/D/I no hematoma noted   Neurology: A&Ox3, no focalization signs    LABS                                 13.2   3.85  )-----------( 177      ( 12 Jan 2020 06:52 )             42.2     01-12    140  |  98  |  28.0<H>  ----------------------------<  95  3.8   |  27.0  |  1.41<H>    Ca    9.0      12 Jan 2020 06:52  Phos  3.4     01-12  Mg     1.7     01-12    TPro  8.6  /  Alb  3.3  /  TBili  1.3  /  DBili  x   /  AST  31  /  ALT  27  /  AlkPhos  99  01-10 Patient is a 39y old  Male who presents with a chief complaint of difficulty breathing (21 Dec 2019 10:30) improving sob/ peripheral edema     INTERVAL/OVERNIGHT EVENTS:  Patient seen and examined at beside. Pt reports improvement in SOB and peripheral edema. Understands he will be watched for additional 24 hours given labile bp and introduction of new bp medications.   Patient is tolerating PO diet w/o nausea/vomiting/diarrhea/abdominal pain. Patient is voiding actively.   Rest of ROS not contributory except for above.    I&O's Summary    14 Jan 2020 07:01  -  15 Tyrone 2020 07:00  --------------------------------------------------------  IN: 120 mL / OUT: 2500 mL / NET: -2380 mL    15 Tyrone 2020 07:01  -  15 Tyrone 2020 13:14  --------------------------------------------------------  IN: 0 mL / OUT: 250 mL / NET: -250 mL      Vital Signs Last 24 Hrs  T(C): 36.6 (15 Tyrone 2020 10:40), Max: 36.7 (15 Tyrone 2020 05:43)  T(F): 97.9 (15 Tyrone 2020 10:40), Max: 98.1 (15 Tyrone 2020 05:43)  HR: 78 (15 Tyrone 2020 10:40) (74 - 101)  BP: 116/69 (15 Tyrone 2020 10:40) (91/56 - 118/55)  RR: 18 (15 Tyrone 2020 10:40) (17 - 26)  SpO2: 98% (15 Tyrone 2020 10:40) (90% - 100%)    General: Well nourished/Well developed, NAD, Obese  Neck:  Supple, no sinuses or palpable masses, No JVD noted  Respiratory: Good air entry, remote intermittent crackles, mild expiratory wheezing  CV: RRR, S1S2, no murmur.  Abdominal: Obese, Soft, NT, ND, no palpable mass  Extremities: +2 b/l le edema, + peripheral pulses, FROM all 4 extremity   R brachial with dressin C/D/I no hematoma noted   Neurology: A&Ox3, no focalization signs    LABS                                 13.2   3.85  )-----------( 177      ( 12 Jan 2020 06:52 )             42.2     01-12    140  |  98  |  28.0<H>  ----------------------------<  95  3.8   |  27.0  |  1.41<H>    Ca    9.0      12 Jan 2020 06:52  Phos  3.4     01-12  Mg     1.7     01-12    TPro  8.6  /  Alb  3.3  /  TBili  1.3  /  DBili  x   /  AST  31  /  ALT  27  /  AlkPhos  99  01-10

## 2020-01-15 NOTE — DIETITIAN INITIAL EVALUATION ADULT. - PERTINENT LABORATORY DATA
01-15 Na142 mmol/L Glu 105 mg/dL K+ 4.4 mmol/L Cr  1.18 mg/dL BUN 32.0 mg/dL<H> Phos 3.4 mg/dL Alb n/a   PAB n/a

## 2020-01-15 NOTE — PROGRESS NOTE ADULT - ASSESSMENT
38 yo M with morbid obesity, chronic respiratory failure with hypoxia 2/2 JAN, OHS on bipap and on 3-4 liters baseline home o2,  DM2 not on long term insulin, mediastinal lymphadenopathy, chronic diastolic HFpEF, RHC at [facility] in 2008 with moderate pulmonary HTN, recently admitted to [facility] and [place] on 12/28/19 for DHF exacerbation and currently presenting w/ recurrent symptoms of worsening sob/ peripheral edema in the setting of dietary indiscretion. Pt placed on IV bumex drip, diuresd well with improving sx. S/p negative NM scan for amyloidosis and s/p RHC today showing severe pulm htn. Pt changed to po torsemide, which was held today given low bp. Overall improving fluid status. Anticipated discharge in 24 hours if pt remains medically stable.     Acute on chronic diastolic hfpef exacerbation with severe pulm htn   -hx of [facility] hospital readmissions for hf, known non adherence to lifestyle and dietary recs  - counselled on compliance with diet and fluid restriction; noted gallon of water that the pt was drinking at bedside   - d/c bumex drip and changed to po torsemide, todays dose to be held given hypotension and possible overdiuresis, will re evaluate tomorrow to restart po diuretic   - c/w Potassium 40 mEQ BID - monitoring electrolytes closely   - C/w Coreg, sildenafil, enalapril  - NM Amyloidosis SPECT scan negative   - Strict i/o and daily standing weight   - fluid restriction    - Keep K > 4, Mg > 2   - Monitor renal function with BID BMP due to ongoing diuresis   - cardio f/u noted and appreciated, d/w Dr. Cox the plan of care     HAYES  - secondary to hypervolemia in the setting of HF flare   - resolved   -Today cr: 1.3 and at baseline   - Baseline cr 0.9-1.4  - avoiding nephrotoxic medications     JAN  -non compliant with BIPAP  -c/w nocturnal bipap  -pt educated on importance of compliance  -Pt states has an appointment for sleep study outpt   - outpt f/u w/ pulm    DM II not on long term insulin   -Stable  -A1c 6.3 in 12/2019, controlled  -c/w HSS  - c/w hypoglyemia protocol     Mediastinal lymphadenopathy  -incidentally noted last admit, advised to fu w/ rheum outpt for sarcoid w/u    Morbid obesity  BMI: 55   -Counseling provided  -Will need to f/u outpt    DVT ppx.   -c/w Lovenox bid    Dispo. Anticipated discharge in 24 hours to home, will need home care on discharge. Pt has bipap and o2 at home.   High risk for re admission given non compliance. 38 yo M with morbid obesity, chronic respiratory failure with hypoxia 2/2 JAN, OHS on bipap and on 3-4 liters baseline home o2,  DM2 not on long term insulin, mediastinal lymphadenopathy, chronic diastolic HFpEF, RHC at [facility] in 2008 with moderate pulmonary HTN, recently admitted to [facility] and [place] on 12/28/19 for DHF exacerbation and currently presenting w/ recurrent symptoms of worsening sob/ peripheral edema in the setting of dietary indiscretion. Pt placed on IV bumex drip, diuresd well with improving sx. S/p negative NM scan for amyloidosis and s/p RHC today showing severe pulm htn. Pt changed to po Bumex. Overall improving fluid status. Anticipated discharge in 24 hours if pt remains medically stable.     Acute on chronic diastolic hfpef exacerbation with severe pulm htn   -hx of [facility] readmissions for hf, known non adherence to lifestyle and dietary recs  - counselled on compliance with diet and fluid restriction; noted gallon of water that the pt was drinking at bedside   - d/c bumex drip and changed to po Bumex, will watch overnight for possible signs of hypotension    - c/w Potassium 40 mEQ BID - monitoring electrolytes closely   - C/w Coreg, sildenafil, enalapril  - NM Amyloidosis SPECT scan negative   - Strict i/o and daily standing weight   - fluid restriction    - Keep K > 4, Mg > 2   - cardio f/u noted and appreciated, d/w Dr. Cox the plan of care     HAYES  - secondary to hypervolemia in the setting of HF flare   - resolved   -Today cr: 1.1 and at baseline   - Baseline cr 0.9-1.4  - avoiding nephrotoxic medications     JAN  -non compliant with BIPAP  -c/w nocturnal bipap  -pt educated on importance of compliance  -Pt states has an appointment for sleep study outpt   - outpt f/u w/ pulm    DM II not on long term insulin   -Stable  -A1c 6.3 in 12/2019, controlled  -c/w HSS  - c/w hypoglyemia protocol     Mediastinal lymphadenopathy  -incidentally noted last admit, advised to fu w/ rheum outpt for sarcoid w/u    Morbid obesity  BMI: 55   -Counseling provided  -Will need to f/u outpt    DVT ppx.   -c/w Lovenox bid    Dispo. Anticipated discharge in 24 hours to home, will need home care on discharge. Pt has bipap and o2 at home.   High risk for re admission given non compliance. 40 yo M with morbid obesity, chronic respiratory failure with hypoxia 2/2 JAN, OHS on bipap and on 3-4 liters baseline home o2,  DM2 not on long term insulin, mediastinal lymphadenopathy, chronic diastolic HFpEF, RHC at [facility] in 2008 with moderate pulmonary HTN, recently admitted to [facility] and [place] on 12/28/19 for DHF exacerbation and currently presenting w/ recurrent symptoms of worsening sob/ peripheral edema in the setting of dietary indiscretion. Pt placed on IV bumex drip, diuresd well with improving sx. S/p negative NM scan for amyloidosis and s/p RHC on 1/14/20 showing severe pulm htn. Pt changed to po Bumex today. Overall improving fluid status. Anticipated discharge in 24 hours if pt remains medically stable.     Acute on chronic diastolic hfpef exacerbation with severe pulm htn   -hx of [facility] hospital readmissions for hf, known non adherence to lifestyle and dietary recs  - counselled on compliance with diet and fluid restriction; noted gallon of water that the pt was drinking at bedside   - d/c bumex drip and changed to po Bumex, will watch overnight for possible signs of hypotension    - c/w Potassium 40 mEQ BID - monitoring electrolytes closely   - C/w Coreg, sildenafil, enalapril  - NM Amyloidosis SPECT scan negative   - Strict i/o and daily standing weight   - fluid restriction    - Keep K > 4, Mg > 2   - cardio f/u noted and appreciated, d/w Dr. Cox the plan of care     HAYES  - secondary to hypervolemia in the setting of HF flare   - resolved   -Today cr: 1.1 and at baseline   - Baseline cr 0.9-1.4  - avoiding nephrotoxic medications     JAN  -non compliant with BIPAP  -c/w nocturnal bipap  -pt educated on importance of compliance  -Pt states has an appointment for sleep study outpt   - outpt f/u w/ pulm    DM II not on long term insulin   -Stable  -A1c 6.3 in 12/2019, controlled  -c/w HSS  - c/w hypoglyemia protocol     Mediastinal lymphadenopathy  -incidentally noted last admit, advised to fu w/ rheum outpt for sarcoid w/u    Morbid obesity  BMI: 55   -Counseling provided  -Will need to f/u outpt    DVT ppx.   -c/w Lovenox bid    Dispo. Anticipated discharge in 24 hours to home, will need home care on discharge. Pt has bipap and o2 at home. Reports broken  for portable o2, d/w CM who will reach out to community supplies.   High risk for re admission given non compliance. 40 yo M with morbid obesity, chronic respiratory failure with hypoxia 2/2 JAN, OHS on bipap and on 3-4 liters baseline home o2,  DM2 not on long term insulin, mediastinal lymphadenopathy, chronic diastolic HFpEF, RHC at [facility] in 2008 with moderate pulmonary HTN, recently admitted to [facility] and [place] on 12/28/19 for DHF exacerbation and currently presenting w/ recurrent symptoms of worsening sob/ peripheral edema in the setting of dietary indiscretion. Pt placed on IV bumex drip, diuresd well with improving sx. S/p negative NM scan for amyloidosis and s/p RHC on 1/14/20 showing severe pulm htn. Pt changed to po Bumex today. Overall improving fluid status. Anticipated discharge in 24 hours if pt remains medically stable.     Acute on chronic diastolic hfpef exacerbation with severe pulm htn   -hx of [facility] hospital readmissions for hf, known non adherence to lifestyle and dietary recs  - counselled on compliance with diet and fluid restriction; noted gallon of water that the pt was drinking at bedside   - low labile bp    - d/c bumex drip and changed to po Bumex, will watch overnight for possible signs of hypotension    - c/w Potassium 40 mEQ BID - monitoring electrolytes closely   - C/w Coreg, sildenafil, enalapril  - NM Amyloidosis SPECT scan negative   - Strict i/o and daily standing weight   - fluid restriction    - Keep K > 4, Mg > 2   - cardio f/u noted and appreciated, d/w Dr. Cox the plan of care     HAYES  - secondary to hypervolemia in the setting of HF flare   - resolved   -Today cr: 1.1 and at baseline   - Baseline cr 0.9-1.4  - avoiding nephrotoxic medications     JAN  -non compliant with BIPAP  -c/w nocturnal bipap  -pt educated on importance of compliance  -Pt states has an appointment for sleep study outpt   - outpt f/u w/ pulm    DM II not on long term insulin   -Stable  -A1c 6.3 in 12/2019, controlled  -c/w HSS  - c/w hypoglyemia protocol     Mediastinal lymphadenopathy  -incidentally noted last admit, advised to fu w/ rheum outpt for sarcoid w/u    Morbid obesity  BMI: 55   -Counseling provided  -Will need to f/u outpt    DVT ppx.   -c/w Lovenox bid    Dispo. Anticipated discharge in 24 hours to home, will need home care on discharge. Pt has bipap and o2 at home. Reports broken  for portable o2, d/w CM who will reach out to community supplies.   High risk for re admission given non compliance.

## 2020-01-15 NOTE — DIETITIAN INITIAL EVALUATION ADULT. - OTHER INFO
Pt with morbid obesity, chronic respiratory failure with hypoxia 2/2 JAN, OHS on bipap and on 3-4 liters baseline home o2,  DM2 not on long term insulin, mediastinal lymphadenopathy, chronic diastolic HFpEF, moderate pulmonary HTN, recently admitted to [facility] on 12/28/19 for CHF exacerbation and currently presenting w/ recurrent symptoms of worsening SOB/peripheral edema in the setting of dietary indiscretion.  Pt reports good po intake at meals.  Pt states good appetite prior to admission and denies wt loss other than fluid changes.  Pt states non compliance to meal plan.  Discussed heart healthy/heart failure nutrition guidelines with pt.  Pt receptive and verbalized understanding.  Nutrition literature provided.

## 2020-01-16 ENCOUNTER — TRANSCRIPTION ENCOUNTER (OUTPATIENT)
Age: 40
End: 2020-01-16

## 2020-01-16 VITALS — DIASTOLIC BLOOD PRESSURE: 57 MMHG | SYSTOLIC BLOOD PRESSURE: 83 MMHG | HEART RATE: 73 BPM

## 2020-01-16 LAB
ANION GAP SERPL CALC-SCNC: 9 MMOL/L — SIGNIFICANT CHANGE UP (ref 5–17)
AUTO DIFF PNL BLD: NEGATIVE — SIGNIFICANT CHANGE UP
BUN SERPL-MCNC: 27 MG/DL — HIGH (ref 8–20)
C-ANCA SER-ACNC: NEGATIVE — SIGNIFICANT CHANGE UP
CALCIUM SERPL-MCNC: 9.6 MG/DL — SIGNIFICANT CHANGE UP (ref 8.6–10.2)
CHLORIDE SERPL-SCNC: 98 MMOL/L — SIGNIFICANT CHANGE UP (ref 98–107)
CO2 SERPL-SCNC: 31 MMOL/L — HIGH (ref 22–29)
CREAT SERPL-MCNC: 1.17 MG/DL — SIGNIFICANT CHANGE UP (ref 0.5–1.3)
GLUCOSE BLDC GLUCOMTR-MCNC: 93 MG/DL — SIGNIFICANT CHANGE UP (ref 70–99)
GLUCOSE BLDC GLUCOMTR-MCNC: 97 MG/DL — SIGNIFICANT CHANGE UP (ref 70–99)
GLUCOSE SERPL-MCNC: 107 MG/DL — SIGNIFICANT CHANGE UP (ref 70–115)
MAGNESIUM SERPL-MCNC: 1.9 MG/DL — SIGNIFICANT CHANGE UP (ref 1.8–2.6)
P-ANCA SER-ACNC: NEGATIVE — SIGNIFICANT CHANGE UP
PHOSPHATE SERPL-MCNC: 3.6 MG/DL — SIGNIFICANT CHANGE UP (ref 2.4–4.7)
POTASSIUM SERPL-MCNC: 4.4 MMOL/L — SIGNIFICANT CHANGE UP (ref 3.5–5.3)
POTASSIUM SERPL-SCNC: 4.4 MMOL/L — SIGNIFICANT CHANGE UP (ref 3.5–5.3)
SODIUM SERPL-SCNC: 138 MMOL/L — SIGNIFICANT CHANGE UP (ref 135–145)

## 2020-01-16 PROCEDURE — C1889: CPT

## 2020-01-16 PROCEDURE — 99232 SBSQ HOSP IP/OBS MODERATE 35: CPT

## 2020-01-16 PROCEDURE — 94660 CPAP INITIATION&MGMT: CPT

## 2020-01-16 PROCEDURE — 83735 ASSAY OF MAGNESIUM: CPT

## 2020-01-16 PROCEDURE — 86431 RHEUMATOID FACTOR QUANT: CPT

## 2020-01-16 PROCEDURE — 78803 RP LOCLZJ TUM SPECT 1 AREA: CPT

## 2020-01-16 PROCEDURE — C1894: CPT

## 2020-01-16 PROCEDURE — 36415 COLL VENOUS BLD VENIPUNCTURE: CPT

## 2020-01-16 PROCEDURE — 86235 NUCLEAR ANTIGEN ANTIBODY: CPT

## 2020-01-16 PROCEDURE — 71045 X-RAY EXAM CHEST 1 VIEW: CPT

## 2020-01-16 PROCEDURE — 84100 ASSAY OF PHOSPHORUS: CPT

## 2020-01-16 PROCEDURE — 86038 ANTINUCLEAR ANTIBODIES: CPT

## 2020-01-16 PROCEDURE — 83880 ASSAY OF NATRIURETIC PEPTIDE: CPT

## 2020-01-16 PROCEDURE — 99239 HOSP IP/OBS DSCHRG MGMT >30: CPT

## 2020-01-16 PROCEDURE — 99285 EMERGENCY DEPT VISIT HI MDM: CPT | Mod: 25

## 2020-01-16 PROCEDURE — 86036 ANCA SCREEN EACH ANTIBODY: CPT

## 2020-01-16 PROCEDURE — 85610 PROTHROMBIN TIME: CPT

## 2020-01-16 PROCEDURE — A9538: CPT

## 2020-01-16 PROCEDURE — 85730 THROMBOPLASTIN TIME PARTIAL: CPT

## 2020-01-16 PROCEDURE — 86703 HIV-1/HIV-2 1 RESULT ANTBDY: CPT

## 2020-01-16 PROCEDURE — 80048 BASIC METABOLIC PNL TOTAL CA: CPT

## 2020-01-16 PROCEDURE — 93451 RIGHT HEART CATH: CPT

## 2020-01-16 PROCEDURE — 99152 MOD SED SAME PHYS/QHP 5/>YRS: CPT

## 2020-01-16 PROCEDURE — 96374 THER/PROPH/DIAG INJ IV PUSH: CPT

## 2020-01-16 PROCEDURE — 85027 COMPLETE CBC AUTOMATED: CPT

## 2020-01-16 PROCEDURE — 84484 ASSAY OF TROPONIN QUANT: CPT

## 2020-01-16 PROCEDURE — 82962 GLUCOSE BLOOD TEST: CPT

## 2020-01-16 PROCEDURE — 86255 FLUORESCENT ANTIBODY SCREEN: CPT

## 2020-01-16 PROCEDURE — 80053 COMPREHEN METABOLIC PANEL: CPT

## 2020-01-16 PROCEDURE — C1769: CPT

## 2020-01-16 PROCEDURE — 93005 ELECTROCARDIOGRAM TRACING: CPT

## 2020-01-16 PROCEDURE — 82550 ASSAY OF CK (CPK): CPT

## 2020-01-16 PROCEDURE — 94640 AIRWAY INHALATION TREATMENT: CPT

## 2020-01-16 RX ORDER — ATORVASTATIN CALCIUM 80 MG/1
1 TABLET, FILM COATED ORAL
Qty: 0 | Refills: 0 | DISCHARGE
Start: 2020-01-16

## 2020-01-16 RX ORDER — BUMETANIDE 0.25 MG/ML
1 INJECTION INTRAMUSCULAR; INTRAVENOUS
Qty: 60 | Refills: 0
Start: 2020-01-16 | End: 2020-02-14

## 2020-01-16 RX ORDER — CARVEDILOL PHOSPHATE 80 MG/1
1 CAPSULE, EXTENDED RELEASE ORAL
Qty: 0 | Refills: 0 | DISCHARGE
Start: 2020-01-16

## 2020-01-16 RX ORDER — POTASSIUM CHLORIDE 20 MEQ
2 PACKET (EA) ORAL
Qty: 60 | Refills: 0
Start: 2020-01-16 | End: 2020-02-14

## 2020-01-16 RX ORDER — ASPIRIN/CALCIUM CARB/MAGNESIUM 324 MG
1 TABLET ORAL
Qty: 30 | Refills: 0
Start: 2020-01-16 | End: 2020-02-14

## 2020-01-16 RX ADMIN — Medication 10 MILLIGRAM(S): at 06:34

## 2020-01-16 RX ADMIN — Medication 3 MILLILITER(S): at 09:30

## 2020-01-16 RX ADMIN — Medication 40 MILLIEQUIVALENT(S): at 09:07

## 2020-01-16 RX ADMIN — Medication 5 MILLIGRAM(S): at 06:35

## 2020-01-16 RX ADMIN — Medication 3 MILLILITER(S): at 02:52

## 2020-01-16 RX ADMIN — BUMETANIDE 1 MILLIGRAM(S): 0.25 INJECTION INTRAMUSCULAR; INTRAVENOUS at 06:34

## 2020-01-16 RX ADMIN — MAGNESIUM OXIDE 400 MG ORAL TABLET 400 MILLIGRAM(S): 241.3 TABLET ORAL at 09:07

## 2020-01-16 RX ADMIN — Medication 81 MILLIGRAM(S): at 13:00

## 2020-01-16 RX ADMIN — CARVEDILOL PHOSPHATE 3.12 MILLIGRAM(S): 80 CAPSULE, EXTENDED RELEASE ORAL at 06:34

## 2020-01-16 RX ADMIN — MAGNESIUM OXIDE 400 MG ORAL TABLET 400 MILLIGRAM(S): 241.3 TABLET ORAL at 13:00

## 2020-01-16 NOTE — PROGRESS NOTE ADULT - SUBJECTIVE AND OBJECTIVE BOX
[facility]                                                       [facility]                                                               Office: [address] [address]                                                              Telephone: [phone]. Fax:[phone]                                                                             PROGRESS NOTE  Reason for follow up: RV failure, severe pulmonary HTN  Overnight: No new events.   Update: Patient back to baseline, tolerating bumex well. -715 cc yesterday.     Subjective: "I'm feeling better, when can I go home."      Vital Signs Last 24 Hrs  T(C): 36.6 (16 Jan 2020 09:42), Max: 36.9 (16 Jan 2020 06:25)  T(F): 97.8 (16 Jan 2020 09:42), Max: 98.4 (16 Jan 2020 06:25)  HR: 73 (16 Jan 2020 09:42) (66 - 87)  BP: 92/60 (16 Jan 2020 09:42) (92/50 - 128/86)  RR: 18 (16 Jan 2020 09:42) (18 - 18)  SpO2: 92% (16 Jan 2020 09:42) (91% - 100%)      PHYSICAL EXAM:  Constitutional: Comfortable . No acute distress.   HEENT: Atraumatic and normocephalic , neck is supple . + JVD. No carotid bruit. PEERL   CNS: A&Ox3. No focal deficits. EOMI. Cranial nerves II-IX are intact.   Lymph Nodes: Cervical : Not palpable.  Respiratory: Decreased at b/l bases  Cardiovascular: S1S2 RRR. No rubs or gallop. II/VI systolic murmur lsb  Gastrointestinal: obese, Soft non-tender and non distended . +Bowel sounds. negative Collins's sign.  Extremities: 1+ LE edema.   Psychiatric: Calm . no agitation.  Skin: No skin rash/ulcers visualized to face, hands or feet.    MEDICATIONS  (STANDING):  albuterol/ipratropium for Nebulization 3 milliLiter(s) Nebulizer every 6 hours  aspirin  chewable 81 milliGRAM(s) Oral daily  atorvastatin 40 milliGRAM(s) Oral at bedtime  buMETAnide 1 milliGRAM(s) Oral two times a day  carvedilol 3.125 milliGRAM(s) Oral every 12 hours  dextrose 5%. 1000 milliLiter(s) (50 mL/Hr) IV Continuous <Continuous>  dextrose 50% Injectable 12.5 Gram(s) IV Push once  dextrose 50% Injectable 25 Gram(s) IV Push once  dextrose 50% Injectable 25 Gram(s) IV Push once  enalapril 5 milliGRAM(s) Oral daily  enoxaparin Injectable 40 milliGRAM(s) SubCutaneous two times a day  influenza   Vaccine 0.5 milliLiter(s) IntraMuscular once  insulin lispro (HumaLOG) corrective regimen sliding scale   SubCutaneous Before meals and at bedtime  magnesium oxide 400 milliGRAM(s) Oral three times a day with meals  potassium chloride    Tablet ER 40 milliEquivalent(s) Oral every 12 hours  sildenafil (REVATIO) 10 milliGRAM(s) Oral three times a day    MEDICATIONS  (PRN):  dextrose 40% Gel 15 Gram(s) Oral once PRN Blood Glucose LESS THAN 70 milliGRAM(s)/deciliter  glucagon  Injectable 1 milliGRAM(s) IntraMuscular once PRN Glucose LESS THAN 70 milligrams/deciliter  sodium chloride 0.65% Nasal 1 Spray(s) Both Nostrils three times a day PRN Nasal Congestion    DIAGNOSTIC TESTING:  [ ] Echocardiogram:   < from: TTE Echo Complete w/Doppler (12.21.19 @ 08:48) >  PHYSICIAN INTERPRETATION:  Left Ventricle: The left ventricular internal cavity size is normal.  Left ventricular ejection fraction, by visual estimation, is 65 to 70%. The interventricular septum is flattened in systole and diastole, consistent with right ventricular pressure and volume overload.  Right Ventricle: The right ventricular size is severely enlarged. RV systolic function is severely reduced. TV S' 0.2 m/s.  Left Atrium: The left atrium is normal in size.  Right Atrium: Severely enlarged right atrium.  Pericardium: There is no evidence of pericardial effusion.  Mitral Valve: The mitral valve is normal in structure. Mild mitral valve regurgitation is seen.  Tricuspid Valve: Moderate-severe tricuspid regurgitation is visualized. Estimated pulmonary artery systolic pressure is 89.6 mmHg assuming a right atrial pressure of 15 mmHg, which is consistent with severe pulmonary hypertension.  Aortic Valve: The aortic valve is normal. No evidence of aortic valve regurgitation is seen.  Pulmonic Valve: The pulmonic valve was not well visualized. Mild pulmonic valve regurgitation.  Aorta: The aortic root is normal in size and structure.  Pulmonary Artery: The pulmonary artery is moderately dilated.  Venous: The inferior vena cava was dilated, with respiratory size variation less than 50%. The inferior vena cava and the hepatic vein show a pattern of systolic flow reversal, suggestive of tricuspid regurgitation.       Summary:   1. Technically fair study.   2. Left ventricular ejection fraction, by visual estimation, is 65 to 70%.   3. There is moderate septal left ventricular hypertrophy.   4. Severely enlarged right ventricle.   5. Severely reduced RV systolic function.   6. Right ventricular volume and pressure overload.   7. Moderate-severe tricuspid regurgitation.   8. Estimated pulmonary artery systolic pressure is 89.6 mmHg assuming a right atrial pressure of 15 mmHg, which is consistent with severe pulmonary hypertension.   9. Moderately dilated pulmonary artery.    MD Josefina Electronically signed on 12/24/2019 at 2:54:21 PM     OTHER: 	  < from: Xray Chest 1 View- PORTABLE-Urgent (01.10.20 @ 20:26) >   EXAM:  XR CHEST PORTABLE URGENT 1V                          PROCEDURE DATE:  01/10/2020          INTERPRETATION:  Portable chest radiograph        CLINICAL INFORMATION:   Short of breath.    TECHNIQUE:  Portable  AP view of the chest was obtained.    COMPARISON: 12/20/2019 available for review.    FINDINGS:   The lungs  are clear.  No pleural abnormality is seen.    The  heart is enlarged in transverse diameter. No hilar mass. Trachea midline.   Visualized osseous structures are intact.        IMPRESSION: Cardiomegaly No evidence of active chest disease.    < end of copied text >      LABS:	 	    01-15    142  |  97<L>  |  32.0<H>  ----------------------------<  105  4.4   |  34.0<H>  |  1.18    Ca    9.5      15 [place] 2020 06:08  Phos  3.4     01-15  Mg     1.7     01-15      Mg     1.6     01-14      proBNP: Serum Pro-Brain Natriuretic Peptide: 2022 pg/mL (01-10 @ 22:33)    TELEMETRY: Reviewed  SR

## 2020-01-16 NOTE — PROGRESS NOTE ADULT - ATTENDING COMMENTS
pt seen and examined  doing better. no lightheadedness. Ambulating  Pt is stable for dc  advised to see pmd and pulmonologist next week.  I reviewed the above and agree with a/p.
pt seen and examined.  We will cont with diuretics for one more day.   Plan for RHC.   PYP results pending  I reviewed the above and agree with a/p.
pt seen and examined.  feels better, diuresed close to 15 L  BP low, diuretics held back. no cp.  pt with severe pulmonary htn. need addition of further hf meds  I left message for pt's pulmonologist in [place], Dr Franklin.  Plan for dc possibly in am
pt see ad examined.  He is doing better, Diuresing.   Decrease revatio to 10 mg tid  plan for RHC on monday.
pt seen and examined.   Doing well with iv diuretics. Changed to po today  plan for RHC today.   I agree with a/p

## 2020-01-16 NOTE — DISCHARGE NOTE NURSING/CASE MANAGEMENT/SOCIAL WORK - PATIENT PORTAL LINK FT
You can access the FollowMyHealth Patient Portal offered by [facility] by registering at the following website: [email or web address]. By joining [employer]’s FollowMyHealth portal, you will also be able to view your health information using other applications (apps) compatible with our system.

## 2020-01-16 NOTE — PROGRESS NOTE ADULT - ASSESSMENT
A/P:  38 y/o morbidly obese M with a PMHx of Right sided heart failure (12/19 EF 65-70% with severely reduced RV fxn), severe pulmonary HTN (PA systolic 89.6) on home oxygen (4L NC), JAN, DMII on insuline who presented to the ED complaining of worsening leg edema, and shortness of breath. Patient was recently admitted to [facility] back in December for similar presentation, and states that he has been taking all of his medications as prescribed, but has been drinking a lot more fluids then he "should." Patient states he also felt like torsemide worked better for him in the past, and that the lasix isn't as strong. Patient didn't follow up with Dr. Horton after discharge as recommended. Patient states that his legs have been getting more and more swollen, and he could no longer fit in his shoes. Patient also with dyspnea now at rest, and had to increase his oxygen to 5L at home, so he came to the ED to be evaluated. Patient denies fevers, chills, CP, palpitations, syncope, near syncope, abdominal pain, N/V/D, headache, or dizziness.   Troponin neg x 1  BNP 2002    1. Acute decompensated Right Heart Failure  - Exacerbation likely secondary to poor diet adherence  - Stable for D/C home today  - Continue Bumex 1mg PO BID, with potassium 40 mEQ daily  - Continue coreg, sildenafil, and enalapril   - PYP negative for amyloid  - RF factor negative, scl-17 negative, centromere negative   - MARTITA positive  - C-ANCA/P-ANCA negative  - Have patient f/u with outpatient Pulm, Cardiology, and Rheumatology     2. HTN  - BP well controlled off Hydralazine    3. JAN  - Continue BiPAP at night    Preliminary evaluation, please await official recommendations by Dr. Cox

## 2020-01-16 NOTE — DISCHARGE NOTE NURSING/CASE MANAGEMENT/SOCIAL WORK - NSDCFUADDAPPT_GEN_ALL_CORE_FT
Please follow up with your primary care physician in 3-5 days. Please follow up with your pulmonologist in 1-2 weeks and cardiologist 1-2 weeks.

## 2020-02-06 NOTE — PHYSICAL THERAPY INITIAL EVALUATION ADULT - LONG TERM MEMORY, REHAB EVAL
Per RN c/o severe non stop headache despite receiving Tylenol 650mg PO earlier.  Seen and evaluated patient at the bedside, she reports history of headaches in the past but never lasting this long.  She reports throwing frontal headache for almost two hours.  Neuro assessment unremarkable. She is currently on Heparin gtt for RUE DVT and scheduled for Cath in the AM.    Vital Signs Last 24 Hrs  T(C): 37 (05 Feb 2020 20:04), Max: 37 (05 Feb 2020 05:45)  T(F): 98.6 (05 Feb 2020 20:04), Max: 98.6 (05 Feb 2020 05:45)  HR: 80 (05 Feb 2020 20:04) (73 - 80)  BP: 145/52 (05 Feb 2020 23:36) (145/52 - 185/70)  BP(mean): --  RR: 18 (05 Feb 2020 20:04) (18 - 18)  SpO2: 98% (05 Feb 2020 20:04) (97% - 99%)    - Hold Heparin gtt pending CT Scan results  - CT Head Urgent - attempted to expedite scan, spoke to charge nurse from ED and unable to accommodate patient at this time, advised to call back in 2 hours.   - Tylenol 650mg IVPB x 1   - Neuro checks q4h   - Will continue to monitor intact

## 2020-02-09 ENCOUNTER — INPATIENT (INPATIENT)
Facility: HOSPITAL | Age: 40
LOS: 2 days | Discharge: HOME | DRG: 291 | End: 2020-02-12
Attending: INTERNAL MEDICINE | Admitting: HOSPITALIST
Payer: MEDICARE

## 2020-02-09 VITALS
OXYGEN SATURATION: 78 % | RESPIRATION RATE: 26 BRPM | SYSTOLIC BLOOD PRESSURE: 116 MMHG | DIASTOLIC BLOOD PRESSURE: 78 MMHG | TEMPERATURE: 99 F | WEIGHT: 315 LBS | HEIGHT: 69 IN | HEART RATE: 104 BPM

## 2020-02-09 LAB
ALBUMIN SERPL ELPH-MCNC: 3.5 G/DL — SIGNIFICANT CHANGE UP (ref 3.3–5.2)
ALP SERPL-CCNC: 113 U/L — SIGNIFICANT CHANGE UP (ref 40–120)
ALT FLD-CCNC: 25 U/L — SIGNIFICANT CHANGE UP
ANION GAP SERPL CALC-SCNC: 13 MMOL/L — SIGNIFICANT CHANGE UP (ref 5–17)
ANISOCYTOSIS BLD QL: SLIGHT — SIGNIFICANT CHANGE UP
APTT BLD: 33 SEC — SIGNIFICANT CHANGE UP (ref 27.5–36.3)
AST SERPL-CCNC: 28 U/L — SIGNIFICANT CHANGE UP
BASE EXCESS BLDV CALC-SCNC: 9.2 MMOL/L — HIGH (ref -2–2)
BASOPHILS # BLD AUTO: 0.11 K/UL — SIGNIFICANT CHANGE UP (ref 0–0.2)
BASOPHILS NFR BLD AUTO: 2.7 % — HIGH (ref 0–2)
BILIRUB SERPL-MCNC: 1.7 MG/DL — SIGNIFICANT CHANGE UP (ref 0.4–2)
BUN SERPL-MCNC: 19 MG/DL — SIGNIFICANT CHANGE UP (ref 8–20)
CA-I SERPL-SCNC: 1.13 MMOL/L — LOW (ref 1.15–1.33)
CALCIUM SERPL-MCNC: 9.6 MG/DL — SIGNIFICANT CHANGE UP (ref 8.6–10.2)
CHLORIDE BLDV-SCNC: 98 MMOL/L — SIGNIFICANT CHANGE UP (ref 98–107)
CHLORIDE SERPL-SCNC: 93 MMOL/L — LOW (ref 98–107)
CO2 SERPL-SCNC: 29 MMOL/L — SIGNIFICANT CHANGE UP (ref 22–29)
CREAT SERPL-MCNC: 1.2 MG/DL — SIGNIFICANT CHANGE UP (ref 0.5–1.3)
EOSINOPHIL # BLD AUTO: 0.15 K/UL — SIGNIFICANT CHANGE UP (ref 0–0.5)
EOSINOPHIL NFR BLD AUTO: 3.5 % — SIGNIFICANT CHANGE UP (ref 0–6)
GAS PNL BLDA: SIGNIFICANT CHANGE UP
GAS PNL BLDV: 141 MMOL/L — SIGNIFICANT CHANGE UP (ref 135–145)
GAS PNL BLDV: SIGNIFICANT CHANGE UP
GAS PNL BLDV: SIGNIFICANT CHANGE UP
GIANT PLATELETS BLD QL SMEAR: PRESENT — SIGNIFICANT CHANGE UP
GLUCOSE BLDV-MCNC: 96 MG/DL — SIGNIFICANT CHANGE UP (ref 70–99)
GLUCOSE SERPL-MCNC: 95 MG/DL — SIGNIFICANT CHANGE UP (ref 70–99)
HCO3 BLDV-SCNC: 31 MMOL/L — HIGH (ref 20–26)
HCT VFR BLD CALC: 44.2 % — SIGNIFICANT CHANGE UP (ref 39–50)
HCT VFR BLDA CALC: 45 — SIGNIFICANT CHANGE UP (ref 39–50)
HGB BLD CALC-MCNC: 14.6 G/DL — SIGNIFICANT CHANGE UP (ref 13–17)
HGB BLD-MCNC: 13.6 G/DL — SIGNIFICANT CHANGE UP (ref 13–17)
INR BLD: 1.9 RATIO — HIGH (ref 0.88–1.16)
LACTATE BLDV-MCNC: 1.5 MMOL/L — SIGNIFICANT CHANGE UP (ref 0.5–2)
LYMPHOCYTES # BLD AUTO: 0.78 K/UL — LOW (ref 1–3.3)
LYMPHOCYTES # BLD AUTO: 18.6 % — SIGNIFICANT CHANGE UP (ref 13–44)
MACROCYTES BLD QL: SLIGHT — SIGNIFICANT CHANGE UP
MANUAL SMEAR VERIFICATION: SIGNIFICANT CHANGE UP
MCHC RBC-ENTMCNC: 29.2 PG — SIGNIFICANT CHANGE UP (ref 27–34)
MCHC RBC-ENTMCNC: 30.8 GM/DL — LOW (ref 32–36)
MCV RBC AUTO: 94.8 FL — SIGNIFICANT CHANGE UP (ref 80–100)
MICROCYTES BLD QL: SLIGHT — SIGNIFICANT CHANGE UP
MONOCYTES # BLD AUTO: 0.55 K/UL — SIGNIFICANT CHANGE UP (ref 0–0.9)
MONOCYTES NFR BLD AUTO: 13.3 % — SIGNIFICANT CHANGE UP (ref 2–14)
NEUTROPHILS # BLD AUTO: 2.58 K/UL — SIGNIFICANT CHANGE UP (ref 1.8–7.4)
NEUTROPHILS NFR BLD AUTO: 61.9 % — SIGNIFICANT CHANGE UP (ref 43–77)
NT-PROBNP SERPL-SCNC: 1603 PG/ML — HIGH (ref 0–300)
OTHER CELLS CSF MANUAL: 5 ML/DL — LOW (ref 18–22)
PCO2 BLDV: 59 MMHG — HIGH (ref 35–50)
PH BLDV: 7.4 — SIGNIFICANT CHANGE UP (ref 7.32–7.43)
PLAT MORPH BLD: NORMAL — SIGNIFICANT CHANGE UP
PLATELET # BLD AUTO: 222 K/UL — SIGNIFICANT CHANGE UP (ref 150–400)
PO2 BLDV: 19 MMHG — LOW (ref 25–45)
POLYCHROMASIA BLD QL SMEAR: SLIGHT — SIGNIFICANT CHANGE UP
POTASSIUM BLDV-SCNC: 4.3 MMOL/L — SIGNIFICANT CHANGE UP (ref 3.4–4.5)
POTASSIUM SERPL-MCNC: 4.2 MMOL/L — SIGNIFICANT CHANGE UP (ref 3.5–5.3)
POTASSIUM SERPL-SCNC: 4.2 MMOL/L — SIGNIFICANT CHANGE UP (ref 3.5–5.3)
PROT SERPL-MCNC: 9.1 G/DL — HIGH (ref 6.6–8.7)
PROTHROM AB SERPL-ACNC: 21.9 SEC — HIGH (ref 10–12.9)
RAPID RVP RESULT: DETECTED
RBC # BLD: 4.66 M/UL — SIGNIFICANT CHANGE UP (ref 4.2–5.8)
RBC # FLD: 15.6 % — HIGH (ref 10.3–14.5)
RBC BLD AUTO: SIGNIFICANT CHANGE UP
RV+EV RNA SPEC QL NAA+PROBE: DETECTED
SAO2 % BLDV: 24 % — SIGNIFICANT CHANGE UP
SODIUM SERPL-SCNC: 135 MMOL/L — SIGNIFICANT CHANGE UP (ref 135–145)
TROPONIN T SERPL-MCNC: <0.01 NG/ML — SIGNIFICANT CHANGE UP (ref 0–0.06)
WBC # BLD: 4.17 K/UL — SIGNIFICANT CHANGE UP (ref 3.8–10.5)
WBC # FLD AUTO: 4.17 K/UL — SIGNIFICANT CHANGE UP (ref 3.8–10.5)

## 2020-02-09 PROCEDURE — 93010 ELECTROCARDIOGRAM REPORT: CPT

## 2020-02-09 PROCEDURE — 71275 CT ANGIOGRAPHY CHEST: CPT | Mod: 26

## 2020-02-09 PROCEDURE — 99285 EMERGENCY DEPT VISIT HI MDM: CPT

## 2020-02-09 PROCEDURE — 71045 X-RAY EXAM CHEST 1 VIEW: CPT | Mod: 26

## 2020-02-09 RX ORDER — FUROSEMIDE 40 MG
80 TABLET ORAL ONCE
Refills: 0 | Status: COMPLETED | OUTPATIENT
Start: 2020-02-09 | End: 2020-02-09

## 2020-02-09 RX ORDER — ASPIRIN/CALCIUM CARB/MAGNESIUM 324 MG
81 TABLET ORAL DAILY
Refills: 0 | Status: DISCONTINUED | OUTPATIENT
Start: 2020-02-09 | End: 2020-02-12

## 2020-02-09 RX ADMIN — Medication 81 MILLIGRAM(S): at 21:00

## 2020-02-09 RX ADMIN — Medication 80 MILLIGRAM(S): at 19:00

## 2020-02-09 NOTE — ED ADULT NURSE REASSESSMENT NOTE - NS ED NURSE REASSESS COMMENT FT1
respirations even and unlabored. on oxy mask. pt has productive cough. deminished breath sounds Assumed pt. care at 1930 from off going RN. Pt. A&Ox3. Pt. on cardiac monitor and continuos pulse ox . Pt. in  NSR in lead II. Pt. respirations even and unlabored. Pt. on oxy mask and tolerating it well. Pt. states he feels better on the mask and his breathing has gotten better since being here. Pt. pt has productive cough. Pt. has diminished breath sounds. Pt. educated about plan that he will be admitted.

## 2020-02-09 NOTE — ED ADULT NURSE REASSESSMENT NOTE - EENT WDL
Eyes with no visual disturbances.  Ears clean and dry and no hearing difficulties. Nose with pink mucosa and no drainage.  Mouth mucous membranes moist and pink.  No tenderness or swelling to throat or neck. 3 way 22 Fr youssef

## 2020-02-09 NOTE — ED ADULT NURSE REASSESSMENT NOTE - NS ED NURSE REASSESS COMMENT FT1
Pt. mental status unchanged. Pt. respirations even and unlabored. Pt. tolerating oxy mask well. Pt. states he does not feel SOB with it on. Pt. resting comfortably in stretcher. Tele hospitalist to see.

## 2020-02-09 NOTE — ED ADULT NURSE REASSESSMENT NOTE - NS ED NURSE REASSESS COMMENT FT1
Bipap on standby as per MD and placed on oxy-mask 12L. Tolerating mask and SpO2 98% without any respiratory distress or complaints offered at current moment in time. Safety maintained at all times. RN to continue to monitor and reassess closely.

## 2020-02-09 NOTE — ED PROVIDER NOTE - OBJECTIVE STATEMENT
40yo male PMH congestive heart failure on 6L home O2, HTN, pHTN presenting with shortness of breath. Patient states that he was recently admitted to [facility] for flu, discharged home 4 days ago. Patient states that since discharge he hasn't been able to breath. No fevers/chills. +cough.

## 2020-02-09 NOTE — ED ADULT NURSE NOTE - OBJECTIVE STATEMENT
Pt received sitting upright in stretcher with labored respirations, improved with N/C 6L/min.  Denies fevers or chills. Denies CP. Reports intermittent productive cough. States recently DX with flu and hospitalized at [facility].

## 2020-02-09 NOTE — ED PROVIDER NOTE - PHYSICAL EXAMINATION
Gen: NAD, AOx3  Head: NCAT  Lung: lungs CTA, moderate respiratory distress, tachypneic to 28  CV: normal s1/s2, rrr, no murmurs, Normal perfusion  Abd: soft, NTND  MSK: No edema, no visible deformities, full range of motion in all 4 extremities  Neuro: No focal neurologic deficits  Skin: No rash   Psych: normal affect

## 2020-02-09 NOTE — ED PROVIDER NOTE - CARE PLAN
Principal Discharge DX:	Respiratory failure with hypoxia Principal Discharge DX:	CHF (congestive heart failure)

## 2020-02-09 NOTE — ED PROVIDER NOTE - PROGRESS NOTE DETAILS
Patient improved on venti mask, comfortable. Pending CTA chest. Signed out to Dr. Adamson. All future decisions will be made at his discretion. Radha Sanchez DO

## 2020-02-09 NOTE — ED PROVIDER NOTE - CLINICAL SUMMARY MEDICAL DECISION MAKING FREE TEXT BOX
40yo male with PMH congestive heart failure, JAN, pHTN presenting with respiratory distress and hypoxia, increased O2 requirements, improved on 8L NC, will obtain labs, ekg, cxr, reassess. Radha Sanchez DO

## 2020-02-10 DIAGNOSIS — I50.9 HEART FAILURE, UNSPECIFIED: ICD-10-CM

## 2020-02-10 DIAGNOSIS — I27.20 PULMONARY HYPERTENSION, UNSPECIFIED: ICD-10-CM

## 2020-02-10 DIAGNOSIS — I50.813 ACUTE ON CHRONIC RIGHT HEART FAILURE: ICD-10-CM

## 2020-02-10 DIAGNOSIS — I10 ESSENTIAL (PRIMARY) HYPERTENSION: ICD-10-CM

## 2020-02-10 DIAGNOSIS — J06.9 ACUTE UPPER RESPIRATORY INFECTION, UNSPECIFIED: ICD-10-CM

## 2020-02-10 LAB
GLUCOSE BLDC GLUCOMTR-MCNC: 106 MG/DL — HIGH (ref 70–99)
GLUCOSE BLDC GLUCOMTR-MCNC: 88 MG/DL — SIGNIFICANT CHANGE UP (ref 70–99)
GLUCOSE BLDC GLUCOMTR-MCNC: 94 MG/DL — SIGNIFICANT CHANGE UP (ref 70–99)
GLUCOSE BLDC GLUCOMTR-MCNC: 94 MG/DL — SIGNIFICANT CHANGE UP (ref 70–99)
NT-PROBNP SERPL-SCNC: 1486 PG/ML — HIGH (ref 0–300)

## 2020-02-10 PROCEDURE — 99223 1ST HOSP IP/OBS HIGH 75: CPT

## 2020-02-10 PROCEDURE — 12345: CPT | Mod: NC,GC

## 2020-02-10 RX ORDER — BUMETANIDE 0.25 MG/ML
1 INJECTION INTRAMUSCULAR; INTRAVENOUS
Refills: 0 | Status: DISCONTINUED | OUTPATIENT
Start: 2020-02-10 | End: 2020-02-10

## 2020-02-10 RX ORDER — ACETYLCYSTEINE 200 MG/ML
3 VIAL (ML) MISCELLANEOUS THREE TIMES A DAY
Refills: 0 | Status: COMPLETED | OUTPATIENT
Start: 2020-02-10 | End: 2020-02-11

## 2020-02-10 RX ORDER — ALBUTEROL 90 UG/1
2.5 AEROSOL, METERED ORAL ONCE
Refills: 0 | Status: COMPLETED | OUTPATIENT
Start: 2020-02-10 | End: 2020-02-10

## 2020-02-10 RX ORDER — GLUCAGON INJECTION, SOLUTION 0.5 MG/.1ML
1 INJECTION, SOLUTION SUBCUTANEOUS ONCE
Refills: 0 | Status: DISCONTINUED | OUTPATIENT
Start: 2020-02-10 | End: 2020-02-12

## 2020-02-10 RX ORDER — DEXTROSE 50 % IN WATER 50 %
25 SYRINGE (ML) INTRAVENOUS ONCE
Refills: 0 | Status: DISCONTINUED | OUTPATIENT
Start: 2020-02-10 | End: 2020-02-12

## 2020-02-10 RX ORDER — SODIUM CHLORIDE 9 MG/ML
1000 INJECTION, SOLUTION INTRAVENOUS
Refills: 0 | Status: DISCONTINUED | OUTPATIENT
Start: 2020-02-10 | End: 2020-02-12

## 2020-02-10 RX ORDER — ALBUTEROL 90 UG/1
2.5 AEROSOL, METERED ORAL ONCE
Refills: 0 | Status: COMPLETED | OUTPATIENT
Start: 2020-02-10 | End: 2020-02-11

## 2020-02-10 RX ORDER — DEXTROSE 50 % IN WATER 50 %
15 SYRINGE (ML) INTRAVENOUS ONCE
Refills: 0 | Status: DISCONTINUED | OUTPATIENT
Start: 2020-02-10 | End: 2020-02-12

## 2020-02-10 RX ORDER — DEXTROSE 50 % IN WATER 50 %
12.5 SYRINGE (ML) INTRAVENOUS ONCE
Refills: 0 | Status: DISCONTINUED | OUTPATIENT
Start: 2020-02-10 | End: 2020-02-12

## 2020-02-10 RX ORDER — BUMETANIDE 0.25 MG/ML
1 INJECTION INTRAMUSCULAR; INTRAVENOUS
Refills: 0 | Status: DISCONTINUED | OUTPATIENT
Start: 2020-02-10 | End: 2020-02-12

## 2020-02-10 RX ORDER — POTASSIUM CHLORIDE 20 MEQ
20 PACKET (EA) ORAL DAILY
Refills: 0 | Status: DISCONTINUED | OUTPATIENT
Start: 2020-02-10 | End: 2020-02-12

## 2020-02-10 RX ORDER — ENOXAPARIN SODIUM 100 MG/ML
40 INJECTION SUBCUTANEOUS
Refills: 0 | Status: DISCONTINUED | OUTPATIENT
Start: 2020-02-10 | End: 2020-02-12

## 2020-02-10 RX ORDER — INSULIN LISPRO 100/ML
VIAL (ML) SUBCUTANEOUS
Refills: 0 | Status: DISCONTINUED | OUTPATIENT
Start: 2020-02-10 | End: 2020-02-12

## 2020-02-10 RX ORDER — CARVEDILOL PHOSPHATE 80 MG/1
3.12 CAPSULE, EXTENDED RELEASE ORAL EVERY 12 HOURS
Refills: 0 | Status: DISCONTINUED | OUTPATIENT
Start: 2020-02-10 | End: 2020-02-12

## 2020-02-10 RX ORDER — ATORVASTATIN CALCIUM 80 MG/1
40 TABLET, FILM COATED ORAL AT BEDTIME
Refills: 0 | Status: DISCONTINUED | OUTPATIENT
Start: 2020-02-10 | End: 2020-02-12

## 2020-02-10 RX ADMIN — CARVEDILOL PHOSPHATE 3.12 MILLIGRAM(S): 80 CAPSULE, EXTENDED RELEASE ORAL at 17:14

## 2020-02-10 RX ADMIN — ATORVASTATIN CALCIUM 40 MILLIGRAM(S): 80 TABLET, FILM COATED ORAL at 21:25

## 2020-02-10 RX ADMIN — Medication 100 MILLIGRAM(S): at 13:40

## 2020-02-10 RX ADMIN — Medication 3 MILLILITER(S): at 21:51

## 2020-02-10 RX ADMIN — BUMETANIDE 1 MILLIGRAM(S): 0.25 INJECTION INTRAMUSCULAR; INTRAVENOUS at 06:00

## 2020-02-10 RX ADMIN — ALBUTEROL 2.5 MILLIGRAM(S): 90 AEROSOL, METERED ORAL at 21:48

## 2020-02-10 RX ADMIN — Medication 100 MILLIGRAM(S): at 21:26

## 2020-02-10 RX ADMIN — Medication 10 MILLIGRAM(S): at 13:40

## 2020-02-10 RX ADMIN — BUMETANIDE 1 MILLIGRAM(S): 0.25 INJECTION INTRAMUSCULAR; INTRAVENOUS at 17:14

## 2020-02-10 RX ADMIN — Medication 5 MILLIGRAM(S): at 06:00

## 2020-02-10 RX ADMIN — CARVEDILOL PHOSPHATE 3.12 MILLIGRAM(S): 80 CAPSULE, EXTENDED RELEASE ORAL at 06:00

## 2020-02-10 RX ADMIN — Medication 10 MILLIGRAM(S): at 05:59

## 2020-02-10 RX ADMIN — ALBUTEROL 2.5 MILLIGRAM(S): 90 AEROSOL, METERED ORAL at 15:34

## 2020-02-10 RX ADMIN — Medication 81 MILLIGRAM(S): at 08:55

## 2020-02-10 RX ADMIN — Medication 3 MILLILITER(S): at 15:30

## 2020-02-10 RX ADMIN — Medication 20 MILLIEQUIVALENT(S): at 13:39

## 2020-02-10 RX ADMIN — Medication 10 MILLIGRAM(S): at 21:25

## 2020-02-10 NOTE — H&P ADULT - ASSESSMENT
38 y/o male with PMH of CHF, pHTN on 6L home O2, HTN, JAN, OHS, obesity, DM-2 came to the ED complaining of shortness of breath. Patient reported that he was recently discharged from [facility] 4 days ago after he was treated for flu. He noted that he has been having difficulty breathing since then. Symptom is mainly with minimal exertion and as a result he has been restricted with his ADLs. He reported associated cough productive of white sputum other wise no fever, chills, chest pain, palpitation, nausea, vomiting abdominal pain, change in bowel/urinary habit, HA, no recent travel, calf pain.     Acute respiratory failure with hypoxia and hypercapnia   Admit to telemetry   Multifactorial HF, JAN, OHS, pHTN   BiPAP placed; continue HS/PRN   O2 via NC as needed     Acute on chronic right sided failure   CTA chest: pulmonary vascular congestion and pulmonary edema   Lasix 80mg given in the ED   Bumex 1mg bid; monitor renal function   Cardiology following     RSV   Supportive management     HTN   Enalapril 5mg   Coreg 3.125mg bid    Pulmonary HTN   Sildenafil 10mg tid     DM-2   Insulin sliding scale     Obesity   Lifestyle modification     JAN/OHS   BiPAP HS   Follows pulmonology     Supportive   DVT prophylaxis: Lovenox   Diet: DASH 40 y/o male with PMH of CHF, pHTN on 6L home O2, HTN, JAN, OHS, obesity, DM-2 came to the ED complaining of shortness of breath. Patient reported that he was recently discharged from [facility] 4 days ago after he was treated for flu. He noted that he has been having difficulty breathing since then. Symptom is mainly with minimal exertion and as a result he has been restricted with his ADLs. He reported associated cough productive of white sputum other wise no fever, chills, chest pain, palpitation, nausea, vomiting abdominal pain, change in bowel/urinary habit, HA, no recent travel, calf pain.     Acute respiratory failure with hypoxia and hypercapnia   Admit to telemetry   Multifactorial HF, JAN, OHS, pHTN   BiPAP placed; continue HS/PRN   O2 via NC as needed     Acute on chronic right sided failure   CTA chest: pulmonary vascular congestion and pulmonary edema   Lasix 80mg given in the ED   Bumex 1mg bid; monitor renal function   Cardiology following     RVP positive   Supportive management     HTN   Enalapril 5mg   Coreg 3.125mg bid    Pulmonary HTN   Sildenafil 10mg tid     DM-2   Insulin sliding scale     Obesity   Lifestyle modification     JAN/OHS   BiPAP HS   Follows pulmonology     Supportive   DVT prophylaxis: Lovenox   Diet: DASH

## 2020-02-10 NOTE — ED ADULT NURSE REASSESSMENT NOTE - NS ED NURSE REASSESS COMMENT FT1
Pt. mental status unchanged. Respirations even and unlabored. Pt. tolerating oxy-mask well. Pt. informed he will be going to 4T

## 2020-02-10 NOTE — CONSULT NOTE ADULT - ATTENDING COMMENTS
severe pulm HTn with cor pulmonale. isolated right heart failure. obesity, Sleepa pnea.  Patient had recent viral respo tract infection.  ct diuresis IV>  patietlakeisha was diagnosed with severe pulm HTn and has lymphadenopathy.   OPatient never followed up with cardiologist when her was discharged. he lives in [place]. We cannot provide aggressvie pulm HTN meds in hospital. this has to be outpaitent follow up and treatment.   also needs LN biopsy to evaluate for sarcodis or scleroderma. need rheum and pulm Follow up.  opverall, goal for current admission: diuesis and discharge with outpatietn follow up for intense pulmnonary hypertension work up.  Dr. Cox saw patient last visit. Dr. Cox will follow up in hospital tomorrow.

## 2020-02-10 NOTE — CONSULT NOTE ADULT - REASON FOR ADMISSION
Acute hypoxic respiratory failure secondary to Entero/Rhinovirus infection with superimposed volume overload from CHF exacerbation.

## 2020-02-10 NOTE — H&P ADULT - HISTORY OF PRESENT ILLNESS
40 y/o male with PMH of CHF, pHTN on 6L home O2, HTN, JAN, OHS, obesity, DM-2 came to the ED complaining of shortness of breath. Patient reported that he was recently discharged from [facility] 4 days ago after he was treated for flu. He noted that he has been having difficulty breathing since then. Symptom is mainly with minimal exertion and as a result he has been restricted with his ADLs. He reported associated cough productive of white sputum other wise no fever, chills, chest pain, palpitation, nausea, vomiting abdominal pain, change in bowel/urinary habit, HA, no recent travel, calf pain.

## 2020-02-10 NOTE — CHART NOTE - NSCHARTNOTEFT_GEN_A_CORE
CC: Worsening sob    Overnight/ am events:  Patient seen and examined at bedside. No acute events overnight. Patient states his sob is improving, he is upset hes back in the hospital. Has been staying in  with friends but plan was to return to , Reports though his living condiitons in  are not good that's why he remains in . Understands he is very ill and needs to follow up but finds it difficult at times. Discussed at length in regards to his HF and severe pulm htn and that he needs to be placed on the right meds, but also be more compliant with meds, diet and follow up. Reports he wants to remain in  and be treated in . Patient denies chest pain,, abd pain, N/V, fever, chills, dysuria or any other complaints. All remainder ROS negative.           Vital Signs Last 24 Hrs  T(C): 36.7 (10 Feb 2020 21:06), Max: 37 (10 Feb 2020 15:41)  T(F): 98 (10 Feb 2020 21:06), Max: 98.6 (10 Feb 2020 15:41)  HR: 85 (10 Feb 2020 23:42) (68 - 88)  BP: 101/72 (10 Feb 2020 21:06) (101/72 - 120/81)  BP(mean): --  RR: 18 (10 Feb 2020 21:06) (18 - 18)  SpO2: 96% (10 Feb 2020 23:42) (86% - 100%)    CONSTITUTIONAL: obese Well appearing, well nourished, awake, alert and in no apparent distress  CARDIAC: Normal rate, regular rhythm.  Heart sounds S1, S2.  No murmurs, rubs or gallops   RESPIRATORY: fair air entry b/l crackles no WRR   GASTROENTEROLOGY: Soft nt nd bs +  EXTREMITIES: b/l le edema 3+, No cyanosis or deformity   NEUROLOGICAL: Alert and oriented, no focal deficits, no motor or sensory deficits.  SKIN: No rash, skin turgor wnl   chronic stasis dermatitis b/l le      A/p: 38 yo M with morbid obesity, chronic respiratory failure with hypoxia 2/2 JAN, OHS on bipap and on 3-4 liters baseline home o2,  DM2 not on long term insulin, mediastinal lymphadenopathy, chronic diastolic HFpEF, RHC at [facility] in 2008 with severe pulmonary HTN, recently admitted to [facility] on 1/16/20 for DHF exacerbation and currently presenting w/ recurrent symptoms of worsening sob/ peripheral edema in the setting of dietary indiscretion/non compliance to follow up. Pt placed on IV bumex bid, continue to be diuresd with improving sx. On prior hospitalization was S/p negative NM scan for amyloidosis and s/p RHC showing severe pulm htn. Cardio consulted, continuing to optimize medications to offload fluid. Pt will need aggressive medication management for severe pulm htn, prior hospitalization was evaluated by rheum/ pulm to have full work up to evaluate further for noted LAD, with recommended LN bx to see if sarcoidosis or other etiology present. Pt non compliant with follow up. Compliance counselling provided. Also with recent influenza and post viral URI sx which he feels have exacerbated her current state. Supportive care being provided. Will c/w management for co morbid conditions. D/w cardiologist Dr. Cason the curent plan of care.

## 2020-02-10 NOTE — PHYSICAL THERAPY INITIAL EVALUATION ADULT - PERTINENT HX OF CURRENT PROBLEM, REHAB EVAL
38 y/o male with PMH of CHF, pHTN on 6L home O2, HTN, JAN, OHS, obesity, DM-2 came to the ED complaining of shortness of breath. Patient reported that he was recently discharged from [facility] 4 days ago after he was treated for flu. He noted that he has been having difficulty breathing since then. Symptom is mainly with minimal exertion and as a result he has been restricted with his ADLs.

## 2020-02-10 NOTE — PHYSICAL THERAPY INITIAL EVALUATION ADULT - CRITERIA FOR SKILLED THERAPEUTIC INTERVENTIONS
impairments found/risk reduction/prevention/functional limitations in following categories/therapy frequency/anticipated equipment needs at discharge/rehab potential/anticipated discharge recommendation

## 2020-02-10 NOTE — CONSULT NOTE ADULT - SUBJECTIVE AND OBJECTIVE BOX
REASON FOR Tele-evaluation    SUBJECTIVE: (***)    HPI:        OBJECTIVE  ROS:  (***)    PHYSICAL EXAM: Tele-evaluation precludes physical exam. Pertinent physical exam findings as per verbal communication by …… and nurse at bedside are as following:  (***)          ASSESSMENT AND PLAN: (***)    Care plan discussed with (***) REASON FOR Tele-evaluation    SUBJECTIVE: SOB while on supplemental oxygen     ED HPI: 40yo male PMH congestive heart failure on 6L home O2, HTN, pHTN presenting with shortness of breath. Patient states that he was recently admitted to [facility] for flu, discharged home 4 days ago. Patient states that since discharge he hasn't been able to breath. No fevers/chills. +cough.  Above ED HPI reviewed and noted: patient reports that since being diagnosed with influenza he has not felt well. he continued to have significant SOB while on 6L NC (this was increased from 4L after discharged from [facility]), he has significant functional limitations due to MUÑOZ while on 6 L NC. Since his discharge he continued to feel generalized weakness, upset stomach, and has a productive cough. patient reports being complaint with home medications and denies any recent medication changes since his discharge from [facility]. no reported non adherence to diet and fluid intake.     OBJECTIVE  ROS:  noted above.,     PHYSICAL EXAM: Tele-evaluation precludes physical exam. mild resp distress while conducting the interview, speaking in short sentences. On face mask.   Vital Signs Last 24 Hrs  T(C): 37.1 (09 Feb 2020 23:28), Max: 37.2 (09 Feb 2020 17:32)  T(F): 98.7 (09 Feb 2020 23:28), Max: 98.9 (09 Feb 2020 17:32)  HR: 88 (09 Feb 2020 23:28) (88 - 104)  BP: 123/87 (09 Feb 2020 23:28) (116/78 - 130/70)  BP(mean): --  RR: 20 (09 Feb 2020 23:28) (20 - 26)  SpO2: 98% (09 Feb 2020 23:28) (78% - 98%)        LABS: All Labs Reviewed:                        13.6   4.17  )-----------( 222      ( 09 Feb 2020 18:21 )             44.2     02-09    135  |  93<L>  |  19.0  ----------------------------<  95  4.2   |  29.0  |  1.20    Ca    9.6      09 Feb 2020 18:21    TPro  9.1<H>  /  Alb  3.5  /  TBili  1.7  /  DBili  x   /  AST  28  /  ALT  25  /  AlkPhos  113  02-09    PT/INR - ( 09 Feb 2020 18:21 )   PT: 21.9 sec;   INR: 1.90 ratio         PTT - ( 09 Feb 2020 18:21 )  PTT:33.0 sec  CARDIAC MARKERS ( 09 Feb 2020 18:21 )  x     / <0.01 ng/mL / x     / x     / x        Blood Culture:   RADIOLOGY/EKG:  CTA chest  FINDINGS:    LUNGS AND AIRWAYS: Patent central airways.  There are diffuse groundglass opacities. There is mild interlobular septal thickening. These findings likely represent  pulmonary vascular congestion and pulmonary edema. Superimposed infectious or  inflammatory process cannot be excluded.    PLEURA: Small right pleural effusion.    MEDIASTINUM AND CONTRERAS: Extensive mediastinal and hilar lymphadenopathy is  Again noted, not significantly changed from the prior study dated 12/24/2019. For reference, a subcarinal lymph node measures 4.8 x 2.8 cm. A high right paratracheal lymph node measures 3.5 x 2.6 cm.    VESSELS: There is no pulmonary embolism. The thoracic aorta is normal in caliber. Dilated main pulmonary artery suggesting pulmonary arterial hypertension.    HEART: Cardiomegaly. No pericardial effusion.    CHEST WALL AND LOWER NECK: The thyroid gland is within normal limits. No  definite supraclavicular or axillary lymphadenopathy.    VISUALIZED UPPER ABDOMEN: Within normal limits.    BONES: Multilevel degenerative change of the thoracic spine.    IMPRESSION:    No pulmonary embolism.    Pulmonary vascular congestion and pulmonary edema. Superimposed infectious or inflammatory process is not excluded.    Extensive mediastinal and hilar lymphadenopathy is again noted.    ASSESSMENT AND PLAN:  Acute hypoxic respiratory failure secondary to Entero/Rhinovirus infection with superimposed volume overload from acute on chronic HFpEF exacerbation.  Admit to telemetry with   Bipap implemented 16/8/40%  cont supplemental oxygen as needed while not on Bipap  cont Coreg, sildenafil, enalapril and Bumex changed to IVP from oral 1mg BID  cont oral potassium supplement.   Fluid restriction   PT consult as per CHF protocol    consult - high risk of readmission   Cardiology consult - [facility] group, notified by ED attending   status post 80mg Lasix IVP while in ER.     Strict i/o and daily standing weight  H/O JAN, recently received a home bipap, settings unknown  -   - consider pulmonary consult   - Bipap started  DMT2, not insulin dependent, controlled  last known A1C 6.3 (12/19)  - RISS, accu check, hypoglycemia protocol   H/O Mediastinal lymphadenopathy, out patient evaluation in process, recommend cont follow up.     Morbid Obesity   - BMI 55  - diet and weight loss advised,  consult for possible medical weight loss program recommendations.     DVT prophylaxis.     Care plan discussed with Dr. Kaye [facility], Britney Vergara (MD)  Dr. Talia Hayden Primary Care Doctor, [place]  Dr. Abbi Franklin (Pulmonologist),   Krys Horton (DO) - cardio    REASON FOR Tele-evaluation    SUBJECTIVE: SOB while on supplemental oxygen     ED HPI: 40yo male PMH congestive heart failure on 6L home O2, HTN, pHTN presenting with shortness of breath. Patient states that he was recently admitted to [facility] for flu, discharged home 4 days ago. Patient states that since discharge he hasn't been able to breath. No fevers/chills. +cough.  Above ED HPI reviewed and noted: patient reports that since being diagnosed with influenza he has not felt well. he continued to have significant SOB while on 6L NC (this was increased from 4L after discharged from [facility]), he has significant functional limitations due to MUÑOZ while on 6 L NC. Since his discharge he continued to feel generalized weakness, upset stomach, and has a productive cough. patient reports being complaint with home medications and denies any recent medication changes since his discharge from [facility]. no reported non adherence to diet and fluid intake.     OBJECTIVE  ROS:  noted above.,     PHYSICAL EXAM: Tele-evaluation precludes physical exam. mild resp distress while conducting the interview, speaking in short sentences. On face mask.   Vital Signs Last 24 Hrs  T(C): 37.1 (09 Feb 2020 23:28), Max: 37.2 (09 Feb 2020 17:32)  T(F): 98.7 (09 Feb 2020 23:28), Max: 98.9 (09 Feb 2020 17:32)  HR: 88 (09 Feb 2020 23:28) (88 - 104)  BP: 123/87 (09 Feb 2020 23:28) (116/78 - 130/70)  BP(mean): --  RR: 20 (09 Feb 2020 23:28) (20 - 26)  SpO2: 98% (09 Feb 2020 23:28) (78% - 98%)        LABS: All Labs Reviewed:                        13.6   4.17  )-----------( 222      ( 09 Feb 2020 18:21 )             44.2     02-09    135  |  93<L>  |  19.0  ----------------------------<  95  4.2   |  29.0  |  1.20    Ca    9.6      09 Feb 2020 18:21    TPro  9.1<H>  /  Alb  3.5  /  TBili  1.7  /  DBili  x   /  AST  28  /  ALT  25  /  AlkPhos  113  02-09    PT/INR - ( 09 Feb 2020 18:21 )   PT: 21.9 sec;   INR: 1.90 ratio         PTT - ( 09 Feb 2020 18:21 )  PTT:33.0 sec  CARDIAC MARKERS ( 09 Feb 2020 18:21 )  x     / <0.01 ng/mL / x     / x     / x        Blood Culture:   RADIOLOGY/EKG:  CTA chest  FINDINGS:    LUNGS AND AIRWAYS: Patent central airways.  There are diffuse groundglass opacities. There is mild interlobular septal thickening. These findings likely represent  pulmonary vascular congestion and pulmonary edema. Superimposed infectious or  inflammatory process cannot be excluded.    PLEURA: Small right pleural effusion.    MEDIASTINUM AND CONTRERAS: Extensive mediastinal and hilar lymphadenopathy is  Again noted, not significantly changed from the prior study dated 12/24/2019. For reference, a subcarinal lymph node measures 4.8 x 2.8 cm. A high right paratracheal lymph node measures 3.5 x 2.6 cm.    VESSELS: There is no pulmonary embolism. The thoracic aorta is normal in caliber. Dilated main pulmonary artery suggesting pulmonary arterial hypertension.    HEART: Cardiomegaly. No pericardial effusion.    CHEST WALL AND LOWER NECK: The thyroid gland is within normal limits. No  definite supraclavicular or axillary lymphadenopathy.    VISUALIZED UPPER ABDOMEN: Within normal limits.    BONES: Multilevel degenerative change of the thoracic spine.    IMPRESSION:    No pulmonary embolism.    Pulmonary vascular congestion and pulmonary edema. Superimposed infectious or inflammatory process is not excluded.    Extensive mediastinal and hilar lymphadenopathy is again noted.    ASSESSMENT AND PLAN:  Acute hypoxic respiratory failure secondary to Entero/Rhinovirus infection with superimposed volume overload from acute on chronic HFpEF exacerbation.  Admit to telemetry with   Bipap implemented 16/8/40%  cont supplemental oxygen as needed while not on Bipap  cont Coreg, sildenafil, enalapril and Bumex changed to IVP from oral 1mg BID  cont oral potassium supplement.   Fluid restriction   PT consult as per CHF protocol    consult - high risk of readmission   Cardiology consult - [facility] group, notified by ED attending   status post 80mg Lasix IVP while in ER.     Strict i/o and daily standing weight  H/O JAN, recently received a home bipap, settings unknown  -   - consider pulmonary consult   - Bipap started  DMT2, not insulin dependent, controlled  last known A1C 6.3 (12/19)  - RISS, accu check, hypoglycemia protocol   H/O Mediastinal lymphadenopathy, out patient evaluation in process, recommend cont follow up.     Morbid Obesity   - BMI 55  - diet and weight loss advised,  consult for possible medical weight loss program recommendations.     DVT prophylaxis.     Care plan discussed with Dr. Kaye

## 2020-02-10 NOTE — CONSULT NOTE ADULT - SUBJECTIVE AND OBJECTIVE BOX
History obtained by: Patient and medical record     obtained: No    Chief complaint:  "I couldn't breathe"    HPI:  This is 38 y/o male with hx of R sided heart failure (nl LVEF with severely reduced RV function), severe pulmonary HTN on home O2, JAN on CPAP, IDDM2 who presents with SOB x1 week. Pt was recently diagnosed with influenza and was admitted to [facility], however, his breathing hasn't improved since his discharge 4 days ago. Pt denies fever/chills but c/o productive cough with white phlegm. In the ER pt was found with RVP positive for entero/rhinovirus. Chest CT showed pulmonary edema. ABG revealed hypoxemia with pO2 47 on 6L N/C. Pt states he's been compliant with his medications and low salt diet, denies chest pain or palpitations. Troponin negative x1, proBNP 1603. EKG shows SR with biphasic T-waves in V2-V6 (similar to previous).       REVIEW OF SYMPTOMS:   Cardiovascular:   as per HPI  Respiratory:   c/o dyspnea and cough,      Genitourinary:  No dysuria, no hematuria;   Gastrointestinal:   No dark color stool, no melena, no diarrhea, no constipation, no abdominal pain;   Neurological: No headache, no dizziness, no slurred speech;    Psychiatric: No agitation, no anxiety.  ALL OTHER REVIEW OF SYSTEMS ARE NEGATIVE.    MEDICATIONS  (STANDING):  aspirin  chewable 81 milliGRAM(s) Oral daily  atorvastatin 40 milliGRAM(s) Oral at bedtime  buMETAnide Injectable 1 milliGRAM(s) IV Push two times a day  carvedilol 3.125 milliGRAM(s) Oral every 12 hours  dextrose 5%. 1000 milliLiter(s) (50 mL/Hr) IV Continuous <Continuous>  dextrose 50% Injectable 12.5 Gram(s) IV Push once  dextrose 50% Injectable 25 Gram(s) IV Push once  dextrose 50% Injectable 25 Gram(s) IV Push once  enalapril 5 milliGRAM(s) Oral daily  enoxaparin Injectable 40 milliGRAM(s) SubCutaneous two times a day  insulin lispro (HumaLOG) corrective regimen sliding scale   SubCutaneous Before meals and at bedtime  potassium chloride    Tablet ER 20 milliEquivalent(s) Oral daily  sildenafil (REVATIO) 10 milliGRAM(s) Oral three times a day    MEDICATIONS  (PRN):  dextrose 40% Gel 15 Gram(s) Oral once PRN Blood Glucose LESS THAN 70 milliGRAM(s)/deciliter  glucagon  Injectable 1 milliGRAM(s) IntraMuscular once PRN Glucose LESS THAN 70 milligrams/deciliter        PAST MEDICAL & SURGICAL HISTORY:  Obesity, morbid  Pulmonary hypertension  R sided HF   Hypertension  DM2  JAN on CPAP  No significant past surgical history      FAMILY HISTORY:  Family history of diabetes mellitus (DM): grandmother.      SOCIAL HISTORY: lives with family    CIGARETTES: smoked 20 years ago for a short time    ALCOHOL: denies    DRUGS: denies    Vital Signs Last 24 Hrs  T(C): 37.1 (09 Feb 2020 23:28), Max: 37.2 (09 Feb 2020 17:32)  T(F): 98.7 (09 Feb 2020 23:28), Max: 98.9 (09 Feb 2020 17:32)  HR: 88 (09 Feb 2020 23:28) (88 - 104)  BP: 123/87 (09 Feb 2020 23:28) (116/78 - 130/70)  BP(mean): --  RR: 20 (09 Feb 2020 23:28) (20 - 26)  SpO2: 98% (09 Feb 2020 23:28) (78% - 98%)    PHYSICAL EXAM:  General: WN/WD NAD  Neurology: A&Ox3, nonfocal, BURROUGHS x 4  Eyes: PERRL/ EOMI, Gross vision intact  ENT/Neck: Neck supple, trachea midline, No JVD, Gross hearing intact  Respiratory:  B/L scattered rhonchi  CV: RRR, S1S2, 3/6 murmur  Abdominal: Soft, NT, ND +BS,   Extremities: LE +2 edema, + peripheral pulses  Skin: No Rashes, Hematoma, Ecchymosis      INTERPRETATION OF TELEMETRY: SR 80's-90's    ECG: SR 94 bpm, biphasic T-waves in V2-V6 (similar to EKG from 12/2019)      I&O's Detail      LABS:                        13.6   4.17  )-----------( 222      ( 09 Feb 2020 18:21 )             44.2     02-09    135  |  93<L>  |  19.0  ----------------------------<  95  4.2   |  29.0  |  1.20    Ca    9.6      09 Feb 2020 18:21    TPro  9.1<H>  /  Alb  3.5  /  TBili  1.7  /  DBili  x   /  AST  28  /  ALT  25  /  AlkPhos  113  02-09    CARDIAC MARKERS ( 09 Feb 2020 18:21 )  x     / <0.01 ng/mL / x     / x     / x          PT/INR - ( 09 Feb 2020 18:21 )   PT: 21.9 sec;   INR: 1.90 ratio         PTT - ( 09 Feb 2020 18:21 )  PTT:33.0 sec    I&O's Summary      RADIOLOGY & ADDITIONAL STUDIES:  X-ray:    PREVIOUS DIAGNOSTIC TESTING:      ECHO  < from: TTE Echo Complete w/Doppler (12.21.19 @ 08:48) >  Summary:   1. Technically fair study.   2. Left ventricular ejection fraction, by visual estimation, is 65 to 70%.   3. There is moderate septal left ventricular hypertrophy.   4. Severely enlarged right ventricle.   5. Severely reduced RV systolic function.   6. Right ventricular volume and pressure overload.   7. Moderate-severe tricuspid regurgitation.   8. Estimated pulmonary artery systolic pressure is 89.6 mmHg assuming a right atrial pressure of 15 mmHg, which is consistent with severe pulmonary hypertension.   9. Moderately dilated pulmonary artery.    MD Josefina Electronically signed on 12/24/2019 at 2:54:21 PM    < end of copied text >      STRESS: n/a      CATHETERIZATION:  < from: Cardiac Cath Lab - Adult (01.14.20 @ 12:56) >  DIAGNOSTIC IMPRESSIONS: Markedly elevated right sided filling pressure (RA  23 mmHg)  Severe pulmonary hypertension ( PASP 74 mmHg, Mean 51 mmHg)  PVR 5.79 Wood Units  PCWP within normal 15 mmgh  Pulmonary hypertension showed No response to Nitric oxide  DIAGNOSTIC RECOMMENDATIONS: Management as per Dr. Cox.  Prepared and signed by  Jairon Santana MD  Signed 01/16/2020 18:16:31    < end of copied text >

## 2020-02-10 NOTE — PHYSICAL THERAPY INITIAL EVALUATION ADULT - ADDITIONAL COMMENTS
pt states he lives with family in a 2-story house with 3-4 steps to enter(+rail). pt states his bedroom is on the first floor, along with a half bath. pt states the kitchen and full bath are on the second floor, but that he has not been able to get upstairs. says his aunt is home and brings him food. pt states he is otherwise independent without devices. no DME.

## 2020-02-10 NOTE — PHYSICAL THERAPY INITIAL EVALUATION ADULT - GENERAL OBSERVATIONS, REHAB EVAL
awake in bed on 5L/min O2 via nc (placed on 6L/min portable per nurse for mobility), +telemonitor with .

## 2020-02-11 ENCOUNTER — TRANSCRIPTION ENCOUNTER (OUTPATIENT)
Age: 40
End: 2020-02-11

## 2020-02-11 LAB
ANION GAP SERPL CALC-SCNC: 12 MMOL/L — SIGNIFICANT CHANGE UP (ref 5–17)
BUN SERPL-MCNC: 22 MG/DL — HIGH (ref 8–20)
CALCIUM SERPL-MCNC: 9 MG/DL — SIGNIFICANT CHANGE UP (ref 8.6–10.2)
CHLORIDE SERPL-SCNC: 93 MMOL/L — LOW (ref 98–107)
CO2 SERPL-SCNC: 29 MMOL/L — SIGNIFICANT CHANGE UP (ref 22–29)
CREAT SERPL-MCNC: 1.35 MG/DL — HIGH (ref 0.5–1.3)
GLUCOSE BLDC GLUCOMTR-MCNC: 101 MG/DL — HIGH (ref 70–99)
GLUCOSE BLDC GLUCOMTR-MCNC: 101 MG/DL — HIGH (ref 70–99)
GLUCOSE BLDC GLUCOMTR-MCNC: 105 MG/DL — HIGH (ref 70–99)
GLUCOSE BLDC GLUCOMTR-MCNC: 92 MG/DL — SIGNIFICANT CHANGE UP (ref 70–99)
GLUCOSE SERPL-MCNC: 114 MG/DL — HIGH (ref 70–99)
HCT VFR BLD CALC: 39.7 % — SIGNIFICANT CHANGE UP (ref 39–50)
HGB BLD-MCNC: 12.7 G/DL — LOW (ref 13–17)
MAGNESIUM SERPL-MCNC: 1.6 MG/DL — SIGNIFICANT CHANGE UP (ref 1.6–2.6)
MCHC RBC-ENTMCNC: 30.1 PG — SIGNIFICANT CHANGE UP (ref 27–34)
MCHC RBC-ENTMCNC: 32 GM/DL — SIGNIFICANT CHANGE UP (ref 32–36)
MCV RBC AUTO: 94.1 FL — SIGNIFICANT CHANGE UP (ref 80–100)
PHOSPHATE SERPL-MCNC: 3.9 MG/DL — SIGNIFICANT CHANGE UP (ref 2.4–4.7)
PLATELET # BLD AUTO: 203 K/UL — SIGNIFICANT CHANGE UP (ref 150–400)
POTASSIUM SERPL-MCNC: 4.1 MMOL/L — SIGNIFICANT CHANGE UP (ref 3.5–5.3)
POTASSIUM SERPL-SCNC: 4.1 MMOL/L — SIGNIFICANT CHANGE UP (ref 3.5–5.3)
RBC # BLD: 4.22 M/UL — SIGNIFICANT CHANGE UP (ref 4.2–5.8)
RBC # FLD: 15.4 % — HIGH (ref 10.3–14.5)
SODIUM SERPL-SCNC: 134 MMOL/L — LOW (ref 135–145)
WBC # BLD: 3.71 K/UL — LOW (ref 3.8–10.5)
WBC # FLD AUTO: 3.71 K/UL — LOW (ref 3.8–10.5)

## 2020-02-11 PROCEDURE — 99239 HOSP IP/OBS DSCHRG MGMT >30: CPT

## 2020-02-11 PROCEDURE — 99232 SBSQ HOSP IP/OBS MODERATE 35: CPT

## 2020-02-11 RX ORDER — ASPIRIN/CALCIUM CARB/MAGNESIUM 324 MG
1 TABLET ORAL
Qty: 30 | Refills: 0
Start: 2020-02-11 | End: 2020-03-11

## 2020-02-11 RX ORDER — BUMETANIDE 0.25 MG/ML
1 INJECTION INTRAMUSCULAR; INTRAVENOUS
Qty: 60 | Refills: 0
Start: 2020-02-11 | End: 2020-03-11

## 2020-02-11 RX ADMIN — Medication 5 MILLIGRAM(S): at 05:57

## 2020-02-11 RX ADMIN — Medication 100 MILLIGRAM(S): at 05:57

## 2020-02-11 RX ADMIN — Medication 3 MILLILITER(S): at 08:50

## 2020-02-11 RX ADMIN — ALBUTEROL 2.5 MILLIGRAM(S): 90 AEROSOL, METERED ORAL at 08:51

## 2020-02-11 RX ADMIN — BUMETANIDE 1 MILLIGRAM(S): 0.25 INJECTION INTRAMUSCULAR; INTRAVENOUS at 05:57

## 2020-02-11 RX ADMIN — Medication 81 MILLIGRAM(S): at 10:43

## 2020-02-11 RX ADMIN — CARVEDILOL PHOSPHATE 3.12 MILLIGRAM(S): 80 CAPSULE, EXTENDED RELEASE ORAL at 05:57

## 2020-02-11 RX ADMIN — CARVEDILOL PHOSPHATE 3.12 MILLIGRAM(S): 80 CAPSULE, EXTENDED RELEASE ORAL at 17:35

## 2020-02-11 RX ADMIN — Medication 100 MILLIGRAM(S): at 15:51

## 2020-02-11 RX ADMIN — Medication 20 MILLIEQUIVALENT(S): at 10:43

## 2020-02-11 RX ADMIN — BUMETANIDE 1 MILLIGRAM(S): 0.25 INJECTION INTRAMUSCULAR; INTRAVENOUS at 17:35

## 2020-02-11 RX ADMIN — Medication 100 MILLIGRAM(S): at 21:00

## 2020-02-11 RX ADMIN — Medication 100 MILLIGRAM(S): at 15:52

## 2020-02-11 RX ADMIN — ATORVASTATIN CALCIUM 40 MILLIGRAM(S): 80 TABLET, FILM COATED ORAL at 21:03

## 2020-02-11 RX ADMIN — Medication 10 MILLIGRAM(S): at 05:57

## 2020-02-11 RX ADMIN — Medication 10 MILLIGRAM(S): at 15:29

## 2020-02-11 RX ADMIN — Medication 10 MILLIGRAM(S): at 21:00

## 2020-02-11 NOTE — PROGRESS NOTE ADULT - SUBJECTIVE AND OBJECTIVE BOX
[place] [facility]                                                        Office: [address] [address]                                                       Telephone: [phone]. Fax:[phone]                                                                             PROGRESS NOTE   Reason for follow up:  SOB                            Overnight: No new events.   Update:   Worsening renal function:  22/1.35  Subjective: "I am still a little sob at times"   Complains of:  Nothing  Review of symptoms: Cardiac:  No chest pain. No dyspnea. No palpitations.  Respiratory: no cough. No dyspnea  Gastrointestinal: No diarrhea. No abdominal pain. No bleeding.     Past medical history: No updates.   Chronic conditions:  HEALTH ISSUES - PROBLEM Dx:  Upper respiratory infection, viral: Upper respiratory infection, viral  Hypertension, unspecified type: Hypertension, unspecified type  Pulmonary hypertension: Pulmonary hypertension  Acute on chronic right-sided heart failure: Acute on chronic right-sided heart failure    Vitals:  T(C): 36.6 (02-11-20 @ 05:51), Max: 37 (02-10-20 @ 15:41)  HR: 77 (02-11-20 @ 08:51) (68 - 88)  BP: 110/74 (02-11-20 @ 05:51) (101/72 - 120/81)  RR: 18 (02-11-20 @ 05:51) (18 - 18)  SpO2: 99% (02-11-20 @ 08:51) (86% - 100%)  I&O's Summary  10 Feb 2020 07:01  -  11 Feb 2020 07:00  --------------------------------------------------------  IN: 630 mL / OUT: 1500 mL / NET: -870 mL  11 Feb 2020 07:01  -  11 Feb 2020 11:30  --------------------------------------------------------  IN: 120 mL / OUT: 500 mL / NET: -380 mL  Weight (kg): 149.7 (02-10 @ 02:00)    PHYSICAL EXAM:  Appearance: Comfortable. No acute distress, obese,   HEENT:  Head and neck: Atraumatic. Normocephalic.  Normal oral mucosa, PERRL, Neck is supple. No JVD, No carotid bruit.   Neurologic: A & O x 3, no focal deficits. EOMI , Cranial nerves are intact.  Lymphatic: No cervical lymphadenopathy  Cardiovascular: Normal S1 S2, 3/6 murmur, rubs/gallops. No JVD, +2 edema  Respiratory: Lungs clear to auscultation, right base with slight coarse crackles noted.    Gastrointestinal:  Soft, Non-tender, + BS  Lower Extremities: No edema  Psychiatry: Patient is calm. No agitation. Mood & affect appropriate  Skin: No rashes/ ecchymoses/cyanosis/ulcers visualized on the face, hands or feet.    CURRENT MEDICATIONS:  buMETAnide Injectable 1 milliGRAM(s) IV Push two times a day  carvedilol 3.125 milliGRAM(s) Oral every 12 hours  enalapril 5 milliGRAM(s) Oral daily  sildenafil (REVATIO) 10 milliGRAM(s) Oral three times a day  benzonatate  atorvastatin  insulin lispro (HumaLOG) corrective regimen sliding scale  aspirin  chewable  dextrose 5%.  enoxaparin Injectable  potassium chloride    Tablet ER    LABS:	 	  CARDIAC MARKERS ( 10 Feb 2020 05:59 )  x     / x     / x     / x     / x      p-BNP 10 Feb 2020 05:59: 1486 pg/mL, CARDIAC MARKERS ( 09 Feb 2020 18:21 )  x     / <0.01 ng/mL / x     / x     / x      p-BNP 09 Feb 2020 18:21: 1603 pg/mL                        12.7   3.71  )-----------( 203      ( 11 Feb 2020 06:14 )            39.7   02-11  134<L>  |  93<L>  |  22.0<H>  ----------------------------<  114<H>  4.1   |  29.0  |  1.35<H>  Ca    9.0      11 Feb 2020 06:14  Phos  3.9     02-11  Mg     1.6     02-11  TPro  9.1<H>  /  Alb  3.5  /  TBili  1.7  /  DBili  x   /  AST  28  /  ALT  25  /  AlkPhos  113  02-09  proBNP: Serum Pro-Brain Natriuretic Peptide: 1486 pg/mL (02-10 @ 05:59)  Serum Pro-Brain Natriuretic Peptide: 1603 pg/mL (02-09 @ 18:21)    TELEMETRY: Reviewed    ECG:  Reviewed by me.

## 2020-02-11 NOTE — DISCHARGE NOTE PROVIDER - INSTRUCTIONS
Follow a heart healthy diet. And make sure to limit the salt (sodium) in your diet. Salt causes your body to hold water. This makes your heart work harder as there is more fluid for the heart to pump. Limit your salt by doing the following: Limit canned, dried, packaged, and fast foods.  Don't add salt to your food.  Season foods with herbs instead of salt.  Watch how much liquids you drink. Drinking too much can make heart failure worse. Do not drink more than 1L of fluid per day  Limit the amount of alcohol you drink. It may harm your heart. Women should have no more than 1 drink a day and men should have no more than 2.  When you eat out, request that your meals have no added salt. Low salt  Low cholesterol    Fluid restriction 1 liter

## 2020-02-11 NOTE — DISCHARGE NOTE PROVIDER - HOSPITAL COURSE
40 yo M with morbid obesity, chronic respiratory failure with hypoxia 2/2 JAN, OHS on bipap and on 3-4 liters baseline home O2, DM2 not on long term insulin, mediastinal lymphadenopathy, chronic diastolic HFpEF, RHC at [facility] in 2008 with severe pulmonary HTN, recently admitted to [facility] and [place] on 1/16/20 for DHF exacerbation and currently presenting w/ recurrent symptoms of worsening sob/ peripheral edema in the setting of dietary indiscretion/non compliance to follow up. Pt placed on IV bumex bid, continues to be diuresed with improving sx. On prior hospitalization was s/p negative NM scan for amyloidosis and s/p RHC showing severe pulm htn, with markedly elevated right sided filling pressures. Cardio consulted, recommend to continue to optimize medications to offload fluid. Pt will need aggressive medication management for severe pulm htn. Patient also with recent influenza and post viral URI sx which likely exacerbated underlying symptoms, supportive care was provided with notable improvement in symptoms. The patient was diuresed with improvement in HF symptoms as well, he will be discharged on PO bumex 1mg PO BID, close follow up with cardio discussed with patient to start long term management for severe pHTN.         All electrolyte abnormalities were monitored carefully and repleted as necessary during this hospitalization. At the time of discharge patient was hemodynamically stable and amenable to all terms of discharge. The patient has received verbal instructions from myself regarding discharge plans.         Length of Discharge: 45MIN        Vital Signs Last 24 Hrs    T(F): 98 (11 Feb 2020 15:10), Max: 98 (10 Feb 2020 21:06)    HR: 88 (11 Feb 2020 15:10) (77 - 88)    BP: 100/71 (11 Feb 2020 15:10) (100/71 - 120/81)    RR: 18 (11 Feb 2020 15:10) (18 - 18)    SpO2: 92% (11 Feb 2020 15:10) (86% - 100%) 38 yo M with morbid obesity, chronic respiratory failure with hypoxia 2/2 JAN, OHS on bipap and on 3-4 liters baseline home O2, DM2 not on long term insulin, mediastinal lymphadenopathy, chronic diastolic HFpEF, RHC at [facility] in 2008 with severe pulmonary HTN, recently admitted to [facility] and [place] on 1/16/20 for DHF exacerbation and currently presenting w/ recurrent symptoms of worsening sob/ peripheral edema in the setting of dietary indiscretion/non compliance to follow up. Pt placed on IV bumex bid, continues to be diuresed with improving sx. On prior hospitalization was s/p negative NM scan for amyloidosis and s/p RHC showing severe pulm htn, with markedly elevated right sided filling pressures. Cardio consulted, recommend to continue to optimize medications to offload fluid. Pt will need aggressive medication management for severe pulm htn. Patient also with recent influenza and post viral URI sx which likely exacerbated underlying symptoms, supportive care was provided with notable improvement in symptoms. The patient was diuresed with improvement in HF symptoms as well, he will be discharged on PO bumex 1mg PO BID, close follow up with cardio discussed with patient to start long term management for severe pHTN. Pt cleared by cardio and medically stable to be discharged home.         All electrolyte abnormalities were monitored carefully and repleted as necessary during this hospitalization. At the time of discharge patient was hemodynamically stable and amenable to all terms of discharge. The patient has received verbal instructions from myself regarding discharge plans.         Length of Discharge: 45MIN 40 yo M with morbid obesity, chronic respiratory failure with hypoxia 2/2 JAN, OHS on bipap and on 3-4 liters baseline home O2, DM2 not on long term insulin, mediastinal lymphadenopathy, chronic diastolic HFpEF, RHC at [facility] in 2008 with severe pulmonary HTN, recently admitted to [facility] and [place] on 1/16/20 for DHF exacerbation and currently presenting w/ recurrent symptoms of worsening sob/ peripheral edema in the setting of dietary indiscretion/non compliance to follow up. Pt placed on IV bumex bid, continues to be diuresed with improving sx. On prior hospitalization was s/p negative NM scan for amyloidosis and s/p RHC showing severe pulm htn, with markedly elevated right sided filling pressures. Cardio consulted, recommend to continue to optimize medications to offload fluid. Pt will need aggressive medication management for severe pulm htn. Patient also with recent influenza and post viral URI sx which likely exacerbated underlying symptoms, supportive care was provided with notable improvement in symptoms. The patient was diuresed with improvement in HF symptoms as well, he will be discharged on PO bumex 1mg PO BID, close follow up with cardio discussed with patient to start long term management for severe pHTN. Pt cleared by cardio and medically stable to be discharged home.         All electrolyte abnormalities were monitored carefully and repleted as necessary during this hospitalization. At the time of discharge patient was hemodynamically stable and amenable to all terms of discharge. The patient has received verbal instructions from myself regarding discharge plans.         Vital Signs Last 24 Hrs    T(C): 36.9 (12 Feb 2020 05:29), Max: 36.9 (12 Feb 2020 05:29)    T(F): 98.4 (12 Feb 2020 05:29), Max: 98.4 (12 Feb 2020 05:29)    HR: 84 (12 Feb 2020 05:29) (84 - 95)    BP: 92/68 (12 Feb 2020 05:29) (92/68 - 106/76)    RR: 18 (12 Feb 2020 05:29) (17 - 18)    SpO2: 100% (12 Feb 2020 05:29) (92% - 100%)        Physical Exam:     GENERAL: NAD, morbidly obese, bipap over face    RESP: CTA b/l, B/L air entry, no crackles, no wheezing    CVS: Regular rate and rhythm; Normal S1 & S2, No murmurs, rubs, or gallops    Abdomen: soft +BS, nontender, nondistended    Extremities: b/l LE edema, no calf tenderness, venous stasis changes b/l    Skin: grossly intact  b/l chronic stasis dermatitis, multiple healed scars on his back     Neuro: AAOX3        Length of Discharge: 45MIN

## 2020-02-11 NOTE — DIETITIAN INITIAL EVALUATION ADULT. - OTHER INFO
Pt with h/o CHF, pHTN on 6L home O2, HTN, JAN, OHS, obesity, DM-2 came to the ED complaining of SOB. Pt reported that he was recently discharged from [facility] 4 days ago after he was treated for flu.  Pt presents with acute respiratory failure with hypoxia and hypercapnia and heart failure.  Pt reports good po intake at meals.  Pt states mostly good appetite prior to admission- was slightly less while having flu, but denies wt loss.  Pt known to be non compliant with heart healthy/heart failure nutrition guidelines, but was trying to be more compliant since prior admission last month.  Reinforced meal plan-- pt receptive and agreeable,  Heart failure nutrition literature provided.

## 2020-02-11 NOTE — DISCHARGE NOTE NURSING/CASE MANAGEMENT/SOCIAL WORK - PATIENT PORTAL LINK FT
You can access the FollowMyHealth Patient Portal offered by [facility] by registering at the following website: [email or web address]. By joining [facility]’s FollowMyHealth portal, you will also be able to view your health information using other applications (apps) compatible with our system.

## 2020-02-11 NOTE — PROGRESS NOTE ADULT - ASSESSMENT
This is 40 y/o male with hx of R sided heart failure (nl LVEF with severely reduced RV function), severe pulmonary HTN on home O2, JAN on CPAP, IDDM2 who presents with SOB x1 week. Pt was recently diagnosed with influenza and was admitted to [facility], however, his breathing hasn't improved since his discharge 4 days ago. Pt denies fever/chills but c/o productive cough with white phlegm. In the ER pt was found with RVP positive for entero/rhinovirus. Chest CT showed pulmonary edema. ABG revealed hypoxemia with pO2 47 on 6L N/C. Pt states he's been compliant with his medications and low salt diet, denies chest pain or palpitations. Troponin negative x1, proBNP 1603. EKG shows SR with biphasic T-waves in V2-V6 (similar to previous).     1. Acute on Chronic Right Heart Failure  -Tele- NSR HR @ 86  -[id number]  -Cont. Bumex 1mg BID  -BUN/Creatnin- 22/1.35  -Cont. to monitor lytes and replenish as needed This is 40 y/o male with hx of R sided heart failure (nl LVEF with severely reduced RV function), severe pulmonary HTN on home O2, JAN on CPAP, IDDM2 who presents with SOB x1 week. Pt was recently diagnosed with influenza and was admitted to [facility], however, his breathing hasn't improved since his discharge 4 days ago. Pt denies fever/chills but c/o productive cough with white phlegm. In the ER pt was found with RVP positive for entero/rhinovirus. Chest CT showed pulmonary edema. ABG revealed hypoxemia with pO2 47 on 6L N/C. Pt states he's been compliant with his medications and low salt diet, denies chest pain or palpitations. Troponin negative x1, proBNP 1603. EKG shows SR with biphasic T-waves in V2-V6 (similar to previous).   As of 2/11/2020, pt is well appearing, and denies SOB. Pt shows no cardiac events on tele monitor. Resp. assessment left lower ra;es        Acute on Chronic Right Heart Failure  -Tele- NSR HR @ 86  -[id number]  -BUN/Creatnin- 22/1.35  -Start PO dose of bumetanide 1mg BID as per home regimen  -Cont. to monitor lytes and replenish as needed  -DC Tele  -Cardiology following no longer needed at this time, pt stable and symptoms most likely r/t viral infection vs cardiac

## 2020-02-11 NOTE — DISCHARGE NOTE PROVIDER - NSDCFUADDAPPT_GEN_ALL_CORE_FT
Please follow up with your primary care physician in 3-5 days. Please follow up with the cardiologist and pulmonologist in 1-2 weeks.

## 2020-02-11 NOTE — PROGRESS NOTE ADULT - SUBJECTIVE AND OBJECTIVE BOX
Patient is a 39y old  Male who presents with a chief complaint of Acute on chronic CHF exacerbation (10 Feb 2020 04:03)    INTERVAL/OVERNIGHT EVENTS:  Patient seen and examined at bedside, no acute events overnight. Patient has been compliant with medications as well as Bipap overnight. Patient has been diuresing adequately with bumex.     CARDIAC MONITOR: NSR, no events    I&O's Summary  10 Feb 2020 07:01  -  2020 07:00  --------------------------------------------------------  IN: 630 mL / OUT: 1500 mL / NET: -870 mL    2020 07:01  -  2020 10:37  --------------------------------------------------------  IN: 120 mL / OUT: 500 mL / NET: -380 mL    Daily Weight in k.5 (2020 09:34)    CAPILLARY BLOOD GLUCOSE  POCT Blood Glucose.: 105 mg/dL (2020 08:10)  POCT Blood Glucose.: 106 mg/dL (10 Feb 2020 21:09)  POCT Blood Glucose.: 94 mg/dL (10 Feb 2020 17:01)  POCT Blood Glucose.: 88 mg/dL (10 Feb 2020 11:58)    V/S:  T(F): 97.9 (2020 05:51), Max: 98.6 (10 Feb 2020 15:41)  HR: 77 (2020 08:51) (68 - 88)  BP: 110/74 (2020 05:51) (101/72 - 120/81)  RR: 18 (2020 05:51) (18 - 18)  SpO2: 99% (2020 08:51) (86% - 100%)    Physical Exam:   GENERAL: NAD, morbidly obese, bipap over face  RESP: CTA b/l, B/L air entry, no crackles, no wheezing  CVS: Regular rate and rhythm; Normal S1 & S2, No murmurs, rubs, or gallops  Abdomen: obese, +BS, nontender, nondistended  Extremities: b/l LE edema, no calf tenderness, venous stasis changes b/l  Skin: grossly intact  Neuro: AAOX3    LABS                          12.7   3.71  )-----------( 203      ( 2020 06:14 )             39.7         02-11    134<L>  |  93<L>  |  22.0<H>  ----------------------------<  114<H>  4.1   |  29.0  |  1.35<H>    Ca    9.0      2020 06:14  Phos  3.9     02-11  Mg     1.6     02-11    TPro  9.1<H>  /  Alb  3.5  /  TBili  1.7  /  DBili  x   /  AST  28  /  ALT  25  /  AlkPhos  113  02-09    CARDIAC MARKERS ( 2020 18:21 )  x     / <0.01 ng/mL / x     / x     / x        LIVER FUNCTIONS - ( 2020 18:21 )  Alb: 3.5 g/dL / Pro: 9.1 g/dL / ALK PHOS: 113 U/L / ALT: 25 U/L / AST: 28 U/L / GGT: x         PT/INR - ( 2020 18:21 )   PT: 21.9 sec;   INR: 1.90 ratio    PTT - ( 2020 18:21 )  PTT:33.0 sec    IMAGING  DIET:    MEDICATIONS  (STANDING):  aspirin  chewable 81 milliGRAM(s) Oral daily  atorvastatin 40 milliGRAM(s) Oral at bedtime  benzonatate 100 milliGRAM(s) Oral three times a day  buMETAnide Injectable 1 milliGRAM(s) IV Push two times a day  carvedilol 3.125 milliGRAM(s) Oral every 12 hours  dextrose 5%. 1000 milliLiter(s) (50 mL/Hr) IV Continuous <Continuous>  dextrose 50% Injectable 12.5 Gram(s) IV Push once  dextrose 50% Injectable 25 Gram(s) IV Push once  dextrose 50% Injectable 25 Gram(s) IV Push once  enalapril 5 milliGRAM(s) Oral daily  enoxaparin Injectable 40 milliGRAM(s) SubCutaneous two times a day  insulin lispro (HumaLOG) corrective regimen sliding scale   SubCutaneous Before meals and at bedtime  potassium chloride    Tablet ER 20 milliEquivalent(s) Oral daily  sildenafil (REVATIO) 10 milliGRAM(s) Oral three times a day    MEDICATIONS  (PRN):  dextrose 40% Gel 15 Gram(s) Oral once PRN Blood Glucose LESS THAN 70 milliGRAM(s)/deciliter  glucagon  Injectable 1 milliGRAM(s) IntraMuscular once PRN Glucose LESS THAN 70 milligrams/deciliter  guaiFENesin   Syrup  (Sugar-Free) 100 milliGRAM(s) Oral every 6 hours PRN Cough Patient is a 39y old  Male who presents with a chief complaint of Acute on chronic CHF exacerbation (10 Feb 2020 04:03) pending dispo     INTERVAL/OVERNIGHT EVENTS:  Patient seen and examined at bedside, no acute events overnight. Patient has been compliant with medications as well as Bipap overnight. Patient has been diuresing adequately with bumex. Reports improvement in sob., Understands the plan of care, discussed him being able to stay in [place] to receive care bc he cannot return to  there is a flight of stairs for him to get into his home. Patient denies chest pain,abd pain, N/V, fever, chills, dysuria or any other complaints. All remainder ROS negative.     CARDIAC MONITOR: NSR, no events    I&O's Summary  10 Feb 2020 07:  -  2020 07:00  --------------------------------------------------------  IN: 630 mL / OUT: 1500 mL / NET: -870 mL    2020 07:01  -  2020 10:37  --------------------------------------------------------  IN: 120 mL / OUT: 500 mL / NET: -380 mL    Daily Weight in k.5 (2020 09:34)    CAPILLARY BLOOD GLUCOSE  POCT Blood Glucose.: 105 mg/dL (2020 08:10)  POCT Blood Glucose.: 106 mg/dL (10 Feb 2020 21:09)  POCT Blood Glucose.: 94 mg/dL (10 Feb 2020 17:01)  POCT Blood Glucose.: 88 mg/dL (10 Feb 2020 11:58)    V/S:  T(F): 97.9 (2020 05:51), Max: 98.6 (10 Feb 2020 15:41)  HR: 77 (2020 08:51) (68 - 88)  BP: 110/74 (2020 05:51) (101/72 - 120/81)  RR: 18 (2020 05:51) (18 - 18)  SpO2: 99% (2020 08:51) (86% - 100%)    Physical Exam:   GENERAL: NAD, morbidly obese, bipap over face  RESP: CTA b/l, B/L air entry, no crackles, no wheezing  CVS: Regular rate and rhythm; Normal S1 & S2, No murmurs, rubs, or gallops  Abdomen: soft +BS, nontender, nondistended  Extremities: b/l LE edema, no calf tenderness, venous stasis changes b/l  Skin: grossly intact  b/l chronic stasis dermatitis, multiple healed scars on his back   Neuro: AAOX3    LABS                          12.7   3.71  )-----------( 203      ( 2020 06:14 )             39.7         02-11    134<L>  |  93<L>  |  22.0<H>  ----------------------------<  114<H>  4.1   |  29.0  |  1.35<H>    Ca    9.0      2020 06:14  Phos  3.9     02-11  Mg     1.6     02-11    TPro  9.1<H>  /  Alb  3.5  /  TBili  1.7  /  DBili  x   /  AST  28  /  ALT  25  /  AlkPhos  113  02-09    CARDIAC MARKERS ( 2020 18:21 )  x     / <0.01 ng/mL / x     / x     / x        LIVER FUNCTIONS - ( 2020 18:21 )  Alb: 3.5 g/dL / Pro: 9.1 g/dL / ALK PHOS: 113 U/L / ALT: 25 U/L / AST: 28 U/L / GGT: x         PT/INR - ( 2020 18:21 )   PT: 21.9 sec;   INR: 1.90 ratio    PTT - ( 2020 18:21 )  PTT:33.0 sec    IMAGING  DIET:    MEDICATIONS  (STANDING):  aspirin  chewable 81 milliGRAM(s) Oral daily  atorvastatin 40 milliGRAM(s) Oral at bedtime  benzonatate 100 milliGRAM(s) Oral three times a day  buMETAnide Injectable 1 milliGRAM(s) IV Push two times a day  carvedilol 3.125 milliGRAM(s) Oral every 12 hours  dextrose 5%. 1000 milliLiter(s) (50 mL/Hr) IV Continuous <Continuous>  dextrose 50% Injectable 12.5 Gram(s) IV Push once  dextrose 50% Injectable 25 Gram(s) IV Push once  dextrose 50% Injectable 25 Gram(s) IV Push once  enalapril 5 milliGRAM(s) Oral daily  enoxaparin Injectable 40 milliGRAM(s) SubCutaneous two times a day  insulin lispro (HumaLOG) corrective regimen sliding scale   SubCutaneous Before meals and at bedtime  potassium chloride    Tablet ER 20 milliEquivalent(s) Oral daily  sildenafil (REVATIO) 10 milliGRAM(s) Oral three times a day    MEDICATIONS  (PRN):  dextrose 40% Gel 15 Gram(s) Oral once PRN Blood Glucose LESS THAN 70 milliGRAM(s)/deciliter  glucagon  Injectable 1 milliGRAM(s) IntraMuscular once PRN Glucose LESS THAN 70 milligrams/deciliter  guaiFENesin   Syrup  (Sugar-Free) 100 milliGRAM(s) Oral every 6 hours PRN Cough

## 2020-02-11 NOTE — DISCHARGE NOTE PROVIDER - NSDCCPCAREPLAN_GEN_ALL_CORE_FT
PRINCIPAL DISCHARGE DIAGNOSIS  Diagnosis: Acute on chronic right-sided heart failure  Assessment and Plan of Treatment: Heart failure (HF) is a condition that does not allow your heart to fill or pump properly. Not enough oxygen in your blood gets to your organs and tissues. HF can occur in the right side, the left side, or both lower chambers of your heart. HF is often caused by damage or injury to your heart. The damage may be caused by heart attack, other heart conditions, or high blood pressure. HF is a long-term condition that tends to get worse over time. It is important to manage your health to improve your quality of life. HF can be worsened by heavy alcohol use, smoking, diabetes that is not controlled, or obesity.  Please call your doctor if you experience shortness of breath or have more difficulty breathing, increased swelling of your feet, ankles, hands or abdomen, feeling tired with normal activity or experiencing dizziness or fainting, trouble sleeping or waking up feeling short of breath or coughing, chest pain or pressure, weight gain of 3-5 pounds over 2-3 days.      SECONDARY DISCHARGE DIAGNOSES  Diagnosis: Upper respiratory infection, viral  Assessment and Plan of Treatment: You had sympoms related to an upper respiratory viral infection and were found to be positive for a viral infection. This infection likely exacerbated your underlying symptoms. Please follow up with your PMD and cardiologist within 1 week to discuss this hospitalization as well as long term treatment options.    Diagnosis: Pulmonary hypertension  Assessment and Plan of Treatment: You have pulmonary hypertension, noted through extensive evaluation of your heart. Pulmonary hypertension is a condition that causes high blood pressure in the blood vessels that carry blood to the lungs. When this happens the heart has to work harder, which causes you to have trouble breathing and to feel very tired. Overtime pulmonary hypertension can cause right sided heart strain and poor functioning. Follow up with the cardiologist within 1 week to discuss long term treatment options for the pulonary hypertension.    Diagnosis: Hypertension, unspecified type  Assessment and Plan of Treatment: Be sure to follow a low salt diet. If you have been prescribed antihypertensive medications to control your blood pressure, be sure to take them every day as prescribed and do not miss any doses, the medications do not work if they are not taken consistently. Follow up with your Primary Care Doctor and have your Blood Pressure checked. PRINCIPAL DISCHARGE DIAGNOSIS  Diagnosis: Acute on chronic right-sided heart failure  Assessment and Plan of Treatment: Heart failure (HF) is a condition that does not allow your heart to fill or pump properly. Not enough oxygen in your blood gets to your organs and tissues. HF can occur in the right side, the left side, or both lower chambers of your heart. HF is often caused by damage or injury to your heart. The damage may be caused by heart attack, other heart conditions, or high blood pressure. HF is a long-term condition that tends to get worse over time. It is important to manage your health to improve your quality of life. HF can be worsened by heavy alcohol use, smoking, diabetes that is not controlled, or obesity.  Please call your doctor if you experience shortness of breath or have more difficulty breathing, increased swelling of your feet, ankles, hands or abdomen, feeling tired with normal activity or experiencing dizziness or fainting, trouble sleeping or waking up feeling short of breath or coughing, chest pain or pressure, weight gain of 3-5 pounds over 2-3 days.      SECONDARY DISCHARGE DIAGNOSES  Diagnosis: Upper respiratory infection, viral  Assessment and Plan of Treatment: You had sympoms related to an upper respiratory viral infection and were found to be positive for a viral infection. This infection likely exacerbated your underlying symptoms. Please follow up with your PMD and cardiologist within 1 week to discuss this hospitalization as well as long term treatment options.    Diagnosis: Pulmonary hypertension  Assessment and Plan of Treatment: You have pulmonary hypertension, noted through extensive evaluation of your heart. Pulmonary hypertension is a condition that causes high blood pressure in the blood vessels that carry blood to the lungs. When this happens the heart has to work harder, which causes you to have trouble breathing and to feel very tired. Overtime pulmonary hypertension can cause right sided heart strain and poor functioning. Follow up with the cardiologist Dr. Cox within 1 week to discuss long term treatment options for the pulonary hypertension.    Diagnosis: Type 2 diabetes mellitus  Assessment and Plan of Treatment: Continue with medications as prescribed. Low carbohydrate diet.    Diagnosis: Hypertension, unspecified type  Assessment and Plan of Treatment: Be sure to follow a low salt diet. If you have been prescribed antihypertensive medications to control your blood pressure, be sure to take them every day as prescribed and do not miss any doses, the medications do not work if they are not taken consistently. Follow up with your Primary Care Doctor and have your Blood Pressure checked.

## 2020-02-11 NOTE — PROGRESS NOTE ADULT - ATTENDING COMMENTS
pt seen and examined. Plan of care dw pt and Dr Cui.   Pt is not volume overloaded on exam. He has a viral syndrome which has caused his SOB.  He wants to see me for PAH and will see me as outpt.  Pt can be dc from our standpoint and fu in my office next week or two.

## 2020-02-11 NOTE — DISCHARGE NOTE PROVIDER - NSDCMRMEDTOKEN_GEN_ALL_CORE_FT
atorvastatin 40 mg oral tablet: 1 tab(s) orally once a day (at bedtime)  carvedilol 3.125 mg oral tablet: 1 tab(s) orally every 12 hours  enalapril 5 mg oral tablet: 1 tab(s) orally once a day  K-Tab 20 mEq oral tablet, extended release: 2 tab(s) orally once a day   metFORMIN 500 mg oral tablet: 1 tab(s) orally once a day aspirin 81 mg oral tablet, chewable: 1 tab(s) orally once a day  atorvastatin 40 mg oral tablet: 1 tab(s) orally once a day (at bedtime)  benzonatate 100 mg oral capsule: 1 cap(s) orally 3 times a day, As Needed   bumetanide 1 mg oral tablet: 1 tab(s) orally 2 times a day  carvedilol 3.125 mg oral tablet: 1 tab(s) orally every 12 hours  enalapril 5 mg oral tablet: 1 tab(s) orally once a day  K-Tab 20 mEq oral tablet, extended release: 2 tab(s) orally once a day   metFORMIN 500 mg oral tablet: 1 tab(s) orally once a day   sildenafil 20 mg oral tablet: 0.5 tab(s) orally 3 times a day

## 2020-02-11 NOTE — DISCHARGE NOTE PROVIDER - CARE PROVIDER_API CALL
Alton Cox)  Cardiovascular Disease  [address] [facility], [address] [place] [address]  Phone: [phone]  Fax: [phone]  Established Patient  Follow Up Time: 1 week

## 2020-02-11 NOTE — PROGRESS NOTE ADULT - ASSESSMENT
40 yo M with morbid obesity, chronic respiratory failure with hypoxia 2/2 JAN, OHS on bipap and on 3-4 liters baseline home O2, DM2 not on long term insulin, mediastinal lymphadenopathy, chronic diastolic HFpEF, RHC at [facility] in 2008 with severe pulmonary HTN, recently admitted to [facility] and dc on 1/16/20 for DHF exacerbation and currently presenting w/ recurrent symptoms of worsening sob/ peripheral edema in the setting of dietary indiscretion/non compliance to follow up. Pt placed on IV bumex bid, continues to be diuresed with improving sx. On prior hospitalization was s/p negative NM scan for amyloidosis and s/p RHC showing severe pulm htn. Cardio consulted, recommend to continue to optimize medications to offload fluid. Pt will need aggressive medication management for severe pulm htn, prior hospitalization was evaluated by rheum/ pulm to have full work up to evaluate further for noted LAD, with recommended LN bx to see if sarcoidosis or other etiology present. Pt non compliant with follow up. Also with recent influenza and post viral URI sx which patient feels have exacerbated his symptoms, supportive care being provided with notable improvement in symptoms. Will c/w management for co morbid conditions.     Acute respiratory failure with hypoxia and hypercapnia   Multifactorial HF, JAN, OHS, pHTN   BiPAP placed; continue HS/PRN   O2 via NC as needed     Acute on chronic right sided failure   CTA chest: pulmonary vascular congestion and pulmonary edema   Lasix 80mg given in the ED   c/w Bumex 1mg bid; monitor renal function   Cardiology following     RVP positive   Supportive management     HTN   Enalapril 5mg   Coreg 3.125mg bid    Pulmonary HTN   Sildenafil 10mg tid     DM-2   Insulin sliding scale     Obesity   Lifestyle modification     JAN/OHS   BiPAP HS   Follows pulmonology     Supportive   DVT prophylaxis: Lovenox   Diet: DASH     Dispo: Likely dc in in 24-48 hours pending clinical improvement 40 yo M with morbid obesity, chronic respiratory failure with hypoxia 2/2 JAN, OHS on bipap and on 3-4 liters baseline home O2, DM2 not on long term insulin, mediastinal lymphadenopathy, chronic diastolic HFpEF, RHC at [facility] in 2008 with severe pulmonary HTN, recently admitted to [facility] and [place] on 1/16/20 for DHF exacerbation and currently presenting w/ recurrent symptoms of worsening sob/ peripheral edema in the setting of dietary indiscretion/non compliance to follow up. Pt placed on IV bumex bid, continues to be diuresed with improving sx. On prior hospitalization was s/p negative NM scan for amyloidosis and s/p RHC showing severe pulm htn. Cardio consulted, recommend to continue to optimize medications to offload fluid. Pt will need aggressive medication management for severe pulm htn, prior hospitalization was evaluated by rheum/ pulm to have full work up to evaluate further for noted LAD, with recommended LN bx to see if sarcoidosis or other etiology present. Pt non compliant with follow up. Also with recent influenza and post viral URI sx which patient feels have exacerbated his symptoms, supportive care being provided with notable improvement in symptoms.  Cardio cleared pt for dispo today. Pending dispo.     Acute on chronic respiratory failure with hypoxia and hypercapnia likely multifactorial in the setting of fluid overload, acute on chronic diastolic HF exacerbation with known severe pulm htn, JAN/OHS and enterovirus positive   sob improving back to baseline  CTA chest: pulmonary vascular congestion and pulmonary edema   net negative 1.5 liters   changed bumex 1mg IV BID to po bid  c/w BiPAP placed; continue HS/PRN   c/w O2 via NC as needed at home dose   c/w tessalon pearls prn, robitussin prn   c/w coreg, lisinopril and sildenafil   cardiology f/u noted and appreciated, d/w Dr. Cox and the pt is cleared to be discharged to f/u in his office for further management and care     HTN   bp stable   Enalapril 5mg   Coreg 3.125mg bid    CKD2  at baseline creatine  will allow degree azotemia for cardiorenal     DM-2   HBA1C: 6.3  FS Stable  c/w accuchecks ACHS TID  c/w HSS   c/w hypoglycemia protocol     JAN/OHS   severe pulm htn   c/w BiPAP HS   diet lifestly modification counselling   Follows pulmonology and needs to f/u outpt for further management     Obesity   BMI 48   counselled on diet/Lifestyle modification     Supportive   DVT prophylaxis: Lovenox   Diet: DASH     Dispo:  Pt to be discharged today, home care provided. Pending ability to have friend pick him up and bring o2. Planned for today after 7pm.

## 2020-02-11 NOTE — PROGRESS NOTE ADULT - REASON FOR ADMISSION
Acute on chronic CHF exacerbation

## 2020-02-11 NOTE — PROGRESS NOTE ADULT - ASSESSMENT
40 y/o male with R heart failure, severe pulmonary HTN on home O2 presents with SOB x1 week. Pt was recently admitted o another hospital for influenza but hasn't improved since discharge. In the ER pt found to have RVP positive for entero/rhinovirus, chest CT showing pulmonary edema, ABG with hypoxemia. Troponin negative, proBNP 1603.      Problem/Recommendation - 1:  Problem: Acute on chronic right-sided heart failure. Recommendation: -admit to telemetry  -SOB likely due to combination of R heart failure, pHTN and respiratory viral infection  -ct Bumex to 1 mg IV bid  - electrolytes with ongoing diuresis  K 4.1.   -low sodium diet  -daily weights  -nocturnal CPAP.     Problem/Recommendation - 2:  ·  Problem: Pulmonary hypertension.  Recommendation: -Sildenafil 10 mg tid  -supplemental O2 to maintain SpO2 >90%.      Problem/Recommendation - 3:  ·  Problem: Hypertension, unspecified type.  Recommendation: -continue Coreg and Enalapril.      Problem/Recommendation - 4:  ·  Problem: Upper respiratory infection, viral.  Recommendation: -bronchodilators  -pulmonary toilet  -Tylenol for fever as needed.

## 2020-02-11 NOTE — PROGRESS NOTE ADULT - SUBJECTIVE AND OBJECTIVE BOX
[facility]                                                               Office: [address] [address]                                                              Telephone: [phone]. Fax:[phone]                                                                             PROGRESS NOTE  Reason for follow up:   Overnight: No new events.   Update:     Subjective: "  ______________________"      	  Vitals:  T(C): 36.6 (02-11-20 @ 10:01), Max: 37 (02-10-20 @ 15:41)  HR: 81 (02-11-20 @ 10:01) (68 - 88)  BP: 104/68 (02-11-20 @ 10:01) (101/72 - 120/81)  RR: 18 (02-11-20 @ 10:01) (18 - 18)  SpO2: 91% (02-11-20 @ 10:01) (86% - 100%)    I&O's Summary    10 Feb 2020 07:01  -  11 Feb 2020 07:00  --------------------------------------------------------  IN: 630 mL / OUT: 1500 mL / NET: -870 mL    11 Feb 2020 07:01  -  11 Feb 2020 12:20  --------------------------------------------------------  IN: 120 mL / OUT: 900 mL / NET: -780 mL      Weight (kg): 149.7 (02-10 @ 02:00)      PHYSICAL EXAM:  Appearance: Comfortable. No acute distress  HEENT:  Head and neck: Atraumatic. Normocephalic.  Normal oral mucosa, PERRL, Neck is supple. No JVD, No carotid bruit.   Neurologic: A & O x 3, no focal deficits. EOMI.  Lymphatic: No cervical lymphadenopathy  Cardiovascular: Normal S1 S2, No murmur, rubs/gallops. No JVD, No edema  Respiratory: Left lower lobe rales, cleared upon cough     Gastrointestinal:  Soft, Non-tender, + BS  Lower Extremities: No edema  Psychiatry: Patient is calm. No agitation. Mood & affect appropriate  Skin: No rashes/ ecchymoses/cyanosis/ulcers visualized on the face, hands or feet.      CURRENT MEDICATIONS:  buMETAnide Injectable 1 milliGRAM(s) IV Push two times a day  carvedilol 3.125 milliGRAM(s) Oral every 12 hours  enalapril 5 milliGRAM(s) Oral daily  sildenafil (REVATIO) 10 milliGRAM(s) Oral three times a day  benzonatate  atorvastatin  dextrose 50% Injectable  dextrose 50% Injectable  dextrose 50% Injectable  insulin lispro (HumaLOG) corrective regimen sliding scale  aspirin  chewable  dextrose 5%.  enoxaparin Injectable  potassium chloride    Tablet ER      DIAGNOSTIC TESTING:  [ ] Echocardiogram: < from: TTE Echo Complete w/Doppler (12.21.19 @ 08:48) >  Summary:   1. Technically fair study.   2. Left ventricular ejection fraction, by visual estimation, is 65 to 70%.   3. There is moderate septal left ventricular hypertrophy.   4. Severely enlarged right ventricle.   5. Severely reduced RV systolic function.   6. Right ventricular volume and pressure overload.   7. Moderate-severe tricuspid regurgitation.   8. Estimated pulmonary artery systolic pressure is 89.6 mmHg assuming a right atrial pressure of 15 mmHg, which is consistent with severe pulmonary hypertension.   9. Moderately dilated pulmonary artery.    < end of copied text >    [ ]  Catheterization:< from: Cardiac Cath Lab - Adult (01.14.20 @ 12:56) >  HEMODYNAMICS: There is severe right sided failure. There is severe, fixed  pulmonary hypertension. With continued administration of inhaled nitric  oxide ( 40 PPM), there was no change in pulmonary hemodynamics.    < end of copied text >        OTHER: 	  CT Angio: < from: CT Angio Chest w/ IV Cont (02.09.20 @ 22:03) >  IMPRESSION:    No pulmonary embolism.    Pulmonary vascular congestion and pulmonary edema. Superimposed infectious or inflammatory process is not excluded.    Extensive mediastinal and hilar lymphadenopathy is again noted.    < end of copied text >      LABS:	 	  CARDIAC MARKERS ( 10 Feb 2020 05:59 )  x     / x     / x     / x     / x      p-BNP 10 Feb 2020 05:59: 1486 pg/mL, CARDIAC MARKERS ( 09 Feb 2020 18:21 )  x     / <0.01 ng/mL / x     / x     / x      p-BNP 09 Feb 2020 18:21: 1603 pg/mL                          12.7   3.71  )-----------( 203      ( 11 Feb 2020 06:14 )             39.7     02-11    134<L>  |  93<L>  |  22.0<H>  ----------------------------<  114<H>  4.1   |  29.0  |  1.35<H>    Ca    9.0      11 Feb 2020 06:14  Phos  3.9     02-11  Mg     1.6     02-11    TPro  9.1<H>  /  Alb  3.5  /  TBili  1.7  /  DBili  x   /  AST  28  /  ALT  25  /  AlkPhos  113  02-09    proBNP: Serum Pro-Brain Natriuretic Peptide: 1486 pg/mL (02-10 @ 05:59)  Serum Pro-Brain Natriuretic Peptide: 1603 pg/mL (02-09 @ 18:21)      TELEMETRY: NSR HR @  86  ECG: NSR HR @ 94 [facility]                                                               Office: [address] [address]                                                              Telephone: [phone]. Fax:[phone]                                                                             PROGRESS NOTE  Reason for follow up: Volume overload, PAH.   Overnight: No new events.   Update:     Subjective:  breathing is improved. no cp, + cough.      	  Vitals:  T(C): 36.6 (02-11-20 @ 10:01), Max: 37 (02-10-20 @ 15:41)  HR: 81 (02-11-20 @ 10:01) (68 - 88)  BP: 104/68 (02-11-20 @ 10:01) (101/72 - 120/81)  RR: 18 (02-11-20 @ 10:01) (18 - 18)  SpO2: 91% (02-11-20 @ 10:01) (86% - 100%)    I&O's Summary    10 Feb 2020 07:01  -  11 Feb 2020 07:00  --------------------------------------------------------  IN: 630 mL / OUT: 1500 mL / NET: -870 mL    11 Feb 2020 07:01  -  11 Feb 2020 12:20  --------------------------------------------------------  IN: 120 mL / OUT: 900 mL / NET: -780 mL      Weight (kg): 149.7 (02-10 @ 02:00)      PHYSICAL EXAM:  Appearance: Comfortable. No acute distress  HEENT:  Head and neck: Atraumatic. Normocephalic.  Normal oral mucosa, PERRL, Neck is supple. No JVD, No carotid bruit.   Neurologic: A & O x 3, no focal deficits. EOMI.  Lymphatic: No cervical lymphadenopathy  Cardiovascular: Normal S1 S2, No murmur, rubs/gallops. No JVD, No edema  Respiratory: Left lower lobe rales, cleared upon cough     Gastrointestinal:  Soft, Non-tender, + BS  Lower Extremities: No edema  Psychiatry: Patient is calm. No agitation. Mood & affect appropriate  Skin: No rashes/ ecchymoses/cyanosis/ulcers visualized on the face, hands or feet.      CURRENT MEDICATIONS:  buMETAnide Injectable 1 milliGRAM(s) IV Push two times a day  carvedilol 3.125 milliGRAM(s) Oral every 12 hours  enalapril 5 milliGRAM(s) Oral daily  sildenafil (REVATIO) 10 milliGRAM(s) Oral three times a day  benzonatate  atorvastatin  dextrose 50% Injectable  dextrose 50% Injectable  dextrose 50% Injectable  insulin lispro (HumaLOG) corrective regimen sliding scale  aspirin  chewable  dextrose 5%.  enoxaparin Injectable  potassium chloride    Tablet ER    DIAGNOSTIC TESTING:  [ ] Echocardiogram: < from: TTE Echo Complete w/Doppler (12.21.19 @ 08:48) >  Summary:   1. Technically fair study.   2. Left ventricular ejection fraction, by visual estimation, is 65 to 70%.   3. There is moderate septal left ventricular hypertrophy.   4. Severely enlarged right ventricle.   5. Severely reduced RV systolic function.   6. Right ventricular volume and pressure overload.   7. Moderate-severe tricuspid regurgitation.   8. Estimated pulmonary artery systolic pressure is 89.6 mmHg assuming a right atrial pressure of 15 mmHg, which is consistent with severe pulmonary hypertension.   9. Moderately dilated pulmonary artery.    < end of copied text >    [ ]  Catheterization:< from: Cardiac Cath Lab - Adult (01.14.20 @ 12:56) >  HEMODYNAMICS: There is severe right sided failure. There is severe, fixed  pulmonary hypertension. With continued administration of inhaled nitric  oxide ( 40 PPM), there was no change in pulmonary hemodynamics.    < end of copied text >        OTHER: 	  CT Angio: < from: CT Angio Chest w/ IV Cont (02.09.20 @ 22:03) >  IMPRESSION:    No pulmonary embolism.    Pulmonary vascular congestion and pulmonary edema. Superimposed infectious or inflammatory process is not excluded.    Extensive mediastinal and hilar lymphadenopathy is again noted.    < end of copied text >      LABS:	 	  CARDIAC MARKERS ( 10 Feb 2020 05:59 )  x     / x     / x     / x     / x      p-BNP 10 Feb 2020 05:59: 1486 pg/mL, CARDIAC MARKERS ( 09 Feb 2020 18:21 )  x     / <0.01 ng/mL / x     / x     / x      p-BNP 09 Feb 2020 18:21: 1603 pg/mL                          12.7   3.71  )-----------( 203      ( 11 Feb 2020 06:14 )             39.7     02-11    134<L>  |  93<L>  |  22.0<H>  ----------------------------<  114<H>  4.1   |  29.0  |  1.35<H>    Ca    9.0      11 Feb 2020 06:14  Phos  3.9     02-11  Mg     1.6     02-11    TPro  9.1<H>  /  Alb  3.5  /  TBili  1.7  /  DBili  x   /  AST  28  /  ALT  25  /  AlkPhos  113  02-09    proBNP: Serum Pro-Brain Natriuretic Peptide: 1486 pg/mL (02-10 @ 05:59)  Serum Pro-Brain Natriuretic Peptide: 1603 pg/mL (02-09 @ 18:21)      TELEMETRY: NSR HR @  86  ECG: NSR HR @ 94

## 2020-02-11 NOTE — DIETITIAN INITIAL EVALUATION ADULT. - PERTINENT LABORATORY DATA
02-11 Na134 mmol/L<L> Glu 114 mg/dL<H> K+ 4.1 mmol/L Cr  1.35 mg/dL<H> BUN 22.0 mg/dL<H> Phos 3.9 mg/dL Alb n/a   PAB n/a

## 2020-02-12 LAB — GLUCOSE BLDC GLUCOMTR-MCNC: 88 MG/DL — SIGNIFICANT CHANGE UP (ref 70–99)

## 2020-02-12 PROCEDURE — 96374 THER/PROPH/DIAG INJ IV PUSH: CPT | Mod: XU

## 2020-02-12 PROCEDURE — 94760 N-INVAS EAR/PLS OXIMETRY 1: CPT

## 2020-02-12 PROCEDURE — 99239 HOSP IP/OBS DSCHRG MGMT >30: CPT

## 2020-02-12 PROCEDURE — 99285 EMERGENCY DEPT VISIT HI MDM: CPT | Mod: 25

## 2020-02-12 PROCEDURE — 80053 COMPREHEN METABOLIC PANEL: CPT

## 2020-02-12 PROCEDURE — 82435 ASSAY OF BLOOD CHLORIDE: CPT

## 2020-02-12 PROCEDURE — 97163 PT EVAL HIGH COMPLEX 45 MIN: CPT

## 2020-02-12 PROCEDURE — 84132 ASSAY OF SERUM POTASSIUM: CPT

## 2020-02-12 PROCEDURE — 85730 THROMBOPLASTIN TIME PARTIAL: CPT

## 2020-02-12 PROCEDURE — 83735 ASSAY OF MAGNESIUM: CPT

## 2020-02-12 PROCEDURE — 84484 ASSAY OF TROPONIN QUANT: CPT

## 2020-02-12 PROCEDURE — 85027 COMPLETE CBC AUTOMATED: CPT

## 2020-02-12 PROCEDURE — 87486 CHLMYD PNEUM DNA AMP PROBE: CPT

## 2020-02-12 PROCEDURE — 82803 BLOOD GASES ANY COMBINATION: CPT

## 2020-02-12 PROCEDURE — 87581 M.PNEUMON DNA AMP PROBE: CPT

## 2020-02-12 PROCEDURE — 71275 CT ANGIOGRAPHY CHEST: CPT

## 2020-02-12 PROCEDURE — 83605 ASSAY OF LACTIC ACID: CPT

## 2020-02-12 PROCEDURE — 85610 PROTHROMBIN TIME: CPT

## 2020-02-12 PROCEDURE — 94640 AIRWAY INHALATION TREATMENT: CPT

## 2020-02-12 PROCEDURE — 36415 COLL VENOUS BLD VENIPUNCTURE: CPT

## 2020-02-12 PROCEDURE — 84295 ASSAY OF SERUM SODIUM: CPT

## 2020-02-12 PROCEDURE — 85014 HEMATOCRIT: CPT

## 2020-02-12 PROCEDURE — 97116 GAIT TRAINING THERAPY: CPT

## 2020-02-12 PROCEDURE — 82947 ASSAY GLUCOSE BLOOD QUANT: CPT

## 2020-02-12 PROCEDURE — 80048 BASIC METABOLIC PNL TOTAL CA: CPT

## 2020-02-12 PROCEDURE — 82330 ASSAY OF CALCIUM: CPT

## 2020-02-12 PROCEDURE — 97530 THERAPEUTIC ACTIVITIES: CPT

## 2020-02-12 PROCEDURE — 82962 GLUCOSE BLOOD TEST: CPT

## 2020-02-12 PROCEDURE — 93005 ELECTROCARDIOGRAM TRACING: CPT

## 2020-02-12 PROCEDURE — 84100 ASSAY OF PHOSPHORUS: CPT

## 2020-02-12 PROCEDURE — 94660 CPAP INITIATION&MGMT: CPT

## 2020-02-12 PROCEDURE — 83880 ASSAY OF NATRIURETIC PEPTIDE: CPT

## 2020-02-12 PROCEDURE — 71045 X-RAY EXAM CHEST 1 VIEW: CPT

## 2020-02-12 PROCEDURE — 87798 DETECT AGENT NOS DNA AMP: CPT

## 2020-02-12 PROCEDURE — 87633 RESP VIRUS 12-25 TARGETS: CPT

## 2020-02-12 RX ORDER — BUMETANIDE 0.25 MG/ML
1 INJECTION INTRAMUSCULAR; INTRAVENOUS
Refills: 0 | Status: DISCONTINUED | OUTPATIENT
Start: 2020-02-12 | End: 2020-02-12

## 2020-02-12 RX ADMIN — BUMETANIDE 1 MILLIGRAM(S): 0.25 INJECTION INTRAMUSCULAR; INTRAVENOUS at 05:32

## 2020-02-12 RX ADMIN — Medication 100 MILLIGRAM(S): at 05:32

## 2020-02-12 RX ADMIN — CARVEDILOL PHOSPHATE 3.12 MILLIGRAM(S): 80 CAPSULE, EXTENDED RELEASE ORAL at 05:32

## 2020-02-12 RX ADMIN — Medication 10 MILLIGRAM(S): at 05:32

## 2020-02-12 RX ADMIN — Medication 5 MILLIGRAM(S): at 05:32

## 2020-02-13 VITALS — WEIGHT: 315 LBS

## 2020-02-24 ENCOUNTER — INPATIENT (INPATIENT)
Facility: HOSPITAL | Age: 40
LOS: 2 days | Discharge: HOME | DRG: 292 | End: 2020-02-27
Attending: HOSPITALIST
Payer: MEDICARE

## 2020-02-24 VITALS
HEIGHT: 69 IN | RESPIRATION RATE: 22 BRPM | DIASTOLIC BLOOD PRESSURE: 75 MMHG | WEIGHT: 302.92 LBS | OXYGEN SATURATION: 84 % | HEART RATE: 94 BPM | SYSTOLIC BLOOD PRESSURE: 107 MMHG | TEMPERATURE: 98 F

## 2020-02-24 DIAGNOSIS — I50.9 HEART FAILURE, UNSPECIFIED: ICD-10-CM

## 2020-02-24 LAB
ALBUMIN SERPL ELPH-MCNC: 3.3 G/DL — SIGNIFICANT CHANGE UP (ref 3.3–5.2)
ALP SERPL-CCNC: 91 U/L — SIGNIFICANT CHANGE UP (ref 40–120)
ALT FLD-CCNC: 15 U/L — SIGNIFICANT CHANGE UP
ANION GAP SERPL CALC-SCNC: 12 MMOL/L — SIGNIFICANT CHANGE UP (ref 5–17)
AST SERPL-CCNC: 22 U/L — SIGNIFICANT CHANGE UP
BASOPHILS # BLD AUTO: 0.04 K/UL — SIGNIFICANT CHANGE UP (ref 0–0.2)
BASOPHILS NFR BLD AUTO: 1 % — SIGNIFICANT CHANGE UP (ref 0–2)
BILIRUB SERPL-MCNC: 1.4 MG/DL — SIGNIFICANT CHANGE UP (ref 0.4–2)
BUN SERPL-MCNC: 19 MG/DL — SIGNIFICANT CHANGE UP (ref 8–20)
CALCIUM SERPL-MCNC: 8.8 MG/DL — SIGNIFICANT CHANGE UP (ref 8.6–10.2)
CHLORIDE SERPL-SCNC: 95 MMOL/L — LOW (ref 98–107)
CO2 SERPL-SCNC: 30 MMOL/L — HIGH (ref 22–29)
CREAT SERPL-MCNC: 1.62 MG/DL — HIGH (ref 0.5–1.3)
EOSINOPHIL # BLD AUTO: 0.16 K/UL — SIGNIFICANT CHANGE UP (ref 0–0.5)
EOSINOPHIL NFR BLD AUTO: 3.8 % — SIGNIFICANT CHANGE UP (ref 0–6)
GLUCOSE SERPL-MCNC: 95 MG/DL — SIGNIFICANT CHANGE UP (ref 70–99)
HCT VFR BLD CALC: 40.2 % — SIGNIFICANT CHANGE UP (ref 39–50)
HGB BLD-MCNC: 12.8 G/DL — LOW (ref 13–17)
IMM GRANULOCYTES NFR BLD AUTO: 0.2 % — SIGNIFICANT CHANGE UP (ref 0–1.5)
LYMPHOCYTES # BLD AUTO: 0.67 K/UL — LOW (ref 1–3.3)
LYMPHOCYTES # BLD AUTO: 16 % — SIGNIFICANT CHANGE UP (ref 13–44)
MAGNESIUM SERPL-MCNC: 1.4 MG/DL — LOW (ref 1.6–2.6)
MCHC RBC-ENTMCNC: 30.6 PG — SIGNIFICANT CHANGE UP (ref 27–34)
MCHC RBC-ENTMCNC: 31.8 GM/DL — LOW (ref 32–36)
MCV RBC AUTO: 96.2 FL — SIGNIFICANT CHANGE UP (ref 80–100)
MONOCYTES # BLD AUTO: 0.55 K/UL — SIGNIFICANT CHANGE UP (ref 0–0.9)
MONOCYTES NFR BLD AUTO: 13.1 % — SIGNIFICANT CHANGE UP (ref 2–14)
NEUTROPHILS # BLD AUTO: 2.77 K/UL — SIGNIFICANT CHANGE UP (ref 1.8–7.4)
NEUTROPHILS NFR BLD AUTO: 65.9 % — SIGNIFICANT CHANGE UP (ref 43–77)
NT-PROBNP SERPL-SCNC: 1137 PG/ML — HIGH (ref 0–300)
PLATELET # BLD AUTO: 172 K/UL — SIGNIFICANT CHANGE UP (ref 150–400)
POTASSIUM SERPL-MCNC: 3.7 MMOL/L — SIGNIFICANT CHANGE UP (ref 3.5–5.3)
POTASSIUM SERPL-SCNC: 3.7 MMOL/L — SIGNIFICANT CHANGE UP (ref 3.5–5.3)
PROT SERPL-MCNC: 8.1 G/DL — SIGNIFICANT CHANGE UP (ref 6.6–8.7)
RBC # BLD: 4.18 M/UL — LOW (ref 4.2–5.8)
RBC # FLD: 16.2 % — HIGH (ref 10.3–14.5)
SODIUM SERPL-SCNC: 137 MMOL/L — SIGNIFICANT CHANGE UP (ref 135–145)
TROPONIN T SERPL-MCNC: <0.01 NG/ML — SIGNIFICANT CHANGE UP (ref 0–0.06)
WBC # BLD: 4.2 K/UL — SIGNIFICANT CHANGE UP (ref 3.8–10.5)
WBC # FLD AUTO: 4.2 K/UL — SIGNIFICANT CHANGE UP (ref 3.8–10.5)

## 2020-02-24 PROCEDURE — 93010 ELECTROCARDIOGRAM REPORT: CPT

## 2020-02-24 PROCEDURE — 99223 1ST HOSP IP/OBS HIGH 75: CPT

## 2020-02-24 PROCEDURE — 93970 EXTREMITY STUDY: CPT | Mod: 26

## 2020-02-24 PROCEDURE — 71046 X-RAY EXAM CHEST 2 VIEWS: CPT | Mod: 26

## 2020-02-24 PROCEDURE — 99285 EMERGENCY DEPT VISIT HI MDM: CPT

## 2020-02-24 RX ORDER — ASPIRIN/CALCIUM CARB/MAGNESIUM 324 MG
81 TABLET ORAL DAILY
Refills: 0 | Status: DISCONTINUED | OUTPATIENT
Start: 2020-02-24 | End: 2020-02-27

## 2020-02-24 RX ORDER — MAGNESIUM SULFATE 500 MG/ML
1 VIAL (ML) INJECTION ONCE
Refills: 0 | Status: COMPLETED | OUTPATIENT
Start: 2020-02-24 | End: 2020-02-24

## 2020-02-24 RX ORDER — SODIUM CHLORIDE 9 MG/ML
1000 INJECTION, SOLUTION INTRAVENOUS
Refills: 0 | Status: DISCONTINUED | OUTPATIENT
Start: 2020-02-24 | End: 2020-02-27

## 2020-02-24 RX ORDER — DEXTROSE 50 % IN WATER 50 %
12.5 SYRINGE (ML) INTRAVENOUS ONCE
Refills: 0 | Status: DISCONTINUED | OUTPATIENT
Start: 2020-02-24 | End: 2020-02-27

## 2020-02-24 RX ORDER — ATORVASTATIN CALCIUM 80 MG/1
40 TABLET, FILM COATED ORAL AT BEDTIME
Refills: 0 | Status: DISCONTINUED | OUTPATIENT
Start: 2020-02-24 | End: 2020-02-27

## 2020-02-24 RX ORDER — CARVEDILOL PHOSPHATE 80 MG/1
3.12 CAPSULE, EXTENDED RELEASE ORAL EVERY 12 HOURS
Refills: 0 | Status: DISCONTINUED | OUTPATIENT
Start: 2020-02-24 | End: 2020-02-27

## 2020-02-24 RX ORDER — DEXTROSE 50 % IN WATER 50 %
25 SYRINGE (ML) INTRAVENOUS ONCE
Refills: 0 | Status: DISCONTINUED | OUTPATIENT
Start: 2020-02-24 | End: 2020-02-27

## 2020-02-24 RX ORDER — INSULIN LISPRO 100/ML
VIAL (ML) SUBCUTANEOUS AT BEDTIME
Refills: 0 | Status: DISCONTINUED | OUTPATIENT
Start: 2020-02-24 | End: 2020-02-27

## 2020-02-24 RX ORDER — BUMETANIDE 0.25 MG/ML
1 INJECTION INTRAMUSCULAR; INTRAVENOUS ONCE
Refills: 0 | Status: COMPLETED | OUTPATIENT
Start: 2020-02-24 | End: 2020-02-24

## 2020-02-24 RX ORDER — DEXTROSE 50 % IN WATER 50 %
15 SYRINGE (ML) INTRAVENOUS ONCE
Refills: 0 | Status: DISCONTINUED | OUTPATIENT
Start: 2020-02-24 | End: 2020-02-27

## 2020-02-24 RX ORDER — POTASSIUM CHLORIDE 20 MEQ
40 PACKET (EA) ORAL DAILY
Refills: 0 | Status: DISCONTINUED | OUTPATIENT
Start: 2020-02-24 | End: 2020-02-27

## 2020-02-24 RX ORDER — GLUCAGON INJECTION, SOLUTION 0.5 MG/.1ML
1 INJECTION, SOLUTION SUBCUTANEOUS ONCE
Refills: 0 | Status: DISCONTINUED | OUTPATIENT
Start: 2020-02-24 | End: 2020-02-27

## 2020-02-24 RX ORDER — INSULIN LISPRO 100/ML
VIAL (ML) SUBCUTANEOUS
Refills: 0 | Status: DISCONTINUED | OUTPATIENT
Start: 2020-02-24 | End: 2020-02-27

## 2020-02-24 RX ORDER — ENOXAPARIN SODIUM 100 MG/ML
30 INJECTION SUBCUTANEOUS DAILY
Refills: 0 | Status: DISCONTINUED | OUTPATIENT
Start: 2020-02-24 | End: 2020-02-25

## 2020-02-24 RX ORDER — BUMETANIDE 0.25 MG/ML
1 INJECTION INTRAMUSCULAR; INTRAVENOUS EVERY 12 HOURS
Refills: 0 | Status: DISCONTINUED | OUTPATIENT
Start: 2020-02-25 | End: 2020-02-27

## 2020-02-24 RX ADMIN — Medication 100 GRAM(S): at 22:23

## 2020-02-24 RX ADMIN — Medication 10 MILLIGRAM(S): at 22:22

## 2020-02-24 RX ADMIN — ATORVASTATIN CALCIUM 40 MILLIGRAM(S): 80 TABLET, FILM COATED ORAL at 22:23

## 2020-02-24 NOTE — H&P ADULT - ASSESSMENT
40 y/o male with PMH of CHF, pHTN on 6L home O2, HTN, JAN, OHS, obesity, DM-2 came to the ED complaining of shortness of breath and LE swelling. Patient said his symptoms started about 3 days ago, he had pain in his leg due to swelling to the level of his thigh. He also has shortness of breath despite using his home O2; he has however, continue to use the same liters of home O2. He reported absolute compliant with medications and diet.       Acute on chronic HF   Admit to monitor bed with    Bumex 1mg IV once given in the ED, will continue bid. Monitor renal function   KCL 40mg daily   Cardiology consulted     B/L LE edema likely due to HF   US doppler b/l negative for DVT   Continue treatment as above     Hypomagnesemia   M.4  Supplemented  Monitor     HTN   Enalapril 5mg; monitor renal function   Coreg 3.125mg bid with holding parameters     Pulmonary HTN   Sildenafil 10mg tid     DM-2   Insulin sliding scale     Obesity   Lifestyle modification     JAN/OHS   CIPAP HS     Supportive   DVT prophylaxis: Lovenox 30mg   Diet: DASH

## 2020-02-24 NOTE — ED PROVIDER NOTE - ATTENDING CONTRIBUTION TO CARE
I, Patricio Barksdale, personally saw the patient with the resident, and completed the key components of the history and physical exam. I then discussed the management plan with the resident.    40 yo M hx of CHF, JAN, obesity, DM,  p/w worsening exertional dyspnea and worsening thigh pain. He is 5L O2 on at home. He was initally hypoxic and improved after rest. CXR looked worse compared to prior. bumex 1 mg IV given. duplex leg negative for DVT. Trop negative. probnp improved to prior but has clinical signes of CHF.  admitted for CHF exacerbation.

## 2020-02-24 NOTE — ED PROVIDER NOTE - OBJECTIVE STATEMENT
Pt is a 40 y/o M w/PMHx PMH of CHF, pHTN on 6L home O2, HTN, JAN, OHS, obesity, DMII presents c/o shortness of breath and increased leg swelling.  Pt states over the last few days he has felt pain in both of his thighs which he attributes to increased swelling.  He is currently not working and spends most of his days sitting in a chair with his leg flat on the floor.  He has been sleeping in a reclining chair as well.  He has not increased his home O2.  Pt denies recent illness, chest pain, fever, cough abdominal pain, endorses shortness of breath, leg swelling

## 2020-02-24 NOTE — ED PROVIDER NOTE - PHYSICAL EXAMINATION
General: well appearing, interactive, obese, NAD  HEENT: pupils equal and reactive, normal external ears bilaterally   Cardiac: RRR, no MRG appreciated  Resp: lungs clear to auscultation bilaterally, symmetric chest wall rise  Abd: soft, nontender, nondistended,   : no CVA tenderness  Neuro: Moving all extremities  MSK: BL LE edema R>L, thigh ttp BL  Skin:  normal color for race

## 2020-02-24 NOTE — H&P ADULT - HISTORY OF PRESENT ILLNESS
40 y/o male with PMH of CHF, pHTN on 6L home O2, HTN, JAN, OHS, obesity, DM-2 came to the ED complaining of shortness of breath and LE swelling. Patient said his symptoms started about 3 days ago, he had pain in his leg due to swelling to the level of his thigh. He also has shortness of breath despite using his home O2; he has however, continue to use the same liters of home O2. He reported absolute compliant with medications and diet. He has no chest pain, palpitation, nausea, vomiting, diaphoresis, cough, fever, chills, abdominal pain, change in bowel/urinary habit, recent travel.

## 2020-02-24 NOTE — ED PROVIDER NOTE - CLINICAL SUMMARY MEDICAL DECISION MAKING FREE TEXT BOX
Pt is a 38 y/o M presents c/o shortness of breath will get cxr, bnp, labs, venous u/s b/l due to lag pain.  O2 on 5LNC

## 2020-02-24 NOTE — ED ADULT NURSE NOTE - OBJECTIVE STATEMENT
Patient with shortness of breath and swelling b/l lower extremities "for a few days" patient is alert and oriented x4, saturating >92% on 4L NC.  Denies chest pain, denies pain of any kind.  Comfortable appearing, respirations are unlabored at this time.

## 2020-02-24 NOTE — ED ADULT NURSE REASSESSMENT NOTE - NS ED NURSE REASSESS COMMENT FT1
Report received and patient care assumed from ETHEL Rehman at 2130. Pt received awake, alert, oriented x4. Breath sounds diminished bilaterally, pt with respirations appearing even, unlabored at this time. Pt with 6L o2 in place via NC. Pt denies any pain at this time. Cardiac monitor in place showing NSR HR 80s, pulse oxygenation in place with o2 saturation in the mid 90s. VS documented per flow. Pt with no apparent acute distress observed at this time. Safety maintained. Will continue to monitor.
received report from day shift rn, pt laying in stretcher, on cardiac monitor, o2 via nc 2l, holding bumex for bp of 101/59 as per md frank, will continue to monitor, pt stable and no s/s of acute distress, pt to us, pt safety maintained.
report given to kimberly horn, pt comfortable in stretcher, no s/s of acurte distress, moved to b5l placed on cardiac monitor, assisted pt with toileting while maintaining pt safety.

## 2020-02-25 LAB
ANION GAP SERPL CALC-SCNC: 12 MMOL/L — SIGNIFICANT CHANGE UP (ref 5–17)
BUN SERPL-MCNC: 22 MG/DL — HIGH (ref 8–20)
CALCIUM SERPL-MCNC: 8.8 MG/DL — SIGNIFICANT CHANGE UP (ref 8.6–10.2)
CHLORIDE SERPL-SCNC: 97 MMOL/L — LOW (ref 98–107)
CO2 SERPL-SCNC: 28 MMOL/L — SIGNIFICANT CHANGE UP (ref 22–29)
CREAT SERPL-MCNC: 1.49 MG/DL — HIGH (ref 0.5–1.3)
GLUCOSE BLDC GLUCOMTR-MCNC: 111 MG/DL — HIGH (ref 70–99)
GLUCOSE BLDC GLUCOMTR-MCNC: 86 MG/DL — SIGNIFICANT CHANGE UP (ref 70–99)
GLUCOSE BLDC GLUCOMTR-MCNC: 92 MG/DL — SIGNIFICANT CHANGE UP (ref 70–99)
GLUCOSE BLDC GLUCOMTR-MCNC: 96 MG/DL — SIGNIFICANT CHANGE UP (ref 70–99)
GLUCOSE SERPL-MCNC: 97 MG/DL — SIGNIFICANT CHANGE UP (ref 70–99)
MAGNESIUM SERPL-MCNC: 1.6 MG/DL — SIGNIFICANT CHANGE UP (ref 1.6–2.6)
POTASSIUM SERPL-MCNC: 3.7 MMOL/L — SIGNIFICANT CHANGE UP (ref 3.5–5.3)
POTASSIUM SERPL-SCNC: 3.7 MMOL/L — SIGNIFICANT CHANGE UP (ref 3.5–5.3)
SODIUM SERPL-SCNC: 137 MMOL/L — SIGNIFICANT CHANGE UP (ref 135–145)

## 2020-02-25 PROCEDURE — 99233 SBSQ HOSP IP/OBS HIGH 50: CPT

## 2020-02-25 PROCEDURE — 99233 SBSQ HOSP IP/OBS HIGH 50: CPT | Mod: GC

## 2020-02-25 RX ORDER — INFLUENZA VIRUS VACCINE 15; 15; 15; 15 UG/.5ML; UG/.5ML; UG/.5ML; UG/.5ML
0.5 SUSPENSION INTRAMUSCULAR ONCE
Refills: 0 | Status: DISCONTINUED | OUTPATIENT
Start: 2020-02-25 | End: 2020-02-27

## 2020-02-25 RX ORDER — ENOXAPARIN SODIUM 100 MG/ML
40 INJECTION SUBCUTANEOUS EVERY 12 HOURS
Refills: 0 | Status: DISCONTINUED | OUTPATIENT
Start: 2020-02-25 | End: 2020-02-27

## 2020-02-25 RX ADMIN — Medication 81 MILLIGRAM(S): at 10:34

## 2020-02-25 RX ADMIN — Medication 40 MILLIEQUIVALENT(S): at 10:35

## 2020-02-25 RX ADMIN — CARVEDILOL PHOSPHATE 3.12 MILLIGRAM(S): 80 CAPSULE, EXTENDED RELEASE ORAL at 17:09

## 2020-02-25 RX ADMIN — BUMETANIDE 1 MILLIGRAM(S): 0.25 INJECTION INTRAMUSCULAR; INTRAVENOUS at 17:11

## 2020-02-25 RX ADMIN — ENOXAPARIN SODIUM 40 MILLIGRAM(S): 100 INJECTION SUBCUTANEOUS at 17:09

## 2020-02-25 RX ADMIN — CARVEDILOL PHOSPHATE 3.12 MILLIGRAM(S): 80 CAPSULE, EXTENDED RELEASE ORAL at 05:20

## 2020-02-25 RX ADMIN — Medication 10 MILLIGRAM(S): at 05:20

## 2020-02-25 RX ADMIN — ATORVASTATIN CALCIUM 40 MILLIGRAM(S): 80 TABLET, FILM COATED ORAL at 21:05

## 2020-02-25 RX ADMIN — Medication 10 MILLIGRAM(S): at 21:05

## 2020-02-25 RX ADMIN — BUMETANIDE 1 MILLIGRAM(S): 0.25 INJECTION INTRAMUSCULAR; INTRAVENOUS at 05:21

## 2020-02-25 RX ADMIN — Medication 10 MILLIGRAM(S): at 13:55

## 2020-02-25 RX ADMIN — Medication 5 MILLIGRAM(S): at 05:20

## 2020-02-25 NOTE — CONSULT NOTE ADULT - SUBJECTIVE AND OBJECTIVE BOX
[place] [facility]                                                               Office:  [address] [place] [address]                                                              Telephone: [phone]. Fax:[phone]                                                                        CARDIOLOGY CONSULTATION NOTE                                                                                             Consult requested by:  Dr. Epps  Reason for Consultation: CHF  History obtained by: Patient and medical record   obtained: No    Chief complaint:    Patient is a 39y old  Male who presents with a chief complaint of CHF exacerbation (24 Feb 2020 21:05)      HPI: 40 y/o M with a PMHx of hx of R sided heart failure (normalLVEF with severely reduced RV function), severe pulmonary HTN on home O2, JAN on CPAP, IDDM2 who presented to the ED with complaints of leg swelling and SOB. Patient states that he has been taking all of his medications, but starting 3 days ago he began experiencing worsening SOB and thigh swelling. Patient states that he hasn't had to increase his oxygen requirements though. Patient denies any chest pain at this time. Patient is very tired, and agitated at having to answer more questions. Refuses to answer further ROS.     REVIEW OF SYMPTOMS:     CONSTITUTIONAL: No fever, weight loss, or fatigue  ENMT:  No difficulty hearing, tinnitus, vertigo; No sinus or throat pain  NECK: No pain or stiffness  CARDIOVASCULAR: AS PER HPI  RESPIRATORY: AS PER HPI  : No dysuria, no hematuria   GI: No dark color stool, no melena, no diarrhea, no constipation, no abdominal pain   NEURO: No headache, no dizziness, no slurred speech   MUSCULOSKELETAL: No joint pain or swelling; No muscle, back, or extremity pain  PSYCH: No agitation, no anxiety.    ALL OTHER REVIEW OF SYSTEMS ARE NEGATIVE.      PREVIOUS DIAGNOSTIC TESTING  ECHO FINDINGS:  < from: TTE Echo Complete w/Doppler (12.21.19 @ 08:48) >  PHYSICIAN INTERPRETATION:  Left Ventricle: The left ventricular internal cavity size is normal.  Left ventricular ejection fraction, by visual estimation, is 65 to 70%. The interventricular septum is flattened in systole and diastole, consistent with right ventricular pressure and volume overload.  Right Ventricle: The right ventricular size is severely enlarged. RV systolic function is severely reduced. TV S' 0.2 m/s.  Left Atrium: The left atrium is normal in size.  Right Atrium: Severely enlarged right atrium.  Pericardium: There is no evidence of pericardial effusion.  Mitral Valve: The mitral valve is normal in structure. Mild mitral valve regurgitation is seen.  Tricuspid Valve: Moderate-severe tricuspid regurgitation is visualized. Estimated pulmonary artery systolic pressure is 89.6 mmHg assuming a right atrial pressure of 15 mmHg, which is consistent with severe pulmonary hypertension.  Aortic Valve: The aortic valve is normal. No evidence of aortic valve regurgitation is seen.  Pulmonic Valve: The pulmonic valve was not well visualized. Mild pulmonic valve regurgitation.  Aorta: The aortic root is normal in size and structure.  Pulmonary Artery: The pulmonary artery is moderately dilated.  Venous: The inferior vena cava was dilated, with respiratory size variation less than 50%. The inferior vena cava and the hepatic vein show a pattern of systolic flow reversal, suggestive of tricuspid regurgitation.       Summary:   1. Technically fair study.   2. Left ventricular ejection fraction, by visual estimation, is 65 to 70%.   3. There is moderate septal left ventricular hypertrophy.   4. Severely enlarged right ventricle.   5. Severely reduced RV systolic function.   6. Right ventricular volume and pressure overload.   7. Moderate-severe tricuspid regurgitation.   8. Estimated pulmonary artery systolic pressure is 89.6 mmHg assuming a right atrial pressure of 15 mmHg, which is consistent with severe pulmonary hypertension.   9. Moderately dilated pulmonary artery.    MD Josefina Electronically signed on 12/24/2019 at 2:54:21 PM    < end of copied text >    CATHETERIZATION FINDINGS:   < from: Cardiac Cath Lab - Adult (01.14.20 @ 12:56) >  MEDICATIONS GIVEN: Fentanyl,25 mcg, IV. 1% Lidocaine, 10 ml,  subcutaneously.  HEMODYNAMICS: There is severe right sided failure. There is severe, fixed  pulmonary hypertension. With continued administration of inhaled nitric  oxide ( 40 PPM), there was no change in pulmonary hemodynamics.  COMPLICATIONS: No complications occurred during the cath lab visit.  DIAGNOSTIC IMPRESSIONS: Markedly elevated right sided filling pressure (RA  23 mmHg)  Severe pulmonary hypertension ( PASP 74 mmHg, Mean 51 mmHg)  PVR 5.79 Wood Units  PCWP within normal 15 mmgh  Pulmonary hypertension showed No response to Nitric oxide  DIAGNOSTIC RECOMMENDATIONS: Management as per Dr. Cox.  Prepared and signed by  Jairon Santana MD  Signed 01/16/2020 18:16:31    < end of copied text >    ALLERGIES: Allergies    No Known Allergies    Intolerances      PAST MEDICAL HISTORY  Obesity, morbid  Pulmonary hypertension  CHF (congestive heart failure)  Hypertension      PAST SURGICAL HISTORY  No significant past surgical history    FAMILY HISTORY:  Family history of diabetes mellitus (DM): grandmother.      SOCIAL HISTORY: lives with family    CIGARETTES: smoked 20 years ago for a short time    ALCOHOL: denies    DRUGS: denies      CURRENT MEDICATIONS:  buMETAnide Injectable 1 milliGRAM(s) IV Push every 12 hours  carvedilol 3.125 milliGRAM(s) Oral every 12 hours  enalapril 5 milliGRAM(s) Oral daily  sildenafil (REVATIO) 10 milliGRAM(s) Oral three times a day     aspirin  chewable  atorvastatin  dextrose 5%.  dextrose 50% Injectable  dextrose 50% Injectable  dextrose 50% Injectable  enoxaparin Injectable  influenza   Vaccine  insulin lispro (HumaLOG) corrective regimen sliding scale  insulin lispro (HumaLOG) corrective regimen sliding scale  potassium chloride    Tablet ER    HOME MEDICATIONS:  aspirin 81 mg oral tablet, chewable: 1 tab(s) orally once a day  atorvastatin 40 mg oral tablet: 1 tab(s) orally once a day (at bedtime)  benzonatate 100 mg oral capsule: 1 cap(s) orally 3 times a day, As Needed   bumetanide 1 mg oral tablet: 1 tab(s) orally 2 times a day  carvedilol 3.125 mg oral tablet: 1 tab(s) orally every 12 hours  enalapril 5 mg oral tablet: 1 tab(s) orally once a day  K-Tab 20 mEq oral tablet, extended release: 2 tab(s) orally once a day   metFORMIN 500 mg oral tablet: 1 tab(s) orally once a day   sildenafil 20 mg oral tablet: 0.5 tab(s) orally 3 times a day      Vital Signs Last 24 Hrs  T(C): 37 (25 Feb 2020 01:00), Max: 37.3 (24 Feb 2020 21:46)  T(F): 98.6 (25 Feb 2020 01:00), Max: 99.2 (24 Feb 2020 21:46)  HR: 84 (25 Feb 2020 05:16) (82 - 94)  BP: 114/83 (25 Feb 2020 05:16) (101/59 - 114/83)  RR: 18 (25 Feb 2020 05:16) (18 - 22)  SpO2: 94% (25 Feb 2020 05:16) (84% - 98%)      PHYSICAL EXAM:  Constitutional: Comfortable . No acute distress.   HEENT: Atraumatic and normocephalic , neck is supple . no JVD. No carotid bruit. PEERL   CNS: A&Ox3. No focal deficits. EOMI. Cranial nerves II-IX are intact.   Lymph Nodes: Cervical : Not palpable.  Respiratory: CTAB  Cardiovascular: S1S2 RRR. No murmur/rubs or gallop.  Gastrointestinal: Soft non-tender and non distended . +Bowel sounds. negative Collins's sign.  Extremities: No edema.   Psychiatric: Calm . no agitation.  Skin: No skin rash/ulcers visualized to face, hands or feet.    Intake and output:   02-24 @ 07:01  -  02-25 @ 07:00  --------------------------------------------------------  IN: 0 mL / OUT: 400 mL / NET: -400 mL        LABS:                        12.8   4.20  )-----------( 172      ( 24 Feb 2020 18:29 )             40.2     02-25    137  |  97<L>  |  22.0<H>  ----------------------------<  97  3.7   |  28.0  |  1.49<H>    Ca    8.8      25 Feb 2020 06:13  Mg     1.6     02-25    TPro  8.1  /  Alb  3.3  /  TBili  1.4  /  DBili  x   /  AST  22  /  ALT  15  /  AlkPhos  91  02-24    CARDIAC MARKERS ( 24 Feb 2020 19:48 )  x     / <0.01 ng/mL / x     / x     / x        ;p-BNP=Serum Pro-Brain Natriuretic Peptide: 1137 pg/mL (02-24 @ 18:29)      INTERPRETATION OF TELEMETRY: Reviewed by me. SR, LAE, nonspecific T wave changes anterolaterally  ECG: Reviewed by me.     RADIOLOGY & ADDITIONAL STUDIES:    X-ray:  reviewed by me.   < from: Xray Chest 2 Views PA/Lat (02.24.20 @ 19:13) >   EXAM:  XR CHEST PA LAT 2V                          PROCEDURE DATE:  02/24/2020          INTERPRETATION:  PA and lateral chest radiographs     COMPARISON:  NONE.    CLINICAL INFORMATION: Chest Pain.    FINDINGS:    The airway is midline.  There areno airspace consolidations.  There is no pleural effusion or pneumothorax.   There is gross cardiomegaly unchanged from prior examination. The visualized osseus structures are intact.     IMPRESSION:  Gross cardiomegaly. No acute radiographic cardiopulmonary pathology.  Please refer to prior CT scan dated 2/9/2020    < end of copied text >

## 2020-02-25 NOTE — PROGRESS NOTE ADULT - ASSESSMENT
40 y/o male with PMH of CHF, pHTN on 6L home O2, HTN, JAN, OHS, obesity, DM-2 came to the ED complaining of SOB, orthopnea, LE swelling x 3 days, patient admitted for acute on chronic CHF exacerbation. Patient with hx of previous CHF exacerbation since January 2/2 noncompliance with medications/diet.     Acute on chronic HF, EF 60-65% in Dec 2019  s/p bumex 1 mg IV in ED  c/w Bumex 1mg IV BID  BNP: 1137  CXR: gross cardiomegaly  Monitor renal function   KCL 40mg daily   Cardiology note seen, recs appreciated  strict I&Os & daily weights   O2 to maintain O2 Sat > 88%  monitor electrolytes  Keep K > 4, Mg > 2      B/L LE edema likely due to HF   US doppler b/l negative for DVT   c/w bumex IV    HTN   Enalapril 5mg; monitor renal function   Coreg 3.125mg bid with holding parameters     Pulmonary HTN   c/w sildenafil 10 mg TID  can uptitrate dose if BP tolerates  patient to start orinetram as out patient      DM-2   stable  c/w Insulin sliding scale     Obesity   Lifestyle modification     JAN/OHS   c/w BIPAP HS     Preventive Measure  DVT prophylaxis: Lovenox 30mg     Dispo: pending clinical improvement 38 y/o male with PMH of CHF, pHTN on 6L home O2, HTN, JAN, OHS, obesity, DM-2 came to the ED complaining of SOB, orthopnea, LE swelling x 3 days, patient admitted for acute on chronic CHF exacerbation. Patient with hx of previous CHF exacerbation since January 2/2 noncompliance with medications/diet.     Acute on chronic HFpEF, EF 60-65% in Dec 2019  s/p bumex 1 mg IV in ED  c/w Bumex 1mg IV BID  BNP: 1137  CXR: gross cardiomegaly  Monitor renal function   KCL 40mg daily   Cardiology note seen, recs appreciated  strict I&Os & daily weights   O2 to maintain O2 Sat > 88%  monitor electrolytes  Keep K > 4, Mg > 2      B/L LE edema likely due to HF   US doppler b/l negative for DVT   c/w bumex IV    HTN   Enalapril 5mg; monitor renal function   Coreg 3.125mg bid with holding parameters     Pulmonary HTN   c/w sildenafil 10 mg TID  can uptitrate dose as BP tolerates  patient to start orinetram as out patient      DM-2   stable  c/w Insulin sliding scale     Obesity   Lifestyle modification     JAN/OHS   c/w BIPAP QHS     Preventive Measure  DVT prophylaxis: Lovenox 30mg     Dispo: pending clinical improvement 40 y/o male with PMH of CHF, pHTN on 6L home O2, HTN, JAN, OHS, obesity, DM-2 came to the ED complaining of SOB, orthopnea, LE swelling x 3 days, patient admitted for acute on chronic CHF exacerbation. Patient with hx of previous CHF exacerbation since January 2/2 noncompliance with medications/diet.     Acute on chronic HFpEF, EF 60-65% in Dec 2019  s/p bumex 1 mg IV in ED  c/w Bumex 1mg IV BID  BNP: 1137  CXR: gross cardiomegaly  Monitor renal function   KCL 40mg daily   Cardiology note seen, recs appreciated  strict I&Os & daily weights   O2 to maintain O2 Sat > 88%  monitor electrolytes  Keep K > 4, Mg > 2      B/L LE edema likely due to HF   US doppler b/l negative for DVT   c/w bumex IV    HAYES  downtrending, Cr now 1.49  baseline creatinine around 1.2    HTN   Enalapril 5mg; monitor renal function   Coreg 3.125mg bid with holding parameters     Pulmonary HTN   c/w sildenafil 10 mg TID  can uptitrate dose as BP tolerates  patient to start orinetram as out patient      DM-2   stable  c/w Insulin sliding scale     Obesity   Lifestyle modification     JAN/OHS   c/w BIPAP QHS     Preventive Measure  DVT prophylaxis: Lovenox 30mg     Dispo: pending clinical improvement 40 y/o male with PMH of CHF, pHTN on 6L home O2, HTN, JAN, OHS, obesity, DM-2 came to the ED complaining of SOB, orthopnea, LE swelling x 3 days, patient admitted for acute on chronic CHF exacerbation. Patient with hx of previous CHF exacerbation since January 2/2 noncompliance with medications/diet.     Acute on chronic HFpEF, EF 60-65% in Dec 2019  in the setting of severe pulmonary htn requiring 24/7 usage of 6L O2  s/p bumex 1 mg IV in ED, c/w Bumex 1mg IV BID  Monitor renal function   c/w coreg, statin, aspirin and KCL 40mg daily   c/w sildenafil, titrate dose as BP tolerates. To start orinetram as OP   Cardiology note seen, recs appreciated  strict I&Os & daily weights   O2 to maintain O2 Sat > 88%  monitor electrolytes  Keep K > 4, Mg > 2      Elevated Cr   downtrending, Cr now 1.49  baseline creatinine around 1.2; monitor while on diuretics    HTN   Enalapril 5mg; monitor renal function   Coreg 3.125mg bid with holding parameters     DM-2   stable  c/w Insulin sliding scale     Obesity Class III   with JAN/OHS  Lifestyle modification   c/w BIPAP QHS     Preventive Measure  DVT prophylaxis: sq lovenox based on weight     Dispo: pending clinical improvement

## 2020-02-25 NOTE — CONSULT NOTE ADULT - ASSESSMENT
A/P: 38 y/o M with a PMHx of hx of R sided heart failure (normalLVEF with severely reduced RV function), severe pulmonary HTN on home O2, JAN on CPAP, IDDM2 who presented to the ED with complaints of leg swelling and SOB. Patient states that he has been taking all of his medications, but starting 3 days ago he began experiencing worsening SOB and thigh swelling. Patient states that he hasn't had to increase his oxygen requirements though. Patient denies any chest pain at this time. Patient is very tired, and agitated at having to answer more questions. Refuses to answer further ROS.   Troponin neg x 1  BNP 1137    1. Acute on Chronic Right Heart Failure  - BNP overall down from previous  - States taking bumex as prescribed  - Would start on Bumex 1mg IV BID   - Continue sildenafil, coreg, and enalapril  - Monitor on telemetry.    - Strict i/o and daily standing weights.    - Keep K > 4, Mg > 2.    - Monitor renal function with ongoing diuresis.    2. Pulmonary HTN  - Continue diuresis and sildenafil as above    3. JAN  - Continue CPAP at night    Preliminary evaluation, please await official recommendations by Dr. Cox

## 2020-02-25 NOTE — PROGRESS NOTE ADULT - ATTENDING COMMENTS
Senior Resident Addendum  Note addended where needed. Patient seen and examined at bedside. I was physically present for the key portions of the evaluation and management services provided. Pt clinically stable. 40 y/o male with PMH of HFpEF, pHTN on 6L home O2, HTN, JAN, OHS, obesity, DM-2 admitted for CHF exacerbation. Symptoms improving on Bumex IV, patient does not respond to Lasix. Bipap at night. BNP not as elevated as prior admission, Trop neg. Mild HAYES, improving.  Needs outpatient cardiology follow up.  Disposition pending on clinical improvement. Likely to home in 1-2 days. Senior Resident Addendum  Pt clinically stable. 38 y/o male with PMH of HFpEF, pHTN on 6L home O2, HTN, JAN, OHS, obesity, DM-2 admitted for CHF exacerbation. Symptoms improving on Bumex IV, patient does not respond to Lasix. Bipap at night. BNP not as elevated as prior admission, Trop neg. Mild HAYES, improving.  Needs outpatient cardiology follow up.  Disposition pending on clinical improvement. Likely to home in 1-2 days.    Attending attestation: Note addended where needed. Plan discussed with patient at bedside.

## 2020-02-25 NOTE — PROGRESS NOTE ADULT - SUBJECTIVE AND OBJECTIVE BOX
Patient is a 39y old  Male who presents with a chief complaint of CHF exacerbation (2020 09:10)    INTERVAL/OVERNIGHT EVENTS:  Patient seen and examined at bedside, no acute events overnight. Patient was compliant with Bipap overnight, states that his breathing is   slightly improved. Patient had been increasingly short of breath at home despite use of 6 L supplement oxygen, states that he has been compliant with diet/medication/bipap/o2 use at home. Patient was supposed to f/u with cardio this week for eval of sever pHTN and medications. Patient denies any fevers, chills, chest pain, palpitations, abdominal pain, diarrhea.     I&O's Summary  2020 07:01  -  2020 07:00  --------------------------------------------------------  IN: 0 mL / OUT: 400 mL / NET: -400 mL    2020 07:01  -  2020 14:39  --------------------------------------------------------  IN: 240 mL / OUT: 600 mL / NET: -360 mL    Daily Height in cm: 175.26 (2020 17:21)    Daily Weight in k.4 (2020 06:16)    CAPILLARY BLOOD GLUCOSE  POCT Blood Glucose.: 86 mg/dL (2020 12:03)  POCT Blood Glucose.: 92 mg/dL (2020 08:17)    V/S:  T(F): 97.7 (2020 10:20), Max: 99.2 (2020 21:46)  HR: 82 (2020 10:20) (82 - 94)  BP: 95/62 (2020 10:20) (95/62 - 114/83)  RR: 18 (2020 10:20) (18 - 22)  SpO2: 94% (2020 05:16) (84% - 98%)    Physical Exam:   GENERAL: NAD, obese AA male  HEENT: clear sclera and conjunctiva  RESP: CTA b/l, B/L air entry, decreased breath sounds due to body habitus, no wheezing  CVS: Regular rate and rhythm; Normal S1 & S2, No murmurs, rubs, or gallops  Abdomen: obese, +BS, nontender, nondistended, no CVA tenderness  Extremities: 2+ non pitting edema,   Skin: grossly intact, LE venous stasis changes b/l  Neuro: AAOX3, motor intact 5/5    LABS                        12.8   4.20  )-----------( 172      ( 2020 18:29 )             40.2         02-    137  |  97<L>  |  22.0<H>  ----------------------------<  97  3.7   |  28.0  |  1.49<H>    Ca    8.8      2020 06:13  Mg     1.6         TPro  8.1  /  Alb  3.3  /  TBili  1.4  /  DBili  x   /  AST  22  /  ALT  15  /  AlkPhos  91  -    CARDIAC MARKERS ( 2020 19:48 )  x     / <0.01 ng/mL / x     / x     / x        LIVER FUNCTIONS - ( 2020 19:48 )  Alb: 3.3 g/dL / Pro: 8.1 g/dL / ALK PHOS: 91 U/L / ALT: 15 U/L / AST: 22 U/L / GGT: x           Xray Chest 2 Views PA/Lat (20 @ 19:13)  IMPRESSION:  Gross cardiomegaly. No acute radiographic cardiopulmonary pathology.  Please refer to prior CT scan dated 2020    MEDICATIONS  (STANDING):  aspirin  chewable 81 milliGRAM(s) Oral daily  atorvastatin 40 milliGRAM(s) Oral at bedtime  buMETAnide Injectable 1 milliGRAM(s) IV Push every 12 hours  carvedilol 3.125 milliGRAM(s) Oral every 12 hours  dextrose 5%. 1000 milliLiter(s) (50 mL/Hr) IV Continuous <Continuous>  dextrose 50% Injectable 12.5 Gram(s) IV Push once  dextrose 50% Injectable 25 Gram(s) IV Push once  dextrose 50% Injectable 25 Gram(s) IV Push once  enalapril 5 milliGRAM(s) Oral daily  enoxaparin Injectable 40 milliGRAM(s) SubCutaneous every 12 hours  influenza   Vaccine 0.5 milliLiter(s) IntraMuscular once  insulin lispro (HumaLOG) corrective regimen sliding scale   SubCutaneous three times a day before meals  insulin lispro (HumaLOG) corrective regimen sliding scale   SubCutaneous at bedtime  potassium chloride    Tablet ER 40 milliEquivalent(s) Oral daily  sildenafil (REVATIO) 10 milliGRAM(s) Oral three times a day    MEDICATIONS  (PRN):  dextrose 40% Gel 15 Gram(s) Oral once PRN Blood Glucose LESS THAN 70 milliGRAM(s)/deciliter  glucagon  Injectable 1 milliGRAM(s) IntraMuscular once PRN Glucose LESS THAN 70 milligrams/deciliter Patient is a 39y old  Male who presents with a chief complaint of CHF exacerbation (2020 09:10)    INTERVAL/OVERNIGHT EVENTS:  Patient seen and examined at bedside, no acute events overnight. Patient was compliant with Bipap overnight, states that his breathing is   slightly improved. Patient had been increasingly short of breath at home despite use of 6 L supplement oxygen, states that he has been compliant with diet/medication/bipap/o2 use at home. Patient was supposed to f/u with cardio this week for eval of sever pHTN and medications. Patient denies any fevers, chills, chest pain, palpitations, abdominal pain, diarrhea. All other ROS negative     I&O's Summary  2020 07:01  -  2020 07:00  --------------------------------------------------------  IN: 0 mL / OUT: 400 mL / NET: -400 mL    2020 07:01  -  2020 14:39  --------------------------------------------------------  IN: 240 mL / OUT: 600 mL / NET: -360 mL    Daily Height in cm: 175.26 (2020 17:21)    Daily Weight in k.4 (2020 06:16)    CAPILLARY BLOOD GLUCOSE  POCT Blood Glucose.: 86 mg/dL (2020 12:03)  POCT Blood Glucose.: 92 mg/dL (2020 08:17)    V/S:  T(F): 97.7 (2020 10:20), Max: 99.2 (2020 21:46)  HR: 82 (2020 10:20) (82 - 94)  BP: 95/62 (2020 10:20) (95/62 - 114/83)  RR: 18 (2020 10:20) (18 - 22)  SpO2: 94% (2020 05:16) (84% - 98%)    Physical Exam:   GENERAL: NAD, obese AA male  HEENT: clear sclera and conjunctiva  RESP: CTA b/l, B/L air entry, decreased breath sounds due to body habitus, no wheezing  CVS: Regular rate and rhythm; Normal S1 & S2, No murmurs, rubs, or gallops  Abdomen: obese, +BS, nontender, nondistended, no CVA tenderness  Extremities: 2+ non pitting edema,   Skin: grossly intact, LE venous stasis changes b/l  Neuro: AAOX3, motor intact 5/5    LABS                        12.8   4.20  )-----------( 172      ( 2020 18:29 )             40.2         -    137  |  97<L>  |  22.0<H>  ----------------------------<  97  3.7   |  28.0  |  1.49<H>    Ca    8.8      2020 06:13  Mg     1.6         TPro  8.1  /  Alb  3.3  /  TBili  1.4  /  DBili  x   /  AST  22  /  ALT  15  /  AlkPhos  91  -    CARDIAC MARKERS ( 2020 19:48 )  x     / <0.01 ng/mL / x     / x     / x        LIVER FUNCTIONS - ( 2020 19:48 )  Alb: 3.3 g/dL / Pro: 8.1 g/dL / ALK PHOS: 91 U/L / ALT: 15 U/L / AST: 22 U/L / GGT: x           Xray Chest 2 Views PA/Lat (20 @ 19:13)  IMPRESSION:  Gross cardiomegaly. No acute radiographic cardiopulmonary pathology.  Please refer to prior CT scan dated 2020    MEDICATIONS  (STANDING):  aspirin  chewable 81 milliGRAM(s) Oral daily  atorvastatin 40 milliGRAM(s) Oral at bedtime  buMETAnide Injectable 1 milliGRAM(s) IV Push every 12 hours  carvedilol 3.125 milliGRAM(s) Oral every 12 hours  dextrose 5%. 1000 milliLiter(s) (50 mL/Hr) IV Continuous <Continuous>  dextrose 50% Injectable 12.5 Gram(s) IV Push once  dextrose 50% Injectable 25 Gram(s) IV Push once  dextrose 50% Injectable 25 Gram(s) IV Push once  enalapril 5 milliGRAM(s) Oral daily  enoxaparin Injectable 40 milliGRAM(s) SubCutaneous every 12 hours  influenza   Vaccine 0.5 milliLiter(s) IntraMuscular once  insulin lispro (HumaLOG) corrective regimen sliding scale   SubCutaneous three times a day before meals  insulin lispro (HumaLOG) corrective regimen sliding scale   SubCutaneous at bedtime  potassium chloride    Tablet ER 40 milliEquivalent(s) Oral daily  sildenafil (REVATIO) 10 milliGRAM(s) Oral three times a day    MEDICATIONS  (PRN):  dextrose 40% Gel 15 Gram(s) Oral once PRN Blood Glucose LESS THAN 70 milliGRAM(s)/deciliter  glucagon  Injectable 1 milliGRAM(s) IntraMuscular once PRN Glucose LESS THAN 70 milligrams/deciliter

## 2020-02-25 NOTE — CONSULT NOTE ADULT - ATTENDING COMMENTS
pt was seen and examined. He is known to me and presents again with volume overload. He has severe PAH and will need PAH meds but has not been seen in the office.  He is mildly volume overloaded and will be diuresed.   I will start him on Orinetram as outpt in addition to taking sildenafil. Increase doses of sildenafil as tolerated by BP.   I reviewed the above and agree with a/p.

## 2020-02-26 ENCOUNTER — TRANSCRIPTION ENCOUNTER (OUTPATIENT)
Age: 40
End: 2020-02-26

## 2020-02-26 LAB
ANION GAP SERPL CALC-SCNC: 13 MMOL/L — SIGNIFICANT CHANGE UP (ref 5–17)
BUN SERPL-MCNC: 25 MG/DL — HIGH (ref 8–20)
CALCIUM SERPL-MCNC: 9 MG/DL — SIGNIFICANT CHANGE UP (ref 8.6–10.2)
CHLORIDE SERPL-SCNC: 97 MMOL/L — LOW (ref 98–107)
CO2 SERPL-SCNC: 30 MMOL/L — HIGH (ref 22–29)
CREAT SERPL-MCNC: 1.15 MG/DL — SIGNIFICANT CHANGE UP (ref 0.5–1.3)
GLUCOSE BLDC GLUCOMTR-MCNC: 116 MG/DL — HIGH (ref 70–99)
GLUCOSE BLDC GLUCOMTR-MCNC: 94 MG/DL — SIGNIFICANT CHANGE UP (ref 70–99)
GLUCOSE BLDC GLUCOMTR-MCNC: 94 MG/DL — SIGNIFICANT CHANGE UP (ref 70–99)
GLUCOSE BLDC GLUCOMTR-MCNC: 98 MG/DL — SIGNIFICANT CHANGE UP (ref 70–99)
GLUCOSE SERPL-MCNC: 98 MG/DL — SIGNIFICANT CHANGE UP (ref 70–99)
MAGNESIUM SERPL-MCNC: 1.5 MG/DL — LOW (ref 1.6–2.6)
PHOSPHATE SERPL-MCNC: 3.6 MG/DL — SIGNIFICANT CHANGE UP (ref 2.4–4.7)
POTASSIUM SERPL-MCNC: 3.9 MMOL/L — SIGNIFICANT CHANGE UP (ref 3.5–5.3)
POTASSIUM SERPL-SCNC: 3.9 MMOL/L — SIGNIFICANT CHANGE UP (ref 3.5–5.3)
SODIUM SERPL-SCNC: 140 MMOL/L — SIGNIFICANT CHANGE UP (ref 135–145)

## 2020-02-26 PROCEDURE — 99232 SBSQ HOSP IP/OBS MODERATE 35: CPT | Mod: GC

## 2020-02-26 PROCEDURE — 99232 SBSQ HOSP IP/OBS MODERATE 35: CPT

## 2020-02-26 RX ORDER — POTASSIUM CHLORIDE 20 MEQ
40 PACKET (EA) ORAL ONCE
Refills: 0 | Status: COMPLETED | OUTPATIENT
Start: 2020-02-26 | End: 2020-02-26

## 2020-02-26 RX ORDER — MAGNESIUM SULFATE 500 MG/ML
2 VIAL (ML) INJECTION ONCE
Refills: 0 | Status: COMPLETED | OUTPATIENT
Start: 2020-02-26 | End: 2020-02-26

## 2020-02-26 RX ADMIN — Medication 40 MILLIEQUIVALENT(S): at 21:46

## 2020-02-26 RX ADMIN — Medication 50 GRAM(S): at 11:30

## 2020-02-26 RX ADMIN — CARVEDILOL PHOSPHATE 3.12 MILLIGRAM(S): 80 CAPSULE, EXTENDED RELEASE ORAL at 05:22

## 2020-02-26 RX ADMIN — BUMETANIDE 1 MILLIGRAM(S): 0.25 INJECTION INTRAMUSCULAR; INTRAVENOUS at 17:52

## 2020-02-26 RX ADMIN — Medication 40 MILLIEQUIVALENT(S): at 12:17

## 2020-02-26 RX ADMIN — Medication 81 MILLIGRAM(S): at 12:17

## 2020-02-26 RX ADMIN — BUMETANIDE 1 MILLIGRAM(S): 0.25 INJECTION INTRAMUSCULAR; INTRAVENOUS at 05:22

## 2020-02-26 RX ADMIN — CARVEDILOL PHOSPHATE 3.12 MILLIGRAM(S): 80 CAPSULE, EXTENDED RELEASE ORAL at 17:52

## 2020-02-26 RX ADMIN — Medication 10 MILLIGRAM(S): at 05:22

## 2020-02-26 RX ADMIN — Medication 5 MILLIGRAM(S): at 05:22

## 2020-02-26 RX ADMIN — Medication 20 MILLIGRAM(S): at 21:46

## 2020-02-26 RX ADMIN — ATORVASTATIN CALCIUM 40 MILLIGRAM(S): 80 TABLET, FILM COATED ORAL at 21:46

## 2020-02-26 NOTE — DISCHARGE NOTE NURSING/CASE MANAGEMENT/SOCIAL WORK - PATIENT PORTAL LINK FT
You can access the FollowMyHealth Patient Portal offered by [facility] by registering at the following website: [email or web address]. By joining [facility]’s FollowMyHealth portal, you will also be able to view your health information using other applications (apps) compatible with our system.

## 2020-02-26 NOTE — PROGRESS NOTE ADULT - ATTENDING COMMENTS
pt seen and examined. Plan of care as described above.   Cont with diuresis, increase revatio as tolerated.   I reviewed the above and agree with a/p.

## 2020-02-26 NOTE — PROGRESS NOTE ADULT - ASSESSMENT
38 y/o male with PMH of CHF, pHTN on 6L home O2, HTN, JAN, OHS, obesity, DM-2 came to the ED complaining of SOB, orthopnea, LE swelling x 3 days, patient admitted for acute on chronic CHF exacerbation. Patient with hx of previous CHF exacerbation since January 2/2 noncompliance with medications/diet.     Acute on chronic HFpEF, EF 60-65% in Dec 2019  in the setting of severe pulmonary htn requiring 24/7 usage of 6L O2  - s/p bumex 1 mg IV in ED,  -  c/w Bumex 1mg IV BID  - c/w Monitoring renal function, monitor electrolytes, Keep K > 4, Mg > 2    - c/w coreg, statin, aspirin and KCL 40mg daily   - c/w sildenafil, down-titrated to 10 mg BID by cardiology. Goal is 20mg QD.   - To be started on Orenitram as outpatient.    - Cardiology  recs appreciated  - c/w strict I&Os & daily weights   - O2 to maintain O2 Sat > 88%      Hypomagnansemia  -Mild, prob 2/2 diuretics.   - 2gr Mg iv to replete. F/u Mg level in am.    Elevated Cr   - Resolved.  - Will continue monitoring while on diuretics    HTN   - Controlled.  - C/w Enalapril 5mg; monitor renal function   - C/w Coreg 3.125mg bid with holding parameters     DM-2   - stable  - c/w Insulin sliding scale     Obesity Class III   with JAN/OHS  Lifestyle modification   c/w BIPAP QHS     Preventive Measure  DVT prophylaxis: sq lovenox based on weight     Dispo: pending clinical improvement 38 y/o male with PMH of CHF, pHTN on 6L home O2, HTN, JAN, OHS, obesity, DM-2 came to the ED complaining of SOB, orthopnea, LE swelling x 3 days, patient admitted for acute on chronic CHF exacerbation. Patient with hx of previous CHF exacerbation since January 2/2 noncompliance with medications/diet.     Acute on chronic HFpEF, EF 60-65% in Dec 2019  in the setting of severe pulmonary htn requiring 24/7 usage of 6L O2  -  c/w Bumex 1mg IV BID (with metabolic alkalosis and elevated bun due to diuretics)   - c/w coreg, statin, aspirin and KCL 40mg daily   - c/w sildenafil, orenitram to start as OP   - Cardiology  recs appreciated  - c/w strict I&Os & daily weights (currently negative output)  - O2 to maintain O2 Sat > 88%    Hypomagnansemia  -Mild, prob 2/2 diuretics.   - 2gr Mg iv to replete. F/u Mg level in am.    Elevated Cr   - Resolved likely due to diuretics and improved output   - Will continue monitoring while on diuretics    HTN   - Controlled.  - C/w Enalapril 5mg; monitor renal function   - C/w Coreg 3.125mg bid with holding parameters     DM-2 a1c 6.3   - stable  - c/w Insulin sliding scale     Obesity Class III   with JAN/OHS  Lifestyle modification   c/w BIPAP QHS     Preventive Measure  DVT prophylaxis: sq lovenox based on weight     Dispo: pending clinical improvement

## 2020-02-26 NOTE — PROGRESS NOTE ADULT - SUBJECTIVE AND OBJECTIVE BOX
[facility]                                                               Office: [address], [place] [address]                                                              Telephone: [phone]. Fax:[phone]                                                                             PROGRESS NOTE  Reason for follow up: RV failure  Overnight: No new events.   Update: Patient states his breathing has improved a bit, and his thigh swelling is beginning to improve. Patient is negative almost 2L yesterday. Wearing CPAP overnight.     Subjective: "I'm a little better."  	  Vitals:  T(C): 36.7 (02-25-20 @ 21:03), Max: 36.7 (02-25-20 @ 21:03)  HR: 77 (02-26-20 @ 05:17) (77 - 87)  BP: 105/69 (02-26-20 @ 05:17) (95/62 - 108/73)  RR: 18 (02-26-20 @ 05:17) (16 - 18)  SpO2: 100% (02-26-20 @ 05:17) (92% - 100%)  Wt(kg): --  I&O's Summary    25 Feb 2020 07:01  -  26 Feb 2020 07:00  --------------------------------------------------------  IN: 540 mL / OUT: 2500 mL / NET: -1960 mL    26 Feb 2020 07:01  -  26 Feb 2020 09:41  --------------------------------------------------------  IN: 0 mL / OUT: 425 mL / NET: -425 mL      Weight (kg): 137.4 (02-24 @ 17:21)    PHYSICAL EXAM:  Constitutional: Comfortable . No acute distress.   HEENT: Atraumatic and normocephalic , neck is supple . no JVD. No carotid bruit. PEERL   CNS: A&Ox3. No focal deficits. EOMI. Cranial nerves II-IX are intact.   Lymph Nodes: Cervical : Not palpable.  Respiratory: CTAB  Cardiovascular: S1S2 RRR. No murmur/rubs or gallop.  Gastrointestinal: Soft non-tender and non distended . +Bowel sounds. negative Collins's sign.  Extremities: No edema.   Psychiatric: Calm . no agitation.  Skin: No skin rash/ulcers visualized to face, hands or feet.    CURRENT MEDICATIONS:  buMETAnide Injectable 1 milliGRAM(s) IV Push every 12 hours  carvedilol 3.125 milliGRAM(s) Oral every 12 hours  enalapril 5 milliGRAM(s) Oral daily  sildenafil (REVATIO) 10 milliGRAM(s) Oral every 12 hours  sildenafil (REVATIO) 20 milliGRAM(s) Oral once    atorvastatin  dextrose 50% Injectable  dextrose 50% Injectable  dextrose 50% Injectable  insulin lispro (HumaLOG) corrective regimen sliding scale  insulin lispro (HumaLOG) corrective regimen sliding scale  aspirin  chewable  dextrose 5%.  enoxaparin Injectable  influenza   Vaccine  potassium chloride    Tablet ER      DIAGNOSTIC TESTING:  ECHO FINDINGS:  < from: TTE Echo Complete w/Doppler (12.21.19 @ 08:48) >  PHYSICIAN INTERPRETATION:  Left Ventricle: The left ventricular internal cavity size is normal.  Left ventricular ejection fraction, by visual estimation, is 65 to 70%. The interventricular septum is flattened in systole and diastole, consistent with right ventricular pressure and volume overload.  Right Ventricle: The right ventricular size is severely enlarged. RV systolic function is severely reduced. TV S' 0.2 m/s.  Left Atrium: The left atrium is normal in size.  Right Atrium: Severely enlarged right atrium.  Pericardium: There is no evidence of pericardial effusion.  Mitral Valve: The mitral valve is normal in structure. Mild mitral valve regurgitation is seen.  Tricuspid Valve: Moderate-severe tricuspid regurgitation is visualized. Estimated pulmonary artery systolic pressure is 89.6 mmHg assuming a right atrial pressure of 15 mmHg, which is consistent with severe pulmonary hypertension.  Aortic Valve: The aortic valve is normal. No evidence of aortic valve regurgitation is seen.  Pulmonic Valve: The pulmonic valve was not well visualized. Mild pulmonic valve regurgitation.  Aorta: The aortic root is normal in size and structure.  Pulmonary Artery: The pulmonary artery is moderately dilated.  Venous: The inferior vena cava was dilated, with respiratory size variation less than 50%. The inferior vena cava and the hepatic vein show a pattern of systolic flow reversal, suggestive of tricuspid regurgitation.       Summary:   1. Technically fair study.   2. Left ventricular ejection fraction, by visual estimation, is 65 to 70%.   3. There is moderate septal left ventricular hypertrophy.   4. Severely enlarged right ventricle.   5. Severely reduced RV systolic function.   6. Right ventricular volume and pressure overload.   7. Moderate-severe tricuspid regurgitation.   8. Estimated pulmonary artery systolic pressure is 89.6 mmHg assuming a right atrial pressure of 15 mmHg, which is consistent with severe pulmonary hypertension.   9. Moderately dilated pulmonary artery.    MD Josefina Electronically signed on 12/24/2019 at 2:54:21 PM    < end of copied text >    CATHETERIZATION FINDINGS:   < from: Cardiac Cath Lab - Adult (01.14.20 @ 12:56) >  MEDICATIONS GIVEN: Fentanyl,25 mcg, IV. 1% Lidocaine, 10 ml,  subcutaneously.  HEMODYNAMICS: There is severe right sided failure. There is severe, fixed  pulmonary hypertension. With continued administration of inhaled nitric  oxide ( 40 PPM), there was no change in pulmonary hemodynamics.  COMPLICATIONS: No complications occurred during the cath lab visit.  DIAGNOSTIC IMPRESSIONS: Markedly elevated right sided filling pressure (RA  23 mmHg)  Severe pulmonary hypertension ( PASP 74 mmHg, Mean 51 mmHg)  PVR 5.79 Wood Units  PCWP within normal 15 mmgh  Pulmonary hypertension showed No response to Nitric oxide  DIAGNOSTIC RECOMMENDATIONS: Management as per Dr. Cox.  Prepared and signed by  Jairon Santana MD  Signed 01/16/2020 18:16:31    < end of copied text >    OTHER: 	      LABS:	 	  CARDIAC MARKERS ( 24 Feb 2020 19:48 )  x     / <0.01 ng/mL / x     / x     / x      p-BNP 24 Feb 2020 19:48: x    , CARDIAC MARKERS ( 24 Feb 2020 18:29 )  x     / x     / x     / x     / x      p-BNP 24 Feb 2020 18:29: 1137 pg/mL                          12.8   4.20  )-----------( 172      ( 24 Feb 2020 18:29 )             40.2     02-26    140  |  97<L>  |  25.0<H>  ----------------------------<  98  3.9   |  30.0<H>  |  1.15    Ca    9.0      26 Feb 2020 06:58  Phos  3.6     02-26  Mg     1.5     02-26    TPro  8.1  /  Alb  3.3  /  TBili  1.4  /  DBili  x   /  AST  22  /  ALT  15  /  AlkPhos  91  02-24    proBNP: Serum Pro-Brain Natriuretic Peptide: 1137 pg/mL (02-24 @ 18:29)    Lipid Profile:   HgA1c:   TSH:       TELEMETRY: Reviewed  [facility]                                                               Office: [address], [place] [address]                                                              Telephone: [phone]. Fax:[phone]                                                                             PROGRESS NOTE  Reason for follow up: RV failure  Overnight: No new events.   Update: Patient states his breathing has improved a bit, and his thigh swelling is beginning to improve. Patient is negative almost 2L yesterday. Wearing CPAP overnight.     Subjective: "I'm a little better."  	  Vitals:  T(C): 36.7 (02-25-20 @ 21:03), Max: 36.7 (02-25-20 @ 21:03)  HR: 77 (02-26-20 @ 05:17) (77 - 87)  BP: 105/69 (02-26-20 @ 05:17) (95/62 - 108/73)  RR: 18 (02-26-20 @ 05:17) (16 - 18)  SpO2: 100% (02-26-20 @ 05:17) (92% - 100%)  Wt(kg): --  I&O's Summary    25 Feb 2020 07:01  -  26 Feb 2020 07:00  --------------------------------------------------------  IN: 540 mL / OUT: 2500 mL / NET: -1960 mL    26 Feb 2020 07:01  -  26 Feb 2020 09:41  --------------------------------------------------------  IN: 0 mL / OUT: 425 mL / NET: -425 mL      Weight (kg): 137.4 (02-24 @ 17:21)    PHYSICAL EXAM:  Constitutional: Comfortable . No acute distress.   HEENT: Atraumatic and normocephalic , neck is supple . no JVD. No carotid bruit. PEERL   CNS: A&Ox3. No focal deficits. EOMI. Cranial nerves II-IX are intact.   Lymph Nodes: Cervical : Not palpable.  Respiratory: CTAB  Cardiovascular: S1S2 RRR. No murmur/rubs or gallop.  Gastrointestinal: Soft non-tender and non distended . +Bowel sounds. negative Collins's sign.  Extremities: No edema.   Psychiatric: Calm . no agitation.  Skin: No skin rash/ulcers visualized to face, hands or feet.    CURRENT MEDICATIONS:  buMETAnide Injectable 1 milliGRAM(s) IV Push every 12 hours  carvedilol 3.125 milliGRAM(s) Oral every 12 hours  enalapril 5 milliGRAM(s) Oral daily  sildenafil (REVATIO) 10 milliGRAM(s) Oral every 12 hours  sildenafil (REVATIO) 20 milliGRAM(s) Oral once    atorvastatin  dextrose 50% Injectable  dextrose 50% Injectable  dextrose 50% Injectable  insulin lispro (HumaLOG) corrective regimen sliding scale  insulin lispro (HumaLOG) corrective regimen sliding scale  aspirin  chewable  dextrose 5%.  enoxaparin Injectable  influenza   Vaccine  potassium chloride    Tablet ER      DIAGNOSTIC TESTING:  ECHO FINDINGS:  < from: TTE Echo Complete w/Doppler (12.21.19 @ 08:48) >  PHYSICIAN INTERPRETATION:  Left Ventricle: The left ventricular internal cavity size is normal.  Left ventricular ejection fraction, by visual estimation, is 65 to 70%. The interventricular septum is flattened in systole and diastole, consistent with right ventricular pressure and volume overload.  Right Ventricle: The right ventricular size is severely enlarged. RV systolic function is severely reduced. TV S' 0.2 m/s.  Left Atrium: The left atrium is normal in size.  Right Atrium: Severely enlarged right atrium.  Pericardium: There is no evidence of pericardial effusion.  Mitral Valve: The mitral valve is normal in structure. Mild mitral valve regurgitation is seen.  Tricuspid Valve: Moderate-severe tricuspid regurgitation is visualized. Estimated pulmonary artery systolic pressure is 89.6 mmHg assuming a right atrial pressure of 15 mmHg, which is consistent with severe pulmonary hypertension.  Aortic Valve: The aortic valve is normal. No evidence of aortic valve regurgitation is seen.  Pulmonic Valve: The pulmonic valve was not well visualized. Mild pulmonic valve regurgitation.  Aorta: The aortic root is normal in size and structure.  Pulmonary Artery: The pulmonary artery is moderately dilated.  Venous: The inferior vena cava was dilated, with respiratory size variation less than 50%. The inferior vena cava and the hepatic vein show a pattern of systolic flow reversal, suggestive of tricuspid regurgitation.       Summary:   1. Technically fair study.   2. Left ventricular ejection fraction, by visual estimation, is 65 to 70%.   3. There is moderate septal left ventricular hypertrophy.   4. Severely enlarged right ventricle.   5. Severely reduced RV systolic function.   6. Right ventricular volume and pressure overload.   7. Moderate-severe tricuspid regurgitation.   8. Estimated pulmonary artery systolic pressure is 89.6 mmHg assuming a right atrial pressure of 15 mmHg, which is consistent with severe pulmonary hypertension.   9. Moderately dilated pulmonary artery.    MD Josefina Electronically signed on 12/24/2019 at 2:54:21 PM    < end of copied text >    CATHETERIZATION FINDINGS:   < from: Cardiac Cath Lab - Adult (01.14.20 @ 12:56) >  MEDICATIONS GIVEN: Fentanyl,25 mcg, IV. 1% Lidocaine, 10 ml,  subcutaneously.  HEMODYNAMICS: There is severe right sided failure. There is severe, fixed  pulmonary hypertension. With continued administration of inhaled nitric  oxide ( 40 PPM), there was no change in pulmonary hemodynamics.  COMPLICATIONS: No complications occurred during the cath lab visit.  DIAGNOSTIC IMPRESSIONS: Markedly elevated right sided filling pressure (RA  23 mmHg)  Severe pulmonary hypertension ( PASP 74 mmHg, Mean 51 mmHg)  PVR 5.79 Wood Units  PCWP within normal 15 mmgh  Pulmonary hypertension showed No response to Nitric oxide  DIAGNOSTIC RECOMMENDATIONS: Management as per Dr. Cox.  Prepared and signed by  Jairon Santana MD  Signed 01/16/2020 18:16:31    < end of copied text >    OTHER: 	      LABS:	 	  CARDIAC MARKERS ( 24 Feb 2020 19:48 )  x     / <0.01 ng/mL / x     / x     / x      p-BNP 24 Feb 2020 19:48: x    , CARDIAC MARKERS ( 24 Feb 2020 18:29 )  x     / x     / x     / x     / x      p-BNP 24 Feb 2020 18:29: 1137 pg/mL                          12.8   4.20  )-----------( 172      ( 24 Feb 2020 18:29 )             40.2     02-26    140  |  97<L>  |  25.0<H>  ----------------------------<  98  3.9   |  30.0<H>  |  1.15    Ca    9.0      26 Feb 2020 06:58  Phos  3.6     02-26  Mg     1.5     02-26    TPro  8.1  /  Alb  3.3  /  TBili  1.4  /  DBili  x   /  AST  22  /  ALT  15  /  AlkPhos  91  02-24    proBNP: Serum Pro-Brain Natriuretic Peptide: 1137 pg/mL (02-24 @ 18:29)    TELEMETRY: Reviewed  SR

## 2020-02-26 NOTE — PROGRESS NOTE ADULT - ATTENDING COMMENTS
Note addended where needed. Plan discussed with patient at bedside. Likely d/c to home in am. Will inform family/friends himself

## 2020-02-26 NOTE — PROGRESS NOTE ADULT - SUBJECTIVE AND OBJECTIVE BOX
Patient w/ seven beats of NSVT this evening. Patient asymptomatic and vitals stable.   Will supplement K and monitor closely overnight. Continue BB and check labs in AM.

## 2020-02-26 NOTE — PROGRESS NOTE ADULT - ASSESSMENT
A/P: A/P: 38 y/o M with a PMHx of hx of R sided heart failure (normalLVEF with severely reduced RV function), severe pulmonary HTN on home O2, JAN on CPAP, IDDM2 who presented to the ED with complaints of leg swelling and SOB. Patient states that he has been taking all of his medications, but starting 3 days ago he began experiencing worsening SOB and thigh swelling. Patient states that he hasn't had to increase his oxygen requirements though. Patient denies any chest pain at this time. Patient is very tired, and agitated at having to answer more questions. Refuses to answer further ROS.   Troponin neg x 1  BNP 1137    1. Acute on Chronic Right Heart Failure  - BNP overall down from previous  - States taking bumex as prescribed  - Continue Bumex 1mg IV BID for today  - Still with low BP, try to increase sildenafil to 10mg BID, and 20mg once daily.   - Continue coreg and enalapril.  - Monitor on telemetry.    - Strict i/o and daily standing weights.    - Keep K > 4, Mg > 2.    - Monitor renal function with ongoing diuresis.    2. Pulmonary HTN  - Continue diuresis and sildenafil as above    3. JAN  - Continue CPAP at night    Preliminary evaluation, please await official recommendations by Dr. Cox

## 2020-02-26 NOTE — PROGRESS NOTE ADULT - SUBJECTIVE AND OBJECTIVE BOX
39y Male,   Patient is a 39y old  Male who presents with a chief complaint of CHF exacerbation (2020 09:41)    INTERVAL/OVERNIGHT EVENTS:  Patient examined at bedside.  No acute events overnight.  Monitor w/o events overnight.  Patient states feeling better compared to admission in regards of SOB and orthopnea, as well as pedal edema that is decreasing. Patient not ambulatory, states that he is able to walk around 100 feet at baseline before feeling tired. Patient was encouraged to walk to see how MUÑOZ is behaving with treatment. +Po tolerance, +Voiding, +stooling.  Patient report mild epistaxis with NC, denies cough, chest pain, abdominal pain, nausea, vomiting, calf pain, fever. Rest of ROS negative, except for what mentioned above.    I&O's Summary    2020 07:01  -  2020 07:00  --------------------------------------------------------  IN: 540 mL / OUT: 2500 mL / NET: -1960 mL    2020 07:01  -  2020 12:21  --------------------------------------------------------  IN: 240 mL / OUT: 675 mL / NET: -435 mL    Daily Weight in k.6 (2020 04:26)    CAPILLARY BLOOD GLUCOSE  POCT Blood Glucose.: 98 mg/dL (2020 12:11)  POCT Blood Glucose.: 94 mg/dL (2020 08:20)  POCT Blood Glucose.: 111 mg/dL (2020 21:19)  POCT Blood Glucose.: 96 mg/dL (2020 17:35)      T(C): 36.6 (20 @ 10:15), Max: 36.7 (20 @ 21:03)  HR: 75 (20 @ 10:15) (75 - 87)  BP: 93/63 (20 @ 10:15) (93/63 - 108/73)  RR: 18 (20 @ 10:15) (16 - 18)  SpO2: 100% (20 @ 05:17) (92% - 100%)    Physical Exam:   GENERAL: Awake, alert and oriented x4. Not in acute distress. Obese male with NC in place.  HEENT: NC/AT, moist oral mucosa. No active bleeding.   RESP: Fair air entry 2/2 body habitus. Lungs are clear on auscultation, no wheezing/ no crackles.  CVS: S1/s2+. Systolic murmur ii/vi on Pulmonary and left parasternal.   Abdomen: obese, +BS, nontender, nondistended, no CVA tenderness  Extremities: 2+ non pitting edema.   Skin: grossly intact, LE venous stasis changes b/l  Neuro: AAOX3, motor intact 5/5    LABS                          12.8   4.20  )-----------( 172      ( 2020 18:29 )             40.2             140  |  97<L>  |  25.0<H>  ----------------------------<  98  3.9   |  30.0<H>  |  1.15    Ca    9.0      2020 06:58  Phos  3.6       Mg     1.5         TPro  8.1  /  Alb  3.3  /  TBili  1.4  /  DBili  x   /  AST  22  /  ALT  15  /  AlkPhos  91  02-24      CARDIAC MARKERS ( 2020 19:48 )  x     / <0.01 ng/mL / x     / x     / x            LIVER FUNCTIONS - ( 2020 19:48 )  Alb: 3.3 g/dL / Pro: 8.1 g/dL / ALK PHOS: 91 U/L / ALT: 15 U/L / AST: 22 U/L / GGT: x             MEDICATIONS  (STANDING):  aspirin  chewable 81 milliGRAM(s) Oral daily  atorvastatin 40 milliGRAM(s) Oral at bedtime  buMETAnide Injectable 1 milliGRAM(s) IV Push every 12 hours  carvedilol 3.125 milliGRAM(s) Oral every 12 hours  dextrose 5%. 1000 milliLiter(s) (50 mL/Hr) IV Continuous <Continuous>  dextrose 50% Injectable 12.5 Gram(s) IV Push once  dextrose 50% Injectable 25 Gram(s) IV Push once  dextrose 50% Injectable 25 Gram(s) IV Push once  enalapril 5 milliGRAM(s) Oral daily  enoxaparin Injectable 40 milliGRAM(s) SubCutaneous every 12 hours  influenza   Vaccine 0.5 milliLiter(s) IntraMuscular once  insulin lispro (HumaLOG) corrective regimen sliding scale   SubCutaneous three times a day before meals  insulin lispro (HumaLOG) corrective regimen sliding scale   SubCutaneous at bedtime  potassium chloride    Tablet ER 40 milliEquivalent(s) Oral daily  sildenafil (REVATIO) 10 milliGRAM(s) Oral every 12 hours  sildenafil (REVATIO) 20 milliGRAM(s) Oral once    MEDICATIONS  (PRN):  dextrose 40% Gel 15 Gram(s) Oral once PRN Blood Glucose LESS THAN 70 milliGRAM(s)/deciliter  glucagon  Injectable 1 milliGRAM(s) IntraMuscular once PRN Glucose LESS THAN 70 milligrams/deciliter Patient is a 39y old  Male who presents with a chief complaint of CHF exacerbation (2020 09:41)    INTERVAL/OVERNIGHT EVENTS:  Patient examined at bedside.  No acute events overnight.  Monitor w/o events overnight.  Patient states feeling better compared to admission in regards of SOB and orthopnea, as well as pedal edema that is decreasing. Patient not ambulatory, states that he is able to walk around 100 feet at baseline before feeling tired. Patient was encouraged to walk to see how MUÑOZ is behaving with treatment. +Po tolerance, +Voiding, +stooling.  Patient report mild epistaxis with NC, denies cough, chest pain, abdominal pain, nausea, vomiting, calf pain, fever. Rest of ROS negative  I&O's Summary    2020 07:01  -  2020 07:00  --------------------------------------------------------  IN: 540 mL / OUT: 2500 mL / NET: -1960 mL    2020 07:01  -  2020 12:21  --------------------------------------------------------  IN: 240 mL / OUT: 675 mL / NET: -435 mL    Daily Weight in k.6 (2020 04:26)    CAPILLARY BLOOD GLUCOSE  POCT Blood Glucose.: 98 mg/dL (2020 12:11)  POCT Blood Glucose.: 94 mg/dL (2020 08:20)  POCT Blood Glucose.: 111 mg/dL (2020 21:19)  POCT Blood Glucose.: 96 mg/dL (2020 17:35)      T(C): 36.6 (20 @ 10:15), Max: 36.7 (20 @ 21:03)  HR: 75 (20 @ 10:15) (75 - 87)  BP: 93/63 (20 @ 10:15) (93/63 - 108/73)  RR: 18 (20 @ 10:15) (16 - 18)  SpO2: 100% (20 @ 05:17) (92% - 100%)    Physical Exam:   GENERAL: Awake, alert and oriented x4. Not in acute distress. Obese male with NC in place.  HEENT: NC/AT, moist oral mucosa. No active bleeding.   RESP: Fair air entry 2/2 body habitus. Lungs are clear on auscultation, no wheezing/ no crackles.  CVS: S1/s2+. Systolic murmur 2/6 on Pulmonary and left parasternal.   Abdomen: obese, +BS, nontender, nondistended, no CVA tenderness  Extremities: 2+ non pitting edema.   Skin: grossly intact, LE venous stasis changes b/l  Neuro: AAOX3, motor intact 5/5    LABS                          12.8   4.20  )-----------( 172      ( 2020 18:29 )             40.2             140  |  97<L>  |  25.0<H>  ----------------------------<  98  3.9   |  30.0<H>  |  1.15    Ca    9.0      2020 06:58  Phos  3.6       Mg     1.5         TPro  8.1  /  Alb  3.3  /  TBili  1.4  /  DBili  x   /  AST  22  /  ALT  15  /  AlkPhos  91  -24      CARDIAC MARKERS ( 2020 19:48 )  x     / <0.01 ng/mL / x     / x     / x            LIVER FUNCTIONS - ( 2020 19:48 )  Alb: 3.3 g/dL / Pro: 8.1 g/dL / ALK PHOS: 91 U/L / ALT: 15 U/L / AST: 22 U/L / GGT: x             MEDICATIONS  (STANDING):  aspirin  chewable 81 milliGRAM(s) Oral daily  atorvastatin 40 milliGRAM(s) Oral at bedtime  buMETAnide Injectable 1 milliGRAM(s) IV Push every 12 hours  carvedilol 3.125 milliGRAM(s) Oral every 12 hours  dextrose 5%. 1000 milliLiter(s) (50 mL/Hr) IV Continuous <Continuous>  dextrose 50% Injectable 12.5 Gram(s) IV Push once  dextrose 50% Injectable 25 Gram(s) IV Push once  dextrose 50% Injectable 25 Gram(s) IV Push once  enalapril 5 milliGRAM(s) Oral daily  enoxaparin Injectable 40 milliGRAM(s) SubCutaneous every 12 hours  influenza   Vaccine 0.5 milliLiter(s) IntraMuscular once  insulin lispro (HumaLOG) corrective regimen sliding scale   SubCutaneous three times a day before meals  insulin lispro (HumaLOG) corrective regimen sliding scale   SubCutaneous at bedtime  potassium chloride    Tablet ER 40 milliEquivalent(s) Oral daily  sildenafil (REVATIO) 10 milliGRAM(s) Oral every 12 hours  sildenafil (REVATIO) 20 milliGRAM(s) Oral once    MEDICATIONS  (PRN):  dextrose 40% Gel 15 Gram(s) Oral once PRN Blood Glucose LESS THAN 70 milliGRAM(s)/deciliter  glucagon  Injectable 1 milliGRAM(s) IntraMuscular once PRN Glucose LESS THAN 70 milligrams/deciliter

## 2020-02-27 ENCOUNTER — APPOINTMENT (OUTPATIENT)
Dept: CARDIOLOGY | Facility: CLINIC | Age: 40
End: 2020-02-27

## 2020-02-27 ENCOUNTER — TRANSCRIPTION ENCOUNTER (OUTPATIENT)
Age: 40
End: 2020-02-27

## 2020-02-27 VITALS — SYSTOLIC BLOOD PRESSURE: 122 MMHG | DIASTOLIC BLOOD PRESSURE: 60 MMHG

## 2020-02-27 DIAGNOSIS — I51.9 HEART DISEASE, UNSPECIFIED: ICD-10-CM

## 2020-02-27 DIAGNOSIS — Q24.0 DEXTROCARDIA: ICD-10-CM

## 2020-02-27 DIAGNOSIS — G47.33 OBSTRUCTIVE SLEEP APNEA (ADULT) (PEDIATRIC): ICD-10-CM

## 2020-02-27 DIAGNOSIS — I27.20 PULMONARY HYPERTENSION, UNSPECIFIED: ICD-10-CM

## 2020-02-27 LAB
ANION GAP SERPL CALC-SCNC: 10 MMOL/L — SIGNIFICANT CHANGE UP (ref 5–17)
BUN SERPL-MCNC: 25 MG/DL — HIGH (ref 8–20)
CALCIUM SERPL-MCNC: 9.1 MG/DL — SIGNIFICANT CHANGE UP (ref 8.6–10.2)
CHLORIDE SERPL-SCNC: 99 MMOL/L — SIGNIFICANT CHANGE UP (ref 98–107)
CO2 SERPL-SCNC: 30 MMOL/L — HIGH (ref 22–29)
CREAT SERPL-MCNC: 1.01 MG/DL — SIGNIFICANT CHANGE UP (ref 0.5–1.3)
GLUCOSE BLDC GLUCOMTR-MCNC: 107 MG/DL — HIGH (ref 70–99)
GLUCOSE BLDC GLUCOMTR-MCNC: 94 MG/DL — SIGNIFICANT CHANGE UP (ref 70–99)
GLUCOSE BLDC GLUCOMTR-MCNC: 94 MG/DL — SIGNIFICANT CHANGE UP (ref 70–99)
GLUCOSE SERPL-MCNC: 94 MG/DL — SIGNIFICANT CHANGE UP (ref 70–99)
MAGNESIUM SERPL-MCNC: 1.8 MG/DL — SIGNIFICANT CHANGE UP (ref 1.6–2.6)
PHOSPHATE SERPL-MCNC: 3.4 MG/DL — SIGNIFICANT CHANGE UP (ref 2.4–4.7)
POTASSIUM SERPL-MCNC: 4.3 MMOL/L — SIGNIFICANT CHANGE UP (ref 3.5–5.3)
POTASSIUM SERPL-SCNC: 4.3 MMOL/L — SIGNIFICANT CHANGE UP (ref 3.5–5.3)
SODIUM SERPL-SCNC: 139 MMOL/L — SIGNIFICANT CHANGE UP (ref 135–145)

## 2020-02-27 PROCEDURE — 84100 ASSAY OF PHOSPHORUS: CPT

## 2020-02-27 PROCEDURE — 99285 EMERGENCY DEPT VISIT HI MDM: CPT

## 2020-02-27 PROCEDURE — 94660 CPAP INITIATION&MGMT: CPT

## 2020-02-27 PROCEDURE — 82962 GLUCOSE BLOOD TEST: CPT

## 2020-02-27 PROCEDURE — 93005 ELECTROCARDIOGRAM TRACING: CPT

## 2020-02-27 PROCEDURE — 94760 N-INVAS EAR/PLS OXIMETRY 1: CPT

## 2020-02-27 PROCEDURE — 71046 X-RAY EXAM CHEST 2 VIEWS: CPT

## 2020-02-27 PROCEDURE — 99232 SBSQ HOSP IP/OBS MODERATE 35: CPT

## 2020-02-27 PROCEDURE — 80048 BASIC METABOLIC PNL TOTAL CA: CPT

## 2020-02-27 PROCEDURE — 83880 ASSAY OF NATRIURETIC PEPTIDE: CPT

## 2020-02-27 PROCEDURE — 83735 ASSAY OF MAGNESIUM: CPT

## 2020-02-27 PROCEDURE — 85027 COMPLETE CBC AUTOMATED: CPT

## 2020-02-27 PROCEDURE — 80053 COMPREHEN METABOLIC PANEL: CPT

## 2020-02-27 PROCEDURE — 84484 ASSAY OF TROPONIN QUANT: CPT

## 2020-02-27 PROCEDURE — 93970 EXTREMITY STUDY: CPT

## 2020-02-27 PROCEDURE — 36415 COLL VENOUS BLD VENIPUNCTURE: CPT

## 2020-02-27 PROCEDURE — 99239 HOSP IP/OBS DSCHRG MGMT >30: CPT

## 2020-02-27 RX ORDER — ATORVASTATIN CALCIUM 80 MG/1
1 TABLET, FILM COATED ORAL
Qty: 30 | Refills: 0
Start: 2020-02-27 | End: 2020-03-27

## 2020-02-27 RX ORDER — BUMETANIDE 0.25 MG/ML
1 INJECTION INTRAMUSCULAR; INTRAVENOUS
Qty: 60 | Refills: 0
Start: 2020-02-27 | End: 2020-03-27

## 2020-02-27 RX ADMIN — Medication 81 MILLIGRAM(S): at 09:36

## 2020-02-27 RX ADMIN — Medication 20 MILLIGRAM(S): at 17:26

## 2020-02-27 RX ADMIN — CARVEDILOL PHOSPHATE 3.12 MILLIGRAM(S): 80 CAPSULE, EXTENDED RELEASE ORAL at 17:26

## 2020-02-27 RX ADMIN — Medication 40 MILLIEQUIVALENT(S): at 09:36

## 2020-02-27 RX ADMIN — CARVEDILOL PHOSPHATE 3.12 MILLIGRAM(S): 80 CAPSULE, EXTENDED RELEASE ORAL at 05:37

## 2020-02-27 RX ADMIN — Medication 10 MILLIGRAM(S): at 09:35

## 2020-02-27 RX ADMIN — BUMETANIDE 1 MILLIGRAM(S): 0.25 INJECTION INTRAMUSCULAR; INTRAVENOUS at 17:26

## 2020-02-27 RX ADMIN — Medication 5 MILLIGRAM(S): at 05:37

## 2020-02-27 RX ADMIN — BUMETANIDE 1 MILLIGRAM(S): 0.25 INJECTION INTRAMUSCULAR; INTRAVENOUS at 05:37

## 2020-02-27 NOTE — DISCHARGE NOTE PROVIDER - NSDCCPCAREPLAN_GEN_ALL_CORE_FT
PRINCIPAL DISCHARGE DIAGNOSIS  Diagnosis: CHF exacerbation  Assessment and Plan of Treatment: You were treated for an acute CHF exacerbation      SECONDARY DISCHARGE DIAGNOSES  Diagnosis: Pulmonary hypertension, moderate to severe  Assessment and Plan of Treatment:     Diagnosis: Hypertension  Assessment and Plan of Treatment:     Diagnosis: Diabetes mellitus  Assessment and Plan of Treatment:     Diagnosis: Severe obesity  Assessment and Plan of Treatment: PRINCIPAL DISCHARGE DIAGNOSIS  Diagnosis: CHF exacerbation  Assessment and Plan of Treatment: You were treated for an acute CHF exacerbation which was treated with IV medications to help you diureses and offload accumulated fluid. Your symptoms improved and you have been transitioned back to your oral diuretic medication. Itis important that you comply with a heart healthy, low salt diet. Please avoid drinking more than 1 literof fluid each day. Weigh yourself daily to make sure you are not gaining extra weight. Please follow up with your cardiologist to discuss this hospitalization as well as your sever pulmonary hypertension which is a lso contributing to your symptoms.      SECONDARY DISCHARGE DIAGNOSES  Diagnosis: Pulmonary hypertension, moderate to severe  Assessment and Plan of Treatment: You have history of sever pulmonary hypertension which is treated with sildenafil. Please continue to take all medications as prescribed. Follow up with your cardiologist after discharge to discuss other options to treat this condition.    Diagnosis: Hypertension  Assessment and Plan of Treatment:     Diagnosis: Diabetes mellitus  Assessment and Plan of Treatment:     Diagnosis: Severe obesity  Assessment and Plan of Treatment: PRINCIPAL DISCHARGE DIAGNOSIS  Diagnosis: CHF exacerbation  Assessment and Plan of Treatment: You were treated for an acute CHF exacerbation which was treated with IV medications to help you diureses and offload accumulated fluid. Your symptoms improved and you have been transitioned back to your oral diuretic medication. Itis important that you comply with a heart healthy, low salt diet. Please avoid drinking more than 1 literof fluid each day. Weigh yourself daily to make sure you are not gaining extra weight. Please follow up with your cardiologist to discuss this hospitalization as well as your sever pulmonary hypertension which is a lso contributing to your symptoms.      SECONDARY DISCHARGE DIAGNOSES  Diagnosis: Pulmonary hypertension, moderate to severe  Assessment and Plan of Treatment: You have history of sever pulmonary hypertension which is treated with sildenafil. Please continue to take all medications as prescribed. Follow up with your cardiologist after discharge to discuss other options to treat this condition. Your night    Diagnosis: Hypertension  Assessment and Plan of Treatment:     Diagnosis: Diabetes mellitus  Assessment and Plan of Treatment:     Diagnosis: Severe obesity  Assessment and Plan of Treatment: PRINCIPAL DISCHARGE DIAGNOSIS  Diagnosis: CHF exacerbation  Assessment and Plan of Treatment: You were treated for an acute CHF exacerbation which was treated with IV medications to help you diureses and offload accumulated fluid. Your symptoms improved and you have been transitioned back to your oral diuretic medication. Itis important that you comply with a heart healthy, low salt diet. Please avoid drinking more than 1 literof fluid each day. Weigh yourself daily to make sure you are not gaining extra weight. Please follow up with your cardiologist to discuss this hospitalization as well as your sever pulmonary hypertension which is a lso contributing to your symptoms.      SECONDARY DISCHARGE DIAGNOSES  Diagnosis: Pulmonary hypertension, moderate to severe  Assessment and Plan of Treatment: You have history of sever pulmonary hypertension which is treated with sildenafil. Please continue to take all medications as prescribed. Follow up with your cardiologist after discharge to discuss other options to treat this condition. Take the 10 mg sildenafil in the morning and evening, take 20 mg at bedtime. Please check your blood pressure daily, if you feel lightheaded or dizzy and you find that your blood pressure is low, please hold off on the increased dose of sildenafil.    Diagnosis: Hypertension  Assessment and Plan of Treatment: You have a history of high blood pressure, continue to take all medications as prescribed. Measure your blood pressure daily with an at home blood pressure machine. Follow up with your primary care doctor within 1 week after discharge.    Diagnosis: Diabetes mellitus  Assessment and Plan of Treatment: Please take all your medications as precribed, follow a consistent carbohydrate, low fat, low sugar diet. Follow up with your primary care doctor after discharge.    Diagnosis: Severe obesity  Assessment and Plan of Treatment: You have severe obesity, this can cause an additional strain on your heart in addition to your chornic heart failure. Weight reduction can alleviate your breathing problems and decrease stress on your heart. Please follow a low fat, heart healthy diet. Follow up with your primary care doctor to monitor your weight loss. PRINCIPAL DISCHARGE DIAGNOSIS  Diagnosis: CHF exacerbation  Assessment and Plan of Treatment: You were treated for an acute CHF exacerbation which was treated with IV medications to help you diureses and offload accumulated fluid. Your symptoms improved and you have been transitioned back to your oral diuretic medication. It is important that you comply with a heart healthy, low salt diet. Please avoid drinking more than 1 liter of fluid each day. Weigh yourself daily to make sure you are not gaining extra weight. Please follow up with your cardiologist to discuss this hospitalization as well as your sever pulmonary hypertension which is a lso contributing to your symptoms.      SECONDARY DISCHARGE DIAGNOSES  Diagnosis: Severe obesity  Assessment and Plan of Treatment: You have severe obesity, this can cause an additional strain on your heart in addition to your chornic heart failure. Weight reduction can alleviate your breathing problems and decrease stress on your heart. Please follow a low fat, heart healthy diet. Follow up with your primary care doctor to monitor your weight loss.    Diagnosis: Diabetes mellitus  Assessment and Plan of Treatment: Please take all your medications as precribed, follow a consistent carbohydrate, low fat, low sugar diet. Follow up with your primary care doctor after discharge.    Diagnosis: Hypertension  Assessment and Plan of Treatment: You have a history of high blood pressure, continue to take Carvedilol and Enalapril medications as prescribed. Measure your blood pressure daily with an at home blood pressure machine. Follow up with your primary care doctor within 1 week after discharge.    Diagnosis: Pulmonary hypertension, moderate to severe  Assessment and Plan of Treatment: You have history of sever pulmonary hypertension which is treated with sildenafil. Please continue to take all medications as prescribed. Follow up with your cardiologist after discharge to discuss other options to treat this condition. Take the 20 mg sildenafil once a day. Please check your blood pressure daily, if you feel lightheaded or dizzy and you find that your blood pressure is low, please hold off on the increased dose of sildenafil. PRINCIPAL DISCHARGE DIAGNOSIS  Diagnosis: CHF exacerbation  Assessment and Plan of Treatment: You were treated for an acute CHF exacerbation (with a preserved ejection fraction) which was treated with IV medications (diuretics) to help you diures and offload accumulated fluid. Your symptoms improved and you have been transitioned back to your oral diuretic medication. It is important that you comply with a heart healthy, low salt diet. Please avoid drinking more than 1.2 liter of fluid each day. Weigh yourself daily to make sure you are not gaining extra weight. Please follow up with your cardiologist to discuss this hospitalization as well as your sever pulmonary hypertension which is a lso contributing to your symptoms.      SECONDARY DISCHARGE DIAGNOSES  Diagnosis: Severe obesity  Assessment and Plan of Treatment: You have severe obesity, this can cause an additional strain on your heart in addition to your chornic heart failure. Weight reduction can alleviate your breathing problems and decrease stress on your heart. Please follow a low fat, heart healthy diet. Follow up with your primary care doctor to monitor your weight loss.    Diagnosis: Diabetes mellitus  Assessment and Plan of Treatment: Please take all your medications as precribed, follow a consistent carbohydrate, low fat, low sugar diet. a1c 6.3    Diagnosis: Hypertension  Assessment and Plan of Treatment: You have a history of high blood pressure, continue to take Carvedilol and Enalapril medications as prescribed. Measure your blood pressure daily with an at home blood pressure machine.    Diagnosis: JAN (obstructive sleep apnea)  Assessment and Plan of Treatment: continue with bipap at home    Diagnosis: Pulmonary hypertension, moderate to severe  Assessment and Plan of Treatment: You have history of sever pulmonary hypertension which is treated with sildenafil. Please continue to take all medications as prescribed. Follow up with your cardiologist after discharge to discuss other options to treat this condition. Take the 20 mg sildenafil once a day and the 10mg dose twice a day. We suggest that you check your blood pressure and if the top number is less than 90, then you can hold than 20mg dose in the middle of the day.   Dr. Cox is planning on starting you on a new medication when you see him on the outside.

## 2020-02-27 NOTE — CHART NOTE - NSCHARTNOTEFT_GEN_A_CORE
Brief d/c note   Patient seen and examined at bedside. No acute distress and no acute overnight events   ROS negative   VS and labs noted   Exam - on d/c paper   Please see discharge papers for details. Brief d/c note   Patient seen and examined at bedside. No acute distress and no acute overnight events   ROS negative   VS and labs noted \  accuchecks noted     Elevated BUN and CO2 likely 2/2 diuretics     Exam - on d/c paper   Please see discharge papers for details. Brief d/c note   Patient seen and examined at bedside. No acute distress and no acute overnight events   ROS negative   VS and labs noted \  accuchecks noted     Elevated BUN and CO2 likely 2/2 diuretics     Exam - on d/c paper   Please see discharge papers for details.    **Patient is a 40 y/o male with need for diet/modification and lifestyle counselling which was done extensively at the bedside. GOC discussion will not be done on this 39 year old male who clearly expressive that he wants to be compliant and alive.

## 2020-02-27 NOTE — DISCHARGE NOTE PROVIDER - INSTRUCTIONS
Follow a heart healthy diet. And make sure to limit the salt (sodium) in your diet. Salt causes your body to hold water. This makes your heart work harder as there is more fluid for the heart to pump. Limit your salt by doing the following:  Limit canned, dried, packaged, and fast foods.  Don't add salt to your food.  Season foods with herbs instead of salt.  Watch how much liquids you drink. Drinking too much can make heart failure worse. Do not drink more than 1 L of fluid per day  Limit the amount of alcohol you drink. It may harm your heart.   When you eat out, request that your meals have no added salt.

## 2020-02-27 NOTE — DISCHARGE NOTE PROVIDER - CARE PROVIDERS DIRECT ADDRESSES
[email or web address]

## 2020-02-27 NOTE — DIETITIAN INITIAL EVALUATION ADULT. - PERTINENT LABORATORY DATA
02-27 Na139 mmol/L Glu 94 mg/dL K+ 4.3 mmol/L Cr  1.01 mg/dL BUN 25.0 mg/dL<H> Phos 3.4 mg/dL Alb n/a   PAB n/a

## 2020-02-27 NOTE — DISCHARGE NOTE PROVIDER - HOSPITAL COURSE
40 y/o male with PMH of CHF, pHTN on 6L home O2, HTN, JAN, OHS, obesity, DM-2 came to the ED complaining of SOB, orthopnea, LE swelling x 3 days, patient admitted for acute on chronic CHF exacerbation. Patient with hx of previous CHF exacerbation since January 2/2 noncompliance with medications/diet.                         All electrolyte abnormalities were monitored carefully and repleted as necessary during this hospitalization. At the time of discharge patient was hemodynamically stable and amenable to all terms of discharge. The patient has received verbal instructions from myself regarding discharge plans.         Length of Discharge: 45MIN        Vital Signs Last 24 Hrs    T(F): 97.8 (27 Feb 2020 05:31), Max: 98.4 (26 Feb 2020 21:38)    HR: 71 (27 Feb 2020 09:52) (70 - 82)    BP: 106/82 (27 Feb 2020 05:31) (93/63 - 120/79)    RR: 16 (27 Feb 2020 09:52) (16 - 19)    SpO2: 98% (27 Feb 2020 09:52) (94% - 100%)        Physical Exam:     GENERAL: Awake, alert and oriented x4. Not in acute distress. Obese male with NC in place.    HEENT: NC/AT, moist oral mucosa. No active bleeding.     RESP: Fair air entry 2/2 body habitus. Lungs are clear on auscultation, no wheezing/ no crackles.    CVS: S1/s2+. Systolic murmur 2/6 on Pulmonary and left parasternal.     Abdomen: obese, +BS, nontender, nondistended, no CVA tenderness    Extremities: 2+ non pitting edema.     Skin: grossly intact, LE venous stasis changes b/l    Neuro: AAOX3, motor intact 5/5 40 y/o male with PMH of CHF, pHTN on 6L home O2, HTN, JAN, OHS, obesity, DM-2 came to the ED complaining of SOB, orthopnea, LE swelling x 3 days, patient admitted for acute on chronic CHF exacerbation. Patient with hx of previous CHF exacerbation since January 2/2 noncompliance with medications/diet. The patient was started on IV diuresis with bumex, and diuresed adequately with improvement in his symptoms. The patients sildenafil dose  was continued at 10 mg BID and the night time dose was increased to 20 mg, patient's BP was monitored closely and tolerated dose increase. The patient has follow up with  cardiology outpatient to be started on orenitram for severe pulmonary htn. Patient also had slight increase in creatinine which improved with diuresis. He was educated and counselled extensively about diet and lifestyle modifications.        All electrolyte abnormalities were monitored carefully and repleted as necessary during this hospitalization. At the time of discharge patient was hemodynamically stable and amenable to all terms of discharge. The patient has received verbal instructions from myself regarding discharge plans.         Length of Discharge: 45MIN        Vital Signs Last 24 Hrs    T(F): 97.8 (27 Feb 2020 05:31), Max: 98.4 (26 Feb 2020 21:38)    HR: 71 (27 Feb 2020 09:52) (70 - 82)    BP: 106/82 (27 Feb 2020 05:31) (93/63 - 120/79)    RR: 16 (27 Feb 2020 09:52) (16 - 19)    SpO2: 98% (27 Feb 2020 09:52) (94% - 100%)        Physical Exam:     GENERAL: Awake, alert and oriented x4. Not in acute distress. Obese male with NC in place.    HEENT: NC/AT, moist oral mucosa. No active bleeding.     RESP: Fair air entry 2/2 body habitus. Lungs are clear on auscultation, no wheezing/ no crackles.    CVS: S1/s2+. Systolic murmur 2/6 on Pulmonary and left parasternal.     Abdomen: obese, +BS, nontender, nondistended, no CVA tenderness    Extremities: 2+ non pitting edema.     Skin: grossly intact, LE venous stasis changes b/l    Neuro: AAOX3, motor intact 5/5 40 y/o male with PMH of CHF, pHTN on 6L home O2, HTN, JAN, OHS, obesity, DM-2 came to the ED complaining of SOB, orthopnea, LE swelling x 3 days, patient admitted for acute on chronic CHF exacerbation. Patient with hx of previous CHF exacerbation in January 2/2 noncompliance with medications/diet. The patient was started on IV diuresis with bumex, and diuresed adequately with improvement in his symptoms. The patients sildenafil dose  was downtitrated from 10mg TID to 20mg QD due to soft BP. The patient has follow up with cardiology outpatient to be started on orenitram for severe pulmonary htn. Patient also had slight increase in creatinine which improved with diuresis. He was educated and counselled extensively about diet and lifestyle modifications.        All electrolyte abnormalities were monitored carefully and repleted as necessary during this hospitalization. At the time of discharge patient was hemodynamically stable and amenable to all terms of discharge. The patient has received verbal instructions from myself regarding discharge plans.         Length of Discharge: 45MIN        Vital Signs Last 24 Hrs    T(F): 97.8 (27 Feb 2020 05:31), Max: 98.4 (26 Feb 2020 21:38)    HR: 71 (27 Feb 2020 09:52) (70 - 82)    BP: 106/82 (27 Feb 2020 05:31) (93/63 - 120/79)    RR: 16 (27 Feb 2020 09:52) (16 - 19)    SpO2: 98% (27 Feb 2020 09:52) (94% - 100%)        Physical Exam:     GENERAL: Awake, alert and oriented x4. Not in acute distress. Obese male with NC in place.    HEENT: NC/AT, moist oral mucosa. No active bleeding.     RESP: Fair air entry 2/2 body habitus. Lungs are clear on auscultation, no wheezing/ no crackles.    CVS: S1/s2+. Systolic murmur 2/6 on Pulmonary and left parasternal.     Abdomen: obese, +BS, nontender, nondistended, no CVA tenderness    Extremities: 2+ non pitting edema.     Skin: grossly intact, LE venous stasis changes b/l    Neuro: AAOX3, motor intact 5/5 38 y/o male with PMH of CHF, pHTN on 6L home O2, HTN, JAN, OHS, obesity, DM-2 came to the ED complaining of SOB, orthopnea, LE swelling x 3 days, patient admitted for acute on chronic CHF exacerbation. Patient with hx of previous CHF exacerbation in January 2/2 noncompliance with medications/diet. The patient was started on IV diuresis with bumex, and diuresed adequately with improvement in his symptoms. The patients sildenafil dose  was uptitrated from 10mg TID to 10mg BID and 20mg QD. The patient has follow up with cardiology outpatient to be started on orenitram for severe pulmonary htn. Patient also had slight increase in creatinine which improved with diuresis. He was educated and counselled extensively about diet and lifestyle modifications.        All electrolyte abnormalities were monitored carefully and repleted as necessary during this hospitalization. At the time of discharge patient was hemodynamically stable and amenable to all terms of discharge. The patient has received verbal instructions from myself regarding discharge plans.         Length of Discharge: 45MIN        Vital Signs Last 24 Hrs    T(F): 97.8 (27 Feb 2020 05:31), Max: 98.4 (26 Feb 2020 21:38)    HR: 71 (27 Feb 2020 09:52) (70 - 82)    BP: 106/82 (27 Feb 2020 05:31) (93/63 - 120/79)    RR: 16 (27 Feb 2020 09:52) (16 - 19)    SpO2: 98% (27 Feb 2020 09:52) (94% - 100%)        Physical Exam:     GENERAL: Awake, alert and oriented x4. Not in acute distress. Obese male with NC in place.    HEENT: NC/AT, moist oral mucosa. No active bleeding.     RESP: Fair air entry 2/2 body habitus. Lungs are clear on auscultation, no wheezing/ no crackles.    CVS: S1/s2+. Systolic murmur 2/6 on Pulmonary and left parasternal.     Abdomen: obese, +BS, nontender, nondistended, no CVA tenderness    Extremities: 2+ non pitting edema.     Skin: grossly intact, LE venous stasis changes b/l    Neuro: AAOX3, motor intact 5/5 Patient is a 38 y/o male with a PMH of CHF (with preserved ejection fraction), pHTN on 6L home O2, HTN, JAN, OHS, obesity, DM-2 came to the ED complaining of SOB, orthopnea, LE swelling x 3 days, patient admitted for acute on chronic CHF exacerbation. Patient with hx of previous CHF exacerbation in January 2/2 noncompliance with medications/diet. The patient was started on IV diuresis with bumex, and diuresed adequately with improvement in his symptoms. The patients sildenafil dose  was uptitrated from 10mg TID to 10mg BID and 20mg QD. The patient has follow up with cardiology outpatient to be started on orenitram for severe pulmonary htn. Patient also had slight increase in creatinine which improved with diuresis. He was educated and counselled extensively about diet and lifestyle modifications. Per cardiology, will likely start Orinetram as outpatient.         Extensive diet and lifestyle modification counselling given to patient at bedside.         All electrolyte abnormalities were monitored carefully and repleted as necessary during this hospitalization. At the time of discharge patient was hemodynamically stable and amenable to all terms of discharge. The patient has received verbal instructions from myself regarding discharge plans.         Total time coordinating this discharge was 40 minutes.         VS and exam on the day of discharge:     Vital Signs Last 24 Hrs    T(F): 97.8 (27 Feb 2020 05:31), Max: 98.4 (26 Feb 2020 21:38)    HR: 71 (27 Feb 2020 09:52) (70 - 82)    BP: 106/82 (27 Feb 2020 05:31) (93/63 - 120/79)    RR: 16 (27 Feb 2020 09:52) (16 - 19)    SpO2: 98% (27 Feb 2020 09:52) (94% - 100%)        Tele - overnight with 7 beats of NSVT - asymptomatic         Physical Exam:     GENERAL: Awake, alert and oriented x4. Not in acute distress. Obese male with NC in place.    HEENT: NC/AT, moist oral mucosa. No active bleeding.     RESP: Fair air entry 2/2 body habitus. LLL with mild crackles, rest clear     CVS: S1/s2+. Systolic murmur 2/6 on Pulmonary and left parasternal.     Abdomen: obese, +BS, nontender, nondistended, no CVA tenderness    Extremities: 2+ non pitting edema.     Skin: grossly intact, LE venous stasis changes b/l    Neuro: AAOX3, motor intact 5/5

## 2020-02-27 NOTE — PROGRESS NOTE ADULT - PROBLEM SELECTOR PLAN 2
- Continue diuresis and sildenafil Severe pulmonary hypertension   PASP 74 w/ Mean 51  PCWP 15  s/p L/RHC which showed no response to NO   on sildenafil but cannot tolerate higher dosages 2/2 hypotension  will use modified approach and follow up in office  continue sildenafil 10mg BID (06:00; 22:00)   continue sildenfafil 20mg QD (14:00)

## 2020-02-27 NOTE — DISCHARGE NOTE PROVIDER - NSDCMRMEDTOKEN_GEN_ALL_CORE_FT
aspirin 81 mg oral tablet, chewable: 1 tab(s) orally once a day  atorvastatin 40 mg oral tablet: 1 tab(s) orally once a day (at bedtime)  bumetanide 1 mg oral tablet: 1 tab(s) orally 2 times a day  carvedilol 3.125 mg oral tablet: 1 tab(s) orally every 12 hours  enalapril 5 mg oral tablet: 1 tab(s) orally once a day  K-Tab 20 mEq oral tablet, extended release: 2 tab(s) orally once a day   metFORMIN 500 mg oral tablet: 1 tab(s) orally once a day   sildenafil 20 mg oral tablet: 0.5 tab(s) orally 3 times a day aspirin 81 mg oral tablet, chewable: 1 tab(s) orally once a day  atorvastatin 40 mg oral tablet: 1 tab(s) orally once a day (at bedtime)  bumetanide 1 mg oral tablet: 1 tab(s) orally 2 times a day  carvedilol 3.125 mg oral tablet: 1 tab(s) orally every 12 hours  enalapril 5 mg oral tablet: 1 tab(s) orally once a day  K-Tab 20 mEq oral tablet, extended release: 2 tab(s) orally once a day   metFORMIN 500 mg oral tablet: 1 tab(s) orally once a day   sildenafil 20 mg oral tablet: 0.5 tab(s) orally in the morning and evening, take 1 tab at bedtime aspirin 81 mg oral tablet, chewable: 1 tab(s) orally once a day  atorvastatin 40 mg oral tablet: 1 tab(s) orally once a day (at bedtime)  bumetanide 1 mg oral tablet: 1 tab(s) orally 2 times a day  carvedilol 3.125 mg oral tablet: 1 tab(s) orally every 12 hours  enalapril 5 mg oral tablet: 1 tab(s) orally once a day  K-Tab 20 mEq oral tablet, extended release: 2 tab(s) orally once a day   metFORMIN 500 mg oral tablet: 1 tab(s) orally once a day aspirin 81 mg oral tablet, chewable: 1 tab(s) orally once a day  atorvastatin 40 mg oral tablet: 1 tab(s) orally once a day (at bedtime)  bumetanide 1 mg oral tablet: 1 tab(s) orally 2 times a day  carvedilol 3.125 mg oral tablet: 1 tab(s) orally every 12 hours  enalapril 5 mg oral tablet: 1 tab(s) orally once a day  K-Tab 20 mEq oral tablet, extended release: 2 tab(s) orally once a day   metFORMIN 500 mg oral tablet: 1 tab(s) orally once a day   sildenafil 20 mg oral tablet: 0.5 tab(s) orally every 12 hours    User Schedule:   One tablet at 07:00 AM   One tablet at 10:00 PM   sildenafil 20 mg oral tablet: 1 tab(s) orally once a day     User Schedule:  One tablet at 3:00 PM

## 2020-02-27 NOTE — PROGRESS NOTE ADULT - PROBLEM SELECTOR PLAN 1
1. Acute on Chronic Right Heart Failure  - Pt continued on bumex 1mg IV BID  - BNP down trending   - Cr- downtrending  - BP remains low, Pt continued on sildenafil to 10mg BID, will try to increased to 20mg once daily.   - Continue coreg and enalapril.   - Strict i/o and daily standing weights.    - Water restriction education reinforced with patient HFpEF w/ severely reduced RV  chronically volume overloaded  diuresed this admission   stable for DC   DC on bumex 1mg PO BID and metolazone 2.5mg daily  continue coreg, enalapril, ASA   follow up closely outpatient with Brooke ANGLIN within 1 week.   Strict i/o and daily standing weights.    Water restriction education heavily reinforced with patient  Diet/lifestyle modifications and medication compliance heavily reinforced

## 2020-02-27 NOTE — PROGRESS NOTE ADULT - SUBJECTIVE AND OBJECTIVE BOX
[place] [facility] Faculty Practice                          [address] [place] [address]                       Phone: [phone]. Fax:[phone]                      ________________________________________________           Reason for follow up/Overnight events: RV failure/No new events     HPI:  40 y/o male with PMH of CHF, pHTN on 6L home O2, HTN, JAN, OHS, obesity, DM-2 came to the ED complaining of shortness of breath and LE swelling. Patient said his symptoms started about 3 days ago, he had pain in his leg due to swelling to the level of his thigh. He also has shortness of breath despite using his home O2; he has however, continue to use the same liters of home O2. He reported absolute compliant with medications and diet. He has no chest pain, palpitation, nausea, vomiting, diaphoresis, cough, fever, chills, abdominal pain, change in bowel/urinary habit, recent travel. (2020 21:05)      ROS: All review of systems negative unless indicated otherwise below.                          LAB RESULTS                   COMPLETE BLOOD COUNT( 2020 18:29 )                            12.8 g/dL<L>  4.20 K/uL )---------------( 172 K/uL                        40.2 %      Automated Differential     Auto Basophil # - 0.04 K/uL  Auto Basophil % - 1.0 %  Auto Eosinophil # - 0.16 K/uL  Auto Eosinophil % - 3.8 %  Auto Immature Granulocyte # - X      Auto Immature Granulocyte % - 0.2 %  Auto Lymphocyte # - 0.67 K/uL<L>  Auto Lymphocyte % - 16.0 %  Auto Monocyte # - 0.55 K/uL  Auto Monocyte % - 13.1 %  Auto Neutrophil # - 2.77 K/uL  Auto Neutrophil % - 65.9 %                                  CHEMISTRY                 Basic Metabolic Panel (20 @ 06:33)    139  |  99  |  25.0<H>  ----------------------------<  94  4.3   |  30.0<H>  |  1.01    Ca    9.1      2020 06:33  Phos  3.4       Mg     1.8                         Liver Functions (20 @ 19:48))  TPro  8.1  /  Alb  3.3  /  TBili  1.4  /  DBili  x   /  AST  22  /  ALT  15  /  AlkPhos  91     PT/INR/PTT ( 2020 18:21 )                        :                       :      21.9         :       33.0                  .        .                   .              .           .       1.90        .                                                                   Cardiac Enzymes   ( 2020 19:48 )  Troponin T  <0.01,  CPK  X    , CKMB  X    , BNP X        , ( 2020 18:29 )  Troponin T  X    ,  CPK  X    , CKMB  X    , BNP 1137<H>                            RADIOLOGY RESULTS: Personally visualized   < from: Xray Chest 2 Views PA/Lat (20 @ 19:13) >  IMPRESSION:  Gross cardiomegaly. No acute radiographic cardiopulmonary pathology.  Please refer to prior CT scan dated 2020    < end of copied text >    < from: CT Angio Chest w/ IV Cont (20 @ 22:03) >  IMPRESSION:    No pulmonary embolism.    Pulmonary vascular congestion and pulmonary edema. Superimposed infectious or inflammatory process is not excluded.    Extensive mediastinal and hilar lymphadenopathy is again noted.    NACHO MONTES DE OCA M.D., ATTENDING RADIOLOGIST    < end of copied text >                            CARDIOLOGY RESULTS: Official Report/Preliminary Verbal Reports    ECHO: < from: TTE Echo Complete w/Doppler (19 @ 08:48) >  Summary:   1. Technically fair study.   2. Left ventricular ejection fraction, by visual estimation, is 65 to 70%.   3. There is moderate septal left ventricular hypertrophy.   4. Severely enlarged right ventricle.   5. Severely reduced RV systolic function.   6. Right ventricular volume and pressure overload.   7. Moderate-severe tricuspid regurgitation.   8. Estimated pulmonary artery systolic pressure is 89.6 mmHg assuming a right atrial pressure of 15 mmHg, which is consistent with severe pulmonary hypertension.   9. Moderately dilated pulmonary artery.    MD Josefina Electronically signed on 2019 at 2:54:21 PM       < end of copied text >    CATH: < from: Cardiac Cath Lab - Adult (20 @ 12:56) >  HEMODYNAMICS: There is severe right sided failure. There is severe, fixed  pulmonary hypertension. With continued administration of inhaled nitric  oxide ( 40 PPM), there was no change in pulmonary hemodynamics.  COMPLICATIONS: No complications occurred during the cath lab visit.  DIAGNOSTIC IMPRESSIONS: Markedly elevated right sided filling pressure (RA  23 mmHg)  Severe pulmonary hypertension ( PASP 74 mmHg, Mean 51 mmHg)  PVR 5.79 Wood Units  PCWP within normal 15 mmgh  Pulmonary hypertension showed No response to Nitric oxide  DIAGNOSTIC RECOMMENDATIONS: Management as per Dr. Cox.    < end of copied text >                          CARDIOLOGY REVIEW: Personally visualized and reviewed    Telemetry Last 24h: SR 70's-80's with PVC's                              DAILY WEIGHTS - 48 HOUR TREND     Daily Weight in k.5 (2020 10:12), Weight in k.9 (2020 05:26)                             INTAKE AND OUTPUT - 48 HOUR TREND     20 @ 07:01  -  20 @ 07:00  --------------------------------------------------------  IN:  Total IN: 0 mL    OUT:    Voided: 2500 mL  Total OUT: 2500 mL    Total NET: -2500 mL      20 @ 07:01  -  20 @ 07:00  --------------------------------------------------------  IN:  Total IN: 0 mL    OUT:    Voided: 2425 mL  Total OUT: 2425 mL    Total NET: -2425 mL          HOME MEDICATIONS:  atorvastatin 40 mg oral tablet: 1 tab(s) orally once a day (at bedtime) (2020 07:50)  carvedilol 3.125 mg oral tablet: 1 tab(s) orally every 12 hours (2020 07:50)  enalapril 5 mg oral tablet: 1 tab(s) orally once a day (2020 07:50)                             Current Admission Active Medications    aspirin  chewable 81 milliGRAM(s) Oral daily  atorvastatin 40 milliGRAM(s) Oral at bedtime  buMETAnide Injectable 1 milliGRAM(s) IV Push every 12 hours  carvedilol 3.125 milliGRAM(s) Oral every 12 hours  dextrose 40% Gel 15 Gram(s) Oral once PRN Blood Glucose LESS THAN 70 milliGRAM(s)/deciliter  dextrose 5%. 1000 milliLiter(s) (50 mL/Hr) IV Continuous <Continuous>  dextrose 50% Injectable 12.5 Gram(s) IV Push once  dextrose 50% Injectable 25 Gram(s) IV Push once  dextrose 50% Injectable 25 Gram(s) IV Push once  enalapril 5 milliGRAM(s) Oral daily  enoxaparin Injectable 40 milliGRAM(s) SubCutaneous every 12 hours  glucagon  Injectable 1 milliGRAM(s) IntraMuscular once PRN Glucose LESS THAN 70 milligrams/deciliter  influenza   Vaccine 0.5 milliLiter(s) IntraMuscular once  insulin lispro (HumaLOG) corrective regimen sliding scale   SubCutaneous three times a day before meals  insulin lispro (HumaLOG) corrective regimen sliding scale   SubCutaneous at bedtime  potassium chloride    Tablet ER 40 milliEquivalent(s) Oral daily  sildenafil (REVATIO) 20 milliGRAM(s) Oral <User Schedule>  sildenafil (REVATIO) 10 milliGRAM(s) Oral every 12 hours                        PHYSICAL EXAM:    Vital Signs Last 24 Hrs  T(C): 36.6 (2020 10:08), Max: 36.9 (2020 21:38)  T(F): 97.9 (2020 10:08), Max: 98.4 (2020 21:38)  HR: 69 (2020 10:08) (69 - 82)  BP: 94/65 (2020 10:08) (94/65 - 120/79)  BP(mean): --  RR: 16 (2020 10:08) (16 - 19)  SpO2: 92% (2020 10:08) (92% - 100%)    GENERAL: NAD  NECK: Supple, No JVD  NERVOUS SYSTEM:  Alert & Oriented X3, non focal neuro exam.   CHEST/LUNG: clear lungs, No rales, rhonchi, wheezing, or rubs  HEART: Regular rate and rhythm; s1 and s2 auscultated, No murmurs, rubs, or gallops  ABDOMEN: Soft, Nontender, Nondistended; Bowel sounds present and normoactive.   EXTREMITIES:  2+ Peripheral Pulses, BLE edema [place] [facility] Faculty Practice                          [address] [place] [address]                       Phone: [phone]. Fax:[phone]                      ________________________________________________           Reason for follow up/Overnight events: RV failure/No new events     HPI:  38 y/o male with PMH of CHF, pHTN on 6L home O2, HTN, JAN, OHS, obesity, DM-2 came to the ED complaining of shortness of breath and LE swelling. Patient said his symptoms started about 3 days ago, he had pain in his leg due to swelling to the level of his thigh. He also has shortness of breath despite using his home O2; he has however, continue to use the same liters of home O2. He reported absolute compliant with medications and diet. He has no chest pain, palpitation, nausea, vomiting, diaphoresis, cough, fever, chills, abdominal pain, change in bowel/urinary habit, recent travel. (2020 21:05)      ROS: All review of systems negative unless indicated otherwise below.                          LAB RESULTS                   COMPLETE BLOOD COUNT( 2020 18:29 )                            12.8 g/dL<L>  4.20 K/uL )---------------( 172 K/uL                        40.2 %      Automated Differential     Auto Basophil # - 0.04 K/uL  Auto Basophil % - 1.0 %  Auto Eosinophil # - 0.16 K/uL  Auto Eosinophil % - 3.8 %  Auto Immature Granulocyte # - X      Auto Immature Granulocyte % - 0.2 %  Auto Lymphocyte # - 0.67 K/uL<L>  Auto Lymphocyte % - 16.0 %  Auto Monocyte # - 0.55 K/uL  Auto Monocyte % - 13.1 %  Auto Neutrophil # - 2.77 K/uL  Auto Neutrophil % - 65.9 %                                  CHEMISTRY                 Basic Metabolic Panel (20 @ 06:33)    139  |  99  |  25.0<H>  ----------------------------<  94  4.3   |  30.0<H>  |  1.01    Ca    9.1      2020 06:33  Phos  3.4       Mg     1.8                         Liver Functions (20 @ 19:48))  TPro  8.1  /  Alb  3.3  /  TBili  1.4  /  DBili  x   /  AST  22  /  ALT  15  /  AlkPhos  91     PT/INR/PTT ( 2020 18:21 )                        :                       :      21.9         :       33.0                  .        .                   .              .           .       1.90        .                                                                   Cardiac Enzymes   ( 2020 19:48 )  Troponin T  <0.01,  CPK  X    , CKMB  X    , BNP X        , ( 2020 18:29 )  Troponin T  X    ,  CPK  X    , CKMB  X    , BNP 1137<H>                            RADIOLOGY RESULTS: Personally visualized   < from: Xray Chest 2 Views PA/Lat (20 @ 19:13) >  IMPRESSION:  Gross cardiomegaly. No acute radiographic cardiopulmonary pathology.  Please refer to prior CT scan dated 2020    < end of copied text >    < from: CT Angio Chest w/ IV Cont (20 @ 22:03) >  IMPRESSION:    No pulmonary embolism.    Pulmonary vascular congestion and pulmonary edema. Superimposed infectious or inflammatory process is not excluded.    Extensive mediastinal and hilar lymphadenopathy is again noted.    NACHO MONTES DE OCA M.D., ATTENDING RADIOLOGIST    < end of copied text >                            CARDIOLOGY RESULTS: Official Report/Preliminary Verbal Reports    ECHO: < from: TTE Echo Complete w/Doppler (19 @ 08:48) >  Summary:   1. Technically fair study.   2. Left ventricular ejection fraction, by visual estimation, is 65 to 70%.   3. There is moderate septal left ventricular hypertrophy.   4. Severely enlarged right ventricle.   5. Severely reduced RV systolic function.   6. Right ventricular volume and pressure overload.   7. Moderate-severe tricuspid regurgitation.   8. Estimated pulmonary artery systolic pressure is 89.6 mmHg assuming a right atrial pressure of 15 mmHg, which is consistent with severe pulmonary hypertension.   9. Moderately dilated pulmonary artery.    MD Josefina Electronically signed on 2019 at 2:54:21 PM       < end of copied text >    CATH: < from: Cardiac Cath Lab - Adult (20 @ 12:56) >  HEMODYNAMICS: There is severe right sided failure. There is severe, fixed  pulmonary hypertension. With continued administration of inhaled nitric  oxide ( 40 PPM), there was no change in pulmonary hemodynamics.  COMPLICATIONS: No complications occurred during the cath lab visit.  DIAGNOSTIC IMPRESSIONS: Markedly elevated right sided filling pressure (RA  23 mmHg)  Severe pulmonary hypertension ( PASP 74 mmHg, Mean 51 mmHg)  PVR 5.79 Wood Units  PCWP within normal 15 mmgh  Pulmonary hypertension showed No response to Nitric oxide  DIAGNOSTIC RECOMMENDATIONS: Management as per Dr. Cox.    < end of copied text >                          CARDIOLOGY REVIEW: Personally visualized and reviewed    Telemetry Last 24h: SR 70's-80's with PVC's                              DAILY WEIGHTS - 48 HOUR TREND     Daily Weight in k.5 (2020 10:12), Weight in k.9 (2020 05:26)                             INTAKE AND OUTPUT - 48 HOUR TREND     20 @ 07:01  -  20 @ 07:00  --------------------------------------------------------  IN:  Total IN: 0 mL    OUT:    Voided: 2500 mL  Total OUT: 2500 mL    Total NET: -2500 mL      20 @ 07:01  -  20 @ 07:00  --------------------------------------------------------  IN:  Total IN: 0 mL    OUT:    Voided: 2425 mL  Total OUT: 2425 mL    Total NET: -2425 mL          HOME MEDICATIONS:  atorvastatin 40 mg oral tablet: 1 tab(s) orally once a day (at bedtime) (2020 07:50)  carvedilol 3.125 mg oral tablet: 1 tab(s) orally every 12 hours (2020 07:50)  enalapril 5 mg oral tablet: 1 tab(s) orally once a day (2020 07:50)                             Current Admission Active Medications    aspirin  chewable 81 milliGRAM(s) Oral daily  atorvastatin 40 milliGRAM(s) Oral at bedtime  buMETAnide Injectable 1 milliGRAM(s) IV Push every 12 hours  carvedilol 3.125 milliGRAM(s) Oral every 12 hours  dextrose 40% Gel 15 Gram(s) Oral once PRN Blood Glucose LESS THAN 70 milliGRAM(s)/deciliter  dextrose 5%. 1000 milliLiter(s) (50 mL/Hr) IV Continuous <Continuous>  dextrose 50% Injectable 12.5 Gram(s) IV Push once  dextrose 50% Injectable 25 Gram(s) IV Push once  dextrose 50% Injectable 25 Gram(s) IV Push once  enalapril 5 milliGRAM(s) Oral daily  enoxaparin Injectable 40 milliGRAM(s) SubCutaneous every 12 hours  glucagon  Injectable 1 milliGRAM(s) IntraMuscular once PRN Glucose LESS THAN 70 milligrams/deciliter  influenza   Vaccine 0.5 milliLiter(s) IntraMuscular once  insulin lispro (HumaLOG) corrective regimen sliding scale   SubCutaneous three times a day before meals  insulin lispro (HumaLOG) corrective regimen sliding scale   SubCutaneous at bedtime  potassium chloride    Tablet ER 40 milliEquivalent(s) Oral daily  sildenafil (REVATIO) 20 milliGRAM(s) Oral <User Schedule>  sildenafil (REVATIO) 10 milliGRAM(s) Oral every 12 hours                        PHYSICAL EXAM:    Vital Signs Last 24 Hrs  T(C): 36.6 (2020 10:08), Max: 36.9 (2020 21:38)  T(F): 97.9 (2020 10:08), Max: 98.4 (2020 21:38)  HR: 69 (2020 10:08) (69 - 82)  BP: 94/65 (2020 10:08) (94/65 - 120/79)  BP(mean): --  RR: 16 (2020 10:08) (16 - 19)  SpO2: 92% (2020 10:08) (92% - 100%)    GENERAL: NAD  NECK: Supple, No JVD  NERVOUS SYSTEM:  Alert & Oriented X3, non focal neuro exam.   CHEST/LUNG: clear lungs, No rales, rhonchi, wheezing, or rubs  HEART: Regular rate and rhythm; s1 and s2 auscultated, No murmurs, rubs, or gallops  ABDOMEN: Soft, Nontender, Nondistended; Bowel sounds present and normoactive.   EXTREMITIES:  2+ Peripheral Pulses, BLE +2 edema

## 2020-02-27 NOTE — PROGRESS NOTE ADULT - PROBLEM/PLAN-2
DISCHARGE SUMMARY and INSTRUCTIONS:    You are being discharged undelivered because you and your baby are in stable condition.    STATUS NOTE:  Date:  7/26/2017  Time: 7:18 PM  Destination:  Home    ACTIVITY:  May not return to work until 5/28/2017.    DIET:  · Continue to eat a healthy pregnancy diet  · Be sure to drink 8-10 glasses of water a day  · Don't go more than 8-10 hours without eating or drinking    CONTACT YOUR DOCTOR OR MIDWIFE IF YOU HAVE ANY OF THESE WARNING SIGNS OF PREGNANCY:  · Sudden and/or constant pain  · Vaginal bleeding  · Sudden gush or leaking fluid from the vagina  · Fainting  · Headache that will not go away with your normal comfort measures  · Any changes in your vision, such as blurry vision, double vision or spots/stars before your eyes  · Severe nausea and vomiting  · Little or no urine, pain and burning with urination, or blood in your urine  · Chills or fever  · Increase or change in vaginal discharge  · Your baby moves less than usual (See Fetal Movement Counting below.)      FETAL MOVEMENT COUNTING:  One way you can know your baby is doing well during pregnancy is to pay attention to his or her movements.  We recommend counting your baby's movements once each day beginning in the third trimester, or about the 26th week of pregnancy.      How to count baby's movements:  · Choose a time that is convenient for you when the baby is active, such as after a meal.  Try to do it at the same time each day.  · Sit comfortably or lie on your side.  · Note the time on the clock when you're ready to begin counting.  · Count each movement until the baby has moved 10 times.   · Count all movements that are either seen or felt, including shifting, rolling, or kicking.  Do not count baby's hiccoughs.  · If you have counted 10 movements in less than 2 hours, resume your normal activities and count again tomorrow.    If it takes longer than 1 hour to count 4 movements, try these suggestions:  · Eat  something if you haven't eaten within 2 to 3 hours before you count.  · Try to lie down in a quiet, undisturbed location, on either your right or left side.  · Place your hands on your belly and focus on your baby's movements.  · Continue your count from where you left off before, until you feel 10 movements.  · If it took longer than 2 hours to count 10 movements, call your midwife or doctor right away for recommendations.    Remember:  By counting and staying aware of your baby's movements, you will be helping your caregivers provide the best possible care for you and your baby.     Things to Start Planning for Your Delivery  · Who is your baby's doctor going to be?  If you don't already have a doctor picked out for your baby, this is something that you need to start thinking about before your baby is born.  · What to pack for the hospital  (Robe, slippers, toiletries, brush/comb, stress ball, change of clothes, phone numbers to make calls after baby is born. Change/money for vending machines, camera/video camera, copy of birth plan, nursing bras, baby book, car seat, baby outfit(s), baby blanket, snacks for support people)       · The Benefits of Breastfeeding brochure was not given  to patient.     A copy of this form was provided to and reviewed with Tiffanie Haque by Lenora Gastelum RN, 7/26/2017.  Patient verbalized understanding and has no further questions at this time.    DISCHARGE SUMMARY and INSTRUCTIONS:    You are being discharged undelivered because you and your baby are in stable condition.    STATUS NOTE:  Date:  7/26/2017  Time: 7:18 PM  Destination:  Home    ACTIVITY:      DIET:  · Continue to eat a healthy pregnancy diet  · Be sure to drink 8-10 glasses of water a day  · Don't go more than 8-10 hours without eating or drinking    CONTACT YOUR DOCTOR OR MIDWIFE IF YOU HAVE ANY OF THESE WARNING SIGNS OF PREGNANCY:  · Sudden and/or constant pain  · Vaginal bleeding  · Sudden gush or leaking  fluid from the vagina  · Fainting  · Headache that will not go away with your normal comfort measures  · Any changes in your vision, such as blurry vision, double vision or spots/stars before your eyes  · Severe nausea and vomiting  · Little or no urine, pain and burning with urination, or blood in your urine  · Chills or fever  · Increase or change in vaginal discharge  · Your baby moves less than usual (See Fetal Movement Counting below.)      FETAL MOVEMENT COUNTING:  One way you can know your baby is doing well during pregnancy is to pay attention to his or her movements.  We recommend counting your baby's movements once each day beginning in the third trimester, or about the 26th week of pregnancy.      How to count baby's movements:  · Choose a time that is convenient for you when the baby is active, such as after a meal.  Try to do it at the same time each day.  · Sit comfortably or lie on your side.  · Note the time on the clock when you're ready to begin counting.  · Count each movement until the baby has moved 10 times.   · Count all movements that are either seen or felt, including shifting, rolling, or kicking.  Do not count baby's hiccoughs.  · If you have counted 10 movements in less than 2 hours, resume your normal activities and count again tomorrow.    If it takes longer than 1 hour to count 4 movements, try these suggestions:  · Eat something if you haven't eaten within 2 to 3 hours before you count.  · Try to lie down in a quiet, undisturbed location, on either your right or left side.  · Place your hands on your belly and focus on your baby's movements.  · Continue your count from where you left off before, until you feel 10 movements.  · If it took longer than 2 hours to count 10 movements, call your midwife or doctor right away for recommendations.    Remember:  By counting and staying aware of your baby's movements, you will be helping your caregivers provide the best possible care for you and  your baby.     Things to Start Planning for Your Delivery  · Who is your baby's doctor going to be?  If you don't already have a doctor picked out for your baby, this is something that you need to start thinking about before your baby is born.  · What to pack for the hospital  (Robe, slippers, toiletries, brush/comb, stress ball, change of clothes, phone numbers to make calls after baby is born. Change/money for vending machines, camera/video camera, copy of birth plan, nursing bras, baby book, car seat, baby outfit(s), baby blanket, snacks for support people)       · The Benefits of Breastfeeding brochure was not given  to patient.     A copy of this form was provided to and reviewed with Tiffanie Haque by Lenora Gastelum RN, 7/26/2017.  Patient verbalized understanding and has no further questions at this time.       DISPLAY PLAN FREE TEXT

## 2020-02-27 NOTE — DIETITIAN INITIAL EVALUATION ADULT. - OTHER INFO
Pt presents with a chief complaint of CHF exacerbation.  Pt known to writer for multiple admission for CHF.  Pt reports good po intake at meals.  Pt with good appetite prior to admission, denies wt loss.  Pt states following low na meal plan at home- reinforced heart failure nutrition guidelines.  Pt receptive and verbalized understanding.

## 2020-02-27 NOTE — DISCHARGE NOTE PROVIDER - CARE PROVIDER_API CALL
Alton Cox)  Cardiovascular Disease  [address] [facility], [address] [place] [address]  Phone: [phone]  Fax: [phone]  Established Patient  Follow Up Time: 1 week Alton Cox)  Cardiovascular Disease  [address] [facility], [address]  [place] [address]  Phone: [phone]  Fax: [phone]  Established Patient  Follow Up Time: 1 week    Talia Hayden)  Med  Hosp Hospitalist  [address] [place] [address]  Phone: [phone]  Fax: [phone]  Established Patient  Follow Up Time: 1 week    DAVID HAYS  PULMONOLOGIST  Phone: (   )    -  Fax: (   )    -  Established Patient  Follow Up Time: 1 week

## 2020-02-27 NOTE — DISCHARGE NOTE PROVIDER - PROVIDER TOKENS
PROVIDER:[TOKEN:[[id number]],FOLLOWUP:[1 week],ESTABLISHEDPATIENT:[T]] PROVIDER:[TOKEN:[[id number]],FOLLOWUP:[1 week],ESTABLISHEDPATIENT:[T]],PROVIDER:[TOKEN:[[id number]],FOLLOWUP:[1 week],ESTABLISHEDPATIENT:[T]],FREE:[LAST:[MINKIN],FIRST:[DAVID],PHONE:[(   )    -],FAX:[(   )    -],ADDRESS:[PULMONOLOGIST],FOLLOWUP:[1 week],ESTABLISHEDPATIENT:[T]]

## 2020-02-27 NOTE — DISCHARGE NOTE PROVIDER - NSDCFUADDINST_GEN_ALL_CORE_FT
02-27    139  |  99  |  25.0<H>  ----------------------------<  94  4.3   |  30.0<H>  |  1.01    Ca    9.1      27 Feb 2020 06:33  Phos  3.4     02-27  Mg     1.8     02-27

## 2020-02-27 NOTE — PROGRESS NOTE ADULT - ATTENDING COMMENTS
pt was seen and examined. plan of care dw pt.  He will go home on bumex and zaroxlyn  fu next week, plan to add PAH meds.

## 2020-02-27 NOTE — DISCHARGE NOTE PROVIDER - NSDCFUADDAPPT_GEN_ALL_CORE_FT
Please follow up with your primary care MD in 2-3 days after discharge. Please follow up with your cardiologist in 1-2 weeks after discharge

## 2020-02-27 NOTE — PROGRESS NOTE ADULT - ASSESSMENT
40 y/o M with a PMHx of hx of R sided heart failure (normal LVEF with severely reduced RV function), severe pulmonary HTN on home O2, JAN on CPAP, IDDM2 who presented to the ED with complaints of leg swelling and SOB. At this time patient states that his breathing has improved and swelling in his legs has decreased. Patient continues with diuresis and net negative 2400 ml. Patient continues to use CPAP at night. Patient OOB to chair with oxygen and tolerating well. Patient educated on limiting water intake.  Troponin neg x 1  BNP 1137

## 2020-03-02 ENCOUNTER — APPOINTMENT (OUTPATIENT)
Dept: CARDIOLOGY | Facility: CLINIC | Age: 40
End: 2020-03-02
Payer: MEDICARE

## 2020-03-02 VITALS
HEART RATE: 85 BPM | DIASTOLIC BLOOD PRESSURE: 60 MMHG | OXYGEN SATURATION: 94 % | SYSTOLIC BLOOD PRESSURE: 102 MMHG | RESPIRATION RATE: 18 BRPM

## 2020-03-02 VITALS
DIASTOLIC BLOOD PRESSURE: 60 MMHG | SYSTOLIC BLOOD PRESSURE: 96 MMHG | HEIGHT: 69 IN | BODY MASS INDEX: 46.65 KG/M2 | WEIGHT: 315 LBS

## 2020-03-02 DIAGNOSIS — Z87.891 PERSONAL HISTORY OF NICOTINE DEPENDENCE: ICD-10-CM

## 2020-03-02 DIAGNOSIS — E66.01 MORBID (SEVERE) OBESITY DUE TO EXCESS CALORIES: ICD-10-CM

## 2020-03-02 DIAGNOSIS — Z83.3 FAMILY HISTORY OF DIABETES MELLITUS: ICD-10-CM

## 2020-03-02 DIAGNOSIS — E11.9 TYPE 2 DIABETES MELLITUS W/OUT COMPLICATIONS: ICD-10-CM

## 2020-03-02 DIAGNOSIS — I10 ESSENTIAL (PRIMARY) HYPERTENSION: ICD-10-CM

## 2020-03-02 DIAGNOSIS — Z72.3 LACK OF PHYSICAL EXERCISE: ICD-10-CM

## 2020-03-02 DIAGNOSIS — E78.5 HYPERLIPIDEMIA, UNSPECIFIED: ICD-10-CM

## 2020-03-02 DIAGNOSIS — I50.30 UNSPECIFIED DIASTOLIC (CONGESTIVE) HEART FAILURE: ICD-10-CM

## 2020-03-02 PROCEDURE — 99214 OFFICE O/P EST MOD 30 MIN: CPT

## 2020-03-02 PROCEDURE — 93000 ELECTROCARDIOGRAM COMPLETE: CPT

## 2020-03-02 RX ORDER — KRILL/OM-3/DHA/EPA/PHOSPHO/AST 1000-230MG
81 CAPSULE ORAL DAILY
Refills: 0 | Status: ACTIVE | COMMUNITY

## 2020-03-02 RX ORDER — ENALAPRIL MALEATE 5 MG/1
5 TABLET ORAL DAILY
Qty: 30 | Refills: 0 | Status: DISCONTINUED | COMMUNITY
End: 2020-03-02

## 2020-03-02 RX ORDER — SILDENAFIL 20 MG/1
20 TABLET ORAL
Qty: 180 | Refills: 0 | Status: ACTIVE | COMMUNITY

## 2020-03-10 RX ORDER — METFORMIN HYDROCHLORIDE 500 MG/1
500 TABLET, COATED ORAL DAILY
Qty: 90 | Refills: 0 | Status: ACTIVE | COMMUNITY

## 2020-03-12 ENCOUNTER — INPATIENT (INPATIENT)
Facility: HOSPITAL | Age: 40
LOS: 0 days | Discharge: HOME | DRG: 292 | End: 2020-03-13
Attending: INTERNAL MEDICINE | Admitting: HOSPITALIST
Payer: MEDICARE

## 2020-03-12 VITALS
DIASTOLIC BLOOD PRESSURE: 73 MMHG | RESPIRATION RATE: 25 BRPM | SYSTOLIC BLOOD PRESSURE: 122 MMHG | HEART RATE: 91 BPM | OXYGEN SATURATION: 91 % | TEMPERATURE: 99 F

## 2020-03-12 PROCEDURE — 99291 CRITICAL CARE FIRST HOUR: CPT

## 2020-03-12 RX ORDER — BUMETANIDE 0.25 MG/ML
1 INJECTION INTRAMUSCULAR; INTRAVENOUS ONCE
Refills: 0 | Status: COMPLETED | OUTPATIENT
Start: 2020-03-12 | End: 2020-03-12

## 2020-03-12 RX ORDER — IPRATROPIUM/ALBUTEROL SULFATE 18-103MCG
3 AEROSOL WITH ADAPTER (GRAM) INHALATION
Refills: 0 | Status: COMPLETED | OUTPATIENT
Start: 2020-03-12 | End: 2020-03-13

## 2020-03-12 NOTE — ED ADULT TRIAGE NOTE - CHIEF COMPLAINT QUOTE
patient states that he has been having shortness of breath and difficulty breathing x 2 days, patient also has had increasing swelling to lower extremities and increased MUÑOZ patient currently on o2 via [facility] o2 sat 85-90% states that he has been sick x1 week. patient brought to critical care

## 2020-03-13 ENCOUNTER — TRANSCRIPTION ENCOUNTER (OUTPATIENT)
Age: 40
End: 2020-03-13

## 2020-03-13 VITALS
TEMPERATURE: 98 F | OXYGEN SATURATION: 92 % | DIASTOLIC BLOOD PRESSURE: 75 MMHG | RESPIRATION RATE: 20 BRPM | SYSTOLIC BLOOD PRESSURE: 116 MMHG | HEART RATE: 80 BPM

## 2020-03-13 DIAGNOSIS — I50.9 HEART FAILURE, UNSPECIFIED: ICD-10-CM

## 2020-03-13 LAB
ALBUMIN SERPL ELPH-MCNC: 3.4 G/DL — SIGNIFICANT CHANGE UP (ref 3.3–5.2)
ALP SERPL-CCNC: 105 U/L — SIGNIFICANT CHANGE UP (ref 40–120)
ALT FLD-CCNC: 14 U/L — SIGNIFICANT CHANGE UP
ANION GAP SERPL CALC-SCNC: 13 MMOL/L — SIGNIFICANT CHANGE UP (ref 5–17)
APPEARANCE UR: CLEAR — SIGNIFICANT CHANGE UP
APTT BLD: 34.3 SEC — SIGNIFICANT CHANGE UP (ref 27.5–36.3)
AST SERPL-CCNC: 24 U/L — SIGNIFICANT CHANGE UP
BACTERIA # UR AUTO: ABNORMAL
BASOPHILS # BLD AUTO: 0.03 K/UL — SIGNIFICANT CHANGE UP (ref 0–0.2)
BASOPHILS NFR BLD AUTO: 0.7 % — SIGNIFICANT CHANGE UP (ref 0–2)
BILIRUB SERPL-MCNC: 1.5 MG/DL — SIGNIFICANT CHANGE UP (ref 0.4–2)
BILIRUB UR-MCNC: NEGATIVE — SIGNIFICANT CHANGE UP
BUN SERPL-MCNC: 20 MG/DL — SIGNIFICANT CHANGE UP (ref 8–20)
CALCIUM SERPL-MCNC: 9.4 MG/DL — SIGNIFICANT CHANGE UP (ref 8.6–10.2)
CHLORIDE SERPL-SCNC: 92 MMOL/L — LOW (ref 98–107)
CO2 SERPL-SCNC: 32 MMOL/L — HIGH (ref 22–29)
COLOR SPEC: YELLOW — SIGNIFICANT CHANGE UP
CREAT SERPL-MCNC: 1.29 MG/DL — SIGNIFICANT CHANGE UP (ref 0.5–1.3)
DIFF PNL FLD: NEGATIVE — SIGNIFICANT CHANGE UP
EOSINOPHIL # BLD AUTO: 0.22 K/UL — SIGNIFICANT CHANGE UP (ref 0–0.5)
EOSINOPHIL NFR BLD AUTO: 5.1 % — SIGNIFICANT CHANGE UP (ref 0–6)
EPI CELLS # UR: SIGNIFICANT CHANGE UP
GLUCOSE BLDC GLUCOMTR-MCNC: 129 MG/DL — HIGH (ref 70–99)
GLUCOSE BLDC GLUCOMTR-MCNC: 157 MG/DL — HIGH (ref 70–99)
GLUCOSE BLDC GLUCOMTR-MCNC: 159 MG/DL — HIGH (ref 70–99)
GLUCOSE SERPL-MCNC: 118 MG/DL — HIGH (ref 70–99)
GLUCOSE UR QL: NEGATIVE MG/DL — SIGNIFICANT CHANGE UP
HCT VFR BLD CALC: 40.1 % — SIGNIFICANT CHANGE UP (ref 39–50)
HGB BLD-MCNC: 12.5 G/DL — LOW (ref 13–17)
IMM GRANULOCYTES NFR BLD AUTO: 0.2 % — SIGNIFICANT CHANGE UP (ref 0–1.5)
INR BLD: 2.05 RATIO — HIGH (ref 0.88–1.16)
KETONES UR-MCNC: NEGATIVE — SIGNIFICANT CHANGE UP
LACTATE BLDV-MCNC: 1.4 MMOL/L — SIGNIFICANT CHANGE UP (ref 0.5–2)
LEUKOCYTE ESTERASE UR-ACNC: ABNORMAL
LYMPHOCYTES # BLD AUTO: 0.77 K/UL — LOW (ref 1–3.3)
LYMPHOCYTES # BLD AUTO: 17.7 % — SIGNIFICANT CHANGE UP (ref 13–44)
MAGNESIUM SERPL-MCNC: 1.8 MG/DL — SIGNIFICANT CHANGE UP (ref 1.6–2.6)
MCHC RBC-ENTMCNC: 29.8 PG — SIGNIFICANT CHANGE UP (ref 27–34)
MCHC RBC-ENTMCNC: 31.2 GM/DL — LOW (ref 32–36)
MCV RBC AUTO: 95.7 FL — SIGNIFICANT CHANGE UP (ref 80–100)
MONOCYTES # BLD AUTO: 0.5 K/UL — SIGNIFICANT CHANGE UP (ref 0–0.9)
MONOCYTES NFR BLD AUTO: 11.5 % — SIGNIFICANT CHANGE UP (ref 2–14)
NEUTROPHILS # BLD AUTO: 2.82 K/UL — SIGNIFICANT CHANGE UP (ref 1.8–7.4)
NEUTROPHILS NFR BLD AUTO: 64.8 % — SIGNIFICANT CHANGE UP (ref 43–77)
NITRITE UR-MCNC: NEGATIVE — SIGNIFICANT CHANGE UP
NT-PROBNP SERPL-SCNC: 1107 PG/ML — HIGH (ref 0–300)
NT-PROBNP SERPL-SCNC: 1194 PG/ML — HIGH (ref 0–300)
PH UR: 7 — SIGNIFICANT CHANGE UP (ref 5–8)
PLATELET # BLD AUTO: 158 K/UL — SIGNIFICANT CHANGE UP (ref 150–400)
POTASSIUM SERPL-MCNC: 3.8 MMOL/L — SIGNIFICANT CHANGE UP (ref 3.5–5.3)
POTASSIUM SERPL-SCNC: 3.8 MMOL/L — SIGNIFICANT CHANGE UP (ref 3.5–5.3)
PROT SERPL-MCNC: 8.8 G/DL — HIGH (ref 6.6–8.7)
PROT UR-MCNC: 100 MG/DL
PROTHROM AB SERPL-ACNC: 23.7 SEC — HIGH (ref 10–12.9)
RAPID RVP RESULT: SIGNIFICANT CHANGE UP
RBC # BLD: 4.19 M/UL — LOW (ref 4.2–5.8)
RBC # FLD: 16.1 % — HIGH (ref 10.3–14.5)
RBC CASTS # UR COMP ASSIST: SIGNIFICANT CHANGE UP /HPF (ref 0–4)
SODIUM SERPL-SCNC: 137 MMOL/L — SIGNIFICANT CHANGE UP (ref 135–145)
SP GR SPEC: 1 — LOW (ref 1.01–1.02)
TROPONIN T SERPL-MCNC: <0.01 NG/ML — SIGNIFICANT CHANGE UP (ref 0–0.06)
UROBILINOGEN FLD QL: 4 MG/DL
WBC # BLD: 4.35 K/UL — SIGNIFICANT CHANGE UP (ref 3.8–10.5)
WBC # FLD AUTO: 4.35 K/UL — SIGNIFICANT CHANGE UP (ref 3.8–10.5)
WBC UR QL: SIGNIFICANT CHANGE UP

## 2020-03-13 PROCEDURE — 81001 URINALYSIS AUTO W/SCOPE: CPT

## 2020-03-13 PROCEDURE — 99291 CRITICAL CARE FIRST HOUR: CPT | Mod: 25

## 2020-03-13 PROCEDURE — 93005 ELECTROCARDIOGRAM TRACING: CPT

## 2020-03-13 PROCEDURE — 12345: CPT | Mod: NC,GC

## 2020-03-13 PROCEDURE — 85610 PROTHROMBIN TIME: CPT

## 2020-03-13 PROCEDURE — 87581 M.PNEUMON DNA AMP PROBE: CPT

## 2020-03-13 PROCEDURE — 84484 ASSAY OF TROPONIN QUANT: CPT

## 2020-03-13 PROCEDURE — 83735 ASSAY OF MAGNESIUM: CPT

## 2020-03-13 PROCEDURE — 99223 1ST HOSP IP/OBS HIGH 75: CPT

## 2020-03-13 PROCEDURE — 71045 X-RAY EXAM CHEST 1 VIEW: CPT | Mod: 26

## 2020-03-13 PROCEDURE — 85730 THROMBOPLASTIN TIME PARTIAL: CPT

## 2020-03-13 PROCEDURE — 87040 BLOOD CULTURE FOR BACTERIA: CPT

## 2020-03-13 PROCEDURE — 71045 X-RAY EXAM CHEST 1 VIEW: CPT

## 2020-03-13 PROCEDURE — 36415 COLL VENOUS BLD VENIPUNCTURE: CPT

## 2020-03-13 PROCEDURE — 80053 COMPREHEN METABOLIC PANEL: CPT

## 2020-03-13 PROCEDURE — 87633 RESP VIRUS 12-25 TARGETS: CPT

## 2020-03-13 PROCEDURE — 87798 DETECT AGENT NOS DNA AMP: CPT

## 2020-03-13 PROCEDURE — 87086 URINE CULTURE/COLONY COUNT: CPT

## 2020-03-13 PROCEDURE — 94640 AIRWAY INHALATION TREATMENT: CPT

## 2020-03-13 PROCEDURE — 83605 ASSAY OF LACTIC ACID: CPT

## 2020-03-13 PROCEDURE — 96375 TX/PRO/DX INJ NEW DRUG ADDON: CPT

## 2020-03-13 PROCEDURE — 93010 ELECTROCARDIOGRAM REPORT: CPT

## 2020-03-13 PROCEDURE — 96374 THER/PROPH/DIAG INJ IV PUSH: CPT

## 2020-03-13 PROCEDURE — 82962 GLUCOSE BLOOD TEST: CPT

## 2020-03-13 PROCEDURE — 85027 COMPLETE CBC AUTOMATED: CPT

## 2020-03-13 PROCEDURE — 83880 ASSAY OF NATRIURETIC PEPTIDE: CPT

## 2020-03-13 PROCEDURE — 94660 CPAP INITIATION&MGMT: CPT

## 2020-03-13 PROCEDURE — 87486 CHLMYD PNEUM DNA AMP PROBE: CPT

## 2020-03-13 RX ORDER — BUMETANIDE 0.25 MG/ML
1 INJECTION INTRAMUSCULAR; INTRAVENOUS DAILY
Refills: 0 | Status: DISCONTINUED | OUTPATIENT
Start: 2020-03-13 | End: 2020-03-13

## 2020-03-13 RX ORDER — DEXTROSE 50 % IN WATER 50 %
25 SYRINGE (ML) INTRAVENOUS ONCE
Refills: 0 | Status: DISCONTINUED | OUTPATIENT
Start: 2020-03-13 | End: 2020-03-13

## 2020-03-13 RX ORDER — ASPIRIN/CALCIUM CARB/MAGNESIUM 324 MG
81 TABLET ORAL DAILY
Refills: 0 | Status: DISCONTINUED | OUTPATIENT
Start: 2020-03-13 | End: 2020-03-13

## 2020-03-13 RX ORDER — DEXTROSE 50 % IN WATER 50 %
12.5 SYRINGE (ML) INTRAVENOUS ONCE
Refills: 0 | Status: DISCONTINUED | OUTPATIENT
Start: 2020-03-13 | End: 2020-03-13

## 2020-03-13 RX ORDER — ASPIRIN/CALCIUM CARB/MAGNESIUM 324 MG
1 TABLET ORAL
Qty: 30 | Refills: 0
Start: 2020-03-13 | End: 2020-04-11

## 2020-03-13 RX ORDER — BUMETANIDE 0.25 MG/ML
1 INJECTION INTRAMUSCULAR; INTRAVENOUS
Refills: 0 | Status: DISCONTINUED | OUTPATIENT
Start: 2020-03-13 | End: 2020-03-13

## 2020-03-13 RX ORDER — SODIUM CHLORIDE 9 MG/ML
1000 INJECTION, SOLUTION INTRAVENOUS
Refills: 0 | Status: DISCONTINUED | OUTPATIENT
Start: 2020-03-13 | End: 2020-03-13

## 2020-03-13 RX ORDER — ATORVASTATIN CALCIUM 80 MG/1
1 TABLET, FILM COATED ORAL
Qty: 30 | Refills: 0
Start: 2020-03-13 | End: 2020-04-11

## 2020-03-13 RX ORDER — BUMETANIDE 0.25 MG/ML
1 INJECTION INTRAMUSCULAR; INTRAVENOUS
Qty: 60 | Refills: 0
Start: 2020-03-13 | End: 2020-04-11

## 2020-03-13 RX ORDER — DEXTROSE 50 % IN WATER 50 %
15 SYRINGE (ML) INTRAVENOUS ONCE
Refills: 0 | Status: DISCONTINUED | OUTPATIENT
Start: 2020-03-13 | End: 2020-03-13

## 2020-03-13 RX ORDER — ATORVASTATIN CALCIUM 80 MG/1
40 TABLET, FILM COATED ORAL AT BEDTIME
Refills: 0 | Status: DISCONTINUED | OUTPATIENT
Start: 2020-03-13 | End: 2020-03-13

## 2020-03-13 RX ORDER — CARVEDILOL PHOSPHATE 80 MG/1
3.12 CAPSULE, EXTENDED RELEASE ORAL EVERY 12 HOURS
Refills: 0 | Status: DISCONTINUED | OUTPATIENT
Start: 2020-03-13 | End: 2020-03-13

## 2020-03-13 RX ORDER — INSULIN LISPRO 100/ML
VIAL (ML) SUBCUTANEOUS
Refills: 0 | Status: DISCONTINUED | OUTPATIENT
Start: 2020-03-13 | End: 2020-03-13

## 2020-03-13 RX ORDER — METFORMIN HYDROCHLORIDE 850 MG/1
1 TABLET ORAL
Qty: 30 | Refills: 0
Start: 2020-03-13 | End: 2020-04-11

## 2020-03-13 RX ORDER — GLUCAGON INJECTION, SOLUTION 0.5 MG/.1ML
1 INJECTION, SOLUTION SUBCUTANEOUS ONCE
Refills: 0 | Status: DISCONTINUED | OUTPATIENT
Start: 2020-03-13 | End: 2020-03-13

## 2020-03-13 RX ORDER — CARVEDILOL PHOSPHATE 80 MG/1
1 CAPSULE, EXTENDED RELEASE ORAL
Qty: 60 | Refills: 0
Start: 2020-03-13 | End: 2020-04-11

## 2020-03-13 RX ADMIN — Medication 3 MILLILITER(S): at 00:06

## 2020-03-13 RX ADMIN — BUMETANIDE 1 MILLIGRAM(S): 0.25 INJECTION INTRAMUSCULAR; INTRAVENOUS at 00:06

## 2020-03-13 RX ADMIN — Medication 125 MILLIGRAM(S): at 00:07

## 2020-03-13 RX ADMIN — Medication 1: at 11:42

## 2020-03-13 RX ADMIN — Medication 81 MILLIGRAM(S): at 11:35

## 2020-03-13 RX ADMIN — BUMETANIDE 1 MILLIGRAM(S): 0.25 INJECTION INTRAMUSCULAR; INTRAVENOUS at 10:14

## 2020-03-13 RX ADMIN — Medication 10 MILLIGRAM(S): at 15:47

## 2020-03-13 NOTE — CHART NOTE - NSCHARTNOTEFT_GEN_A_CORE
SW Note: SW received call that pt is ready for d/c and needs ambulette home as his friend can no longer transport him. SW met with pt at bedside, pt uses 6L O2 and has portable 02 with him. SW arranged ambulette via [facility], no further SW services identified

## 2020-03-13 NOTE — DISCHARGE NOTE PROVIDER - NSDCMRMEDTOKEN_GEN_ALL_CORE_FT
aspirin 81 mg oral tablet, chewable: 1 tab(s) orally once a day  atorvastatin 40 mg oral tablet: 1 tab(s) orally once a day (at bedtime)  bumetanide 1 mg oral tablet: 1 tab(s) orally 2 times a day  carvedilol 3.125 mg oral tablet: 1 tab(s) orally every 12 hours  enalapril 5 mg oral tablet: 1 tab(s) orally once a day  hydroCHLOROthiazide 25 mg oral tablet: 1 tab(s) orally once a day  K-Tab 20 mEq oral tablet, extended release: 2 tab(s) orally once a day   metFORMIN 500 mg oral tablet: 1 tab(s) orally once a day   sildenafil 20 mg oral tablet: 0.5 tab(s) orally 3 times a day aspirin 81 mg oral tablet, chewable: 1 tab(s) orally once a day  atorvastatin 40 mg oral tablet: 1 tab(s) orally once a day (at bedtime)  bumetanide 1 mg oral tablet: 1 tab(s) orally 2 times a day  carvedilol 3.125 mg oral tablet: 1 tab(s) orally every 12 hours  hydroCHLOROthiazide 25 mg oral tablet: 1 tab(s) orally once a day  K-Tab 20 mEq oral tablet, extended release: 2 tab(s) orally once a day   metFORMIN 500 mg oral tablet: 1 tab(s) orally once a day   sildenafil 20 mg oral tablet: 0.5 tab(s) orally 3 times a day

## 2020-03-13 NOTE — DISCHARGE NOTE PROVIDER - NSDCFUADDINST_GEN_ALL_CORE_FT
Serum Pro-Brain Natriuretic Peptide: 1107 (03.13.20 @ 06:55)                          12.5   4.35  )-----------( 158      ( 13 Mar 2020 00:15 )             40.1     03-13    137  |  92<L>  |  20.0  ----------------------------<  118<H>  3.8   |  32.0<H>  |  1.29    Ca    9.4      13 Mar 2020 00:15  Mg     1.8     03-13    TPro  8.8<H>  /  Alb  3.4  /  TBili  1.5  /  DBili  x   /  AST  24  /  ALT  14  /  AlkPhos  105  03-13

## 2020-03-13 NOTE — H&P ADULT - NSHPPHYSICALEXAM_GEN_ALL_CORE
Vital Signs Last 24 Hrs  T(C): 36.6 (13 Mar 2020 06:51), Max: 37.5 (12 Mar 2020 23:54)  T(F): 97.8 (13 Mar 2020 06:51), Max: 99.5 (12 Mar 2020 23:54)  HR: 83 (13 Mar 2020 06:51) (83 - 94)  BP: 102/57 (13 Mar 2020 06:51) (102/57 - 132/61)  BP(mean): --  RR: 22 (13 Mar 2020 06:51) (22 - 25)  SpO2: 90% (13 Mar 2020 06:51) (90% - 95%)    Constitutional/General: unkept. Well developed, well nourished, vitals reviewed  EYE: PERRL, conjunctiva clear  ENT: Good dentition, oropharynx clear  NECK: No masses, thyroid normal  RESP: CTAB, no wheezes, no rhonchi, normal effort  CV: RRR, normal S1S2, + murmur, 2+ DP pulses, no JVD, no edema  ABDOMEN: Morbidly obese, Soft, nontender, no HSM: patient with home clothing unable to fully examine abd, skin, or sacral area or LE  LYMPH: No cervical or supraclavicular adenopathy  SKIN: Warm, dry, bilateral chronic venous stasis   PSYCHIATRIC: Oriented x3, normal mood and affect

## 2020-03-13 NOTE — DISCHARGE NOTE PROVIDER - NSDCCPCAREPLAN_GEN_ALL_CORE_FT
PRINCIPAL DISCHARGE DIAGNOSIS  Diagnosis: CHF (congestive heart failure)  Assessment and Plan of Treatment: You were treated for an acute CHF exacerbation (with a preserved ejection fraction) which was treated with IV medications bumex to help you diures and offload accumulated fluid. Your symptoms improved and you have been transitioned back to your oral diuretic medication. It is important that you comply with a heart healthy, low salt diet. Please avoid drinking more than 1.2 liter of fluid each day. Weigh yourself daily to make sure you are not gaining extra weight. Please follow up with your cardiologist to discuss this hospitalization as well as your sever pulmonary hypertension which is a lso contributing to your symptoms.      SECONDARY DISCHARGE DIAGNOSES  Diagnosis: Pulmonary hypertension  Assessment and Plan of Treatment: You have history of sever pulmonary hypertension which is treated with sildenafil. Please continue to take all medications as prescribed. Follow up with your cardiologist after discharge.    Diagnosis: JAN (obstructive sleep apnea)  Assessment and Plan of Treatment: Counselled on weight loss. continue with bipap at home    Diagnosis: HTN (hypertension)  Assessment and Plan of Treatment: You have a history of high blood pressure, continue to take Carvedilol and Enalapril medications as prescribed. Measure your blood pressure daily with an at home blood pressure machine    Diagnosis: Diabetes mellitus  Assessment and Plan of Treatment: Please take all your medications as precribed, follow a consistent carbohydrate, low fat, low sugar diet. a1c 6.3    Diagnosis: Severe obesity  Assessment and Plan of Treatment: You have severe obesity, this can cause an additional strain on your heart in addition to your chornic heart failure. Weight reduction can alleviate your breathing problems and decrease stress on your heart. Please follow a low fat, heart healthy diet. Follow up with your primary care doctor to monitor your weight loss. PRINCIPAL DISCHARGE DIAGNOSIS  Diagnosis: CHF (congestive heart failure)  Assessment and Plan of Treatment: Chronic diastolic Heart failure exacerbation. You were treated for an acute CHF exacerbation (with a preserved ejection fraction) which was treated with IV medications bumex to help you diures and offload accumulated fluid. Your symptoms improved and you have been transitioned back to your oral diuretic medication. It is important that you comply with a heart healthy, low salt diet. Please avoid drinking more than 1.0 liters of fluid each day. Weigh yourself daily to make sure you are not gaining extra weight. Please follow up with your cardiologist to discuss this hospitalization as well as your severe pulmonary hypertension which is a lso contributing to your symptoms.      SECONDARY DISCHARGE DIAGNOSES  Diagnosis: Pulmonary hypertension  Assessment and Plan of Treatment: You have history of severe pulmonary hypertension which is treated with sildenafil.  Continue with following with the cardiologist for pulmonary hypertension medications. Please continue to take all medications as prescribed. Follow up with your cardiologist after discharge with scheduled appointment.    Diagnosis: JAN (obstructive sleep apnea)  Assessment and Plan of Treatment: Counselled on weight loss. continue with bipap at home    Diagnosis: HTN (hypertension)  Assessment and Plan of Treatment: You have a history of high blood pressure, continue to take Carvedilol and Enalapril medications as prescribed. Measure your blood pressure daily with an at home blood pressure machine    Diagnosis: Diabetes mellitus  Assessment and Plan of Treatment: Please take all your medications as precribed, follow a consistent carbohydrate, low fat, low sugar diet. a1c 6.3    Diagnosis: Severe obesity  Assessment and Plan of Treatment: You have severe obesity, this can cause an additional strain on your heart in addition to your chornic heart failure. Weight reduction can alleviate your breathing problems and decrease stress on your heart. Please follow a low fat, heart healthy diet. Follow up with your primary care doctor to monitor your weight loss.

## 2020-03-13 NOTE — PHARMACOTHERAPY INTERVENTION NOTE - COMMENTS
Spoke with patient at bedside regarding medication reconciliation. Please refer to outpatient medication review for the updated list.

## 2020-03-13 NOTE — DISCHARGE NOTE PROVIDER - PROVIDER TOKENS
PROVIDER:[TOKEN:[[id number]]],FREE:[LAST:[primary care physician],PHONE:[(   )    -],FAX:[(   )    -]]

## 2020-03-13 NOTE — DISCHARGE NOTE PROVIDER - HOSPITAL COURSE
38 y/o male with PMH of HFpEF, Severe pHTN on 6L home O2, HTN, JAN on cpap, OHS, morbid obesity class 3, DM-2 came to the ED complaining of SOB, orthopnea, LE swelling x 7 days, patient admitted for acute on chronic CHF exacerbation. patient reports no daily monitoring of weight or fluid intake. He denies: recent travel. RVP negative. Chest xray gross enlargement of heart noted. BNP 1107, compared to discharge from previous hospitalizations. Troponin negative. Patient received IV bumex x 1, changed to PO bumex. Continued on home medications. Patient tolerated CPAP overnight. Cardiology consulted and cleared for disposition. Patient continued on chronic home medications.        Vital Signs Last 24 Hrs    T(C): 36.8 (13 Mar 2020 11:08), Max: 37.5 (12 Mar 2020 23:54)    T(F): 98.3 (13 Mar 2020 11:08), Max: 99.5 (12 Mar 2020 23:54)    HR: 69 (13 Mar 2020 11:08) (69 - 94)    BP: 90/60 (13 Mar 2020 11:08) (90/60 - 132/61)    RR: 21 (13 Mar 2020 11:08) (21 - 25)    SpO2: 93% (13 Mar 2020 11:08) (90% - 95%) 40 y/o male with PMH of HFpEF, Severe pHTN on 6L home O2, HTN, JAN on cpap, OHS, morbid obesity class 3, DM-2 came to the ED complaining of SOB, orthopnea, LE swelling x 7 days, patient admitted for acute on chronic CHF exacerbation. patient reports no daily monitoring of weight or fluid intake. He denies: recent travel. RVP negative. Chest xray gross enlargement of heart noted. BNP 1107, compared to discharge from previous hospitalizations. Troponin negative. Patient received IV bumex x 1, changed to PO bumex. Continued on home medications. Patient tolerated CPAP overnight. Cardiology consulted and cleared for disposition. Patient continued on chronic home medications.        Vital Signs Last 24 Hrs    T(C): 36.8 (13 Mar 2020 11:08), Max: 37.5 (12 Mar 2020 23:54)    T(F): 98.3 (13 Mar 2020 11:08), Max: 99.5 (12 Mar 2020 23:54)    HR: 69 (13 Mar 2020 11:08) (69 - 94)    BP: 90/60 (13 Mar 2020 11:08) (90/60 - 132/61)    RR: 21 (13 Mar 2020 11:08) (21 - 25)    SpO2: 93% (13 Mar 2020 11:08) (90% - 95%)        Constitutional/General: unkept. Well developed, well nourished, vitals reviewed    HEENT: clear sclera, no tongue deviation, NC in place    RESP: CTAB, no wheezes, no rhonchi, normal effort    CV: RRR, normal S1S2, + murmur, 2+ DP pulses, no JVD, no edema    ABDOMEN: Morbidly obese, Soft, nontender, +BS, difficult to appreciate     LYMPH: No cervical or supraclavicular adenopathy    SKIN: Warm, dry, bilateral chronic venous stasis, chronic LE vascular changes     PSYCHIATRIC: Oriented x3, normal mood and affect 38 y/o male with PMH of HFpEF, Severe pHTN on 6L home O2, HTN, JAN on cpap, OHS, morbid obesity class 3, DM-2 came to the ED complaining of SOB, orthopnea, LE swelling x 7 days, patient admitted for acute on chronic CHF exacerbation. patient reports no daily monitoring of weight or fluid intake. He denies: recent travel. RVP negative. Chest xray gross enlargement of heart noted. BNP 1107, compared to discharge from previous hospitalizations. Troponin negative. Patient received IV bumex x 1, changed to PO bumex. Continued on home medications. Patient tolerated BIPAP overnight. Cardiology consulted and cleared for disposition. Patient continued on chronic home medications.        Vital Signs Last 24 Hrs    T(C): 36.8 (13 Mar 2020 11:08), Max: 37.5 (12 Mar 2020 23:54)    T(F): 98.3 (13 Mar 2020 11:08), Max: 99.5 (12 Mar 2020 23:54)    HR: 69 (13 Mar 2020 11:08) (69 - 94)    BP: 90/60 (13 Mar 2020 11:08) (90/60 - 132/61)    RR: 21 (13 Mar 2020 11:08) (21 - 25)    SpO2: 93% (13 Mar 2020 11:08) (90% - 95%)        Constitutional/General: unkept. Well developed, well nourished, vitals reviewed    HEENT: clear sclera, no tongue deviation, NC in place    RESP: CTAB, no wheezes, no rhonchi, normal effort    CV: RRR, normal S1S2, + murmur, 2+ DP pulses, no JVD, no edema    ABDOMEN: Morbidly obese, Soft, nontender, +BS, difficult to appreciate     LYMPH: No cervical or supraclavicular adenopathy    SKIN: Warm, dry, bilateral chronic venous stasis, chronic LE vascular changes     PSYCHIATRIC: Oriented x3, normal mood and affect 40 y/o male with hx of HFpEF, Severe pHTN on 6L home O2, HTN, JAN on cpap, OHS, morbid obesity class 3, DM-2 came to the ED complaining of SOB, orthopnea, LE swelling x 7 days, patient admitted for acute on chronic CHF exacerbation. patient reports no daily monitoring of weight or fluid intake. He denies: recent travel. RVP negative. Chest xray gross enlargement of heart noted, no congestion. BNP 1107, compared to discharge from previous hospitalizations usually around this range. Troponin negative. No need for repeat TTE. Patient received IV bumex  with improvement in sx and bumex changed to PO bumex. Continued on home medications. Patient tolerated BIPAP overnight. Cardiology d/w Dr. Cox, pt to c/w same regimen.  Patient counselled on weight checks, fluid restriction and diet control. Pt pending auth outpt for pulm htn medication. Pt medically stable to be discharged home to f/u with pmd in 3-5 days. Pt to f/u with cardio/ pulm htn specialist with scheduled appt. Pt high risk for recurrent hospitalization due to lack of insight into his condition. He also is living with a friend. Socially has lack of resources.              Vital Signs Last 24 Hrs    T(C): 36.8 (13 Mar 2020 11:08), Max: 37.5 (12 Mar 2020 23:54)    T(F): 98.3 (13 Mar 2020 11:08), Max: 99.5 (12 Mar 2020 23:54)    HR: 69 (13 Mar 2020 11:08) (69 - 94)    BP: 90/60 (13 Mar 2020 11:08) (90/60 - 132/61)    RR: 21 (13 Mar 2020 11:08) (21 - 25)    SpO2: 93% (13 Mar 2020 11:08) (90% - 95%)        Constitutional/General: Obese unkept. Well developed, well nourished     RESP: CTAB, no wheezes, no rhonchi, normal effort    CV: RRR, normal S1S2, + murmur, 2+ DP pulses, no JVD, no edema    Edema: b/l le edema +3       SKIN: ilateral chronic venous stasis, chronic LE vascular changes     PSYCHIATRIC: Oriented x3, normal mood and affect            Discharge planning took 46 minutes

## 2020-03-13 NOTE — DISCHARGE NOTE PROVIDER - NSDCFUADDAPPT_GEN_ALL_CORE_FT
Please follow up with your primary care physician in 3-5 days. Please follow up with scheduled appointment with Dr. Cox, please follow up with pulmonary hypertension specialist with scheduled appointment.

## 2020-03-13 NOTE — ED ADULT NURSE NOTE - OBJECTIVE STATEMENT
Pt care assumed in Critical Care, MD Santana at bedside for evaluation. BIBA from home for respiratory distress. As per EMS, pt sat 85-90% on nonrebreather. Airway patent, breathing tachypneic and labored. Breath sounds are diminished bilaterally upon auscultation. Pt c/o SOB, MUÑOZ, cough x2 days and b/l LE swelling. Pt reports using 6L NC at home every day. Pt denies any chest pain, fevers, chills, recent travel or sick contacts. Pt placed on cardiac monitor,  and IV access obtained. Medicated as per orders.

## 2020-03-13 NOTE — DISCHARGE NOTE PROVIDER - CARE PROVIDER_API CALL
Alton Cox)  Cardiovascular Disease  [address] [facility], [address] [place] [address]  Phone: [phone]  Fax: [phone]  Follow Up Time:     primary care physician,   Phone: (   )    -  Fax: (   )    -  Follow Up Time:

## 2020-03-13 NOTE — ED PROVIDER NOTE - OBJECTIVE STATEMENT
40 y/o M with PMHx of JAN, pulmonary HTN, morbid obesity, CHF, presents to the ED c/o SOB, MUÑOZ, and productive cough x2 days with associated increased abdomin and b/l leg swelling. Pt reports CP xfew days, without radiation. CP exacerbated with deep breaths. Pt states that he has been sick x1 week. On Bumex (1 mg BID). No hx of mi. On 6 liters at home. KNDA. Nonsmoker. No EtOH use, no illicit drug use. Pt denies fevers/chills, ha, loc, focal neuro deficits, palp, abd pain/n/v/d, urinary symptoms, recent travel and sick contacts. 40 y/o M with PMHx of JAN, pulmonary HTN, morbid obesity, CHF, presents to the ED c/o SOB, MUÑOZ, and productive cough x2 days with associated increased abdomin and b/l leg swelling. Pt reports CP xfew days, without radiation. CP exacerbated with deep breaths. Pt states that he has been sick x1 week. On Bumex (1 mg BID). No hx of MI. On 6 liters at home. NKDA. Nonsmoker. No EtOH use, no illicit drug use. Pt denies fevers/chills, ha, loc, focal neuro deficits, palp, abd pain/n/v/d, urinary symptoms, recent travel and sick contacts. No contact with suspected COVID pt.

## 2020-03-13 NOTE — DISCHARGE NOTE NURSING/CASE MANAGEMENT/SOCIAL WORK - PATIENT PORTAL LINK FT
You can access the FollowMyHealth Patient Portal offered by [facility] by registering at the following website: [email or web address]. By joining [employer]’s FollowMyHealth portal, you will also be able to view your health information using other applications (apps) compatible with our system.

## 2020-03-13 NOTE — ED ADULT NURSE REASSESSMENT NOTE - NS ED NURSE REASSESS COMMENT FT1
Received pt from Roberto DAVENPORT. PT resting in stretcher states breathing improved at this time.  NAD noted at this time. CTM

## 2020-03-13 NOTE — H&P ADULT - ASSESSMENT
Acute on Chronic heart failure, likely non adherent, patient reports not monitoring daily weight or fluid restriction  Admit to telemetry with   Bipap implemented 16/8/40%  cont supplemental oxygen as needed while not on Bipap  cont Coreg, sildenafil, Bumex oral 1mg BID  Fluid restriction   heart failure protocol   PT consult as per CHF protocol    consult - high risk of readmission   Cardiology consult - [facility] group, notified by ED attending   Strict i/o and daily standing weight    H/O JAN,   -   - Bipap 16/8 40%    DMT2, not insulin dependent, controlled, last known A1C 6.3 (12/19)  - RISS, accu check, hypoglycemia protocol, hold metformin     Morbid Obesity   - BMI 55  - diet and weight loss advised,  consult for possible medical weight loss program recommendations.     DVT prophylaxis.

## 2020-03-13 NOTE — ED PROVIDER NOTE - PROGRESS NOTE DETAILS
After chart review pt was here for similar presentation found to have acute CHF exacerbation as well pulmonary HTN, and had multiple visits here in past for similar sx. Pt respiratory status improving with Duoneb treatment. Will continue RAD treatment as well as CHF treatment.

## 2020-03-13 NOTE — H&P ADULT - HISTORY OF PRESENT ILLNESS
40 y/o male with PMH of HFpEF, Severe pHTN on 6L home O2, HTN, JAN on cpap, OHS, morbid obesity class 3, DM-2 came to the ED complaining of SOB, orthopnea, LE swelling x 7 days, patient admitted for acute on chronic CHF exacerbation. patient reports no daily monitoring of weight or fluid intake. He denies: recent travel. patient was recent admitted to [facility] with A/C HF exacerbation. since his discharge he has not followed up with PCP as per patient.

## 2020-03-13 NOTE — ED PROVIDER NOTE - CLINICAL SUMMARY MEDICAL DECISION MAKING FREE TEXT BOX
38 y/o M with PMHx of JAN, pulmonary HTN, morbid obesity, CHF, presents to the ED c/o SOB, MUÑOZ, and productive cough x2 days with associated increased abdomin and b/l leg swelling. 38 y/o M with PMHx of JAN, pulmonary HTN, morbid obesity, CHF, presents to the ED c/o SOB, MUÑOZ, and productive cough x2 days with associated increased abdomin and b/l leg swelling. Pt with acute on chronic CHF and pulm HTN along with possible reactive airway disease, improved with nebs sand steroids as well as bumex, admit for further management

## 2020-03-14 LAB
CULTURE RESULTS: SIGNIFICANT CHANGE UP
SPECIMEN SOURCE: SIGNIFICANT CHANGE UP

## 2020-03-14 RX ORDER — CARVEDILOL PHOSPHATE 80 MG/1
1 CAPSULE, EXTENDED RELEASE ORAL
Qty: 60 | Refills: 0
Start: 2020-03-14 | End: 2020-04-12

## 2020-03-14 RX ORDER — METFORMIN HYDROCHLORIDE 850 MG/1
1 TABLET ORAL
Qty: 30 | Refills: 0
Start: 2020-03-14 | End: 2020-04-12

## 2020-03-14 RX ORDER — BUMETANIDE 0.25 MG/ML
1 INJECTION INTRAMUSCULAR; INTRAVENOUS
Qty: 60 | Refills: 0
Start: 2020-03-14 | End: 2020-05-16

## 2020-03-14 RX ORDER — POTASSIUM CHLORIDE 20 MEQ
2 PACKET (EA) ORAL
Qty: 60 | Refills: 0
Start: 2020-03-14 | End: 2020-05-16

## 2020-03-14 RX ORDER — POTASSIUM CHLORIDE 20 MEQ
2 PACKET (EA) ORAL
Qty: 60 | Refills: 0
Start: 2020-03-14 | End: 2020-04-12

## 2020-03-14 RX ORDER — METFORMIN HYDROCHLORIDE 850 MG/1
1 TABLET ORAL
Qty: 30 | Refills: 0
Start: 2020-03-14 | End: 2020-05-16

## 2020-03-14 RX ORDER — ATORVASTATIN CALCIUM 80 MG/1
1 TABLET, FILM COATED ORAL
Qty: 30 | Refills: 0
Start: 2020-03-14 | End: 2020-04-12

## 2020-03-14 RX ORDER — ASPIRIN/CALCIUM CARB/MAGNESIUM 324 MG
1 TABLET ORAL
Qty: 30 | Refills: 0
Start: 2020-03-14 | End: 2020-04-12

## 2020-03-14 RX ORDER — BUMETANIDE 0.25 MG/ML
1 INJECTION INTRAMUSCULAR; INTRAVENOUS
Qty: 60 | Refills: 0
Start: 2020-03-14 | End: 2020-04-12

## 2020-03-14 RX ORDER — CARVEDILOL PHOSPHATE 80 MG/1
1 CAPSULE, EXTENDED RELEASE ORAL
Qty: 60 | Refills: 0
Start: 2020-03-14 | End: 2020-05-16

## 2020-03-14 RX ORDER — ATORVASTATIN CALCIUM 80 MG/1
1 TABLET, FILM COATED ORAL
Qty: 30 | Refills: 0
Start: 2020-03-14 | End: 2020-05-16

## 2020-03-14 RX ORDER — HYDROCHLOROTHIAZIDE 25 MG
1 TABLET ORAL
Qty: 30 | Refills: 0
Start: 2020-03-14 | End: 2020-05-16

## 2020-03-18 LAB
CULTURE RESULTS: SIGNIFICANT CHANGE UP
CULTURE RESULTS: SIGNIFICANT CHANGE UP
SPECIMEN SOURCE: SIGNIFICANT CHANGE UP
SPECIMEN SOURCE: SIGNIFICANT CHANGE UP

## 2020-03-20 ENCOUNTER — INPATIENT (INPATIENT)
Facility: HOSPITAL | Age: 40
LOS: 2 days | Discharge: HOME | DRG: 193 | End: 2020-03-23
Attending: HOSPITALIST | Admitting: HOSPITALIST
Payer: MEDICARE

## 2020-03-20 VITALS
SYSTOLIC BLOOD PRESSURE: 121 MMHG | TEMPERATURE: 99 F | HEART RATE: 83 BPM | DIASTOLIC BLOOD PRESSURE: 61 MMHG | OXYGEN SATURATION: 86 % | WEIGHT: 315 LBS | RESPIRATION RATE: 67 BRPM

## 2020-03-20 PROCEDURE — 99291 CRITICAL CARE FIRST HOUR: CPT

## 2020-03-20 PROCEDURE — 93010 ELECTROCARDIOGRAM REPORT: CPT

## 2020-03-20 RX ORDER — ACETAMINOPHEN 500 MG
975 TABLET ORAL ONCE
Refills: 0 | Status: COMPLETED | OUTPATIENT
Start: 2020-03-20 | End: 2020-03-20

## 2020-03-20 RX ORDER — FUROSEMIDE 40 MG
60 TABLET ORAL ONCE
Refills: 0 | Status: COMPLETED | OUTPATIENT
Start: 2020-03-20 | End: 2020-03-20

## 2020-03-20 NOTE — ED ADULT TRIAGE NOTE - CHIEF COMPLAINT QUOTE
Patient is alert and oriented x3 c/o sob with productive cough sat 86% on 6L of 02. 6L o2 baseline per patient for hx of CHF. lung sounds dim through out with crackles at the bases.. also c/o left sided chest pain at home. took 324 of ASA prior to arrival. no recent travel. no sick contacts at home. denies abd pain. denies n/v/d. denies fevers or chills at home.

## 2020-03-20 NOTE — ED PROVIDER NOTE - OBJECTIVE STATEMENT
40y/o M with PMHx of CHF presents to the ED c/o worsening SOB which began 1 week ago. Assoc. sx of cough and sharp CP. Reports taking 325 ASA PTA to ED. He additionally c/o abdominal distension and LE edema, reports edema is improving. Pt is on 6liters home O2 at baseline, currently sating 86%. He is on Bipap at home, compliant with using. Recently in ED 2 weeks ago for SOB, Same sx at that time, admitted and d/c. No recent travel or direct contact. Denies fever or nasal congestion.   Cardio: [place] 38y/o M with PMHx of CHF presents to the ED c/o worsening SOB which began 1 week ago. Assoc. sx of cough and sharp CP. Reports taking 325 ASA PTA to ED. He additionally c/o abdominal distension and LE edema, reports edema is improving. Pt is on 6liters home O2 at baseline, currently sating 86%. He is on Bipap at home, compliant with using. Recently in ED 2 weeks ago for SOB, Same sx at that time, admitted and d/c. No recent travel or direct contact. Denies fever.   Cardio: [place]

## 2020-03-20 NOTE — ED PROVIDER NOTE - CLINICAL SUMMARY MEDICAL DECISION MAKING FREE TEXT BOX
Pt p/w Pt p/w worsening SOB, cough, congestion. No fever. worsening hypoxia today. Will give supplemental workup. CHF workup. RVP/COVID testing. Lasix and admit.

## 2020-03-21 DIAGNOSIS — R09.02 HYPOXEMIA: ICD-10-CM

## 2020-03-21 LAB
ALBUMIN SERPL ELPH-MCNC: 3.2 G/DL — LOW (ref 3.3–5.2)
ALP SERPL-CCNC: 117 U/L — SIGNIFICANT CHANGE UP (ref 40–120)
ALT FLD-CCNC: 18 U/L — SIGNIFICANT CHANGE UP
ANION GAP SERPL CALC-SCNC: 10 MMOL/L — SIGNIFICANT CHANGE UP (ref 5–17)
AST SERPL-CCNC: 29 U/L — SIGNIFICANT CHANGE UP
BASOPHILS # BLD AUTO: 0.04 K/UL — SIGNIFICANT CHANGE UP (ref 0–0.2)
BASOPHILS NFR BLD AUTO: 0.9 % — SIGNIFICANT CHANGE UP (ref 0–2)
BILIRUB SERPL-MCNC: 1.5 MG/DL — SIGNIFICANT CHANGE UP (ref 0.4–2)
BUN SERPL-MCNC: 20 MG/DL — SIGNIFICANT CHANGE UP (ref 8–20)
CALCIUM SERPL-MCNC: 8.7 MG/DL — SIGNIFICANT CHANGE UP (ref 8.6–10.2)
CHLORIDE SERPL-SCNC: 97 MMOL/L — LOW (ref 98–107)
CO2 SERPL-SCNC: 28 MMOL/L — SIGNIFICANT CHANGE UP (ref 22–29)
CREAT SERPL-MCNC: 1.42 MG/DL — HIGH (ref 0.5–1.3)
EOSINOPHIL # BLD AUTO: 0.3 K/UL — SIGNIFICANT CHANGE UP (ref 0–0.5)
EOSINOPHIL NFR BLD AUTO: 6.6 % — HIGH (ref 0–6)
GLUCOSE BLDC GLUCOMTR-MCNC: 100 MG/DL — HIGH (ref 70–99)
GLUCOSE BLDC GLUCOMTR-MCNC: 102 MG/DL — HIGH (ref 70–99)
GLUCOSE BLDC GLUCOMTR-MCNC: 121 MG/DL — HIGH (ref 70–99)
GLUCOSE BLDC GLUCOMTR-MCNC: 97 MG/DL — SIGNIFICANT CHANGE UP (ref 70–99)
GLUCOSE SERPL-MCNC: 114 MG/DL — HIGH (ref 70–99)
HCOV PNL SPEC NAA+PROBE: DETECTED
HCT VFR BLD CALC: 40.7 % — SIGNIFICANT CHANGE UP (ref 39–50)
HGB BLD-MCNC: 12.7 G/DL — LOW (ref 13–17)
IMM GRANULOCYTES NFR BLD AUTO: 0.2 % — SIGNIFICANT CHANGE UP (ref 0–1.5)
LYMPHOCYTES # BLD AUTO: 0.71 K/UL — LOW (ref 1–3.3)
LYMPHOCYTES # BLD AUTO: 15.5 % — SIGNIFICANT CHANGE UP (ref 13–44)
MAGNESIUM SERPL-MCNC: 1.9 MG/DL — SIGNIFICANT CHANGE UP (ref 1.6–2.6)
MCHC RBC-ENTMCNC: 29.7 PG — SIGNIFICANT CHANGE UP (ref 27–34)
MCHC RBC-ENTMCNC: 31.2 GM/DL — LOW (ref 32–36)
MCV RBC AUTO: 95.3 FL — SIGNIFICANT CHANGE UP (ref 80–100)
MONOCYTES # BLD AUTO: 0.52 K/UL — SIGNIFICANT CHANGE UP (ref 0–0.9)
MONOCYTES NFR BLD AUTO: 11.4 % — SIGNIFICANT CHANGE UP (ref 2–14)
NEUTROPHILS # BLD AUTO: 3 K/UL — SIGNIFICANT CHANGE UP (ref 1.8–7.4)
NEUTROPHILS NFR BLD AUTO: 65.4 % — SIGNIFICANT CHANGE UP (ref 43–77)
NT-PROBNP SERPL-SCNC: 1127 PG/ML — HIGH (ref 0–300)
PLATELET # BLD AUTO: 173 K/UL — SIGNIFICANT CHANGE UP (ref 150–400)
POTASSIUM SERPL-MCNC: 4.1 MMOL/L — SIGNIFICANT CHANGE UP (ref 3.5–5.3)
POTASSIUM SERPL-SCNC: 4.1 MMOL/L — SIGNIFICANT CHANGE UP (ref 3.5–5.3)
PROT SERPL-MCNC: 8.3 G/DL — SIGNIFICANT CHANGE UP (ref 6.6–8.7)
RAPID RVP RESULT: DETECTED
RBC # BLD: 4.27 M/UL — SIGNIFICANT CHANGE UP (ref 4.2–5.8)
RBC # FLD: 16.1 % — HIGH (ref 10.3–14.5)
SODIUM SERPL-SCNC: 135 MMOL/L — SIGNIFICANT CHANGE UP (ref 135–145)
TROPONIN T SERPL-MCNC: <0.01 NG/ML — SIGNIFICANT CHANGE UP (ref 0–0.06)
WBC # BLD: 4.58 K/UL — SIGNIFICANT CHANGE UP (ref 3.8–10.5)
WBC # FLD AUTO: 4.58 K/UL — SIGNIFICANT CHANGE UP (ref 3.8–10.5)

## 2020-03-21 PROCEDURE — 99497 ADVNCD CARE PLAN 30 MIN: CPT | Mod: GC

## 2020-03-21 PROCEDURE — 71045 X-RAY EXAM CHEST 1 VIEW: CPT | Mod: 26

## 2020-03-21 PROCEDURE — 12345: CPT | Mod: NC,GC

## 2020-03-21 RX ORDER — INSULIN LISPRO 100/ML
VIAL (ML) SUBCUTANEOUS
Refills: 0 | Status: DISCONTINUED | OUTPATIENT
Start: 2020-03-21 | End: 2020-03-23

## 2020-03-21 RX ORDER — PIPERACILLIN AND TAZOBACTAM 4; .5 G/20ML; G/20ML
3.38 INJECTION, POWDER, LYOPHILIZED, FOR SOLUTION INTRAVENOUS ONCE
Refills: 0 | Status: COMPLETED | OUTPATIENT
Start: 2020-03-21 | End: 2020-03-21

## 2020-03-21 RX ORDER — VANCOMYCIN HCL 1 G
2000 VIAL (EA) INTRAVENOUS ONCE
Refills: 0 | Status: COMPLETED | OUTPATIENT
Start: 2020-03-21 | End: 2020-03-21

## 2020-03-21 RX ORDER — DEXTROSE 50 % IN WATER 50 %
25 SYRINGE (ML) INTRAVENOUS ONCE
Refills: 0 | Status: DISCONTINUED | OUTPATIENT
Start: 2020-03-21 | End: 2020-03-23

## 2020-03-21 RX ORDER — SODIUM CHLORIDE 9 MG/ML
1000 INJECTION, SOLUTION INTRAVENOUS
Refills: 0 | Status: DISCONTINUED | OUTPATIENT
Start: 2020-03-21 | End: 2020-03-23

## 2020-03-21 RX ORDER — DEXTROSE 50 % IN WATER 50 %
12.5 SYRINGE (ML) INTRAVENOUS ONCE
Refills: 0 | Status: DISCONTINUED | OUTPATIENT
Start: 2020-03-21 | End: 2020-03-23

## 2020-03-21 RX ORDER — BUMETANIDE 0.25 MG/ML
1 INJECTION INTRAMUSCULAR; INTRAVENOUS
Refills: 0 | Status: DISCONTINUED | OUTPATIENT
Start: 2020-03-21 | End: 2020-03-23

## 2020-03-21 RX ORDER — GLUCAGON INJECTION, SOLUTION 0.5 MG/.1ML
1 INJECTION, SOLUTION SUBCUTANEOUS ONCE
Refills: 0 | Status: DISCONTINUED | OUTPATIENT
Start: 2020-03-21 | End: 2020-03-23

## 2020-03-21 RX ORDER — HYDROCHLOROTHIAZIDE 25 MG
25 TABLET ORAL DAILY
Refills: 0 | Status: DISCONTINUED | OUTPATIENT
Start: 2020-03-21 | End: 2020-03-23

## 2020-03-21 RX ORDER — VANCOMYCIN HCL 1 G
1000 VIAL (EA) INTRAVENOUS ONCE
Refills: 0 | Status: DISCONTINUED | OUTPATIENT
Start: 2020-03-21 | End: 2020-03-21

## 2020-03-21 RX ORDER — CARVEDILOL PHOSPHATE 80 MG/1
3.12 CAPSULE, EXTENDED RELEASE ORAL EVERY 12 HOURS
Refills: 0 | Status: DISCONTINUED | OUTPATIENT
Start: 2020-03-21 | End: 2020-03-23

## 2020-03-21 RX ORDER — ATORVASTATIN CALCIUM 80 MG/1
40 TABLET, FILM COATED ORAL AT BEDTIME
Refills: 0 | Status: DISCONTINUED | OUTPATIENT
Start: 2020-03-21 | End: 2020-03-23

## 2020-03-21 RX ORDER — ASPIRIN/CALCIUM CARB/MAGNESIUM 324 MG
81 TABLET ORAL DAILY
Refills: 0 | Status: DISCONTINUED | OUTPATIENT
Start: 2020-03-21 | End: 2020-03-23

## 2020-03-21 RX ORDER — INFLUENZA VIRUS VACCINE 15; 15; 15; 15 UG/.5ML; UG/.5ML; UG/.5ML; UG/.5ML
0.5 SUSPENSION INTRAMUSCULAR ONCE
Refills: 0 | Status: DISCONTINUED | OUTPATIENT
Start: 2020-03-21 | End: 2020-03-23

## 2020-03-21 RX ORDER — DEXTROSE 50 % IN WATER 50 %
15 SYRINGE (ML) INTRAVENOUS ONCE
Refills: 0 | Status: DISCONTINUED | OUTPATIENT
Start: 2020-03-21 | End: 2020-03-23

## 2020-03-21 RX ADMIN — Medication 10 MILLIGRAM(S): at 06:29

## 2020-03-21 RX ADMIN — Medication 10 MILLIGRAM(S): at 14:19

## 2020-03-21 RX ADMIN — Medication 25 MILLIGRAM(S): at 06:29

## 2020-03-21 RX ADMIN — PIPERACILLIN AND TAZOBACTAM 200 GRAM(S): 4; .5 INJECTION, POWDER, LYOPHILIZED, FOR SOLUTION INTRAVENOUS at 01:11

## 2020-03-21 RX ADMIN — BUMETANIDE 1 MILLIGRAM(S): 0.25 INJECTION INTRAMUSCULAR; INTRAVENOUS at 17:04

## 2020-03-21 RX ADMIN — Medication 975 MILLIGRAM(S): at 00:24

## 2020-03-21 RX ADMIN — CARVEDILOL PHOSPHATE 3.12 MILLIGRAM(S): 80 CAPSULE, EXTENDED RELEASE ORAL at 17:04

## 2020-03-21 RX ADMIN — CARVEDILOL PHOSPHATE 3.12 MILLIGRAM(S): 80 CAPSULE, EXTENDED RELEASE ORAL at 06:29

## 2020-03-21 RX ADMIN — Medication 250 MILLIGRAM(S): at 02:59

## 2020-03-21 RX ADMIN — BUMETANIDE 1 MILLIGRAM(S): 0.25 INJECTION INTRAMUSCULAR; INTRAVENOUS at 06:29

## 2020-03-21 RX ADMIN — ATORVASTATIN CALCIUM 40 MILLIGRAM(S): 80 TABLET, FILM COATED ORAL at 22:53

## 2020-03-21 RX ADMIN — Medication 81 MILLIGRAM(S): at 11:45

## 2020-03-21 RX ADMIN — Medication 975 MILLIGRAM(S): at 02:59

## 2020-03-21 RX ADMIN — Medication 10 MILLIGRAM(S): at 22:53

## 2020-03-21 RX ADMIN — Medication 60 MILLIGRAM(S): at 00:24

## 2020-03-21 NOTE — ED ADULT NURSE REASSESSMENT NOTE - NS ED NURSE REASSESS COMMENT FT1
Patient medicated as per MD orders. Patient to be admitted. Awaiting bed. Patient remains on cardiac monitor. Plan of care explained. Verbalized understanding.

## 2020-03-21 NOTE — H&P ADULT - NSHPPHYSICALEXAM_GEN_ALL_CORE
Vital Signs Last 24 Hrs  T(C): 36.4 (21 Mar 2020 04:33), Max: 37.8 (21 Mar 2020 00:15)  T(F): 97.6 (21 Mar 2020 04:33), Max: 100 (21 Mar 2020 00:15)  HR: 75 (21 Mar 2020 04:33) (75 - 88)  BP: 104/67 (21 Mar 2020 04:33) (104/67 - 121/61)  RR: 20 (21 Mar 2020 04:33) (20 - 67)  SpO2: 95% (21 Mar 2020 04:33) (86% - 95%)    Constitutional/General: unkept. morbidly obese. vitals reviewed  EYE: PERRL, conjunctiva clear  ENT: Good dentition, oropharynx clear  NECK: No masses, thyroid normal  RESP: diminished bilateral BS, + wheezes, no rhonchi, increased effort  CV: RRR, normal S1S2, no murmur, 2+ DP pulses, no JVD, 4+ non pitting edema  ABDOMEN: Obese, Soft, nontender, no HSM  LYMPH: No cervical or supraclavicular adenopathy  SKIN: Warm, dry, no rashes, chronic venous stasis   PSYCHIATRIC: Oriented x3, normal mood and affect

## 2020-03-21 NOTE — H&P ADULT - HISTORY OF PRESENT ILLNESS
· HPI Objective Statement: 40y/o M with PMHx of CHF presents to the ED c/o worsening SOB which began 1 week ago. Assoc. sx of cough and sharp CP. Reports taking 325 ASA PTA to ED. He additionally c/o abdominal distension and LE edema, reports edema is improving. Pt is on 6liters home O2 at baseline, currently sating 86%. He is on Bipap at home, compliant with using. Recently in ED 2 weeks ago for SOB, Same sx at that time, admitted and d/c. No recent travel or direct contact. Denies fever.     · Presenting Symptoms: COUGH, SHORTNESS OF BREATH, LE edema. abdominal distension.  · Negative Findings: no fever  · Timing: gradual onset  · Duration: week(s)  1  · Recent Exposure To: none known  · Aggravated Factors: none  · Relieving Factors: none    Above ED HPI reviewed and discussed with patient reports above symptoms are similar to recent admission, reports

## 2020-03-21 NOTE — H&P ADULT - ASSESSMENT
40 y/o male with PMH of CHF, pHTN on 6L home O2, HTN, JAN, OHS, obesity, DM-2 came to the ED complaining worsening shortness of breath of 1 week duration, associated with cough and sharp chest pain mostly with cough. Reports taking 325 ASA PTA to ED. He also mentioned abdominal distension and LE edema, but noted edema is improving. He has no fever, chills, recent travel, contact with CoVID-19 patient, diaphoresis, palpitation, HA, nausea, vomiting, abdominal pain.   Shortness of breath likely 2/2 to CHF exacerbation   Admit to monitor bed with    Bumex 1mg   KCL 40mg daily   Rule out CoVID   Zosyn and Vancomycin once given in the ED   ESR, CRP, LDH, Ferritin sent, follow up   Airborne and contact isolation   ID consult   HTN   Enalapril 5mg; monitor renal function   Coreg 3.125mg bid with holding parameters   Pulmonary HTN   Sildenafil 10mg tid   DM-2   Insulin sliding scale   Obesity   Lifestyle modification   JAN/OHS   CIPAP HS   Supportive   DVT prophylaxis: Lovenox 30mg   Diet: DASH

## 2020-03-21 NOTE — PATIENT PROFILE ADULT - EQUIPMENT CURRENTLY USED AT HOME
Lives at home  Former smoker . quit 40 yrs ago, smoked 1PPD for 15-20 yrs  Occasional alcohol  No illicit drugs  Independent yes Lives at home, retired from work at [employer]  Former smoker . quit 40 yrs ago, smoked 1PPD for 15-20 yrs  Occasional alcohol  No illicit drugs  Independent, Pt is Uptodate with his Flu & PNA Inj

## 2020-03-21 NOTE — CHART NOTE - NSCHARTNOTEFT_GEN_A_CORE
Post midnight admission brief note. Patient was seen and examined by overnight hospitalist this am. HPI reviewed. Labs, vitals noted. I have personally seen and examined this patient today   Receiving neb tx when I walked in, almost completed. States an improved shortness of breath noted. No fevers or chills overnight.     ROS: No fevers/chills, abdominal pain, n/v, diarrhea/constipation, dysuria or increased urinary frequency. all other ros negative     Vital Signs Last 24 Hrs  T(C): 36.3 (21 Mar 2020 22:00), Max: 37.8 (21 Mar 2020 00:15)  T(F): 97.4 (21 Mar 2020 22:00), Max: 100 (21 Mar 2020 00:15)  HR: 79 (21 Mar 2020 22:00) (70 - 88)  BP: 103/69 (21 Mar 2020 22:00) (100/70 - 118/66)  RR: 20 (21 Mar 2020 22:00) (20 - 35)  SpO2: 99% (21 Mar 2020 22:00) (92% - 99%)    Exam:   Gen: middle aged obese male in nad   HEENT: eomi, perrla   CVS: S1s2nl RRR + murmur noted   Lungs: scattered rhonchi noted on posterior exam  GI: Soft, nt, bs +  Ext: no c/c/. b/l LE edema chronic   Skin: Grossly intact   Neuro: aaox3, moves all 4 extremities 5/5 strength    Labs notes   covid pending   Cr mildly increasing     Patient is a 38 y/o male with PMH of CHF, pHTN on 6L home O2, HTN, JAN, OHS, obesity, DM-2 came to the ED complaining worsening shortness of breath of 1 week duration, associated with cough and sharp chest pain mostly with cough. Admitted for chf exac vs URI causing sx with covid pending     RVP noted as positive already   await testing   monitor ekg/labs   Plan d/w patient. Stated that noone needed to be called at this time. BIPAP at home? Will in quire in am. Will await for test result to resume.   HPI noted. Will follow

## 2020-03-22 PROBLEM — I10 HYPERTENSION: Status: ACTIVE | Noted: 2020-03-02

## 2020-03-22 LAB
CK SERPL-CCNC: 60 U/L — SIGNIFICANT CHANGE UP (ref 30–200)
CRP SERPL-MCNC: 2.82 MG/DL — HIGH (ref 0–0.4)
ERYTHROCYTE [SEDIMENTATION RATE] IN BLOOD: 46 MM/HR — HIGH (ref 0–20)
FERRITIN SERPL-MCNC: 136 NG/ML — SIGNIFICANT CHANGE UP (ref 30–400)
GLUCOSE BLDC GLUCOMTR-MCNC: 110 MG/DL — HIGH (ref 70–99)
GLUCOSE BLDC GLUCOMTR-MCNC: 110 MG/DL — HIGH (ref 70–99)
GLUCOSE BLDC GLUCOMTR-MCNC: 119 MG/DL — HIGH (ref 70–99)
GLUCOSE BLDC GLUCOMTR-MCNC: 88 MG/DL — SIGNIFICANT CHANGE UP (ref 70–99)
HBA1C BLD-MCNC: 6.6 % — HIGH (ref 4–5.6)
LDH SERPL L TO P-CCNC: 164 U/L — SIGNIFICANT CHANGE UP (ref 98–192)
TROPONIN T SERPL-MCNC: <0.01 NG/ML — SIGNIFICANT CHANGE UP (ref 0–0.06)

## 2020-03-22 PROCEDURE — 99232 SBSQ HOSP IP/OBS MODERATE 35: CPT | Mod: GC

## 2020-03-22 PROCEDURE — 93010 ELECTROCARDIOGRAM REPORT: CPT

## 2020-03-22 RX ADMIN — Medication 10 MILLIGRAM(S): at 14:19

## 2020-03-22 RX ADMIN — ATORVASTATIN CALCIUM 40 MILLIGRAM(S): 80 TABLET, FILM COATED ORAL at 22:39

## 2020-03-22 RX ADMIN — BUMETANIDE 1 MILLIGRAM(S): 0.25 INJECTION INTRAMUSCULAR; INTRAVENOUS at 17:36

## 2020-03-22 RX ADMIN — Medication 10 MILLIGRAM(S): at 06:15

## 2020-03-22 RX ADMIN — BUMETANIDE 1 MILLIGRAM(S): 0.25 INJECTION INTRAMUSCULAR; INTRAVENOUS at 06:15

## 2020-03-22 RX ADMIN — CARVEDILOL PHOSPHATE 3.12 MILLIGRAM(S): 80 CAPSULE, EXTENDED RELEASE ORAL at 06:15

## 2020-03-22 RX ADMIN — Medication 25 MILLIGRAM(S): at 06:15

## 2020-03-22 RX ADMIN — Medication 10 MILLIGRAM(S): at 22:38

## 2020-03-22 RX ADMIN — CARVEDILOL PHOSPHATE 3.12 MILLIGRAM(S): 80 CAPSULE, EXTENDED RELEASE ORAL at 17:36

## 2020-03-22 RX ADMIN — Medication 81 MILLIGRAM(S): at 14:22

## 2020-03-22 NOTE — PROGRESS NOTE ADULT - ASSESSMENT
Patient is a 38 y/o male with PMH of CHF, pHTN on 6L home O2, HTN, JAN, OHS, obesity, DM-2 came to the ED complaining worsening shortness of breath of 1 week duration, associated with cough and sharp chest pain mostly with cough. Admitted for chf exacerbation vs URI causing symptoms. Covid negative.     RVP noted as positive already   await testing   monitor ekg/labs   Plan d/w patient. Stated that noone needed to be called at this time. BIPAP at home? Will in quire in am. Will await for test result to resume.   HPI noted. Will follow. Patient is a 38 y/o male with PMH of CHF, pHTN on 6L home O2, HTN, JAN, OHS, obesity, DM-2 came to the ED complaining worsening shortness of breath of 1 week duration, associated with cough and sharp chest pain mostly with cough. Admitted for chf exacerbation vs URI causing symptoms. Covid negative.     Shortness of breath likely 2/2 to CHF exacerbation vs viral URI. COVID negative   c/w Bumex 1mg   KCL 40mg daily   RVP positive, corona positive, COVID negative. Afebrile.    S/p Zosyn and Vancomycin in ED, discontinued   C/ supportive care,  on 6L home O2 via NC, c/w supplemental O2. Titate   ESR and CRP mildly elevated. LDH and Ferritin wnl.   F/u ID consult from ER.     HTN   c/w hydrochlorothiazide 25mg qd and Coreg 3.125mg bid with holding parameters     Pulmonary HTN   Sildenafil 10mg tid     DM-2   Insulin sliding scale     Obesity   Lifestyle modification     JAN/OHS   CIPAP HS     Supportive   DVT prophylaxis: Lovenox 30mg   Diet: DASH    RVP noted as positive already   await testing   monitor ekg/labs Patient is a 40 y/o male with PMH of CHF, pHTN on 6L home O2, HTN, JAN, OHS, obesity, DM-2 came to the ED complaining worsening shortness of breath of 1 week duration, associated with cough and sharp chest pain mostly with cough. Admitted for chf exacerbation vs URI causing symptoms. Covid negative.     Shortness of breath likely 2/2 to CHF (RV dysfunction - with severe pulmonary htn) exacerbation vs Viral URI with RVP + for non Covid coronavirus strands   c/w Bumex 1mg, K as needed   More likely to be due to RVP   O2 normally 6L at home, now on VM 50% with desats overnight   c/w O2 support   ESR and CRP mildly elevated. LDH and Ferritin wnl.   Seen by cardio as OP, started on a new medication, not supported by insurance   d/c cardiac monitor     For Heart failure - c/w home meds - coreg, revatio  and diuretics     HTN - controlled   c/w hydrochlorothiazide 25mg qd and Coreg 3.125mg bid with holding parameters     DM-2 a1c 6.6   Insulin sliding scale   -asked for sliding scale to be discontinued     Morbid Obesity   Lifestyle modification, diet & exercise when able/stabe    JAN/OHS   CPAP HS was not able to be given due to covid pending   -covid negative, will see if he can get cpap for tonight which may help him with his saturations     Supportive   DVT prophylaxis: Lovenox 30mg   Diet: DASH    Dispo - d/c to home in am if he is able to come off of O2 supplement that is higher than what he is on at home Patient is a 38 y/o male with PMH of CHF, pHTN on 6L home O2, HTN, JAN, OHS, obesity, DM-2 came to the ED complaining worsening shortness of breath of 1 week duration, associated with cough and sharp chest pain mostly with cough. Admitted for chf exacerbation vs URI causing symptoms. Covid negative.     Shortness of breath likely 2/2 to CHF (RV dysfunction - with severe pulmonary htn) exacerbation vs Viral URI with RVP + for non Covid coronavirus strands   c/o chest pain (likely pleuritic)   c/w Bumex 1mg, K as needed   More likely to be due to RVP   O2 normally 6L at home, now on VM 50% with desats overnight   c/w O2 support   ESR and CRP mildly elevated. LDH and Ferritin wnl.   Seen by cardio as OP, started on a new medication, not supported by insurance   d/c cardiac monitor   Trop and ekg in am     For Heart failure - c/w home meds - coreg, revatio  and diuretics     HTN - controlled   c/w hydrochlorothiazide 25mg qd and Coreg 3.125mg bid with holding parameters     DM-2 a1c 6.6   Insulin sliding scale   -asked for sliding scale to be discontinued     Morbid Obesity   Lifestyle modification, diet & exercise when able/stabe    JAN/OHS   CPAP HS was not able to be given due to covid pending   -covid negative, will see if he can get cpap for tonight which may help him with his saturations     Supportive   DVT prophylaxis: Lovenox 30mg   Diet: DASH    Dispo - d/c to home in am if he is able to come off of O2 supplement that is higher than what he is on at home

## 2020-03-22 NOTE — PROGRESS NOTE ADULT - SUBJECTIVE AND OBJECTIVE BOX
Pt seen and examined at bedside. Feels like he is improving with his breathing. Per nurse, no difficulty breathing, saturating well on venti mask. Is tolerating PO, voiding, and having BMs at baseline. ROS negative except as above.     Vital Signs Last 24 Hrs  T(C): 36.6 (22 Mar 2020 15:56), Max: 37.2 (22 Mar 2020 08:24)  T(F): 97.8 (22 Mar 2020 15:56), Max: 98.9 (22 Mar 2020 08:24)  HR: 75 (22 Mar 2020 15:56) (75 - 80)  BP: 114/80 (22 Mar 2020 15:56) (103/69 - 114/80)  RR: 20 (22 Mar 2020 15:56) (18 - 20)  SpO2: 99% (22 Mar 2020 15:56) (90% - 99%)    General: NAD, middle age, obese male laying in bed  HEENT: atraumatic, normocephalic, PERRLA, EOMI  NECK: Supple  CVR: Regular rate and rhythm, Normal s1, s2 , no murmurs, rubs or gallops.  LUNG: Clear to auscultation bilaterally with scattered rhonchi  ABDOMEN: + Bowel Sounds x4, soft nondistended, not tender to palpation, no garding or rigidity.  EXT: 2+ Peripheral Pulses, No clubbing, cyanosis, or edema  SKIN: Grossly intact                          12.7   4.58  )-----------( 173      ( 21 Mar 2020 00:15 )             40.7     03-21    135  |  97<L>  |  20.0  ----------------------------<  114<H>  4.1   |  28.0  |  1.42<H>    Ca    8.7      21 Mar 2020 00:15  Mg     1.9     03-21    TPro  8.3  /  Alb  3.2<L>  /  TBili  1.5  /  DBili  x   /  AST  29  /  ALT  18  /  AlkPhos  117  03-21    LIVER FUNCTIONS - ( 21 Mar 2020 00:15 )  Alb: 3.2 g/dL / Pro: 8.3 g/dL / ALK PHOS: 117 U/L / ALT: 18 U/L / AST: 29 U/L / GGT: x           MEDICATIONS  (STANDING):  aspirin  chewable 81 milliGRAM(s) Oral daily  atorvastatin 40 milliGRAM(s) Oral at bedtime  buMETAnide 1 milliGRAM(s) Oral two times a day  carvedilol 3.125 milliGRAM(s) Oral every 12 hours  dextrose 5%. 1000 milliLiter(s) (50 mL/Hr) IV Continuous <Continuous>  dextrose 50% Injectable 12.5 Gram(s) IV Push once  dextrose 50% Injectable 25 Gram(s) IV Push once  dextrose 50% Injectable 25 Gram(s) IV Push once  hydrochlorothiazide 25 milliGRAM(s) Oral daily  influenza   Vaccine 0.5 milliLiter(s) IntraMuscular once  insulin lispro (HumaLOG) corrective regimen sliding scale   SubCutaneous Before meals and at bedtime  sildenafil (REVATIO) 10 milliGRAM(s) Oral three times a day    MEDICATIONS  (PRN):  dextrose 40% Gel 15 Gram(s) Oral once PRN Blood Glucose LESS THAN 70 milliGRAM(s)/deciliter  glucagon  Injectable 1 milliGRAM(s) IntraMuscular once PRN Glucose LESS THAN 70 milligrams/deciliter  guaiFENesin  milliGRAM(s) Oral every 12 hours PRN congestion Patient is a 39y old  Male who presents with a chief complaint of r/o COVID-19, CHF exacerbation, (22 Mar 2020 16:51)    Pt seen and examined at bedside. No acute distress. Feels like he is improving with his breathing. Per nurse, no difficulty breathing, saturating well on venti mask. Is tolerating PO, voiding, and having BMs at baseline. States that he is still having that anterior right sided chest pain, not radiating. Per nurse, he told her that it was only occurring when he took deep breaths.   No fevers/chills noted.     ROS: No fevers/chills, abdominal pain, n/v, diarrhea/constipation, dysuria or increased urinary frequency. All other ROS negative     Tele: desaturations occasionally w/o any cardiac events     Vital Signs Last 24 Hrs  T(C): 36.6 (22 Mar 2020 15:56), Max: 37.2 (22 Mar 2020 08:24)  T(F): 97.8 (22 Mar 2020 15:56), Max: 98.9 (22 Mar 2020 08:24)  HR: 75 (22 Mar 2020 15:56) (75 - 80)  BP: 114/80 (22 Mar 2020 15:56) (103/69 - 114/80)  RR: 20 (22 Mar 2020 15:56) (18 - 20)  SpO2: 99% (22 Mar 2020 15:56) (90% - 99%)    Physical Exam:   General: NAD, middle age, obese male laying in bed  HEENT: atraumatic, normocephalic, PERRLA, EOMI  NECK: Supple  CVR: Regular rate and rhythm, Normal s1, s2 , no murmurs, rubs or gallops.  LUNG: Clear to auscultation bilaterally with no further scattered rhonchi  ABDOMEN: + Bowel Sounds x4, soft nondistended, not tender to palpation, no guarding or rigidity  EXT: 2+ Peripheral Pulses, No clubbing, cyanosis. b/l chronic venous stasis changes. Swelling decreased. 1+ pitting edema   SKIN: Grossly intact    Labs:                      12.7   4.58  )-----------( 173      ( 21 Mar 2020 00:15 )             40.7     03-21    135  |  97<L>  |  20.0  ----------------------------<  114<H>  4.1   |  28.0  |  1.42<H>    Ca    8.7      21 Mar 2020 00:15  Mg     1.9     03-21    TPro  8.3  /  Alb  3.2<L>  /  TBili  1.5  /  DBili  x   /  AST  29  /  ALT  18  /  AlkPhos  117  03-21    LIVER FUNCTIONS - ( 21 Mar 2020 00:15 )  Alb: 3.2 g/dL / Pro: 8.3 g/dL / ALK PHOS: 117 U/L / ALT: 18 U/L / AST: 29 U/L / GGT: x           a1c 6.6   ESR 46 CRP 2.82    MEDICATIONS  (STANDING):  aspirin  chewable 81 milliGRAM(s) Oral daily  atorvastatin 40 milliGRAM(s) Oral at bedtime  buMETAnide 1 milliGRAM(s) Oral two times a day  carvedilol 3.125 milliGRAM(s) Oral every 12 hours  dextrose 5%. 1000 milliLiter(s) (50 mL/Hr) IV Continuous <Continuous>  dextrose 50% Injectable 12.5 Gram(s) IV Push once  dextrose 50% Injectable 25 Gram(s) IV Push once  dextrose 50% Injectable 25 Gram(s) IV Push once  hydrochlorothiazide 25 milliGRAM(s) Oral daily  influenza   Vaccine 0.5 milliLiter(s) IntraMuscular once  insulin lispro (HumaLOG) corrective regimen sliding scale   SubCutaneous Before meals and at bedtime  sildenafil (REVATIO) 10 milliGRAM(s) Oral three times a day    MEDICATIONS  (PRN):  dextrose 40% Gel 15 Gram(s) Oral once PRN Blood Glucose LESS THAN 70 milliGRAM(s)/deciliter  glucagon  Injectable 1 milliGRAM(s) IntraMuscular once PRN Glucose LESS THAN 70 milligrams/deciliter  guaiFENesin  milliGRAM(s) Oral every 12 hours PRN congestion Patient is a 39y old  Male who presents with a chief complaint of r/o COVID-19, CHF exacerbation, (22 Mar 2020 16:51)    Pt seen and examined at bedside. No acute distress. Feels like he is improving with his breathing. Per nurse, no difficulty breathing, saturating well on venti mask. Is tolerating PO, voiding, and having BMs at baseline. States that he is still having that anterior right sided chest pain, not radiating. Per nurse, he told her that it was only occurring when he took deep breaths.   No fevers/chills noted.     ROS: No fevers/chills, abdominal pain, n/v, diarrhea/constipation, dysuria or increased urinary frequency. All other ROS negative     Tele: desaturations occasionally w/o any cardiac events     Vital Signs Last 24 Hrs  T(C): 36.6 (22 Mar 2020 15:56), Max: 37.2 (22 Mar 2020 08:24)  T(F): 97.8 (22 Mar 2020 15:56), Max: 98.9 (22 Mar 2020 08:24)  HR: 75 (22 Mar 2020 15:56) (75 - 80)  BP: 114/80 (22 Mar 2020 15:56) (103/69 - 114/80)  RR: 20 (22 Mar 2020 15:56) (18 - 20)  SpO2: 99% (22 Mar 2020 15:56) (90% - 99%)    Physical Exam:   General: NAD, middle age, obese male laying in bed  HEENT: atraumatic, normocephalic, PERRLA, EOMI  NECK: Supple  CVR: Regular rate and rhythm, Normal s1, s2 , no murmurs, rubs or gallops.  LUNG: Clear to auscultation bilaterally with no further scattered rhonchi  ABDOMEN: + Bowel Sounds x4, soft nondistended, not tender to palpation, no guarding or rigidity  EXT: 2+ Peripheral Pulses, No clubbing, cyanosis. b/l chronic venous stasis changes. Swelling decreased. 1+ pitting edema   SKIN: Grossly intact    Labs:                      12.7   4.58  )-----------( 173      ( 21 Mar 2020 00:15 )             40.7     03-21    135  |  97<L>  |  20.0  ----------------------------<  114<H>  4.1   |  28.0  |  1.42<H>    Ca    8.7      21 Mar 2020 00:15  Mg     1.9     03-21    TPro  8.3  /  Alb  3.2<L>  /  TBili  1.5  /  DBili  x   /  AST  29  /  ALT  18  /  AlkPhos  117  03-21    LIVER FUNCTIONS - ( 21 Mar 2020 00:15 )  Alb: 3.2 g/dL / Pro: 8.3 g/dL / ALK PHOS: 117 U/L / ALT: 18 U/L / AST: 29 U/L / GGT: x           a1c 6.6   ESR 46 CRP 2.82    EKG: NSR 82, qtc 457. No ct     MEDICATIONS  (STANDING):  aspirin  chewable 81 milliGRAM(s) Oral daily  atorvastatin 40 milliGRAM(s) Oral at bedtime  buMETAnide 1 milliGRAM(s) Oral two times a day  carvedilol 3.125 milliGRAM(s) Oral every 12 hours  dextrose 5%. 1000 milliLiter(s) (50 mL/Hr) IV Continuous <Continuous>  dextrose 50% Injectable 12.5 Gram(s) IV Push once  dextrose 50% Injectable 25 Gram(s) IV Push once  dextrose 50% Injectable 25 Gram(s) IV Push once  hydrochlorothiazide 25 milliGRAM(s) Oral daily  influenza   Vaccine 0.5 milliLiter(s) IntraMuscular once  insulin lispro (HumaLOG) corrective regimen sliding scale   SubCutaneous Before meals and at bedtime  sildenafil (REVATIO) 10 milliGRAM(s) Oral three times a day    MEDICATIONS  (PRN):  dextrose 40% Gel 15 Gram(s) Oral once PRN Blood Glucose LESS THAN 70 milliGRAM(s)/deciliter  glucagon  Injectable 1 milliGRAM(s) IntraMuscular once PRN Glucose LESS THAN 70 milligrams/deciliter  guaiFENesin  milliGRAM(s) Oral every 12 hours PRN congestion

## 2020-03-22 NOTE — PROGRESS NOTE ADULT - ATTENDING COMMENTS
Note addended where needed. Plan discussed with patient. Asked if we can call his family, he declined and said he is talking to them himself

## 2020-03-23 ENCOUNTER — TRANSCRIPTION ENCOUNTER (OUTPATIENT)
Age: 40
End: 2020-03-23

## 2020-03-23 VITALS
TEMPERATURE: 98 F | DIASTOLIC BLOOD PRESSURE: 81 MMHG | RESPIRATION RATE: 20 BRPM | OXYGEN SATURATION: 93 % | HEART RATE: 75 BPM | SYSTOLIC BLOOD PRESSURE: 119 MMHG

## 2020-03-23 LAB
ALBUMIN SERPL ELPH-MCNC: 3 G/DL — LOW (ref 3.3–5.2)
ALP SERPL-CCNC: 107 U/L — SIGNIFICANT CHANGE UP (ref 40–120)
ALT FLD-CCNC: 15 U/L — SIGNIFICANT CHANGE UP
ANION GAP SERPL CALC-SCNC: 12 MMOL/L — SIGNIFICANT CHANGE UP (ref 5–17)
AST SERPL-CCNC: 23 U/L — SIGNIFICANT CHANGE UP
BASOPHILS # BLD AUTO: 0.03 K/UL — SIGNIFICANT CHANGE UP (ref 0–0.2)
BASOPHILS NFR BLD AUTO: 0.8 % — SIGNIFICANT CHANGE UP (ref 0–2)
BILIRUB SERPL-MCNC: 1.6 MG/DL — SIGNIFICANT CHANGE UP (ref 0.4–2)
BUN SERPL-MCNC: 24 MG/DL — HIGH (ref 8–20)
CALCIUM SERPL-MCNC: 8.9 MG/DL — SIGNIFICANT CHANGE UP (ref 8.6–10.2)
CHLORIDE SERPL-SCNC: 95 MMOL/L — LOW (ref 98–107)
CO2 SERPL-SCNC: 30 MMOL/L — HIGH (ref 22–29)
CREAT SERPL-MCNC: 1.18 MG/DL — SIGNIFICANT CHANGE UP (ref 0.5–1.3)
EOSINOPHIL # BLD AUTO: 0.26 K/UL — SIGNIFICANT CHANGE UP (ref 0–0.5)
EOSINOPHIL NFR BLD AUTO: 6.6 % — HIGH (ref 0–6)
GLUCOSE BLDC GLUCOMTR-MCNC: 95 MG/DL — SIGNIFICANT CHANGE UP (ref 70–99)
GLUCOSE BLDC GLUCOMTR-MCNC: 99 MG/DL — SIGNIFICANT CHANGE UP (ref 70–99)
GLUCOSE SERPL-MCNC: 106 MG/DL — HIGH (ref 70–99)
HCT VFR BLD CALC: 37.2 % — LOW (ref 39–50)
HGB BLD-MCNC: 11.9 G/DL — LOW (ref 13–17)
IMM GRANULOCYTES NFR BLD AUTO: 0.3 % — SIGNIFICANT CHANGE UP (ref 0–1.5)
LYMPHOCYTES # BLD AUTO: 0.71 K/UL — LOW (ref 1–3.3)
LYMPHOCYTES # BLD AUTO: 18.1 % — SIGNIFICANT CHANGE UP (ref 13–44)
MCHC RBC-ENTMCNC: 29.9 PG — SIGNIFICANT CHANGE UP (ref 27–34)
MCHC RBC-ENTMCNC: 32 GM/DL — SIGNIFICANT CHANGE UP (ref 32–36)
MCV RBC AUTO: 93.5 FL — SIGNIFICANT CHANGE UP (ref 80–100)
MONOCYTES # BLD AUTO: 0.57 K/UL — SIGNIFICANT CHANGE UP (ref 0–0.9)
MONOCYTES NFR BLD AUTO: 14.5 % — HIGH (ref 2–14)
NEUTROPHILS # BLD AUTO: 2.34 K/UL — SIGNIFICANT CHANGE UP (ref 1.8–7.4)
NEUTROPHILS NFR BLD AUTO: 59.7 % — SIGNIFICANT CHANGE UP (ref 43–77)
PLATELET # BLD AUTO: 169 K/UL — SIGNIFICANT CHANGE UP (ref 150–400)
POTASSIUM SERPL-MCNC: 3.4 MMOL/L — LOW (ref 3.5–5.3)
POTASSIUM SERPL-SCNC: 3.4 MMOL/L — LOW (ref 3.5–5.3)
PROT SERPL-MCNC: 7.8 G/DL — SIGNIFICANT CHANGE UP (ref 6.6–8.7)
RBC # BLD: 3.98 M/UL — LOW (ref 4.2–5.8)
RBC # FLD: 15.8 % — HIGH (ref 10.3–14.5)
SODIUM SERPL-SCNC: 137 MMOL/L — SIGNIFICANT CHANGE UP (ref 135–145)
TROPONIN T SERPL-MCNC: <0.01 NG/ML — SIGNIFICANT CHANGE UP (ref 0–0.06)
WBC # BLD: 3.92 K/UL — SIGNIFICANT CHANGE UP (ref 3.8–10.5)
WBC # FLD AUTO: 3.92 K/UL — SIGNIFICANT CHANGE UP (ref 3.8–10.5)

## 2020-03-23 PROCEDURE — 36415 COLL VENOUS BLD VENIPUNCTURE: CPT

## 2020-03-23 PROCEDURE — 99239 HOSP IP/OBS DSCHRG MGMT >30: CPT

## 2020-03-23 PROCEDURE — 96375 TX/PRO/DX INJ NEW DRUG ADDON: CPT

## 2020-03-23 PROCEDURE — 82550 ASSAY OF CK (CPK): CPT

## 2020-03-23 PROCEDURE — 99285 EMERGENCY DEPT VISIT HI MDM: CPT | Mod: 25

## 2020-03-23 PROCEDURE — 87633 RESP VIRUS 12-25 TARGETS: CPT

## 2020-03-23 PROCEDURE — 93005 ELECTROCARDIOGRAM TRACING: CPT

## 2020-03-23 PROCEDURE — 87486 CHLMYD PNEUM DNA AMP PROBE: CPT

## 2020-03-23 PROCEDURE — 82728 ASSAY OF FERRITIN: CPT

## 2020-03-23 PROCEDURE — 82962 GLUCOSE BLOOD TEST: CPT

## 2020-03-23 PROCEDURE — 86140 C-REACTIVE PROTEIN: CPT

## 2020-03-23 PROCEDURE — 94660 CPAP INITIATION&MGMT: CPT

## 2020-03-23 PROCEDURE — 96374 THER/PROPH/DIAG INJ IV PUSH: CPT

## 2020-03-23 PROCEDURE — 83036 HEMOGLOBIN GLYCOSYLATED A1C: CPT

## 2020-03-23 PROCEDURE — 83615 LACTATE (LD) (LDH) ENZYME: CPT

## 2020-03-23 PROCEDURE — U0001: CPT

## 2020-03-23 PROCEDURE — 87581 M.PNEUMON DNA AMP PROBE: CPT

## 2020-03-23 PROCEDURE — 87040 BLOOD CULTURE FOR BACTERIA: CPT

## 2020-03-23 PROCEDURE — 85652 RBC SED RATE AUTOMATED: CPT

## 2020-03-23 PROCEDURE — 87798 DETECT AGENT NOS DNA AMP: CPT

## 2020-03-23 PROCEDURE — 84484 ASSAY OF TROPONIN QUANT: CPT

## 2020-03-23 PROCEDURE — 71045 X-RAY EXAM CHEST 1 VIEW: CPT

## 2020-03-23 PROCEDURE — 93010 ELECTROCARDIOGRAM REPORT: CPT

## 2020-03-23 PROCEDURE — 85027 COMPLETE CBC AUTOMATED: CPT

## 2020-03-23 PROCEDURE — 80053 COMPREHEN METABOLIC PANEL: CPT

## 2020-03-23 PROCEDURE — 83735 ASSAY OF MAGNESIUM: CPT

## 2020-03-23 PROCEDURE — 83880 ASSAY OF NATRIURETIC PEPTIDE: CPT

## 2020-03-23 RX ORDER — ASPIRIN/CALCIUM CARB/MAGNESIUM 324 MG
1 TABLET ORAL
Qty: 30 | Refills: 0
Start: 2020-03-23 | End: 2020-05-16

## 2020-03-23 RX ORDER — ASPIRIN/CALCIUM CARB/MAGNESIUM 324 MG
1 TABLET ORAL
Qty: 30 | Refills: 0
Start: 2020-03-23 | End: 2020-04-21

## 2020-03-23 RX ORDER — POTASSIUM CHLORIDE 20 MEQ
40 PACKET (EA) ORAL ONCE
Refills: 0 | Status: COMPLETED | OUTPATIENT
Start: 2020-03-23 | End: 2020-03-23

## 2020-03-23 RX ADMIN — Medication 10 MILLIGRAM(S): at 05:17

## 2020-03-23 RX ADMIN — Medication 81 MILLIGRAM(S): at 11:14

## 2020-03-23 RX ADMIN — Medication 40 MILLIEQUIVALENT(S): at 09:47

## 2020-03-23 RX ADMIN — Medication 10 MILLIGRAM(S): at 11:13

## 2020-03-23 RX ADMIN — Medication 25 MILLIGRAM(S): at 05:18

## 2020-03-23 RX ADMIN — CARVEDILOL PHOSPHATE 3.12 MILLIGRAM(S): 80 CAPSULE, EXTENDED RELEASE ORAL at 05:17

## 2020-03-23 RX ADMIN — BUMETANIDE 1 MILLIGRAM(S): 0.25 INJECTION INTRAMUSCULAR; INTRAVENOUS at 05:17

## 2020-03-23 NOTE — DISCHARGE NOTE NURSING/CASE MANAGEMENT/SOCIAL WORK - PATIENT PORTAL LINK FT
You can access the FollowMyHealth Patient Portal offered by [facility] by registering at the following website: [email or web address]. By joining [facility]’s FollowMyHealth portal, you will also be able to view your health information using other applications (apps) compatible with our system.

## 2020-03-23 NOTE — DISCHARGE NOTE PROVIDER - CARE PROVIDER_API CALL
[facility], [facility]  [address] [place] [address]  Phone: [phone]  Fax: [phone]  Follow Up Time: 1-3 days    Alton Cox)  Cardiovascular Disease  [address], [address]  [place] [address]  Phone: [phone]  Fax: [phone]  Follow Up Time:     Talia Hayden)  Med  Hosp Hospitalist  [address] [place] [address]  Phone: [phone]  Fax: [phone]  Follow Up Time:

## 2020-03-23 NOTE — CHART NOTE - NSCHARTNOTEFT_GEN_A_CORE
Called by RN @ 1:25am to read EKG performed @ 21:21pm.  RN reports that patient with chest pain earlier this afternoon and EKG, CPK and troponin were ordered.  Patient seen and examined at bedside.  Patient reports on and off right anterior chest wall pain. States is unchanged from previous pain. As per chart review, pain present upon admission.   Patient without any other complaints. All other ROS negative.   Patient lying comfortably in bed, NAD.  S1S2+. Lungs clear to auscultation b/l.  EKG without any acute changes from previous.  CPK and troponin WNL.   Repeat troponin and EKG ordered for AM.   RN advised to call PA if any changes.  Will continue to monitor.

## 2020-03-23 NOTE — DISCHARGE NOTE PROVIDER - NSDCFUADDAPPT_GEN_ALL_CORE_FT
11.9   3.92  )-----------( 169      ( 23 Mar 2020 07:16 )             37.2   03-23  137  |  95<L>  |  24.0<H>  ----------------------------<  106<H>  3.4<L>   |  30.0<H>  |  1.18  Ca    8.9      23 Mar 2020 07:16  TPro  7.8  /  Alb  3.0<L>  /  TBili  1.6  /  DBili  x   /  AST  23  /  ALT  15  /  AlkPhos  107  03-23  FINDINGS:    The lungs  are clear.  No pleural abnormality is seen.  The  heart is markedly enlarged in transverse diameter unchanged from 3/12/2020 exam.. No hilar mass. Trachea midline. Visualized osseous structures are intact.  IMPRESSION: Gross cardiomegaly. No evidence of active chest disease.  CECY RODRIGEZ M.D., ATTENDING RADIOLOGIST  This document has been electronically signed. Mar 21 2020  9:15AM  < end of copied text >    Rapid Respiratory Viral Panel (03.21.20 @ 01:19)    Coronavirus (229E,HKU1,NL63,OC43): Detected    COVID-19 PCR (03.21.20 @ 14:21)    COVID-19 PCR: NotDetec: LDT - Laboratory Developed Test As with all clinical testing, results  should be correlated with clinical findings.  This test has been validated by [employer] to be accurate;  though it has not been FDA cleared/approved by the usual pathway.  As with all laboratory tests, results should be correlated with clinical  findings.

## 2020-03-23 NOTE — DISCHARGE NOTE PROVIDER - NSDCCPCAREPLAN_GEN_ALL_CORE_FT
PRINCIPAL DISCHARGE DIAGNOSIS  Diagnosis: Coronavirus infection  Assessment and Plan of Treatment: PRINCIPAL DISCHARGE DIAGNOSIS  Diagnosis: Coronavirus infection  Assessment and Plan of Treatment:       SECONDARY DISCHARGE DIAGNOSES  Diagnosis: Chronic CHF  Assessment and Plan of Treatment: PRINCIPAL DISCHARGE DIAGNOSIS  Diagnosis: Coronavirus infection  Assessment and Plan of Treatment: You were diagnosed with Viral Pneumonia due to the classic coronavirus strand. According to our testing you currently do not have COVID-19. Please continue with tylenol for fever and pain. Please continue with proper isolation precautions, social distancing and hand washing. Please return to Emergency Department for fever, chest pain, shortness of breath cough.      SECONDARY DISCHARGE DIAGNOSES  Diagnosis: DM (diabetes mellitus)  Assessment and Plan of Treatment: Please take all your medications as precribed, follow a consistent carbohydrate, low fat, low sugar diet. HgbA1c:6.6    Diagnosis: HTN (hypertension)  Assessment and Plan of Treatment: Diagnosis: HTN (hypertension)  Assessment and Plan of Treatment: You have a history of high blood pressure, continue to take Carvedilol and Enalapril medications as prescribed. Measure your blood pressure daily with an at home blood pressure machine  Diagnosis: Diabetes mellitus  Assessment and Plan of Treatment:    Diagnosis: JAN on CPAP  Assessment and Plan of Treatment: Counselled on weight loss. continue with Cpap at home while you sleep.    Diagnosis: Obesity  Assessment and Plan of Treatment: You have severe obesity, this can cause an additional strain on your heart in addition to your chornic heart failure. Weight reduction can alleviate your breathing problems and decrease stress on your heart. Please follow a low fat, heart healthy diet. Follow up with your primary care doctor to monitor your weight loss.    Diagnosis: Pulmonary HTN  Assessment and Plan of Treatment: You have history of severe pulmonary hypertension which is treated with sildenafil.  Continue with following with the cardiologist for pulmonary hypertension medications. Please continue to take all medications as prescribed. Follow up with your cardiologist after discharge with scheduled appointment.    Diagnosis: Chronic CHF  Assessment and Plan of Treatment: Please continue taking your diuretics as indicated for your Chronic Congestive Heart Failure. Please follow up with your primary care doctor, cardiologist for further management of your meds. Please return to the emergency department if you feel Short of breath, Chest pain, increased swelling. PRINCIPAL DISCHARGE DIAGNOSIS  Diagnosis: Coronavirus infection  Assessment and Plan of Treatment: You were diagnosed with Viral Pneumonia due to the classic coronavirus strand (common cold) NOT COVID 19. According to our testing you currently do not have COVID-19. Please continue with tylenol for fever and pain. Please continue with proper isolation precautions, social distancing and hand washing. Please return to the Emergency Department for fever, chest pain, shortness of breath cough.      SECONDARY DISCHARGE DIAGNOSES  Diagnosis: Hypokalemia  Assessment and Plan of Treatment: repleted on the day of discharge and planned for discharge    Diagnosis: DM (diabetes mellitus)  Assessment and Plan of Treatment: Please take all your medications as precribed, follow a consistent carbohydrate, low fat, low sugar diet. HgbA1c:6.6    Diagnosis: HTN (hypertension)  Assessment and Plan of Treatment: continue with your medications from home and monitor your blood pressure so that your doctor can help titrate medications as needed    Diagnosis: JAN on CPAP  Assessment and Plan of Treatment: Counselled on weight loss. continue with Cpap at home while you sleep.    Diagnosis: Obesity  Assessment and Plan of Treatment: We have spoken about nutritional changes, diet modification and exercise.    Diagnosis: Pulmonary HTN  Assessment and Plan of Treatment: You have history of severe pulmonary hypertension which is treated with sildenafil.  Follow up with your cardiologist as per routine    Diagnosis: Chronic CHF  Assessment and Plan of Treatment: We initially thought you had heart failure, but found that you had a strand of a cold virus. Your oxygen requirement improved and the rest of your medications were not changed.   Please monitor your weight daily and keep a tab of it. Should you gain 3-5lb, please call your doctor right away

## 2020-03-23 NOTE — CHART NOTE - NSCHARTNOTEFT_GEN_A_CORE
Patient seen and examined at bedside. No acute distress. Overnight, patient c/o chest pain, pleuritic in nature and with a negative EKG and troponin   States that he is feeling well   Shortness of breath improved. On 6L of O2 at this time. O2 sat above 92%  No overnight events on monitor     VS and exam noted from the day discharge   Labs noted     Normocytic anemia stable   Hypokalemia repleted   Metabolic aklalosis likely from diuresis   Elevated bun noted likely due to diuresis     Troponin/ck negative on labs from chest pain. EKG NSR 78, Qtc 462    Patient to be discharged to home today. He is tolerating his 6L of O2, can c/w cpap at home at bedtime at the same settings.     Discharge diagnosis is Viral Pna with non COVID corona strands. He may have had an acute on chronic component of his heart failure (right sided and systolic) but it is now resolved and he is being discharged to home with a stable chronic heart failure status.     Please see discharge papers for details.

## 2020-03-23 NOTE — DISCHARGE NOTE PROVIDER - INSTRUCTIONS
Follow a heart healthy diet. And make sure to limit the salt (sodium) in your diet. Max 2 grams of salt daily. Salt causes your body to hold water. This makes your heart work harder as there is more fluid for the heart to pump. Limit your salt by doing the following:  Limit canned, dried, packaged, and fast foods, don't add salt to your food, season foods with herbs instead of salt. Watch how much liquids you drink, drinking too much can make heart failure worse. Do not drink more than 1 L of fluid per day. Limit the amount of alcohol you drink. It may harm your heart, women should have no more than 1 drink a day and men should have no more than 2.  When you eat out, request that your meals have no added salt. 2gm sodium diet, low cholesterol and low sugar/carb

## 2020-03-23 NOTE — DISCHARGE NOTE PROVIDER - NSDCFUADDINST_GEN_ALL_CORE_FT
-Be sure to continue care with your Primary Care Doctor, Cardiologist. You should call to make an appointment for hospital follow up within 7 days, be sure to tell them that you were just released from [facility] when making your appointment. Contact information for a suggested provider is available on this paperwork.    -Continue with all medications as prescribed.    -If symptoms return, become worse, and/or if new symptoms appear please seek medical attention immediately with Primary Care Physician and/or Emergency Department (as you may deem appropriate). 11.9   3.92  )-----------( 169      ( 23 Mar 2020 07:16 )             37.2   03-23    137  |  95<L>  |  24.0<H>  ----------------------------<  106<H>  3.4<L>   |  30.0<H>  |  1.18    Ca    8.9      23 Mar 2020 07:16    TPro  7.8  /  Alb  3.0<L>  /  TBili  1.6  /  DBili  x   /  AST  23  /  ALT  15  /  AlkPhos  107  03-23    COVID 19 negative

## 2020-03-23 NOTE — DISCHARGE NOTE PROVIDER - HOSPITAL COURSE
40 y/o male with PMH of CHF, pHTN on 6L home O2, HTN, JAN, OHS, obesity, DM-2 came to the [facility] ED complaining worsening shortness of breath of 1 week, associated with cough and sharp chest pain, mostly with cough. Admitted for chf exacerbation vs URI causing symptoms. Covid-19 negative. Pt tested positive for Coronavirus strand 229E, HKU1, NL63,OC43. Pt was diagnosed with Viral PNA.         For CHF, pt continued with Bumex 1 mg daily, HCTZ 25 mg daily for diuresis. Pt continued using CPAP overnight with desaturation against 89%.        Shortness of breath likely 2/2 to CHF (RV dysfunction - with severe pulmonary htn) exacerbation vs Viral URI with RVP + for non Covid coronavirus strands     c/o chest pain (likely pleuritic)         More likely to be due to RVP     O2 normally 6L at home, now on VM 50% with desats overnight     c/w O2 support     ESR and CRP mildly elevated. LDH and Ferritin wnl.     Seen by cardio as OP, started on a new medication, not supported by insurance     d/c cardiac monitor     Trop and ekg in am         For Heart failure - c/w home meds - coreg, revatio  and diuretics         HTN - controlled     c/w hydrochlorothiazide 25mg qd and Coreg 3.125mg bid with holding parameters         DM-2 a1c 6.6     Insulin sliding scale     -asked for sliding scale to be discontinued         Morbid Obesity     Lifestyle modification, diet & exercise when able/stabe        JAN/OHS     CPAP HS was not able to be given due to covid pending     -covid negative, will see if he can get cpap for tonight which may help him with his saturations 38 y/o male with PMH of CHF, pHTN on 6L home O2, HTN, JAN, OHS, obesity, DM-2 came to the [facility] ED complaining worsening shortness of breath of 1 week, associated with cough and sharp chest pain, mostly with cough. Admitted for chf exacerbation vs URI causing symptoms. Pt started on Sozyn and Vanco for empiric tx of PNA. Covid-19 negative. Pt tested positive for Coronavirus strand 229E, HKU1, NL63,OC43. Pt was diagnosed with Viral PNA.         For CHF, pt continued with Bumex 1 mg daily, HCTZ 25 mg daily for diuresis. Pt diuresed well. Pt continued using CPAP overnight with desaturation against 89%.        Shortness of breath likely 2/2 to CHF (RV dysfunction - with severe pulmonary htn) exacerbation vs Viral URI with RVP + for non Covid coronavirus strands     c/o chest pain (likely pleuritic)         More likely to be due to RVP     O2 normally 6L at home, now on VM 50% with desats overnight     c/w O2 support     ESR and CRP mildly elevated. LDH and Ferritin wnl.     Seen by cardio as OP, started on a new medication, not supported by insurance     d/c cardiac monitor     Trop and ekg in am         For Heart failure - c/w home meds - coreg, revatio  and diuretics         HTN - controlled     c/w hydrochlorothiazide 25mg qd and Coreg 3.125mg bid with holding parameters         DM-2 a1c 6.6     Insulin sliding scale     -asked for sliding scale to be discontinued         Morbid Obesity     Lifestyle modification, diet & exercise when able/stabe        JAN/OHS     CPAP HS was not able to be given due to covid pending     -covid negative, will see if he can get cpap for tonight which may help him with his saturations 38 y/o male with PMH of CHF, pHTN on 6L home O2, HTN, JAN, OHS, obesity, DM-2 came to the [facility] ED complaining worsening shortness of breath of 1 week, associated with cough and sharp chest pain, mostly with cough. Admitted for chf exacerbation vs URI causing symptoms. Pt started on Sozyn and Vanco for empiric tx of PNA. Covid-19 negative. Pt tested positive for Coronavirus strand 229E, HKU1, NL63,OC43. Pt was diagnosed with Viral PNA. IV abx were discontinued. Pt was treated with supportive measures. Continued with CPAP for JAN/OHS.         For CHF, pt continued with Bumex 1 mg daily, HCTZ 25 mg daily for diuresis. Also treated with home dose coreg, revatio. Pt diuresed well. Pt continued using CPAP overnight with desaturation against 89%. Pt had CP while inpatient with negative troponins and no EKG changes.         Patient is medically optimized for discharge home & will follow up with primary care physician.         All electrolyte abnormalities were monitored carefully and repleted as necessary during this hospitalization. At the time of discharge patient was hemodynamically stable and amenable to all terms of discharge. The patient has received verbal instructions from myself regarding discharge plans. Anticipatory guidance provided        Minutes spent: 45 mins        Vital Signs Last 24 Hrs    T(C): 36.6 (23 Mar 2020 07:42), Max: 37.3 (22 Mar 2020 17:35)    T(F): 97.9 (23 Mar 2020 07:42), Max: 99.1 (22 Mar 2020 17:35)    HR: 72 (23 Mar 2020 07:42) (72 - 88)    BP: 112/78 (23 Mar 2020 07:42) (112/78 - 122/84)    RR: 20 (23 Mar 2020 07:42) (18 - 20)    SpO2: 95% (23 Mar 2020 09:15) (90% - 99%)        Physical Exam:     General: NAD, middle age, obese male laying in bed    HEENT: atraumatic, normocephalic, PERRLA, EOMI    NECK: Supple    CVR: Regular rate and rhythm, Normal s1, s2 , no murmurs, rubs or gallops.    LUNG: Clear to auscultation bilaterally with no further scattered rhonchi    ABDOMEN: + Bowel Sounds x4, soft nondistended, not tender to palpation, no guarding or rigidity    EXT: 2+ Peripheral Pulses, No clubbing, cyanosis. b/l chronic venous stasis changes. Swelling decreased. 1+ pitting edema     SKIN: Grossly intact 40 y/o male with PMH of CHF, pHTN on 6L home O2, HTN, JAN, OHS, obesity, DM-2 came to the [facility] ED complaining worsening shortness of breath of 1 week, associated with cough and sharp chest pain, mostly with cough. Admitted for chf exacerbation vs URI causing symptoms. Pt started on zosyn and Vanco for empiric tx of PNA. Covid-19 negative. Pt tested positive for Coronavirus strand 229E, HKU1, NL63,OC43. Pt was diagnosed with Viral PNA. IV abx were discontinued. Pt was treated with supportive measures. Continued with CPAP for JAN/OHS. Covid 19 testing negative. (Testing done b/c patient had increased use of oxygen at VM 50% which was titrated down to 6L)         For CHF, pt continued with Bumex 1 mg daily, HCTZ 25 mg daily for diuresis. Also treated with home dose coreg, revatio. Pt diuresed well. Pt continued using CPAP overnight with desaturation against 89%. Pt had CP while inpatient with negative troponins and no EKG changes.         Patient is medically optimized for discharge home & will follow up with primary care physician.         All electrolyte abnormalities were monitored carefully and repleted as necessary during this hospitalization. At the time of discharge patient was hemodynamically stable and amenable to all terms of discharge. The patient has received verbal instructions from myself regarding discharge plans. Anticipatory guidance provided        Minutes spent: 45 mins        Tele: No events on tele         VS and exam on the day of discharge:     T(C): 36.6 (23 Mar 2020 07:42), Max: 37.3 (22 Mar 2020 17:35)    T(F): 97.9 (23 Mar 2020 07:42), Max: 99.1 (22 Mar 2020 17:35)    HR: 72 (23 Mar 2020 07:42) (72 - 88)    BP: 112/78 (23 Mar 2020 07:42) (112/78 - 122/84)    RR: 20 (23 Mar 2020 07:42) (18 - 20)    SpO2: 95% (23 Mar 2020 09:15) (90% - 99%)        Physical Exam:     General: NAD, middle age, obese male laying in bed    HEENT: atraumatic, normocephalic, PERRLA, EOMI    CVR: Regular rate and rhythm, S1S2nl. + 3/6 murmur noted (sytsolic)    LUNG: Clear to auscultation bilaterally with no further scattered rhonchi.     ABDOMEN: + Bowel Sounds x4, soft nondistended, not tender to palpation, no guarding or rigidity    EXT: 2+ Peripheral Pulses, No clubbing, cyanosis. b/l chronic venous stasis changes. Swelling decreased. 1+ pitting edema     SKIN: Grossly intact    Neuro: aaox3, Motor 5/5

## 2020-03-23 NOTE — DISCHARGE NOTE PROVIDER - PROVIDER TOKENS
FREE:[LAST:[HRH],FIRST:[Feliciano],PHONE:[phone]],FAX:[phone]],ADDRESS:[[place] [address]],FOLLOWUP:[1-3 days]],PROVIDER:[TOKEN:[[id number]]],PROVIDER:[TOKEN:[[id number]]]

## 2020-03-23 NOTE — DISCHARGE NOTE PROVIDER - NSDCMRMEDTOKEN_GEN_ALL_CORE_FT
aspirin 81 mg oral tablet, chewable: 1 tab(s) orally once a day  atorvastatin 40 mg oral tablet: 1 tab(s) orally once a day (at bedtime)  bumetanide 1 mg oral tablet: 1 tab(s) orally 2 times a day  carvedilol 3.125 mg oral tablet: 1 tab(s) orally every 12 hours  hydroCHLOROthiazide 25 mg oral tablet: 1 tab(s) orally once a day  K-Tab 20 mEq oral tablet, extended release: 2 tab(s) orally once a day   metFORMIN 500 mg oral tablet: 1 tab(s) orally once a day   sildenafil 20 mg oral tablet: 0.5 tab(s) orally 3 times a day

## 2020-03-26 ENCOUNTER — APPOINTMENT (OUTPATIENT)
Dept: CARDIOLOGY | Facility: CLINIC | Age: 40
End: 2020-03-26

## 2020-04-02 RX ORDER — ATORVASTATIN CALCIUM 40 MG/1
40 TABLET, FILM COATED ORAL
Qty: 1 | Refills: 0 | Status: ACTIVE | COMMUNITY

## 2020-04-09 RX ORDER — POTASSIUM CHLORIDE 1500 MG/1
20 TABLET, FILM COATED, EXTENDED RELEASE ORAL DAILY
Qty: 180 | Refills: 0 | Status: ACTIVE | COMMUNITY
Start: 1900-01-01 | End: 1900-01-01

## 2020-04-16 ENCOUNTER — EMERGENCY (EMERGENCY)
Facility: HOSPITAL | Age: 40
LOS: 1 days | Discharge: HOME | End: 2020-04-16
Attending: EMERGENCY MEDICINE
Payer: MEDICARE

## 2020-04-16 VITALS
HEART RATE: 99 BPM | RESPIRATION RATE: 24 BRPM | SYSTOLIC BLOOD PRESSURE: 137 MMHG | TEMPERATURE: 99 F | DIASTOLIC BLOOD PRESSURE: 89 MMHG | OXYGEN SATURATION: 91 %

## 2020-04-16 LAB
ALBUMIN SERPL ELPH-MCNC: 3.4 G/DL — SIGNIFICANT CHANGE UP (ref 3.3–5.2)
ALP SERPL-CCNC: 125 U/L — HIGH (ref 40–120)
ALT FLD-CCNC: 14 U/L — SIGNIFICANT CHANGE UP
ANION GAP SERPL CALC-SCNC: 14 MMOL/L — SIGNIFICANT CHANGE UP (ref 5–17)
APTT BLD: 34.7 SEC — SIGNIFICANT CHANGE UP (ref 27.5–36.3)
AST SERPL-CCNC: 26 U/L — SIGNIFICANT CHANGE UP
BILIRUB SERPL-MCNC: 1.7 MG/DL — SIGNIFICANT CHANGE UP (ref 0.4–2)
BUN SERPL-MCNC: 25 MG/DL — HIGH (ref 8–20)
CALCIUM SERPL-MCNC: 9.3 MG/DL — SIGNIFICANT CHANGE UP (ref 8.6–10.2)
CHLORIDE SERPL-SCNC: 92 MMOL/L — LOW (ref 98–107)
CO2 SERPL-SCNC: 31 MMOL/L — HIGH (ref 22–29)
CREAT SERPL-MCNC: 1.06 MG/DL — SIGNIFICANT CHANGE UP (ref 0.5–1.3)
GLUCOSE SERPL-MCNC: 131 MG/DL — HIGH (ref 70–99)
HCT VFR BLD CALC: 40.1 % — SIGNIFICANT CHANGE UP (ref 39–50)
HGB BLD-MCNC: 12.5 G/DL — LOW (ref 13–17)
INR BLD: 2.13 RATIO — HIGH (ref 0.88–1.16)
MCHC RBC-ENTMCNC: 29.5 PG — SIGNIFICANT CHANGE UP (ref 27–34)
MCHC RBC-ENTMCNC: 31.2 GM/DL — LOW (ref 32–36)
MCV RBC AUTO: 94.6 FL — SIGNIFICANT CHANGE UP (ref 80–100)
NT-PROBNP SERPL-SCNC: 911 PG/ML — HIGH (ref 0–300)
PLATELET # BLD AUTO: 193 K/UL — SIGNIFICANT CHANGE UP (ref 150–400)
POTASSIUM SERPL-MCNC: 3.6 MMOL/L — SIGNIFICANT CHANGE UP (ref 3.5–5.3)
POTASSIUM SERPL-SCNC: 3.6 MMOL/L — SIGNIFICANT CHANGE UP (ref 3.5–5.3)
PROT SERPL-MCNC: 9 G/DL — HIGH (ref 6.6–8.7)
PROTHROM AB SERPL-ACNC: 24.6 SEC — HIGH (ref 10–12.9)
RBC # BLD: 4.24 M/UL — SIGNIFICANT CHANGE UP (ref 4.2–5.8)
RBC # FLD: 16.2 % — HIGH (ref 10.3–14.5)
SODIUM SERPL-SCNC: 136 MMOL/L — SIGNIFICANT CHANGE UP (ref 135–145)
TROPONIN T SERPL-MCNC: <0.01 NG/ML — SIGNIFICANT CHANGE UP (ref 0–0.06)
WBC # BLD: 4.72 K/UL — SIGNIFICANT CHANGE UP (ref 3.8–10.5)
WBC # FLD AUTO: 4.72 K/UL — SIGNIFICANT CHANGE UP (ref 3.8–10.5)

## 2020-04-16 PROCEDURE — 76705 ECHO EXAM OF ABDOMEN: CPT | Mod: 26

## 2020-04-16 PROCEDURE — 99220: CPT

## 2020-04-16 PROCEDURE — 99285 EMERGENCY DEPT VISIT HI MDM: CPT

## 2020-04-16 PROCEDURE — 93010 ELECTROCARDIOGRAM REPORT: CPT

## 2020-04-16 PROCEDURE — 71046 X-RAY EXAM CHEST 2 VIEWS: CPT | Mod: 26

## 2020-04-16 RX ORDER — DEXTROSE 50 % IN WATER 50 %
12.5 SYRINGE (ML) INTRAVENOUS ONCE
Refills: 0 | Status: DISCONTINUED | OUTPATIENT
Start: 2020-04-16 | End: 2020-04-21

## 2020-04-16 RX ORDER — CARVEDILOL PHOSPHATE 80 MG/1
3.12 CAPSULE, EXTENDED RELEASE ORAL EVERY 12 HOURS
Refills: 0 | Status: DISCONTINUED | OUTPATIENT
Start: 2020-04-16 | End: 2020-04-21

## 2020-04-16 RX ORDER — HYDROCHLOROTHIAZIDE 25 MG
25 TABLET ORAL DAILY
Refills: 0 | Status: DISCONTINUED | OUTPATIENT
Start: 2020-04-17 | End: 2020-04-21

## 2020-04-16 RX ORDER — BUMETANIDE 0.25 MG/ML
1 INJECTION INTRAMUSCULAR; INTRAVENOUS ONCE
Refills: 0 | Status: COMPLETED | OUTPATIENT
Start: 2020-04-16 | End: 2020-04-16

## 2020-04-16 RX ORDER — GLUCAGON INJECTION, SOLUTION 0.5 MG/.1ML
1 INJECTION, SOLUTION SUBCUTANEOUS ONCE
Refills: 0 | Status: DISCONTINUED | OUTPATIENT
Start: 2020-04-16 | End: 2020-04-21

## 2020-04-16 RX ORDER — SODIUM CHLORIDE 9 MG/ML
1000 INJECTION, SOLUTION INTRAVENOUS
Refills: 0 | Status: DISCONTINUED | OUTPATIENT
Start: 2020-04-16 | End: 2020-04-21

## 2020-04-16 RX ORDER — METFORMIN HYDROCHLORIDE 850 MG/1
500 TABLET ORAL
Refills: 0 | Status: DISCONTINUED | OUTPATIENT
Start: 2020-04-16 | End: 2020-04-21

## 2020-04-16 RX ORDER — ASPIRIN/CALCIUM CARB/MAGNESIUM 324 MG
81 TABLET ORAL DAILY
Refills: 0 | Status: DISCONTINUED | OUTPATIENT
Start: 2020-04-16 | End: 2020-04-21

## 2020-04-16 RX ORDER — POTASSIUM CHLORIDE 20 MEQ
40 PACKET (EA) ORAL DAILY
Refills: 0 | Status: DISCONTINUED | OUTPATIENT
Start: 2020-04-17 | End: 2020-04-21

## 2020-04-16 RX ORDER — DEXTROSE 50 % IN WATER 50 %
25 SYRINGE (ML) INTRAVENOUS ONCE
Refills: 0 | Status: DISCONTINUED | OUTPATIENT
Start: 2020-04-16 | End: 2020-04-21

## 2020-04-16 RX ORDER — INSULIN LISPRO 100/ML
VIAL (ML) SUBCUTANEOUS
Refills: 0 | Status: DISCONTINUED | OUTPATIENT
Start: 2020-04-16 | End: 2020-04-21

## 2020-04-16 RX ORDER — ATORVASTATIN CALCIUM 80 MG/1
40 TABLET, FILM COATED ORAL AT BEDTIME
Refills: 0 | Status: DISCONTINUED | OUTPATIENT
Start: 2020-04-16 | End: 2020-04-21

## 2020-04-16 RX ORDER — DEXTROSE 50 % IN WATER 50 %
15 SYRINGE (ML) INTRAVENOUS ONCE
Refills: 0 | Status: DISCONTINUED | OUTPATIENT
Start: 2020-04-16 | End: 2020-04-21

## 2020-04-16 RX ORDER — BUMETANIDE 0.25 MG/ML
1 INJECTION INTRAMUSCULAR; INTRAVENOUS
Refills: 0 | Status: DISCONTINUED | OUTPATIENT
Start: 2020-04-16 | End: 2020-04-21

## 2020-04-16 RX ADMIN — BUMETANIDE 1 MILLIGRAM(S): 0.25 INJECTION INTRAMUSCULAR; INTRAVENOUS at 21:45

## 2020-04-16 RX ADMIN — ATORVASTATIN CALCIUM 40 MILLIGRAM(S): 80 TABLET, FILM COATED ORAL at 21:48

## 2020-04-16 RX ADMIN — Medication 10 MILLIGRAM(S): at 21:47

## 2020-04-16 NOTE — CONSULT NOTE ADULT - SUBJECTIVE AND OBJECTIVE BOX
[place] [facility]                                                               Office:  [address] [place] [address]                                                              Telephone: [phone]. Fax:[phone]                                                                        CARDIOLOGY CONSULTATION NOTE                                                                                             Consult requested by:  Dr. Vazquez  Reason for Consultation: Heart Failure  History obtained by: Patient and medical record   obtained: No    Chief complaint:    Patient is a 39y old  Male who presents with a chief complaint of shortness of breath.       HPI:  38 y/o M with a PMHx of hx of R sided heart failure (normalLVEF with severely reduced RV function), severe pulmonary HTN on home O2, JAN on CPAP, IDDM2, and recent Covid infection (discharged from [facility] 03/23/20) who presented to the ED with complaints of shortness of breath.         REVIEW OF SYMPTOMS:     CONSTITUTIONAL: No fever, weight loss, or fatigue  ENMT:  No difficulty hearing, tinnitus, vertigo; No sinus or throat pain  NECK: No pain or stiffness  CARDIOVASCULAR: chest pain, dyspnea, syncope, palpitations, dizziness, Orthopnea, Paroxsymal nocturnal dyspnea  RESPIRATORY: Dyspnea on exertion, Shortness of breath, cough, wheezing  : No dysuria, no hematuria   GI: No dark color stool, no melena, no diarrhea, no constipation, no abdominal pain   NEURO: No headache, no dizziness, no slurred speech   MUSCULOSKELETAL: No joint pain or swelling; No muscle, back, or extremity pain  PSYCH: No agitation, no anxiety.    ALL OTHER REVIEW OF SYSTEMS ARE NEGATIVE.      PREVIOUS DIAGNOSTIC TESTING  ECHO FINDINGS:  < from: TTE Echo Complete w/Doppler (12.21.19 @ 08:48) >  PHYSICIAN INTERPRETATION:  Left Ventricle: The left ventricular internal cavity size is normal.  Left ventricular ejection fraction, by visual estimation, is 65 to 70%. The interventricular septum is flattened in systole and diastole, consistent with right ventricular pressure and volume overload.  Right Ventricle: The right ventricular size is severely enlarged. RV systolic function is severely reduced. TV S' 0.2 m/s.  Left Atrium: The left atrium is normal in size.  Right Atrium: Severely enlarged right atrium.  Pericardium: There is no evidence of pericardial effusion.  Mitral Valve: The mitral valve is normal in structure. Mild mitral valve regurgitation is seen.  Tricuspid Valve: Moderate-severe tricuspid regurgitation is visualized. Estimated pulmonary artery systolic pressure is 89.6 mmHg assuming a right atrial pressure of 15 mmHg, which is consistent with severe pulmonary hypertension.  Aortic Valve: The aortic valve is normal. No evidence of aortic valve regurgitation is seen.  Pulmonic Valve: The pulmonic valve was not well visualized. Mild pulmonic valve regurgitation.  Aorta: The aortic root is normal in size and structure.  Pulmonary Artery: The pulmonary artery is moderately dilated.  Venous: The inferior vena cava was dilated, with respiratory size variation less than 50%. The inferior vena cava and the hepatic vein show a pattern of systolic flow reversal, suggestive of tricuspid regurgitation.       Summary:   1. Technically fair study.   2. Left ventricular ejection fraction, by visual estimation, is 65 to 70%.   3. There is moderate septal left ventricular hypertrophy.   4. Severely enlarged right ventricle.   5. Severely reduced RV systolic function.   6. Right ventricular volume and pressure overload.   7. Moderate-severe tricuspid regurgitation.   8. Estimated pulmonary artery systolic pressure is 89.6 mmHg assuming a right atrial pressure of 15 mmHg, which is consistent with severe pulmonary hypertension.   9. Moderately dilated pulmonary artery.    MD Josefina Electronically signed on 12/24/2019 at 2:54:21 PM    < end of copied text >    CATHETERIZATION FINDINGS:   < from: Cardiac Cath Lab - Adult (01.14.20 @ 12:56) >  COMPLICATIONS: No complications occurred during the cath lab visit.  DIAGNOSTIC IMPRESSIONS: Markedly elevated right sided filling pressure (RA  23 mmHg)  Severe pulmonary hypertension ( PASP 74 mmHg, Mean 51 mmHg)  PVR 5.79 Wood Units  PCWP within normal 15 mmgh  Pulmonary hypertension showed No response to Nitric oxide  DIAGNOSTIC RECOMMENDATIONS: Management as per Dr. Cox.  Prepared and signed by  Jairno Santana MD  Signed 01/16/2020 18:16:31    < end of copied text >    ALLERGIES: Allergies    No Known Allergies    Intolerances    PAST MEDICAL HISTORY  Obesity, morbid  Pulmonary hypertension  CHF (congestive heart failure)  Hypertension      PAST SURGICAL HISTORY  No significant past surgical history      FAMILY HISTORY:  Family history of diabetes mellitus (DM): grandmother.      SOCIAL HISTORY: lives with family  CIGARETTES: smoked 20 years ago for a short time  ALCOHOL: denies  DRUGS: denies      CURRENT MEDICATIONS:       HOME MEDICATIONS:  aspirin 81 mg oral tablet, chewable: 1 tab(s) orally once a day  atorvastatin 40 mg oral tablet: 1 tab(s) orally once a day (at bedtime)  bumetanide 1 mg oral tablet: 1 tab(s) orally 2 times a day  carvedilol 3.125 mg oral tablet: 1 tab(s) orally every 12 hours  hydroCHLOROthiazide 25 mg oral tablet: 1 tab(s) orally once a day  K-Tab 20 mEq oral tablet, extended release: 2 tab(s) orally once a day   metFORMIN 500 mg oral tablet: 1 tab(s) orally once a day   sildenafil 20 mg oral tablet: 0.5 tab(s) orally 3 times a day      Vital Signs Last 24 Hrs  T(C): 37.1 (16 Apr 2020 16:27), Max: 37.1 (16 Apr 2020 16:27)  T(F): 98.8 (16 Apr 2020 16:27), Max: 98.8 (16 Apr 2020 16:27)  HR: 99 (16 Apr 2020 16:27) (99 - 99)  BP: 137/89 (16 Apr 2020 16:27) (137/89 - 137/89)  RR: 24 (16 Apr 2020 16:27) (24 - 24)  SpO2: 91% (16 Apr 2020 16:27) (91% - 91%)      PHYSICAL EXAM:  Constitutional: Comfortable . No acute distress.   HEENT: Atraumatic and normocephalic , neck is supple . no JVD. No carotid bruit. PEERL   CNS: A&Ox3. No focal deficits. EOMI. Cranial nerves II-IX are intact.   Lymph Nodes: Cervical : Not palpable.  Respiratory: CTAB  Cardiovascular: S1S2 RRR. No murmur/rubs or gallop.  Gastrointestinal: Soft non-tender and non distended . +Bowel sounds. negative Collins's sign.  Extremities: No edema.   Psychiatric: Calm . no agitation.  Skin: No skin rash/ulcers visualized to face, hands or feet.    Intake and output:     LABS:                        12.5   4.72  )-----------( 193      ( 16 Apr 2020 17:02 )             40.1     04-16    136  |  92<L>  |  25.0<H>  ----------------------------<  131<H>  3.6   |  31.0<H>  |  1.06    Ca    9.3      16 Apr 2020 17:02    TPro  9.0<H>  /  Alb  3.4  /  TBili  1.7  /  DBili  x   /  AST  26  /  ALT  14  /  AlkPhos  125<H>  04-16    CARDIAC MARKERS ( 16 Apr 2020 17:02 )  x     / <0.01 ng/mL / x     / x     / x        ;p-BNP=Serum Pro-Brain Natriuretic Peptide: 911 pg/mL (04-16 @ 17:35)    PT/INR - ( 16 Apr 2020 17:21 )   PT: 24.6 sec;   INR: 2.13 ratio         PTT - ( 16 Apr 2020 17:21 )  PTT:34.7 sec      INTERPRETATION OF TELEMETRY: Reviewed by me.   ECG: Reviewed by me.     RADIOLOGY & ADDITIONAL STUDIES:    X-ray:  reviewed by me.   CT scan:   MRI: [place] [facility]                                                               Office:  [address] [address]                                                              Telephone: [phone]. Fax:[phone]                                                                        CARDIOLOGY CONSULTATION NOTE                                                                                             Consult requested by:  Dr. Vazquez  Reason for Consultation: Heart Failure  History obtained by: Patient and medical record   obtained: No    Chief complaint:    Patient is a 39y old  Male who presents with a chief complaint of nasal congestion, and abdominal distention.       HPI:  38 y/o M with a PMHx of hx of R sided heart failure (normalLVEF with severely reduced RV function), severe pulmonary HTN on home O2, JAN on CPAP, IDDM2, and recent Covid infection (discharged from [facility] 03/23/20) who presented to the ED with complaints of nasal congestion and abdominal distantion. Pt states that for past week he has been experiencing abdominal distention and nasal congestion. Pt also c/o of orthopnea. Pt denies associated symptoms of chest pain, palpitations, SOB, cough, fever, diarrhea,constipation. When pt arrived to [facility]-ED, ECG shows no signs of ischemia, NSR HR @ 91, bnp-911, Trop#1 negative.           REVIEW OF SYMPTOMS:     CONSTITUTIONAL: No fever, weight loss, or fatigue  ENMT:  No difficulty hearing, tinnitus, vertigo; No sinus or throat pain  NECK: No pain or stiffness  CARDIOVASCULAR: See HPI  RESPIRATORY: Dyspnea on exertion, Shortness of breath, cough, wheezing  : No dysuria, no hematuria   GI: No dark color stool, no melena, no diarrhea, no constipation, no abdominal pain   NEURO: No headache, no dizziness, no slurred speech   MUSCULOSKELETAL: No joint pain or swelling; No muscle, back, or extremity pain  PSYCH: No agitation, no anxiety.    ALL OTHER REVIEW OF SYSTEMS ARE NEGATIVE.      PREVIOUS DIAGNOSTIC TESTING  ECHO FINDINGS:  < from: TTE Echo Complete w/Doppler (12.21.19 @ 08:48) >  PHYSICIAN INTERPRETATION:  Left Ventricle: The left ventricular internal cavity size is normal.  Left ventricular ejection fraction, by visual estimation, is 65 to 70%. The interventricular septum is flattened in systole and diastole, consistent with right ventricular pressure and volume overload.  Right Ventricle: The right ventricular size is severely enlarged. RV systolic function is severely reduced. TV S' 0.2 m/s.  Left Atrium: The left atrium is normal in size.  Right Atrium: Severely enlarged right atrium.  Pericardium: There is no evidence of pericardial effusion.  Mitral Valve: The mitral valve is normal in structure. Mild mitral valve regurgitation is seen.  Tricuspid Valve: Moderate-severe tricuspid regurgitation is visualized. Estimated pulmonary artery systolic pressure is 89.6 mmHg assuming a right atrial pressure of 15 mmHg, which is consistent with severe pulmonary hypertension.  Aortic Valve: The aortic valve is normal. No evidence of aortic valve regurgitation is seen.  Pulmonic Valve: The pulmonic valve was not well visualized. Mild pulmonic valve regurgitation.  Aorta: The aortic root is normal in size and structure.  Pulmonary Artery: The pulmonary artery is moderately dilated.  Venous: The inferior vena cava was dilated, with respiratory size variation less than 50%. The inferior vena cava and the hepatic vein show a pattern of systolic flow reversal, suggestive of tricuspid regurgitation.       Summary:   1. Technically fair study.   2. Left ventricular ejection fraction, by visual estimation, is 65 to 70%.   3. There is moderate septal left ventricular hypertrophy.   4. Severely enlarged right ventricle.   5. Severely reduced RV systolic function.   6. Right ventricular volume and pressure overload.   7. Moderate-severe tricuspid regurgitation.   8. Estimated pulmonary artery systolic pressure is 89.6 mmHg assuming a right atrial pressure of 15 mmHg, which is consistent with severe pulmonary hypertension.   9. Moderately dilated pulmonary artery.    MD Josefina Electronically signed on 12/24/2019 at 2:54:21 PM    < end of copied text >    CATHETERIZATION FINDINGS:   < from: Cardiac Cath Lab - Adult (01.14.20 @ 12:56) >  COMPLICATIONS: No complications occurred during the cath lab visit.  DIAGNOSTIC IMPRESSIONS: Markedly elevated right sided filling pressure (RA  23 mmHg)  Severe pulmonary hypertension ( PASP 74 mmHg, Mean 51 mmHg)  PVR 5.79 Wood Units  PCWP within normal 15 mmgh  Pulmonary hypertension showed No response to Nitric oxide  DIAGNOSTIC RECOMMENDATIONS: Management as per Dr. Cox.  Prepared and signed by  Jairon Santana MD  Signed 01/16/2020 18:16:31    < end of copied text >    ALLERGIES: Allergies    No Known Allergies    Intolerances    PAST MEDICAL HISTORY  Obesity, morbid  Pulmonary hypertension  CHF (congestive heart failure)  Hypertension      PAST SURGICAL HISTORY  No significant past surgical history      FAMILY HISTORY:  Family history of diabetes mellitus (DM): grandmother.      SOCIAL HISTORY: lives with family  CIGARETTES: smoked 20 years ago for a short time  ALCOHOL: denies  DRUGS: denies      CURRENT MEDICATIONS:       HOME MEDICATIONS:  aspirin 81 mg oral tablet, chewable: 1 tab(s) orally once a day  atorvastatin 40 mg oral tablet: 1 tab(s) orally once a day (at bedtime)  bumetanide 1 mg oral tablet: 1 tab(s) orally 2 times a day  carvedilol 3.125 mg oral tablet: 1 tab(s) orally every 12 hours  hydroCHLOROthiazide 25 mg oral tablet: 1 tab(s) orally once a day  K-Tab 20 mEq oral tablet, extended release: 2 tab(s) orally once a day   metFORMIN 500 mg oral tablet: 1 tab(s) orally once a day   sildenafil 20 mg oral tablet: 0.5 tab(s) orally 3 times a day      Vital Signs Last 24 Hrs  T(C): 37.1 (16 Apr 2020 16:27), Max: 37.1 (16 Apr 2020 16:27)  T(F): 98.8 (16 Apr 2020 16:27), Max: 98.8 (16 Apr 2020 16:27)  HR: 99 (16 Apr 2020 16:27) (99 - 99)  BP: 137/89 (16 Apr 2020 16:27) (137/89 - 137/89)  RR: 24 (16 Apr 2020 16:27) (24 - 24)  SpO2: 91% (16 Apr 2020 16:27) (91% - 91%)      PHYSICAL EXAM:  Constitutional: Comfortable . No acute distress.   HEENT: Atraumatic and normocephalic , neck is supple . no JVD. No carotid bruit. PEERL   CNS: A&Ox3. No focal deficits. EOMI.   Lymph Nodes: Cervical : Not palpable.  Respiratory: diminished    Cardiovascular: S1S2 RRR. No murmur/rubs or gallop.  Gastrointestinal: Soft non-tender and +distended . +Bowel sounds. negative Collins's sign.  Extremities: 3+/+3 edema bilaterally    Psychiatric: Calm . no agitation.  Skin: No skin rash/ulcers visualized to face, hands or feet.    Intake and output:     LABS:                        12.5   4.72  )-----------( 193      ( 16 Apr 2020 17:02 )             40.1     04-16    136  |  92<L>  |  25.0<H>  ----------------------------<  131<H>  3.6   |  31.0<H>  |  1.06    Ca    9.3      16 Apr 2020 17:02    TPro  9.0<H>  /  Alb  3.4  /  TBili  1.7  /  DBili  x   /  AST  26  /  ALT  14  /  AlkPhos  125<H>  04-16    CARDIAC MARKERS ( 16 Apr 2020 17:02 )  x     / <0.01 ng/mL / x     / x     / x        ;p-BNP=Serum Pro-Brain Natriuretic Peptide: 911 pg/mL (04-16 @ 17:35)    PT/INR - ( 16 Apr 2020 17:21 )   PT: 24.6 sec;   INR: 2.13 ratio         PTT - ( 16 Apr 2020 17:21 )  PTT:34.7 sec      INTERPRETATION OF TELEMETRY: NSR HR @ 80s  ECG: NSR HR @ 91 with t-wave abnormalities inferolateral leads    RADIOLOGY & ADDITIONAL STUDIES:    X-ray:  < from: Xray Chest 2 Views PA/Lat (04.16.20 @ 17:22) >  IMPRESSION:  BILATERAL alveolar infiltrates predominantly in the lower lobes peripherally worrisome for a Covid positive pneumonia.  Moderate cardiomegaly.      < end of copied text >

## 2020-04-16 NOTE — ED CDU PROVIDER INITIAL DAY NOTE - OBJECTIVE STATEMENT
40 y/o M with PMHx CHF, HTN, DM, pulmonary HTN on home O2, JAN presents to ED c/o feeling nasal congestion, shortness of breath worsening and abdominal distension. States he came in because he felt that he was accumulating fluid, states it is worst in his abdomen. Was recently discharged from [facility] on 3/23/2020 for CHF and COVID infection. Pt is on home oxygen 6L via [place]. Denies fever, chills, abd pain, CP, diarrhea.  Cardiologist: Dr Cox

## 2020-04-16 NOTE — CONSULT NOTE ADULT - ATTENDING COMMENTS
pt is seen, examined, chart reviewed, d/w np/pa  Acute on Chronic HFpEF  RV failure  Severe PHTN  Echo- EF 65-70%, MOD. lvh, severely enlarged RV, severely reduced RV sys. fx. , moderate to severe TR, severe pulm. htn  Bumex 1mg IVP once  US abdomen ordered for abdominal distention  Pt's primary cardiologist Dr. Cox to follow 4/17/2020

## 2020-04-16 NOTE — ED ADULT NURSE NOTE - NSIMPLEMENTINTERV_GEN_ALL_ED
Implemented All Universal Safety Interventions:  [place] to call system. Call bell, personal items and telephone within reach. Instruct patient to call for assistance. Room bathroom lighting operational. Non-slip footwear when patient is off stretcher. Physically safe environment: no spills, clutter or unnecessary equipment. Stretcher in lowest position, wheels locked, appropriate side rails in place.

## 2020-04-16 NOTE — ED ADULT TRIAGE NOTE - CHIEF COMPLAINT QUOTE
Feels like he's having fluid build up in lungs and legs.  Hx of CHF, c/o possible sinus infection.  Denies sick contacts, denies fevers.  Wears home O2 6-8L.  B/L LE swelling, tachypneic.

## 2020-04-16 NOTE — ED PROVIDER NOTE - CARDIAC, MLM
Normal rate, regular rhythm.  Heart sounds S1, S2.  No murmurs, rubs or gallops. 2/systolic murmur tense edema ble, edema trace abdominal wall
not applicable

## 2020-04-16 NOTE — CONSULT NOTE ADULT - ASSESSMENT
38 y/o M with a PMHx of hx of R sided heart failure (normalLVEF with severely reduced RV function), severe pulmonary HTN on home O2, JAN on CPAP, IDDM2, and recent Covid infection (discharged from [facility] 03/23/20) who presented to the ED with complaints of nasal congestion and abdominal distantion. Pt states that for past week he has been experiencing abdominal distention and nasal congestion. Pt also c/o of orthopnea. Pt denies associated symptoms of chest pain, palpitations, SOB, cough, fever, diarrhea, conmstipation. When pt arrived to [facility]-ED, ECG shows no signs of ischemia, NSR HR @ 91, bnp-911, Trop#1 negative.    Acute on Chronic CHF  -BNP-911  -ECG-NSR HR @ 91,t wave abnormalities  -Trop#1-negative  -Echo- EF 65-70%, MOD. lvh, severely enlarged RV, severely reduced RV sys. fx. , moderate to severe TR, severe pulm. htn  -AST/ALT- 26/14  -Pt states he has been compliant with medication at home  -Bumex 1mg IVP once  -US abdomen ordered for abdominal distention  -Cont. home meds  -Keep on observation, will re-evaluate if admission needed 4/17

## 2020-04-16 NOTE — ED CDU PROVIDER INITIAL DAY NOTE - PMH
CHF (congestive heart failure)    Diabetes    Hypertension    Obesity, morbid    JAN (obstructive sleep apnea)    Pulmonary hypertension

## 2020-04-16 NOTE — ED CDU PROVIDER INITIAL DAY NOTE - ATTENDING CONTRIBUTION TO CARE
I performed a face to face history and physical exam of the patient and discussed their management with the resident/ACP. I reviewed the resident/ACP's note and agree with the documented findings and plan of care.    pt with nasal congestion, sob. initial workup reviewed. will put in obs for diuresis.

## 2020-04-16 NOTE — ED CDU PROVIDER INITIAL DAY NOTE - MEDICAL DECISION MAKING DETAILS
40 y/o M with PMHx CHF, HTN, DM, pulmonary HTN on home O2, JAN presents to ED c/o feeling nasal congestion, shortness of breath worsening and abdominal distension. Pt seen by Cardiologist, recommending Bumex and re-evaluation in the morning by [facility].

## 2020-04-16 NOTE — ED PROVIDER NOTE - OBJECTIVE STATEMENT
40 yo male discharged from [facility] on March 23 ( approx 3 weeks ago) for CHF and COVID. Serology for covid neg after infection.   Has been feeling well but states over the past few days he has been accumulating fluid. He states he feels it most in his abdomen. + SOB  no cp or fever gu symptoms or CP   med hx chf, htn, alisha, ohs, obese, dm, oxygen dep 6 l/min nc at home  Dr Cox

## 2020-04-17 VITALS
OXYGEN SATURATION: 94 % | TEMPERATURE: 98 F | SYSTOLIC BLOOD PRESSURE: 119 MMHG | DIASTOLIC BLOOD PRESSURE: 80 MMHG | HEART RATE: 86 BPM | RESPIRATION RATE: 23 BRPM

## 2020-04-17 LAB — SARS-COV-2 RNA SPEC QL NAA+PROBE: SIGNIFICANT CHANGE UP

## 2020-04-17 PROCEDURE — 85027 COMPLETE CBC AUTOMATED: CPT

## 2020-04-17 PROCEDURE — 84484 ASSAY OF TROPONIN QUANT: CPT

## 2020-04-17 PROCEDURE — 85610 PROTHROMBIN TIME: CPT

## 2020-04-17 PROCEDURE — 71046 X-RAY EXAM CHEST 2 VIEWS: CPT

## 2020-04-17 PROCEDURE — 94640 AIRWAY INHALATION TREATMENT: CPT

## 2020-04-17 PROCEDURE — 80053 COMPREHEN METABOLIC PANEL: CPT

## 2020-04-17 PROCEDURE — 93005 ELECTROCARDIOGRAM TRACING: CPT

## 2020-04-17 PROCEDURE — 99214 OFFICE O/P EST MOD 30 MIN: CPT

## 2020-04-17 PROCEDURE — 85730 THROMBOPLASTIN TIME PARTIAL: CPT

## 2020-04-17 PROCEDURE — 96374 THER/PROPH/DIAG INJ IV PUSH: CPT

## 2020-04-17 PROCEDURE — 83880 ASSAY OF NATRIURETIC PEPTIDE: CPT

## 2020-04-17 PROCEDURE — G0378: CPT

## 2020-04-17 PROCEDURE — 36415 COLL VENOUS BLD VENIPUNCTURE: CPT

## 2020-04-17 PROCEDURE — 76705 ECHO EXAM OF ABDOMEN: CPT

## 2020-04-17 PROCEDURE — 99284 EMERGENCY DEPT VISIT MOD MDM: CPT | Mod: 25

## 2020-04-17 PROCEDURE — 99217: CPT

## 2020-04-17 PROCEDURE — 87635 SARS-COV-2 COVID-19 AMP PRB: CPT

## 2020-04-17 PROCEDURE — 82962 GLUCOSE BLOOD TEST: CPT

## 2020-04-17 RX ORDER — FLUTICASONE PROPIONATE 50 MCG
2 SPRAY, SUSPENSION NASAL
Refills: 0 | Status: DISCONTINUED | OUTPATIENT
Start: 2020-04-17 | End: 2020-04-21

## 2020-04-17 RX ORDER — SODIUM CHLORIDE 0.65 %
1 AEROSOL, SPRAY (ML) NASAL
Refills: 0 | Status: DISCONTINUED | OUTPATIENT
Start: 2020-04-17 | End: 2020-04-21

## 2020-04-17 RX ADMIN — CARVEDILOL PHOSPHATE 3.12 MILLIGRAM(S): 80 CAPSULE, EXTENDED RELEASE ORAL at 12:51

## 2020-04-17 RX ADMIN — BUMETANIDE 1 MILLIGRAM(S): 0.25 INJECTION INTRAMUSCULAR; INTRAVENOUS at 05:19

## 2020-04-17 RX ADMIN — Medication 2 SPRAY(S): at 12:34

## 2020-04-17 RX ADMIN — METFORMIN HYDROCHLORIDE 500 MILLIGRAM(S): 850 TABLET ORAL at 12:37

## 2020-04-17 RX ADMIN — METFORMIN HYDROCHLORIDE 500 MILLIGRAM(S): 850 TABLET ORAL at 05:17

## 2020-04-17 RX ADMIN — Medication 10 MILLIGRAM(S): at 11:38

## 2020-04-17 RX ADMIN — Medication 10 MILLIGRAM(S): at 05:15

## 2020-04-17 RX ADMIN — Medication 40 MILLIEQUIVALENT(S): at 11:39

## 2020-04-17 RX ADMIN — Medication 81 MILLIGRAM(S): at 11:39

## 2020-04-17 RX ADMIN — BUMETANIDE 1 MILLIGRAM(S): 0.25 INJECTION INTRAMUSCULAR; INTRAVENOUS at 12:51

## 2020-04-17 RX ADMIN — Medication 25 MILLIGRAM(S): at 05:18

## 2020-04-17 RX ADMIN — Medication 1 SPRAY(S): at 12:33

## 2020-04-17 RX ADMIN — CARVEDILOL PHOSPHATE 3.12 MILLIGRAM(S): 80 CAPSULE, EXTENDED RELEASE ORAL at 05:18

## 2020-04-17 NOTE — PROGRESS NOTE ADULT - ATTENDING COMMENTS
pt was seen and examined. He is on o2, sat of 100%.  Pt is c/o nasal congestion. COVID is pending.  Doing well on Orinetram. US of abdomen was negative for ascites.   I agree with the above a/p.

## 2020-04-17 NOTE — ED CDU PROVIDER SUBSEQUENT DAY NOTE - MEDICAL DECISION MAKING DETAILS
38 y/o M with PMHx CHF, HTN, DM, pulmonary HTN on home O2, JAN presents to ED c/o feeling nasal congestion, shortness of breath worsening and abdominal distension. Pt seen by Cardiologist, recommending Bumex and re-evaluation in the morning by [facility].

## 2020-04-17 NOTE — PROGRESS NOTE ADULT - SUBJECTIVE AND OBJECTIVE BOX
[place] [facility]                                                               Office: [address] [address]                                                              Telephone: [phone]. Fax:[phone]                                                                             PROGRESS NOTE  Reason for follow up: CHF  Overnight: No new events.   Update: 4/17: Pt denies chest pain, palpitations, SOB. Pt had no cardiac events on tele overnight. Pt still c/o of nasal congestion-will order nasal saline spray with flonase. Pt euvolemic on exam. US abd-negative for ascites. Pt negative for COVID. Cont. home meds. Pt stable for discharge from cardiology prespective.    Subjective: "  My abdomen is still distended"      	  Vitals:  T(C): 36.4 (04-17-20 @ 13:18), Max: 37.3 (04-16-20 @ 23:30)  HR: 85 (04-17-20 @ 13:18) (82 - 99)  BP: 106/70 (04-17-20 @ 13:22) (104/77 - 138/89)  RR: 24 (04-17-20 @ 13:18) (20 - 32)  SpO2: 89% (04-17-20 @ 13:18) (83% - 100%)  Wt(kg): --  I&O's Summary    16 Apr 2020 07:01  -  17 Apr 2020 07:00  --------------------------------------------------------  IN: 720 mL / OUT: 1100 mL / NET: -380 mL            PHYSICAL EXAM:  Appearance: Comfortable. No acute distress  HEENT:  Head and neck: Atraumatic. Normocephalic.  Normal oral mucosa, PERRL, Neck is supple. No JVD, No carotid bruit.   Neurologic: A & O x 3, no focal deficits. EOMI.  Lymphatic: No cervical lymphadenopathy  Cardiovascular: Normal S1 S2, No murmur, rubs/gallops. No JVD, No edema  Respiratory: diminished bilaterally  Gastrointestinal:  Soft,+distention, Non-tender, + BS  Lower Extremities: No edema  Psychiatry: Patient is calm. No agitation. Mood & affect appropriate  Skin: No rashes/ ecchymoses/cyanosis/ulcers visualized on the face, hands or feet.      CURRENT MEDICATIONS:  buMETAnide 1 milliGRAM(s) Oral two times a day  carvedilol 3.125 milliGRAM(s) Oral every 12 hours  hydrochlorothiazide 25 milliGRAM(s) Oral daily  sildenafil (REVATIO) 10 milliGRAM(s) Oral three times a day  atorvastatin  dextrose 50% Injectable  dextrose 50% Injectable  dextrose 50% Injectable  insulin lispro (HumaLOG) corrective regimen sliding scale  metFORMIN  aspirin  chewable  dextrose 5%.  fluticasone propionate 50 MICROgram(s)/spray Nasal Spray  potassium chloride    Tablet ER      DIAGNOSTIC TESTING:  [ ] Echocardiogram:< from: TTE Echo Complete w/Doppler (12.21.19 @ 08:48) >  Summary:   1. Technically fair study.   2. Left ventricular ejection fraction, by visual estimation, is 65 to 70%.   3. There is moderate septal left ventricular hypertrophy.   4. Severely enlarged right ventricle.   5. Severely reduced RV systolic function.   6. Right ventricular volume and pressure overload.   7. Moderate-severe tricuspid regurgitation.   8. Estimated pulmonary artery systolic pressure is 89.6 mmHg assuming a right atrial pressure of 15 mmHg, which is consistent with severe pulmonary hypertension.   9. Moderately dilated pulmonary artery.    < end of copied text >     [ ]  Catheterization:< from: Cardiac Cath Lab - Adult (01.14.20 @ 12:56) >  HEMODYNAMICS: There is severe right sided failure. There is severe, fixed  pulmonary hypertension. With continued administration of inhaled nitric  oxide ( 40 PPM), there was no change in pulmonary hemodynamics.  COMPLICATIONS: No complications occurred during the cath lab visit.  DIAGNOSTIC IMPRESSIONS: Markedly elevated right sided filling pressure (RA  23 mmHg)  Severe pulmonary hypertension ( PASP 74 mmHg, Mean 51 mmHg)  PVR 5.79 Wood Units  PCWP within normal 15 mmgh  Pulmonary hypertension showed No response to Nitric oxide    < end of copied text >    OTHER: 	  US Abd: < from: US Abdomen Limited (04.16.20 @ 21:39) >  TECHNIQUE: Sonography of the 4 abdominal quadrants to evaluate for ascites.    FINDINGS/  IMPRESSION:    No evidence of free fluid within the 4 quadrants.      < end of copied text >      LABS:	 	  CARDIAC MARKERS ( 16 Apr 2020 17:35 )  x     / x     / x     / x     / x      p-BNP 16 Apr 2020 17:35: 911 pg/mL, CARDIAC MARKERS ( 16 Apr 2020 17:02 )  x     / <0.01 ng/mL / x     / x     / x      p-BNP 16 Apr 2020 17:02: x                              12.5   4.72  )-----------( 193      ( 16 Apr 2020 17:02 )             40.1     04-16    136  |  92<L>  |  25.0<H>  ----------------------------<  131<H>  3.6   |  31.0<H>  |  1.06    Ca    9.3      16 Apr 2020 17:02    TPro  9.0<H>  /  Alb  3.4  /  TBili  1.7  /  DBili  x   /  AST  26  /  ALT  14  /  AlkPhos  125<H>  04-16    proBNP: Serum Pro-Brain Natriuretic Peptide: 911 pg/mL (04-16 @ 17:35)       TELEMETRY: NSR HR @ 80s

## 2020-04-17 NOTE — ED ADULT NURSE REASSESSMENT NOTE - NS ED NURSE REASSESS RESPONSE TO CARE
feeling the same/Pt given apple juice and milk.  Resting in bed.
feeling better/Reports SOBr is improving

## 2020-04-17 NOTE — ED ADULT NURSE REASSESSMENT NOTE - NS ED NURSE REASSESS COMMENT FT1
DR Campo at bedside
Pt care assumed 1930, report received from offgoing RN. Pt is resting in bed comfortably at this time, no apparent distress noted at this time. pt safety maintained. Pt denies any complaints at this time. pt educated on plan of care, plan of care taught back to RN. plan of care education deemed proficient through successful teach back. will continue to reeducate pt regarding plan of care.
assumed care of pt   laying on stretcher, alert and oriented  aBLE TO make needs known.  BURROUGHS=strength   IVSL 20g RACsite clean/dry   CM shows NSR 80s  vitals WNL.   urinals at bedside  voided 400cc ..  6lNC in use HOB elevated.   pt states "that is what I wear at home'    'I really want food I am starving'   waiting breakfast trays  milk and crackers given as snack pre breakfast
cardiology at bedside
pt morbidly obese,  edema to ble ankles. feet  extremely dry skin to feet  obese abd.
pt walked with 02 in hallway  carmela well,  'I get tired'  02sat 83% lowest   02 6l in use,  pt transported to CDU  report given to Cory DAVENPORT
Pt resting in bed with eyes closed.  Vitals WNL.
Report given Kiara Huitron RN ED.  Care transferred.  Pt status remains stable and unchanged.
Pt resting in bed, no change in status.  Breathing equal, unlabored.  CMS within normal limits.  PT adjusted for comfort.  pt educated on blood glucose.  Care status explained.  Denies further needs or questions.
Pt resting in bed comfortably.  equal chest rise and fall.  awaiting cardiology consult
Pt resting in bed.  Pt updated on POC.  Respirations remain equal, unlabored.
Pt informed of wait status for breakfast.  Denies further needs or questions.  Resting quietly

## 2020-04-17 NOTE — ED ADULT NURSE REASSESSMENT NOTE - COMFORT CARE
wait time explained/Pt given food - pudding, loornadoone crackers, water./side rails down/darkened lights/meal provided
side rails down
wait time explained/ambulated to bathroom/plan of care explained/po fluids offered/meal provided
given urinal/warm blanket provided
plan of care explained/darkened lights

## 2020-04-17 NOTE — ED CDU PROVIDER SUBSEQUENT DAY NOTE - ATTENDING CONTRIBUTION TO CARE
pt with history of pulmonary htn,cht with sob placed in observaiton for iv diuresis and emergent cpap ;  pt no complaints overnigh;   pe awake alert in no distress  chest clear no rhonchi;  abd soft  ext +1 edema;   dx chf;

## 2020-04-17 NOTE — ED CDU PROVIDER DISPOSITION NOTE - PATIENT PORTAL LINK FT
You can access the FollowMyHealth Patient Portal offered by [facility] by registering at the following website: [email or web address]. By joining [facility]’s FollowMyHealth portal, you will also be able to view your health information using other applications (apps) compatible with our system.

## 2020-04-17 NOTE — ED CDU PROVIDER SUBSEQUENT DAY NOTE - HISTORY
Respiratory therapist at bedside assessing for CPAP setup, pt currently with normal O2 saturation and no respiratory distress, will not due CPAP at this time due to length of time from last COVID testing, Will continue to monitor. Vital signs remained stable overnight, no hypoxia.

## 2020-04-17 NOTE — ED CDU PROVIDER DISPOSITION NOTE - CLINICAL COURSE
38 yo male with hfpER dm and severe pulmonary htn with sob and abdominal distention. found to be hypoxic with ambulation but states that is his normal. advised on monitoring symptoms at home and close follow up. re-written for medication.

## 2020-04-17 NOTE — ED CDU PROVIDER DISPOSITION NOTE - NSFOLLOWUPINSTRUCTIONS_ED_ALL_ED_FT
please follow with primary care provider   please monitor symptoms for new or worsening symptoms please return to the ER immediately

## 2020-04-17 NOTE — ED ADULT NURSE REASSESSMENT NOTE - GENERAL PATIENT STATE
resting/sleeping
resting/sleeping/RT at bedside to assess for CPAP, d/t length of time from last pt COVID test, decision between RT and [place], pt is not eligible for CPAP.  will continue to monitor.
comfortable appearance
comfortable appearance
comfortable appearance/cooperative/resting/sleeping/smiling/interactive/improvement verbalized
comfortable appearance
resting in bed with eyes closed/comfortable appearance

## 2020-04-17 NOTE — PROGRESS NOTE ADULT - ASSESSMENT
38 y/o M with a PMHx of hx of R sided heart failure (normalLVEF with severely reduced RV function), severe pulmonary HTN on home O2, JAN on CPAP, IDDM2, and recent Covid infection (discharged from [facility] 03/23/20) who presented to the ED with complaints of nasal congestion and abdominal distantion. Pt states that for past week he has been experiencing abdominal distention and nasal congestion. Pt also c/o of orthopnea. Pt denies associated symptoms of chest pain, palpitations, SOB, cough, fever, diarrhea, conmstipation. When pt arrived to [facility]-ED, ECG shows no signs of ischemia, NSR HR @ 91, bnp-911, Trop#1 negative.  4/17: Pt denies chest pain, palpitations, SOB. Pt had no cardiac events on tele overnight. Pt still c/o of nasal congestion-will order nasal saline spray with flonase. Pt euvolemic on exam. US abd-negative for ascites. Pt negative for COVID. Cont. home meds. Pt stable for discharge from cardiology prespective.     Acute on Chronic CHF  -BNP-911  -ECG-NSR HR @ 91,t wave abnormalities  -Trop#1-negative  -Echo 12/2019- EF 65-70%, MOD. lvh, severely enlarged RV, severely reduced RV sys. fx. , moderate to severe TR, severe pulm. htn  -AST/ALT- 26/14  -Pt states he has been compliant with medication at home  -US abdomen-negative for ascites  -COVID-negative  -Pt euvolemic on physical exam  -Cont. home meds  -No admission needed, pt stable for discharge from cardiology prespective  - Please follow up with outpatient cardiology

## 2020-04-17 NOTE — ED CDU PROVIDER DISPOSITION NOTE - DISCHARGE DATE
----- Message from Violeta Brito sent at 9/22/2017 10:59 AM CDT -----  Contact: pt  x 1st Request  _ 2nd Request  _ 3rd Request    Who: pt     Why: Pt would like to speak with Dr. Wilkins about Dr. Cordova. Please call to discuss.     What Number to Call Back: [phone]     When to Expect a call back: (Before the end of the day)  -- if call after 3:00 call back will be tomorrow.        17-Apr-2020

## 2020-04-23 PROBLEM — G47.33 OBSTRUCTIVE SLEEP APNEA (ADULT) (PEDIATRIC): Chronic | Status: ACTIVE | Noted: 2020-04-16

## 2020-04-23 PROBLEM — E11.9 TYPE 2 DIABETES MELLITUS WITHOUT COMPLICATIONS: Chronic | Status: ACTIVE | Noted: 2020-04-16

## 2020-04-27 ENCOUNTER — INPATIENT (INPATIENT)
Facility: HOSPITAL | Age: 40
LOS: 6 days | Discharge: HOME | DRG: 292 | End: 2020-05-04
Attending: STUDENT IN AN ORGANIZED HEALTH CARE EDUCATION/TRAINING PROGRAM | Admitting: HOSPITALIST
Payer: MEDICARE

## 2020-04-27 VITALS
RESPIRATION RATE: 20 BRPM | OXYGEN SATURATION: 91 % | SYSTOLIC BLOOD PRESSURE: 118 MMHG | HEART RATE: 92 BPM | HEIGHT: 69 IN | DIASTOLIC BLOOD PRESSURE: 80 MMHG | WEIGHT: 315 LBS | TEMPERATURE: 97 F

## 2020-04-27 DIAGNOSIS — I50.9 HEART FAILURE, UNSPECIFIED: ICD-10-CM

## 2020-04-27 LAB
ALBUMIN SERPL ELPH-MCNC: 3.5 G/DL — SIGNIFICANT CHANGE UP (ref 3.3–5.2)
ALP SERPL-CCNC: 132 U/L — HIGH (ref 40–120)
ALT FLD-CCNC: 15 U/L — SIGNIFICANT CHANGE UP
ANION GAP SERPL CALC-SCNC: 13 MMOL/L — SIGNIFICANT CHANGE UP (ref 5–17)
APTT BLD: 37.7 SEC — HIGH (ref 27.5–36.3)
AST SERPL-CCNC: 30 U/L — SIGNIFICANT CHANGE UP
BASOPHILS # BLD AUTO: 0.03 K/UL — SIGNIFICANT CHANGE UP (ref 0–0.2)
BASOPHILS NFR BLD AUTO: 0.7 % — SIGNIFICANT CHANGE UP (ref 0–2)
BILIRUB SERPL-MCNC: 2 MG/DL — SIGNIFICANT CHANGE UP (ref 0.4–2)
BLD GP AB SCN SERPL QL: SIGNIFICANT CHANGE UP
BUN SERPL-MCNC: 24 MG/DL — HIGH (ref 8–20)
CALCIUM SERPL-MCNC: 9.1 MG/DL — SIGNIFICANT CHANGE UP (ref 8.6–10.2)
CHLORIDE SERPL-SCNC: 91 MMOL/L — LOW (ref 98–107)
CO2 SERPL-SCNC: 32 MMOL/L — HIGH (ref 22–29)
CREAT SERPL-MCNC: 1.17 MG/DL — SIGNIFICANT CHANGE UP (ref 0.5–1.3)
EOSINOPHIL # BLD AUTO: 0.17 K/UL — SIGNIFICANT CHANGE UP (ref 0–0.5)
EOSINOPHIL NFR BLD AUTO: 3.7 % — SIGNIFICANT CHANGE UP (ref 0–6)
GLUCOSE SERPL-MCNC: 118 MG/DL — HIGH (ref 70–99)
HCT VFR BLD CALC: 38.5 % — LOW (ref 39–50)
HGB BLD-MCNC: 12 G/DL — LOW (ref 13–17)
IMM GRANULOCYTES NFR BLD AUTO: 0.2 % — SIGNIFICANT CHANGE UP (ref 0–1.5)
INR BLD: 2.28 RATIO — HIGH (ref 0.88–1.16)
LYMPHOCYTES # BLD AUTO: 0.55 K/UL — LOW (ref 1–3.3)
LYMPHOCYTES # BLD AUTO: 12 % — LOW (ref 13–44)
MCHC RBC-ENTMCNC: 29.5 PG — SIGNIFICANT CHANGE UP (ref 27–34)
MCHC RBC-ENTMCNC: 31.2 GM/DL — LOW (ref 32–36)
MCV RBC AUTO: 94.6 FL — SIGNIFICANT CHANGE UP (ref 80–100)
MONOCYTES # BLD AUTO: 0.5 K/UL — SIGNIFICANT CHANGE UP (ref 0–0.9)
MONOCYTES NFR BLD AUTO: 10.9 % — SIGNIFICANT CHANGE UP (ref 2–14)
NEUTROPHILS # BLD AUTO: 3.33 K/UL — SIGNIFICANT CHANGE UP (ref 1.8–7.4)
NEUTROPHILS NFR BLD AUTO: 72.5 % — SIGNIFICANT CHANGE UP (ref 43–77)
PLATELET # BLD AUTO: 180 K/UL — SIGNIFICANT CHANGE UP (ref 150–400)
POTASSIUM SERPL-MCNC: 3.7 MMOL/L — SIGNIFICANT CHANGE UP (ref 3.5–5.3)
POTASSIUM SERPL-SCNC: 3.7 MMOL/L — SIGNIFICANT CHANGE UP (ref 3.5–5.3)
PROT SERPL-MCNC: 8.9 G/DL — HIGH (ref 6.6–8.7)
PROTHROM AB SERPL-ACNC: 26.4 SEC — HIGH (ref 10–12.9)
RBC # BLD: 4.07 M/UL — LOW (ref 4.2–5.8)
RBC # FLD: 16.5 % — HIGH (ref 10.3–14.5)
SODIUM SERPL-SCNC: 136 MMOL/L — SIGNIFICANT CHANGE UP (ref 135–145)
TROPONIN T SERPL-MCNC: <0.01 NG/ML — SIGNIFICANT CHANGE UP (ref 0–0.06)
WBC # BLD: 4.59 K/UL — SIGNIFICANT CHANGE UP (ref 3.8–10.5)
WBC # FLD AUTO: 4.59 K/UL — SIGNIFICANT CHANGE UP (ref 3.8–10.5)

## 2020-04-27 PROCEDURE — 71045 X-RAY EXAM CHEST 1 VIEW: CPT | Mod: 26

## 2020-04-27 PROCEDURE — 99223 1ST HOSP IP/OBS HIGH 75: CPT

## 2020-04-27 PROCEDURE — 99285 EMERGENCY DEPT VISIT HI MDM: CPT | Mod: CS

## 2020-04-27 RX ORDER — FUROSEMIDE 40 MG
40 TABLET ORAL ONCE
Refills: 0 | Status: COMPLETED | OUTPATIENT
Start: 2020-04-27 | End: 2020-04-27

## 2020-04-27 RX ORDER — POTASSIUM CHLORIDE 20 MEQ
20 PACKET (EA) ORAL DAILY
Refills: 0 | Status: DISCONTINUED | OUTPATIENT
Start: 2020-04-27 | End: 2020-05-01

## 2020-04-27 RX ORDER — CARVEDILOL PHOSPHATE 80 MG/1
3.12 CAPSULE, EXTENDED RELEASE ORAL EVERY 12 HOURS
Refills: 0 | Status: DISCONTINUED | OUTPATIENT
Start: 2020-04-27 | End: 2020-05-04

## 2020-04-27 RX ORDER — ALBUTEROL 90 UG/1
2 AEROSOL, METERED ORAL EVERY 6 HOURS
Refills: 0 | Status: DISCONTINUED | OUTPATIENT
Start: 2020-04-27 | End: 2020-05-04

## 2020-04-27 RX ORDER — ASPIRIN/CALCIUM CARB/MAGNESIUM 324 MG
81 TABLET ORAL DAILY
Refills: 0 | Status: DISCONTINUED | OUTPATIENT
Start: 2020-04-27 | End: 2020-05-04

## 2020-04-27 RX ORDER — ATORVASTATIN CALCIUM 80 MG/1
40 TABLET, FILM COATED ORAL AT BEDTIME
Refills: 0 | Status: DISCONTINUED | OUTPATIENT
Start: 2020-04-27 | End: 2020-05-04

## 2020-04-27 RX ORDER — FUROSEMIDE 40 MG
40 TABLET ORAL
Refills: 0 | Status: DISCONTINUED | OUTPATIENT
Start: 2020-04-27 | End: 2020-04-28

## 2020-04-27 RX ADMIN — Medication 40 MILLIGRAM(S): at 20:16

## 2020-04-27 NOTE — ED PROVIDER NOTE - ATTENDING CONTRIBUTION TO CARE
40 yo uncomfortable appearing male with history of chf presenting with worsening/ tense edema to lower abdomen and legs. Patient also noted with dsypnea with leg on the stretcher but improved with lower extremities lower or off the bed. Moderate hypoxia also noted on NC but resolves with NRB. I personally saw the patient with the PA, and completed the key components of the history and physical exam. I then discussed the management plan with the PA.

## 2020-04-27 NOTE — ED ADULT TRIAGE NOTE - CHIEF COMPLAINT QUOTE
"I feel like I cant breath" c/o SOB and abd pain worsening today. tested negative for covid 2x weeks prior but has +sick contacts. Hx chf dm2 and htn. +clint pedal edema, skin warm and dry. Verbalized  improvement on 12L/min via NRB. +chest tightness

## 2020-04-27 NOTE — ED PROVIDER NOTE - NS ED ROS FT
ROS: CONTUSIONAL: Denies fever, chills,  wt loss. HEAD: Denies trauma, HA, Dizziness. EYE: Denies Acute visual changes, diplopia. ENMT: Denies change in hearing, tinnitus, epistaxis, difficulty swallowing, sore throat. CARDIO: Denies CP, palpitations, edema. RESP: Denies Cough, , Diff breathing, hemoptysis. GI: Denies N/V, ABD pain, change in bowel movement. URINARY: Denies difficulty urinating, pelvic pain. MS:  Denies joint pain, back pain, weakness, decreased ROM, swelling. NEURO: Denies change in gait, seizures, loss of sensation, dizziness, confusion LOC.  PSY: NO SI/HI.

## 2020-04-27 NOTE — H&P ADULT - NSICDXPASTMEDICALHX_GEN_ALL_CORE_FT
PAST MEDICAL HISTORY:  CHF (congestive heart failure)     Diabetes     Hypertension     Obesity, morbid     JAN (obstructive sleep apnea)     Pulmonary hypertension

## 2020-04-27 NOTE — ED ADULT TRIAGE NOTE - BSA (M2)
ESTABLISHED PATIENT PRE-VISIT PLANNING     Note: Patient will not be contacted if there is no indication to call.     1.  Reviewed notes from the last few office visits within the medical group: Yes    2.  If any orders were placed at last visit or intended to be done for this visit (i.e. 6 mos follow-up), do we have Results/Consult Notes?        •  Labs - Labs were not ordered at last office visit.       •  Imaging - Imaging was not ordered at last office visit.       •  Referrals - No referrals were ordered at last office visit.    3. Is this appointment scheduled as a Hospital Follow-Up? No    4.  Immunizations were updated in [employer] using WebIZ?: Yes       •  Web Iz Recommendations: FLU, TD and ZOSTAVAX (Shingles)    5.  Patient is due for the following Health Maintenance Topics:   Health Maintenance Due   Topic Date Due   • IMM INFLUENZA (1) 09/01/2017       - Patient has completed PNEUMOVAX (PPSV23), PREVNAR (PCV13)  and TDAP Immunization(s) per WebIZ. Chart has been updated.    6.  Patient was NOT informed to arrive 15 min prior to their scheduled appointment and bring in their medication bottles.  
2.77

## 2020-04-27 NOTE — H&P ADULT - ASSESSMENT
pt. is admitted for     - Acute on chronic diastolic chf, will keep on lasix 40 mg iv bid, I/O, weight daily, o2 support, follow covid-19 results.     - Morbid obesity, pt. will benefit from loosing wt.     - Severe pulmonary htn, continue sidenafil. last echo Dec, 2019, echo to be repeated if recommended by his cardiologist.     - Sleep apnea. pt's covid -19 is pending, if negative can use cpap.     - DM, humalog scale. pt. is admitted for     - Acute on chronic diastolic chf, will keep on lasix 40 mg iv bid, I/O, weight daily, o2 support, follow covid-19 results.     - Morbid obesity, pt. will benefit from loosing wt.     - Severe pulmonary htn, continue sidenafil. last echo Dec, 2019, echo to be repeated if recommended by his cardiologist.     - Sleep apnea. pt's covid -19 is pending, if negative can use cpap.     - DM, humalog scale.     - INR 2.28, pt. is not on coumadin and inr likely elevated due to passive liver congestion from his chf, lovenox 40 mg subcut can be given if inr below 2, follow INR. pt. is admitted for     - Acute on chronic diastolic chf, will keep on lasix 40 mg iv bid, I/O, weight daily, o2 support, follow covid-19 results.     - Morbid obesity, pt. will benefit from loosing wt.     - Severe pulmonary htn, continue sidenafil. last echo Dec, 2019, echo to be repeated if recommended by his cardiologist.     - obstructive Sleep apnea. pt's covid -19 is pending, if negative or can be moved to negative pressure room can use cpap.     - DM, humalog scale.     - INR 2.28, pt. is not on coumadin and inr likely elevated due to passive liver congestion from his chf, lovenox 40 mg subcut can be given if inr below 2, follow INR.

## 2020-04-27 NOTE — H&P ADULT - NSHPPHYSICALEXAM_GEN_ALL_CORE
General: Morbidly obese AA male lying in bed not in distress.  HEENT: AT, NC. PERRL. intact EOM. no throat erythema or exudate.   Neck: supple. no JVD.   Chest: CTA bilaterally  Heart: S1,S2. RRR. no heart murmur. 2 + edema of lower ext.  Abdomen: soft. non-tender. morbidly obese, + BS.   Ext: no calf tenderness. moves all ext. independently.   Neuro: AAO x3. no focal weakness. no speech disorder. CNs intact.   Skin: chronic venous stasis skin changes over lower ext. noted.   Psych : no agitation. co-operative. 36.8

## 2020-04-27 NOTE — ED PROVIDER NOTE - CLINICAL SUMMARY MEDICAL DECISION MAKING FREE TEXT BOX
Pt with clinical fluid overload, requiring NRB to maintain o2 sat pt discussed with hospitalist that wants to have pt admitted for further evaluation and possible surgical intervention, pt made aware of need for admission and possible further treatments, pt states that they agree with need for admission and they understand all results and possible treatments. PT will be admitted to hospitalist team and care will be transferred to admitting team.

## 2020-04-27 NOTE — ED ADULT NURSE REASSESSMENT NOTE - NS ED NURSE REASSESS COMMENT FT1
Pt received from outgoing Rn in stable condition. pt is aao x 3, breathing on 15L of non rebreather mask.  Pt on cardiac monitor and  , ID band in place, safety maintained. Will continue to monitor

## 2020-04-27 NOTE — ED PROVIDER NOTE - OBJECTIVE STATEMENT
PT with SPMHX of CHF, pulm HTN, presents to the ED with complaint of SOB and bloating. PT states that he has been having significant ploating over the last 4 days. Pt state that symoptoms started slowly and have gotten progressivly worse. Pt states that he has been taknng all his meds and has been compliant with diet restrictions. Pt staes that he feels like his bell and both legs are swollen tight and mildly tender that he discribes and pressure. Pt staes that pain is non raditing made worse with movmetn not improved by anything. PT states that they have not done anything for the pain prior to coming to the ED. PT with SPMHX of CHF, pulm HTN, presents to the ED with complaint of SOB and bloating. PT states that he has been having significant bloating over the last 4 days. Pt state that symptoms started slowly and have gotten progressively worse. Pt states that he has been taking all his meds and has been compliant with diet restrictions. Pt states that he feels like his bell and both legs are swollen tight and mildly tender that he describes and pressure. Pt states that pain is non radiating made worse with movement not improved by anything. PT states that they have not done anything for the pain prior to coming to the ED. Pt dinse fever, chills, cough, back pain, HA, dizziness, weakness, N/V.

## 2020-04-27 NOTE — H&P ADULT - HISTORY OF PRESENT ILLNESS
pt.is a 40 y/o morbidly obese male with h/o CHF, pulm HTN, presents to the ED with complaint of SOB and bloating over the last 4 days. Pt stated that symptoms started slowly and have gotten progressively worse. reports being compliant with all his meds and has been compliant with diet restrictions. denies cp, no fever, chills, cough, back pain, HA, dizziness, weakness or n/v/d. pt.is a 38 y/o morbidly obese male with multiple hospitalizations and  h/o CHF, pulm HTN, presents to the ED with complaint of SOB and bloating over the last 4 days. Pt stated that symptoms started slowly and have gotten progressively worse. reports being compliant with all his meds and has been compliant with diet restrictions. denies cp, no fever, chills, cough, back pain, HA, dizziness, weakness or n/v/d.

## 2020-04-28 LAB
ANION GAP SERPL CALC-SCNC: 14 MMOL/L — SIGNIFICANT CHANGE UP (ref 5–17)
BUN SERPL-MCNC: 24 MG/DL — HIGH (ref 8–20)
CALCIUM SERPL-MCNC: 9.1 MG/DL — SIGNIFICANT CHANGE UP (ref 8.6–10.2)
CHLORIDE SERPL-SCNC: 94 MMOL/L — LOW (ref 98–107)
CO2 SERPL-SCNC: 30 MMOL/L — HIGH (ref 22–29)
CREAT SERPL-MCNC: 1.07 MG/DL — SIGNIFICANT CHANGE UP (ref 0.5–1.3)
GLUCOSE SERPL-MCNC: 100 MG/DL — HIGH (ref 70–99)
INR BLD: 2.24 RATIO — HIGH (ref 0.88–1.16)
MAGNESIUM SERPL-MCNC: 1.9 MG/DL — SIGNIFICANT CHANGE UP (ref 1.8–2.6)
NT-PROBNP SERPL-SCNC: 749 PG/ML — HIGH (ref 0–300)
POTASSIUM SERPL-MCNC: 3.9 MMOL/L — SIGNIFICANT CHANGE UP (ref 3.5–5.3)
POTASSIUM SERPL-SCNC: 3.9 MMOL/L — SIGNIFICANT CHANGE UP (ref 3.5–5.3)
PROTHROM AB SERPL-ACNC: 26 SEC — HIGH (ref 10–12.9)
SARS-COV-2 RNA SPEC QL NAA+PROBE: SIGNIFICANT CHANGE UP
SODIUM SERPL-SCNC: 138 MMOL/L — SIGNIFICANT CHANGE UP (ref 135–145)
TROPONIN T SERPL-MCNC: <0.01 NG/ML — SIGNIFICANT CHANGE UP (ref 0–0.06)

## 2020-04-28 PROCEDURE — 99233 SBSQ HOSP IP/OBS HIGH 50: CPT

## 2020-04-28 PROCEDURE — 99223 1ST HOSP IP/OBS HIGH 75: CPT

## 2020-04-28 PROCEDURE — 74177 CT ABD & PELVIS W/CONTRAST: CPT | Mod: 26

## 2020-04-28 PROCEDURE — 71260 CT THORAX DX C+: CPT | Mod: 26

## 2020-04-28 RX ORDER — BUMETANIDE 0.25 MG/ML
1 INJECTION INTRAMUSCULAR; INTRAVENOUS EVERY 12 HOURS
Refills: 0 | Status: DISCONTINUED | OUTPATIENT
Start: 2020-04-28 | End: 2020-04-30

## 2020-04-28 RX ORDER — POLYETHYLENE GLYCOL 3350 17 G/17G
17 POWDER, FOR SOLUTION ORAL DAILY
Refills: 0 | Status: DISCONTINUED | OUTPATIENT
Start: 2020-04-28 | End: 2020-05-04

## 2020-04-28 RX ORDER — ENOXAPARIN SODIUM 100 MG/ML
40 INJECTION SUBCUTANEOUS DAILY
Refills: 0 | Status: DISCONTINUED | OUTPATIENT
Start: 2020-04-27 | End: 2020-05-04

## 2020-04-28 RX ORDER — LACTULOSE 10 G/15ML
10 SOLUTION ORAL ONCE
Refills: 0 | Status: COMPLETED | OUTPATIENT
Start: 2020-04-28 | End: 2020-04-28

## 2020-04-28 RX ORDER — SENNA PLUS 8.6 MG/1
2 TABLET ORAL AT BEDTIME
Refills: 0 | Status: DISCONTINUED | OUTPATIENT
Start: 2020-04-28 | End: 2020-05-04

## 2020-04-28 RX ADMIN — BUMETANIDE 1 MILLIGRAM(S): 0.25 INJECTION INTRAMUSCULAR; INTRAVENOUS at 17:18

## 2020-04-28 RX ADMIN — CARVEDILOL PHOSPHATE 3.12 MILLIGRAM(S): 80 CAPSULE, EXTENDED RELEASE ORAL at 17:18

## 2020-04-28 RX ADMIN — Medication 20 MILLIEQUIVALENT(S): at 10:42

## 2020-04-28 RX ADMIN — ENOXAPARIN SODIUM 40 MILLIGRAM(S): 100 INJECTION SUBCUTANEOUS at 02:23

## 2020-04-28 RX ADMIN — Medication 20 MILLIGRAM(S): at 17:19

## 2020-04-28 RX ADMIN — LACTULOSE 10 GRAM(S): 10 SOLUTION ORAL at 10:42

## 2020-04-28 RX ADMIN — ATORVASTATIN CALCIUM 40 MILLIGRAM(S): 80 TABLET, FILM COATED ORAL at 21:39

## 2020-04-28 RX ADMIN — SENNA PLUS 2 TABLET(S): 8.6 TABLET ORAL at 21:39

## 2020-04-28 RX ADMIN — Medication 10 MILLIGRAM(S): at 21:38

## 2020-04-28 RX ADMIN — ENOXAPARIN SODIUM 40 MILLIGRAM(S): 100 INJECTION SUBCUTANEOUS at 10:42

## 2020-04-28 RX ADMIN — Medication 81 MILLIGRAM(S): at 10:42

## 2020-04-28 RX ADMIN — CARVEDILOL PHOSPHATE 3.12 MILLIGRAM(S): 80 CAPSULE, EXTENDED RELEASE ORAL at 06:15

## 2020-04-28 RX ADMIN — Medication 40 MILLIGRAM(S): at 06:15

## 2020-04-28 NOTE — PROGRESS NOTE ADULT - ASSESSMENT
40YO M w/ PMHx of morbidly obese, R sided heart failure (pLVEF with severely reduced RV function), severe pulmonary HTN on home O2, JAN on CPAP, IDDM2, and recent covid infection (discharged from [facility] 03/23/20) who presented to the ED with complaints of leg swelling and abdominal distension. Patient states that being started on Oranetram he has been experiencing worsening abdominal distension and constipation. Pt admitted for acute on chronic CHF exacerbation. Cardiology consult noted.     Acute on Chronic HFpEF (65-70%) exacerbation in setting of severe pulm HTN w/ right heart failure   -Slow clinical improvement. CTA chest pending to r/o PE  -C/w Coreg 3.125mg BID  -C/w Bumex, s/p Lasix 40mg IVP BID   -C/w metolazone 2.5mg PO daily  -C/w home Orenitram 0.125mg 3 tabs PO BID.   -C/w sildenafil   -C/w Albuterol prn   -C/w CPAP   -COVID19 negative   -Trop neg X2  -  -Daily weight and I&O's  -TTE 12/2019: LVEF 65-70%  -Cardio consult noted.     Abdominal distention w/ constipation - acute   -Per pt, started since taking Orenitram  -C/w bowel regimen  -F/u CT a/p to r/o other etiology   -Also consider gastroparesis in setting of chronic DM     IDDM-2, A1c 6.6   -Blood glucose stable   -Hold PO meds, resume on dc     HTN  -Soft BP noted  -Will hold home med (HCTZ 25mg qd)  -Will resume as needed     Elevated INR  -Likely 2/2 hepatic congestion in setting of CHF  -No active bleeding   -Continue to monitor    HLD  -C/w statin    JAN/OHS  -C/w CPAP  -Lifestyle modification: Diet, weight loss    Morbid obesity  -Lifestyle modification: Diet, weight loss    DVT ppx: Heparin subct     Dispo: LOS pending clinical improvement and imaging results. 38YO M w/ PMHx of morbidly obese, R sided heart failure (pLVEF with severely reduced RV function), severe pulmonary HTN on home O2, JAN on CPAP, IDDM2, and recent covid infection (discharged from [facility] 03/23/20) who presented to the ED with complaints of leg swelling and abdominal distension. Patient states that being started on Oranetram he has been experiencing worsening abdominal distension and constipation. Pt admitted for acute on chronic CHF exacerbation. Cardiology consult noted.     Acute on Chronic HFpEF (65-70%) exacerbation in setting of severe pulm HTN w/ right heart failure   -Slow clinical improvement. CTA chest pending to r/o PE  -C/w Coreg 3.125mg BID  -C/w Bumex, s/p Lasix 40mg IVP BID   -C/w metolazone 2.5mg PO daily  -C/w home Orenitram 0.125mg 3 tabs PO BID.   -C/w sildenafil   -C/w Albuterol prn   -C/w CPAP   -COVID19 negative   -Trop neg X2  -  -Daily weight and I&O's  -TTE 12/2019: LVEF 65-70%  -Cardio consult noted.     Abdominal distention w/ constipation - acute   -Per pt, started since taking Orenitram  -C/w bowel regimen  -F/u CT a/p to r/o other etiology   -Also consider gastroparesis in setting of chronic DM     IDDM-2, A1c 6.6   -Blood glucose stable   -Hold PO meds, resume on dc     HTN  -Soft BP noted  -Will hold home med (HCTZ 25mg qd)  -Will resume as needed     Elevated INR  -Likely 2/2 hepatic congestion in setting of acute CHF  -No active bleeding   -Continue to monitor    HLD  -C/w statin    JAN/OHS  -C/w CPAP  -Lifestyle modification: Diet, weight loss    Morbid obesity  -Lifestyle modification: Diet, weight loss    DVT ppx: Heparin subct     Dispo: LOS pending clinical improvement and imaging results.

## 2020-04-28 NOTE — CONSULT NOTE ADULT - ASSESSMENT
A/P:  40 y/o morbidly obese M with a PMHx of hx of R sided heart failure (normalLVEF with severely reduced RV function), severe pulmonary HTN on home O2, JAN on CPAP, IDDM2, and recent Covid infection (discharged from [facility] 03/23/20) who presented to the ED with complaints of leg swelling and abdominal distension. Patient states that being started on Oranetram he has been experiencing worsening abdominal distension and constipation. Patient states he also feels like his thighs have become more swollen. Patient's breathing has been at baseline however. Patient states he has been taking all of his medications, and denies any dietary noncompliance. Patient denies any fevers, chills, chest pain, syncope, near syncope, cough, abdominal pain, N/V/D, headache, or dizziness. Patient is currently being ruled out again for covid.   Troponin neg x 1      1. Abdominal Distension  - In setting of constipation, could be secondary to Oranetram.   - Abdominal U/S at last visit was unremarkable   - Given lactulose and started on senna.   - CTA chest/abd/pelvis to r/o pulmonary vein thrombosis and any other abdominal causes of distension.     2. Severe Pulmonary HTN with Right Heart Failure  - , lowest it's been.   - Doesn't appear to be very overloaded on exam.   - Abdominal distension workup as above.   - D/C Lasix 40mg IV BID, and instead give Bumex 1mg IV BID  - Continue metolazone 2.5mg PO daily  - Continue home Orenitram 0.125mg 3 tabs PO BID.   - Continue sildenafil and coreg.     3. R/O Covid  - Results pending.   - Management per primary team.    Preliminary evaluation, please await official recommendations by Dr. Cox

## 2020-04-28 NOTE — CONSULT NOTE ADULT - SUBJECTIVE AND OBJECTIVE BOX
[place] [facility]                                                               Office:  [address] [address]                                                              Telephone: [phone]. Fax:[phone]                                                                        CARDIOLOGY CONSULTATION NOTE                                                                                             Consult requested by:    Reason for Consultation:   History obtained by: Patient and medical record   obtained: No    Chief complaint:    Patient is a 39y old  Male who presents with a chief complaint of sob and bloated (27 Apr 2020 23:17)        HPI:   40 y/o morbidly obese M with a PMHx of hx of R sided heart failure (normalLVEF with severely reduced RV function), severe pulmonary HTN on home O2, JAN on CPAP, IDDM2, and recent Covid infection (discharged from [facility] 03/23/20) who presented to the ED with complaints of leg swelling and abdominal distension. Patient states that being started on Oranetram he has been experiencing worsening abdominal distension and constipation. Patient states he also feels like his thighs have become more swollen. Patient's breathing has been at baseline however. Patient states he has been taking all of his medications, and denies any dietary noncompliance. Patient denies any fevers, chills, chest pain, syncope, near syncope, cough, abdominal pain, N/V/D, headache, or dizziness. Patient is currently being ruled out again for covid.     REVIEW OF SYMPTOMS:     CONSTITUTIONAL: No fever, weight loss, or fatigue  ENMT:  No difficulty hearing, tinnitus, vertigo; No sinus or throat pain  NECK: No pain or stiffness  CARDIOVASCULAR: AS PER HPI  RESPIRATORY: Dyspnea on exertion, Shortness of breath, cough, wheezing  : No dysuria, no hematuria   GI: AS PER HPI  NEURO: No headache, no dizziness, no slurred speech   MUSCULOSKELETAL: No joint pain or swelling; No muscle, back, or extremity pain  PSYCH: No agitation, no anxiety.    ALL OTHER REVIEW OF SYSTEMS ARE NEGATIVE.      PREVIOUS DIAGNOSTIC TESTING  ECHO FINDINGS:  < from: TTE Echo Complete w/Doppler (12.21.19 @ 08:48) >  PHYSICIAN INTERPRETATION:  Left Ventricle: The left ventricular internal cavity size is normal.  Left ventricular ejection fraction, by visual estimation, is 65 to 70%. The interventricular septum is flattened in systole and diastole, consistent with right ventricular pressure and volume overload.  Right Ventricle: The right ventricular size is severely enlarged. RV systolic function is severely reduced. TV S' 0.2 m/s.  Left Atrium: The left atrium is normal in size.  Right Atrium: Severely enlarged right atrium.  Pericardium: There is no evidence of pericardial effusion.  Mitral Valve: The mitral valve is normal in structure. Mild mitral valve regurgitation is seen.  Tricuspid Valve: Moderate-severe tricuspid regurgitation is visualized. Estimated pulmonary artery systolic pressure is 89.6 mmHg assuming a right atrial pressure of 15 mmHg, which is consistent with severe pulmonary hypertension.  Aortic Valve: The aortic valve is normal. No evidence of aortic valve regurgitation is seen.  Pulmonic Valve: The pulmonic valve was not well visualized. Mild pulmonic valve regurgitation.  Aorta: The aortic root is normal in size and structure.  Pulmonary Artery: The pulmonary artery is moderately dilated.  Venous: The inferior vena cava was dilated, with respiratory size variation less than 50%. The inferior vena cava and the hepatic vein show a pattern of systolic flow reversal, suggestive of tricuspid regurgitation.       Summary:   1. Technically fair study.   2. Left ventricular ejection fraction, by visual estimation, is 65 to 70%.   3. There is moderate septal left ventricular hypertrophy.   4. Severely enlarged right ventricle.   5. Severely reduced RV systolic function.   6. Right ventricular volume and pressure overload.   7. Moderate-severe tricuspid regurgitation.   8. Estimated pulmonary artery systolic pressure is 89.6 mmHg assuming a right atrial pressure of 15 mmHg, which is consistent with severe pulmonary hypertension.   9. Moderately dilated pulmonary artery.    MD Josefina Electronically signed on 12/24/2019 at 2:54:21 PM    < end of copied text >    CATHETERIZATION FINDINGS:   < from: Cardiac Cath Lab - Adult (01.14.20 @ 12:56) >  COMPLICATIONS: No complications occurred during the cath lab visit.  DIAGNOSTIC IMPRESSIONS: Markedly elevated right sided filling pressure (RA  23 mmHg)  Severe pulmonary hypertension ( PASP 74 mmHg, Mean 51 mmHg)  PVR 5.79 Wood Units  PCWP within normal 15 mmgh  Pulmonary hypertension showed No response to Nitric oxide  DIAGNOSTIC RECOMMENDATIONS: Management as per Dr. Cox.  Prepared and signed by  Jairon Santana MD  Signed 01/16/2020 18:16:31    < end of copied text >    ALLERGIES: Allergies    No Known Allergies    Intolerances      PAST MEDICAL HISTORY  JAN (obstructive sleep apnea)  Diabetes  Obesity, morbid  Pulmonary hypertension  CHF (congestive heart failure)  Hypertension      PAST SURGICAL HISTORY  No significant past surgical history    FAMILY HISTORY:  Family history of diabetes mellitus (DM): grandmother.      SOCIAL HISTORY: lives with family  CIGARETTES: smoked 20 years ago for a short time  ALCOHOL: denies  DRUGS: denies      CURRENT MEDICATIONS:  carvedilol 3.125 milliGRAM(s) Oral every 12 hours  furosemide   Injectable 40 milliGRAM(s) IV Push two times a day  sildenafil (REVATIO) 10 milliGRAM(s) Oral three times a day     lactulose Syrup  senna  aspirin  chewable  atorvastatin  enoxaparin Injectable  potassium chloride    Tablet ER    HOME MEDICATIONS:  aspirin 81 mg oral tablet, chewable: 1 tab(s) orally once a day  atorvastatin 40 mg oral tablet: 1 tab(s) orally once a day (at bedtime)  bumetanide 1 mg oral tablet: 1 tab(s) orally 2 times a day  carvedilol 3.125 mg oral tablet: 1 tab(s) orally every 12 hours  hydroCHLOROthiazide 25 mg oral tablet: 1 tab(s) orally once a day  K-Tab 20 mEq oral tablet, extended release: 2 tab(s) orally once a day   metFORMIN 500 mg oral tablet: 1 tab(s) orally once a day   sildenafil 20 mg oral tablet: 0.5 tab(s) orally 3 times a day      Vital Signs Last 24 Hrs  T(C): 36.2 (28 Apr 2020 06:21), Max: 36.6 (28 Apr 2020 01:38)  T(F): 97.2 (28 Apr 2020 06:21), Max: 97.9 (28 Apr 2020 01:38)  HR: 84 (28 Apr 2020 01:38) (84 - 92)  BP: 115/81 (28 Apr 2020 06:21) (113/72 - 122/82)  RR: 18 (28 Apr 2020 06:21) (18 - 23)  SpO2: 100% (28 Apr 2020 06:21) (86% - 100%)      PHYSICAL EXAM:  Limited Exam due to r/o Covid   Constitutional: Comfortable . No acute distress.   CNS: A&Ox3. No focal deficits. EOMI. Cranial nerves II-IX are intact.   Gastrointestinal: Soft non-tender, + softly distended, +Bowel sounds. negative Collins's sign.  Extremities: + edema to upper thighs   Psychiatric: Calm . no agitation.  Skin: No skin rash/ulcers visualized to face, hands or feet.    Intake and output:     LABS:                        12.0   4.59  )-----------( 180      ( 27 Apr 2020 20:11 )             38.5     04-28    138  |  94<L>  |  24.0<H>  ----------------------------<  100<H>  3.9   |  30.0<H>  |  1.07    Ca    9.1      28 Apr 2020 07:52  Mg     1.9     04-28    TPro  8.9<H>  /  Alb  3.5  /  TBili  2.0  /  DBili  x   /  AST  30  /  ALT  15  /  AlkPhos  132<H>  04-27    CARDIAC MARKERS ( 28 Apr 2020 07:53 )  x     / <0.01 ng/mL / x     / x     / x      CARDIAC MARKERS ( 27 Apr 2020 20:11 )  x     / <0.01 ng/mL / x     / x     / x        ;p-BNP=Serum Pro-Brain Natriuretic Peptide: 749 pg/mL (04-28 @ 00:30)    PT/INR - ( 28 Apr 2020 07:58 )   PT: 26.0 sec;   INR: 2.24 ratio         PTT - ( 27 Apr 2020 20:11 )  PTT:37.7 sec      INTERPRETATION OF TELEMETRY: Reviewed by me. SR  ECG: Reviewed by me. Pending    RADIOLOGY & ADDITIONAL STUDIES:    X-ray:  reviewed by me.   < from: Xray Chest 1 View- PORTABLE-Urgent (04.27.20 @ 19:32) >   EXAM:  XR CHEST PORTABLE URGENT 1V                          PROCEDURE DATE:  04/27/2020          INTERPRETATION:  INDICATION: Shortness of Breath    PRIORS: 4/16/2020    VIEWS: Portable AP radiography of the chest performed. Evaluation very limited secondary to patient body habitus and shallow inspiration.    FINDINGS: Enlargement of the cardiac silhouette is redemonstrated, likely related to cardiomegaly. Hilar and superior mediastinal contours are not well assessed on this examination. No gross lobar consolidation or pulmonary edema identified. Due to the limited nature of this evaluation mild airspace opacities within the lung parenchyma could not be fully excluded. No pleural effusion or pneumothorax is demonstrated. No mediastinal shift is noted. No osseous fractures are identified.    IMPRESSION: Very limited evaluation. Enlargement of the cardiac silhouette redemonstrated. Due to the limited nature of this evaluation mild airspace opacities within the lung parenchyma could not be fully excluded.       < end of copied text > [place] [facility]                                                               Office:  [address] [address]                                                              Telephone: [phone]. Fax:[phone]                                                                        CARDIOLOGY CONSULTATION NOTE                                                                                             Consult requested by:    Reason for Consultation:   History obtained by: Patient and medical record   obtained: No    Chief complaint:    Patient is a 39y old  Male who presents with a chief complaint of sob and bloated (27 Apr 2020 23:17)    HPI:   38 y/o morbidly obese M with a PMHx of hx of R sided heart failure (normalLVEF with severely reduced RV function), severe pulmonary HTN on home O2, JAN on CPAP, IDDM2, and recent Covid infection (discharged from [facility] 03/23/20) who presented to the ED with complaints of leg swelling and abdominal distension. Patient states that being started on Oranetram he has been experiencing worsening abdominal distension and constipation. Patient states he also feels like his thighs have become more swollen. Patient's breathing has been at baseline however. Patient states he has been taking all of his medications, and denies any dietary noncompliance. Patient denies any fevers, chills, chest pain, syncope, near syncope, cough, abdominal pain, N/V/D, headache, or dizziness. Patient is currently being ruled out again for covid.     REVIEW OF SYMPTOMS:   CONSTITUTIONAL: No fever, weight loss, or fatigue. + weight gain.  ENMT:  No difficulty hearing, tinnitus, vertigo; No sinus or throat pain  NECK: No pain or stiffness  CARDIOVASCULAR: AS PER HPI  RESPIRATORY: Dyspnea on exertion, Shortness of breath, cough, wheezing  : No dysuria, no hematuria   GI: AS PER HPI  NEURO: No headache, no dizziness, no slurred speech   MUSCULOSKELETAL: No joint pain or swelling; No muscle, back, or extremity pain  PSYCH: No agitation, no anxiety.    ALL OTHER REVIEW OF SYSTEMS ARE NEGATIVE.    PREVIOUS DIAGNOSTIC TESTING  ECHO FINDINGS:  < from: TTE Echo Complete w/Doppler (12.21.19 @ 08:48) >  PHYSICIAN INTERPRETATION:  Left Ventricle: The left ventricular internal cavity size is normal.  Left ventricular ejection fraction, by visual estimation, is 65 to 70%. The interventricular septum is flattened in systole and diastole, consistent with right ventricular pressure and volume overload.  Right Ventricle: The right ventricular size is severely enlarged. RV systolic function is severely reduced. TV S' 0.2 m/s.  Left Atrium: The left atrium is normal in size.  Right Atrium: Severely enlarged right atrium.  Pericardium: There is no evidence of pericardial effusion.  Mitral Valve: The mitral valve is normal in structure. Mild mitral valve regurgitation is seen.  Tricuspid Valve: Moderate-severe tricuspid regurgitation is visualized. Estimated pulmonary artery systolic pressure is 89.6 mmHg assuming a right atrial pressure of 15 mmHg, which is consistent with severe pulmonary hypertension.  Aortic Valve: The aortic valve is normal. No evidence of aortic valve regurgitation is seen.  Pulmonic Valve: The pulmonic valve was not well visualized. Mild pulmonic valve regurgitation.  Aorta: The aortic root is normal in size and structure.  Pulmonary Artery: The pulmonary artery is moderately dilated.  Venous: The inferior vena cava was dilated, with respiratory size variation less than 50%. The inferior vena cava and the hepatic vein show a pattern of systolic flow reversal, suggestive of tricuspid regurgitation.       Summary:   1. Technically fair study.   2. Left ventricular ejection fraction, by visual estimation, is 65 to 70%.   3. There is moderate septal left ventricular hypertrophy.   4. Severely enlarged right ventricle.   5. Severely reduced RV systolic function.   6. Right ventricular volume and pressure overload.   7. Moderate-severe tricuspid regurgitation.   8. Estimated pulmonary artery systolic pressure is 89.6 mmHg assuming a right atrial pressure of 15 mmHg, which is consistent with severe pulmonary hypertension.   9. Moderately dilated pulmonary artery.    MD Josefina Electronically signed on 12/24/2019 at 2:54:21 PM    < end of copied text >    CATHETERIZATION FINDINGS:   < from: Cardiac Cath Lab - Adult (01.14.20 @ 12:56) >  COMPLICATIONS: No complications occurred during the cath lab visit.  DIAGNOSTIC IMPRESSIONS: Markedly elevated right sided filling pressure (RA  23 mmHg)  Severe pulmonary hypertension ( PASP 74 mmHg, Mean 51 mmHg)  PVR 5.79 Wood Units  PCWP within normal 15 mmgh  Pulmonary hypertension showed No response to Nitric oxide  DIAGNOSTIC RECOMMENDATIONS: Management as per Dr. Cox.  Prepared and signed by  Jairon Santana MD  Signed 01/16/2020 18:16:31    < end of copied text >    ALLERGIES:   No Known Allergies    PAST MEDICAL HISTORY  JAN (obstructive sleep apnea)  Diabetes  Obesity, morbid  Pulmonary hypertension  CHF (congestive heart failure)  Hypertension    PAST SURGICAL HISTORY  No significant past surgical history    FAMILY HISTORY:  Family history of diabetes mellitus (DM): grandmother.    SOCIAL HISTORY: lives with family  CIGARETTES: smoked 20 years ago for a short time  ALCOHOL: denies  DRUGS: denies    CURRENT MEDICATIONS:  carvedilol 3.125 milliGRAM(s) Oral every 12 hours  furosemide   Injectable 40 milliGRAM(s) IV Push two times a day  sildenafil (REVATIO) 10 milliGRAM(s) Oral three times a day     lactulose Syrup  senna  aspirin  chewable  atorvastatin  enoxaparin Injectable  potassium chloride    Tablet ER    HOME MEDICATIONS:  aspirin 81 mg oral tablet, chewable: 1 tab(s) orally once a day  atorvastatin 40 mg oral tablet: 1 tab(s) orally once a day (at bedtime)  bumetanide 1 mg oral tablet: 1 tab(s) orally 2 times a day  carvedilol 3.125 mg oral tablet: 1 tab(s) orally every 12 hours  hydroCHLOROthiazide 25 mg oral tablet: 1 tab(s) orally once a day  K-Tab 20 mEq oral tablet, extended release: 2 tab(s) orally once a day   metFORMIN 500 mg oral tablet: 1 tab(s) orally once a day   sildenafil 20 mg oral tablet: 0.5 tab(s) orally 3 times a day    Vital Signs Last 24 Hrs  T(C): 36.2 (28 Apr 2020 06:21), Max: 36.6 (28 Apr 2020 01:38)  T(F): 97.2 (28 Apr 2020 06:21), Max: 97.9 (28 Apr 2020 01:38)  HR: 84 (28 Apr 2020 01:38) (84 - 92)  BP: 115/81 (28 Apr 2020 06:21) (113/72 - 122/82)  RR: 18 (28 Apr 2020 06:21) (18 - 23)  SpO2: 100% (28 Apr 2020 06:21) (86% - 100%)    PHYSICAL EXAM:  Limited Exam due to r/o Covid   Constitutional: Comfortable . No acute distress.   CNS: A&Ox3. No focal deficits. EOMI. Cranial nerves II-IX are intact.   Gastrointestinal: Soft non-tender, + softly distended, +Bowel sounds. negative Collins's sign.  Extremities: + edema to upper thighs   Psychiatric: Calm . no agitation.  Skin: No skin rash/ulcers visualized to face, hands or feet.      LABS:                        12.0   4.59  )-----------( 180      ( 27 Apr 2020 20:11 )             38.5     04-28    138  |  94<L>  |  24.0<H>  ----------------------------<  100<H>  3.9   |  30.0<H>  |  1.07    Ca    9.1      28 Apr 2020 07:52  Mg     1.9     04-28    TPro  8.9<H>  /  Alb  3.5  /  TBili  2.0  /  DBili  x   /  AST  30  /  ALT  15  /  AlkPhos  132<H>  04-27    CARDIAC MARKERS ( 28 Apr 2020 07:53 )  x     / <0.01 ng/mL / x     / x     / x      CARDIAC MARKERS ( 27 Apr 2020 20:11 )  x     / <0.01 ng/mL / x     / x     / x        ;p-BNP=Serum Pro-Brain Natriuretic Peptide: 749 pg/mL (04-28 @ 00:30)    PT/INR - ( 28 Apr 2020 07:58 )   PT: 26.0 sec;   INR: 2.24 ratio         PTT - ( 27 Apr 2020 20:11 )  PTT:37.7 sec      INTERPRETATION OF TELEMETRY: Reviewed by me. SR  ECG: Reviewed by me. Pending    RADIOLOGY & ADDITIONAL STUDIES:    X-ray:  reviewed by me.   < from: Xray Chest 1 View- PORTABLE-Urgent (04.27.20 @ 19:32) >   EXAM:  XR CHEST PORTABLE URGENT 1V                          PROCEDURE DATE:  04/27/2020          INTERPRETATION:  INDICATION: Shortness of Breath    PRIORS: 4/16/2020    VIEWS: Portable AP radiography of the chest performed. Evaluation very limited secondary to patient body habitus and shallow inspiration.    FINDINGS: Enlargement of the cardiac silhouette is redemonstrated, likely related to cardiomegaly. Hilar and superior mediastinal contours are not well assessed on this examination. No gross lobar consolidation or pulmonary edema identified. Due to the limited nature of this evaluation mild airspace opacities within the lung parenchyma could not be fully excluded. No pleural effusion or pneumothorax is demonstrated. No mediastinal shift is noted. No osseous fractures are identified.    IMPRESSION: Very limited evaluation. Enlargement of the cardiac silhouette redemonstrated. Due to the limited nature of this evaluation mild airspace opacities within the lung parenchyma could not be fully excluded.       < end of copied text >

## 2020-04-28 NOTE — CONSULT NOTE ADULT - ATTENDING COMMENTS
pt was seen and examined. Presents with bloating.   He is constipated. He is on Orenitram which can have GI side effects but usually n/v.   We will obtain CT chest, abd and pelvis.   We will exclude hepatic vein thrombosis which is unlikely and also pulmonary vein thrombosis.   Lactulose x1.   IV diuretics.  Above meds are not accurate and were changed.   Cont with revatio and orenitram (pt's home meds).  We will follow with you,  Thank you.

## 2020-04-28 NOTE — ED ADULT NURSE REASSESSMENT NOTE - NS ED NURSE REASSESS COMMENT FT1
assumed care of patent [id number] charting as noted. pt alert and oriented x4 repsirations even unlabored. pt on non rebreather denies pain. pt educated on plan of care, pt able to successfully teach back plan of care to RN, RN will continue to reeducate pt during hospital stay.

## 2020-04-28 NOTE — PROGRESS NOTE ADULT - SUBJECTIVE AND OBJECTIVE BOX
Patient is a 39y old  Male who presents with a chief complaint of sob and bloated (28 Apr 2020 09:03)      Interval/Overnight Events   Pt seen and examined at beside. No acute events overnight. Pt reports feeling better but continues to c/o difficulty breathing. Pt is tolerating PO diet w/o n/v/d/c. Pt is voiding actively. Pt c/o constipation, bowel regimen added.     ROS: Denies CP, HA, SOB, fever/chills, n/v/c/d, abdominal/back pain, blood in urine or stool, calf tenderness/swelling. All other ROS negative.    CARDIAC MONITOR: no events, monitor d/c     OXYGEN: 6L O2 NC     Vital Signs Last 24 Hrs  T(C): 36.2 (28 Apr 2020 06:21), Max: 36.6 (28 Apr 2020 01:38)  T(F): 97.2 (28 Apr 2020 06:21), Max: 97.9 (28 Apr 2020 01:38)  HR: 84 (28 Apr 2020 01:38) (84 - 92)  BP: 115/81 (28 Apr 2020 06:21) (113/72 - 122/82)  RR: 22 (28 Apr 2020 10:08) (18 - 23)  SpO2: 94% (28 Apr 2020 10:08) (86% - 100%)      Physical Examination  GENERAL: NAD, communicates well  HEENT: clear sclera and conjunctiva, PERRLA, MMM  RESP: fair air entry b/l, +wheezing b/l upper lobes, no crackles  CVS: Normal S1 & S2, No murmurs, rubs, or gallops  GI: obese, +BS, nontender, nondistended  Extremities: +1 edema b/l, no clubbing, no calf tenderness/swelling   Skin: grossly intact, b/l LE - chronic venous stasis   Neuro: AAOX3, CN II-XII grossly intact     LABS                          12.0   4.59  )-----------( 180      ( 27 Apr 2020 20:11 )             38.5         04-28    138  |  94<L>  |  24.0<H>  ----------------------------<  100<H>  3.9   |  30.0<H>  |  1.07    Ca    9.1      28 Apr 2020 07:52  Mg     1.9     04-28    TPro  8.9<H>  /  Alb  3.5  /  TBili  2.0  /  DBili  x   /  AST  30  /  ALT  15  /  AlkPhos  132<H>  04-27      CARDIAC MARKERS ( 28 Apr 2020 07:53 )  x     / <0.01 ng/mL / x     / x     / x      CARDIAC MARKERS ( 27 Apr 2020 20:11 )  x     / <0.01 ng/mL / x     / x     / x        PT/INR - ( 28 Apr 2020 07:58 )   PT: 26.0 sec;   INR: 2.24 ratio    PTT - ( 27 Apr 2020 20:11 )  PTT:37.7 sec    IMAGING  Xray Chest 1 View- PORTABLE-Urgent (04.27.20 @ 19:32) >  IMPRESSION: Very limited evaluation. Enlargement of the cardiac silhouette redemonstrated. Due to the limited nature of this evaluation mild airspace opacities within the lung parenchyma could not be fully excluded.       DIET: DASH/TLC, consistent carb     MEDICATIONS  (STANDING):  aspirin  chewable 81 milliGRAM(s) Oral daily  atorvastatin 40 milliGRAM(s) Oral at bedtime  buMETAnide Injectable 1 milliGRAM(s) IV Push every 12 hours  carvedilol 3.125 milliGRAM(s) Oral every 12 hours  enoxaparin Injectable 40 milliGRAM(s) SubCutaneous daily  metolazone 2.5 milliGRAM(s) Oral daily  orenitram(treprostinil)0.125 mg tab 3 Tablet(s) 3 Tablet(s) Oral every 12 hours  potassium chloride    Tablet ER 20 milliEquivalent(s) Oral daily  senna 2 Tablet(s) Oral at bedtime  sildenafil (REVATIO) 10 milliGRAM(s) Oral <User Schedule>  sildenafil (REVATIO) 20 milliGRAM(s) Oral <User Schedule>    MEDICATIONS  (PRN):  ALBUTerol    90 MICROgram(s) HFA Inhaler 2 Puff(s) Inhalation every 6 hours PRN Shortness of Breath and/or Wheezing  polyethylene glycol 3350 17 Gram(s) Oral daily PRN Constipation

## 2020-04-29 LAB
ANION GAP SERPL CALC-SCNC: 13 MMOL/L — SIGNIFICANT CHANGE UP (ref 5–17)
BASOPHILS # BLD AUTO: 0.04 K/UL — SIGNIFICANT CHANGE UP (ref 0–0.2)
BASOPHILS NFR BLD AUTO: 1 % — SIGNIFICANT CHANGE UP (ref 0–2)
BUN SERPL-MCNC: 27 MG/DL — HIGH (ref 8–20)
CALCIUM SERPL-MCNC: 9.1 MG/DL — SIGNIFICANT CHANGE UP (ref 8.6–10.2)
CHLORIDE SERPL-SCNC: 94 MMOL/L — LOW (ref 98–107)
CO2 SERPL-SCNC: 33 MMOL/L — HIGH (ref 22–29)
CREAT SERPL-MCNC: 1.21 MG/DL — SIGNIFICANT CHANGE UP (ref 0.5–1.3)
EOSINOPHIL # BLD AUTO: 0.16 K/UL — SIGNIFICANT CHANGE UP (ref 0–0.5)
EOSINOPHIL NFR BLD AUTO: 4.1 % — SIGNIFICANT CHANGE UP (ref 0–6)
GLUCOSE SERPL-MCNC: 108 MG/DL — HIGH (ref 70–99)
HCT VFR BLD CALC: 38.3 % — LOW (ref 39–50)
HGB BLD-MCNC: 11.7 G/DL — LOW (ref 13–17)
IMM GRANULOCYTES NFR BLD AUTO: 0.3 % — SIGNIFICANT CHANGE UP (ref 0–1.5)
INR BLD: 2.24 RATIO — HIGH (ref 0.88–1.16)
LYMPHOCYTES # BLD AUTO: 0.63 K/UL — LOW (ref 1–3.3)
LYMPHOCYTES # BLD AUTO: 16.2 % — SIGNIFICANT CHANGE UP (ref 13–44)
MAGNESIUM SERPL-MCNC: 2.1 MG/DL — SIGNIFICANT CHANGE UP (ref 1.6–2.6)
MCHC RBC-ENTMCNC: 29.3 PG — SIGNIFICANT CHANGE UP (ref 27–34)
MCHC RBC-ENTMCNC: 30.5 GM/DL — LOW (ref 32–36)
MCV RBC AUTO: 95.8 FL — SIGNIFICANT CHANGE UP (ref 80–100)
MONOCYTES # BLD AUTO: 0.41 K/UL — SIGNIFICANT CHANGE UP (ref 0–0.9)
MONOCYTES NFR BLD AUTO: 10.5 % — SIGNIFICANT CHANGE UP (ref 2–14)
NEUTROPHILS # BLD AUTO: 2.64 K/UL — SIGNIFICANT CHANGE UP (ref 1.8–7.4)
NEUTROPHILS NFR BLD AUTO: 67.9 % — SIGNIFICANT CHANGE UP (ref 43–77)
PHOSPHATE SERPL-MCNC: 3.5 MG/DL — SIGNIFICANT CHANGE UP (ref 2.4–4.7)
PLATELET # BLD AUTO: 183 K/UL — SIGNIFICANT CHANGE UP (ref 150–400)
POTASSIUM SERPL-MCNC: 3.9 MMOL/L — SIGNIFICANT CHANGE UP (ref 3.5–5.3)
POTASSIUM SERPL-SCNC: 3.9 MMOL/L — SIGNIFICANT CHANGE UP (ref 3.5–5.3)
PROTHROM AB SERPL-ACNC: 25.9 SEC — HIGH (ref 10–12.9)
RBC # BLD: 4 M/UL — LOW (ref 4.2–5.8)
RBC # FLD: 16.5 % — HIGH (ref 10.3–14.5)
SODIUM SERPL-SCNC: 139 MMOL/L — SIGNIFICANT CHANGE UP (ref 135–145)
WBC # BLD: 3.89 K/UL — SIGNIFICANT CHANGE UP (ref 3.8–10.5)
WBC # FLD AUTO: 3.89 K/UL — SIGNIFICANT CHANGE UP (ref 3.8–10.5)

## 2020-04-29 PROCEDURE — 99232 SBSQ HOSP IP/OBS MODERATE 35: CPT

## 2020-04-29 PROCEDURE — 99233 SBSQ HOSP IP/OBS HIGH 50: CPT

## 2020-04-29 RX ORDER — LACTULOSE 10 G/15ML
20 SOLUTION ORAL ONCE
Refills: 0 | Status: COMPLETED | OUTPATIENT
Start: 2020-04-29 | End: 2020-04-29

## 2020-04-29 RX ADMIN — Medication 10 MILLIGRAM(S): at 21:45

## 2020-04-29 RX ADMIN — Medication 20 MILLIEQUIVALENT(S): at 10:32

## 2020-04-29 RX ADMIN — BUMETANIDE 1 MILLIGRAM(S): 0.25 INJECTION INTRAMUSCULAR; INTRAVENOUS at 16:41

## 2020-04-29 RX ADMIN — ENOXAPARIN SODIUM 40 MILLIGRAM(S): 100 INJECTION SUBCUTANEOUS at 10:33

## 2020-04-29 RX ADMIN — Medication 10 MILLIGRAM(S): at 08:05

## 2020-04-29 RX ADMIN — Medication 81 MILLIGRAM(S): at 10:32

## 2020-04-29 RX ADMIN — BUMETANIDE 1 MILLIGRAM(S): 0.25 INJECTION INTRAMUSCULAR; INTRAVENOUS at 05:39

## 2020-04-29 RX ADMIN — LACTULOSE 20 GRAM(S): 10 SOLUTION ORAL at 16:39

## 2020-04-29 RX ADMIN — ATORVASTATIN CALCIUM 40 MILLIGRAM(S): 80 TABLET, FILM COATED ORAL at 21:45

## 2020-04-29 RX ADMIN — CARVEDILOL PHOSPHATE 3.12 MILLIGRAM(S): 80 CAPSULE, EXTENDED RELEASE ORAL at 18:58

## 2020-04-29 RX ADMIN — CARVEDILOL PHOSPHATE 3.12 MILLIGRAM(S): 80 CAPSULE, EXTENDED RELEASE ORAL at 05:38

## 2020-04-29 RX ADMIN — Medication 20 MILLIGRAM(S): at 16:46

## 2020-04-29 RX ADMIN — SENNA PLUS 2 TABLET(S): 8.6 TABLET ORAL at 21:45

## 2020-04-29 NOTE — PROGRESS NOTE ADULT - SUBJECTIVE AND OBJECTIVE BOX
[facility]                                                       [facility]                                                               Office: [address] [address]                                                              Telephone: [phone]. Fax:[phone]                                                                             PROGRESS NOTE  Reason for follow up: CHF  Overnight: No new events.   Update: Patient states that he still feels bloated, but it did improve after a BM yesterday. Patient still with some shortness of breath and thigh swelling, but it has also improved slightly as well. CTA chest with no evidence of pulmonary vein thrombosis or significant ascites.     Subjective: "I'm still bloated."    	  Vitals:  T(C): 36.4 (04-29-20 @ 08:12), Max: 36.4 (04-28-20 @ 15:16)  HR: 78 (04-29-20 @ 08:12) (72 - 78)  BP: 135/78 (04-29-20 @ 08:12) (102/70 - 135/78)  RR: 20 (04-29-20 @ 08:12) (20 - 22)  SpO2: 98% (04-29-20 @ 08:12) (94% - 99%)  Wt(kg): --  I&O's Summary    Weight (kg): 187.7 (04-28 @ 22:15)      PHYSICAL EXAM:  Appearance: Comfortable. No acute distress  HEENT:  Head and neck: Atraumatic. Normocephalic.  Normal oral mucosa, PERRL, Neck is supple. No JVD, No carotid bruit.   Neurologic: A & O x 3, no focal deficits. EOMI.  Lymphatic: No cervical lymphadenopathy  Cardiovascular: Normal S1 S2, No murmur, rubs/gallops. No JVD, No edema  Respiratory: Decreased BS bilaterally   Gastrointestinal: Obese,  Softly distended, Non-tender, + BS  Extremities: + edema to upper thighs   Psychiatry: Patient is calm. No agitation. Mood & affect appropriate  Skin: No rashes/ ecchymoses/cyanosis/ulcers visualized on the face, hands or feet.      CURRENT MEDICATIONS:  buMETAnide Injectable 1 milliGRAM(s) IV Push every 12 hours  carvedilol 3.125 milliGRAM(s) Oral every 12 hours  metolazone 2.5 milliGRAM(s) Oral daily  sildenafil (REVATIO) 10 milliGRAM(s) Oral <User Schedule>  sildenafil (REVATIO) 20 milliGRAM(s) Oral <User Schedule>    senna  atorvastatin  aspirin  chewable  enoxaparin Injectable  potassium chloride    Tablet ER      DIAGNOSTIC TESTING:  < from: TTE Echo Complete w/Doppler (12.21.19 @ 08:48) >  PHYSICIAN INTERPRETATION:  Left Ventricle: The left ventricular internal cavity size is normal.  Left ventricular ejection fraction, by visual estimation, is 65 to 70%. The interventricular septum is flattened in systole and diastole, consistent with right ventricular pressure and volume overload.  Right Ventricle: The right ventricular size is severely enlarged. RV systolic function is severely reduced. TV S' 0.2 m/s.  Left Atrium: The left atrium is normal in size.  Right Atrium: Severely enlarged right atrium.  Pericardium: There is no evidence of pericardial effusion.  Mitral Valve: The mitral valve is normal in structure. Mild mitral valve regurgitation is seen.  Tricuspid Valve: Moderate-severe tricuspid regurgitation is visualized. Estimated pulmonary artery systolic pressure is 89.6 mmHg assuming a right atrial pressure of 15 mmHg, which is consistent with severe pulmonary hypertension.  Aortic Valve: The aortic valve is normal. No evidence of aortic valve regurgitation is seen.  Pulmonic Valve: The pulmonic valve was not well visualized. Mild pulmonic valve regurgitation.  Aorta: The aortic root is normal in size and structure.  Pulmonary Artery: The pulmonary artery is moderately dilated.  Venous: The inferior vena cava was dilated, with respiratory size variation less than 50%. The inferior vena cava and the hepatic vein show a pattern of systolic flow reversal, suggestive of tricuspid regurgitation.       Summary:   1. Technically fair study.   2. Left ventricular ejection fraction, by visual estimation, is 65 to 70%.   3. There is moderate septal left ventricular hypertrophy.   4. Severely enlarged right ventricle.   5. Severely reduced RV systolic function.   6. Right ventricular volume and pressure overload.   7. Moderate-severe tricuspid regurgitation.   8. Estimated pulmonary artery systolic pressure is 89.6 mmHg assuming a right atrial pressure of 15 mmHg, which is consistent with severe pulmonary hypertension.   9. Moderately dilated pulmonary artery.    MD Josefina Electronically signed on 12/24/2019 at 2:54:21 PM    < end of copied text >    CATHETERIZATION FINDINGS:   < from: Cardiac Cath Lab - Adult (01.14.20 @ 12:56) >  COMPLICATIONS: No complications occurred during the cath lab visit.  DIAGNOSTIC IMPRESSIONS: Markedly elevated right sided filling pressure (RA  23 mmHg)  Severe pulmonary hypertension ( PASP 74 mmHg, Mean 51 mmHg)  PVR 5.79 Wood Units  PCWP within normal 15 mmgh  Pulmonary hypertension showed No response to Nitric oxide  DIAGNOSTIC RECOMMENDATIONS: Management as per Dr. Cox.  Prepared and signed by  Jairon Santana MD  Signed 01/16/2020 18:16:31    < end of copied text >    OTHER: 	  < from: CT Chest w/ IV Cont (04.28.20 @ 22:32) >  CHEST:     LUNGS AND LARGE AIRWAYS: Patent central airways. Lower lobe dependent atelectasis versus scarring. Redemonstration of confluent areas of groundglass opacity with patchy areas of sparing throughout the lungs..  PLEURA: No pleural effusion.  VESSELS: The study is nearly nondiagnostic for detection of pulmonary blood due to suboptimal opacification of the pulmonary arteries. No large saddle embolus.  HEART: Heart size is normal. No pericardial effusion.  MEDIASTINUM AND CONTRERAS: Stable mediastinal lymphadenopathy. Representative right upper paratracheal lymph node measures 3.0 x 2.3 cm. No hilar lymphadenopathy.  CHEST WALL AND LOWER NECK: Within normal limits.    ABDOMEN AND PELVIS:    LIVER: Hepatomegaly with a craniocaudad length of 22 cm..  BILE DUCTS: Normal caliber.  GALLBLADDER: Within normal limits.  SPLEEN: Within normal limits.  PANCREAS: Within normal limits.  ADRENALS: Within normal limits.  KIDNEYS/URETERS: Within normal limits.    BLADDER: Within normal limits.  REPRODUCTIVE ORGANS: Prostate within normal limits.    BOWEL: No bowel obstruction. Appendix is normal.  PERITONEUM: No ascites.  VESSELS: Within normal limits.  RETROPERITONEUM/LYMPH NODES: Bilateral pelvic sidewall and external iliac lymphadenopathy. Representative nodes measure 3.0 x 2.6 cm and the left external iliac chain and 3.3 x 2.4 cm in the right external iliac chain..    ABDOMINAL WALL: Bilateral inguinal lymphadenopathy measuring up to 1.2 cm in short axis.  BONES: Degenerative changes of the spine.    IMPRESSION:     Nearly nondiagnostic exam for detection of PE. No large saddle embolus.    Stable confluentareas of pulmonary groundglass opacities with patchy areas of sparing. Differential includes pulmonary edema, pneumonia, or inflammatory processes.    Stable thoracic and pelvic lymphadenopathy.    Hepatomegaly.    < end of copied text >      LABS:	 	  CARDIAC MARKERS ( 28 Apr 2020 07:53 )  x     / <0.01 ng/mL / x     / x     / x      p-BNP 28 Apr 2020 07:53: x    , CARDIAC MARKERS ( 28 Apr 2020 00:30 )  x     / x     / x     / x     / x      p-BNP 28 Apr 2020 00:30: 749 pg/mL, CARDIAC MARKERS ( 27 Apr 2020 20:11 )  x     / <0.01 ng/mL / x     / x     / x      p-BNP 27 Apr 2020 20:11: x                              11.7   3.89  )-----------( 183      ( 29 Apr 2020 06:41 )             38.3     04-29    139  |  94<L>  |  27.0<H>  ----------------------------<  108<H>  3.9   |  33.0<H>  |  1.21    Ca    9.1      29 Apr 2020 06:41  Phos  3.5     04-29  Mg     2.1     04-29    TPro  8.9<H>  /  Alb  3.5  /  TBili  2.0  /  DBili  x   /  AST  30  /  ALT  15  /  AlkPhos  132<H>  04-27    proBNP: Serum Pro-Brain Natriuretic Peptide: 749 pg/mL (04-28 @ 00:30)    Lipid Profile:   HgA1c:   TSH:       TELEMETRY: Reviewed  SR

## 2020-04-29 NOTE — PHARMACOTHERAPY INTERVENTION NOTE - COMMENTS
Spoke with patient at bedside regarding patient's own medication Orenitram.  Per patient, takes total 0.375mg of orenitram three times daily at midnight, 8am, and 4pm.  Medication was identified, relabeled and returned back to patient.

## 2020-04-29 NOTE — PROGRESS NOTE ADULT - SUBJECTIVE AND OBJECTIVE BOX
Patient is a 39y old  Male who presents with a chief complaint of sob and bloated (28 Apr 2020 09:03)    Interval/Overnight Events   Pt seen and examined at beside. No acute events overnight. Pt reports feeling better, SOB improved. Pt also mentions improvement of bloating since he was started on bowel regimen. Pt is tolerating PO diet w/o n/v/d/c. Pt is voiding actively, last BM was today and was normal.    ROS: Denies CP, HA, SOB, fever/chills, n/v/c/d, abdominal/back pain, blood in urine or stool, calf tenderness/swelling. All other ROS negative.    CARDIAC MONITOR: no events, monitor d/c     OXYGEN: Pt now on room air. Uses CPAP at nights     Vital Signs Last 24 Hrs  T(C): 36.9 (29 Apr 2020 11:43), Max: 36.9 (29 Apr 2020 11:43)  T(F): 98.5 (29 Apr 2020 11:43), Max: 98.5 (29 Apr 2020 11:43)  HR: 78 (29 Apr 2020 11:43) (72 - 78)  BP: 114/83 (29 Apr 2020 11:43) (102/70 - 135/78)  RR: 20 (29 Apr 2020 11:43) (20 - 20)  SpO2: 99% (29 Apr 2020 11:43) (96% - 99%)    Physical Examination  GENERAL: NAD, communicates well  HEENT: clear sclera and conjunctiva, PERRLA, MMM  RESP: reduced air entry b/l, no wheezing or crackles  CVS: Normal S1 & S2, No murmurs, rubs, or gallops  GI: obese, +BS, nontender, nondistended  Extremities: +1 edema b/l, no clubbing, no calf tenderness/swelling   Skin: grossly intact, b/l LE - chronic venous stasis   Neuro: AAOX3, CN II-XII grossly intact     LABS                          11.7   3.89  )-----------( 183      ( 29 Apr 2020 06:41 )             38.3       04-29    139  |  94<L>  |  27.0<H>  ----------------------------<  108<H>  3.9   |  33.0<H>  |  1.21    Ca    9.1      29 Apr 2020 06:41  Phos  3.5     04-29  Mg     2.1     04-29    TPro  8.9<H>  /  Alb  3.5  /  TBili  2.0  /  DBili  x   /  AST  30  /  ALT  15  /  AlkPhos  132<H>  04-27    CARDIAC MARKERS ( 28 Apr 2020 07:53 )  x     / <0.01 ng/mL / x     / x     / x      CARDIAC MARKERS ( 27 Apr 2020 20:11 )  x     / <0.01 ng/mL / x     / x     / x        PT/INR - ( 28 Apr 2020 07:58 )   PT: 26.0 sec;   INR: 2.24 ratio    PTT - ( 27 Apr 2020 20:11 )  PTT:37.7 sec    IMAGING  CT Abdomen and Pelvis w/ IV Cont (04.28.20 @ 22:33) >  IMPRESSION:   Nearly nondiagnostic exam for detection of PE. No large saddle embolus.  Stable confluentareas of pulmonary groundglass opacities with patchy areas of sparing. Differential includes pulmonary edema, pneumonia, or inflammatory processes.  Stable thoracic and pelvic lymphadenopathy.  Hepatomegaly.    Xray Chest 1 View- PORTABLE-Urgent (04.27.20 @ 19:32) >  IMPRESSION: Very limited evaluation. Enlargement of the cardiac silhouette redemonstrated. Due to the limited nature of this evaluation mild airspace opacities within the lung parenchyma could not be fully excluded.       DIET: DASH/TLC, consistent carb     MEDICATIONS  (STANDING):  aspirin  chewable 81 milliGRAM(s) Oral daily  atorvastatin 40 milliGRAM(s) Oral at bedtime  buMETAnide Injectable 1 milliGRAM(s) IV Push every 12 hours  carvedilol 3.125 milliGRAM(s) Oral every 12 hours  enoxaparin Injectable 40 milliGRAM(s) SubCutaneous daily  lactulose Syrup 20 Gram(s) Oral once  metolazone 2.5 milliGRAM(s) Oral daily  metolazone 2.5 milliGRAM(s) Oral once  Orenitram 0.125mg tablet 1 Tablet(s) 1 Tablet(s) Oral <User Schedule>  Orenitram 0.25mg tablet 1 Tablet(s) 1 Tablet(s) Oral <User Schedule>  potassium chloride    Tablet ER 20 milliEquivalent(s) Oral daily  senna 2 Tablet(s) Oral at bedtime  sildenafil (REVATIO) 10 milliGRAM(s) Oral <User Schedule>  sildenafil (REVATIO) 20 milliGRAM(s) Oral <User Schedule>    MEDICATIONS  (PRN):  ALBUTerol    90 MICROgram(s) HFA Inhaler 2 Puff(s) Inhalation every 6 hours PRN Shortness of Breath and/or Wheezing  polyethylene glycol 3350 17 Gram(s) Oral daily PRN Constipation

## 2020-04-29 NOTE — PROGRESS NOTE ADULT - ASSESSMENT
A/P:  40 y/o morbidly obese M with a PMHx of hx of R sided heart failure (normalLVEF with severely reduced RV function), severe pulmonary HTN on home O2, JAN on CPAP, IDDM2, and recent Covid infection (discharged from [facility] 03/23/20) who presented to the ED with complaints of leg swelling and abdominal distension. Patient states that being started on Oranetram he has been experiencing worsening abdominal distension and constipation. Patient states he also feels like his thighs have become more swollen. Patient's breathing has been at baseline however. Patient states he has been taking all of his medications, and denies any dietary noncompliance. Patient denies any fevers, chills, chest pain, syncope, near syncope, cough, abdominal pain, N/V/D, headache, or dizziness. Patient is currently being ruled out again for covid.   Troponin neg x 1      1. Abdominal Distension  - In setting of constipation, could be secondary to Oranetram.   - Abdominal U/S at last visit was unremarkable   - Improved after BM  - Continue senna.   - Will give another dose of lactulose today.   - CTA chest/abdomen/pelvis with no acute findings.       2. Severe Pulmonary HTN with Right Heart Failure  -   - Abdominal distension workup as above.   - Continue Bumex 1mg IV BID today, will try to transition back to oral tomorrow.   - Continue metolazone 2.5mg PO daily  - Continue home Orenitram 0.125mg 3 tabs PO BID.   - Continue sildenafil and coreg.     3. JAN  - Continue BiPAP at night.     Preliminary evaluation, please await official recommendations by Dr. Cox A/P:  40 y/o morbidly obese M with a PMHx of hx of R sided heart failure (normalLVEF with severely reduced RV function), severe pulmonary HTN on home O2, JAN on CPAP, IDDM2, and recent Covid infection (discharged from [facility] 03/23/20) who presented to the ED with complaints of leg swelling and abdominal distension. Patient states that being started on Oranetram he has been experiencing worsening abdominal distension and constipation. Patient states he also feels like his thighs have become more swollen. Patient's breathing has been at baseline however. Patient states he has been taking all of his medications, and denies any dietary noncompliance. Patient denies any fevers, chills, chest pain, syncope, near syncope, cough, abdominal pain, N/V/D, headache, or dizziness. Patient is currently being ruled out again for covid.   Troponin neg x 1      1. Abdominal Distension  - In setting of constipation, could be secondary to Oranetram.   - Abdominal U/S at last visit was unremarkable   - Improved after BM  - Continue senna.   - Will give another dose of lactulose today.   - CTA chest/abdomen/pelvis with no acute findings.       2. Severe Pulmonary HTN with Right Heart Failure  -   - Abdominal distension workup as above.   - Continue Bumex 1mg IV BID today with an extra dose of metolazone 2.5mg PO x 1 this afternoon.  - Continue metolazone 2.5mg PO daily  - Continue home Orenitram 0.125mg 3 tabs PO BID.   - Continue sildenafil and coreg.     3. JAN  - Continue BiPAP at night.     Preliminary evaluation, please await official recommendations by Dr. Cox

## 2020-04-29 NOTE — PROGRESS NOTE ADULT - ASSESSMENT
40YO M w/ PMHx of morbidly obese, R sided heart failure (pLVEF with severely reduced RV function), severe pulmonary HTN on home O2, JAN on CPAP, IDDM2, and recent covid infection (discharged from [facility] 03/23/20) who presented to the ED with complaints of leg swelling and abdominal distension. Patient states that being started on Oranetram he has been experiencing worsening abdominal distension and constipation. Pt admitted for acute on chronic CHF exacerbation. Cardiology consult noted.     Acute on Chronic HFpEF (65-70%) exacerbation in setting of severe pulm HTN w/ right heart failure   -Clinical improvement. Pt now on room air  -CTA chest negative for PE  -C/w Coreg 3.125mg BID  -C/w Bumex, s/p Lasix 40mg IVP BID   -C/w metolazone 2.5mg PO daily  -C/w home Orenitram 0.125mg 3 tabs PO BID.   -C/w sildenafil   -C/w Albuterol prn   -C/w CPAP   -COVID19 negative   -Trop neg X2  -  -Daily weight and I&O's  -TTE 12/2019: LVEF 65-70%  -Cardio consult noted.     Abdominal distention w/ constipation - acute   -Improving   -Per pt, started since taking Orenitram  -C/w bowel regimen  -CT a/p unremarkable   -Also consider gastroparesis in setting of chronic DM     Abnormal CT findings   -CT a/p: Mediastinal, pelvic b/l, external iliac and b/l inguinal LAD - stable  -Pt to f/u outpatient     IDDM-2, A1c 6.6   -Blood glucose stable   -Hold PO meds, resume on dc     HTN  -Soft BP noted  -Will hold home med (HCTZ 25mg qd)  -Will resume as needed     Elevated INR   -Stable   -Likely 2/2 hepatic congestion in setting of acute CHF  -No active bleeding   -Continue to monitor    HLD  -C/w statin    JAN/OHS  -C/w CPAP  -Lifestyle modification: Diet, weight loss    Morbid obesity  -Lifestyle modification: Diet, weight loss    DVT ppx: Heparin subct     Dispo: Likely d/c in 1-2 days pending clinical improvement. Pt is from home, will return home. Pt is high risk for readmission due to noncompliance.

## 2020-04-30 ENCOUNTER — APPOINTMENT (OUTPATIENT)
Dept: CARDIOLOGY | Facility: CLINIC | Age: 40
End: 2020-04-30

## 2020-04-30 LAB
ANION GAP SERPL CALC-SCNC: 12 MMOL/L — SIGNIFICANT CHANGE UP (ref 5–17)
BUN SERPL-MCNC: 28 MG/DL — HIGH (ref 8–20)
CALCIUM SERPL-MCNC: 9.4 MG/DL — SIGNIFICANT CHANGE UP (ref 8.6–10.2)
CHLORIDE SERPL-SCNC: 92 MMOL/L — LOW (ref 98–107)
CO2 SERPL-SCNC: 34 MMOL/L — HIGH (ref 22–29)
CREAT SERPL-MCNC: 1.12 MG/DL — SIGNIFICANT CHANGE UP (ref 0.5–1.3)
GLUCOSE SERPL-MCNC: 118 MG/DL — HIGH (ref 70–99)
INR BLD: 2.24 RATIO — HIGH (ref 0.88–1.16)
MAGNESIUM SERPL-MCNC: 2.2 MG/DL — SIGNIFICANT CHANGE UP (ref 1.8–2.6)
PHOSPHATE SERPL-MCNC: 3.4 MG/DL — SIGNIFICANT CHANGE UP (ref 2.4–4.7)
POTASSIUM SERPL-MCNC: 3.9 MMOL/L — SIGNIFICANT CHANGE UP (ref 3.5–5.3)
POTASSIUM SERPL-SCNC: 3.9 MMOL/L — SIGNIFICANT CHANGE UP (ref 3.5–5.3)
PROTHROM AB SERPL-ACNC: 26 SEC — HIGH (ref 10–12.9)
SODIUM SERPL-SCNC: 138 MMOL/L — SIGNIFICANT CHANGE UP (ref 135–145)

## 2020-04-30 PROCEDURE — 99233 SBSQ HOSP IP/OBS HIGH 50: CPT

## 2020-04-30 PROCEDURE — 99232 SBSQ HOSP IP/OBS MODERATE 35: CPT

## 2020-04-30 RX ORDER — BUMETANIDE 0.25 MG/ML
0.75 INJECTION INTRAMUSCULAR; INTRAVENOUS
Qty: 20 | Refills: 0 | Status: DISCONTINUED | OUTPATIENT
Start: 2020-04-30 | End: 2020-05-02

## 2020-04-30 RX ADMIN — Medication 20 MILLIEQUIVALENT(S): at 11:22

## 2020-04-30 RX ADMIN — CARVEDILOL PHOSPHATE 3.12 MILLIGRAM(S): 80 CAPSULE, EXTENDED RELEASE ORAL at 17:13

## 2020-04-30 RX ADMIN — BUMETANIDE 1 MILLIGRAM(S): 0.25 INJECTION INTRAMUSCULAR; INTRAVENOUS at 06:41

## 2020-04-30 RX ADMIN — ENOXAPARIN SODIUM 40 MILLIGRAM(S): 100 INJECTION SUBCUTANEOUS at 11:21

## 2020-04-30 RX ADMIN — CARVEDILOL PHOSPHATE 3.12 MILLIGRAM(S): 80 CAPSULE, EXTENDED RELEASE ORAL at 06:41

## 2020-04-30 RX ADMIN — Medication 10 MILLIGRAM(S): at 23:25

## 2020-04-30 RX ADMIN — Medication 10 MILLIGRAM(S): at 06:40

## 2020-04-30 RX ADMIN — Medication 81 MILLIGRAM(S): at 11:22

## 2020-04-30 RX ADMIN — ATORVASTATIN CALCIUM 40 MILLIGRAM(S): 80 TABLET, FILM COATED ORAL at 23:24

## 2020-04-30 RX ADMIN — BUMETANIDE 3.75 MG/HR: 0.25 INJECTION INTRAMUSCULAR; INTRAVENOUS at 11:15

## 2020-04-30 RX ADMIN — Medication 20 MILLIGRAM(S): at 11:22

## 2020-04-30 RX ADMIN — SENNA PLUS 2 TABLET(S): 8.6 TABLET ORAL at 23:24

## 2020-04-30 NOTE — PROGRESS NOTE ADULT - ASSESSMENT
40 y/o morbidly obese M with a PMHx of hx of R sided heart failure (normalLVEF with severely reduced RV function), severe pulmonary HTN on home O2, JAN on CPAP, IDDM2, and recent Covid infection (discharged from [facility] 03/23/20) who presented to the ED with complaints of leg swelling and abdominal distension. Patient states that being started on Oranetram he has been experiencing worsening abdominal distension and constipation. Patient states he also feels like his thighs have become more swollen. Patient's breathing has been at baseline however. Patient states he has been taking all of his medications, and denies any dietary noncompliance. Patient denies any fevers, chills, chest pain, syncope, near syncope, cough, abdominal pain, N/V/D, headache, or dizziness. Patient is currently being ruled out again for covid, Troponin neg x 1, .    4/30: Pt has c/o of fatigue, did not sleep well overnight. Pt denies chest pain, palpitations, or SOB. Pt used Bipap overnight and uses NC 6L when awake. Pt still presents with abdominal distention with c/o of no BM for past 2 days, and bilateral lower extremity edema. Start bumex gtt 0.75mg/hr for diuresis, maintain Potassium with current dose of potassium daily. Give patient dose of miralax.        Abdominal Distension  - In setting of constipation, could be secondary to Oranetram.   - Abdominal U/S at last visit was unremarkable   - Give ordered miralax for constipation  - CTA chest/abdomen/pelvis with no acute findings    Severe Pulmonary HTN with Right Heart Failure  -   - Abdominal distension workup as above  - Start Bumex gtt 0.75mg/hr      - Continue metolazone 2.5mg PO daily  - Continue home Orenitram 0.125mg 3 tabs PO BID.   - Continue sildenafil and coreg    JAN  -Continue BiPAP at night  -NC 6L during AM

## 2020-04-30 NOTE — PROGRESS NOTE ADULT - SUBJECTIVE AND OBJECTIVE BOX
Patient is a 39y old  Male who presents with a chief complaint of sob and bloated (28 Apr 2020 09:03)    Interval/Overnight Events   Pt seen and examined at beside. No acute events overnight. Pt reports feeling better, SOB improved. Pt also mentions improvement of bloating since he was started on bowel regimen. Pt is tolerating PO diet w/o n/v/d/c. Pt is voiding actively, last BM was today and was normal.    ROS: Denies CP, HA, SOB, fever/chills, n/v/c/d, abdominal/back pain, blood in urine or stool, calf tenderness/swelling. All other ROS negative.    OXYGEN: Pt now on room air. Uses CPAP at nights     Vital Signs Last 24 Hrs  T(C): 36.6 (30 Apr 2020 11:09), Max: 36.9 (29 Apr 2020 11:43)  T(F): 97.8 (30 Apr 2020 11:09), Max: 98.5 (29 Apr 2020 11:43)  HR: 71 (30 Apr 2020 11:09) (63 - 81)  BP: 134/78 (30 Apr 2020 11:09) (100/65 - 136/84)  RR: 20 (30 Apr 2020 11:09) (18 - 20)  SpO2: 90% (30 Apr 2020 11:09) (90% - 99%)    Physical Examination  GENERAL: NAD, communicates well  HEENT: clear sclera and conjunctiva, PERRLA, MMM  RESP: reduced air entry b/l, no wheezing or crackles  CVS: Normal S1 & S2, No murmurs, rubs, or gallops  GI: obese, +BS, nontender, nondistended  Extremities: +1 edema b/l, no clubbing, no calf tenderness/swelling   Skin: grossly intact, b/l LE - chronic venous stasis and lymphedema   Neuro: AAOX3, CN II-XII grossly intact     LABS                          11.7   3.89  )-----------( 183      ( 29 Apr 2020 06:41 )             38.3       04-30    138  |  92<L>  |  28.0<H>  ----------------------------<  118<H>  3.9   |  34.0<H>  |  1.12    Ca    9.4      30 Apr 2020 09:33  Phos  3.4     04-30  Mg     2.2     04-30    TPro  8.9<H>  /  Alb  3.5  /  TBili  2.0  /  DBili  x   /  AST  30  /  ALT  15  /  AlkPhos  132<H>  04-27    CARDIAC MARKERS ( 28 Apr 2020 07:53 )  x     / <0.01 ng/mL / x     / x     / x      CARDIAC MARKERS ( 27 Apr 2020 20:11 )  x     / <0.01 ng/mL / x     / x     / x        PT/INR - ( 28 Apr 2020 07:58 )   PT: 26.0 sec;   INR: 2.24 ratio    PTT - ( 27 Apr 2020 20:11 )  PTT:37.7 sec    IMAGING  CT Abdomen and Pelvis w/ IV Cont (04.28.20 @ 22:33) >  IMPRESSION:   Nearly nondiagnostic exam for detection of PE. No large saddle embolus.  Stable confluentareas of pulmonary groundglass opacities with patchy areas of sparing. Differential includes pulmonary edema, pneumonia, or inflammatory processes.  Stable thoracic and pelvic lymphadenopathy.  Hepatomegaly.    Xray Chest 1 View- PORTABLE-Urgent (04.27.20 @ 19:32) >  IMPRESSION: Very limited evaluation. Enlargement of the cardiac silhouette redemonstrated. Due to the limited nature of this evaluation mild airspace opacities within the lung parenchyma could not be fully excluded.       DIET: DASH/TLC, consistent carb     MEDICATIONS  (STANDING):  aspirin  chewable 81 milliGRAM(s) Oral daily  atorvastatin 40 milliGRAM(s) Oral at bedtime  buMETAnide Infusion 0.75 mG/Hr (3.75 mL/Hr) IV Continuous <Continuous>  carvedilol 3.125 milliGRAM(s) Oral every 12 hours  enoxaparin Injectable 40 milliGRAM(s) SubCutaneous daily  metolazone 2.5 milliGRAM(s) Oral daily  Orenitram 0.125mg tablet 1 Tablet(s) 1 Tablet(s) Oral <User Schedule>  Orenitram 0.25mg tablet 1 Tablet(s) 1 Tablet(s) Oral <User Schedule>  potassium chloride    Tablet ER 20 milliEquivalent(s) Oral daily  senna 2 Tablet(s) Oral at bedtime  sildenafil (REVATIO) 10 milliGRAM(s) Oral <User Schedule>  sildenafil (REVATIO) 20 milliGRAM(s) Oral <User Schedule>    MEDICATIONS  (PRN):  ALBUTerol    90 MICROgram(s) HFA Inhaler 2 Puff(s) Inhalation every 6 hours PRN Shortness of Breath and/or Wheezing  polyethylene glycol 3350 17 Gram(s) Oral daily PRN Constipation

## 2020-04-30 NOTE — PROGRESS NOTE ADULT - SUBJECTIVE AND OBJECTIVE BOX
[facility]                                                       [facility]                                                               Office: [address] [address]                                                              Telephone: [phone]. Fax:[phone]                                                                             PROGRESS NOTE  Reason for follow up: SOB/Abdomen distention   Overnight: No new events   Update: 4/30: Pt has c/o of fatigue, did not sleep well overnight. Pt denies chest pain, palpitations, or SOB. Pt used Bipap overnight and uses NC 6L when awake. Pt still presents with abdominal distention with c/o of no BM for past 2 days, and bilateral lower extremity edema. Start bumex gtt 0.75mg/hr for diuresis, maintain Potassium with current dose of potassium daily. Give patient dose of miralax.          Subjective: "I'm tired"      	  Vitals:  T(C): 36.6 (04-30-20 @ 11:09), Max: 36.6 (04-29-20 @ 15:18)  HR: 71 (04-30-20 @ 11:09) (63 - 81)  BP: 134/78 (04-30-20 @ 11:09) (100/65 - 136/84)  RR: 20 (04-30-20 @ 11:09) (18 - 20)  SpO2: 90% (04-30-20 @ 11:09) (90% - 97%)    I&O's Summary    Weight (kg): 187.7 (04-28 @ 22:15)      PHYSICAL EXAM:  Appearance: Comfortable. No acute distress  HEENT:  Head and neck: Atraumatic. Normocephalic.  Normal oral mucosa, PERRL, Neck is supple. No JVD, No carotid bruit.   Neurologic: A & O x 3, no focal deficits. EOMI.  Lymphatic: No cervical lymphadenopathy  Cardiovascular: Normal S1 S2, No murmur, rubs/gallops. No JVD, No edema  Respiratory: Lungs mostly clear to auscultation, ?minimal rales LLL  Gastrointestinal:  Soft, Non-tender, + BS, +abd. distention  Lower Extremities: +3/+3 nonpitting edema  Psychiatry: Patient is calm. No agitation. Mood & affect appropriate  Skin: No rashes/ ecchymoses/cyanosis/ulcers visualized on the face, hands or feet.      CURRENT MEDICATIONS:  buMETAnide Infusion 0.75 mG/Hr IV Continuous <Continuous>  carvedilol 3.125 milliGRAM(s) Oral every 12 hours  metolazone 2.5 milliGRAM(s) Oral daily  sildenafil (REVATIO) 10 milliGRAM(s) Oral <User Schedule>  sildenafil (REVATIO) 20 milliGRAM(s) Oral <User Schedule>  senna  atorvastatin  aspirin  chewable  enoxaparin Injectable  potassium chloride    Tablet ER      DIAGNOSTIC TESTING:  [ ] Echocardiogram: < from: TTE Echo Complete w/Doppler (12.21.19 @ 08:48) >  Summary:   1. Technically fair study.   2. Left ventricular ejection fraction, by visual estimation, is 65 to 70%.   3. There is moderate septal left ventricular hypertrophy.   4. Severely enlarged right ventricle.   5. Severely reduced RV systolic function.   6. Right ventricular volume and pressure overload.   7. Moderate-severe tricuspid regurgitation.   8. Estimated pulmonary artery systolic pressure is 89.6 mmHg assuming a right atrial pressure of 15 mmHg, which is consistent with severe pulmonary hypertension.   9. Moderately dilated pulmonary artery.    < end of copied text >    [ ]  Catheterization:< from: Cardiac Cath Lab - Adult (01.14.20 @ 12:56) >  DIAGNOSTIC IMPRESSIONS: Markedly elevated right sided filling pressure (RA  23 mmHg)  Severe pulmonary hypertension ( PASP 74 mmHg, Mean 51 mmHg)  PVR 5.79 Wood Units  PCWP within normal 15 mmgh  Pulmonary hypertension showed No response to Nitric oxide    < end of copied text >      OTHER: 	  XRAY: < from: Xray Chest 1 View- PORTABLE-Urgent (04.27.20 @ 19:32) >  FINDINGS: Enlargement of the cardiac silhouette is redemonstrated, likely related to cardiomegaly. Hilar and superior mediastinal contours are not well assessed on this examination. No gross lobar consolidation or pulmonary edema identified. Due to the limited nature of this evaluation mild airspace opacities within the lung parenchyma could not be fully excluded. No pleural effusion or pneumothorax is demonstrated. No mediastinal shift is noted. No osseous fractures are identified.    IMPRESSION: Very limited evaluation. Enlargement of the cardiac silhouette redemonstrated. Due to the limited nature of this evaluation mild airspace opacities within the lung parenchyma could not be fully excluded.     < end of copied text >      LABS:	 	  CARDIAC MARKERS ( 28 Apr 2020 07:53 )  x     / <0.01 ng/mL / x     / x     / x      p-BNP 28 Apr 2020 07:53: x    , CARDIAC MARKERS ( 28 Apr 2020 00:30 )  x     / x     / x     / x     / x      p-BNP 28 Apr 2020 00:30: 749 pg/mL, CARDIAC MARKERS ( 27 Apr 2020 20:11 )  x     / <0.01 ng/mL / x     / x     / x      p-BNP 27 Apr 2020 20:11: x                              11.7   3.89  )-----------( 183      ( 29 Apr 2020 06:41 )             38.3     04-30    138  |  92<L>  |  28.0<H>  ----------------------------<  118<H>  3.9   |  34.0<H>  |  1.12    Ca    9.4      30 Apr 2020 09:33  Phos  3.4     04-30  Mg     2.2     04-30      proBNP: Serum Pro-Brain Natriuretic Peptide: 749 pg/mL (04-28 @ 00:30)        TELEMETRY: NSR HR @ 70s-80s [facility]                                                       [facility]                                                               Office: [address] [address]                                                              Telephone: [phone]. Fax:[phone]                                                                             PROGRESS NOTE  Reason for follow up: SOB/Abdomen distention   Overnight: No new events   Update: 4/30: Pt has c/o of fatigue, did not sleep well overnight. Pt denies chest pain, palpitations, or SOB. Pt used Bipap overnight and uses NC 6L when awake. Pt still presents with abdominal distention with c/o of no BM for past 2 days, and bilateral lower extremity edema. Start bumex gtt 0.75mg/hr for diuresis, maintain Potassium with current dose of potassium daily. Give patient dose of miralax.        Subjective: "I'm tired"    	  Vitals:  T(C): 36.6 (04-30-20 @ 11:09), Max: 36.6 (04-29-20 @ 15:18)  HR: 71 (04-30-20 @ 11:09) (63 - 81)  BP: 134/78 (04-30-20 @ 11:09) (100/65 - 136/84)  RR: 20 (04-30-20 @ 11:09) (18 - 20)  SpO2: 90% (04-30-20 @ 11:09) (90% - 97%)    I&O's Summary    Weight (kg): 187.7 (04-28 @ 22:15)      PHYSICAL EXAM:  Appearance: Comfortable. No acute distress  HEENT:  Head and neck: Atraumatic. Normocephalic.  Normal oral mucosa, PERRL, Neck is supple. No JVD, No carotid bruit.   Neurologic: A & O x 3, no focal deficits. EOMI.  Lymphatic: No cervical lymphadenopathy  Cardiovascular: Normal S1 S2, No murmur, rubs/gallops. No JVD, No edema  Respiratory: Lungs mostly clear to auscultation, ?minimal rales LLL  Gastrointestinal:  Soft, Non-tender, + BS, +abd. distention  Lower Extremities: +3/+3 nonpitting edema  Psychiatry: Patient is calm. No agitation. Mood & affect appropriate  Skin: No rashes/ ecchymoses/cyanosis/ulcers visualized on the face, hands or feet.      CURRENT MEDICATIONS:  buMETAnide Infusion 0.75 mG/Hr IV Continuous <Continuous>  carvedilol 3.125 milliGRAM(s) Oral every 12 hours  metolazone 2.5 milliGRAM(s) Oral daily  sildenafil (REVATIO) 10 milliGRAM(s) Oral <User Schedule>  sildenafil (REVATIO) 20 milliGRAM(s) Oral <User Schedule>  senna  atorvastatin  aspirin  chewable  enoxaparin Injectable  potassium chloride    Tablet ER      DIAGNOSTIC TESTING:  [ ] Echocardiogram: < from: TTE Echo Complete w/Doppler (12.21.19 @ 08:48) >  Summary:   1. Technically fair study.   2. Left ventricular ejection fraction, by visual estimation, is 65 to 70%.   3. There is moderate septal left ventricular hypertrophy.   4. Severely enlarged right ventricle.   5. Severely reduced RV systolic function.   6. Right ventricular volume and pressure overload.   7. Moderate-severe tricuspid regurgitation.   8. Estimated pulmonary artery systolic pressure is 89.6 mmHg assuming a right atrial pressure of 15 mmHg, which is consistent with severe pulmonary hypertension.   9. Moderately dilated pulmonary artery.    < end of copied text >    [ ]  Catheterization:< from: Cardiac Cath Lab - Adult (01.14.20 @ 12:56) >  DIAGNOSTIC IMPRESSIONS: Markedly elevated right sided filling pressure (RA  23 mmHg)  Severe pulmonary hypertension ( PASP 74 mmHg, Mean 51 mmHg)  PVR 5.79 Wood Units  PCWP within normal 15 mmgh  Pulmonary hypertension showed No response to Nitric oxide    < end of copied text >      OTHER: 	  XRAY: < from: Xray Chest 1 View- PORTABLE-Urgent (04.27.20 @ 19:32) >  FINDINGS: Enlargement of the cardiac silhouette is redemonstrated, likely related to cardiomegaly. Hilar and superior mediastinal contours are not well assessed on this examination. No gross lobar consolidation or pulmonary edema identified. Due to the limited nature of this evaluation mild airspace opacities within the lung parenchyma could not be fully excluded. No pleural effusion or pneumothorax is demonstrated. No mediastinal shift is noted. No osseous fractures are identified.    IMPRESSION: Very limited evaluation. Enlargement of the cardiac silhouette redemonstrated. Due to the limited nature of this evaluation mild airspace opacities within the lung parenchyma could not be fully excluded.     < end of copied text >      LABS:	 	  CARDIAC MARKERS ( 28 Apr 2020 07:53 )  x     / <0.01 ng/mL / x     / x     / x      p-BNP 28 Apr 2020 07:53: x    , CARDIAC MARKERS ( 28 Apr 2020 00:30 )  x     / x     / x     / x     / x      p-BNP 28 Apr 2020 00:30: 749 pg/mL, CARDIAC MARKERS ( 27 Apr 2020 20:11 )  x     / <0.01 ng/mL / x     / x     / x      p-BNP 27 Apr 2020 20:11: x                              11.7   3.89  )-----------( 183      ( 29 Apr 2020 06:41 )             38.3     04-30    138  |  92<L>  |  28.0<H>  ----------------------------<  118<H>  3.9   |  34.0<H>  |  1.12    Ca    9.4      30 Apr 2020 09:33  Phos  3.4     04-30  Mg     2.2     04-30      proBNP: Serum Pro-Brain Natriuretic Peptide: 749 pg/mL (04-28 @ 00:30)        TELEMETRY: NSR HR @ 70s-80s

## 2020-04-30 NOTE — PROGRESS NOTE ADULT - ASSESSMENT
40YO M w/ PMHx of morbidly obese, R sided heart failure (pLVEF with severely reduced RV function), severe pulmonary HTN on home O2, JAN on CPAP, IDDM2, and recent covid infection (discharged from [facility] 03/23/20) who presented to the ED with complaints of leg swelling and abdominal distension. Patient states that being started on Oranetram he has been experiencing worsening abdominal distension and constipation. Pt admitted for acute on chronic CHF exacerbation. Cardiology consult noted.     Acute on Chronic HFpEF (65-70%) exacerbation in setting of severe pulm HTN w/ right heart failure   -Clinical improvement. Pt now on room air  -CTA chest negative for PE  -C/w Coreg 3.125mg BID  -C/w Bumex, s/p Lasix 40mg IVP BID   -C/w metolazone 2.5mg PO daily  -C/w home Orenitram 0.125mg 3 tabs PO BID.   -C/w sildenafil   -C/w Albuterol prn   -C/w CPAP   -COVID19 negative   -Trop neg X2  -  -Daily weight and I&O's  -TTE 12/2019: LVEF 65-70%  -Cardio consult noted.     Abdominal distention w/ constipation - acute   -Improving   -Per pt, started since taking Orenitram  -C/w bowel regimen  -CT a/p unremarkable   -Also consider gastroparesis in setting of chronic DM     Abnormal CT findings   -CT a/p: Mediastinal, pelvic b/l, external iliac and b/l inguinal LAD - stable  -Pt to f/u outpatient     IDDM-2, A1c 6.6   -Blood glucose stable   -Hold PO meds, resume on dc     HTN  -Soft BP noted  -Will hold home med (HCTZ 25mg qd)  -Will resume as needed     Elevated INR   -Stable   -Likely 2/2 hepatic congestion in setting of acute CHF  -No active bleeding   -Continue to monitor    HLD  -C/w statin    JAN/OHS  -C/w CPAP  -Lifestyle modification: Diet, weight loss    Morbid obesity  -Lifestyle modification: Diet, weight loss    DVT ppx: Heparin subct     Dispo: Likely d/c in 1-2 days pending clinical improvement. Pt is from home, will return home. Pt is high risk for readmission due to noncompliance. CM/SW aware.

## 2020-05-01 ENCOUNTER — TRANSCRIPTION ENCOUNTER (OUTPATIENT)
Age: 40
End: 2020-05-01

## 2020-05-01 LAB
ANION GAP SERPL CALC-SCNC: 16 MMOL/L — SIGNIFICANT CHANGE UP (ref 5–17)
BUN SERPL-MCNC: 30 MG/DL — HIGH (ref 8–20)
CALCIUM SERPL-MCNC: 9.4 MG/DL — SIGNIFICANT CHANGE UP (ref 8.6–10.2)
CHLORIDE SERPL-SCNC: 88 MMOL/L — LOW (ref 98–107)
CO2 SERPL-SCNC: 34 MMOL/L — HIGH (ref 22–29)
CREAT SERPL-MCNC: 1.16 MG/DL — SIGNIFICANT CHANGE UP (ref 0.5–1.3)
GLUCOSE SERPL-MCNC: 107 MG/DL — HIGH (ref 70–99)
MAGNESIUM SERPL-MCNC: 1.8 MG/DL — SIGNIFICANT CHANGE UP (ref 1.6–2.6)
PHOSPHATE SERPL-MCNC: 3.6 MG/DL — SIGNIFICANT CHANGE UP (ref 2.4–4.7)
POTASSIUM SERPL-MCNC: 3.2 MMOL/L — LOW (ref 3.5–5.3)
POTASSIUM SERPL-SCNC: 3.2 MMOL/L — LOW (ref 3.5–5.3)
SODIUM SERPL-SCNC: 138 MMOL/L — SIGNIFICANT CHANGE UP (ref 135–145)

## 2020-05-01 PROCEDURE — 99233 SBSQ HOSP IP/OBS HIGH 50: CPT | Mod: GC,25

## 2020-05-01 PROCEDURE — 99233 SBSQ HOSP IP/OBS HIGH 50: CPT

## 2020-05-01 RX ORDER — POTASSIUM CHLORIDE 20 MEQ
40 PACKET (EA) ORAL EVERY 4 HOURS
Refills: 0 | Status: COMPLETED | OUTPATIENT
Start: 2020-05-01 | End: 2020-05-01

## 2020-05-01 RX ORDER — MAGNESIUM SULFATE 500 MG/ML
2 VIAL (ML) INJECTION ONCE
Refills: 0 | Status: COMPLETED | OUTPATIENT
Start: 2020-05-01 | End: 2020-05-01

## 2020-05-01 RX ORDER — POTASSIUM CHLORIDE 20 MEQ
20 PACKET (EA) ORAL ONCE
Refills: 0 | Status: DISCONTINUED | OUTPATIENT
Start: 2020-05-01 | End: 2020-05-01

## 2020-05-01 RX ADMIN — CARVEDILOL PHOSPHATE 3.12 MILLIGRAM(S): 80 CAPSULE, EXTENDED RELEASE ORAL at 06:34

## 2020-05-01 RX ADMIN — ATORVASTATIN CALCIUM 40 MILLIGRAM(S): 80 TABLET, FILM COATED ORAL at 22:14

## 2020-05-01 RX ADMIN — Medication 40 MILLIEQUIVALENT(S): at 22:14

## 2020-05-01 RX ADMIN — ENOXAPARIN SODIUM 40 MILLIGRAM(S): 100 INJECTION SUBCUTANEOUS at 12:58

## 2020-05-01 RX ADMIN — Medication 50 GRAM(S): at 12:57

## 2020-05-01 RX ADMIN — SENNA PLUS 2 TABLET(S): 8.6 TABLET ORAL at 22:13

## 2020-05-01 RX ADMIN — Medication 40 MILLIEQUIVALENT(S): at 12:57

## 2020-05-01 RX ADMIN — Medication 20 MILLIGRAM(S): at 12:57

## 2020-05-01 RX ADMIN — Medication 10 MILLIGRAM(S): at 06:36

## 2020-05-01 RX ADMIN — Medication 10 MILLIGRAM(S): at 22:13

## 2020-05-01 RX ADMIN — Medication 40 MILLIEQUIVALENT(S): at 17:02

## 2020-05-01 RX ADMIN — Medication 81 MILLIGRAM(S): at 12:57

## 2020-05-01 RX ADMIN — CARVEDILOL PHOSPHATE 3.12 MILLIGRAM(S): 80 CAPSULE, EXTENDED RELEASE ORAL at 17:02

## 2020-05-01 NOTE — PROGRESS NOTE ADULT - SUBJECTIVE AND OBJECTIVE BOX
CHIEF COMPLAINT:Patient is a 39y old  Male who presents with a chief complaint of sob and bloated (30 Apr 2020 11:51)    INTERVAL HISTORY:  pt treated for PAH.  On IV diuretics, good urine output.   Leg edema and abdominal bloating is improving.  no cp. on 6l NC.     Allergies  No Known Allergies  	  MEDICATIONS:  buMETAnide Infusion 0.75 mG/Hr IV Continuous <Continuous>  carvedilol 3.125 milliGRAM(s) Oral every 12 hours  metolazone 2.5 milliGRAM(s) Oral daily  sildenafil (REVATIO) 10 milliGRAM(s) Oral <User Schedule>  sildenafil (REVATIO) 20 milliGRAM(s) Oral <User Schedule>  ALBUTerol    90 MICROgram(s) HFA Inhaler 2 Puff(s) Inhalation every 6 hours PRN  polyethylene glycol 3350 17 Gram(s) Oral daily PRN  senna 2 Tablet(s) Oral at bedtime  atorvastatin 40 milliGRAM(s) Oral at bedtime  aspirin  chewable 81 milliGRAM(s) Oral daily  enoxaparin Injectable 40 milliGRAM(s) SubCutaneous daily  potassium chloride    Tablet ER 40 milliEquivalent(s) Oral every 4 hours    PHYSICAL EXAM:  T(C): 36.7 (05-01-20 @ 05:47), Max: 36.8 (04-30-20 @ 16:41)  HR: 87 (05-01-20 @ 05:47) (71 - 91)  BP: 104/68 (05-01-20 @ 05:47) (104/68 - 134/78)  RR: 18 (05-01-20 @ 05:47) (18 - 20)  SpO2: 99% (05-01-20 @ 05:47) (90% - 99%)  Wt(kg): --    I&O's Summary    30 Apr 2020 07:01  -  01 May 2020 07:00  --------------------------------------------------------  IN: 0 mL / OUT: 3900 mL / NET: -3900 mL    01 May 2020 07:01  -  01 May 2020 09:25  --------------------------------------------------------  IN: 0 mL / OUT: 600 mL / NET: -600 mL    Appearance: Normal	  HEENT:   NC/AT  Eye: Pink Conjunctiva  Lungs: CTA B/L  CVS: RRR, Normal S1 and S2, perisacral and thigh edema  Pulses: Normal distal pulses.  Neuro: A&O x3      LABS:  05-01    138  |  88<L>  |  30.0<H>  ----------------------------<  107<H>  3.2<L>   |  34.0<H>  |  1.16    Ca    9.4      01 May 2020 06:34  Phos  3.6     05-01  Mg     1.8     05-01    ASSESSMENT/PLAN: 	  1. Pulmonary HTN. Increase revatio to 20 mg in am and lunch, 10 mg in pm. Cont with Oranitrem 0.125 mg tabs, 3 tabs tid.   2. Autoanticoagulated with TR.  3. cont with IV diuretics  4. Monitor renal fxn  5. replace k and mg.

## 2020-05-01 NOTE — PROGRESS NOTE ADULT - SUBJECTIVE AND OBJECTIVE BOX
Patient is a 39y old  Male who presents with a chief complaint of sob and bloated (28 Apr 2020 09:03)    Interval/Overnight Events   Pt seen and examined at beside. No acute events overnight. Pt reports feeling better but said he's feeling "heavy." Pt also mentions improvement of bloating since he was started on bowel regimen. Pt is tolerating PO diet w/o n/v/d/c. Pt is voiding actively, last BM was normal.    ROS: Denies CP, HA, SOB, fever/chills, n/v/c/d, abdominal/back pain, blood in urine or stool, calf tenderness/swelling. All other ROS negative.    OXYGEN: Pt on 4-6L. Uses CPAP at nights     Vital Signs Last 24 Hrs  T(C): 36.7 (01 May 2020 05:47), Max: 36.8 (30 Apr 2020 16:41)  T(F): 98.1 (01 May 2020 05:47), Max: 98.3 (30 Apr 2020 16:41)  HR: 87 (01 May 2020 05:47) (76 - 91)  BP: 112/72 (01 May 2020 08:30) (104/68 - 128/86)  BP(mean): 80 (30 Apr 2020 14:29) (80 - 80)  RR: 18 (01 May 2020 05:47) (18 - 20)  SpO2: 99% (01 May 2020 05:47) (92% - 99%)    Physical Examination  GENERAL: NAD, communicates well  HEENT: clear sclera and conjunctiva, PERRLA, MMM  RESP: reduced air entry b/l, no wheezing or crackles  CVS: Normal S1 & S2, No murmurs, rubs, or gallops  GI: obese, +BS, nontender, nondistended  Extremities: +1 edema b/l, no clubbing, no calf tenderness/swelling   Skin: grossly intact, b/l LE - chronic venous stasis and lymphedema   Neuro: AAOX3, CN II-XII grossly intact     LABS                          11.7   3.89  )-----------( 183      ( 29 Apr 2020 06:41 )             38.3       05-01    138  |  88<L>  |  30.0<H>  ----------------------------<  107<H>  3.2<L>   |  34.0<H>  |  1.16    Ca    9.4      01 May 2020 06:34  Phos  3.6     05-01  Mg     1.8     05-01    TPro  8.9<H>  /  Alb  3.5  /  TBili  2.0  /  DBili  x   /  AST  30  /  ALT  15  /  AlkPhos  132<H>  04-27    CARDIAC MARKERS ( 28 Apr 2020 07:53 )  x     / <0.01 ng/mL / x     / x     / x      CARDIAC MARKERS ( 27 Apr 2020 20:11 )  x     / <0.01 ng/mL / x     / x     / x        PT/INR - ( 28 Apr 2020 07:58 )   PT: 26.0 sec;   INR: 2.24 ratio    PTT - ( 27 Apr 2020 20:11 )  PTT:37.7 sec    IMAGING  CT Abdomen and Pelvis w/ IV Cont (04.28.20 @ 22:33) >  IMPRESSION:   Nearly nondiagnostic exam for detection of PE. No large saddle embolus.  Stable confluentareas of pulmonary groundglass opacities with patchy areas of sparing. Differential includes pulmonary edema, pneumonia, or inflammatory processes.  Stable thoracic and pelvic lymphadenopathy.  Hepatomegaly.    Xray Chest 1 View- PORTABLE-Urgent (04.27.20 @ 19:32) >  IMPRESSION: Very limited evaluation. Enlargement of the cardiac silhouette redemonstrated. Due to the limited nature of this evaluation mild airspace opacities within the lung parenchyma could not be fully excluded.       DIET: DASH/TLC, consistent carb     MEDICATIONS  (STANDING):  aspirin  chewable 81 milliGRAM(s) Oral daily  atorvastatin 40 milliGRAM(s) Oral at bedtime  buMETAnide Infusion 0.75 mG/Hr (3.75 mL/Hr) IV Continuous <Continuous>  carvedilol 3.125 milliGRAM(s) Oral every 12 hours  enoxaparin Injectable 40 milliGRAM(s) SubCutaneous daily  metolazone 2.5 milliGRAM(s) Oral daily  Orenitram 0.125mg tablet 1 Tablet(s) 1 Tablet(s) Oral <User Schedule>  Orenitram 0.25mg tablet 1 Tablet(s) 1 Tablet(s) Oral <User Schedule>  potassium chloride    Tablet ER 40 milliEquivalent(s) Oral every 4 hours  senna 2 Tablet(s) Oral at bedtime  sildenafil (REVATIO) 10 milliGRAM(s) Oral <User Schedule>  sildenafil (REVATIO) 20 milliGRAM(s) Oral <User Schedule>    MEDICATIONS  (PRN):  ALBUTerol    90 MICROgram(s) HFA Inhaler 2 Puff(s) Inhalation every 6 hours PRN Shortness of Breath and/or Wheezing  polyethylene glycol 3350 17 Gram(s) Oral daily PRN Constipation

## 2020-05-01 NOTE — DISCHARGE NOTE PROVIDER - CARE PROVIDERS DIRECT ADDRESSES
,DirectAddress_Unknown,[email or web address],DirectAddress_Unknown,[email or web address]

## 2020-05-01 NOTE — DISCHARGE NOTE PROVIDER - PROVIDER TOKENS
FREE:[LAST:[[facility]],PHONE:[phone]],FAX:[(   )    -],ADDRESS:[[address] [place] [address]]],PROVIDER:[TOKEN:[[id number]]] PROVIDER:[TOKEN:[[id number]]],FREE:[LAST:[[facility]],PHONE:[phone]],FAX:[(   )    -],ADDRESS:[[address] [place] [address]]],PROVIDER:[TOKEN:[[id number]]]

## 2020-05-01 NOTE — DISCHARGE NOTE PROVIDER - NSDCFUADDAPPT_GEN_ALL_CORE_FT
-Follow up with Primary Care Doctor 3-5 days.  -Follow up with Cardiology in 1 week.  -Follow up at Lymphedema clinic in 1-2 weeks. -Follow up with Primary Care Doctor 3-5 days.  -Follow up with Cardiology in 1 week.  -Follow up with Pulmonology in 2-3 weeks.   -Follow up at Lymphedema clinic in 1-2 weeks.

## 2020-05-01 NOTE — DISCHARGE NOTE PROVIDER - HOSPITAL COURSE
38YO M w/ PMHx of morbidly obese, R sided heart failure (pLVEF with severely reduced RV function), HFpEF (TTE 12/2019, 65-70%), severe pulmonary HTN on home O2, JAN on CPAP, IDDM2, and recent covid infection (discharged from [facility] 03/23/20) who presented to the ED with complaints of leg swelling and abdominal distension. Patient states that being started on Oranetram he has been experiencing worsening abdominal distension and constipation. Pt admitted for acute on chronic CHF exacerbation. Cardiology consult noted. Clinical improvement of volume overload s/p diuresis (Lasix IVP and Bumex gtt). Pt to c/w home medications. Pt w/ h/o pulm HTN, Sildenafil dose increase to 20/20/10mg. Pt c/o abdominal bloating w/ constipation, per pt it started when he began Orenitram. CT a/p negative. Symptoms likely 2/2 constipation, now resolved s/p bowel movement. Abnormal CT findings, CT a/p: Mediastinal, pelvic b/l, external iliac and b/l inguinal LAD - stable when compared to prior studies. Pt to f/u outpatient. IDDM-2, A1c 6.6, blood sugars stable during admission. Pt to resume home meds on d/c. Pt w/ h/o HTN, soft BP noted. Home meds held can be resumed on d/c. Pt noted to have elevated INR, stable, likely 2/2 hepatic congestion in setting of acute CHF. No active bleeding to f/u outpt. Pt to c/w meds for HLD and CPAP for JAN/OHS. Pt w/ morbid obesity and chronic lymphedema b/l LE. Weight loss encouraged, pt to f/u w/ lymphedema clinic. Pt to f/u w/ PMD, Cardio and Lymphedema clinic after d/c.        All electrolyte abnormalities were monitored carefully and repleted as necessary during this hospitalization. At the time of discharge patient was hemodynamically stable and amenable to all terms of discharge. The patient has received verbal instructions from myself regarding discharge plans.         Length of Discharge: 45MIN 40YO M w/ PMHx of morbidly obese, R sided heart failure (pLVEF with severely reduced RV function), HFpEF (TTE 12/2019, 65-70%), severe pulmonary HTN on home O2, JAN on CPAP, IDDM2, and recent covid infection (discharged from [facility] 03/23/20) who presented to the ED with complaints of leg swelling and abdominal distension. Patient states that being started on Oranetram he has been experiencing worsening abdominal distension and constipation. Pt admitted for acute on chronic CHF exacerbation. Cardiology consult noted. Clinical improvement of volume overload s/p diuresis (Lasix IVP and Bumex gtt). Pt to c/w home medications. Pt w/ h/o pulm HTN, Sildenafil dose increase to 20/20/10mg. Pt c/o abdominal bloating w/ constipation, per pt it started when he began Orenitram. CT a/p negative. Symptoms likely 2/2 constipation, now resolved s/p bowel movement. Abnormal CT findings, CT a/p: Mediastinal, pelvic b/l, external iliac and b/l inguinal LAD - stable when compared to prior studies. Pt to f/u outpatient. IDDM-2, A1c 6.6, blood sugars stable during admission. Pt to resume home meds on d/c. Pt w/ h/o HTN, soft BP noted. Home meds held can be resumed on d/c. Pt noted to have elevated INR, stable, likely 2/2 hepatic congestion in setting of acute CHF. No active bleeding to f/u outpt. Pt to c/w meds for HLD and CPAP for JAN/OHS. Pt w/ morbid obesity and chronic lymphedema b/l LE. Weight loss encouraged, pt to f/u w/ lymphedema clinic. Pt to f/u w/ PMD, Cardio and Lymphedema clinic after d/c.        All electrolyte abnormalities were monitored carefully and repleted as necessary during this hospitalization. At the time of discharge patient was hemodynamically stable and amenable to all terms of discharge. The patient has received verbal instructions from myself regarding discharge plans.         Length of Discharge: 45MIN        Vital Signs Last 24 Hrs    T(F): 98.3 (03 May 2020 07:55), Max: 98.7 (02 May 2020 19:53)    HR: 77 (03 May 2020 09:37) (77 - 95)    BP: 106/70 (03 May 2020 07:55) (103/67 - 149/76)    RR: 18 (03 May 2020 09:41) (18 - 20)    SpO2: 90% (03 May 2020 09:41) (90% - 98%)        Physical Examination    GENERAL: NAD, bipap over face    HEENT: clear sclera and conjunctiva, EOMI, MMM    RESP: reduced air entry b/l, no wheezing or crackles    CVS: Normal S1 & S2, No murmurs, rubs, or gallops    GI: obese, +BS, nontender, distended    Extremities: +1 edema b/l, no clubbing, no calf tenderness    Skin: grossly intact, b/l LE - chronic venous stasis and lymphedema     Neuro: AAOX3 38YO M w/ PMHx of morbidly obese, R sided heart failure (pLVEF with severely reduced RV function), HFpEF (TTE 12/2019, 65-70%), severe pulmonary HTN on home O2, JAN on CPAP, IDDM2, and recent covid infection (discharged from [facility] 03/23/20) who presented to the ED with complaints of leg swelling and abdominal distension. Patient states that being started on Oranetram he has been experiencing worsening abdominal distension and constipation. Pt admitted for acute on chronic CHF exacerbation. Cardiology consult noted. Clinical improvement of volume overload s/p diuresis (Lasix IVP and Bumex gtt). Pt to c/w home medications. Pt w/ h/o pulm HTN, Sildenafil dose increase to 20/20/10mg. Pt c/o abdominal bloating w/ constipation, per pt it started when he began Orenitram. CT a/p negative. Symptoms likely 2/2 constipation, now resolved s/p bowel movement. Abnormal CT findings, CT a/p: Mediastinal, pelvic b/l, external iliac and b/l inguinal LAD - stable when compared to prior studies. Pt to f/u outpatient. IDDM-2, A1c 6.6, blood sugars stable during admission. Pt to resume home meds on d/c. Pt w/ h/o HTN, soft BP noted. Home meds held can be resumed on d/c. Pt noted to have elevated INR, stable, likely 2/2 hepatic congestion in setting of acute CHF. No active bleeding to f/u outpt. Pt to c/w meds for HLD and CPAP for JAN/OHS. Pt w/ morbid obesity and chronic lymphedema b/l LE. Weight loss encouraged, pt to f/u w/ lymphedema clinic. Pt to f/u w/ PMD, Cardio and Lymphedema clinic after d/c.        All electrolyte abnormalities were monitored carefully and repleted as necessary during this hospitalization. At the time of discharge patient was hemodynamically stable and amenable to all terms of discharge. The patient has received verbal instructions from myself regarding discharge plans.         Length of Discharge: 45MIN        Vital Signs Last 24 Hrs    T(C): 36.8 (04 May 2020 09:26), Max: 36.9 (03 May 2020 21:33)    T(F): 98.2 (04 May 2020 09:26), Max: 98.4 (03 May 2020 21:33)    HR: 81 (04 May 2020 09:26) (78 - 86)    BP: 107/69 (04 May 2020 09:26) (107/69 - 134/75)    BP(mean): 84 (04 May 2020 03:52) (84 - 95)    RR: 18 (04 May 2020 09:26) (18 - 20)    SpO2: 92% (04 May 2020 09:26) (90% - 99%)        Physical Examination    GENERAL: NAD, bipap over face    HEENT: clear sclera and conjunctiva, EOMI, MMM    RESP: reduced air entry b/l, no wheezing or crackles    CVS: Normal S1 & S2, No murmurs, rubs, or gallops    GI: obese, +BS, nontender, distended    Extremities: +1 edema b/l, no clubbing, no calf tenderness    Skin: grossly intact, b/l LE - chronic venous stasis and lymphedema     Neuro: AAOX3 38YO M w/ PMHx of morbidly obese, R sided heart failure (pLVEF with severely reduced RV function), HFpEF (TTE 12/2019, 65-70%), severe pulmonary HTN on home O2, JAN on CPAP, IDDM2, and recent covid infection (discharged from [facility] 03/23/20) who presented to the ED with complaints of leg swelling and abdominal distension. Patient states that being started on Oranetram he has been experiencing worsening abdominal distension and constipation. Pt admitted for acute on chronic CHF exacerbation. Cardiology consult noted. Clinical improvement of volume overload s/p diuresis (Lasix IVP and Bumex gtt). Pt to c/w home medications. Pt w/ h/o pulm HTN, Sildenafil dose increase to 20/20/10mg. Pt c/o abdominal bloating w/ constipation, per pt it started when he began Orenitram. CT a/p negative. Symptoms likely 2/2 constipation, now resolved s/p bowel movement. Abnormal CT findings, CT a/p: Mediastinal, pelvic b/l, external iliac and b/l inguinal LAD - stable when compared to prior studies. Pt to f/u outpatient. IDDM-2, A1c 6.6, blood sugars stable during admission. Pt to resume home meds on d/c. Pt w/ h/o HTN, soft BP noted. Home meds held can be resumed on d/c. Pt noted to have elevated INR, stable, likely 2/2 hepatic congestion in setting of acute CHF. No active bleeding to f/u outpt. Pt to c/w meds for HLD and CPAP for JAN/OHS. Pt w/ morbid obesity and chronic lymphedema b/l LE. Weight loss encouraged, pt to f/u w/ lymphedema clinic. Pt to f/u w/ PMD, Cardio and Lymphedema clinic after d/c.        Pt understands Orenitram is not available at [facility] pharmacy and agrees to  at his [facility]. Pt agrees to pay out of pocket for metolozone at [facility] pharmacy due to it not being covered by insturance.         All electrolyte abnormalities were monitored carefully and repleted as necessary during this hospitalization. At the time of discharge patient was hemodynamically stable and amenable to all terms of discharge. The patient has received verbal instructions from myself regarding discharge plans.         Length of Discharge: 45MIN        Vital Signs Last 24 Hrs    T(C): 36.8 (04 May 2020 09:26), Max: 36.9 (03 May 2020 21:33)    T(F): 98.2 (04 May 2020 09:26), Max: 98.4 (03 May 2020 21:33)    HR: 81 (04 May 2020 09:26) (78 - 86)    BP: 107/69 (04 May 2020 09:26) (107/69 - 134/75)    BP(mean): 84 (04 May 2020 03:52) (84 - 95)    RR: 18 (04 May 2020 09:26) (18 - 20)    SpO2: 92% (04 May 2020 09:26) (90% - 99%)        Physical Examination    GENERAL: NAD, bipap over face    HEENT: clear sclera and conjunctiva, EOMI, MMM    RESP: reduced air entry b/l, no wheezing or crackles    CVS: Normal S1 & S2, No murmurs, rubs, or gallops    GI: obese, +BS, nontender, distended    Extremities: +1 edema b/l, no clubbing, no calf tenderness    Skin: grossly intact, b/l LE - chronic venous stasis and lymphedema     Neuro: AAOX3 38YO M w/ PMHx of morbidly obese, R sided heart failure (pLVEF with severely reduced RV function), HFpEF (TTE 12/2019, 65-70%), severe pulmonary HTN on home O2, JAN on CPAP, IDDM2, and recent covid infection (discharged from [facility] 03/23/20) who presented to the ED with complaints of leg swelling and abdominal distension. Patient states that being started on Oranetram he has been experiencing worsening abdominal distension and constipation. Pt admitted for acute on chronic CHF exacerbation. Cardiology consult noted. Clinical improvement of volume overload s/p diuresis (Lasix IVP and Bumex gtt). Pt to c/w home medications. Pt w/ h/o pulm HTN, Sildenafil dose increase to 20/20/10mg. Pt c/o abdominal bloating w/ constipation, per pt it started when he began Orenitram. CT a/p negative. Symptoms likely 2/2 constipation, now resolved s/p bowel movement. Abnormal CT findings, CT a/p: Mediastinal, pelvic b/l, external iliac and b/l inguinal LAD - stable when compared to prior studies. Pt to f/u outpatient. IDDM-2, A1c 6.6, blood sugars stable during admission. Pt to resume home meds on d/c. Pt w/ h/o HTN, soft BP noted. Home meds held can be resumed on d/c. Pt noted to have elevated INR, stable, likely 2/2 hepatic congestion in setting of acute CHF. No active bleeding to f/u outpt. Pt to c/w meds for HLD and CPAP for JAN/OHS. Pt w/ morbid obesity and chronic lymphedema b/l LE. Weight loss encouraged, pt to f/u w/ lymphedema clinic. Pt to f/u w/ PMD, Cardio and Lymphedema clinic after d/c.        Pt understands Orenitram is not available at [facility] pharmacy and agrees to  at [facility] pharmacy. Pt agrees to pay out of pocket for metolozone at [facility] pharmacy due to it not being covered by insturance.         All electrolyte abnormalities were monitored carefully and repleted as necessary during this hospitalization. At the time of discharge patient was hemodynamically stable and amenable to all terms of discharge. The patient has received verbal instructions from myself regarding discharge plans.         Length of Discharge: 45MIN        Vital Signs Last 24 Hrs    T(C): 36.8 (04 May 2020 09:26), Max: 36.9 (03 May 2020 21:33)    T(F): 98.2 (04 May 2020 09:26), Max: 98.4 (03 May 2020 21:33)    HR: 81 (04 May 2020 09:26) (78 - 86)    BP: 107/69 (04 May 2020 09:26) (107/69 - 134/75)    BP(mean): 84 (04 May 2020 03:52) (84 - 95)    RR: 18 (04 May 2020 09:26) (18 - 20)    SpO2: 92% (04 May 2020 09:26) (90% - 99%)        Physical Examination    GENERAL: NAD, bipap over face    HEENT: clear sclera and conjunctiva, EOMI, MMM    RESP: reduced air entry b/l, no wheezing or crackles    CVS: Normal S1 & S2, No murmurs, rubs, or gallops    GI: obese, +BS, nontender, distended    Extremities: +1 edema b/l, no clubbing, no calf tenderness    Skin: grossly intact, b/l LE - chronic venous stasis and lymphedema     Neuro: AAOX3

## 2020-05-01 NOTE — DISCHARGE NOTE PROVIDER - NSDCCPCAREPLAN_GEN_ALL_CORE_FT
PRINCIPAL DISCHARGE DIAGNOSIS  Diagnosis: CHF (congestive heart failure)  Assessment and Plan of Treatment: You had symptoms of volume overload and was given medications to remove the excess fluid from your body.   -Continue taking the medications as prescribed.  -Follow a diet that is low in salt.  -Do not drink more than 2L (4 bottles of water) daily.      SECONDARY DISCHARGE DIAGNOSES  Diagnosis: Pulmonary hypertension  Assessment and Plan of Treatment: Continue taking home medication.  -Your dose has increased to 20mg in am, 20mg in afternoon and 10mg at bedtime.  -Follow up with Primary Care Doctor and Cardiology.    Diagnosis: Lymphedema of leg  Assessment and Plan of Treatment: Follow up with Lymphedema clinic as instructed.    Diagnosis: Morbid obesity  Assessment and Plan of Treatment: -Weight loss encouraged.  -Follow a diet that is low in salt and carbohydrates (rice, pasta, potatoes, sodas, etc).    Diagnosis: Acute constipation  Assessment and Plan of Treatment: Your symptoms of bloating was due to severe constipation. Your symptoms are now resolved.   -Continue taking medications as needed.  -Keep hydrated and follow a diet that is high in fibers (fruits, grains, etc).    Diagnosis: Abnormal CT scan  Assessment and Plan of Treatment: Imaging of your abdomen and pelvis showed mediastinal, pelvic, external iliac and inguinal lyphadenoapathy that are stable.  -Follow up with Primary Care Doctor for repeat imaging and monitoring.    Diagnosis: Insulin dependent type 2 diabetes mellitus  Assessment and Plan of Treatment: Continue taking your home medication. Check your blood sugar as scheduled.   HbA1c 6.6    Diagnosis: HLD (hyperlipidemia)  Assessment and Plan of Treatment: Continue taking home medication.    Diagnosis: Hypertension  Assessment and Plan of Treatment: Continue taking home medication. Check your blood pressure regularly and keep a log.  -Follow a diet that is low in salt.    Diagnosis: Elevated INR  Assessment and Plan of Treatment: You were noted to have elevate INR. You had no signs or symptoms of bleeding.  -Follow up with Primary Care Doctor for repeat blood work for INR check.    Diagnosis: JAN on CPAP  Assessment and Plan of Treatment: Continue using CPAP.  -Weight loss encouraged. PRINCIPAL DISCHARGE DIAGNOSIS  Diagnosis: CHF (congestive heart failure)  Assessment and Plan of Treatment: You had symptoms of volume overload and was given medications to remove the excess fluid from your body.   -Continue taking the medications as prescribed.  -Follow a diet that is low in salt.  -Do not drink more than 2L (4 bottles of water) daily.      SECONDARY DISCHARGE DIAGNOSES  Diagnosis: Pulmonary hypertension  Assessment and Plan of Treatment: Continue taking home medication.  -Your dose has increased to 20mg in am, 20mg in afternoon and 10mg at bedtime.  -Follow up with Primary Care Doctor and Cardiology.    Diagnosis: Lymphedema of leg  Assessment and Plan of Treatment: Follow up with Lymphedema clinic as instructed.    Diagnosis: Morbid obesity  Assessment and Plan of Treatment: -Weight loss encouraged.  -Follow a diet that is low in salt and carbohydrates (rice, pasta, potatoes, sodas, etc).    Diagnosis: Acute constipation  Assessment and Plan of Treatment: Your symptoms of bloating was due to severe constipation. Your symptoms are now resolved.   -Continue taking medications as needed.  -Keep hydrated and follow a diet that is high in fibers (fruits, grains, etc).    Diagnosis: Abnormal CT scan  Assessment and Plan of Treatment: Imaging of your abdomen and pelvis showed mediastinal, pelvic, external iliac and inguinal lyphadenoapathy that are stable.  -Follow up with Primary Care Doctor for repeat imaging and monitoring.    Diagnosis: Insulin dependent type 2 diabetes mellitus  Assessment and Plan of Treatment: Continue taking your home medication. Check your blood sugar as scheduled.   HbA1c 6.6    Diagnosis: JAN on CPAP  Assessment and Plan of Treatment: Follow up with Pulmonolgy or Primary Care Doctor to have a sleep study done in [facility] to obtain CPAP for home.  -Weight loss encouraged.    Diagnosis: HLD (hyperlipidemia)  Assessment and Plan of Treatment: Continue taking home medication.    Diagnosis: Hypertension  Assessment and Plan of Treatment: Continue taking home medication. Check your blood pressure regularly and keep a log.  -Follow a diet that is low in salt.    Diagnosis: Elevated INR  Assessment and Plan of Treatment: You were noted to have elevate INR. You had no signs or symptoms of bleeding.  -Follow up with Primary Care Doctor for repeat blood work for INR check. PRINCIPAL DISCHARGE DIAGNOSIS  Diagnosis: CHF (congestive heart failure)  Assessment and Plan of Treatment: You had symptoms of volume overload and was given medications to remove the excess fluid from your body.   -Continue taking the medications as prescribed.  -Follow a diet that is low in salt.  -Do not drink more than 2L (4 bottles of water) daily.  Your cardiac medications were adjusted by your cardiologist. Please continue to take them as directed and follow up with your cardiologist in 1 week of discharge.  Please continue home with home physical therapy upon discharge.      SECONDARY DISCHARGE DIAGNOSES  Diagnosis: Pulmonary hypertension  Assessment and Plan of Treatment: Continue taking home medication.  -Your dose of sildenafil has increased to 20mg in am, 20mg in afternoon and 10mg at bedtime.  -Follow up with Primary Care Doctor and Cardiology.  Please continue your home dose of 6LMP oxygen via nasal cannula. Please follow up with your pulmonologist for adjustments to your oxygen.    Diagnosis: Lymphedema of leg  Assessment and Plan of Treatment: Follow up with Lymphedema clinic as instructed.    Diagnosis: Morbid obesity  Assessment and Plan of Treatment: -Weight loss encouraged.  -Follow a diet that is low in salt and carbohydrates (rice, pasta, potatoes, sodas, etc).    Diagnosis: Acute constipation  Assessment and Plan of Treatment: Your symptoms of bloating was due to severe constipation. Your symptoms are now resolved.   -Continue taking medications as needed.  -Keep hydrated and follow a diet that is high in fibers (fruits, grains, etc).    Diagnosis: Abnormal CT scan  Assessment and Plan of Treatment: Imaging of your abdomen and pelvis showed mediastinal, pelvic, external iliac and inguinal lyphadenoapathy that are stable.  -Follow up with Primary Care Doctor for repeat imaging and monitoring.    Diagnosis: Insulin dependent type 2 diabetes mellitus  Assessment and Plan of Treatment: Continue taking your home medication. Check your blood sugar as scheduled.   HbA1c 6.6    Diagnosis: HLD (hyperlipidemia)  Assessment and Plan of Treatment: Continue taking home medication.    Diagnosis: Hypertension  Assessment and Plan of Treatment: Continue taking home medication. Check your blood pressure regularly and keep a log.  -Follow a diet that is low in salt.    Diagnosis: Elevated INR  Assessment and Plan of Treatment: You were noted to have elevate INR. You had no signs or symptoms of bleeding.  -Follow up with Primary Care Doctor for repeat blood work for INR check.    Diagnosis: JAN on CPAP  Assessment and Plan of Treatment: Follow up with Pulmonolgy or Primary Care Doctor to have a sleep study done in [place] to obtain CPAP for home.  -Weight loss encouraged. PRINCIPAL DISCHARGE DIAGNOSIS  Diagnosis: CHF (congestive heart failure)  Assessment and Plan of Treatment: You had symptoms of volume overload and was given medications to remove the excess fluid from your body.   -Continue taking the medications as prescribed.  -Follow a diet that is low in salt.  -Do not drink more than 2L (4 bottles of water) daily.  Your cardiac medications were adjusted by your cardiologist. Please continue to take them as directed and follow up with your cardiologist in 1 week of discharge.  Please continue home with home physical therapy upon discharge.  Orenitram is not available at [facility] pharmacy. It was sent to your home pharmacy. Please  at [facility].      SECONDARY DISCHARGE DIAGNOSES  Diagnosis: Pulmonary hypertension  Assessment and Plan of Treatment: Continue taking home medication.  -Your dose of sildenafil has increased to 20mg in am, 20mg in afternoon and 10mg at bedtime.  -Follow up with Primary Care Doctor and Cardiology.  Please continue your home dose of 6LMP oxygen via nasal cannula. Please follow up with your pulmonologist for adjustments to your oxygen.    Diagnosis: Lymphedema of leg  Assessment and Plan of Treatment: Follow up with Lymphedema clinic as instructed.    Diagnosis: Morbid obesity  Assessment and Plan of Treatment: -Weight loss encouraged.  -Follow a diet that is low in salt and carbohydrates (rice, pasta, potatoes, sodas, etc).    Diagnosis: Acute constipation  Assessment and Plan of Treatment: Your symptoms of bloating was due to severe constipation. Your symptoms are now resolved.   -Continue taking medications as needed.  -Keep hydrated and follow a diet that is high in fibers (fruits, grains, etc).    Diagnosis: Abnormal CT scan  Assessment and Plan of Treatment: Imaging of your abdomen and pelvis showed mediastinal, pelvic, external iliac and inguinal lyphadenoapathy that are stable.  -Follow up with Primary Care Doctor for repeat imaging and monitoring.    Diagnosis: Insulin dependent type 2 diabetes mellitus  Assessment and Plan of Treatment: Continue taking your home medication. Check your blood sugar as scheduled.   HbA1c 6.6    Diagnosis: HLD (hyperlipidemia)  Assessment and Plan of Treatment: Continue taking home medication.    Diagnosis: Hypertension  Assessment and Plan of Treatment: Continue taking home medication. Check your blood pressure regularly and keep a log.  -Follow a diet that is low in salt.    Diagnosis: Elevated INR  Assessment and Plan of Treatment: You were noted to have elevate INR. You had no signs or symptoms of bleeding.  -Follow up with Primary Care Doctor for repeat blood work for INR check.    Diagnosis: JAN on CPAP  Assessment and Plan of Treatment: Follow up with Pulmonolgy or Primary Care Doctor to have a sleep study done in [place] to obtain CPAP for home.  -Weight loss encouraged.

## 2020-05-01 NOTE — DISCHARGE NOTE PROVIDER - NSDCMRMEDTOKEN_GEN_ALL_CORE_FT
aspirin 81 mg oral tablet, chewable: 1 tab(s) orally once a day  atorvastatin 40 mg oral tablet: 1 tab(s) orally once a day (at bedtime)  bumetanide 1 mg oral tablet: 1 tab(s) orally 2 times a day  carvedilol 3.125 mg oral tablet: 1 tab(s) orally every 12 hours  hydroCHLOROthiazide 25 mg oral tablet: 1 tab(s) orally once a day  K-Tab 20 mEq oral tablet, extended release: 2 tab(s) orally once a day   metFORMIN 500 mg oral tablet: 1 tab(s) orally once a day   sildenafil 20 mg oral tablet: 0.5 tab(s) orally 3 times a day aspirin 81 mg oral tablet, chewable: 1 tab(s) orally once a day  atorvastatin 40 mg oral tablet: 1 tab(s) orally once a day (at bedtime)  bumetanide 1 mg oral tablet: 1 tab(s) orally 2 times a day  carvedilol 3.125 mg oral tablet: 1 tab(s) orally every 12 hours  freetext medication     -: 1 tab(s) orally   freetext medication     -: 1 tab(s) orally   hydroCHLOROthiazide 25 mg oral tablet: 1 tab(s) orally once a day  K-Tab 20 mEq oral tablet, extended release: 2 tab(s) orally once a day   metFORMIN 500 mg oral tablet: 1 tab(s) orally once a day   sildenafil 20 mg oral tablet: 0.5 tab(s) orally 3 times a day aspirin 81 mg oral tablet, chewable: 1 tab(s) orally once a day  atorvastatin 40 mg oral tablet: 1 tab(s) orally once a day (at bedtime)  bumetanide 1 mg oral tablet: 1 tab(s) orally 2 times a day  carvedilol 3.125 mg oral tablet: 1 tab(s) orally every 12 hours  freetext medication     -: 1 tab(s) orally   freetext medication     -: 1 tab(s) orally   hydroCHLOROthiazide 25 mg oral tablet: 1 tab(s) orally once a day  K-Tab 20 mEq oral tablet, extended release: 2 tab(s) orally once a day   metFORMIN 500 mg oral tablet: 1 tab(s) orally once a day   metOLazone 2.5 mg oral tablet: 1 tab(s) orally once a day  sildenafil 20 mg oral tablet: 0.5 tab(s) orally 3 times a day aspirin 81 mg oral tablet, chewable: 1 tab(s) orally once a day  atorvastatin 40 mg oral tablet: 1 tab(s) orally once a day (at bedtime)  bumetanide 1 mg oral tablet: 1 tab(s) orally 2 times a day  carvedilol 3.125 mg oral tablet: 1 tab(s) orally every 12 hours  hydroCHLOROthiazide 25 mg oral tablet: 1 tab(s) orally once a day  K-Tab 20 mEq oral tablet, extended release: 2 tab(s) orally once a day   metFORMIN 500 mg oral tablet: 1 tab(s) orally once a day   metOLazone 2.5 mg oral tablet: 1 tab(s) orally once a day  Orenitram 0.125 mg oral tablet, extended release: 1 tab(s) orally 3 times a day  Orenitram 0.25 mg oral tablet, extended release: 1 tab(s) orally 3 times a day  sildenafil 20 mg oral tablet: 1 tab in morning  1 tab in afternoon  HALF  tab in evening aspirin 81 mg oral tablet, chewable: 1 tab(s) orally once a day  atorvastatin 40 mg oral tablet: 1 tab(s) orally once a day (at bedtime)  bumetanide 1 mg oral tablet: 1 tab(s) orally 2 times a day  carvedilol 3.125 mg oral tablet: 1 tab(s) orally every 12 hours  hydroCHLOROthiazide 25 mg oral tablet: 1 tab(s) orally once a day  K-Tab 20 mEq oral tablet, extended release: 2 tab(s) orally once a day   metFORMIN 500 mg oral tablet: 1 tab(s) orally once a day   metOLazone 2.5 mg oral tablet: 1 tab(s) orally once a day  Orenitram 0.125 mg oral tablet, extended release: 1 tab(s) orally 3 times a day MDD:.  Orenitram 0.25 mg oral tablet, extended release: 1 tab(s) orally 3 times a day MDD:.  sildenafil 20 mg oral tablet: 1 tab in morning  1 tab in afternoon  HALF  tab in evening aspirin 81 mg oral tablet, chewable: 1 tab(s) orally once a day  atorvastatin 40 mg oral tablet: 1 tab(s) orally once a day (at bedtime)  bumetanide 1 mg oral tablet: 1 tab(s) orally 2 times a day  carvedilol 3.125 mg oral tablet: 1 tab(s) orally every 12 hours  hydroCHLOROthiazide 25 mg oral tablet: 1 tab(s) orally once a day  K-Tab 20 mEq oral tablet, extended release: 2 tab(s) orally once a day   metFORMIN 500 mg oral tablet: 1 tab(s) orally once a day   metOLazone 2.5 mg oral tablet: 1 tab(s) orally once a day  Orenitram 0.125 mg oral tablet, extended release: 1 tab(s) orally 3 times a day MDD:..  Orenitram 0.25 mg oral tablet, extended release: 1 tab(s) orally 3 times a day MDD:..  sildenafil 20 mg oral tablet: 1 tab in morning  1 tab in afternoon  HALF  tab in evening

## 2020-05-01 NOTE — DISCHARGE NOTE PROVIDER - CARE PROVIDER_API CALL
[facility],   [facility] [place] [address]  Phone: [phone]  Fax: (   )    -  Follow Up Time:     Alton Cox)  Cardiovascular Disease  [address] [facility], [place] [address]  Phone: [phone]  Fax: [phone]  Follow Up Time: Alton Cox)  Cardiovascular Disease  [address] [facility], [address]  [place] [address]  Phone: [phone]  Fax: [phone]  Follow Up Time:     [facility],   [address] [place] [address]  Phone: [phone]  Fax: (   )    -  Follow Up Time:     Christopher Ramires (DO)  Critical Care Medicine; Internal Medicine; Pulmonary Disease  [address] [facility] [facility], [address]  [place] [address]  Phone: [phone]  Fax: [phone]  Follow Up Time:

## 2020-05-02 LAB
ANION GAP SERPL CALC-SCNC: 15 MMOL/L — SIGNIFICANT CHANGE UP (ref 5–17)
BUN SERPL-MCNC: 31 MG/DL — HIGH (ref 8–20)
CALCIUM SERPL-MCNC: 9.2 MG/DL — SIGNIFICANT CHANGE UP (ref 8.6–10.2)
CHLORIDE SERPL-SCNC: 85 MMOL/L — LOW (ref 98–107)
CO2 SERPL-SCNC: 37 MMOL/L — HIGH (ref 22–29)
CREAT SERPL-MCNC: 1.12 MG/DL — SIGNIFICANT CHANGE UP (ref 0.5–1.3)
GLUCOSE SERPL-MCNC: 99 MG/DL — SIGNIFICANT CHANGE UP (ref 70–99)
HCT VFR BLD CALC: 37.2 % — LOW (ref 39–50)
HGB BLD-MCNC: 12.1 G/DL — LOW (ref 13–17)
MAGNESIUM SERPL-MCNC: 1.9 MG/DL — SIGNIFICANT CHANGE UP (ref 1.6–2.6)
MCHC RBC-ENTMCNC: 29.4 PG — SIGNIFICANT CHANGE UP (ref 27–34)
MCHC RBC-ENTMCNC: 32.5 GM/DL — SIGNIFICANT CHANGE UP (ref 32–36)
MCV RBC AUTO: 90.3 FL — SIGNIFICANT CHANGE UP (ref 80–100)
PHOSPHATE SERPL-MCNC: 3.5 MG/DL — SIGNIFICANT CHANGE UP (ref 2.4–4.7)
PLATELET # BLD AUTO: 179 K/UL — SIGNIFICANT CHANGE UP (ref 150–400)
POTASSIUM SERPL-MCNC: 3.3 MMOL/L — LOW (ref 3.5–5.3)
POTASSIUM SERPL-SCNC: 3.3 MMOL/L — LOW (ref 3.5–5.3)
RBC # BLD: 4.12 M/UL — LOW (ref 4.2–5.8)
RBC # FLD: 16.1 % — HIGH (ref 10.3–14.5)
SODIUM SERPL-SCNC: 136 MMOL/L — SIGNIFICANT CHANGE UP (ref 135–145)
WBC # BLD: 3.87 K/UL — SIGNIFICANT CHANGE UP (ref 3.8–10.5)
WBC # FLD AUTO: 3.87 K/UL — SIGNIFICANT CHANGE UP (ref 3.8–10.5)

## 2020-05-02 PROCEDURE — 71045 X-RAY EXAM CHEST 1 VIEW: CPT | Mod: 26

## 2020-05-02 PROCEDURE — 99232 SBSQ HOSP IP/OBS MODERATE 35: CPT

## 2020-05-02 PROCEDURE — 99233 SBSQ HOSP IP/OBS HIGH 50: CPT | Mod: GC,25

## 2020-05-02 RX ORDER — POTASSIUM CHLORIDE 20 MEQ
40 PACKET (EA) ORAL EVERY 4 HOURS
Refills: 0 | Status: COMPLETED | OUTPATIENT
Start: 2020-05-02 | End: 2020-05-02

## 2020-05-02 RX ORDER — SODIUM CHLORIDE 0.65 %
1 AEROSOL, SPRAY (ML) NASAL
Refills: 0 | Status: DISCONTINUED | OUTPATIENT
Start: 2020-05-02 | End: 2020-05-04

## 2020-05-02 RX ORDER — BUMETANIDE 0.25 MG/ML
1 INJECTION INTRAMUSCULAR; INTRAVENOUS
Refills: 0 | Status: DISCONTINUED | OUTPATIENT
Start: 2020-05-02 | End: 2020-05-04

## 2020-05-02 RX ADMIN — ATORVASTATIN CALCIUM 40 MILLIGRAM(S): 80 TABLET, FILM COATED ORAL at 21:40

## 2020-05-02 RX ADMIN — Medication 40 MILLIEQUIVALENT(S): at 14:49

## 2020-05-02 RX ADMIN — Medication 1 SPRAY(S): at 09:46

## 2020-05-02 RX ADMIN — Medication 20 MILLIGRAM(S): at 14:49

## 2020-05-02 RX ADMIN — SENNA PLUS 2 TABLET(S): 8.6 TABLET ORAL at 21:39

## 2020-05-02 RX ADMIN — CARVEDILOL PHOSPHATE 3.12 MILLIGRAM(S): 80 CAPSULE, EXTENDED RELEASE ORAL at 05:49

## 2020-05-02 RX ADMIN — Medication 10 MILLIGRAM(S): at 05:49

## 2020-05-02 RX ADMIN — Medication 10 MILLIGRAM(S): at 21:39

## 2020-05-02 RX ADMIN — BUMETANIDE 1 MILLIGRAM(S): 0.25 INJECTION INTRAMUSCULAR; INTRAVENOUS at 21:40

## 2020-05-02 RX ADMIN — CARVEDILOL PHOSPHATE 3.12 MILLIGRAM(S): 80 CAPSULE, EXTENDED RELEASE ORAL at 18:47

## 2020-05-02 RX ADMIN — Medication 40 MILLIEQUIVALENT(S): at 09:46

## 2020-05-02 NOTE — PROGRESS NOTE ADULT - ASSESSMENT
40YO M w/ PMHx of morbidly obese, R sided heart failure (pLVEF with severely reduced RV function), severe pulmonary HTN on home O2, JAN on CPAP, IDDM2, and recent covid infection (discharged from [facility] 03/23/20) who presented to the ED with complaints of leg swelling and abdominal distension. Patient states that since started Orenitram he has been experiencing worsening abdominal distension and constipation. Pt admitted for acute on chronic CHF exacerbation. Cardiology consult noted.     Acute on Chronic HFpEF (65-70%) exacerbation in setting of severe pulm HTN w/ right heart failure   -Slow clinical improvement, pt is still volume overload. On 4-6L O2 NC  -CTA chest negative for PE  -C/w Coreg 3.125mg BID  -C/w Bumex 1mg BID, s/p Bumex gtt, s/p Lasix 40mg IVP BID   -C/w metolazone 2.5mg PO daily  -Orenitram 0.125mg 3 tabs PO BID.   -C/w sildenafil - dose increased to 20/20/10mg   -C/w Albuterol prn   -C/w CPAP   -COVID19 negative   -Trop neg X2  -  -Daily weight and I&O's - net negative.   -TTE 12/2019: LVEF 65-70%  -Cardio consult noted.     Abdominal distention w/ constipation - acute   -Improving   -Per pt, started since taking Orenitram  -C/w bowel regimen  -CT a/p unremarkable   -Also consider gastroparesis in setting of chronic DM     Hypokalemia  -S/p repletion  -F/u am     Abnormal CT findings   -CT a/p: Mediastinal, pelvic b/l, external iliac and b/l inguinal LAD - stable  -Pt to f/u outpatient     IDDM-2, A1c 6.6   -Blood glucose stable   -Hold PO meds, resume on dc     HTN  -Soft BP noted  -Will hold home med (HCTZ 25mg qd)  -Will resume as needed     Elevated INR   -Stable   -Likely 2/2 hepatic congestion in setting of acute CHF  -No active bleeding   -Continue to monitor    HLD  -C/w statin    JAN/OHS  -C/w CPAP  -Lifestyle modification: Diet, weight loss  -Outpt sleep study for further eval to obtain CPAP for home     Morbid obesity  -Lifestyle modification: Diet, weight loss    DVT ppx: Heparin subct     Dispo: Likely d/c in 1-2 days pending clinical improvement as pt is still volume overload. Cardio consult noted. Pt is from home, will return home. PT consult pending. Pt is high risk for readmission due to noncompliance. CM/SW aware.

## 2020-05-02 NOTE — PROGRESS NOTE ADULT - SUBJECTIVE AND OBJECTIVE BOX
[facility]  [address], [place] [address]    REASON FOR VISIT:  Follow up on chf  UPDATE:  Feeling less bloated:  had a nose bleed this am  TELEMETRY MONITORING:  off monitor    IN: 44.4 mL / OUT: 4200 mL      ROS:  No fever chills  Cardiac  No cp + sob  with exertion  NO palp  Resp  no cough no mucus production  Gi  no abd pain no melana  distension improved  Ext No calf tenderness, no edema      05-02    136  |  85<L>  |  31.0<H>  ----------------------------<  99  3.3<L>   |  37.0<H>  |  1.12    Ca    9.2      02 May 2020 06:12  Phos  3.5     05-02  Mg     1.9     05-02 05-01-20 @ 07:01  -  05-02-20 @ 07:00  --------------------------------------------------------  IN: 44.4 mL / OUT: 4200 mL / NET: -4155.6 mL    MEDICATIONS  (STANDING):  carvedilol 3.125 milliGRAM(s) Oral every 12 hours  metolazone 2.5 milliGRAM(s) Oral daily  carvedilol 3.125 milliGRAM(s) Oral every 12 hours  aspirin  chewable 81 milliGRAM(s) Oral daily  atorvastatin 40 milliGRAM(s) Oral at bedtime    buMETAnide Infusion 0.75 mG/Hr (3.75 mL/Hr) IV Continuous <Continuous>  enoxaparin Injectable 40 milliGRAM(s) SubCutaneous daily  Orenitram 0.125mg tablet 1 Tablet(s) 1 Tablet(s) Oral <User Schedule>  Orenitram 0.25mg tablet 1 Tablet(s) 1 Tablet(s) Oral <User Schedule>  senna 2 Tablet(s) Oral at bedtime  sildenafil (REVATIO) 10 milliGRAM(s) Oral <User Schedule>  sildenafil (REVATIO) 20 milliGRAM(s) Oral <User Schedule>      Vital Signs Last 24 Hrs  T(C): 36.6 (02 May 2020 04:30), Max: 36.8 (01 May 2020 19:45)  T(F): 97.9 (02 May 2020 04:30), Max: 98.3 (01 May 2020 19:45)  HR: 82 (02 May 2020 04:46) (81 - 84)  BP: 114/73 (02 May 2020 04:30) (107/72 - 120/87)  BP(mean): --  RR: 18 (02 May 2020 04:30) (18 - 20)  SpO2: 96% (02 May 2020 04:46) (94% - 96%)  T(C): 36.6 (05-02-20 @ 04:30), Max: 36.8 (05-01-20 @ 19:45)  HR: 82 (05-02-20 @ 04:46) (81 - 84)  BP: 114/73 (05-02-20 @ 04:30) (107/72 - 120/87)  RR: 18 (05-02-20 @ 04:30) (18 - 20)  SpO2: 96% (05-02-20 @ 04:46) (94% - 96%)    HEENT Head atraumatic eomi, oral mucosa moist  CV S1&S2   regular   RESP  Clear  GI  Soft active bowel sounds non tender  EXT  No clubbing/Cyanosis /  decreased edema  NEURO  Alert oriented  No gross motor or sensory deficits    < from: TTE Echo Complete w/Doppler (12.21.19 @ 08:48) >  Summary:   1. Technically fair study.   2. Left ventricular ejection fraction, by visual estimation, is 65 to 70%.   3. There is moderate septal left ventricular hypertrophy.   4. Severely enlarged right ventricle.   5. Severely reduced RV systolic function.   6. Right ventricular volume and pressure overload.   7. Moderate-severe tricuspid regurgitation.   8. Estimated pulmonary artery systolic pressure is 89.6 mmHg assuming a right atrial pressure of 15 mmHg, which is consistent with severe pulmonary hypertension.   9. Moderately dilated pulmonary artery.    < from: Cardiac Cath Lab - Adult (01.14.20 @ 12:56) >  HEMODYNAMICS: There is severe right sided failure. There is severe, fixed  pulmonary hypertension. With continued administration of inhaled nitric  oxide ( 40 PPM), there was no change in pulmonary hemodynamics.  COMPLICATIONS: No complications occurred during the cath lab visit.  DIAGNOSTIC IMPRESSIONS: Markedly elevated right sided filling pressure (RA  23 mmHg)  Severe pulmonary hypertension ( PASP 74 mmHg, Mean 51 mmHg)  PVR 5.79 Wood Units  PCWP within normal 15 mmgh  Pulmonary hypertension showed No response to Nitric oxide

## 2020-05-02 NOTE — PROGRESS NOTE ADULT - ASSESSMENT
This is a 40 y/o morbidly obese M with a PMHx of hx of R sided heart failure (normal LVEF with severely reduced RV function), severe pulmonary HTN on home O2, JAN on CPAP, IDDM2, and recent Covid infection (discharged from [facility] 03/23/20) who presented to the ED with complaints of leg swelling and abdominal distension. Patient states that being started on Oranetram he has been experiencing worsening abdominal distension and constipation.  Patient has had poor compliance with follow up    DECOMPESATED CHF/ Normal EF, Severe pulmonary hypertension  Copliance discussed with patient  Low na diet  dc Bumex drip  b/p soft change to oral bumex  carvedilol 3.125 milliGRAM(s) Oral every 12 hours  metolazone 2.5 milliGRAM(s) Oral daily  c/w oranetram & evatio to 20 mg in am and lunch, 10 mg in pm  oygen at home  Discussed having a sleep study to get cpap machine  Daily weights     Epistaxis  hold asa and lovenex  consider Ent consult if it continues to bleed

## 2020-05-02 NOTE — DIETITIAN INITIAL EVALUATION ADULT. - OTHER INFO
39 year old male morbidly obese with a PMH of R sided heart failure (normal LVEF with severely reduced RV function), severe pulmonary HTN, JAN on CPAP, IDDM2, and recent COVID infection (discharged from [facility] 03/23/20) who presented to the ED with complaints of leg swelling and abdominal distension. Admitted for acute on chronic HFpEF and abdominal distention w/ constipation. Attempted to interview, pt currently on CPAP. Consult received for diet education- provided with written literature at bedside. Aware pt with multiple admissions and has previously received nutrition education. Tolerating diet. Aware of constipation- improving as bowel regimen added. RD to follow up with subjective interview and verbal education as feasible.

## 2020-05-02 NOTE — DIETITIAN INITIAL EVALUATION ADULT. - PERTINENT LABORATORY DATA
05-02 Na136 mmol/L Glu 99 mg/dL K+ 3.3 mmol/L<L> Cr  1.12 mg/dL BUN 31.0 mg/dL<H> Phos 3.5 mg/dL Alb n/a   PAB n/a  HgA1c 6.6%

## 2020-05-02 NOTE — DIETITIAN INITIAL EVALUATION ADULT. - PERTINENT MEDS FT
MEDICATIONS  (STANDING):  aspirin  chewable 81 milliGRAM(s) Oral daily  atorvastatin 40 milliGRAM(s) Oral at bedtime  buMETAnide 1 milliGRAM(s) Oral two times a day  carvedilol 3.125 milliGRAM(s) Oral every 12 hours  enoxaparin Injectable 40 milliGRAM(s) SubCutaneous daily  metolazone 2.5 milliGRAM(s) Oral daily  Orenitram 0.125mg tablet 1 Tablet(s) 1 Tablet(s) Oral <User Schedule>  Orenitram 0.25mg tablet 1 Tablet(s) 1 Tablet(s) Oral <User Schedule>  potassium chloride    Tablet ER 40 milliEquivalent(s) Oral every 4 hours  senna 2 Tablet(s) Oral at bedtime  sildenafil (REVATIO) 10 milliGRAM(s) Oral <User Schedule>  sildenafil (REVATIO) 20 milliGRAM(s) Oral <User Schedule>    MEDICATIONS  (PRN):  ALBUTerol    90 MICROgram(s) HFA Inhaler 2 Puff(s) Inhalation every 6 hours PRN Shortness of Breath and/or Wheezing  polyethylene glycol 3350 17 Gram(s) Oral daily PRN Constipation  sodium chloride 0.65% Nasal 1 Spray(s) Both Nostrils every 3 hours PRN Nasal Congestion

## 2020-05-02 NOTE — PROGRESS NOTE ADULT - SUBJECTIVE AND OBJECTIVE BOX
Patient is a 39y old  Male who presents with a chief complaint of sob and bloated (28 Apr 2020 09:03)    Interval/Overnight Events   Pt seen and examined at beside. No acute events overnight. Pt reports feeling better but c/o legs feeling "heavy." Pt noted to have b/l epistaxis 2/2 O2 use. Pt is tolerating PO diet w/o n/v/d/c. Pt is voiding actively, last BM was normal.    ROS: Denies CP, HA, SOB, fever/chills, n/v/c/d, abdominal/back pain, blood in urine or stool, calf tenderness All other ROS negative.    OXYGEN: Pt on 4-6L. Uses CPAP at nights     Vital Signs Last 24 Hrs  T(C): 36.6 (02 May 2020 08:02), Max: 36.8 (01 May 2020 19:45)  T(F): 97.9 (02 May 2020 08:02), Max: 98.3 (01 May 2020 19:45)  HR: 90 (02 May 2020 08:02) (81 - 90)  BP: 96/62 (02 May 2020 08:02) (96/62 - 120/87)  RR: 19 (02 May 2020 08:02) (18 - 20)  SpO2: 95% (02 May 2020 08:02) (94% - 96%)    Physical Examination  GENERAL: NAD, communicates well  HEENT: clear sclera and conjunctiva, PERRLA, MMM, +b/l epistaxis   RESP: reduced air entry b/l, no wheezing or crackles  CVS: Normal S1 & S2, No murmurs, rubs, or gallops  GI: obese, +BS, nontender, nondistended  Extremities: +1 edema b/l, no clubbing, no calf tenderness/swelling   Skin: grossly intact, b/l LE - chronic venous stasis and lymphedema   Neuro: AAOX3, CN II-XII grossly intact     LABS                          12.1   3.87  )-----------( 179      ( 02 May 2020 06:12 )             37.2     05-02    136  |  85<L>  |  31.0<H>  ----------------------------<  99  3.3<L>   |  37.0<H>  |  1.12    Ca    9.2      02 May 2020 06:12  Phos  3.5     05-02  Mg     1.9     05-02    TPro  8.9<H>  /  Alb  3.5  /  TBili  2.0  /  DBili  x   /  AST  30  /  ALT  15  /  AlkPhos  132<H>  04-27    CARDIAC MARKERS ( 28 Apr 2020 07:53 )  x     / <0.01 ng/mL / x     / x     / x      CARDIAC MARKERS ( 27 Apr 2020 20:11 )  x     / <0.01 ng/mL / x     / x     / x        PT/INR - ( 28 Apr 2020 07:58 )   PT: 26.0 sec;   INR: 2.24 ratio    PTT - ( 27 Apr 2020 20:11 )  PTT:37.7 sec    IMAGING  CT Abdomen and Pelvis w/ IV Cont (04.28.20 @ 22:33) >  IMPRESSION:   Nearly nondiagnostic exam for detection of PE. No large saddle embolus.  Stable confluentareas of pulmonary groundglass opacities with patchy areas of sparing. Differential includes pulmonary edema, pneumonia, or inflammatory processes.  Stable thoracic and pelvic lymphadenopathy.  Hepatomegaly.    Xray Chest 1 View- PORTABLE-Urgent (04.27.20 @ 19:32) >  IMPRESSION: Very limited evaluation. Enlargement of the cardiac silhouette redemonstrated. Due to the limited nature of this evaluation mild airspace opacities within the lung parenchyma could not be fully excluded.       DIET: DASH/TLC, consistent carb     MEDICATIONS  (STANDING):  aspirin  chewable 81 milliGRAM(s) Oral daily  atorvastatin 40 milliGRAM(s) Oral at bedtime  buMETAnide 1 milliGRAM(s) Oral two times a day  carvedilol 3.125 milliGRAM(s) Oral every 12 hours  enoxaparin Injectable 40 milliGRAM(s) SubCutaneous daily  metolazone 2.5 milliGRAM(s) Oral daily  Orenitram 0.125mg tablet 1 Tablet(s) 1 Tablet(s) Oral <User Schedule>  Orenitram 0.25mg tablet 1 Tablet(s) 1 Tablet(s) Oral <User Schedule>  potassium chloride    Tablet ER 40 milliEquivalent(s) Oral every 4 hours  senna 2 Tablet(s) Oral at bedtime  sildenafil (REVATIO) 10 milliGRAM(s) Oral <User Schedule>  sildenafil (REVATIO) 20 milliGRAM(s) Oral <User Schedule>    MEDICATIONS  (PRN):  ALBUTerol    90 MICROgram(s) HFA Inhaler 2 Puff(s) Inhalation every 6 hours PRN Shortness of Breath and/or Wheezing  polyethylene glycol 3350 17 Gram(s) Oral daily PRN Constipation  sodium chloride 0.65% Nasal 1 Spray(s) Both Nostrils every 3 hours PRN Nasal Congestion

## 2020-05-03 LAB
ANION GAP SERPL CALC-SCNC: 11 MMOL/L — SIGNIFICANT CHANGE UP (ref 5–17)
ANION GAP SERPL CALC-SCNC: 15 MMOL/L — SIGNIFICANT CHANGE UP (ref 5–17)
BUN SERPL-MCNC: 38 MG/DL — HIGH (ref 8–20)
BUN SERPL-MCNC: 38 MG/DL — HIGH (ref 8–20)
CALCIUM SERPL-MCNC: 9 MG/DL — SIGNIFICANT CHANGE UP (ref 8.6–10.2)
CALCIUM SERPL-MCNC: 9.4 MG/DL — SIGNIFICANT CHANGE UP (ref 8.6–10.2)
CHLORIDE SERPL-SCNC: 83 MMOL/L — LOW (ref 98–107)
CHLORIDE SERPL-SCNC: 84 MMOL/L — LOW (ref 98–107)
CO2 SERPL-SCNC: 37 MMOL/L — HIGH (ref 22–29)
CO2 SERPL-SCNC: 39 MMOL/L — HIGH (ref 22–29)
CREAT SERPL-MCNC: 1.15 MG/DL — SIGNIFICANT CHANGE UP (ref 0.5–1.3)
CREAT SERPL-MCNC: 1.28 MG/DL — SIGNIFICANT CHANGE UP (ref 0.5–1.3)
GLUCOSE SERPL-MCNC: 106 MG/DL — HIGH (ref 70–99)
GLUCOSE SERPL-MCNC: 125 MG/DL — HIGH (ref 70–99)
HCT VFR BLD CALC: 36.7 % — LOW (ref 39–50)
HGB BLD-MCNC: 11.6 G/DL — LOW (ref 13–17)
MAGNESIUM SERPL-MCNC: 2 MG/DL — SIGNIFICANT CHANGE UP (ref 1.8–2.6)
MCHC RBC-ENTMCNC: 29 PG — SIGNIFICANT CHANGE UP (ref 27–34)
MCHC RBC-ENTMCNC: 31.6 GM/DL — LOW (ref 32–36)
MCV RBC AUTO: 91.8 FL — SIGNIFICANT CHANGE UP (ref 80–100)
PHOSPHATE SERPL-MCNC: 3.3 MG/DL — SIGNIFICANT CHANGE UP (ref 2.4–4.7)
PLATELET # BLD AUTO: 192 K/UL — SIGNIFICANT CHANGE UP (ref 150–400)
POTASSIUM SERPL-MCNC: 3.1 MMOL/L — LOW (ref 3.5–5.3)
POTASSIUM SERPL-MCNC: 3.3 MMOL/L — LOW (ref 3.5–5.3)
POTASSIUM SERPL-SCNC: 3.1 MMOL/L — LOW (ref 3.5–5.3)
POTASSIUM SERPL-SCNC: 3.3 MMOL/L — LOW (ref 3.5–5.3)
RBC # BLD: 4 M/UL — LOW (ref 4.2–5.8)
RBC # FLD: 16.2 % — HIGH (ref 10.3–14.5)
SODIUM SERPL-SCNC: 134 MMOL/L — LOW (ref 135–145)
SODIUM SERPL-SCNC: 135 MMOL/L — SIGNIFICANT CHANGE UP (ref 135–145)
WBC # BLD: 4.29 K/UL — SIGNIFICANT CHANGE UP (ref 3.8–10.5)
WBC # FLD AUTO: 4.29 K/UL — SIGNIFICANT CHANGE UP (ref 3.8–10.5)

## 2020-05-03 PROCEDURE — 99232 SBSQ HOSP IP/OBS MODERATE 35: CPT

## 2020-05-03 PROCEDURE — 99233 SBSQ HOSP IP/OBS HIGH 50: CPT | Mod: GC,25

## 2020-05-03 RX ORDER — POTASSIUM CHLORIDE 20 MEQ
40 PACKET (EA) ORAL
Refills: 0 | Status: COMPLETED | OUTPATIENT
Start: 2020-05-03 | End: 2020-05-03

## 2020-05-03 RX ORDER — POTASSIUM CHLORIDE 20 MEQ
40 PACKET (EA) ORAL EVERY 4 HOURS
Refills: 0 | Status: DISCONTINUED | OUTPATIENT
Start: 2020-05-03 | End: 2020-05-03

## 2020-05-03 RX ORDER — POTASSIUM CHLORIDE 20 MEQ
40 PACKET (EA) ORAL EVERY 4 HOURS
Refills: 0 | Status: COMPLETED | OUTPATIENT
Start: 2020-05-03 | End: 2020-05-04

## 2020-05-03 RX ADMIN — Medication 81 MILLIGRAM(S): at 12:39

## 2020-05-03 RX ADMIN — BUMETANIDE 1 MILLIGRAM(S): 0.25 INJECTION INTRAMUSCULAR; INTRAVENOUS at 05:16

## 2020-05-03 RX ADMIN — SENNA PLUS 2 TABLET(S): 8.6 TABLET ORAL at 21:04

## 2020-05-03 RX ADMIN — Medication 40 MILLIEQUIVALENT(S): at 09:53

## 2020-05-03 RX ADMIN — BUMETANIDE 1 MILLIGRAM(S): 0.25 INJECTION INTRAMUSCULAR; INTRAVENOUS at 18:14

## 2020-05-03 RX ADMIN — Medication 40 MILLIEQUIVALENT(S): at 21:03

## 2020-05-03 RX ADMIN — Medication 10 MILLIGRAM(S): at 06:42

## 2020-05-03 RX ADMIN — CARVEDILOL PHOSPHATE 3.12 MILLIGRAM(S): 80 CAPSULE, EXTENDED RELEASE ORAL at 05:16

## 2020-05-03 RX ADMIN — CARVEDILOL PHOSPHATE 3.12 MILLIGRAM(S): 80 CAPSULE, EXTENDED RELEASE ORAL at 18:14

## 2020-05-03 RX ADMIN — ATORVASTATIN CALCIUM 40 MILLIGRAM(S): 80 TABLET, FILM COATED ORAL at 21:04

## 2020-05-03 RX ADMIN — Medication 20 MILLIGRAM(S): at 18:14

## 2020-05-03 RX ADMIN — Medication 10 MILLIGRAM(S): at 21:04

## 2020-05-03 NOTE — PHYSICAL THERAPY INITIAL EVALUATION ADULT - CRITERIA FOR SKILLED THERAPEUTIC INTERVENTIONS
functional limitations in following categories/risk reduction/prevention/anticipated discharge recommendation/impairments found/anticipated equipment needs at discharge

## 2020-05-03 NOTE — PROGRESS NOTE ADULT - ATTENDING COMMENTS
Encouraged to get OOB and walk room....  FU as per GI consult.   Pain Mange ment  Dilaudid for today. reassess in am.
I concur with the above note.
Patient seen and examined by me.  I have discussed my recommendation with the PA which are outlined above.  Will sign off
Patient was seen and examined by me at bedside. I agree with resident's note, subjective, objective physical exam, assessment and plan with following modifications/additions.
Patient was seen and examined by me at bedside. I agree with resident's note, subjective, objective physical exam, assessment and plan with following modifications/additions.
Mr. Garcia was seen and examined.   Feels slightly better, had BM last night.  CT scan was reviewed with radiologist, no PV thrombosis seen. Hepatic vein thrombosis not present.  Cont with diuretics. Increase metolazone. Replace lytes.  Agree with a/p.  Possible DC home in AM
Multiple admissions for CHF exacerbation.   Will need firm counseling and RFU with PMD to manage diet, weight loss, and exercise as well as Nutritional counseling for CHF and DM.    DAILY weights.
pt was seen and examined.  He is still volume overloaded.   On Orenitram tid   He will need diuretics, continous iv bumex  replace lytes.  cont with cpap at night.   I agree with a/p.
Patient was seen and examined by me at bedside. I agree with resident's note, subjective, objective physical exam, assessment and plan with following modifications/additions.

## 2020-05-03 NOTE — PROGRESS NOTE ADULT - ASSESSMENT
This is a 38 y/o morbidly obese M with a PMHx of hx of R sided heart failure (normal LVEF with severely reduced RV function), severe pulmonary HTN on home O2, JAN on CPAP, IDDM2, and recent Covid infection (discharged from [facility] 03/23/20) who presented to the ED with complaints of leg swelling and abdominal distension. Patient states that being started on Oranetram he has been experiencing worsening abdominal distension and constipation.  Patient has had poor compliance with follow up.  Paitent stable, educated on medications, home oxygen, and from a cardiac persepctive will sign off.  Please re consult if necessary.      DECOMPESATED CHF/ Normal EF, Severe pulmonary hypertension  Copliance discussed with patient  Low na diet  dc Bumex drip  b/p soft change to oral bumex  carvedilol 3.125 milliGRAM(s) Oral every 12 hours  Ct bumes and metolazone 2.5 milliGRAM(s) Oral daily  c/w oranetram & evatio to 20 mg in am and lunch, 10 mg in pm  oygen at home  Discussed having a sleep study to get cpap machine  -Slow clinical improvement, pt is still volume overload. On 4-6L O2 NC  -CTA chest negative for PE  -C/w Coreg 3.125mg BID  -C/w metolazone 2.5mg PO daily  -Orenitram 0.125mg 3 tabs PO BID.   -C/w sildenafil - dose increased to 20/20/10mg   -C/w Albuterol prn   -C/w CPAP   -COVID19 negative   -Trop neg X2  -  -Daily weight and I&O's - net negative.   -TTE 12/2019: LVEF 65-70%  Will sign off and please re consult as need.      Epistaxis  consider Ent consult if it continues to bleed  No further bleeding noted.

## 2020-05-03 NOTE — PROGRESS NOTE ADULT - REASON FOR ADMISSION
sob and bloated

## 2020-05-03 NOTE — PROGRESS NOTE ADULT - ASSESSMENT
40YO M w/ PMHx of morbidly obese, R sided heart failure (pLVEF with severely reduced RV function), severe pulmonary HTN on home O2, JAN on CPAP, IDDM2, and recent covid infection (discharged from [facility] 03/23/20) who presented to the ED with complaints of leg swelling and abdominal distension. Patient states that since started Orenitram he has been experiencing worsening abdominal distension and constipation. Pt admitted for acute on chronic CHF exacerbation. Cardiology consult noted.     Acute on Chronic HFpEF (65-70%) exacerbation in setting of severe pulm HTN w/ right heart failure   -Slow clinical improvement, pt is still volume overload. On 4-6L O2 NC  -CTA chest negative for PE  -C/w Coreg 3.125mg BID  -C/w Bumex 1mg BID, s/p Bumex gtt, s/p Lasix 40mg IVP BID   -C/w metolazone 2.5mg PO daily  -Orenitram 0.125mg 3 tabs PO BID.   -C/w sildenafil - dose increased to 20/20/10mg   -C/w Albuterol prn   -C/w CPAP   -COVID19 negative   -Trop neg X2  -  -Daily weight and I&O's - net negative.   -TTE 12/2019: LVEF 65-70%  -Cardio consult noted.     Abdominal distention w/ constipation - acute   -Improving   -Per pt, started since taking Orenitram  -C/w bowel regimen  -CT a/p unremarkable   -Also consider gastroparesis in setting of chronic DM     Hypokalemia  -S/p repletion  -F/u am     Abnormal CT findings   -CT a/p: Mediastinal, pelvic b/l, external iliac and b/l inguinal LAD - stable  -Pt to f/u outpatient     IDDM-2, A1c 6.6   -Blood glucose stable   -Hold PO meds, resume on dc     HTN  -Soft BP noted  -Will hold home med (HCTZ 25mg qd)  -Will resume as needed     Elevated INR   -Stable   -Likely 2/2 hepatic congestion in setting of acute CHF  -No active bleeding   -Continue to monitor    HLD  -C/w statin    JAN/OHS  -C/w CPAP  -Lifestyle modification: Diet, weight loss  -Outpt sleep study for further eval to obtain CPAP for home     Morbid obesity  -Lifestyle modification: Diet, weight loss    DVT ppx: Heparin subct     Dispo: Likely d/c in 1-2 days pending clinical improvement as pt is still volume overload. Cardio consult noted. Pt is from home, will return home. PT consult pending. Pt is high risk for readmission due to noncompliance. CM/SW aware. 38YO M w/ PMHx of morbidly obese, R sided heart failure (pLVEF with severely reduced RV function), severe pulmonary HTN on home O2, JAN on CPAP, IDDM2, and recent covid infection (discharged from [facility] 03/23/20) who presented to the ED with complaints of leg swelling and abdominal distension. Patient states that since started Orenitram he has been experiencing worsening abdominal distension and constipation. Pt admitted for acute on chronic CHF exacerbation.     Acute on Chronic HFpEF (65-70%) exacerbation in setting of severe pulm HTN w/ right heart failure   -Slow clinical improvement, pt is still volume overload. On 4-6L O2 NC  -CTA chest negative for PE  -C/w Coreg 3.125mg BID  -C/w Bumex 1mg BID, s/p Bumex gtt, s/p Lasix 40mg IVP BID   -C/w metolazone 2.5mg PO daily  -Orenitram 0.125mg 3 tabs PO BID.   -C/w sildenafil - dose increased to 20/20/10mg   -C/w Albuterol prn   -C/w CPAP   -COVID19 negative   -Trop neg X2  -  -Daily weight and I&O's - net negative.   -TTE 12/2019: LVEF 65-70%  -Cardio consult noted.     Abdominal distention w/ constipation - acute   -Improving   -Per pt, started since taking Orenitram  -C/w bowel regimen  -CT a/p unremarkable   -Also consider gastroparesis in setting of chronic DM     Hypokalemia  -S/p repletion  -F/u am     Abnormal CT findings   -CT a/p: Mediastinal, pelvic b/l, external iliac and b/l inguinal LAD - stable  -Pt to f/u outpatient     IDDM-2, A1c 6.6   -Blood glucose stable   -Hold PO meds, resume on dc     HTN  -Soft BP noted  -Will hold home med (HCTZ 25mg qd)  -Will resume as needed     Elevated INR   -Stable   -Likely 2/2 hepatic congestion in setting of acute CHF  -No active bleeding   -Continue to monitor    HLD  -C/w statin    JAN/OHS  -C/w CPAP  -Lifestyle modification: Diet, weight loss  -Outpt sleep study for further eval to obtain CPAP for home     Morbid obesity  -Lifestyle modification: Diet, weight loss    DVT ppx: Heparin subct     Dispo: Likely d/c in 1-2 days pending clinical improvement as pt is still volume overload. Pt is from home, PT consult pending. Pt is high risk for readmission due to noncompliance. CM/SW aware. 40YO M w/ PMHx of morbidly obese, R sided heart failure (pLVEF with severely reduced RV function), severe pulmonary HTN on home O2, JAN on CPAP, IDDM2, and recent covid infection (discharged from [facility] 03/23/20) who presented to the ED with complaints of leg swelling and abdominal distension. Patient states that since started Orenitram he has been experiencing worsening abdominal distension and constipation. Pt admitted for acute on chronic CHF exacerbation.     Acute on Chronic HFpEF (65-70%) exacerbation in setting of severe pulm HTN w/ right heart failure   -Slow clinical improvement, pt is still volume overload. On 4-6L O2 NC  -CTA chest negative for PE  -C/w Coreg 3.125mg BID  -C/w Bumex 1mg BID, s/p Bumex gtt, s/p Lasix 40mg IVP BID   -C/w metolazone 2.5mg PO daily  -Orenitram 0.125mg 3 tabs PO BID.   -C/w sildenafil - dose increased to 20/20/10mg   -C/w Albuterol prn   -C/w CPAP   -COVID19 negative   -Trop neg X2  -  -Daily weight and I&O's - net negative.   -TTE 12/2019: LVEF 65-70%  -Cardio consult noted.     Abdominal distention w/ constipation - acute   -Improving   -Per pt, started since taking Orenitram  -C/w bowel regimen  -CT a/p unremarkable   -Also consider gastroparesis in setting of chronic DM     Hypokalemia  -S/p repletion  -F/u am     Abnormal CT findings   -CT a/p: Mediastinal, pelvic b/l, external iliac and b/l inguinal LAD - stable  -Pt to f/u outpatient     IDDM-2, A1c 6.6   -Blood glucose stable   -Hold PO meds, resume on dc     HTN  -Soft BP noted  -Will hold home med (HCTZ 25mg qd)  -Will resume as needed     Elevated INR   -Stable   -Likely 2/2 hepatic congestion in setting of acute CHF  -No active bleeding   -Continue to monitor    HLD  -C/w statin    JAN/OHS  -C/w CPAP  -Lifestyle modification: Diet, weight loss  -Outpt sleep study for further eval to obtain CPAP for home     Morbid obesity  -Lifestyle modification: Diet, weight loss    DVT ppx: Heparin subct     Dispo: Likely d/c today. Pt is from home, PT consult pending. Pt is high risk for readmission due to noncompliance. CM/SW aware. 38YO M w/ PMHx of morbidly obese, R sided heart failure (pLVEF with severely reduced RV function), severe pulmonary HTN on home O2, JAN on CPAP, IDDM2, and recent covid infection (discharged from [facility] 03/23/20) who presented to the ED with complaints of leg swelling and abdominal distension. Patient states that since started Orenitram he has been experiencing worsening abdominal distension and constipation. Pt admitted for acute on chronic CHF exacerbation.     Acute on Chronic HFpEF (65-70%) exacerbation in setting of severe pulm HTN w/ right heart failure   -Slow clinical improvement, pt is still volume overload. On 4-6L O2 NC  -CTA chest negative for PE  -C/w Coreg 3.125mg BID  -C/w Bumex 1mg BID, s/p Bumex gtt, s/p Lasix 40mg IVP BID   -C/w metolazone 2.5mg PO daily  -Orenitram 0.125mg 3 tabs PO BID.   -C/w sildenafil - dose increased to 20/20/10mg   -C/w Albuterol prn   -C/w CPAP   -COVID19 negative   -Trop neg X2  -  -Daily weight and I&O's - net negative.   -TTE 12/2019: LVEF 65-70%  -Cardio consult noted.     Abdominal distention w/ constipation - acute   -Improving   -Per pt, started since taking Orenitram  -C/w bowel regimen  -CT a/p unremarkable   -Also consider gastroparesis in setting of chronic DM     Hypokalemia  -S/p repletion  -F/u am     Abnormal CT findings   -CT a/p: Mediastinal, pelvic b/l, external iliac and b/l inguinal LAD - stable  -Pt to f/u outpatient     IDDM-2, A1c 6.6   -Blood glucose stable   -Hold PO meds, resume on dc     HTN  -Soft BP noted  -Will hold home med (HCTZ 25mg qd)  -Will resume as needed     Elevated INR   -Stable   -Likely 2/2 hepatic congestion in setting of acute CHF  -No active bleeding   -Continue to monitor    HLD  -C/w statin    JAN/OHS  -C/w CPAP  -Lifestyle modification: Diet, weight loss  -Outpt sleep study for further eval to obtain CPAP for home     Morbid obesity  -Lifestyle modification: Diet, weight loss    DVT ppx: Heparin subct     Dispo: Likely d/c tomorrow. Pt is from home, PT consult consult in progress, patient desatted on ambulation (o2sat: 70s) with supplemental oxygen, so unable to assess stairs. Pt is high risk for readmission due to noncompliance. CM/SW aware.

## 2020-05-03 NOTE — PROGRESS NOTE ADULT - SUBJECTIVE AND OBJECTIVE BOX
Patient is a 39y old  Male who presents with a chief complaint of sob and bloated (02 May 2020 10:34)    INTERVAL/OVERNIGHT EVENTS:  Patient seen and examined at bedside this morning on CPAP.     OXYGEN:   Mask / Cannula    L/min     O2 Sat:   %    I&O's Summary  02 May 2020 07:01  -  03 May 2020 07:00  --------------------------------------------------------  IN: 0 mL / OUT: 1300 mL / NET: -1300 mL    Daily Weight in k.5 (02 May 2020 10:29)    V/S:  T(F): 98.3 (03 May 2020 07:55), Max: 98.7 (02 May 2020 19:53)  HR: 77 (03 May 2020 09:37) (77 - 95)  BP: 106/70 (03 May 2020 07:55) (103/67 - 149/76)  RR: 18 (03 May 2020 09:41) (18 - 20)  SpO2: 90% (03 May 2020 09:41) (90% - 98%)    Physical Exam:   GENERAL: NAD, well-groomed, well-developed, communicates well  HEENT: clear sclera and conjunctiva PERRLA, pupil constricted to light b/l   RESP: CTA b/l, B/L air entry, no crackles, no wheezing  CVS: Regular rate and rhythm; Normal S1 & S2, No murmurs, rubs, or gallops  Abdomen: +BS, nontender, nondistended, no CVA tenderness  Extremities: no cyanosis, no edema, no clubbing, no calf tenderness  Skin: grossly intact, (if elderly include skin tenting findings)  Neuro: AAOX3, motor intact 5/5    LABS                          11.6   4.29  )-----------( 192      ( 03 May 2020 06:48 )             36.7         05-03    135  |  83<L>  |  38.0<H>  ----------------------------<  106<H>  3.3<L>   |  37.0<H>  |  1.28    Ca    9.4      03 May 2020 06:48  Phos  3.3     05-03  Mg     2.0     05-03    IMAGING    DIET:    MEDICATIONS  (STANDING):  aspirin  chewable 81 milliGRAM(s) Oral daily  atorvastatin 40 milliGRAM(s) Oral at bedtime  buMETAnide 1 milliGRAM(s) Oral two times a day  carvedilol 3.125 milliGRAM(s) Oral every 12 hours  enoxaparin Injectable 40 milliGRAM(s) SubCutaneous daily  metolazone 2.5 milliGRAM(s) Oral daily  Orenitram 0.125mg tablet 1 Tablet(s) 1 Tablet(s) Oral <User Schedule>  Orenitram 0.25mg tablet 1 Tablet(s) 1 Tablet(s) Oral <User Schedule>  potassium chloride   Powder 40 milliEquivalent(s) Oral every 2 hours  senna 2 Tablet(s) Oral at bedtime  sildenafil (REVATIO) 10 milliGRAM(s) Oral <User Schedule>  sildenafil (REVATIO) 20 milliGRAM(s) Oral <User Schedule>    MEDICATIONS  (PRN):  ALBUTerol    90 MICROgram(s) HFA Inhaler 2 Puff(s) Inhalation every 6 hours PRN Shortness of Breath and/or Wheezing  polyethylene glycol 3350 17 Gram(s) Oral daily PRN Constipation  sodium chloride 0.65% Nasal 1 Spray(s) Both Nostrils every 3 hours PRN Nasal Congestion Patient is a 39y old  Male who presents with a chief complaint of sob and bloated (02 May 2020 10:34)    INTERVAL/OVERNIGHT EVENTS:  Patient seen and examined at bedside this morning on CPAP. Patient states that his breathing is improved, as well as abdominal distention and lower extremity edema. Patient is voiding without difficulty, has not ambulated. Denies any fevers, chills, chest pain, cough, SOB, abdominal pain, calf pain, diarrhea.     I&O's Summary  02 May 2020 07:01  -  03 May 2020 07:00  --------------------------------------------------------  IN: 0 mL / OUT: 1300 mL / NET: -1300 mL    Daily Weight in k.5 (02 May 2020 10:29)    V/S:  T(F): 98.3 (03 May 2020 07:55), Max: 98.7 (02 May 2020 19:53)  HR: 77 (03 May 2020 09:37) (77 - 95)  BP: 106/70 (03 May 2020 07:55) (103/67 - 149/76)  RR: 18 (03 May 2020 09:41) (18 - 20)  SpO2: 90% (03 May 2020 09:41) (90% - 98%)    Physical Examination  GENERAL: NAD, bipap over face  HEENT: clear sclera and conjunctiva, EOMI, MMM  RESP: reduced air entry b/l, no wheezing or crackles  CVS: Normal S1 & S2, No murmurs, rubs, or gallops  GI: obese, +BS, nontender, distended  Extremities: +1 edema b/l, no clubbing, no calf tenderness  Skin: grossly intact, b/l LE - chronic venous stasis and lymphedema   Neuro: AAOX3    LABS                          11.6   4.29  )-----------( 192      ( 03 May 2020 06:48 )             36.7         05-03    135  |  83<L>  |  38.0<H>  ----------------------------<  106<H>  3.3<L>   |  37.0<H>  |  1.28    Ca    9.4      03 May 2020 06:48  Phos  3.3     05-03  Mg     2.0     05-03    MEDICATIONS  (STANDING):  aspirin  chewable 81 milliGRAM(s) Oral daily  atorvastatin 40 milliGRAM(s) Oral at bedtime  buMETAnide 1 milliGRAM(s) Oral two times a day  carvedilol 3.125 milliGRAM(s) Oral every 12 hours  enoxaparin Injectable 40 milliGRAM(s) SubCutaneous daily  metolazone 2.5 milliGRAM(s) Oral daily  Orenitram 0.125mg tablet 1 Tablet(s) 1 Tablet(s) Oral <User Schedule>  Orenitram 0.25mg tablet 1 Tablet(s) 1 Tablet(s) Oral <User Schedule>  potassium chloride   Powder 40 milliEquivalent(s) Oral every 2 hours  senna 2 Tablet(s) Oral at bedtime  sildenafil (REVATIO) 10 milliGRAM(s) Oral <User Schedule>  sildenafil (REVATIO) 20 milliGRAM(s) Oral <User Schedule>    MEDICATIONS  (PRN):  ALBUTerol    90 MICROgram(s) HFA Inhaler 2 Puff(s) Inhalation every 6 hours PRN Shortness of Breath and/or Wheezing  polyethylene glycol 3350 17 Gram(s) Oral daily PRN Constipation  sodium chloride 0.65% Nasal 1 Spray(s) Both Nostrils every 3 hours PRN Nasal Congestion

## 2020-05-03 NOTE — PHYSICAL THERAPY INITIAL EVALUATION ADULT - ADDITIONAL COMMENTS
pt lives with his family who he states are able to assist him as needed. pt has 3 steps to enter with rails. pt owns no DME and was independent with all ADL's PTA. pt lives with his family who he states are able to assist him as needed. pt has 3 steps to enter with rails. pt owns no DME and was independent with all ADL's PTA. pt on home O2.

## 2020-05-03 NOTE — PROGRESS NOTE ADULT - SUBJECTIVE AND OBJECTIVE BOX
[place] [facility]                                                        Office: [address] [address]                                                       Telephone: [phone]. Fax:[phone]                                                                             PROGRESS NOTE   Reason for follow up:   SOB                            Overnight: No new events.   Update: Stable overnight    Subjective: "I feel better"   Complains of:  nothing  Review of symptoms: Cardiac:  No chest pain. No dyspnea. No palpitations.  Respiratory: no cough. No dyspnea  Gastrointestinal: No diarrhea. No abdominal pain. No bleeding.     Past medical history: No updates.   Chronic conditions:  HEALTH ISSUES - PROBLEM Dx:  morbidly obese M with a PMHx of hx of R sided heart failure (normal LVEF with severely reduced RV function), severe pulmonary HTN on home O2, JAN on CPAP, IDDM2, and recent Covid infection  	  Vitals:  T(C): 36.8 (05-03-20 @ 07:55), Max: 37.1 (05-02-20 @ 19:53)  HR: 77 (05-03-20 @ 09:37) (77 - 95)  BP: 106/70 (05-03-20 @ 07:55) (103/67 - 149/76)  RR: 18 (05-03-20 @ 09:41) (18 - 20)  SpO2: 90% (05-03-20 @ 09:41) (90% - 98%)  I&O's Summary  02 May 2020 07:01  -  03 May 2020 07:00  --------------------------------------------------------  IN: 0 mL / OUT: 1300 mL / NET: -1300 mL  Weight (kg): 158.3 (05-02 @ 09:00)    PHYSICAL EXAM:  Appearance: Comfortable. No acute distress, obese  HEENT:  Head and neck: Atraumatic. Normocephalic.  Normal oral mucosa, PERRL, Neck is supple. No JVD, No carotid bruit.   Neurologic: A & O x 3, no focal deficits. EOMI , Cranial nerves are intact.  Lymphatic: No cervical lymphadenopathy  Cardiovascular: Normal S1 S2, No murmur, rubs/gallops. No JVD, No edema  Respiratory: Lungs clear to auscultation  Gastrointestinal:  Soft, Non-tender, + BS  Lower Extremities: +1 edema, decreasing.    Psychiatry: Patient is calm. No agitation. Mood & affect appropriate  Skin: No rashes/ ecchymoses/cyanosis/ulcers visualized on the face, hands or feet.    CURRENT MEDICATIONS:  buMETAnide 1 milliGRAM(s) Oral two times a day  carvedilol 3.125 milliGRAM(s) Oral every 12 hours  metolazone 2.5 milliGRAM(s) Oral daily  sildenafil (REVATIO) 10 milliGRAM(s) Oral <User Schedule>  sildenafil (REVATIO) 20 milliGRAM(s) Oral <User Schedule>  senna  atorvastatin  aspirin  chewable  enoxaparin Injectable  potassium chloride   Powder    LABS:	 	                              11.6   4.29  )-----------( 192      ( 03 May 2020 06:48 )             36.7     05-03    135  |  83<L>  |  38.0<H>  ----------------------------<  106<H>  3.3<L>   |  37.0<H>  |  1.28    Ca    9.4      03 May 2020 06:48  Phos  3.3     05-03  Mg     2.0     05-03      proBNP:   Lipid Profile:   HgA1c: TSH:     TELEMETRY: Reviewed    ECG:  Reviewed by me. 	    DIAGNOSTIC TESTING:  [ ] Echocardiogram:   [ ]  Catheterization:  [ ] Stress Test:    OTHER:

## 2020-05-04 ENCOUNTER — TRANSCRIPTION ENCOUNTER (OUTPATIENT)
Age: 40
End: 2020-05-04

## 2020-05-04 VITALS
TEMPERATURE: 99 F | DIASTOLIC BLOOD PRESSURE: 77 MMHG | HEART RATE: 89 BPM | SYSTOLIC BLOOD PRESSURE: 130 MMHG | OXYGEN SATURATION: 94 % | RESPIRATION RATE: 18 BRPM

## 2020-05-04 PROCEDURE — 99285 EMERGENCY DEPT VISIT HI MDM: CPT | Mod: 25

## 2020-05-04 PROCEDURE — 97163 PT EVAL HIGH COMPLEX 45 MIN: CPT

## 2020-05-04 PROCEDURE — 84484 ASSAY OF TROPONIN QUANT: CPT

## 2020-05-04 PROCEDURE — 99239 HOSP IP/OBS DSCHRG MGMT >30: CPT | Mod: 25

## 2020-05-04 PROCEDURE — 87635 SARS-COV-2 COVID-19 AMP PRB: CPT

## 2020-05-04 PROCEDURE — 80053 COMPREHEN METABOLIC PANEL: CPT

## 2020-05-04 PROCEDURE — 85610 PROTHROMBIN TIME: CPT

## 2020-05-04 PROCEDURE — 71045 X-RAY EXAM CHEST 1 VIEW: CPT

## 2020-05-04 PROCEDURE — 86900 BLOOD TYPING SEROLOGIC ABO: CPT

## 2020-05-04 PROCEDURE — 96374 THER/PROPH/DIAG INJ IV PUSH: CPT

## 2020-05-04 PROCEDURE — 36415 COLL VENOUS BLD VENIPUNCTURE: CPT

## 2020-05-04 PROCEDURE — 83735 ASSAY OF MAGNESIUM: CPT

## 2020-05-04 PROCEDURE — 94660 CPAP INITIATION&MGMT: CPT

## 2020-05-04 PROCEDURE — 97116 GAIT TRAINING THERAPY: CPT

## 2020-05-04 PROCEDURE — 84100 ASSAY OF PHOSPHORUS: CPT

## 2020-05-04 PROCEDURE — 86901 BLOOD TYPING SEROLOGIC RH(D): CPT

## 2020-05-04 PROCEDURE — 83880 ASSAY OF NATRIURETIC PEPTIDE: CPT

## 2020-05-04 PROCEDURE — 80048 BASIC METABOLIC PNL TOTAL CA: CPT

## 2020-05-04 PROCEDURE — 85730 THROMBOPLASTIN TIME PARTIAL: CPT

## 2020-05-04 PROCEDURE — 86850 RBC ANTIBODY SCREEN: CPT

## 2020-05-04 PROCEDURE — 85027 COMPLETE CBC AUTOMATED: CPT

## 2020-05-04 PROCEDURE — 71260 CT THORAX DX C+: CPT

## 2020-05-04 PROCEDURE — 74177 CT ABD & PELVIS W/CONTRAST: CPT

## 2020-05-04 PROCEDURE — 97110 THERAPEUTIC EXERCISES: CPT

## 2020-05-04 RX ORDER — TREPROSTINIL 48 UG/1
1 INHALANT ORAL
Qty: 90 | Refills: 0
Start: 2020-05-04 | End: 2020-06-02

## 2020-05-04 RX ADMIN — Medication 81 MILLIGRAM(S): at 12:08

## 2020-05-04 RX ADMIN — Medication 40 MILLIEQUIVALENT(S): at 00:50

## 2020-05-04 RX ADMIN — CARVEDILOL PHOSPHATE 3.12 MILLIGRAM(S): 80 CAPSULE, EXTENDED RELEASE ORAL at 05:33

## 2020-05-04 RX ADMIN — BUMETANIDE 1 MILLIGRAM(S): 0.25 INJECTION INTRAMUSCULAR; INTRAVENOUS at 17:19

## 2020-05-04 RX ADMIN — Medication 10 MILLIGRAM(S): at 05:34

## 2020-05-04 RX ADMIN — BUMETANIDE 1 MILLIGRAM(S): 0.25 INJECTION INTRAMUSCULAR; INTRAVENOUS at 05:33

## 2020-05-04 RX ADMIN — Medication 40 MILLIEQUIVALENT(S): at 05:33

## 2020-05-04 RX ADMIN — CARVEDILOL PHOSPHATE 3.12 MILLIGRAM(S): 80 CAPSULE, EXTENDED RELEASE ORAL at 17:19

## 2020-05-04 RX ADMIN — Medication 20 MILLIGRAM(S): at 17:19

## 2020-05-04 NOTE — DISCHARGE NOTE NURSING/CASE MANAGEMENT/SOCIAL WORK - PATIENT PORTAL LINK FT
You can access the FollowMyHealth Patient Portal offered by [facility] by registering at the following website: [email or web address]. By joining [employer]’s FollowMyHealth portal, you will also be able to view your health information using other applications (apps) compatible with our system.

## 2020-05-04 NOTE — CHART NOTE - NSCHARTNOTEFT_GEN_A_CORE
Patient discharged home today at 4 PM. Please refer to complete discharge provider note for further details.

## 2020-05-04 NOTE — DISCHARGE NOTE NURSING/CASE MANAGEMENT/SOCIAL WORK - NSDCFUADDAPPT_GEN_ALL_CORE_FT
-Follow up with Primary Care Doctor 3-5 days.  -Follow up with Cardiology in 1 week.  -Follow up with Pulmonology in 2-3 weeks.   -Follow up at Lymphedema clinic in 1-2 weeks.

## 2020-05-14 ENCOUNTER — INPATIENT (INPATIENT)
Facility: HOSPITAL | Age: 40
LOS: 5 days | Discharge: NURSING, REHAB OR LONG-TERM CARE | DRG: 314 | End: 2020-05-20
Attending: HOSPITALIST | Admitting: STUDENT IN AN ORGANIZED HEALTH CARE EDUCATION/TRAINING PROGRAM
Payer: MEDICARE

## 2020-05-14 VITALS
TEMPERATURE: 98 F | RESPIRATION RATE: 23 BRPM | WEIGHT: 315 LBS | OXYGEN SATURATION: 91 % | SYSTOLIC BLOOD PRESSURE: 109 MMHG | DIASTOLIC BLOOD PRESSURE: 77 MMHG | HEART RATE: 85 BPM | HEIGHT: 69 IN

## 2020-05-14 DIAGNOSIS — I50.9 HEART FAILURE, UNSPECIFIED: ICD-10-CM

## 2020-05-14 LAB
ALBUMIN SERPL ELPH-MCNC: 3.1 G/DL — LOW (ref 3.3–5.2)
ALP SERPL-CCNC: 120 U/L — SIGNIFICANT CHANGE UP (ref 40–120)
ALT FLD-CCNC: 14 U/L — SIGNIFICANT CHANGE UP
ANION GAP SERPL CALC-SCNC: 10 MMOL/L — SIGNIFICANT CHANGE UP (ref 5–17)
APPEARANCE UR: CLEAR — SIGNIFICANT CHANGE UP
AST SERPL-CCNC: 32 U/L — SIGNIFICANT CHANGE UP
BACTERIA # UR AUTO: ABNORMAL
BILIRUB SERPL-MCNC: 2.1 MG/DL — HIGH (ref 0.4–2)
BILIRUB UR-MCNC: NEGATIVE — SIGNIFICANT CHANGE UP
BUN SERPL-MCNC: 38 MG/DL — HIGH (ref 8–20)
CALCIUM SERPL-MCNC: 9.3 MG/DL — SIGNIFICANT CHANGE UP (ref 8.6–10.2)
CHLORIDE SERPL-SCNC: 91 MMOL/L — LOW (ref 98–107)
CO2 SERPL-SCNC: 32 MMOL/L — HIGH (ref 22–29)
COLOR SPEC: YELLOW — SIGNIFICANT CHANGE UP
CREAT SERPL-MCNC: 1.47 MG/DL — HIGH (ref 0.5–1.3)
DIFF PNL FLD: NEGATIVE — SIGNIFICANT CHANGE UP
EPI CELLS # UR: SIGNIFICANT CHANGE UP
GLUCOSE BLDC GLUCOMTR-MCNC: 97 MG/DL — SIGNIFICANT CHANGE UP (ref 70–99)
GLUCOSE SERPL-MCNC: 135 MG/DL — HIGH (ref 70–99)
GLUCOSE UR QL: NEGATIVE MG/DL — SIGNIFICANT CHANGE UP
HCT VFR BLD CALC: 39.2 % — SIGNIFICANT CHANGE UP (ref 39–50)
HGB BLD-MCNC: 12.5 G/DL — LOW (ref 13–17)
KETONES UR-MCNC: NEGATIVE — SIGNIFICANT CHANGE UP
LEUKOCYTE ESTERASE UR-ACNC: NEGATIVE — SIGNIFICANT CHANGE UP
LIDOCAIN IGE QN: 59 U/L — HIGH (ref 22–51)
MAGNESIUM SERPL-MCNC: 2 MG/DL — SIGNIFICANT CHANGE UP (ref 1.6–2.6)
MCHC RBC-ENTMCNC: 29.3 PG — SIGNIFICANT CHANGE UP (ref 27–34)
MCHC RBC-ENTMCNC: 31.9 GM/DL — LOW (ref 32–36)
MCV RBC AUTO: 92 FL — SIGNIFICANT CHANGE UP (ref 80–100)
NITRITE UR-MCNC: NEGATIVE — SIGNIFICANT CHANGE UP
NT-PROBNP SERPL-SCNC: 710 PG/ML — HIGH (ref 0–300)
PH UR: 7 — SIGNIFICANT CHANGE UP (ref 5–8)
PLATELET # BLD AUTO: 175 K/UL — SIGNIFICANT CHANGE UP (ref 150–400)
POTASSIUM SERPL-MCNC: 3.3 MMOL/L — LOW (ref 3.5–5.3)
POTASSIUM SERPL-SCNC: 3.3 MMOL/L — LOW (ref 3.5–5.3)
PROT SERPL-MCNC: 8.8 G/DL — HIGH (ref 6.6–8.7)
PROT UR-MCNC: 30 MG/DL
RBC # BLD: 4.26 M/UL — SIGNIFICANT CHANGE UP (ref 4.2–5.8)
RBC # FLD: 16.4 % — HIGH (ref 10.3–14.5)
RBC CASTS # UR COMP ASSIST: SIGNIFICANT CHANGE UP /HPF (ref 0–4)
SARS-COV-2 RNA SPEC QL NAA+PROBE: SIGNIFICANT CHANGE UP
SODIUM SERPL-SCNC: 132 MMOL/L — LOW (ref 135–145)
SP GR SPEC: 1 — LOW (ref 1.01–1.02)
TROPONIN T SERPL-MCNC: <0.01 NG/ML — SIGNIFICANT CHANGE UP (ref 0–0.06)
TROPONIN T SERPL-MCNC: <0.01 NG/ML — SIGNIFICANT CHANGE UP (ref 0–0.06)
UROBILINOGEN FLD QL: 1 MG/DL
WBC # BLD: 4.56 K/UL — SIGNIFICANT CHANGE UP (ref 3.8–10.5)
WBC # FLD AUTO: 4.56 K/UL — SIGNIFICANT CHANGE UP (ref 3.8–10.5)
WBC UR QL: SIGNIFICANT CHANGE UP

## 2020-05-14 PROCEDURE — 99285 EMERGENCY DEPT VISIT HI MDM: CPT | Mod: CS

## 2020-05-14 PROCEDURE — 99223 1ST HOSP IP/OBS HIGH 75: CPT | Mod: GC

## 2020-05-14 PROCEDURE — 71045 X-RAY EXAM CHEST 1 VIEW: CPT | Mod: 26,CS

## 2020-05-14 PROCEDURE — 99223 1ST HOSP IP/OBS HIGH 75: CPT

## 2020-05-14 PROCEDURE — 93010 ELECTROCARDIOGRAM REPORT: CPT

## 2020-05-14 RX ORDER — FUROSEMIDE 40 MG
60 TABLET ORAL ONCE
Refills: 0 | Status: COMPLETED | OUTPATIENT
Start: 2020-05-14 | End: 2020-05-14

## 2020-05-14 RX ORDER — ASPIRIN/CALCIUM CARB/MAGNESIUM 324 MG
325 TABLET ORAL ONCE
Refills: 0 | Status: COMPLETED | OUTPATIENT
Start: 2020-05-14 | End: 2020-05-14

## 2020-05-14 RX ORDER — NITROGLYCERIN 6.5 MG
1 CAPSULE, EXTENDED RELEASE ORAL ONCE
Refills: 0 | Status: COMPLETED | OUTPATIENT
Start: 2020-05-14 | End: 2020-05-14

## 2020-05-14 RX ORDER — DEXTROSE 50 % IN WATER 50 %
25 SYRINGE (ML) INTRAVENOUS ONCE
Refills: 0 | Status: DISCONTINUED | OUTPATIENT
Start: 2020-05-14 | End: 2020-05-20

## 2020-05-14 RX ORDER — SODIUM CHLORIDE 9 MG/ML
1000 INJECTION, SOLUTION INTRAVENOUS
Refills: 0 | Status: DISCONTINUED | OUTPATIENT
Start: 2020-05-14 | End: 2020-05-20

## 2020-05-14 RX ORDER — DEXTROSE 50 % IN WATER 50 %
15 SYRINGE (ML) INTRAVENOUS ONCE
Refills: 0 | Status: DISCONTINUED | OUTPATIENT
Start: 2020-05-14 | End: 2020-05-20

## 2020-05-14 RX ORDER — POTASSIUM CHLORIDE 20 MEQ
40 PACKET (EA) ORAL EVERY 4 HOURS
Refills: 0 | Status: DISCONTINUED | OUTPATIENT
Start: 2020-05-14 | End: 2020-05-14

## 2020-05-14 RX ORDER — GLUCAGON INJECTION, SOLUTION 0.5 MG/.1ML
1 INJECTION, SOLUTION SUBCUTANEOUS ONCE
Refills: 0 | Status: DISCONTINUED | OUTPATIENT
Start: 2020-05-14 | End: 2020-05-20

## 2020-05-14 RX ORDER — SENNA PLUS 8.6 MG/1
2 TABLET ORAL AT BEDTIME
Refills: 0 | Status: DISCONTINUED | OUTPATIENT
Start: 2020-05-14 | End: 2020-05-20

## 2020-05-14 RX ORDER — INSULIN LISPRO 100/ML
VIAL (ML) SUBCUTANEOUS AT BEDTIME
Refills: 0 | Status: DISCONTINUED | OUTPATIENT
Start: 2020-05-14 | End: 2020-05-20

## 2020-05-14 RX ORDER — POLYETHYLENE GLYCOL 3350 17 G/17G
17 POWDER, FOR SOLUTION ORAL DAILY
Refills: 0 | Status: DISCONTINUED | OUTPATIENT
Start: 2020-05-14 | End: 2020-05-19

## 2020-05-14 RX ORDER — INSULIN LISPRO 100/ML
VIAL (ML) SUBCUTANEOUS
Refills: 0 | Status: DISCONTINUED | OUTPATIENT
Start: 2020-05-14 | End: 2020-05-20

## 2020-05-14 RX ORDER — CARVEDILOL PHOSPHATE 80 MG/1
3.12 CAPSULE, EXTENDED RELEASE ORAL EVERY 12 HOURS
Refills: 0 | Status: DISCONTINUED | OUTPATIENT
Start: 2020-05-14 | End: 2020-05-14

## 2020-05-14 RX ORDER — ATORVASTATIN CALCIUM 80 MG/1
40 TABLET, FILM COATED ORAL AT BEDTIME
Refills: 0 | Status: DISCONTINUED | OUTPATIENT
Start: 2020-05-14 | End: 2020-05-20

## 2020-05-14 RX ORDER — CARVEDILOL PHOSPHATE 80 MG/1
3.12 CAPSULE, EXTENDED RELEASE ORAL EVERY 12 HOURS
Refills: 0 | Status: DISCONTINUED | OUTPATIENT
Start: 2020-05-14 | End: 2020-05-20

## 2020-05-14 RX ORDER — POTASSIUM CHLORIDE 20 MEQ
40 PACKET (EA) ORAL EVERY 4 HOURS
Refills: 0 | Status: COMPLETED | OUTPATIENT
Start: 2020-05-14 | End: 2020-05-15

## 2020-05-14 RX ORDER — ASPIRIN/CALCIUM CARB/MAGNESIUM 324 MG
81 TABLET ORAL DAILY
Refills: 0 | Status: DISCONTINUED | OUTPATIENT
Start: 2020-05-14 | End: 2020-05-20

## 2020-05-14 RX ORDER — HEPARIN SODIUM 5000 [USP'U]/ML
5000 INJECTION INTRAVENOUS; SUBCUTANEOUS EVERY 8 HOURS
Refills: 0 | Status: DISCONTINUED | OUTPATIENT
Start: 2020-05-14 | End: 2020-05-20

## 2020-05-14 RX ORDER — BUMETANIDE 0.25 MG/ML
1 INJECTION INTRAMUSCULAR; INTRAVENOUS DAILY
Refills: 0 | Status: DISCONTINUED | OUTPATIENT
Start: 2020-05-15 | End: 2020-05-15

## 2020-05-14 RX ORDER — DEXTROSE 50 % IN WATER 50 %
12.5 SYRINGE (ML) INTRAVENOUS ONCE
Refills: 0 | Status: DISCONTINUED | OUTPATIENT
Start: 2020-05-14 | End: 2020-05-20

## 2020-05-14 RX ADMIN — Medication 60 MILLIGRAM(S): at 17:45

## 2020-05-14 RX ADMIN — ATORVASTATIN CALCIUM 40 MILLIGRAM(S): 80 TABLET, FILM COATED ORAL at 23:25

## 2020-05-14 RX ADMIN — SENNA PLUS 2 TABLET(S): 8.6 TABLET ORAL at 23:18

## 2020-05-14 RX ADMIN — Medication 20 MILLIGRAM(S): at 23:25

## 2020-05-14 RX ADMIN — Medication 325 MILLIGRAM(S): at 18:25

## 2020-05-14 RX ADMIN — Medication 1 INCH(S): at 17:45

## 2020-05-14 RX ADMIN — Medication 40 MILLIEQUIVALENT(S): at 23:24

## 2020-05-14 RX ADMIN — Medication 1 INCH(S): at 20:44

## 2020-05-14 NOTE — ED PROVIDER NOTE - CARE PLAN
Principal Discharge DX:	CHF (congestive heart failure)  Secondary Diagnosis:	Pulmonary hypertension  Secondary Diagnosis:	Diabetes  Secondary Diagnosis:	Hypertension  Secondary Diagnosis:	JAN (obstructive sleep apnea)

## 2020-05-14 NOTE — H&P ADULT - NSHPLABSRESULTS_GEN_ALL_CORE
12.5   4.56  )-----------( 175      ( 14 May 2020 17:30 )              05-14    132<L>  |  91<L>  |  38.0<H>  ----------------------------<  135<H>  3.3<L>   |  32.0<H>  |  1.47<H>    Ca    9.3      14 May 2020 17:30  Mg     2.0     05-14    TPro  8.8<H>  /  Alb  3.1<L>  /  TBili  2.1<H>  /  DBili  x   /  AST  32  /  ALT  14  /  AlkPhos  120  05-14      < from: Xray Chest 1 View-PORTABLE IMMEDIATE (05.14.20 @ 18:48) >       EXAM:  XR CHEST PORTABLE IMMED 1V                          PROCEDURE DATE:  05/14/2020          INTERPRETATION:    AP chest on May 14, 2020 at 6:26 PM. Patient has chest pain. COVID virus testing has been negative to date. Patient has history of CHF, diabetes, hypertension, and pulmonary hypertension. Patient has lymphedema of the legs. Patient is obese.    Heart is greatly enlarged and obscures the lungs. Large size of the individual also obscures the lungs.    Small right base effusion cannot be excluded.    On May 2, the lungs were grossly clear.    IMPRESSION: As above.    DISCRETE X-RAY DATA:  Percent of LEFT lung opacification: Clear  Percent of RIGHT lung opacification: 1-33%  Change in lung opacification from most recent x-ray (<=3days): No Prior  Change from prior dated 3 or more days (same admission): Increase    < end of copied text >

## 2020-05-14 NOTE — CONSULT NOTE ADULT - ATTENDING COMMENTS
38 y/o morbidly obese male known to our service from multiple admission. severe Right sided HR with severe PH on PH meds followed by Dr. Cox , presents again  with SOB on exertion and LE edema  BNP not as elevated as before   start IV bumex 1 mg daily   monitor electrolytes   continue PH meds   Dr. Cox to follow tomorrow

## 2020-05-14 NOTE — ED PROVIDER NOTE - OBJECTIVE STATEMENT
The patient is a 39 year old male with history of CHF, DM, HTN, Pulmonary HTN presents with SOB, abdominal bloating and bilateral leg swelling.  No fever, No cough, No CP, No abd pain

## 2020-05-14 NOTE — H&P ADULT - ASSESSMENT
A 40 y/o Male with PMH significant for Morbid obesity, JAN, HTN, CHFpEF, Pulm HTN, DM II, presents today c/o SOB and lower extremity swelling, being admitted for Acute on Chronic HFpEF (65-70%) exacerbation     > Admit to medicine  > Bed: Monitor/  > Diet: Dash/TLC, fluid restricted    > Activity: ambulate as tolerated  > Nursing: vitals per routine  > Consults: SS Cardio     Acute on Chronic HFpEF (65-70%) exacerbation  -Patient fluid overload, b/l LE and scrotal edema + Bibasilar crackles  -Pt, know for being poor complaint and poor out patient follow up, did not follow up with any provider after his last DC   -S/p Lasix 60mg IV at the ED  -C/w Bumex 1mg BID,   -C/w Coreg 3.125mg BID  -C/w metolazone 2.5mg PO daily  -Orenitram 0.125mg 3 tabs PO BID.   -C/w sildenafil - dose increased to 20/20/10mg   -C/w Albuterol prn   -COVID19 negative   -Trop neg X1  -  -Daily weight and I&O's - net negative.   -TTE 12/2019: LVEF 65-70%  -Cardio consult noted.     Abdominal distention   likely due to fluid overload but also associated with constipation   -Per pt, started since taking Orenitram  -C/w bowel regimen    Hypokalemia  -S/p repletion  -F/u am     Abnormal CT findings   -CT a/p: Mediastinal, pelvic b/l, external iliac and b/l inguinal LAD, at his last admission   -was instruted to follow up with Rheumatologist or PMD, however failed to do so.    DM-2, A1c 6.6   -Blood glucose stable   -Hold PO meds, resume on dc     HTN  -Soft BP noted  -Will hold home med (HCTZ 25mg qd)  -Will resume as needed     Elevated INR   -Stable   -Likely 2/2 hepatic congestion in setting of acute CHF  -No active bleeding   -Continue to monitor    HLD  -C/w statin    JAN/OHS  -C/w CPAP  -Lifestyle modification: Diet, weight loss  -Outpt sleep study for further eval to obtain CPAP for home     Morbid obesity  -Lifestyle modification: Diet, weight loss    DVT ppx: Heparin subct     Dispo: Likely d/c tomorrow. Pt is from home, PT consult consult in progress, patient desatted on ambulation (o2sat: 70s) with supplemental oxygen, so unable to assess stairs. Pt is high risk for readmission due to noncompliance. CM/SW aware A 40 y/o Male with PMH significant for Morbid obesity, JAN, HTN, CHFpEF, Pulm HTN, DM II, presents today c/o SOB and lower extremity swelling, being admitted for Acute on Chronic HFpEF (65-70%) exacerbation     > Admit to medicine  > Bed: Monitor/  > Diet: Dash/TLC, fluid restricted    > Activity: ambulate as tolerated  > Nursing: vitals per routine  > Consults: SS Cardio     Acute on Chronic HFpEF (65-70%) exacerbation  -Patient fluid overload, b/l LE and scrotal edema + Bibasilar crackles  -Pt, know for being poor complaint and poor out patient follow up, did not follow up with any provider after his last DC   -S/p Lasix 60mg IV at the ED  -C/w Bumex 1mg BID,   -C/w Coreg 3.125mg BID  -C/w metolazone 2.5mg PO daily  -Orenitram 0.125mg 3 tabs PO BID.   -C/w sildenafil - dose increased to 20/20/10mg   -C/w Albuterol prn   -COVID19 negative   -Trop neg X1  -  -Daily weight and I&O's - net negative.   -TTE 12/2019: LVEF 65-70%  -Cardio consult noted.     Abdominal distention   likely due to fluid overload but also associated with constipation   -Per pt, started since taking Orenitram  -C/w bowel regimen    HAYES   Likely due diuretics  Base line 1 - 1.1  Cr: 1.4  Will closely monitor, while on diuretics      Hypokalemia  -S/p repletion  -F/u am       DM-2, A1c 6.6   -Blood glucose stable   -Hold PO meds (Metformin), resume on dc    -Mod ISS    HTN  -Soft BP noted  -Will closely watch while in diuretics   -C/w Cored with holding parameters     HLD  -C/w statin    JAN/OHS  -C/w CPAP  -Lifestyle modification: Diet, weight loss  -Again needs outpt sleep study for further eval to obtain CPAP for home     Abnormal CT findings   -CT a/p: Mediastinal, pelvic b/l, external iliac and b/l inguinal LAD, at his last admission   -was instruted to follow up with Rheumatologist or PMD, however failed to do so.    Morbid obesity  -Lifestyle modification: Diet, weight loss    DVT ppx  -Heparin SC    Code status   Full Code    Patient high risk for readmission, will consult CM and SW A 40 y/o Male with PMH significant for Morbid obesity, JAN, HTN, CHFpEF, Pulm HTN, DM II, presents today c/o SOB and lower extremity swelling, being admitted for Acute on Chronic Respiratory Failure due to CHF exacerbation secondary to Pulmonary Hypertension      > Admit to medicine  > Bed: Monitor/  > Diet: Dash/TLC, fluid restricted    > Activity: ambulate as tolerated  > Nursing: vitals per routine  > Consults: SS Cardio     Acute on Chronic Respiratory Failure due to CHF exacerbation, Right side HF secondary to Pulmonary Hypertension   -Patient fluid overload, b/l LE and scrotal edema + Bibasilar crackles  -Pt, know for being poor complaint and poor out patient follow up, did not follow up with any provider after his last DC   -S/p Lasix 60mg IV at the ED  -C/w Bumex 1mg BID,   -C/w Coreg 3.125mg BID  -C/w metolazone 2.5mg PO daily  -Orenitram 0.125mg 3 tabs PO BID.   -C/w sildenafil - dose increased to 20/20/10mg   -C/w Albuterol prn   -COVID19 negative   -Trop neg X1  -  -Daily weight and I&O's - net negative.   -TTE 12/2019: LVEF 65-70%, Severely reduced RV systolic function, Right ventricular volume and pressure overload, Moderate-severe tricuspid regurgitation.  -Cardio consult noted.     Abdominal distention   likely due to fluid overload but also associated with constipation   -Per pt, started since taking Orenitram  -C/w bowel regimen with Senna and Miralax, will escalate as needed    Prerenal acute HAYES   Likely due diuretics  Base line 1 - 1.1  Cr: 1.4  Will closely monitor, while on diuretics      Hypokalemia  -S/p repletion  -F/u am     Mild Hyponatremia  -Pt. being diureses   -Will hold HCTZ for now  	  -Serum Na: 132  -Will continue to monitor      DM-2, A1c 6.6   -Blood glucose stable   -Hold PO meds (Metformin), resume on dc    -Mod ISS    HTN  -Soft BP noted  -Will closely watch while in diuretics   -C/w Cored with holding parameters     HLD  -C/w statin    JAN/OHS  -C/w CPAP  -Lifestyle modification: Diet, weight loss  -Again needs outpt sleep study for further eval to obtain CPAP for home     Abnormal CT findings   -CT a/p: Mediastinal, pelvic b/l, external iliac and b/l inguinal LAD, at his last admission   -was instructed to follow up with Rheumatologist or PMD, however failed to do so.    Morbid obesity  -Lifestyle modification: Diet, weight loss    DVT ppx  -Heparin SC    Code status   Full Code    Patient high risk for readmission, will consult CM and SW A 40 y/o Male with PMH significant for Morbid obesity, JAN, HTN, CHFpEF, Pulm HTN, DM II, presents today c/o SOB and lower extremity swelling, being admitted for Acute on Chronic Respiratory Failure due to CHF exacerbation secondary to Pulmonary Hypertension      > Admit to medicine  > Bed: Monitor/  > Diet: Dash/TLC, fluid restricted    > Activity: ambulate as tolerated  > Nursing: vitals per routine  > Consults: SS Cardio     Acute on Chronic Respiratory Failure due to CHF exacerbation, Right side HF secondary to Pulmonary Hypertension   -Patient fluid overload, b/l LE and scrotal edema + Bibasilar crackles  -Pt, know for being poor complaint and poor out patient follow up, did not follow up with any provider after his last DC   -S/p Lasix 60mg IV at the ED  -C/w Bumex 1mg BID,   -C/w Coreg 3.125mg BID  -C/w metolazone 2.5mg PO daily  -Orenitram 0.125mg 3 tabs PO BID.   -C/w sildenafil - dose increased to 20/20/10mg   -C/w Albuterol prn   -COVID19 negative   -Trop neg X1  -  -Daily weight and I&O's - net negative.   -TTE 12/2019: LVEF 65-70%, Severely reduced RV systolic function, Right ventricular volume and pressure overload, Moderate-severe tricuspid regurgitation.  -Cardio consult noted.     Abdominal distention   likely due to fluid overload but also associated with constipation   -Per pt, started since taking Orenitram  -C/w bowel regimen with Senna and Miralax, will escalate as needed    Prerenal HAYES   Likely due diuretics  Base line 1 - 1.1  Cr: 1.4  Will closely monitor, while on diuretics      Hypokalemia  -S/p repletion  -F/u am     Mild Hyponatremia  -Pt. being diureses   -Will hold HCTZ for now  	  -Serum Na: 132  -Will continue to monitor      DM-2, A1c 6.6   -Blood glucose stable   -Hold PO meds (Metformin), resume on dc    -Mod ISS    HTN  -Soft BP noted  -Will closely watch while in diuretics   -C/w Cored with holding parameters     HLD  -C/w statin    JAN/OHS  -C/w CPAP  -Lifestyle modification: Diet, weight loss  -Again needs outpt sleep study for further eval to obtain CPAP for home     Abnormal CT findings   -CT a/p: Mediastinal, pelvic b/l, external iliac and b/l inguinal LAD, at his last admission   -was instructed to follow up with Rheumatologist or PMD, however failed to do so.    Morbid obesity  -Lifestyle modification: Diet, weight loss    DVT ppx  -Heparin SC    Code status   Full Code    Patient high risk for readmission, will consult CM and SW A 40 y/o Male with PMH significant for Morbid obesity, JAN, HTN, CHFpEF, Pulm HTN, DM II, presents today c/o SOB and lower extremity swelling, being admitted for Acute on Chronic Respiratory Failure due to CHF exacerbation secondary to Pulmonary Hypertension      > Admit to medicine  > Bed: Monitor/  > Diet: Dash/TLC, fluid restricted    > Activity: ambulate as tolerated  > Nursing: vitals per routine  > Consults: SS Cardio     Acute on Chronic Respiratory Failure due to CHF exacerbation, Right side HF secondary to Pulmonary Hypertension   -Patient fluid overload, b/l LE and scrotal edema + Bibasilar crackles  -Pt, know for being poor complaint and poor out patient follow up, did not follow up with any provider after his last DC   -S/p Lasix 60mg IV at the ED  -C/w Bumex 1mg BID,   -C/w Coreg 3.125mg BID  -C/w metolazone 2.5mg PO daily  -Orenitram 0.125mg 3 tabs PO BID.   -C/w sildenafil - dose increased to 20/20/10mg   -C/w Albuterol prn   -COVID19 negative   -Trop neg X1  -  -Daily weight and I&O's - net negative.   -TTE 12/2019: LVEF 65-70%, Severely reduced RV systolic function, Right ventricular volume and pressure overload, Moderate-severe tricuspid regurgitation.  -Cardio consult noted.     Abdominal distention   -likely due to fluid overload but also associated with constipation   -Per pt, started since taking Orenitram  -C/w bowel regimen with Senna and Miralax, will escalate as needed    Prerenal HAYES   Likely due diuretics and CHF exacerbation   Base line 1 - 1.1  Cr: 1.4  Will closely monitor, while on diuretics      Hypokalemia  -S/p repletion  -F/u am     Mild Hyponatremia  -Due to diureses   -Will hold HCTZ for now  	  -Serum Na: 132  -Will continue to monitor      DM-2, A1c 6.6   -Blood glucose stable   -Hold PO meds (Metformin), resume on dc    -Mod ISS    HTN  -Soft BP noted  -Will closely watch while in diuretics   -C/w Cored with holding parameters     HLD  -C/w statin    JAN/OHS  -C/w CPAP  -Lifestyle modification: Diet, weight loss  -Again needs outpt sleep study for further eval to obtain CPAP for home     Abnormal CT findings   -CT a/p: Mediastinal, pelvic b/l, external iliac and b/l inguinal LAD, at his last admission   -was instructed to follow up with Rheumatologist or PMD, however failed to do so.    Morbid obesity  -Lifestyle modification: Diet, weight loss    DVT ppx  -Heparin SC    Code status   Full Code    Patient high risk for readmission, will consult CM and SW

## 2020-05-14 NOTE — CONSULT NOTE ADULT - SUBJECTIVE AND OBJECTIVE BOX
[facility]                                                               Office:  [address] [place] [address]                                                              Telephone: [phone]. Fax:[phone]                                                                        CARDIOLOGY CONSULTATION NOTE                                                                                             Consult requested by:  Dr. Leblanc  Reason for Consultation: CHF  History obtained by: Patient and medical record   obtained: No    Chief complaint:    Patient is a 39y old  Male who presents with a chief complaint of shortness of breath and leg swelling.       HPI: 40 y/o morbidly obese M with a PMHx of hx of R sided heart failure (normalLVEF with severely reduced RV function), severe pulmonary HTN on home O2, JAN on CPAP, and IDDM2 who was recently admitted to [facility] from (-/20) for heart failure exacerbation presented to the ED today with complaints of dyspnea on exertion and leg swelling. Patient states that he only felt better for a day or 2 after discharge, and since has been experiencing worsening dyspnea on exertion, leg swelling, and abdominal distension. Patient states that he has been taking all of his medications, and has not had dietary indiscretions. Patient received IV lasix in the ED, states very minimal improvement in breathing. Pt is covid-19 negative. Patient denies any fevers, chills, CP, syncope, near syncope, abdominal pain, N/V/D, headache, or dizziness.     REVIEW OF SYMPTOMS:     CONSTITUTIONAL: No fever, weight loss, or fatigue  ENMT:  No difficulty hearing, tinnitus, vertigo; No sinus or throat pain  NECK: No pain or stiffness  CARDIOVASCULAR: AS PER HPI  RESPIRATORY: AS PER HPI  : No dysuria, no hematuria   GI: No dark color stool, no melena, no diarrhea, no constipation, no abdominal pain   NEURO: No headache, no dizziness, no slurred speech   MUSCULOSKELETAL: No joint pain or swelling; No muscle, back, or extremity pain  PSYCH: No agitation, no anxiety.    ALL OTHER REVIEW OF SYSTEMS ARE NEGATIVE.      PREVIOUS DIAGNOSTIC TESTING  ECHO FINDINGS:  < from: TTE Echo Complete w/Doppler (19 @ 08:48) >  PHYSICIAN INTERPRETATION:  Left Ventricle: The left ventricular internal cavity size is normal.  Left ventricular ejection fraction, by visual estimation, is 65 to 70%. The interventricular septum is flattened in systole and diastole, consistent with right ventricular pressure and volume overload.  Right Ventricle: The right ventricular size is severely enlarged. RV systolic function is severely reduced. TV S' 0.2 m/s.  Left Atrium: The left atrium is normal in size.  Right Atrium: Severely enlarged right atrium.  Pericardium: There is no evidence of pericardial effusion.  Mitral Valve: The mitral valve is normal in structure. Mild mitral valve regurgitation is seen.  Tricuspid Valve: Moderate-severe tricuspid regurgitation is visualized. Estimated pulmonary artery systolic pressure is 89.6 mmHg assuming a right atrial pressure of 15 mmHg, which is consistent with severe pulmonary hypertension.  Aortic Valve: The aortic valve is normal. No evidence of aortic valve regurgitation is seen.  Pulmonic Valve: The pulmonic valve was not well visualized. Mild pulmonic valve regurgitation.  Aorta: The aortic root is normal in size and structure.  Pulmonary Artery: The pulmonary artery is moderately dilated.  Venous: The inferior vena cava was dilated, with respiratory size variation less than 50%. The inferior vena cava and the hepatic vein show a pattern of systolic flow reversal, suggestive of tricuspid regurgitation.       Summary:   1. Technically fair study.   2. Left ventricular ejection fraction, by visual estimation, is 65 to 70%.   3. There is moderate septal left ventricular hypertrophy.   4. Severely enlarged right ventricle.   5. Severely reduced RV systolic function.   6. Right ventricular volume and pressure overload.   7. Moderate-severe tricuspid regurgitation.   8. Estimated pulmonary artery systolic pressure is 89.6 mmHg assuming a right atrial pressure of 15 mmHg, which is consistent with severe pulmonary hypertension.   9. Moderately dilated pulmonary artery.    MD Josefina Electronically signed on 2019 at 2:54:21 PM    < end of copied text >    CATHETERIZATION FINDINGS:   < from: Cardiac Cath Lab - Adult (20 @ 12:56) >  COMPLICATIONS: No complications occurred during the cath lab visit.  DIAGNOSTIC IMPRESSIONS: Markedly elevated right sided filling pressure (RA  23 mmHg)  Severe pulmonary hypertension ( PASP 74 mmHg, Mean 51 mmHg)  PVR 5.79 Wood Units  PCWP within normal 15 mmgh  Pulmonary hypertension showed No response to Nitric oxide  DIAGNOSTIC RECOMMENDATIONS: Management as per Dr. Cox.  Prepared and signed by  Jairon Santana MD  Signed 2020 18:16:31    < end of copied text >    ALLERGIES: Allergies    No Known Allergies    Intolerances    PAST MEDICAL HISTORY  JAN (obstructive sleep apnea)  Diabetes  Obesity, morbid  Pulmonary hypertension  CHF (congestive heart failure)  Hypertension      PAST SURGICAL HISTORY  No significant past surgical history      FAMILY HISTORY:  Family history of diabetes mellitus (DM): grandmother.    SOCIAL HISTORY: lives with family  CIGARETTES: smoked 20 years ago for a short time  ALCOHOL: denies  DRUGS: denies    CURRENT MEDICATIONS:       HOME MEDICATIONS:  aspirin 81 mg oral tablet, chewable: 1 tab(s) orally once a day  atorvastatin 40 mg oral tablet: 1 tab(s) orally once a day (at bedtime)  bumetanide 1 mg oral tablet: 1 tab(s) orally 2 times a day  carvedilol 3.125 mg oral tablet: 1 tab(s) orally every 12 hours  hydroCHLOROthiazide 25 mg oral tablet: 1 tab(s) orally once a day  K-Tab 20 mEq oral tablet, extended release: 2 tab(s) orally once a day   metFORMIN 500 mg oral tablet: 1 tab(s) orally once a day   metOLazone 2.5 mg oral tablet: 1 tab(s) orally once a day  Orenitram 0.125 mg oral tablet, extended release: 1 tab(s) orally 3 times a day MDD:..  Orenitram 0.25 mg oral tablet, extended release: 1 tab(s) orally 3 times a day MDD:..  sildenafil 20 mg oral tablet: 1 tab in morning  1 tab in afternoon  HALF  tab in evening      Vital Signs Last 24 Hrs  T(C): 36.9 (14 May 2020 16:06), Max: 36.9 (14 May 2020 16:06)  T(F): 98.4 (14 May 2020 16:06), Max: 98.4 (14 May 2020 16:06)  HR: 85 (14 May 2020 16:06) (85 - 85)  BP: 109/77 (14 May 2020 16:06) (109/77 - 109/77)  BP(mean): --  RR: 23 (14 May 2020 16:06) (23 - 23)  SpO2: 91% (14 May 2020 16:06) (91% - 91%)      PHYSICAL EXAM:  Appearance: Comfortable. No acute distress  HEENT:  Head and neck: Atraumatic. Normocephalic.  Normal oral mucosa, PERRL, Neck is supple. No JVD, No carotid bruit.   Neurologic: A & O x 3, no focal deficits. EOMI.  Lymphatic: No cervical lymphadenopathy  Cardiovascular: Normal S1 S2, No murmur, rubs/gallops. No JVD, No edema  Respiratory: Decreased BS bilaterally   Gastrointestinal: Obese,  Softly distended, Non-tender, + BS  Extremities: + edema to upper thighs   Psychiatry: Patient is calm. No agitation. Mood & affect appropriate  Skin: No rashes/ ecchymoses/cyanosis/ulcers visualized on the face, hands or feet.    Intake and output:     LABS:                        12.5   4.56  )-----------( 175      ( 14 May 2020 17:30 )             39.2         132<L>  |  91<L>  |  38.0<H>  ----------------------------<  135<H>  3.3<L>   |  32.0<H>  |  1.47<H>    Ca    9.3      14 May 2020 17:30  Mg     2.0         TPro  8.8<H>  /  Alb  3.1<L>  /  TBili  2.1<H>  /  DBili  x   /  AST  32  /  ALT  14  /  AlkPhos  120      CARDIAC MARKERS ( 14 May 2020 17:30 )  x     / <0.01 ng/mL / x     / x     / x        ;p-BNP=Serum Pro-Brain Natriuretic Peptide: 710 pg/mL ( @ 17:30)      Urinalysis Basic - ( 14 May 2020 18:04 )    Color: Yellow / Appearance: Clear / S.005 / pH: x  Gluc: x / Ketone: Negative  / Bili: Negative / Urobili: 1 mg/dL   Blood: x / Protein: 30 mg/dL / Nitrite: Negative   Leuk Esterase: Negative / RBC: 0-2 /HPF / WBC 0-2   Sq Epi: x / Non Sq Epi: Occasional / Bacteria: Occasional        INTERPRETATION OF TELEMETRY: Not on telemetry   ECG: Reviewed by me NSR with t-wave abnormalities inferolateral leads    RADIOLOGY & ADDITIONAL STUDIES:    X-ray:  reviewed by me.   < from: Xray Chest 1 View-PORTABLE IMMEDIATE (20 @ 18:48) >     EXAM:  XR CHEST PORTABLE IMMED 1V                          PROCEDURE DATE:  2020          INTERPRETATION:    AP chest on May 14, 2020 at 6:26 PM. Patient has chest pain. COVID virus testing has been negative to date. Patient has history of CHF, diabetes, hypertension, and pulmonary hypertension. Patient has lymphedema of the legs. Patient is obese.    Heart is greatly enlarged and obscures the lungs. Large size of the individual also obscures the lungs.    Small right base effusion cannot be excluded.    On May 2, the lungs were grossly clear.    IMPRESSION: As above.    DISCRETE X-RAY DATA:  Percent of LEFT lung opacification: Clear  Percent of RIGHT lung opacification: 1-33%  Change in lung opacification from most recent x-ray (<=3days): No Prior  Change from prior dated 3 or more days (same admission): Increase      < end of copied text >

## 2020-05-14 NOTE — ED ADULT NURSE REASSESSMENT NOTE - NS ED NURSE REASSESS COMMENT FT1
Report from ETHEL Kumar. Assumed care of patient at this time. Patient presented to ED with increasing shortness of breath over the past 3 days. Patient has a history of pulmonary htn, CHF and DMII and his most recent admission for this was approx 2 weeks ago. Patient wears 6L NC at home and is currently at 93% on his normal 6L. Airway patent, patient showing no signs of respiratory distress at this time. Patient currently awaiting admitted bed, will continue to closely monitor.

## 2020-05-14 NOTE — ED ADULT TRIAGE NOTE - CHIEF COMPLAINT QUOTE
BIBA FROM HOME - C/O SOB ON EXERTION, "INCREASED SWELLING IN LEGS,STOMACH AND ALL OVER"  PT O2 DEPENDENT @ HOME 3-6L NC  91% ROOM AIR @ REST, 98% NRBM IN ED

## 2020-05-14 NOTE — H&P ADULT - HISTORY OF PRESENT ILLNESS
A 38 y/o Male with PMH significant for Morbid obesity, JAN, HTN, CHFpEF, Pulm HTN, DM II, presents today c/o SOB and lower extremity swelling.   Patient reports SOB started a week ago and has progressively worsened over the past 3-4 days, states SOB present at all times, but is worse with exertion and by lying flat. In addition reports worsening lower extremity and scrotal swelling and increased abdomina girth.    Pateint well known by Medicine service, has been admitted several times with CHF exacerbation last admission 2 weeks ago. He states complaint with all of his medications, and following fluid restriction diet.   Of note, patient has poor outpatient follow up, after being discharge last time, was instructed to follow up with PMD, Cardiologist and Rheumatologist (Mediastinal, pelvic b/l, external iliac and b/l inguinal LAD), but patient failed to do so.   Patient denies any fevers, chills, CP, cough, fever, chills, syncope, abdominal pain, N/V/D, headache, or dizziness.   At the ED received Lasix 60mg IV, covid-19 negative, Troponin neg x 1,  A 38 y/o Male with PMH significant for Morbid obesity, JAN, HTN, CHFpEF, Chronic Hypoxic Respiratory Failure with home O2 (3-4 lts), Pulm HTN, DM II, presents today c/o SOB and lower extremity swelling.   Patient reports SOB started a week ago and has progressively worsened over the past 3-4 days, states SOB present at all times, but is worse with exertion and by lying flat. In addition reports worsening lower extremity and scrotal swelling and increased abdomina girth. Also reports O2 requirements have increased up to 6 Lts.    Patient well known by Medicine service, has been admitted several times with CHF exacerbation last admission 2 weeks ago. He states complaint with all of his medications, and following fluid restriction diet.   Of note, patient has poor outpatient follow up, after being discharge last time, was instructed to follow up with PMD, Cardiologist and Rheumatologist (Mediastinal, pelvic b/l, external iliac and b/l inguinal LAD), but patient failed to do so.   Patient denies any fevers, chills, CP, cough, fever, chills, syncope, abdominal pain, N/V/D, headache, or dizziness.   At the ED received Lasix 60mg IV, covid-19 negative, Troponin neg x 1,  A 40 y/o Male with PMH significant for Morbid obesity, JAN, HTN, CHFpEF, Chronic Hypoxic Respiratory Failure with home O2 (3-4 lts), Pulm HTN, DM II, presents today c/o SOB and lower extremity swelling.  Patient reports SOB started a week ago and has progressively worsened over the past 3-4 days, states SOB present at all times, but is worse with exertion and by lying flat. In addition reports worsening lower extremity and scrotal swelling and increased abdomina girth. Also reports O2 requirements have increased up to 6 Lts.   Patient well known by Medicine service, has been admitted several times with CHF exacerbation last admission 2 weeks ago. He states complaint with all of his medications, and following fluid restriction diet.     Of note, patient has poor outpatient follow up, after being discharge last time, was instructed to follow up with PMD, Cardiologist and Rheumatologist (Mediastinal, pelvic b/l, external iliac and b/l inguinal LAD), but patient failed to do so.  Patient denies any fevers, chills, CP, cough, fever, chills, syncope, abdominal pain, N/V/D, headache, or dizziness.     In the ED, received Lasix 60mg IV, covid-19 negative, Troponin neg x 1,

## 2020-05-14 NOTE — CONSULT NOTE ADULT - ASSESSMENT
A/P: 40 y/o morbidly obese M with a PMHx of hx of R sided heart failure (normalLVEF with severely reduced RV function), severe pulmonary HTN on home O2, JAN on CPAP, and IDDM2 who was recently admitted to [facility] from (04/28-/05/04/20) for heart failure exacerbation presented to the ED today with complaints of dyspnea on exertion and leg swelling. Patient states that he only felt better for a day or 2 after discharge, and since has been experiencing worsening dyspnea on exertion, leg swelling, and abdominal distension. Patient states that he has been taking all of his medications, and has not had dietary indiscretions. Patient received IV lasix in the ED, states very minimal improvement in breathing. Pt is covid-19 negative. Patient denies any fevers, chills, CP, syncope, near syncope, abdominal pain, N/V/D, headache, or dizziness.   Troponin neg x 1      1. Severe Pulmonary HTN with Right Heart Failure  - , down from previous admission  - Received Lasix 60mg IV x 1  - Start Bumex 1mg IV daily, will re-evaluate fluid status tomorrow  - Continue Metolazone, orenitram, sildenafil, and coreg.   - Monitor on telemetry.    - Strict i/o and daily standing weights.    - Keep K > 4, Mg > 2.    - Monitor renal function with ongoing diuresis.      2. Hypokalemia  - Due to diuretic use  - Given KDur 40 MEq x 2 doses  - Keep K>4, Mg>2    3. Abdominal Distension  - Likely ADR from meds.   - Continue bowel regimen.     Preliminary evaluation, please await official recommendations by Dr. Santana

## 2020-05-14 NOTE — ED PROVIDER NOTE - CLINICAL SUMMARY MEDICAL DECISION MAKING FREE TEXT BOX
The patient presents with SOB, abdominal bloating and leg swelling c/w acute CHF and will admit for further evaluation

## 2020-05-14 NOTE — H&P ADULT - ATTENDING COMMENTS
39yoM morbid obesity, right sided CHF in setting of severe pulmonary HTN, chronic hypoxemic and hypercapnic respiratory failure on home O2, JAN/OHS non-compliant with BiPAP, HTN, DM presenting with exertional dyspnea, LE edema, increased O2 requirements, being admitted for acute on chronic hypoxemic respiratory failure due to right sided CHF decompensation in setting of severe pulmonary HTN from ?undiagnosed connective tissue disease.     Per prior documentation, pt has had recurrent hospitalizations for decompensated CHF due to poor outpatient cardiology follow up and dietary indiscretions although during this admission, he reports being compliant with meds and diet.  Pt previously had primary care in [place] and got admitted to [facility] 12/2019 while visiting family in [place] and opted to stay in [place] but has yet to establish new primary care and hasn’t followed up routinely with cardiology. He also has mediastinal LAD noted on prior CT chest and was advised to follow up with rheumatologist and pulmonology as outpatient regarding if LN biopsy warranted to assess for sarcoidosis, scleroderma, etc, but has not done this yet.    Pt seen by cards while in ED, recommending IV bumex daily with resuming home cardiac and pulmonary HTN meds as above.

## 2020-05-14 NOTE — ED ADULT NURSE NOTE - OBJECTIVE STATEMENT
Reports increasing swelling to abdomen and Difficulty breathing.  Abdomen with edema noted. Patient chronically on home O2 due to condition.

## 2020-05-14 NOTE — H&P ADULT - NSHPPHYSICALEXAM_GEN_ALL_CORE
Vital Signs Last 24 Hrs  T(C): 36.9 (14 May 2020 16:06), Max: 36.9 (14 May 2020 16:06)  T(F): 98.4 (14 May 2020 16:06), Max: 98.4 (14 May 2020 16:06)  HR: 85 (14 May 2020 16:06) (85 - 85)  BP: 109/77 (14 May 2020 16:06) (109/77 - 109/77)  RR: 23 (14 May 2020 16:06) (23 - 23)  SpO2: 91% (14 May 2020 16:06) (91% - 91%)    General: Obese, tired appearing male, NAD  Head:  Normocephalic, atraumatic  ENT:  Mucosa moist, no ulcerations  Neck:  Supple, no sinuses or palpable masses  Respiratory: Bibasilar crackles, no retractions   CV: RRR, S1S2, no murmur  Abdominal: Soft, NT, Distended, no palpable mass, +BS  : Scrotal edema   Extremities: ++ pitting edema, + peripheral pulses, FROM all 4 extremity  Neurology: A&Ox3  Skin: Dry skin Vital Signs Last 24 Hrs  T(C): 36.9 (14 May 2020 16:06), Max: 36.9 (14 May 2020 16:06)  T(F): 98.4 (14 May 2020 16:06), Max: 98.4 (14 May 2020 16:06)  HR: 85 (14 May 2020 16:06) (85 - 85)  BP: 109/77 (14 May 2020 16:06) (109/77 - 109/77)  RR: 23 (14 May 2020 16:06) (23 - 23)  SpO2: 91% (14 May 2020 16:06) (91% - 91%)    General: Obese, tired appearing male, NAD  Head:  Normocephalic, atraumatic  ENT:  Mucosa moist, no ulcerations  Neck:  Supple, no sinuses or palpable masses  Respiratory: Decrease air movement due body habitus, Bibasilar crackles, no retractions   CV: RRR, S1S2, no murmur  Abdominal: Soft, NT, Distended, no palpable mass, +BS  : Scrotal edema   Extremities: ++ pitting edema, + peripheral pulses, FROM all 4 extremity  Neurology: A&Ox3  Skin: Dry skin

## 2020-05-15 LAB
ANION GAP SERPL CALC-SCNC: 10 MMOL/L — SIGNIFICANT CHANGE UP (ref 5–17)
BUN SERPL-MCNC: 39 MG/DL — HIGH (ref 8–20)
CALCIUM SERPL-MCNC: 9.4 MG/DL — SIGNIFICANT CHANGE UP (ref 8.6–10.2)
CHLORIDE SERPL-SCNC: 90 MMOL/L — LOW (ref 98–107)
CO2 SERPL-SCNC: 34 MMOL/L — HIGH (ref 22–29)
CREAT SERPL-MCNC: 1.37 MG/DL — HIGH (ref 0.5–1.3)
GLUCOSE BLDC GLUCOMTR-MCNC: 109 MG/DL — HIGH (ref 70–99)
GLUCOSE BLDC GLUCOMTR-MCNC: 114 MG/DL — HIGH (ref 70–99)
GLUCOSE BLDC GLUCOMTR-MCNC: 116 MG/DL — HIGH (ref 70–99)
GLUCOSE BLDC GLUCOMTR-MCNC: 97 MG/DL — SIGNIFICANT CHANGE UP (ref 70–99)
GLUCOSE SERPL-MCNC: 103 MG/DL — HIGH (ref 70–99)
MAGNESIUM SERPL-MCNC: 1.9 MG/DL — SIGNIFICANT CHANGE UP (ref 1.6–2.6)
PHOSPHATE SERPL-MCNC: 3.9 MG/DL — SIGNIFICANT CHANGE UP (ref 2.4–4.7)
POTASSIUM SERPL-MCNC: 3.3 MMOL/L — LOW (ref 3.5–5.3)
POTASSIUM SERPL-SCNC: 3.3 MMOL/L — LOW (ref 3.5–5.3)
SODIUM SERPL-SCNC: 134 MMOL/L — LOW (ref 135–145)
TROPONIN T SERPL-MCNC: <0.01 NG/ML — SIGNIFICANT CHANGE UP (ref 0–0.06)

## 2020-05-15 PROCEDURE — 99233 SBSQ HOSP IP/OBS HIGH 50: CPT

## 2020-05-15 PROCEDURE — 99232 SBSQ HOSP IP/OBS MODERATE 35: CPT

## 2020-05-15 PROCEDURE — 93970 EXTREMITY STUDY: CPT | Mod: 26

## 2020-05-15 PROCEDURE — 99223 1ST HOSP IP/OBS HIGH 75: CPT

## 2020-05-15 RX ORDER — POTASSIUM CHLORIDE 20 MEQ
40 PACKET (EA) ORAL EVERY 4 HOURS
Refills: 0 | Status: COMPLETED | OUTPATIENT
Start: 2020-05-15 | End: 2020-05-15

## 2020-05-15 RX ORDER — BUMETANIDE 0.25 MG/ML
0.5 INJECTION INTRAMUSCULAR; INTRAVENOUS
Qty: 20 | Refills: 0 | Status: DISCONTINUED | OUTPATIENT
Start: 2020-05-15 | End: 2020-05-18

## 2020-05-15 RX ADMIN — SENNA PLUS 2 TABLET(S): 8.6 TABLET ORAL at 23:26

## 2020-05-15 RX ADMIN — Medication 10 MILLIGRAM(S): at 23:27

## 2020-05-15 RX ADMIN — BUMETANIDE 1 MILLIGRAM(S): 0.25 INJECTION INTRAMUSCULAR; INTRAVENOUS at 06:02

## 2020-05-15 RX ADMIN — POLYETHYLENE GLYCOL 3350 17 GRAM(S): 17 POWDER, FOR SOLUTION ORAL at 08:41

## 2020-05-15 RX ADMIN — Medication 40 MILLIEQUIVALENT(S): at 03:48

## 2020-05-15 RX ADMIN — Medication 40 MILLIEQUIVALENT(S): at 11:25

## 2020-05-15 RX ADMIN — ATORVASTATIN CALCIUM 40 MILLIGRAM(S): 80 TABLET, FILM COATED ORAL at 23:26

## 2020-05-15 RX ADMIN — Medication 40 MILLIEQUIVALENT(S): at 17:19

## 2020-05-15 RX ADMIN — Medication 81 MILLIGRAM(S): at 08:41

## 2020-05-15 NOTE — PHYSICAL THERAPY INITIAL EVALUATION ADULT - CRITERIA FOR SKILLED THERAPEUTIC INTERVENTIONS
impairments found/anticipated equipment needs at discharge/anticipated discharge recommendation/Acute vs Home with RW, Home PT, assist PRN

## 2020-05-15 NOTE — PHYSICAL THERAPY INITIAL EVALUATION ADULT - ADDITIONAL COMMENTS
Pt lives in 2nd floor apartment with his sister. 12 steps to enter with handrails, 0 steps inside. Pt was independent PTA without assist device. Pt does not own DME.

## 2020-05-15 NOTE — PROGRESS NOTE ADULT - ASSESSMENT
A 40 y/o Male with PMH significant for Morbid obesity, JAN, HTN, CHFpEF, Pulm HTN, DM II, presents today c/o SOB and lower extremity swelling, being admitted for Acute on Chronic Respiratory Failure due to CHF exacerbation secondary to Pulmonary Hypertension.     Acute on Chronic Respiratory Failure due to CHF exacerbation, Right side HF secondary to Pulmonary Hypertension   -COVID19 negative   -Trop neg X3  -  -Patient fluid overload, b/l LE and scrotal edema + Bibasilar crackles  -Pt, know for being poor complaint and poor out patient follow up, did not follow up with any provider after his last DC   -S/p Lasix 60mg IV at the ED  -cardio recs appreciated, c/w Bumex infusion @ rate of 0.5mg/hr.  -Continue Metolazone, sildenafil, and coreg.   -changed orenitram to 1 Tab (0.125mg) qAM and afternoon dose, 2 Tabs (0.25mg) qEvening  -Discontinued home HCTZ   -C/w Albuterol prn   -Daily weight and I&O's - net negative.   -TTE 12/2019: LVEF 65-70%, Severely reduced RV systolic function, Right ventricular volume and pressure overload, Moderate-severe tricuspid regurgitation.  -Cardio consult noted, pt with severe pulmonary HTN, pt already auto-anticoagulated   -PT recs appreciated, pending assesment  -PM&R recs appreciated, need long term outpatient cardiopulmonary rehab. Expect patient to achieve functional goals for dc home with outpt.    Abdominal distention   -likely due to fluid overload but also associated with constipation   -Per pt, started since taking Orenitram  -C/w bowel regimen with Senna and Miralax, will escalate as needed    Prerenal HAYES   Likely due diuretics and CHF exacerbation   Base line 1 - 1.1, current Cr: 1.4>1.34, improving  Will closely monitor, while on diuretics      Hypokalemia  -S/p repletion  -F/u am     Mild Hyponatremia  -Due to diureses   -Will hold HCTZ for now  	  -Serum Na: 134  -improving, monitor      DM-2, A1c 6.6   -Blood glucose stable   -Hold PO meds (Metformin), resume on dc    -Mod ISS    HTN  -Soft BP noted  -Will closely watch while in diuretics   -C/w Cored with holding parameters     HLD  -C/w statin    JAN/OHS  -C/w CPAP  -Lifestyle modification: Diet, weight loss  -Again needs outpt sleep study for further eval to obtain CPAP for home     Abnormal CT findings   -CT a/p: Mediastinal, pelvic b/l, external iliac and b/l inguinal LAD, at his last admission   -was instructed to follow up with Rheumatologist or PMD, however failed to do so.    Morbid obesity  -Lifestyle modification: Diet, weight loss    DVT ppx  -Heparin SC    Code status   Full Code    Patient high risk for readmission, will consult LEONILA and SW A 38 y/o Male with PMH significant for Morbid obesity, JAN, HTN, CHFpEF, Pulm HTN, DM II, presents today c/o SOB and lower extremity swelling, being admitted for Acute on Chronic Respiratory Failure due to CHF exacerbation secondary to Pulmonary Hypertension.     Acute on Chronic Respiratory Failure due to CHF exacerbation, Right side HF secondary to Pulmonary Hypertension   -COVID19 negative   -Trop neg X3  -  -Patient fluid overload, b/l LE and scrotal edema + Bibasilar crackles  -Pt, know for being poor complaint and poor out patient follow up, did not follow up with any provider after his last DC   -S/p Lasix 60mg IV at the ED  -cardio recs appreciated, c/w Bumex infusion @ rate of 0.5mg/hr.  -Continue Metolazone, sildenafil, and coreg.   -changed orenitram to 1 Tab (0.125mg) qAM and afternoon dose, 2 Tabs (0.25mg) qEvening  -Discontinued home HCTZ   -C/w Albuterol prn   -Daily weight and I&O's - net negative.   -TTE 12/2019: LVEF 65-70%, Severely reduced RV systolic function, Right ventricular volume and pressure overload, Moderate-severe tricuspid regurgitation.  -Cardio consult noted, pt with severe pulmonary HTN, pt already auto-anticoagulated   -PT recs appreciated, pending assesment  -PM&R recs appreciated, need long term outpatient cardiopulmonary rehab. Expect patient to achieve functional goals for dc home with outpt.    Abdominal distention   -likely due to fluid overload but also associated with constipation   -Per pt, started since taking Orenitram  -C/w bowel regimen with Senna and Miralax, will escalate as needed    Prerenal HAYES   Likely due diuretics and CHF exacerbation   Base line 1 - 1.1, current Cr: 1.4>1.34, improving  Will closely monitor, while on diuretics      Hypokalemia  -S/p repletion  -F/u am     Mild Hyponatremia  -Due to diureses   -Will hold HCTZ for now  	  -Serum Na: 134  -improving, monitor      DM-2, A1c 6.6   -Blood glucose stable   -Hold PO meds (Metformin), resume on dc    -Mod ISS    Elevated INR   -Stable   -Likely 2/2 hepatic congestion in setting of acute CHF  -No active bleeding   -Continue to monitor    HTN  -Soft BP noted  -Will closely watch while in diuretics   -C/w Cored with holding parameters     HLD  -C/w statin    JAN/OHS  -C/w CPAP  -Lifestyle modification: Diet, weight loss  -Again needs outpt sleep study for further eval to obtain CPAP for home     Abnormal CT findings   -CT a/p: Mediastinal, pelvic b/l, external iliac and b/l inguinal LAD, at his last admission   -was instructed to follow up with Rheumatologist or PMD, however failed to do so.    Morbid obesity  -Lifestyle modification: Diet, weight loss    DVT ppx  -Heparin SC    Code status   Full Code    Patient high risk for readmission, will consult CM and SW A 38 y/o Male with PMH significant for Morbid obesity, JAN, HTN, CHFpEF, Pulm HTN, DM II, presents today c/o SOB and lower extremity swelling, being admitted for Acute on Chronic Respiratory Failure due to CHF exacerbation secondary to Pulmonary Hypertension.     Acute on Chronic Respiratory Failure due to CHF exacerbation, Right side HF secondary to Pulmonary Hypertension   -COVID19 negative   -Trop neg X3  -  -Patient fluid overload, b/l LE and scrotal edema + Bibasilar crackles  -Pt, know for being poor complaint and poor out patient follow up, did not follow up with any provider after his last DC   -S/p Lasix 60mg IV at the ED  -cardio recs appreciated, c/w Bumex infusion @ rate of 0.5mg/hr.  -Continue Metolazone, sildenafil, and coreg.   -changed orenitram to 1 Tab (0.125mg) qAM and afternoon dose, 2 Tabs (0.25mg) qEvening  -Discontinued home HCTZ   -C/w Albuterol prn   -Daily weight and I&O's - net negative.   -TTE 12/2019: LVEF 65-70%, Severely reduced RV systolic function, Right ventricular volume and pressure overload, Moderate-severe tricuspid regurgitation.  -Cardio consult noted, pt with severe pulmonary HTN, pt already auto-anticoagulated   -PT recs appreciated, pending assesment  -PM&R recs appreciated, need long term outpatient cardiopulmonary rehab. Expect patient to achieve functional goals for dc home with outpt.    Abdominal distention   -likely due to fluid overload but also associated with constipation   -Per pt, started since taking Orenitram  -C/w bowel regimen with Senna and Miralax, will escalate as needed    Prerenal HAYES   Likely due diuretics and CHF exacerbation   Base line 1 - 1.1, current Cr: 1.4>1.34, improving  Will closely monitor, while on diuretics      Hypokalemia  -S/p repletion  -F/u am     Mild Hyponatremia  -Due to diureses   -Will hold HCTZ for now  	  -Serum Na: 134  -improving, monitor      DM-2, A1c 6.6   -Blood glucose stable   -Hold PO meds (Metformin), resume on dc    -Mod ISS    HTN  -Soft BP noted  -Will closely watch while in diuretics   -C/w Cored with holding parameters     HLD  -C/w statin    JAN/OHS  -C/w CPAP  -Lifestyle modification: Diet, weight loss  -Again needs outpt sleep study for further eval to obtain CPAP for home     Abnormal CT findings   -CT a/p: Mediastinal, pelvic b/l, external iliac and b/l inguinal LAD, at his last admission   -was instructed to follow up with Rheumatologist or PMD, however failed to do so.    Morbid obesity  -Lifestyle modification: Diet, weight loss    DVT ppx  -Heparin SC    Code status   Full Code    Patient high risk for readmission, will consult LEONILA and SW

## 2020-05-15 NOTE — PHYSICAL THERAPY INITIAL EVALUATION ADULT - PERTINENT HX OF CURRENT PROBLEM, REHAB EVAL
40 y/o morbidly obese M with a PMHx of hx of R sided heart failure (normalLVEF with severely reduced RV function), severe pulmonary HTN on home O2, JAN on CPAP, and IDDM2 who was recently admitted to [facility] from (04/28-/05/04/20) for heart failure exacerbation presented to the ED today with complaints of dyspnea on exertion and leg swelling. Admitted for CHF exacerbation (Covid neg)

## 2020-05-15 NOTE — PHYSICAL THERAPY INITIAL EVALUATION ADULT - IMPAIRMENTS FOUND, PT EVAL
gait, locomotion, and balance/aerobic capacity/endurance/anthropometric characteristics/muscle strength/ventilation and respiration/gas exchange

## 2020-05-15 NOTE — PROGRESS NOTE ADULT - ASSESSMENT
A/P:  40 y/o morbidly obese M with a PMHx of hx of R sided heart failure (normalLVEF with severely reduced RV function), severe pulmonary HTN on home O2, JAN on CPAP, and IDDM2 who was recently admitted to [facility] from (04/28-/05/04/20) for heart failure exacerbation presented to the ED today with complaints of dyspnea on exertion and leg swelling. Patient states that he only felt better for a day or 2 after discharge, and since has been experiencing worsening dyspnea on exertion, leg swelling, and abdominal distension.    5/15: Patient reports mild improvement in respiratory status and slight decrease in edema since arrival.  Reports good urine output.  Discussed at length living situation. Missed 2days of his medications d/t being unable to  refill. Patient sleeps sitting up in a room on an air-mattress. He eats cereal, cold cut sandwiches, and whatever prepared dinner he can get (usually chicken or tacos). He does not want to cook for himself out of fear of wearing his O2 near a stove.    Severe Pulmonary HTN with Right Heart Failure  - , down from previous admission  - Change Bumex to IV infusion at 0.5mg/hr  - Continue Metolazone, sildenafil, and coreg.   - change orenitram to 1 Tab (0.125mg) qAM and afternoon dose, 2 Tabs (0.25mg) qEvening  - DC HOME HCTZ  - Monitor on telemetry.    - Strict i/o and daily standing weights.    - Keep K > 4, Mg > 2.    - Monitor renal function with ongoing diuresis.      Hypokalemia  - Due to diuretic use  - Keep K>4, Mg>2    Prognosis  - Pt with difficulty ambulating and getting up stairs. unable to properly care for self.  - s/w eval  - PT eval        Preliminary evaluation, please await official recommendations by Dr. Cox

## 2020-05-15 NOTE — PROGRESS NOTE ADULT - SUBJECTIVE AND OBJECTIVE BOX
Pt seen and examined at bedside. Still SOB. Understands plan per cardiology to continue diuresis. States he has been compliant with his medications and his diet. Does not understand why he had this acute exacerbation. Requests placement in facility due to unstable housing situation. Is tolerating PO, voiding, and having BMs at baseline.      05-14-20 @ 07:01  -  05-15-20 @ 07:00  --------------------------------------------------------  IN: 0 mL / OUT: 1650 mL / NET: -1650 mL    Vital Signs Last 24 Hrs  T(C): 36.1 (15 May 2020 11:25), Max: 37.1 (15 May 2020 07:40)  T(F): 97 (15 May 2020 11:25), Max: 98.7 (15 May 2020 07:40)  HR: 78 (15 May 2020 11:25) (75 - 90)  BP: 102/68 (15 May 2020 11:25) (96/66 - 110/63)  RR: 20 (15 May 2020 11:25) (18 - 23)  SpO2: 96% (15 May 2020 11:25) (91% - 98%)    General: Obese, tired appearing male, NAD  Head:  Normocephalic, atraumatic  ENT:  Mucosa moist, no ulcerations  Neck:  Supple, no sinuses or palpable masses  Respiratory: Decrease air movement due body habitus, Bibasilar crackles, no retractions   CV: RRR, S1S2, no murmur  Abdominal: Soft, NT, Distended, no palpable mass, +BS  : Scrotal edema   Extremities: 2+ pitting edema, + peripheral pulses, FROM all 4 extremity  Neurology: A&Ox3  Skin: Dry skin                          12.5   4.56  )-----------( 175      ( 14 May 2020 17:30 )             39.2     05-15    134<L>  |  90<L>  |  39.0<H>  ----------------------------<  103<H>  3.3<L>   |  34.0<H>  |  1.37<H>    Ca    9.4      15 May 2020 07:59  Phos  3.9     05-15  Mg     1.9     05-15    TPro  8.8<H>  /  Alb  3.1<L>  /  TBili  2.1<H>  /  DBili  x   /  AST  32  /  ALT  14  /  AlkPhos  120  05-14    LIVER FUNCTIONS - ( 14 May 2020 17:30 )  Alb: 3.1 g/dL / Pro: 8.8 g/dL / ALK PHOS: 120 U/L / ALT: 14 U/L / AST: 32 U/L / GGT: x           	< from: Xray Chest 1 View-PORTABLE IMMEDIATE (05.14.20 @ 18:48) >      	 EXAM:  XR CHEST PORTABLE IMMED 1V                          	PROCEDURE DATE:  05/14/2020          	INTERPRETATION:    	AP chest on May 14, 2020 at 6:26 PM. Patient has chest pain. COVID virus testing has been negative to date. Patient has history of CHF, diabetes, hypertension, and pulmonary hypertension. Patient has lymphedema of the legs. Patient is obese.    	Heart is greatly enlarged and obscures the lungs. Large size of the individual also obscures the lungs.    	Small right base effusion cannot be excluded.      On May 2, the lungs were grossly clear.\    MEDICATIONS  (STANDING):  aspirin  chewable 81 milliGRAM(s) Oral daily  atorvastatin 40 milliGRAM(s) Oral at bedtime  buMETAnide Infusion 0.5 mG/Hr (2.5 mL/Hr) IV Continuous <Continuous>  carvedilol 3.125 milliGRAM(s) Oral every 12 hours  dextrose 5%. 1000 milliLiter(s) (50 mL/Hr) IV Continuous <Continuous>  dextrose 50% Injectable 12.5 Gram(s) IV Push once  dextrose 50% Injectable 25 Gram(s) IV Push once  dextrose 50% Injectable 25 Gram(s) IV Push once  heparin   Injectable 5000 Unit(s) SubCutaneous every 8 hours  insulin lispro (HumaLOG) corrective regimen sliding scale   SubCutaneous three times a day before meals  insulin lispro (HumaLOG) corrective regimen sliding scale   SubCutaneous at bedtime  metolazone 2.5 milliGRAM(s) Oral daily  polyethylene glycol 3350 17 Gram(s) Oral daily  potassium chloride    Tablet ER 40 milliEquivalent(s) Oral every 4 hours  senna 2 Tablet(s) Oral at bedtime  sildenafil (REVATIO) 20 milliGRAM(s) Oral <User Schedule>  sildenafil (REVATIO) 10 milliGRAM(s) Oral <User Schedule>  Treprostinil 0.125 mg 1 Tablet(s) 1 Tablet(s) Oral <User Schedule>  Treprostinil 0.125 mg 2 Tablet(s) 2 Tablet(s) Oral <User Schedule>    MEDICATIONS  (PRN):  dextrose 40% Gel 15 Gram(s) Oral once PRN Blood Glucose LESS THAN 70 milliGRAM(s)/deciliter  glucagon  Injectable 1 milliGRAM(s) IntraMuscular once PRN Glucose LESS THAN 70 milligrams/deciliter

## 2020-05-15 NOTE — PHYSICAL THERAPY INITIAL EVALUATION ADULT - IMPAIRMENTS CONTRIBUTING TO GAIT DEVIATIONS, PT EVAL
decreased strength/standing rest break needed 2*2 + MUÑOZ/impaired balance/impaired postural control

## 2020-05-15 NOTE — PROGRESS NOTE ADULT - SUBJECTIVE AND OBJECTIVE BOX
[place] [facility]                                                               Office: [address] [address]                                                              Telephone: [phone]. Fax:[phone]                                                                             PROGRESS NOTE  Reason for follow up: Pulmonary HTN, Right HF  Overnight: No new events.   Update: 5/15: Patient reports mild improvement in respiratory status and slight decrease in edema since arrival.  Reports good urine output.  Discussed at length living situation. Patient sleeps sitting up in a room on an air-mattress. He eats cereal, cold cut sandwiches, and whatever prepared dinner he can get (usually chicken or tacos)      Review of symptoms:   Cardiac:  No chest pain. No dyspnea. No palpitations.  Respiratory: No cough. No dyspnea  Gastrointestinal: No diarrhea. No abdominal pain. No bleeding.     Past medical history: No updates.   	  Vitals:  T(C): 37.1 (05-15-20 @ 07:40), Max: 37.1 (05-15-20 @ 07:40)  HR: 75 (05-15-20 @ 07:40) (75 - 90)  BP: 96/74 (05-15-20 @ 07:40) (96/66 - 110/63)  RR: 23 (05-15-20 @ 07:40) (18 - 23)  SpO2: 98% (05-15-20 @ 07:40) (91% - 98%)  Wt(kg): --  I&O's Summary    14 May 2020 07:01  -  15 May 2020 07:00  --------------------------------------------------------  IN: 0 mL / OUT: 1650 mL / NET: -1650 mL      Weight (kg): 181.4 (05-14 @ 16:06)      PHYSICAL EXAM:  Appearance: Comfortable. No acute distress  HEENT:  Head and neck: Atraumatic. Normocephalic.  Normal oral mucosa, PERRL, Neck is supple. No JVD, No carotid bruit.   Neurologic: A&Ox 3, no focal deficits. EOMI, Cranial nerves are intact.  Lymphatic: No cervical lymphadenopathy  Cardiovascular: Normal S1 S2, No murmur, rubs/gallops. No JVD, severe b/l LE edema R>L, abd, and scrotal area  Respiratory: Lungs clear to auscultation  Gastrointestinal:  edemanous, Non-tender, + BS  Lower Extremities: severe b/l LE edema R>L  Psychiatry: Patient is calm. No agitation. Mood & affect appropriate  Skin: No rashes/ecchymoses/cyanosis/ulcers visualized on the face, hands or feet.      CURRENT MEDICATIONS:  buMETAnide Injectable 1 milliGRAM(s) IV Push daily  carvedilol 3.125 milliGRAM(s) Oral every 12 hours  metolazone 2.5 milliGRAM(s) Oral daily  sildenafil (REVATIO) 20 milliGRAM(s) Oral <User Schedule>  sildenafil (REVATIO) 10 milliGRAM(s) Oral <User Schedule>    polyethylene glycol 3350  senna  atorvastatin  dextrose 50% Injectable  dextrose 50% Injectable  dextrose 50% Injectable  insulin lispro (HumaLOG) corrective regimen sliding scale  insulin lispro (HumaLOG) corrective regimen sliding scale  aspirin  chewable  dextrose 5%.  heparin   Injectable  potassium chloride    Tablet ER      DIAGNOSTIC TESTING:  [ ] Echocardiogram:   < from: TTE Echo Complete w/Doppler (12.21.19 @ 08:48) >  Summary:   1. Technically fair study.   2. Left ventricular ejection fraction, by visual estimation, is 65 to 70%.   3. There is moderate septal left ventricular hypertrophy.   4. Severely enlarged right ventricle.   5. Severely reduced RV systolic function.   6. Right ventricular volume and pressure overload.   7. Moderate-severe tricuspid regurgitation.   8. Estimated pulmonary artery systolic pressure is 89.6 mmHg assuming a right atrial pressure of 15 mmHg, which is consistent with severe pulmonary hypertension.   9. Moderately dilated pulmonary artery.    MD Josefina Electronically signed on 12/24/2019 at 2:54:21 PM    < end of copied text >    [ ]  Catheterization:    [ ] Stress Test:    OTHER: 	      LABS:	 	  CARDIAC MARKERS ( 15 May 2020 07:59 )  x     / <0.01 ng/mL / x     / x     / x      p-BNP 15 May 2020 07:59: x    , CARDIAC MARKERS ( 14 May 2020 20:44 )  x     / <0.01 ng/mL / x     / x     / x      p-BNP 14 May 2020 20:44: x    , CARDIAC MARKERS ( 14 May 2020 17:30 )  x     / <0.01 ng/mL / x     / x     / x      p-BNP 14 May 2020 17:30: 710 pg/mL                          12.5   4.56  )-----------( 175      ( 14 May 2020 17:30 )             39.2     05-15    134<L>  |  90<L>  |  39.0<H>  ----------------------------<  103<H>  3.3<L>   |  34.0<H>  |  1.37<H>    Ca    9.4      15 May 2020 07:59  Phos  3.9     05-15  Mg     1.9     05-15    TPro  8.8<H>  /  Alb  3.1<L>  /  TBili  2.1<H>  /  DBili  x   /  AST  32  /  ALT  14  /  AlkPhos  120  05-14    proBNP: Serum Pro-Brain Natriuretic Peptide: 710 pg/mL (05-14 @ 17:30)    Lipid Profile:   HgA1c:   TSH:       TELEMETRY: Reviewed    ECG:  Reviewed by me. [place] [facility]                                                               Office: [address] [address]                                                              Telephone: [phone]. Fax:[phone]                                                                             PROGRESS NOTE  Reason for follow up: Pulmonary HTN, Right HF  Overnight: No new events.   Update: 5/15: Patient reports mild improvement in respiratory status and slight decrease in edema since arrival.  Reports good urine output.  Discussed at length living situation. Missed 2days of his medications d/t being unable to  refill. Patient sleeps sitting up in a room on an air-mattress. He eats cereal, cold cut sandwiches, and whatever prepared dinner he can get (usually chicken or tacos). He does not want to cook for himself out of fear of wearing his O2 near a stove.      Review of symptoms:   Cardiac:  No chest pain. No dyspnea. No palpitations.  Respiratory: No cough. No dyspnea  Gastrointestinal: No diarrhea. No abdominal pain. No bleeding.     Past medical history: No updates.   	  Vitals:  T(C): 37.1 (05-15-20 @ 07:40), Max: 37.1 (05-15-20 @ 07:40)  HR: 75 (05-15-20 @ 07:40) (75 - 90)  BP: 96/74 (05-15-20 @ 07:40) (96/66 - 110/63)  RR: 23 (05-15-20 @ 07:40) (18 - 23)  SpO2: 98% (05-15-20 @ 07:40) (91% - 98%)  Wt(kg): --  I&O's Summary    14 May 2020 07:01  -  15 May 2020 07:00  --------------------------------------------------------  IN: 0 mL / OUT: 1650 mL / NET: -1650 mL      Weight (kg): 181.4 (05-14 @ 16:06)      PHYSICAL EXAM:  Appearance: Comfortable. No acute distress  HEENT:  Head and neck: Atraumatic. Normocephalic.  Normal oral mucosa, PERRL, Neck is supple. No JVD, No carotid bruit.   Neurologic: A&Ox 3, no focal deficits. EOMI, Cranial nerves are intact.  Lymphatic: No cervical lymphadenopathy  Cardiovascular: Normal S1 S2, No murmur, rubs/gallops. No JVD, severe b/l LE edema R>L, abd, and scrotal area  Respiratory: Lungs clear to auscultation  Gastrointestinal:  edemanous, Non-tender, + BS  Lower Extremities: severe b/l LE edema R>L  Psychiatry: Patient is calm. No agitation. Mood & affect appropriate  Skin: No rashes/ecchymoses/cyanosis/ulcers visualized on the face, hands or feet.      CURRENT MEDICATIONS:  buMETAnide Injectable 1 milliGRAM(s) IV Push daily  carvedilol 3.125 milliGRAM(s) Oral every 12 hours  metolazone 2.5 milliGRAM(s) Oral daily  sildenafil (REVATIO) 20 milliGRAM(s) Oral <User Schedule>  sildenafil (REVATIO) 10 milliGRAM(s) Oral <User Schedule>    polyethylene glycol 3350  senna  atorvastatin  dextrose 50% Injectable  dextrose 50% Injectable  dextrose 50% Injectable  insulin lispro (HumaLOG) corrective regimen sliding scale  insulin lispro (HumaLOG) corrective regimen sliding scale  aspirin  chewable  dextrose 5%.  heparin   Injectable  potassium chloride    Tablet ER      DIAGNOSTIC TESTING:  [ ] Echocardiogram:   < from: TTE Echo Complete w/Doppler (12.21.19 @ 08:48) >  Summary:   1. Technically fair study.   2. Left ventricular ejection fraction, by visual estimation, is 65 to 70%.   3. There is moderate septal left ventricular hypertrophy.   4. Severely enlarged right ventricle.   5. Severely reduced RV systolic function.   6. Right ventricular volume and pressure overload.   7. Moderate-severe tricuspid regurgitation.   8. Estimated pulmonary artery systolic pressure is 89.6 mmHg assuming a right atrial pressure of 15 mmHg, which is consistent with severe pulmonary hypertension.   9. Moderately dilated pulmonary artery.    MD Josefina Electronically signed on 12/24/2019 at 2:54:21 PM    < end of copied text >    [ ]  Catheterization:  < from: Cardiac Cath Lab - Adult (01.14.20 @ 12:56) >  INDICATIONS: Heart failure; unspecified. Severe pulmonary hypertension  HISTORY: Pulmonary hypertension There was a prior diagnosis of congestive  heart failure. The patient has morbid obesity, JAN  PROCEDURE:  --  Right heart catheterization.  TECHNIQUE: The risks and alternatives of the procedures and conscious  sedation were explained to the patient and informed consent was obtained.  Cardiac catheterization performed electively.  Local anesthetic given. Right brachial vein access. Right heart  catheterization. The procedure was performed utilizing a catheter.  RADIATION EXPOSURE: 4.6 min.  MEDICATIONS GIVEN: Fentanyl,25 mcg, IV. 1% Lidocaine, 10 ml,  subcutaneously.  HEMODYNAMICS: There is severe right sided failure. There is severe, fixed  pulmonary hypertension. With continued administration of inhaled nitric  oxide ( 40 PPM), there was no change in pulmonary hemodynamics.  COMPLICATIONS: No complications occurred during the cath lab visit.  DIAGNOSTIC IMPRESSIONS: Markedly elevated right sided filling pressure (RA  23 mmHg)  Severe pulmonary hypertension ( PASP 74 mmHg, Mean 51 mmHg)  PVR 5.79 Wood Units  PCWP within normal 15 mmgh  Pulmonary hypertension showed No response to Nitric oxide  DIAGNOSTIC RECOMMENDATIONS: Management as per Dr. Cox.  Prepared and signed by  Jairon Santana MD  Signed 01/16/2020 18:16:31    < end of copied text >    [ ] Stress Test:      OTHER: 	      LABS:	 	  CARDIAC MARKERS ( 15 May 2020 07:59 )  x     / <0.01 ng/mL / x     / x     / x      p-BNP 15 May 2020 07:59: x    , CARDIAC MARKERS ( 14 May 2020 20:44 )  x     / <0.01 ng/mL / x     / x     / x      p-BNP 14 May 2020 20:44: x    , CARDIAC MARKERS ( 14 May 2020 17:30 )  x     / <0.01 ng/mL / x     / x     / x      p-BNP 14 May 2020 17:30: 710 pg/mL                          12.5   4.56  )-----------( 175      ( 14 May 2020 17:30 )             39.2     05-15    134<L>  |  90<L>  |  39.0<H>  ----------------------------<  103<H>  3.3<L>   |  34.0<H>  |  1.37<H>    Ca    9.4      15 May 2020 07:59  Phos  3.9     05-15  Mg     1.9     05-15    TPro  8.8<H>  /  Alb  3.1<L>  /  TBili  2.1<H>  /  DBili  x   /  AST  32  /  ALT  14  /  AlkPhos  120  05-14    proBNP: Serum Pro-Brain Natriuretic Peptide: 710 pg/mL (05-14 @ 17:30)    Lipid Profile:   HgA1c:   TSH:       TELEMETRY: Sinus rhythm 70s to low 100s

## 2020-05-15 NOTE — CONSULT NOTE ADULT - SUBJECTIVE AND OBJECTIVE BOX
39yM was admitted on  with progressive CHF exacerbation. Patient has been in and out of the hospital every month attempting to control his symptoms without success. He has had progressive increased swelling of the BLE and scrotum.     Patient reports that he has been having difficulty with his health and cannot seem to get past a couple of weeks to have ongoing follow up as an outpatient.     REVIEW OF SYSTEMS  Constitutional - No fever, No weight loss, +fatigue  HEENT - No eye pain, No visual disturbances, No difficulty hearing, No tinnitus, No vertigo, No neck pain  Respiratory - No cough, No wheezing, +shortness of breath  Cardiovascular - No chest pain, No palpitations  Gastrointestinal - No abdominal pain, No nausea, No vomiting, No diarrhea, No constipation  Genitourinary - No dysuria, No frequency, No hematuria, No incontinence  Neurological - No headaches, No memory loss, +loss of strength, No numbness, No tremors  Skin - No itching, No rashes, +lesions   Endocrine - No temperature intolerance  Musculoskeletal - No joint pain, +joint swelling, No muscle pain  Psychiatric - +depression, No anxiety    VITALS  T(C): 37.1 (05-15-20 @ 07:40), Max: 37.1 (05-15-20 @ 07:40)  HR: 75 (05-15-20 @ 07:40) (75 - 90)  BP: 96/74 (05-15-20 @ 07:40) (96/66 - 110/63)  RR: 23 (05-15-20 @ 07:40) (18 - 23)  SpO2: 98% (05-15-20 @ 07:40) (91% - 98%)  Wt(kg): --    PAST MEDICAL & SURGICAL HISTORY  JAN (obstructive sleep apnea)  Diabetes  Obesity, morbid  Pulmonary hypertension  CHF (congestive heart failure)  Hypertension  No significant past surgical history      SOCIAL HISTORY  Smoking - Denied  EtOH - Denied   Drugs - Denied    FUNCTIONAL HISTORY  Lives in [place] (12 [address]) but staying with friends on [place] (3 [address])  Independent    CURRENT FUNCTIONAL STATUS  5/15  Transfer: Sit to Stand:     · Level of Chaves	contact guard	  · Physical Assist/Nonphysical Assist	1 person assist	  · Weight-Bearing Restrictions	weight-bearing as tolerated	  · Assistive Device	rolling walker	    Transfer: Stand to Sit:     · Level of Chaves	contact guard	  · Weight-Bearing Restrictions	weight-bearing as tolerated	  · Assistive Device	rolling walker	    Sit/Stand Transfer Safety Analysis:     · Transfer Safety Concerns Noted	losing balance	  · Impairments Contributing to Impaired Transfers	impaired balance; decreased strength; Wide MARI 2*2 edema	    Gait Skills:     · Level of Chaves	minimum assist (75% patients effort)	  · Physical Assist/Nonphysical Assist	1 person assist	  · Weight-Bearing Restrictions	weight-bearing as tolerated	  · Assistive Device	rolling walker	  · Gait Distance	20 feet x2	    Gait Analysis:     · Gait Pattern Used	2-point gait	  · Gait Deviations Noted	decreased debbie; increased time in double stance; decreased weight-shifting ability	  · Impairments Contributing to Gait Deviations	impaired balance; impaired postural control; decreased strength; standing rest break needed 2*2 + MUÑOZ	    Stair Negotiation:     · Level of Chaves	unable to perform; + MUÑOZ/SOB, unsafe	        FAMILY HISTORY   Family history of diabetes mellitus (DM) (Grandparent)  Family history unknown      RECENT LABS/IMAGING  CBC Full  -  ( 14 May 2020 17:30 )  WBC Count : 4.56 K/uL  RBC Count : 4.26 M/uL  Hemoglobin : 12.5 g/dL  Hematocrit : 39.2 %  Platelet Count - Automated : 175 K/uL  Mean Cell Volume : 92.0 fl  Mean Cell Hemoglobin : 29.3 pg  Mean Cell Hemoglobin Concentration : 31.9 gm/dL  Auto Neutrophil # : x  Auto Lymphocyte # : x  Auto Monocyte # : x  Auto Eosinophil # : x  Auto Basophil # : x  Auto Neutrophil % : x  Auto Lymphocyte % : x  Auto Monocyte % : x  Auto Eosinophil % : x  Auto Basophil % : x    05-15    134<L>  |  90<L>  |  39.0<H>  ----------------------------<  103<H>  3.3<L>   |  34.0<H>  |  1.37<H>    Ca    9.4      15 May 2020 07:59  Phos  3.9     05-15  Mg     1.9     05-15    TPro  8.8<H>  /  Alb  3.1<L>  /  TBili  2.1<H>  /  DBili  x   /  AST  32  /  ALT  14  /  AlkPhos  120  05-14    Urinalysis Basic - ( 14 May 2020 18:04 )    Color: Yellow / Appearance: Clear / S.005 / pH: x  Gluc: x / Ketone: Negative  / Bili: Negative / Urobili: 1 mg/dL   Blood: x / Protein: 30 mg/dL / Nitrite: Negative   Leuk Esterase: Negative / RBC: 0-2 /HPF / WBC 0-2   Sq Epi: x / Non Sq Epi: Occasional / Bacteria: Occasional        ALLERGIES  No Known Allergies      MEDICATIONS   aspirin  chewable 81 milliGRAM(s) Oral daily  atorvastatin 40 milliGRAM(s) Oral at bedtime  buMETAnide Infusion 0.5 mG/Hr IV Continuous <Continuous>  carvedilol 3.125 milliGRAM(s) Oral every 12 hours  dextrose 40% Gel 15 Gram(s) Oral once PRN  dextrose 5%. 1000 milliLiter(s) IV Continuous <Continuous>  dextrose 50% Injectable 12.5 Gram(s) IV Push once  dextrose 50% Injectable 25 Gram(s) IV Push once  dextrose 50% Injectable 25 Gram(s) IV Push once  glucagon  Injectable 1 milliGRAM(s) IntraMuscular once PRN  heparin   Injectable 5000 Unit(s) SubCutaneous every 8 hours  insulin lispro (HumaLOG) corrective regimen sliding scale   SubCutaneous three times a day before meals  insulin lispro (HumaLOG) corrective regimen sliding scale   SubCutaneous at bedtime  metolazone 2.5 milliGRAM(s) Oral daily  polyethylene glycol 3350 17 Gram(s) Oral daily  potassium chloride    Tablet ER 40 milliEquivalent(s) Oral every 4 hours  senna 2 Tablet(s) Oral at bedtime  sildenafil (REVATIO) 20 milliGRAM(s) Oral <User Schedule>  sildenafil (REVATIO) 10 milliGRAM(s) Oral <User Schedule>  Treprostinil 0.125 mg 1 Tablet(s) 1 Tablet(s) Oral <User Schedule>  Treprostinil 0.125 mg 2 Tablet(s) 2 Tablet(s) Oral <User Schedule>      ----------------------------------------------------------------------------------------  PHYSICAL EXAM  Constitutional - NAD, Comfortable  HEENT - NCAT, EOMI  Neck - Supple, No limited ROM  Chest - Breathing comfortably, No wheezing  Cardiovascular - S1S2   Abdomen - Morbidly obese  Extremities - +Pitting edema, Chronic edema changes in BLE  Neurologic Exam -                    Cognitive - Awake, Alert, AAO to self, place, date, year, situation     Communication - Fluent, No dysarthria     Motor -  No focal deficits     Sensory - Intact to LT  Psychiatric - Mood mildly depressed  ----------------------------------------------------------------------------------------  ASSESSMENT/PLAN  39yMale with functional deficits after CHF exacerbation  CAD - ASA, Lipitor  Cardiac - Coreg  CHF - Bumex  Pulm HTN - Treprostinil  Pain - Tylenol  DVT PPX - SCDs, Heparin  Rehab - Patient need long term outpatient cardiopulmonary rehab. He was independent up until yesterday and current functional status may be related to being in bed. Needs to mobilize throughout the day to maintain not only cardiopulmonary health, but also mitigate effects of debility. Will continue to follow for ongoing rehab needs and recommendations. Expect patient to achieve functional goals for DC HOME with OUTPATIENT.

## 2020-05-16 LAB
ALBUMIN SERPL ELPH-MCNC: 3.2 G/DL — LOW (ref 3.3–5.2)
ALP SERPL-CCNC: 123 U/L — HIGH (ref 40–120)
ALT FLD-CCNC: 14 U/L — SIGNIFICANT CHANGE UP
ANION GAP SERPL CALC-SCNC: 13 MMOL/L — SIGNIFICANT CHANGE UP (ref 5–17)
APTT BLD: 39 SEC — HIGH (ref 27.5–36.3)
AST SERPL-CCNC: 30 U/L — SIGNIFICANT CHANGE UP
BASOPHILS # BLD AUTO: 0.03 K/UL — SIGNIFICANT CHANGE UP (ref 0–0.2)
BASOPHILS NFR BLD AUTO: 0.8 % — SIGNIFICANT CHANGE UP (ref 0–2)
BILIRUB SERPL-MCNC: 2.2 MG/DL — HIGH (ref 0.4–2)
BUN SERPL-MCNC: 37 MG/DL — HIGH (ref 8–20)
CALCIUM SERPL-MCNC: 9.3 MG/DL — SIGNIFICANT CHANGE UP (ref 8.6–10.2)
CHLORIDE SERPL-SCNC: 90 MMOL/L — LOW (ref 98–107)
CO2 SERPL-SCNC: 34 MMOL/L — HIGH (ref 22–29)
CREAT SERPL-MCNC: 1.4 MG/DL — HIGH (ref 0.5–1.3)
EOSINOPHIL # BLD AUTO: 0.16 K/UL — SIGNIFICANT CHANGE UP (ref 0–0.5)
EOSINOPHIL NFR BLD AUTO: 4.3 % — SIGNIFICANT CHANGE UP (ref 0–6)
GLUCOSE BLDC GLUCOMTR-MCNC: 108 MG/DL — HIGH (ref 70–99)
GLUCOSE BLDC GLUCOMTR-MCNC: 125 MG/DL — HIGH (ref 70–99)
GLUCOSE BLDC GLUCOMTR-MCNC: 136 MG/DL — HIGH (ref 70–99)
GLUCOSE BLDC GLUCOMTR-MCNC: 96 MG/DL — SIGNIFICANT CHANGE UP (ref 70–99)
GLUCOSE SERPL-MCNC: 99 MG/DL — SIGNIFICANT CHANGE UP (ref 70–99)
HCT VFR BLD CALC: 39.5 % — SIGNIFICANT CHANGE UP (ref 39–50)
HGB BLD-MCNC: 12.2 G/DL — LOW (ref 13–17)
IMM GRANULOCYTES NFR BLD AUTO: 0.3 % — SIGNIFICANT CHANGE UP (ref 0–1.5)
INR BLD: 2.2 RATIO — HIGH (ref 0.88–1.16)
LYMPHOCYTES # BLD AUTO: 0.61 K/UL — LOW (ref 1–3.3)
LYMPHOCYTES # BLD AUTO: 16.4 % — SIGNIFICANT CHANGE UP (ref 13–44)
MAGNESIUM SERPL-MCNC: 2 MG/DL — SIGNIFICANT CHANGE UP (ref 1.6–2.6)
MCHC RBC-ENTMCNC: 29 PG — SIGNIFICANT CHANGE UP (ref 27–34)
MCHC RBC-ENTMCNC: 30.9 GM/DL — LOW (ref 32–36)
MCV RBC AUTO: 94 FL — SIGNIFICANT CHANGE UP (ref 80–100)
MONOCYTES # BLD AUTO: 0.4 K/UL — SIGNIFICANT CHANGE UP (ref 0–0.9)
MONOCYTES NFR BLD AUTO: 10.8 % — SIGNIFICANT CHANGE UP (ref 2–14)
NEUTROPHILS # BLD AUTO: 2.5 K/UL — SIGNIFICANT CHANGE UP (ref 1.8–7.4)
NEUTROPHILS NFR BLD AUTO: 67.4 % — SIGNIFICANT CHANGE UP (ref 43–77)
PHOSPHATE SERPL-MCNC: 4 MG/DL — SIGNIFICANT CHANGE UP (ref 2.4–4.7)
PLATELET # BLD AUTO: 184 K/UL — SIGNIFICANT CHANGE UP (ref 150–400)
POTASSIUM SERPL-MCNC: 3.6 MMOL/L — SIGNIFICANT CHANGE UP (ref 3.5–5.3)
POTASSIUM SERPL-SCNC: 3.6 MMOL/L — SIGNIFICANT CHANGE UP (ref 3.5–5.3)
PROT SERPL-MCNC: 9.5 G/DL — HIGH (ref 6.6–8.7)
PROTHROM AB SERPL-ACNC: 25.5 SEC — HIGH (ref 10–12.9)
RBC # BLD: 4.2 M/UL — SIGNIFICANT CHANGE UP (ref 4.2–5.8)
RBC # FLD: 16.7 % — HIGH (ref 10.3–14.5)
SODIUM SERPL-SCNC: 137 MMOL/L — SIGNIFICANT CHANGE UP (ref 135–145)
WBC # BLD: 3.71 K/UL — LOW (ref 3.8–10.5)
WBC # FLD AUTO: 3.71 K/UL — LOW (ref 3.8–10.5)

## 2020-05-16 PROCEDURE — 99233 SBSQ HOSP IP/OBS HIGH 50: CPT | Mod: GC

## 2020-05-16 PROCEDURE — 99232 SBSQ HOSP IP/OBS MODERATE 35: CPT

## 2020-05-16 RX ORDER — POTASSIUM CHLORIDE 20 MEQ
40 PACKET (EA) ORAL ONCE
Refills: 0 | Status: COMPLETED | OUTPATIENT
Start: 2020-05-16 | End: 2020-05-16

## 2020-05-16 RX ORDER — POTASSIUM CHLORIDE 20 MEQ
40 PACKET (EA) ORAL ONCE
Refills: 0 | Status: DISCONTINUED | OUTPATIENT
Start: 2020-05-16 | End: 2020-05-16

## 2020-05-16 RX ORDER — LACTULOSE 10 G/15ML
10 SOLUTION ORAL THREE TIMES A DAY
Refills: 0 | Status: COMPLETED | OUTPATIENT
Start: 2020-05-16 | End: 2020-05-18

## 2020-05-16 RX ORDER — SODIUM CHLORIDE 0.65 %
1 AEROSOL, SPRAY (ML) NASAL EVERY 4 HOURS
Refills: 0 | Status: DISCONTINUED | OUTPATIENT
Start: 2020-05-16 | End: 2020-05-20

## 2020-05-16 RX ADMIN — LACTULOSE 10 GRAM(S): 10 SOLUTION ORAL at 21:57

## 2020-05-16 RX ADMIN — CARVEDILOL PHOSPHATE 3.12 MILLIGRAM(S): 80 CAPSULE, EXTENDED RELEASE ORAL at 20:13

## 2020-05-16 RX ADMIN — Medication 20 MILLIGRAM(S): at 17:29

## 2020-05-16 RX ADMIN — HEPARIN SODIUM 5000 UNIT(S): 5000 INJECTION INTRAVENOUS; SUBCUTANEOUS at 21:57

## 2020-05-16 RX ADMIN — SENNA PLUS 2 TABLET(S): 8.6 TABLET ORAL at 21:57

## 2020-05-16 RX ADMIN — Medication 40 MILLIEQUIVALENT(S): at 10:01

## 2020-05-16 RX ADMIN — ATORVASTATIN CALCIUM 40 MILLIGRAM(S): 80 TABLET, FILM COATED ORAL at 21:57

## 2020-05-16 RX ADMIN — CARVEDILOL PHOSPHATE 3.12 MILLIGRAM(S): 80 CAPSULE, EXTENDED RELEASE ORAL at 06:01

## 2020-05-16 RX ADMIN — Medication 10 MILLIGRAM(S): at 21:56

## 2020-05-16 RX ADMIN — Medication 81 MILLIGRAM(S): at 11:08

## 2020-05-16 RX ADMIN — BUMETANIDE 2.5 MG/HR: 0.25 INJECTION INTRAMUSCULAR; INTRAVENOUS at 00:24

## 2020-05-16 RX ADMIN — POLYETHYLENE GLYCOL 3350 17 GRAM(S): 17 POWDER, FOR SOLUTION ORAL at 11:08

## 2020-05-16 RX ADMIN — Medication 20 MILLIGRAM(S): at 06:01

## 2020-05-16 RX ADMIN — HEPARIN SODIUM 5000 UNIT(S): 5000 INJECTION INTRAVENOUS; SUBCUTANEOUS at 17:28

## 2020-05-16 NOTE — PROGRESS NOTE ADULT - SUBJECTIVE AND OBJECTIVE BOX
Pt seen and examined at bedside. States SOB has improved since admission. Still needs to prop head up while laying down due to orthopnea. Understands plan per cardiology to continue diuresis. Has unstable housing situation, understands social work will look into placement to facility. Extensively counciled on medication and diet compliance. Agrees. Is tolerating PO and voiding. No BM in 3 days.       05-15-20 @ 07:01  -  05-16-20 @ 07:00  --------------------------------------------------------  IN: 17.5 mL / OUT: 850 mL / NET: -832.5 mL    05-16-20 @ 07:01  -  05-16-20 @ 15:51  --------------------------------------------------------  IN: 15 mL / OUT: 3350 mL / NET: -3335 mL    Vital Signs Last 24 Hrs  T(C): 37 (16 May 2020 14:08), Max: 37 (16 May 2020 14:08)  T(F): 98.6 (16 May 2020 14:08), Max: 98.6 (16 May 2020 14:08)  HR: 80 (16 May 2020 14:08) (74 - 82)  BP: 98/67 (16 May 2020 14:08) (98/67 - 134/64)  BP(mean): 77 (15 May 2020 21:02) (77 - 77)  RR: 19 (16 May 2020 14:08) (19 - 23)  SpO2: 94% (16 May 2020 14:08) (90% - 98%)    General: Obese, tired appearing male, NAD  Head:  Normocephalic, atraumatic  ENT:  Mucosa moist, no ulcerations  Neck:  Supple, no sinuses or palpable masses  Respiratory: Decrease air movement due body habitus, Bibasilar crackles, no retractions   CV: RRR, S1S2, no murmur  Abdominal: Soft, NT, Distended, no palpable mass, +BS  : Scrotal edema   Extremities: 2+ pitting edema, + peripheral pulses, FROM all 4 extremity  Neurology: A&Ox3  Skin: Dry skin                          12.2   3.71  )-----------( 184      ( 16 May 2020 08:09 )             39.5     05-16    137  |  90<L>  |  37.0<H>  ----------------------------<  99  3.6   |  34.0<H>  |  1.40<H>    Ca    9.3      16 May 2020 08:07  Phos  4.0     05-16  Mg     2.0     05-16    TPro  9.5<H>  /  Alb  3.2<L>  /  TBili  2.2<H>  /  DBili  x   /  AST  30  /  ALT  14  /  AlkPhos  123<H>  05-16    LIVER FUNCTIONS - ( 16 May 2020 08:07 )  Alb: 3.2 g/dL / Pro: 9.5 g/dL / ALK PHOS: 123 U/L / ALT: 14 U/L / AST: 30 U/L / GGT: x              	< from: Xray Chest 1 View-PORTABLE IMMEDIATE (05.14.20 @ 18:48) >      	 EXAM:  XR CHEST PORTABLE IMMED 1V                          	PROCEDURE DATE:  05/14/2020          	INTERPRETATION:    	AP chest on May 14, 2020 at 6:26 PM. Patient has chest pain. COVID virus testing has been negative to date. Patient has history of CHF, diabetes, hypertension, and pulmonary hypertension. Patient has lymphedema of the legs. Patient is obese.    	Heart is greatly enlarged and obscures the lungs. Large size of the individual also obscures the lungs.    	Small right base effusion cannot be excluded.      On May 2, the lungs were grossly clear.\    MEDICATIONS  (STANDING):  aspirin  chewable 81 milliGRAM(s) Oral daily  atorvastatin 40 milliGRAM(s) Oral at bedtime  buMETAnide Infusion 0.5 mG/Hr (2.5 mL/Hr) IV Continuous <Continuous>  carvedilol 3.125 milliGRAM(s) Oral every 12 hours  dextrose 5%. 1000 milliLiter(s) (50 mL/Hr) IV Continuous <Continuous>  dextrose 50% Injectable 12.5 Gram(s) IV Push once  dextrose 50% Injectable 25 Gram(s) IV Push once  dextrose 50% Injectable 25 Gram(s) IV Push once  heparin   Injectable 5000 Unit(s) SubCutaneous every 8 hours  insulin lispro (HumaLOG) corrective regimen sliding scale   SubCutaneous three times a day before meals  insulin lispro (HumaLOG) corrective regimen sliding scale   SubCutaneous at bedtime  metolazone 2.5 milliGRAM(s) Oral daily  polyethylene glycol 3350 17 Gram(s) Oral daily  senna 2 Tablet(s) Oral at bedtime  sildenafil (REVATIO) 20 milliGRAM(s) Oral <User Schedule>  sildenafil (REVATIO) 10 milliGRAM(s) Oral <User Schedule>  Treprostinil 0.125 mg 1 Tablet(s) 1 Tablet(s) Oral <User Schedule>  Treprostinil 0.125 mg 2 Tablet(s) 2 Tablet(s) Oral <User Schedule>    MEDICATIONS  (PRN):  dextrose 40% Gel 15 Gram(s) Oral once PRN Blood Glucose LESS THAN 70 milliGRAM(s)/deciliter  glucagon  Injectable 1 milliGRAM(s) IntraMuscular once PRN Glucose LESS THAN 70 milligrams/deciliter  sodium chloride 0.65% Nasal 1 Spray(s) Both Nostrils every 4 hours PRN Nasal Congestion Patient is a 39y old  Male who presents with a chief complaint of SOBB/CHF exacerbation (16 May 2020 15:48)    Pt seen and examined at bedside. States SOB has improved since admission. Still needs to prop head up while laying down due to orthopnea. Understands plan per cardiology to continue diuresis. Has unstable housing situation, understands social work will look into placement to facility. Extensively counselled on medication and diet compliance. Agrees. Is tolerating PO and voiding. No BM in 3 days. States sob improving, c/o testicular swelling, mild and improved    ROS: No chest pain, palpitations,  fevers/chills, abdominal pain, n/v, diarrhea/constipation, dysuria or increased urinary frequency. All other ros negative     05-15-20 @ 07:01  -  05-16-20 @ 07:00  --------------------------------------------------------  IN: 17.5 mL / OUT: 850 mL / NET: -832.5 mL    05-16-20 @ 07:01  -  05-16-20 @ 15:51  --------------------------------------------------------  IN: 15 mL / OUT: 3350 mL / NET: -3335 mL    Tele: no events     Vital Signs Last 24 Hrs  T(C): 37 (16 May 2020 14:08), Max: 37 (16 May 2020 14:08)  T(F): 98.6 (16 May 2020 14:08), Max: 98.6 (16 May 2020 14:08)  HR: 80 (16 May 2020 14:08) (74 - 82)  BP: 98/67 (16 May 2020 14:08) (98/67 - 134/64)  BP(mean): 77 (15 May 2020 21:02) (77 - 77)  RR: 19 (16 May 2020 14:08) (19 - 23)  SpO2: 94% (16 May 2020 14:08) (90% - 98%)    Exam:   General: Obese, tired appearing male, NAD  HEENT: eomi, perrla, no palpable neck mass   Respiratory: Decrease air movement due body habitus, Bibasilar crackles, no retractions   CV: RRR, S1S2, no murmur  Abdominal: Soft, NT, Distended, no palpable mass, +BS  : Scrotal edema, improved per patient   Extremities: 2+ pitting edema, + peripheral pulses, FROM all 4 extremity  Neurology: A&Ox3  Skin: Dry skin     LABS:                     12.2   3.71  )-----------( 184      ( 16 May 2020 08:09 )             39.5     05-16    137  |  90<L>  |  37.0<H>  ----------------------------<  99  3.6   |  34.0<H>  |  1.40<H>    Ca    9.3      16 May 2020 08:07  Phos  4.0     05-16  Mg     2.0     05-16    TPro  9.5<H>  /  Alb  3.2<L>  /  TBili  2.2<H>  /  DBili  x   /  AST  30  /  ALT  14  /  AlkPhos  123<H>  05-16    LIVER FUNCTIONS - ( 16 May 2020 08:07 )  Alb: 3.2 g/dL / Pro: 9.5 g/dL / ALK PHOS: 123 U/L / ALT: 14 U/L / AST: 30 U/L / GGT: x              	< from: Xray Chest 1 View-PORTABLE IMMEDIATE (05.14.20 @ 18:48) >      	 EXAM:  XR CHEST PORTABLE IMMED 1V                          	PROCEDURE DATE:  05/14/2020          	INTERPRETATION:    	AP chest on May 14, 2020 at 6:26 PM. Patient has chest pain. COVID virus testing has been negative to date. Patient has history of CHF, diabetes, hypertension, and pulmonary hypertension. Patient has lymphedema of the legs. Patient is obese.    	Heart is greatly enlarged and obscures the lungs. Large size of the individual also obscures the lungs.    	Small right base effusion cannot be excluded.      On May 2, the lungs were grossly clear.\    MEDICATIONS  (STANDING):  aspirin  chewable 81 milliGRAM(s) Oral daily  atorvastatin 40 milliGRAM(s) Oral at bedtime  buMETAnide Infusion 0.5 mG/Hr (2.5 mL/Hr) IV Continuous <Continuous>  carvedilol 3.125 milliGRAM(s) Oral every 12 hours  dextrose 5%. 1000 milliLiter(s) (50 mL/Hr) IV Continuous <Continuous>  dextrose 50% Injectable 12.5 Gram(s) IV Push once  dextrose 50% Injectable 25 Gram(s) IV Push once  dextrose 50% Injectable 25 Gram(s) IV Push once  heparin   Injectable 5000 Unit(s) SubCutaneous every 8 hours  insulin lispro (HumaLOG) corrective regimen sliding scale   SubCutaneous three times a day before meals  insulin lispro (HumaLOG) corrective regimen sliding scale   SubCutaneous at bedtime  lactulose Syrup 10 Gram(s) Oral three times a day  metolazone 2.5 milliGRAM(s) Oral daily  polyethylene glycol 3350 17 Gram(s) Oral daily  senna 2 Tablet(s) Oral at bedtime  sildenafil (REVATIO) 20 milliGRAM(s) Oral <User Schedule>  sildenafil (REVATIO) 10 milliGRAM(s) Oral <User Schedule>  Treprostinil 0.125 mg 1 Tablet(s) 1 Tablet(s) Oral <User Schedule>  Treprostinil 0.125 mg 2 Tablet(s) 2 Tablet(s) Oral <User Schedule>    MEDICATIONS  (PRN):  dextrose 40% Gel 15 Gram(s) Oral once PRN Blood Glucose LESS THAN 70 milliGRAM(s)/deciliter  glucagon  Injectable 1 milliGRAM(s) IntraMuscular once PRN Glucose LESS THAN 70 milligrams/deciliter  sodium chloride 0.65% Nasal 1 Spray(s) Both Nostrils every 4 hours PRN Nasal Congestion

## 2020-05-16 NOTE — PROGRESS NOTE ADULT - ASSESSMENT
A 38 y/o Male with PMH significant for Morbid obesity, JAN, HTN, CHFpEF, Pulm HTN, DM II, presents today c/o SOB and lower extremity swelling, being admitted for Acute on Chronic Respiratory Failure due to CHF exacerbation secondary to Pulmonary Hypertension.     Acute on Chronic Respiratory Failure due to CHF exacerbation, Right side HF secondary to Pulmonary Hypertension   -COVID19 negative   -Trop neg X3  -  -Patient fluid overload, b/l LE and scrotal edema + Bibasilar crackles  -Pt, know for being poor complaint and poor out patient follow up, did not follow up with any provider after his last DC   -S/p Lasix 60mg IV at the ED  -cardio recs appreciated, c/w Bumex infusion @ rate of 0.5mg/hr.  -Continue Metolazone, sildenafil, and coreg.   -changed orenitram to 1 Tab (0.125mg) qAM and afternoon dose, 2 Tabs (0.25mg) qEvening  -Discontinued home HCTZ   -C/w Albuterol prn   -Daily weight and I&O's - net negative.   -TTE 12/2019: LVEF 65-70%, Severely reduced RV systolic function, Right ventricular volume and pressure overload, Moderate-severe tricuspid regurgitation.  -Cardio consult noted, pt with severe pulmonary HTN, pt already auto-anticoagulated   -PT recs appreciated, pending assessment  -PM&R recs appreciated, need long term outpatient cardiopulmonary rehab. Expect patient to achieve functional goals for dc home with outpt.    Abdominal distention   -likely due to fluid overload but also associated with constipation. No BM x 3 days  -Per pt, started since taking Orenitram  -C/w bowel regimen with Senna and Miralax, added lactulose    Prerenal HAYES   Likely due diuretics and CHF exacerbation   Base line 1 - 1.1   current Cr: 1.4>1.34> 1.4  cont to diurese, will closely monitor      Elevated INR   -Stable   -Likely 2/2 hepatic congestion in setting of acute CHF  -No active bleeding   -Continue to monitor    Hypokalemia (resolved)  -S/p repletion  -F/u am     Mild Hyponatremia (resolved)  -Due to diureses, discontinued HCTZ   	  -Serum Na: 137  - monitor      DM-2, A1c 6.6   -Blood glucose stable   -Hold PO meds (Metformin), resume on dc    -Mod ISS    HTN  -Soft BP noted  -Will closely watch while in diuretics   -C/w Cored with holding parameters     HLD  -C/w statin    JAN/OHS  -C/w CPAP  -Lifestyle modification: Diet, weight loss  -Again needs outpt sleep study for further eval to obtain CPAP for home     Abnormal CT findings   -CT a/p: Mediastinal, pelvic b/l, external iliac and b/l inguinal LAD, at his last admission   -was instructed to follow up with Rheumatologist or PMD, however failed to do so.    Morbid obesity  -Lifestyle modification: Diet, weight loss    DVT ppx  -Heparin SC    Code status   Full Code    Patient high risk for readmission, will consult CM and SW A 38 y/o Male with PMH significant for Morbid obesity, JAN, HTN, CHFpEF, Pulm HTN, DM II, presents today c/o SOB and lower extremity swelling, being admitted for Acute on Chronic Respiratory Failure due to CHF exacerbation secondary to Pulmonary Hypertension. Patient has multiple admissions for heart failure exacerbation. Patient found to be covid negative, trop x 3 negative and . He was seen by cardio, received lasix 60mg ivp in er and HCTZ from home was discontinued.     Acute on Chronic Respiratory Failure due to CHF exacerbation, Right side HF secondary to Pulmonary Hypertension   metabolic alkalosis likely due to diuresis   +elevated INR/coags likely 2/2 hepatic congestion   -Patient fluid overload, b/l LE and scrotal edema + Bibasilar crackles  -cardio recs appreciated, c/w Bumex infusion @ rate of 0.5mg/hr.  -Continue Metolazone, sildenafil, and coreg.   -changed orenitram to 1 Tab (0.125mg) qAM and afternoon dose, 2 Tabs (0.25mg) qEvening  -C/w Albuterol prn   -Daily weight and I&O's - net negative.   -TTE 12/2019: LVEF 65-70%, Severely reduced RV systolic function, Right ventricular volume and pressure overload, Moderate-severe tricuspid regurgitation.  -Cardio consult noted, pt with severe pulmonary HTN, pt already auto-anticoagulated   -PT recs appreciated, pending assessment  -PM&R recs appreciated, need long term outpatient cardiopulmonary rehab. Expect patient to achieve functional goals for dc home with outpt.    Abdominal distention   -likely due to fluid overload but also associated with constipation. No BM x 3 days  -Per pt, started since taking Orenitram  -C/w bowel regimen with Senna and Miralax, added lactulose    Prerenal HAYES with hypokalemia  Likely due diuretics and CHF exacerbation   Base line 1 - 1.1   current Cr: 1.4>1.34> 1.4  -K repleted and monitored   cont to diurese, will closely monitor      Mild Hyponatremia (resolved)  -Due to diureses, discontinued HCTZ   	  -Serum Na: 137     DM-2, A1c 6.6   -Blood glucose stable   -Hold PO meds (Metformin), resume on dc    -Mod ISS    HTN hx   -bp on the lower side  -Will closely watch while in diuretics   -C/w Cored with holding parameters     HLD  -C/w statin    JAN/OHS with morbid obesity   -C/w CPAP  -Lifestyle modification: Diet, weight loss  -Again needs outpt sleep study for further eval to obtain CPAP for home     Abnormal CT findings   -CT a/p: Mediastinal, pelvic b/l, external iliac and b/l inguinal LAD, at his last admission   -was instructed to follow up with Rheumatologist or PMD, however failed to do so.    DVT ppx  -Heparin SC    Advanced directives: full code     Patient high risk for readmission, will consult CM and SW

## 2020-05-16 NOTE — DIETITIAN INITIAL EVALUATION ADULT. - OTHER INFO
Pt with h/o morbid obesity, JAN, HTN, CHFpEF, Chronic Hypoxic Respiratory Failure with home O2 (3-4 L), Pulm HTN, DM II, presents today c/o SOB and lower extremity swelling.  Pt has been admitted several times with CHF exacerbation last admission 2 weeks ago.  Pt known to service for multiple readmission for CHF- pt has been educated on low Na diet many times and is always able to verbalize understanding.  Aware pt with 4+ severe edema B/L extremities.

## 2020-05-16 NOTE — PROGRESS NOTE ADULT - ASSESSMENT
40 y/o morbidly obese M with a PMHx of hx of R sided heart failure (normalLVEF with severely reduced RV function), severe pulmonary HTN on home O2, JAN on CPAP, and IDDM2 who was recently admitted to [facility] from (04/28-/05/04/20) for heart failure exacerbation presented to the ED today with complaints of dyspnea on exertion and leg swelling. Patient states that he only felt better for a day or 2 after discharge, and since has been experiencing worsening dyspnea on exertion, leg swelling, and abdominal distension.    5/16: Pt sitting upright at side of bed resting comfortably No signs of resp. distress. Lungs clear to auscultation Bilateral lower extremity edema present and resolving. CM HR @ 80s. Cont. current medication regimen. Awaiting PT eval for dispo planning.    Severe Pulmonary HTN with Right Heart Failure  - , down from previous admission  - Change Bumex to IV infusion at 0.5mg/hr  - Continue Metolazone, sildenafil, and coreg.   - change orenitram to 1 Tab (0.125mg) qAM and afternoon dose, 2 Tabs (0.25mg) qEvening  - DC HOME HCTZ  - Monitor on telemetry.    - Strict i/o and daily standing weights.    - Keep K > 4, Mg > 2.    - Monitor renal function with ongoing diuresis.      Hypokalemia  - Due to diuretic use  - Keep K>4, Mg>2    Prognosis  - Pt with difficulty ambulating and getting up stairs. unable to properly care for self.  - s/w eval  - PT eval

## 2020-05-16 NOTE — DIETITIAN INITIAL EVALUATION ADULT. - PERTINENT LABORATORY DATA
05-16 Na137 mmol/L Glu 99 mg/dL K+ 3.6 mmol/L Cr  1.40 mg/dL<H> BUN 37.0 mg/dL<H> Phos 4.0 mg/dL Alb 3.2 g/dL<L> PAB n/a

## 2020-05-16 NOTE — PROGRESS NOTE ADULT - SUBJECTIVE AND OBJECTIVE BOX
[facility]                                                               Office: [address] [address]                                                              Telephone: [phone]. Fax:[phone]                                                                             PROGRESS NOTE  Reason for follow up: SOB/CHF exacerbation  Overnight: No new events.   Update: 5/16: Pt sitting upright at side of bed resting comfortably No signs of resp. distress. Lungs clear to auscultation Bilateral lower extremity edema present and resolving. Cont. current medication regimen. Awaiting PT eval for dispo planning.    Subjective: " I need to find out what is going to happen after I leave here"      	  Vitals:  T(C): 36.9 (05-16-20 @ 12:45), Max: 36.9 (05-15-20 @ 21:02)  HR: 81 (05-16-20 @ 12:45) (74 - 82)  BP: 129/88 (05-16-20 @ 12:45) (104/63 - 134/64)  RR: 21 (05-16-20 @ 12:45) (20 - 23)  SpO2: 90% (05-16-20 @ 12:45) (90% - 98%)    I&O's Summary    15 May 2020 07:01  -  16 May 2020 07:00  --------------------------------------------------------  IN: 17.5 mL / OUT: 850 mL / NET: -832.5 mL    16 May 2020 07:01  -  16 May 2020 13:25  --------------------------------------------------------  IN: 15 mL / OUT: 2950 mL / NET: -2935 mL      Weight (kg): 181.4 (05-14 @ 16:06)      PHYSICAL EXAM:  Appearance: Comfortable. No acute distress  HEENT:  Head and neck: Atraumatic. Normocephalic.  Normal oral mucosa, PERRL, Neck is supple. No JVD, No carotid bruit.   Neurologic: A & O x 3, no focal deficits. EOMI.  Lymphatic: No cervical lymphadenopathy  Cardiovascular: Normal S1 S2, No murmur, rubs/gallops. No JVD, No edema  Respiratory: Lungs clear to auscultation  Gastrointestinal:  Soft, Non-tender, + BS  Lower Extremities: + bilateral lower extremity edema  Psychiatry: Patient is calm. No agitation. Mood & affect appropriate  Skin: No rashes/ ecchymoses/cyanosis/ulcers visualized on the face, hands or feet.      CURRENT MEDICATIONS:  buMETAnide Infusion 0.5 mG/Hr IV Continuous <Continuous>  carvedilol 3.125 milliGRAM(s) Oral every 12 hours  metolazone 2.5 milliGRAM(s) Oral daily  sildenafil (REVATIO) 20 milliGRAM(s) Oral <User Schedule>  sildenafil (REVATIO) 10 milliGRAM(s) Oral <User Schedule>    polyethylene glycol 3350  senna  atorvastatin  dextrose 50% Injectable  dextrose 50% Injectable  dextrose 50% Injectable  insulin lispro (HumaLOG) corrective regimen sliding scale  insulin lispro (HumaLOG) corrective regimen sliding scale  aspirin  chewable  dextrose 5%.  heparin   Injectable      DIAGNOSTIC TESTING:  [ ] Echocardiogram: < from: TTE Echo Complete w/Doppler (12.21.19 @ 08:48) >  Summary:   1. Technically fair study.   2. Left ventricular ejection fraction, by visual estimation, is 65 to 70%.   3. There is moderate septal left ventricular hypertrophy.   4. Severely enlarged right ventricle.   5. Severely reduced RV systolic function.   6. Right ventricular volume and pressure overload.   7. Moderate-severe tricuspid regurgitation.   8. Estimated pulmonary artery systolic pressure is 89.6 mmHg assuming a right atrial pressure of 15 mmHg, which is consistent with severe pulmonary hypertension.   9. Moderately dilated pulmonary artery.      < end of copied text >    [ ]  Catheterization:< from: Cardiac Cath Lab - Adult (01.14.20 @ 12:56) >  HEMODYNAMICS: There is severe right sided failure. There is severe, fixed  pulmonary hypertension. With continued administration of inhaled nitric  oxide ( 40 PPM), there was no change in pulmonary hemodynamics.  COMPLICATIONS: No complications occurred during the cath lab visit.  DIAGNOSTIC IMPRESSIONS: Markedly elevated right sided filling pressure (RA  23 mmHg)  Severe pulmonary hypertension ( PASP 74 mmHg, Mean 51 mmHg)  PVR 5.79 Wood Units  PCWP within normal 15 mmgh  Pulmonary hypertension showed No response to Nitric oxide    < end of copied text >      OTHER: 	  US: < from: US Duplex Venous Lower Ext Complete, Bilateral (05.15.20 @ 12:14) >  FINDINGS:    There is normal compressibility of the bilateral common femoral, femoral and popliteal veins.     Doppler examination shows normal spontaneous and phasic flow.    No calf vein thrombosis is detected.    IMPRESSION:     No evidence of deep venous thrombosis in either lower extremity.      < end of copied text >      LABS:	 	  CARDIAC MARKERS ( 15 May 2020 07:59 )  x     / <0.01 ng/mL / x     / x     / x      p-BNP 15 May 2020 07:59: x    , CARDIAC MARKERS ( 14 May 2020 20:44 )  x     / <0.01 ng/mL / x     / x     / x      p-BNP 14 May 2020 20:44: x    , CARDIAC MARKERS ( 14 May 2020 17:30 )  x     / <0.01 ng/mL / x     / x     / x      p-BNP 14 May 2020 17:30: 710 pg/mL                          12.2   3.71  )-----------( 184      ( 16 May 2020 08:09 )             39.5     05-16    137  |  90<L>  |  37.0<H>  ----------------------------<  99  3.6   |  34.0<H>  |  1.40<H>    Ca    9.3      16 May 2020 08:07  Phos  4.0     05-16  Mg     2.0     05-16    TPro  9.5<H>  /  Alb  3.2<L>  /  TBili  2.2<H>  /  DBili  x   /  AST  30  /  ALT  14  /  AlkPhos  123<H>  05-16    proBNP: Serum Pro-Brain Natriuretic Peptide: 710 pg/mL (05-14 @ 17:30)        TELEMETRY:  NSR HR @ 80s

## 2020-05-17 LAB
ALBUMIN SERPL ELPH-MCNC: 3 G/DL — LOW (ref 3.3–5.2)
ALP SERPL-CCNC: 115 U/L — SIGNIFICANT CHANGE UP (ref 40–120)
ALT FLD-CCNC: 12 U/L — SIGNIFICANT CHANGE UP
ANION GAP SERPL CALC-SCNC: 12 MMOL/L — SIGNIFICANT CHANGE UP (ref 5–17)
AST SERPL-CCNC: 27 U/L — SIGNIFICANT CHANGE UP
BILIRUB SERPL-MCNC: 2 MG/DL — SIGNIFICANT CHANGE UP (ref 0.4–2)
BUN SERPL-MCNC: 37 MG/DL — HIGH (ref 8–20)
CALCIUM SERPL-MCNC: 9.8 MG/DL — SIGNIFICANT CHANGE UP (ref 8.6–10.2)
CHLORIDE SERPL-SCNC: 88 MMOL/L — LOW (ref 98–107)
CO2 SERPL-SCNC: 37 MMOL/L — HIGH (ref 22–29)
CREAT SERPL-MCNC: 1.16 MG/DL — SIGNIFICANT CHANGE UP (ref 0.5–1.3)
GLUCOSE BLDC GLUCOMTR-MCNC: 113 MG/DL — HIGH (ref 70–99)
GLUCOSE BLDC GLUCOMTR-MCNC: 119 MG/DL — HIGH (ref 70–99)
GLUCOSE BLDC GLUCOMTR-MCNC: 120 MG/DL — HIGH (ref 70–99)
GLUCOSE BLDC GLUCOMTR-MCNC: 134 MG/DL — HIGH (ref 70–99)
GLUCOSE SERPL-MCNC: 93 MG/DL — SIGNIFICANT CHANGE UP (ref 70–99)
HCT VFR BLD CALC: 39.1 % — SIGNIFICANT CHANGE UP (ref 39–50)
HGB BLD-MCNC: 12.3 G/DL — LOW (ref 13–17)
MAGNESIUM SERPL-MCNC: 1.8 MG/DL — SIGNIFICANT CHANGE UP (ref 1.8–2.6)
MCHC RBC-ENTMCNC: 29.4 PG — SIGNIFICANT CHANGE UP (ref 27–34)
MCHC RBC-ENTMCNC: 31.5 GM/DL — LOW (ref 32–36)
MCV RBC AUTO: 93.5 FL — SIGNIFICANT CHANGE UP (ref 80–100)
PHOSPHATE SERPL-MCNC: 4.1 MG/DL — SIGNIFICANT CHANGE UP (ref 2.4–4.7)
PLATELET # BLD AUTO: 176 K/UL — SIGNIFICANT CHANGE UP (ref 150–400)
POTASSIUM SERPL-MCNC: 3.2 MMOL/L — LOW (ref 3.5–5.3)
POTASSIUM SERPL-SCNC: 3.2 MMOL/L — LOW (ref 3.5–5.3)
PROT SERPL-MCNC: 9.2 G/DL — HIGH (ref 6.6–8.7)
RBC # BLD: 4.18 M/UL — LOW (ref 4.2–5.8)
RBC # FLD: 16.4 % — HIGH (ref 10.3–14.5)
SODIUM SERPL-SCNC: 137 MMOL/L — SIGNIFICANT CHANGE UP (ref 135–145)
WBC # BLD: 3.84 K/UL — SIGNIFICANT CHANGE UP (ref 3.8–10.5)
WBC # FLD AUTO: 3.84 K/UL — SIGNIFICANT CHANGE UP (ref 3.8–10.5)

## 2020-05-17 PROCEDURE — 99233 SBSQ HOSP IP/OBS HIGH 50: CPT | Mod: GC

## 2020-05-17 PROCEDURE — 99232 SBSQ HOSP IP/OBS MODERATE 35: CPT

## 2020-05-17 RX ORDER — POTASSIUM CHLORIDE 20 MEQ
40 PACKET (EA) ORAL EVERY 4 HOURS
Refills: 0 | Status: COMPLETED | OUTPATIENT
Start: 2020-05-17 | End: 2020-05-17

## 2020-05-17 RX ADMIN — Medication 40 MILLIEQUIVALENT(S): at 17:36

## 2020-05-17 RX ADMIN — BUMETANIDE 2.5 MG/HR: 0.25 INJECTION INTRAMUSCULAR; INTRAVENOUS at 04:58

## 2020-05-17 RX ADMIN — SENNA PLUS 2 TABLET(S): 8.6 TABLET ORAL at 20:53

## 2020-05-17 RX ADMIN — Medication 81 MILLIGRAM(S): at 15:10

## 2020-05-17 RX ADMIN — ATORVASTATIN CALCIUM 40 MILLIGRAM(S): 80 TABLET, FILM COATED ORAL at 20:53

## 2020-05-17 RX ADMIN — Medication 10 MILLIGRAM(S): at 20:53

## 2020-05-17 RX ADMIN — Medication 20 MILLIGRAM(S): at 15:39

## 2020-05-17 RX ADMIN — HEPARIN SODIUM 5000 UNIT(S): 5000 INJECTION INTRAVENOUS; SUBCUTANEOUS at 05:03

## 2020-05-17 RX ADMIN — Medication 20 MILLIGRAM(S): at 05:02

## 2020-05-17 RX ADMIN — LACTULOSE 10 GRAM(S): 10 SOLUTION ORAL at 05:03

## 2020-05-17 RX ADMIN — CARVEDILOL PHOSPHATE 3.12 MILLIGRAM(S): 80 CAPSULE, EXTENDED RELEASE ORAL at 17:36

## 2020-05-17 RX ADMIN — HEPARIN SODIUM 5000 UNIT(S): 5000 INJECTION INTRAVENOUS; SUBCUTANEOUS at 20:53

## 2020-05-17 RX ADMIN — Medication 40 MILLIEQUIVALENT(S): at 20:53

## 2020-05-17 RX ADMIN — HEPARIN SODIUM 5000 UNIT(S): 5000 INJECTION INTRAVENOUS; SUBCUTANEOUS at 15:19

## 2020-05-17 RX ADMIN — CARVEDILOL PHOSPHATE 3.12 MILLIGRAM(S): 80 CAPSULE, EXTENDED RELEASE ORAL at 05:02

## 2020-05-17 NOTE — PROGRESS NOTE ADULT - ASSESSMENT
A 40 y/o Male with PMH significant for Morbid obesity, JAN, HTN, CHFpEF, Pulm HTN, DM II, presents today c/o SOB and lower extremity swelling, being admitted for Acute on Chronic Respiratory Failure due to CHF exacerbation secondary to Pulmonary Hypertension. Patient has multiple admissions for heart failure exacerbation. Patient found to be covid negative, trop x 3 negative and . He was seen by cardio, received lasix 60mg ivp in er and HCTZ from home was discontinued.     Acute on Chronic Respiratory Failure due to CHF exacerbation, Right side HF secondary to Pulmonary Hypertension   metabolic alkalosis likely due to diuresis   +elevated INR/coags likely 2/2 hepatic congestion   -Patient fluid overload, b/l LE and scrotal edema + Bibasilar crackles  -cardio recs appreciated, c/w Bumex infusion @ rate of 0.5mg/hr.  -Continue Metolazone, sildenafil, and coreg.   -changed orenitram to 1 Tab (0.125mg) qAM and afternoon dose, 2 Tabs (0.25mg) qEvening  -C/w Albuterol prn   -Daily weight and I&O's - net negative.   -TTE 12/2019: LVEF 65-70%, Severely reduced RV systolic function, Right ventricular volume and pressure overload, Moderate-severe tricuspid regurgitation.  -Cardio consult noted, pt with severe pulmonary HTN, pt already auto-anticoagulated   -PT recs appreciated, Acute rehab vs Home w/ home pt w RW  -PM&R recs appreciated, need long term outpatient cardiopulmonary rehab. Expect patient to achieve functional goals for dc home with outpt.    Abdominal distention   -likely due to fluid overload but also associated with constipation. No BM x 4 days  -Per pt, started since taking Orenitram  -C/w bowel regimen with Senna and Miralax, added lactulose    Prerenal HAYES (resolved)  Likely due diuretics and CHF exacerbation   Base line 1 - 1.1   current Cr: 1.4>1.16 improving    Hypokalemia   -K repleted and monitored   cont to diurese, will closely monitor      Mild Hyponatremia (resolved)  -Due to diureses, discontinued HCTZ   	  -Serum Na: 137     DM-2, A1c 6.6   -Blood glucose stable   -Hold PO meds (Metformin), resume on dc    -Mod ISS    HTN hx   -bp on the lower side  -Will closely watch while in diuretics   -C/w Cored with holding parameters     HLD  -C/w statin    JAN/OHS with morbid obesity   -C/w CPAP  -Lifestyle modification: Diet, weight loss  -Again needs outpt sleep study for further eval to obtain CPAP for home     Abnormal CT findings   -CT a/p: Mediastinal, pelvic b/l, external iliac and b/l inguinal LAD, at his last admission   -was instructed to follow up with Rheumatologist or PMD, however failed to do so.    DVT ppx  -Heparin SC    Advanced directives: full code     Patient high risk for readmission, will consult CM and SW A 40 y/o Male with PMH significant for Morbid obesity, JAN, HTN, CHFpEF, Pulm HTN, DM II, presents today c/o SOB and lower extremity swelling, being admitted for Acute on Chronic Respiratory Failure due to CHF exacerbation secondary to Pulmonary Hypertension. Patient has multiple admissions for heart failure exacerbation. Patient found to be covid negative, trop x 3 negative and . He was seen by cardio, received lasix 60mg ivp in er and HCTZ from home was discontinued. TTE 12/2019: LVEF 65-70%, Severely reduced RV systolic function, Right ventricular volume and pressure overload, Moderate-severe tricuspid regurgitation.    Acute on Chronic Respiratory Failure due to CHF exacerbation, Right side HF secondary to Pulmonary Hypertension   metabolic alkalosis likely due to diuresis   +elevated INR/coags likely 2/2 hepatic congestion + abdominal distention   +hypokalemia  -cardio recs appreciated, c/w Bumex infusion @ rate of 0.5mg/hr (due to alkalosis, will try to deescalate drip in am)   -Continue Metolazone, sildenafil, and coreg.   -changed orenitram to 1 Tab (0.125mg) qAM and afternoon dose, 2 Tabs (0.25mg) qEvening  -C/w Albuterol prn, Daily weight and I&O's - net negative.   -Cardio consult noted, pt with severe pulmonary HTN  -abdominal distention started since taking Orenitram  -replete K     Prerenal HAYES (resolved) with mild hyponatremia   Likely due diuretics and CHF exacerbation   Base line 1 - 1.1   current Cr: 1.4>1.16 improving  -Due to diureses, discontinued HCTZ   	     DM-2, A1c 6.6   -Blood glucose stable   -Hold PO meds (Metformin), resume on dc    -Mod ISS    HTN hx   -bp on the lower side  -Will closely watch while in diuretics   -C/w Cored with holding parameters     HLD  -C/w statin    JAN/OHS with morbid obesity   -C/w CPAP  -Lifestyle modification: Diet, weight loss  -Again needs outpt sleep study for further eval to obtain CPAP for home     Constipation: -C/w bowel regimen with Senna and Miralax, added lactulose    Abnormal CT findings   -CT a/p: Mediastinal, pelvic b/l, external iliac and b/l inguinal LAD, at his last admission   -was instructed to follow up with Rheumatologist or PMD, however failed to do so.    DVT ppx  -Heparin SC    Advanced directives: full code     Dispo: PT recs appreciated, Acute rehab vs Home w/ home pt w RW. PM&R recs appreciated, need long term outpatient cardiopulmonary rehab. Expect patient to achieve functional goals for dc home with outpt. Per patient, lives with a family friend. Has a place to go on d/c. More concerned with why he keeps getting readmitted. Gave counselling on diet compliance, lifestyle modifications, etc.     Patient high risk for readmission, will consult LEONILA and TYESHA

## 2020-05-17 NOTE — PROGRESS NOTE ADULT - ASSESSMENT
38 y/o morbidly obese M with a PMHx of hx of R sided heart failure (normalLVEF with severely reduced RV function), severe pulmonary HTN on home O2, JAN on CPAP, and IDDM2 who was recently admitted to [facility] from (04/28-/05/04/20) for heart failure exacerbation presented to the ED today with complaints of dyspnea on exertion and leg swelling. Patient states that he only felt better for a day or 2 after discharge, and since has been experiencing worsening dyspnea on exertion, leg swelling, and abdominal distension.    5/17: Pt resting comfortably in bed. No signs of resp. distress. Lungs clear to auscultation Bilateral lower extremity edema resolving. CM NSR HR @ 70-80s. Cont. current medication regimen. Awaiting PT eval for dispo planning.    Severe Pulmonary HTN with Right Heart Failure  - , down from previous admission  - Change Bumex to IV infusion at 0.5mg/hr  - Continue Metolazone, sildenafil, and coreg.   - change orenitram to 1 Tab (0.125mg) qAM and afternoon dose, 2 Tabs (0.25mg) q Evening  - DC HOME HCTZ  - Monitor on telemetry.    - Strict i/o and daily standing weights.    - Keep K > 4, Mg > 2.    - Monitor renal function with ongoing diuresis.      Hypokalemia  - Due to diuretic use  - Keep K>4, Mg>2    Prognosis  - Pt with difficulty ambulating and getting up stairs. unable to properly care for self.  - s/w eval  - PT eval

## 2020-05-17 NOTE — PROGRESS NOTE ADULT - SUBJECTIVE AND OBJECTIVE BOX
[facility] [place] [facility]                                                               Office: [address] [address]                                                              Telephone: [phone]. Fax:[phone]                                                                             PROGRESS NOTE  Reason for follow up: SOB/CHF exacerbation  Overnight: No new events.   Update: 5/17: Pt resting comfortably in bed. No signs of resp. distress. Lungs clear to auscultation Bilateral lower extremity edema resolving. CM NSR HR @ 70-80s. Cont. current medication regimen. Awaiting PT eval for dispo planning.    Subjective: " I'm not as SOB as before"      	  Vitals:  T(C): 36.9 (05-17-20 @ 08:06), Max: 37.1 (05-17-20 @ 04:50)  HR: 82 (05-17-20 @ 09:19) (75 - 90)  BP: 119/78 (05-17-20 @ 08:06) (98/67 - 129/88)  RR: 20 (05-17-20 @ 08:06) (18 - 21)  SpO2: 97% (05-17-20 @ 09:19) (90% - 99%)  Wt(kg): --  I&O's Summary    16 May 2020 07:01  -  17 May 2020 07:00  --------------------------------------------------------  IN: 515 mL / OUT: 7175 mL / NET: -6660 mL    17 May 2020 07:01  -  17 May 2020 11:30  --------------------------------------------------------  IN: 0 mL / OUT: 500 mL / NET: -500 mL      Weight (kg): 181.4 (05-14 @ 16:06)      PHYSICAL EXAM:  Appearance: Comfortable. No acute distress  HEENT:  Head and neck: Atraumatic. Normocephalic.  Normal oral mucosa, PERRL, Neck is supple. No JVD, No carotid bruit.   Neurologic: A & O x 3, no focal deficits. EOMI.  Lymphatic: No cervical lymphadenopathy  Cardiovascular: Normal S1 S2, No murmur, rubs/gallops. No JVD, No edema  Respiratory: +productive cough, clear to auscultation  Gastrointestinal:  Soft, Non-tender, + BS  Lower Extremities: resolving bilateral lower extremity edema  Psychiatry: Patient is calm. No agitation. Mood & affect appropriate  Skin: No rashes/ ecchymoses/cyanosis/ulcers visualized on the face, hands or feet.      CURRENT MEDICATIONS:  buMETAnide Infusion 0.5 mG/Hr IV Continuous <Continuous>  carvedilol 3.125 milliGRAM(s) Oral every 12 hours  metolazone 2.5 milliGRAM(s) Oral daily  sildenafil (REVATIO) 20 milliGRAM(s) Oral <User Schedule>  sildenafil (REVATIO) 10 milliGRAM(s) Oral <User Schedule>  lactulose Syrup  polyethylene glycol 3350  senna  atorvastatin  dextrose 50% Injectable  dextrose 50% Injectable  dextrose 50% Injectable  insulin lispro (HumaLOG) corrective regimen sliding scale  insulin lispro (HumaLOG) corrective regimen sliding scale  aspirin  chewable  dextrose 5%.  heparin   Injectable    DIAGNOSTIC TESTING:  [ ] Echocardiogram: < from: TTE Echo Complete w/Doppler (12.21.19 @ 08:48) >  Summary:   1. Technically fair study.   2. Left ventricular ejection fraction, by visual estimation, is 65 to 70%.   3. There is moderate septal left ventricular hypertrophy.   4. Severely enlarged right ventricle.   5. Severely reduced RV systolic function.   6. Right ventricular volume and pressure overload.   7. Moderate-severe tricuspid regurgitation.   8. Estimated pulmonary artery systolic pressure is 89.6 mmHg assuming a right atrial pressure of 15 mmHg, which is consistent with severe pulmonary hypertension.   9. Moderately dilated pulmonary artery.      < end of copied text >    [ ]  Catheterization:< from: Cardiac Cath Lab - Adult (01.14.20 @ 12:56) >  HEMODYNAMICS: There is severe right sided failure. There is severe, fixed  pulmonary hypertension. With continued administration of inhaled nitric  oxide ( 40 PPM), there was no change in pulmonary hemodynamics.  COMPLICATIONS: No complications occurred during the cath lab visit.  DIAGNOSTIC IMPRESSIONS: Markedly elevated right sided filling pressure (RA  23 mmHg)  Severe pulmonary hypertension ( PASP 74 mmHg, Mean 51 mmHg)  PVR 5.79 Wood Units  PCWP within normal 15 mmgh  Pulmonary hypertension showed No response to Nitric oxide    < end of copied text >      OTHER: 	  US: < from: US Duplex Venous Lower Ext Complete, Bilateral (05.15.20 @ 12:14) >  FINDINGS:    There is normal compressibility of the bilateral common femoral, femoral and popliteal veins.     Doppler examination shows normal spontaneous and phasic flow.    No calf vein thrombosis is detected.    IMPRESSION:     No evidence of deep venous thrombosis in either lower extremity.      < end of copied text >      LABS:	 	  CARDIAC MARKERS ( 15 May 2020 07:59 )  x     / <0.01 ng/mL / x     / x     / x      p-BNP 15 May 2020 07:59: x    , CARDIAC MARKERS ( 14 May 2020 20:44 )  x     / <0.01 ng/mL / x     / x     / x      p-BNP 14 May 2020 20:44: x    , CARDIAC MARKERS ( 14 May 2020 17:30 )  x     / <0.01 ng/mL / x     / x     / x      p-BNP 14 May 2020 17:30: 710 pg/mL                          12.3   3.84  )-----------( 176      ( 17 May 2020 08:46 )             39.1     05-17    137  |  88<L>  |  37.0<H>  ----------------------------<  93  3.2<L>   |  37.0<H>  |  1.16    Ca    9.8      17 May 2020 08:46  Phos  4.1     05-17  Mg     1.8     05-17    TPro  9.2<H>  /  Alb  3.0<L>  /  TBili  2.0  /  DBili  x   /  AST  27  /  ALT  12  /  AlkPhos  115  05-17    proBNP: Serum Pro-Brain Natriuretic Peptide: 710 pg/mL (05-14 @ 17:30)          TELEMETRY: NSR HR @ 70-80s with PVCs

## 2020-05-17 NOTE — PROGRESS NOTE ADULT - SUBJECTIVE AND OBJECTIVE BOX
Patient is a 39y old  Male who presents with a chief complaint of SOBB/CHF exacerbation (16 May 2020 15:48)    Pt seen and examined at bedside. SOB continues to improve. Is diuresing well, understands plan to continue. Understands social work will look into placement to facility due to unstable living conditions. Extensively counselled on medication and diet compliance. Agrees. Is tolerating PO and voiding. No BM in 4 days, agrees to bowel regimen.    ROS: No chest pain, palpitations,  fevers/chills, abdominal pain, n/v, diarrhea/constipation, dysuria or increased urinary frequency. All other ros negative       05-16-20 @ 07:01  -  05-17-20 @ 07:00  --------------------------------------------------------  IN: 515 mL / OUT: 7175 mL / NET: -6660 mL    05-17-20 @ 07:01  -  05-17-20 @ 14:37  --------------------------------------------------------  IN: 972.4 mL / OUT: 1400 mL / NET: -427.6 mL    Tele: no events    Vital Signs Last 24 Hrs  T(C): 36.9 (17 May 2020 08:06), Max: 37.1 (17 May 2020 04:50)  T(F): 98.4 (17 May 2020 08:06), Max: 98.7 (17 May 2020 04:50)  HR: 82 (17 May 2020 09:19) (75 - 90)  BP: 119/78 (17 May 2020 08:06) (111/72 - 129/76)  RR: 20 (17 May 2020 08:06) (18 - 20)  SpO2: 97% (17 May 2020 09:19) (92% - 99%)    Exam:   General: Obese, male, sitting up in bed, comfortable  HEENT: eomi, perrla, no palpable neck mass   Respiratory: Decrease air movement due body habitus, Bibasilar crackles improving, no retractions   CV: RRR, S1S2, no murmur  Abdominal: Soft, NT, Distended, no palpable mass, +BS  : Scrotal edema, improved per patient   Extremities: 2+ pitting edema, + peripheral pulses, FROM all 4 extremity  Neurology: A&Ox3  Skin: Dry skin                          12.3   3.84  )-----------( 176      ( 17 May 2020 08:46 )             39.1     05-17    137  |  88<L>  |  37.0<H>  ----------------------------<  93  3.2<L>   |  37.0<H>  |  1.16    Ca    9.8      17 May 2020 08:46  Phos  4.1     05-17  Mg     1.8     05-17    TPro  9.2<H>  /  Alb  3.0<L>  /  TBili  2.0  /  DBili  x   /  AST  27  /  ALT  12  /  AlkPhos  115  05-17      	< from: Xray Chest 1 View-PORTABLE IMMEDIATE (05.14.20 @ 18:48) >      	 EXAM:  XR CHEST PORTABLE IMMED 1V                          	PROCEDURE DATE:  05/14/2020          	INTERPRETATION:    	AP chest on May 14, 2020 at 6:26 PM. Patient has chest pain. COVID virus testing has been negative to date. Patient has history of CHF, diabetes, hypertension, and pulmonary hypertension. Patient has lymphedema of the legs. Patient is obese.    	Heart is greatly enlarged and obscures the lungs. Large size of the individual also obscures the lungs.    	Small right base effusion cannot be excluded.      On May 2, the lungs were grossly clear.\    MEDICATIONS  (STANDING):  aspirin  chewable 81 milliGRAM(s) Oral daily  atorvastatin 40 milliGRAM(s) Oral at bedtime  buMETAnide Infusion 0.5 mG/Hr (2.5 mL/Hr) IV Continuous <Continuous>  carvedilol 3.125 milliGRAM(s) Oral every 12 hours  dextrose 5%. 1000 milliLiter(s) (50 mL/Hr) IV Continuous <Continuous>  dextrose 50% Injectable 12.5 Gram(s) IV Push once  dextrose 50% Injectable 25 Gram(s) IV Push once  dextrose 50% Injectable 25 Gram(s) IV Push once  heparin   Injectable 5000 Unit(s) SubCutaneous every 8 hours  insulin lispro (HumaLOG) corrective regimen sliding scale   SubCutaneous three times a day before meals  insulin lispro (HumaLOG) corrective regimen sliding scale   SubCutaneous at bedtime  lactulose Syrup 10 Gram(s) Oral three times a day  metolazone 2.5 milliGRAM(s) Oral daily  polyethylene glycol 3350 17 Gram(s) Oral daily  senna 2 Tablet(s) Oral at bedtime  sildenafil (REVATIO) 20 milliGRAM(s) Oral <User Schedule>  sildenafil (REVATIO) 10 milliGRAM(s) Oral <User Schedule>  Treprostinil 0.125 mg 1 Tablet(s) 1 Tablet(s) Oral <User Schedule>  Treprostinil 0.125 mg 2 Tablet(s) 2 Tablet(s) Oral <User Schedule>    MEDICATIONS  (PRN):  dextrose 40% Gel 15 Gram(s) Oral once PRN Blood Glucose LESS THAN 70 milliGRAM(s)/deciliter  glucagon  Injectable 1 milliGRAM(s) IntraMuscular once PRN Glucose LESS THAN 70 milligrams/deciliter  sodium chloride 0.65% Nasal 1 Spray(s) Both Nostrils every 4 hours PRN Nasal Congestion

## 2020-05-18 ENCOUNTER — TRANSCRIPTION ENCOUNTER (OUTPATIENT)
Age: 40
End: 2020-05-18

## 2020-05-18 LAB
ALBUMIN SERPL ELPH-MCNC: 2.9 G/DL — LOW (ref 3.3–5.2)
ALP SERPL-CCNC: 114 U/L — SIGNIFICANT CHANGE UP (ref 40–120)
ALT FLD-CCNC: 12 U/L — SIGNIFICANT CHANGE UP
ANION GAP SERPL CALC-SCNC: 12 MMOL/L — SIGNIFICANT CHANGE UP (ref 5–17)
AST SERPL-CCNC: 28 U/L — SIGNIFICANT CHANGE UP
BILIRUB SERPL-MCNC: 2 MG/DL — SIGNIFICANT CHANGE UP (ref 0.4–2)
BUN SERPL-MCNC: 39 MG/DL — HIGH (ref 8–20)
CALCIUM SERPL-MCNC: 9.7 MG/DL — SIGNIFICANT CHANGE UP (ref 8.6–10.2)
CHLORIDE SERPL-SCNC: 86 MMOL/L — LOW (ref 98–107)
CO2 SERPL-SCNC: 39 MMOL/L — HIGH (ref 22–29)
CREAT SERPL-MCNC: 1.23 MG/DL — SIGNIFICANT CHANGE UP (ref 0.5–1.3)
GLUCOSE BLDC GLUCOMTR-MCNC: 107 MG/DL — HIGH (ref 70–99)
GLUCOSE BLDC GLUCOMTR-MCNC: 129 MG/DL — HIGH (ref 70–99)
GLUCOSE SERPL-MCNC: 112 MG/DL — HIGH (ref 70–99)
MAGNESIUM SERPL-MCNC: 1.8 MG/DL — SIGNIFICANT CHANGE UP (ref 1.8–2.6)
PHOSPHATE SERPL-MCNC: 3.9 MG/DL — SIGNIFICANT CHANGE UP (ref 2.4–4.7)
POTASSIUM SERPL-MCNC: 3.5 MMOL/L — SIGNIFICANT CHANGE UP (ref 3.5–5.3)
POTASSIUM SERPL-SCNC: 3.5 MMOL/L — SIGNIFICANT CHANGE UP (ref 3.5–5.3)
PROT SERPL-MCNC: 9.2 G/DL — HIGH (ref 6.6–8.7)
SODIUM SERPL-SCNC: 137 MMOL/L — SIGNIFICANT CHANGE UP (ref 135–145)

## 2020-05-18 PROCEDURE — 99233 SBSQ HOSP IP/OBS HIGH 50: CPT

## 2020-05-18 PROCEDURE — 99233 SBSQ HOSP IP/OBS HIGH 50: CPT | Mod: GC

## 2020-05-18 PROCEDURE — 99232 SBSQ HOSP IP/OBS MODERATE 35: CPT

## 2020-05-18 RX ORDER — MAGNESIUM OXIDE 400 MG ORAL TABLET 241.3 MG
400 TABLET ORAL ONCE
Refills: 0 | Status: COMPLETED | OUTPATIENT
Start: 2020-05-18 | End: 2020-05-18

## 2020-05-18 RX ORDER — BUMETANIDE 0.25 MG/ML
0.5 INJECTION INTRAMUSCULAR; INTRAVENOUS
Qty: 20 | Refills: 0 | Status: DISCONTINUED | OUTPATIENT
Start: 2020-05-18 | End: 2020-05-19

## 2020-05-18 RX ORDER — POTASSIUM CHLORIDE 20 MEQ
40 PACKET (EA) ORAL EVERY 4 HOURS
Refills: 0 | Status: COMPLETED | OUTPATIENT
Start: 2020-05-18 | End: 2020-05-18

## 2020-05-18 RX ORDER — MAGNESIUM SULFATE 500 MG/ML
1 VIAL (ML) INJECTION ONCE
Refills: 0 | Status: COMPLETED | OUTPATIENT
Start: 2020-05-18 | End: 2020-05-18

## 2020-05-18 RX ADMIN — ATORVASTATIN CALCIUM 40 MILLIGRAM(S): 80 TABLET, FILM COATED ORAL at 21:19

## 2020-05-18 RX ADMIN — HEPARIN SODIUM 5000 UNIT(S): 5000 INJECTION INTRAVENOUS; SUBCUTANEOUS at 12:40

## 2020-05-18 RX ADMIN — BUMETANIDE 2.5 MG/HR: 0.25 INJECTION INTRAMUSCULAR; INTRAVENOUS at 21:23

## 2020-05-18 RX ADMIN — HEPARIN SODIUM 5000 UNIT(S): 5000 INJECTION INTRAVENOUS; SUBCUTANEOUS at 21:19

## 2020-05-18 RX ADMIN — MAGNESIUM OXIDE 400 MG ORAL TABLET 400 MILLIGRAM(S): 241.3 TABLET ORAL at 12:40

## 2020-05-18 RX ADMIN — Medication 20 MILLIGRAM(S): at 05:26

## 2020-05-18 RX ADMIN — CARVEDILOL PHOSPHATE 3.12 MILLIGRAM(S): 80 CAPSULE, EXTENDED RELEASE ORAL at 17:01

## 2020-05-18 RX ADMIN — Medication 81 MILLIGRAM(S): at 12:40

## 2020-05-18 RX ADMIN — Medication 20 MILLIGRAM(S): at 17:06

## 2020-05-18 RX ADMIN — Medication 100 GRAM(S): at 17:01

## 2020-05-18 RX ADMIN — HEPARIN SODIUM 5000 UNIT(S): 5000 INJECTION INTRAVENOUS; SUBCUTANEOUS at 05:26

## 2020-05-18 RX ADMIN — CARVEDILOL PHOSPHATE 3.12 MILLIGRAM(S): 80 CAPSULE, EXTENDED RELEASE ORAL at 05:26

## 2020-05-18 RX ADMIN — Medication 40 MILLIEQUIVALENT(S): at 12:40

## 2020-05-18 RX ADMIN — Medication 10 MILLIGRAM(S): at 21:19

## 2020-05-18 RX ADMIN — Medication 40 MILLIEQUIVALENT(S): at 17:05

## 2020-05-18 NOTE — PROGRESS NOTE ADULT - SUBJECTIVE AND OBJECTIVE BOX
Patient is a 39y old  Male who presents with a chief complaint of SOBB/CHF exacerbation (16 May 2020 15:48)    Pt seen and examined at bedside. SOB continues to improve. Yesterday, stated that he had a lot of questions, today said that he forgot to write them down into his phone. Found sleeping in bed. No complaints from patient     ROS: No chest pain, palpitations,  fevers/chills, abdominal pain, n/v, diarrhea/constipation, dysuria or increased urinary frequency. All other ros negative     Tele: e/o SB, vtach, bigeminy    Vital Signs Last 24 Hrs  T(C): 36.6 (18 May 2020 07:51), Max: 36.8 (17 May 2020 23:55)  T(F): 97.8 (18 May 2020 07:51), Max: 98.2 (17 May 2020 23:55)  HR: 79 (18 May 2020 07:51) (77 - 85)  BP: 106/74 (18 May 2020 07:51) (106/74 - 127/77)  RR: 19 (18 May 2020 07:51) (18 - 20)  SpO2: 91% (18 May 2020 07:51) (91% - 99%)    Exam:   General: Obese, male, sitting up in bed, comfortable  HEENT: eomi, perrla, no palpable neck mass   Respiratory: Decrease air movement due body habitus, Bibasilar crackles   CV: RRR, S1S2, no murmur  Abdominal: Soft, NT, Distended, no palpable mass, +BS  : Scrotal edema, improved per patient   Extremities: 2+ pitting edema, + peripheral pulses, FROM all 4 extremity  Neurology: A&Ox3  Skin: Dry skin    LABS:                         12.3   3.84  )-----------( 176      ( 17 May 2020 08:46 )             39.1   05-18    137  |  86<L>  |  39.0<H>  ----------------------------<  112<H>  3.5   |  39.0<H>  |  1.23    Ca    9.7      18 May 2020 07:48  Phos  3.9     05-18  Mg     1.8     05-18    TPro  9.2<H>  /  Alb  2.9<L>  /  TBili  2.0  /  DBili  x   /  AST  28  /  ALT  12  /  AlkPhos  114  05-18    MEDICATIONS  (STANDING):  aspirin  chewable 81 milliGRAM(s) Oral daily  atorvastatin 40 milliGRAM(s) Oral at bedtime  buMETAnide Infusion 0.5 mG/Hr (2.5 mL/Hr) IV Continuous <Continuous>  carvedilol 3.125 milliGRAM(s) Oral every 12 hours  dextrose 5%. 1000 milliLiter(s) (50 mL/Hr) IV Continuous <Continuous>  dextrose 50% Injectable 12.5 Gram(s) IV Push once  dextrose 50% Injectable 25 Gram(s) IV Push once  dextrose 50% Injectable 25 Gram(s) IV Push once  heparin   Injectable 5000 Unit(s) SubCutaneous every 8 hours  insulin lispro (HumaLOG) corrective regimen sliding scale   SubCutaneous three times a day before meals  insulin lispro (HumaLOG) corrective regimen sliding scale   SubCutaneous at bedtime  metolazone 2.5 milliGRAM(s) Oral daily  polyethylene glycol 3350 17 Gram(s) Oral daily  potassium chloride    Tablet ER 40 milliEquivalent(s) Oral every 4 hours  senna 2 Tablet(s) Oral at bedtime  sildenafil (REVATIO) 20 milliGRAM(s) Oral <User Schedule>  sildenafil (REVATIO) 10 milliGRAM(s) Oral <User Schedule>  Treprostinil 0.25 milliGRAM(s) 0.25 milliGRAM(s) Oral <User Schedule>    MEDICATIONS  (PRN):  dextrose 40% Gel 15 Gram(s) Oral once PRN Blood Glucose LESS THAN 70 milliGRAM(s)/deciliter  glucagon  Injectable 1 milliGRAM(s) IntraMuscular once PRN Glucose LESS THAN 70 milligrams/deciliter  sodium chloride 0.65% Nasal 1 Spray(s) Both Nostrils every 4 hours PRN Nasal Congestion

## 2020-05-18 NOTE — PROGRESS NOTE ADULT - ASSESSMENT
A 40 y/o Male with PMH significant for Morbid obesity, JAN, HTN, CHFpEF, Pulm HTN, DM II, presents today c/o SOB and lower extremity swelling, being admitted for Acute on Chronic Respiratory Failure due to CHF exacerbation secondary to Pulmonary Hypertension. Patient has multiple admissions for heart failure exacerbation. Patient found to be covid negative, trop x 3 negative and . He was seen by cardio, received lasix 60mg ivp in er and HCTZ from home was discontinued. TTE 12/2019: LVEF 65-70%, Severely reduced RV systolic function, Right ventricular volume and pressure overload, Moderate-severe tricuspid regurgitation. Patient has been improving on a bumex drip with a deconditioning overall/physically. He is planned for one more day of IV diuresis and then potentially to [facility] vs home with Home PT    Acute on Chronic Respiratory Failure due to CHF exacerbation, Right side HF secondary to Pulmonary Hypertension   metabolic alkalosis likely due to diuresis   +elevated INR/coags likely 2/2 hepatic congestion + abdominal distention   +hypokalemia, + Elevated BUN likely 2/2 diuresis   -cardio recs appreciated, c/w Bumex infusion x 1 more days  -Continue Metolazone, sildenafil, and coreg.   -c/w orenitram  -C/w Albuterol prn, Daily weight and I&O's - net negative.   -cardio is seeing patient, will try for eventual d/c to rehab at which time in the future he can also go for cardiac rehab   -replete K/Mg/Phos as needed    Prerenal HAYES (resolved) with mild hyponatremia   Likely due diuretics and CHF exacerbation   Base line 1 - 1.1, now 1.23 with diuresis (likely cardio renal initially)   will c/w monitoring     DM-2, A1c 6.6   -Blood glucose stable   -c/w sliding scale (c/s today 120-107)    HTN hx   -bp on the lower side  -Will closely watch while in diuretics   -C/w Coreg with holding parameters     HLD  -C/w statin    JAN/OHS with morbid obesity   -C/w CPAP  -Again needs outpt sleep study for further eval to obtain CPAP for home     Constipation: -C/w bowel regimen with Senna, Miralax, added lactulose    Abnormal CT findings   -CT a/p: Mediastinal, pelvic b/l, external iliac and b/l inguinal LAD, at his last admission   -Rheum f/up on d/c     DVT ppx  -Heparin SC    Advanced directives: full code     Dispo: PT recs appreciated, Acute rehab vs Home w/ home pt w RW. PM&R recs appreciated, need long term outpatient cardiopulmonary rehab. Per patient, lives with a family friend and wants to go there. We are trying to see if he will be able to go to [facility] for some time. Await recs from . In the interim, patient is to c/w diuretics for now.     Patient high risk for readmission,

## 2020-05-18 NOTE — DISCHARGE NOTE PROVIDER - HOSPITAL COURSE
A 40 y/o Male with PMH significant for Morbid obesity, JAN, HTN, CHFpEF, Pulm HTN, DM II, presents w/ c/o SOB and lower extremity swelling,  admitted for Acute on Chronic Respiratory Failure due to CHF exacerbation secondary to Pulmonary Hypertension. Patient has multiple admissions for heart failure exacerbation. Patient found to be covid negative, trop x 3 negative and . He was seen by cardio, received lasix 60mg ivp in er and HCTZ from home was discontinued. TTE 12/2019: LVEF 65-70%, Severely reduced RV systolic function, Right ventricular volume and pressure overload, Moderate-severe tricuspid regurgitation. Patient has been improving on a bumex drip with a deconditioning overall/physically. IV diuresis dc'ed on 5/19 per cardio and switched to....................................................        Seen by physical therapy and PMR, pt agrees to go to [facility] due to functional status.        All electrolyte abnormalities were monitored carefully and repleted as necessary during this hospitalization. At the time of discharge patient was hemodynamically stable and amenable to all terms of discharge. The patient has received verbal instructions from myself regarding discharge plans.         Length of Discharge: 45MIN        Exam:     General: Obese, male, sitting up in bed, comfortable    HEENT: eomi, perrla, no palpable neck mass     Respiratory: Decrease air movement due body habitus, minimal fine bibasilar crackles     CV: RRR, S1S2, no murmur    Abdominal: Soft, NT, Distended, no palpable mass, +BS    : Scrotal edema, improved per patient     Extremities: 2+ pitting edema, + peripheral pulses, FROM all 4 extremity    Neurology: A&Ox3    Skin: Dry skin        Vital Signs Last 24 Hrs    T(C): 36.7 (18 May 2020 16:22), Max: 36.8 (17 May 2020 23:55)    T(F): 98 (18 May 2020 16:22), Max: 98.2 (17 May 2020 23:55)    HR: 77 (18 May 2020 16:22) (77 - 85)    BP: 104/72 (18 May 2020 16:22) (104/72 - 127/77)    RR: 18 (18 May 2020 16:22) (18 - 20)    SpO2: 93% (18 May 2020 16:22) (91% - 99%) A 40 y/o Male with PMH significant for Morbid obesity, JAN, HTN, CHFpEF, Pulm HTN, DM II, presents w/ c/o SOB and lower extremity swelling,  admitted for Acute on Chronic Respiratory Failure due to CHF exacerbation secondary to Pulmonary Hypertension. Patient has multiple admissions for heart failure exacerbation. Patient found to be covid negative, trop x 3 negative and . He was seen by cardio, received lasix 60mg ivp in er and HCTZ from home was discontinued. TTE 12/2019: LVEF 65-70%, Severely reduced RV systolic function, Right ventricular volume and pressure overload, Moderate-severe tricuspid regurgitation. Patient has been improving on a bumex drip with a deconditioning overall/physically. IV diuresis dc'ed on 5/19 per cardio and switched to PO bumex. His medications were altered while here (see care plan).         Seen by physical therapy and PMR, pt agrees to go to [facility] due to functional status.        All electrolyte abnormalities were monitored carefully and repleted as necessary during this hospitalization. At the time of discharge patient was hemodynamically stable and amenable to all terms of discharge. The patient has received verbal instructions from myself regarding discharge plans.         Total time for discharge: 45 minutes         VS and exam on the day of discharge    Exam:     General: Obese, male, sitting up in bed, comfortable    HEENT: eomi, perrla, no palpable neck mass     Respiratory: Decrease air movement due body habitus, minimal fine bibasilar crackles     CV: RRR, S1S2, no murmur    Abdominal: Soft, NT, Distended, no palpable mass, +BS    : Scrotal edema, improved per patient     Extremities: 1+ pitting edema, + peripheral pulses, FROM all 4 extremity    Neurology: A&Ox3    Skin: Dry skin        Vital Signs Last 24 Hrs    T(C): 36.8 (20 May 2020 04:19), Max: 36.8 (20 May 2020 04:19)    T(F): 98.3 (20 May 2020 04:19), Max: 98.3 (20 May 2020 04:19)    HR: 76 (20 May 2020 04:19) (76 - 76)    BP: 111/77 (20 May 2020 04:19) (110/67 - 111/77)    RR: 19 (20 May 2020 04:19) (17 - 19)    SpO2: 96% (20 May 2020 04:19) (96% - 96%)

## 2020-05-18 NOTE — DISCHARGE NOTE PROVIDER - NSDCFUADDINST_GEN_ALL_CORE_FT
Ambulate as tolerated. 05-20    135  |  84<L>  |  46.0<H>  ----------------------------<  108<H>  3.7   |  43.0<H>  |  1.19    Ca    9.2      20 May 2020 06:52  Phos  4.4     05-20  Mg     2.0     05-20    TPro  9.5<H>  /  Alb  3.1<L>  /  TBili  2.0  /  DBili  x   /  AST  27  /  ALT  12  /  AlkPhos  116  05-19      FINDINGS: There are nonsegmental areas of diminished perfusion in bilateral lungs and heterogeneous distribution in the remainder of the lungs. There are no discrete segmental perfusion defects. There is cardiomegaly.    IMPRESSION:     Very low probability of pulmonary embolus.

## 2020-05-18 NOTE — DISCHARGE NOTE PROVIDER - PROVIDER TOKENS
PROVIDER:[TOKEN:[[id number]],FOLLOWUP:[1 week]],FREE:[LAST:[[facility]],PHONE:[(   )    -],FAX:[(   )    -],ADDRESS:[[address] [place] [address]  Phone: [phone]  Fax: (   )    -  Follow Up Time]],PROVIDER:[TOKEN:[[id number]],FOLLOWUP:[1 week]]

## 2020-05-18 NOTE — PROGRESS NOTE ADULT - SUBJECTIVE AND OBJECTIVE BOX
[facility]                                                               Office: [address] [address]                                                              Telephone: [phone]. Fax:[phone]                                                                             PROGRESS NOTE  Reason for follow up: Right heart failure  Overnight: 5 beats of NSVT, episode of bigeminy.  Update: Patient states that he feels as though he is improving at this time, still on a Bumex gtt. Plan now for home PT upon discharge, will confirm with primary team. Patient states his legs seem greatly improved. Still with some baseline dyspnea.       Review of symptoms:   Cardiac:  No chest pain. +dyspnea. No palpitations.  Respiratory: No cough. +dyspnea  Gastrointestinal: No diarrhea. No abdominal pain. No bleeding.     Past medical history: No updates.   	  Vitals:  T(C): 36.6 (05-18-20 @ 07:51), Max: 36.8 (05-17-20 @ 23:55)  HR: 79 (05-18-20 @ 07:51) (77 - 85)  BP: 106/74 (05-18-20 @ 07:51) (106/74 - 127/77)  RR: 19 (05-18-20 @ 07:51) (18 - 20)  SpO2: 91% (05-18-20 @ 07:51) (91% - 99%)  Wt(kg): --  I&O's Summary    17 May 2020 07:01  -  18 May 2020 07:00  --------------------------------------------------------  IN: 2442.4 mL / OUT: 6925 mL / NET: -4482.6 mL    18 May 2020 07:01  -  18 May 2020 09:27  --------------------------------------------------------  IN: 2.5 mL / OUT: 0 mL / NET: 2.5 mL      Weight (kg): 181.4 (05-14 @ 16:06)      PHYSICAL EXAM:  Appearance: Comfortable. No acute distress  HEENT:  Head and neck: Atraumatic. Normocephalic.  Normal oral mucosa, PERRL, Neck is supple. No JVD, No carotid bruit.   Neurologic: A&Ox 3, no focal deficits. EOMI, Cranial nerves are intact.  Lymphatic: No cervical lymphadenopathy  Cardiovascular: Normal S1 S2, No murmur, rubs/gallops. No JVD, No edema  Respiratory: Lungs clear to auscultation  Gastrointestinal: Obese, Soft, Non-tender, + BS  Lower Extremities: Resolving B/L LE edema   Psychiatry: Patient is calm. No agitation. Mood & affect appropriate  Skin: No rashes/ecchymoses/cyanosis/ulcers visualized on the face, hands or feet.      CURRENT MEDICATIONS:  buMETAnide Infusion 0.5 mG/Hr IV Continuous <Continuous>  carvedilol 3.125 milliGRAM(s) Oral every 12 hours  metolazone 2.5 milliGRAM(s) Oral daily  sildenafil (REVATIO) 20 milliGRAM(s) Oral <User Schedule>  sildenafil (REVATIO) 10 milliGRAM(s) Oral <User Schedule>    lactulose Syrup  polyethylene glycol 3350  senna  atorvastatin  dextrose 50% Injectable  dextrose 50% Injectable  dextrose 50% Injectable  insulin lispro (HumaLOG) corrective regimen sliding scale  insulin lispro (HumaLOG) corrective regimen sliding scale  aspirin  chewable  dextrose 5%.  heparin   Injectable      DIAGNOSTIC TESTING:  [ ] Echocardiogram:     < from: TTE Echo Complete w/Doppler (12.21.19 @ 08:48) >  PHYSICIAN INTERPRETATION:  Left Ventricle: The left ventricular internal cavity size is normal.  Left ventricular ejection fraction, by visual estimation, is 65 to 70%. The interventricular septum is flattened in systole and diastole, consistent with right ventricular pressure and volume overload.  Right Ventricle: The right ventricular size is severely enlarged. RV systolic function is severely reduced. TV S' 0.2 m/s.  Left Atrium: The left atrium is normal in size.  Right Atrium: Severely enlarged right atrium.  Pericardium: There is no evidence of pericardial effusion.  Mitral Valve: The mitral valve is normal in structure. Mild mitral valve regurgitation is seen.  Tricuspid Valve: Moderate-severe tricuspid regurgitation is visualized. Estimated pulmonary artery systolic pressure is 89.6 mmHg assuming a right atrial pressure of 15 mmHg, which is consistent with severe pulmonary hypertension.  Aortic Valve: The aortic valve is normal. No evidence of aortic valve regurgitation is seen.  Pulmonic Valve: The pulmonic valve was not well visualized. Mild pulmonic valve regurgitation.  Aorta: The aortic root is normal in size and structure.  Pulmonary Artery: The pulmonary artery is moderately dilated.  Venous: The inferior vena cava was dilated, with respiratory size variation less than 50%. The inferior vena cava and the hepatic vein show a pattern of systolic flow reversal, suggestive of tricuspid regurgitation.       Summary:   1. Technically fair study.   2. Left ventricular ejection fraction, by visual estimation, is 65 to 70%.   3. There is moderate septal left ventricular hypertrophy.   4. Severely enlarged right ventricle.   5. Severely reduced RV systolic function.   6. Right ventricular volume and pressure overload.   7. Moderate-severe tricuspid regurgitation.   8. Estimated pulmonary artery systolic pressure is 89.6 mmHg assuming a right atrial pressure of 15 mmHg, which is consistent with severe pulmonary hypertension.   9. Moderately dilated pulmonary artery.    MD Josefina Electronically signed on 12/24/2019 at 2:54:21 PM    < end of copied text >    OTHER: 	      LABS:	 	                            12.3   3.84  )-----------( 176      ( 17 May 2020 08:46 )             39.1     05-18    137  |  86<L>  |  39.0<H>  ----------------------------<  112<H>  3.5   |  39.0<H>  |  1.23    Ca    9.7      18 May 2020 07:48  Phos  3.9     05-18  Mg     1.8     05-18    TPro  9.2<H>  /  Alb  2.9<L>  /  TBili  2.0  /  DBili  x   /  AST  28  /  ALT  12  /  AlkPhos  114  05-18    proBNP: Serum Pro-Brain Natriuretic Peptide: 710 pg/mL (05-14 @ 17:30)    Lipid Profile:   HgA1c:   TSH:       TELEMETRY: Reviewed  SR, 5 beats NSVT, short episode of bigeminy [facility]                                                               Office: [address] [address]                                                              Telephone: [phone]. Fax:[phone]                                                                             PROGRESS NOTE  Reason for follow up: Right heart failure  Overnight: 5 beats of NSVT, episode of bigeminy.  Update: Patient states that he feels as though he is improving at this time, still on a Bumex gtt.  Patient states his legs seem greatly improved. Still with some baseline dyspnea.     Review of symptoms:   Cardiac:  No chest pain. +dyspnea. No palpitations.  Respiratory: No cough. +dyspnea  Gastrointestinal: No diarrhea. No abdominal pain. No bleeding.     Past medical history: No updates.   	  Vitals:  T(C): 36.6 (05-18-20 @ 07:51), Max: 36.8 (05-17-20 @ 23:55)  HR: 79 (05-18-20 @ 07:51) (77 - 85)  BP: 106/74 (05-18-20 @ 07:51) (106/74 - 127/77)  RR: 19 (05-18-20 @ 07:51) (18 - 20)  SpO2: 91% (05-18-20 @ 07:51) (91% - 99%)  Wt(kg): --  I&O's Summary    17 May 2020 07:01  -  18 May 2020 07:00  --------------------------------------------------------  IN: 2442.4 mL / OUT: 6925 mL / NET: -4482.6 mL    18 May 2020 07:01  -  18 May 2020 09:27  --------------------------------------------------------  IN: 2.5 mL / OUT: 0 mL / NET: 2.5 mL    Weight (kg): 181.4 (05-14 @ 16:06)    PHYSICAL EXAM:  Appearance: Comfortable. No acute distress  HEENT:  Head and neck: Atraumatic. Normocephalic.  Normal oral mucosa, PERRL, Neck is supple. No JVD, No carotid bruit.   Neurologic: A&Ox 3, no focal deficits. EOMI, Cranial nerves are intact.  Lymphatic: No cervical lymphadenopathy  Cardiovascular: Normal S1 S2, No murmur, rubs/gallops. No JVD, No edema  Respiratory: Lungs clear to auscultation  Gastrointestinal: Obese, Soft, Non-tender, + BS  Lower Extremities: Resolving B/L LE edema   Psychiatry: Patient is calm. No agitation. Mood & affect appropriate  Skin: No rashes/ecchymoses/cyanosis/ulcers visualized on the face, hands or feet.      CURRENT MEDICATIONS:  buMETAnide Infusion 0.5 mG/Hr IV Continuous <Continuous>  carvedilol 3.125 milliGRAM(s) Oral every 12 hours  metolazone 2.5 milliGRAM(s) Oral daily  sildenafil (REVATIO) 20 milliGRAM(s) Oral <User Schedule>  sildenafil (REVATIO) 10 milliGRAM(s) Oral <User Schedule>    lactulose Syrup  polyethylene glycol 3350  senna  atorvastatin  dextrose 50% Injectable  dextrose 50% Injectable  dextrose 50% Injectable  insulin lispro (HumaLOG) corrective regimen sliding scale  insulin lispro (HumaLOG) corrective regimen sliding scale  aspirin  chewable  dextrose 5%.  heparin   Injectable      DIAGNOSTIC TESTING:  [ ] Echocardiogram:     < from: TTE Echo Complete w/Doppler (12.21.19 @ 08:48) >  PHYSICIAN INTERPRETATION:  Left Ventricle: The left ventricular internal cavity size is normal.  Left ventricular ejection fraction, by visual estimation, is 65 to 70%. The interventricular septum is flattened in systole and diastole, consistent with right ventricular pressure and volume overload.  Right Ventricle: The right ventricular size is severely enlarged. RV systolic function is severely reduced. TV S' 0.2 m/s.  Left Atrium: The left atrium is normal in size.  Right Atrium: Severely enlarged right atrium.  Pericardium: There is no evidence of pericardial effusion.  Mitral Valve: The mitral valve is normal in structure. Mild mitral valve regurgitation is seen.  Tricuspid Valve: Moderate-severe tricuspid regurgitation is visualized. Estimated pulmonary artery systolic pressure is 89.6 mmHg assuming a right atrial pressure of 15 mmHg, which is consistent with severe pulmonary hypertension.  Aortic Valve: The aortic valve is normal. No evidence of aortic valve regurgitation is seen.  Pulmonic Valve: The pulmonic valve was not well visualized. Mild pulmonic valve regurgitation.  Aorta: The aortic root is normal in size and structure.  Pulmonary Artery: The pulmonary artery is moderately dilated.  Venous: The inferior vena cava was dilated, with respiratory size variation less than 50%. The inferior vena cava and the hepatic vein show a pattern of systolic flow reversal, suggestive of tricuspid regurgitation.       Summary:   1. Technically fair study.   2. Left ventricular ejection fraction, by visual estimation, is 65 to 70%.   3. There is moderate septal left ventricular hypertrophy.   4. Severely enlarged right ventricle.   5. Severely reduced RV systolic function.   6. Right ventricular volume and pressure overload.   7. Moderate-severe tricuspid regurgitation.   8. Estimated pulmonary artery systolic pressure is 89.6 mmHg assuming a right atrial pressure of 15 mmHg, which is consistent with severe pulmonary hypertension.   9. Moderately dilated pulmonary artery.    MD Josefina Electronically signed on 12/24/2019 at 2:54:21 PM    < end of copied text >    OTHER: 	      LABS:	 	                            12.3   3.84  )-----------( 176      ( 17 May 2020 08:46 )             39.1     05-18    137  |  86<L>  |  39.0<H>  ----------------------------<  112<H>  3.5   |  39.0<H>  |  1.23    Ca    9.7      18 May 2020 07:48  Phos  3.9     05-18  Mg     1.8     05-18    TPro  9.2<H>  /  Alb  2.9<L>  /  TBili  2.0  /  DBili  x   /  AST  28  /  ALT  12  /  AlkPhos  114  05-18    proBNP: Serum Pro-Brain Natriuretic Peptide: 710 pg/mL (05-14 @ 17:30)    TELEMETRY: Reviewed  SR, 5 beats NSVT, short episode of bigeminy

## 2020-05-18 NOTE — DISCHARGE NOTE PROVIDER - CARE PROVIDERS DIRECT ADDRESSES
[email or web address]

## 2020-05-18 NOTE — DISCHARGE NOTE PROVIDER - CARE PROVIDER_API CALL
Alton Cox  CARDIOVASCULAR DISEASE  [address] [place] [address]  Phone: [phone]  Fax: [phone]  Follow Up Time: 1 week    [facility],   [facility] [place] [address]  Phone: [phone]  Fax: (   )    -  Follow Up Time  Phone: (   )    -  Fax: (   )    -  Follow Up Time:     Christopher Ramires  CRITICAL CARE MEDICINE  [address] [place] [address]  Phone: [phone]  Fax: [phone]  Follow Up Time: 1 week

## 2020-05-18 NOTE — PROGRESS NOTE ADULT - SUBJECTIVE AND OBJECTIVE BOX
Patient reports he is too tired to get up today, feels very tired.   Discussed that he needs to maximize his cardiac function by sitting and work with therapy.   Did walk yesterday.   Spoke with CM about DC planning, recommending home with outpatient cardio program.     REVIEW OF SYSTEMS  Constitutional - No fever,  +fatigue  HEENT - No vertigo, No neck pain  Neurological - No headaches, No memory loss, +loss of strength, No numbness, No tremors  Skin - No rashes, +lesions   Musculoskeletal - No joint pain, No joint swelling, No muscle pain  Psychiatric - No depression, No anxiety    FUNCTIONAL PROGRESS  5/17  Bed Mobility  Bed Mobility Training Rehab Potential: good, to achieve stated therapy goals  Bed Mobility Training Symptoms Noted During/After Treatment: fatigue  Bed Mobility Training Rolling/Turning: supervsion;  verbal cues;  bed rails  Bed Mobility Training Scooting: supervsion;  verbal cues;  bed rails  Bed Mobility Training Sit-to-Supine: independent;  supervsion;  supervision;  bed rails  Bed Mobility Training Supine-to-Sit: supervsion;  verbal cues;  bed rails  Bed Mobility Training Limitations: decreased ability to use legs for bridging/pushing;  decreased ROM;  decreased strength;  pain    Sit-Stand Transfer Training  Sit-to-Stand Transfer Training Rehab Potential: good, to achieve stated therapy goals  Sit-to-Stand Transfer Training Symptoms Noted During/After Treatment: fatigue  Transfer Training Sit-to-Stand Transfer: contact guard;  supervision;  verbal cues;  1 person assist;  weight-bearing as tolerated   rolling walker  Transfer Training Stand-to-Sit Transfer: rolling walker;  weight-bearing as tolerated   verbal cues;  supervision;  contact guard  Sit-to-Stand Transfer Training Transfer Safety Analysis: decreased step length;  decreased sequencing ability;  decreased weight-shifting ability;  decreased ROM;  decreased strength;  pain;  rolling walker    Gait Training  Gait Training Rehab Potential: good, to achieve stated therapy goals  Gait Training: supervsion;  supervision;  verbal cues;  weight-bearing as tolerated   rolling walker;  50 feet  Gait Analysis: 3-point gait   decreased debbie;  decreased hip/knee flexion;  increased time in double stance;  decreased step length;  decreased stride length;  decreased weight-shifting ability;  decreased ROM;  decreased strength;  pain      VITALS  T(C): 36.6 (05-18-20 @ 07:51), Max: 36.8 (05-17-20 @ 23:55)  HR: 79 (05-18-20 @ 07:51) (77 - 85)  BP: 106/74 (05-18-20 @ 07:51) (106/74 - 127/77)  RR: 19 (05-18-20 @ 07:51) (18 - 20)  SpO2: 91% (05-18-20 @ 07:51) (91% - 99%)  Wt(kg): --    MEDICATIONS   aspirin  chewable 81 milliGRAM(s) daily  atorvastatin 40 milliGRAM(s) at bedtime  buMETAnide Infusion 0.5 mG/Hr <Continuous>  carvedilol 3.125 milliGRAM(s) every 12 hours  dextrose 40% Gel 15 Gram(s) once PRN  dextrose 5%. 1000 milliLiter(s) <Continuous>  dextrose 50% Injectable 12.5 Gram(s) once  dextrose 50% Injectable 25 Gram(s) once  dextrose 50% Injectable 25 Gram(s) once  glucagon  Injectable 1 milliGRAM(s) once PRN  heparin   Injectable 5000 Unit(s) every 8 hours  insulin lispro (HumaLOG) corrective regimen sliding scale   three times a day before meals  insulin lispro (HumaLOG) corrective regimen sliding scale   at bedtime  lactulose Syrup 10 Gram(s) three times a day  magnesium oxide 400 milliGRAM(s) once  metolazone 2.5 milliGRAM(s) daily  polyethylene glycol 3350 17 Gram(s) daily  potassium chloride    Tablet ER 40 milliEquivalent(s) every 4 hours  senna 2 Tablet(s) at bedtime  sildenafil (REVATIO) 20 milliGRAM(s) <User Schedule>  sildenafil (REVATIO) 10 milliGRAM(s) <User Schedule>  sodium chloride 0.65% Nasal 1 Spray(s) every 4 hours PRN  Treprostinil 0.25 milliGRAM(s) 0.25 milliGRAM(s) <User Schedule>      RECENT LABS - Reviewed                        12.3   3.84  )-----------( 176      ( 17 May 2020 08:46 )             39.1     05-18    137  |  86<L>  |  39.0<H>  ----------------------------<  112<H>  3.5   |  39.0<H>  |  1.23    Ca    9.7      18 May 2020 07:48  Phos  3.9     05-18  Mg     1.8     05-18    TPro  9.2<H>  /  Alb  2.9<L>  /  TBili  2.0  /  DBili  x   /  AST  28  /  ALT  12  /  AlkPhos  114  05-18            ----------------------------------------------------------------------------------------  PHYSICAL EXAM  Constitutional - NAD, Comfortable  Abdomen - Morbidly obese  Extremities - +Pitting edema, Chronic edema changes in BLE  Neurologic Exam -                    Motor -  No focal deficits     Sensory - Intact to LT  Psychiatric - Mood mildly depressed, Fatigued  ----------------------------------------------------------------------------------------  ASSESSMENT/PLAN  39yMale with functional deficits after CHF exacerbation  CAD - ASA, Lipitor  Cardiac - Coreg  CHF - Bumex  Pulm HTN - Treprostinil  Pain - Tylenol  DVT PPX - SCDs, Heparin  Rehab - Patient making progress for DC HOME with OUTPATIENT - CARDIO PROGRAM at [facility].  Will continue to follow. Functional progress will determine ongoing rehab dispo recommendations, which may change.    Continue bedside therapy as well as OOB throughout the day with mobilization by staff to maintain cardiopulmonary function and prevention of secondary complications related to debility.

## 2020-05-18 NOTE — DISCHARGE NOTE PROVIDER - NSDCMRMEDTOKEN_GEN_ALL_CORE_FT
aspirin 81 mg oral tablet, chewable: 1 tab(s) orally once a day  atorvastatin 40 mg oral tablet: 1 tab(s) orally once a day (at bedtime)  bumetanide 1 mg oral tablet: 1 tab(s) orally 2 times a day  carvedilol 3.125 mg oral tablet: 1 tab(s) orally every 12 hours  hydroCHLOROthiazide 25 mg oral tablet: 1 tab(s) orally once a day  K-Tab 20 mEq oral tablet, extended release: 2 tab(s) orally once a day   metFORMIN 500 mg oral tablet: 1 tab(s) orally once a day   metOLazone 2.5 mg oral tablet: 1 tab(s) orally once a day  Orenitram 0.125 mg oral tablet, extended release: 1 tab(s) orally 3 times a day MDD:..  sildenafil 20 mg oral tablet: 1 tab in morning  1 tab in afternoon  HALF  tab in evening aspirin 81 mg oral tablet, chewable: 1 tab(s) orally once a day  atorvastatin 40 mg oral tablet: 1 tab(s) orally once a day (at bedtime)  bumetanide 1 mg oral tablet: 1 tab(s) orally every 12 hours  carvedilol 3.125 mg oral tablet: 1 tab(s) orally every 12 hours  K-Tab 20 mEq oral tablet, extended release: 2 tab(s) orally once a day   metFORMIN 500 mg oral tablet: 1 tab(s) orally once a day   metOLazone 5 mg oral tablet: 1 tab(s) orally once a day  Orenitram 0.125 mg oral tablet, extended release: 2 tab(s) orally 3 times a day MDD:..   senna oral tablet: 2 tab(s) orally once a day (at bedtime)  sildenafil 20 mg oral tablet: 1 tab(s) orally 2 times a day at 6am and 2pm   sildenafil 20 mg oral tablet: 0.5 tab(s) orally once a day (at bedtime)  sodium chloride 0.65% nasal spray: 1 spray(s) nasal every 8 hours, As Needed

## 2020-05-18 NOTE — PROVIDER CONTACT NOTE (OTHER) - SITUATION
PA notified that pt had a couple episodes of ventricular bigeminy and a 5 beat run of v-tach. pt had a cardiac workup at admission, VS stable, and pt back in [facility]. labs ordered for morning.

## 2020-05-18 NOTE — DISCHARGE NOTE PROVIDER - NSDCCPCAREPLAN_GEN_ALL_CORE_FT
PRINCIPAL DISCHARGE DIAGNOSIS  Diagnosis: CHF (congestive heart failure)  Assessment and Plan of Treatment: You had symptoms of volume overload and was given diuretics to remove the excess fluid from your body.   -Continue taking the medications as prescribed.  -Follow a diet that is low in salt.  -Do not drink more than 2L (4 bottles of water) daily.  Your cardiac medications were adjusted by your cardiologist. Please continue to take them as directed and follow up with your cardiologist in 1 week of discharge.  Please continue physical therapy at the rehab facility upon discharge.        SECONDARY DISCHARGE DIAGNOSES  Diagnosis: Abnormal finding on CT scan  Assessment and Plan of Treatment: Imaging of your abdomen and pelvis on last admission showed mediastinal, pelvic, external iliac and inguinal lyphadenoapathy that are stable.  Please follow up with Primary Care Doctor for repeat imaging, monitoring and further recommendations.    Diagnosis: JAN (obstructive sleep apnea)  Assessment and Plan of Treatment: Please continue using your CPAP machine as prescribed. Please follow up with your pulmonologist for further recommendations and adjustments to your setting.    Diagnosis: Hypertension  Assessment and Plan of Treatment: Please continue taking your medication. Check your blood pressure regularly and keep a log. Follow a diet that is low in salt.  Some of your home medications were adjusted. Your Hydrochlorothiazide was discontinued.       Diagnosis: Diabetes  Assessment and Plan of Treatment: Please continue taking your diabetes meds as prescribed. Please keep a log of your sugar levels daily and take the log to your doctor for further recommendations and adjustments to your doctor.    Diagnosis: Pulmonary hypertension  Assessment and Plan of Treatment: Continue taking home medication.  -Please continue your dose of sildenafil of 20mg in am, 20mg in afternoon and 10mg at bedtime.  -Follow up with Primary Care Doctor and Cardiology.Please continue your home dose of 6LMP oxygen via nasal cannula. Please follow up with your pulmonologist for adjustments to your oxygen. PRINCIPAL DISCHARGE DIAGNOSIS  Diagnosis: CHF (congestive heart failure)  Assessment and Plan of Treatment: You had symptoms of volume overload and was given diuretics to remove the excess fluid from your body.   -Continue taking the medications as prescribed.  -Follow a diet that is low in salt.  -Do not drink more than 2L (4 bottles of water) daily.  Your cardiac medications were adjusted by your cardiologist. Please continue to take them as directed and follow up with your cardiologist in 1 week of discharge.  Please continue physical therapy at the rehab facility upon discharge.        SECONDARY DISCHARGE DIAGNOSES  Diagnosis: Elevated INR  Assessment and Plan of Treatment: You were noted to have elevate INR. You had no signs or symptoms of bleeding. Please follow up with Primary Care Doctor for repeat blood work for INR check and other recommendations.    Diagnosis: Constipation  Assessment and Plan of Treatment: On admission you were noted to be constipated for which you were started on a bowel regimen. Your symptoms are now resolved. Keep hydrated and follow a diet that is high in fibers (fruits, grains, etc).    Diagnosis: Abnormal finding on CT scan  Assessment and Plan of Treatment: Imaging of your abdomen and pelvis on last admission showed mediastinal, pelvic, external iliac and inguinal lyphadenoapathy that are stable.  Please follow up with Primary Care Doctor for repeat imaging, monitoring and further recommendations.    Diagnosis: JAN (obstructive sleep apnea)  Assessment and Plan of Treatment: Please continue using your CPAP machine as prescribed. Please follow up with your pulmonologist for further recommendations and adjustments to your setting. Weight loss is encouraged.    Diagnosis: Hypertension  Assessment and Plan of Treatment: Please continue taking your medication. Check your blood pressure regularly and keep a log. Follow a diet that is low in salt.  Some of your home medications were adjusted. Your Hydrochlorothiazide was discontinued.       Diagnosis: Diabetes  Assessment and Plan of Treatment: Please continue taking your diabetes meds as prescribed. Please keep a log of your sugar levels daily and take the log to your doctor for further recommendations and adjustments to your doctor.    Diagnosis: Pulmonary hypertension  Assessment and Plan of Treatment: Continue taking home medication.  -Please continue your dose of sildenafil of 20mg in am, 20mg in afternoon and 10mg at bedtime.  -Follow up with Primary Care Doctor and Cardiology.Please continue your home dose of 6LMP oxygen via nasal cannula. Please follow up with your pulmonologist for adjustments to your oxygen. PRINCIPAL DISCHARGE DIAGNOSIS  Diagnosis: CHF (congestive heart failure)  Assessment and Plan of Treatment: with severe pulmonary hypertension   You had symptoms of volume overload and was given diuretics to remove the excess fluid from your body.   -Continue taking the medications as prescribed.  -Follow a diet that is low in salt.  -Do not drink more than 1.2L of total fluid daily.   (this includes fluid in your food such as soup)   -continue with revatio   While you were here, you had a V/Q scan that was negative for PE (blood clot in your lungs)   Your cardiac medications were adjusted by your cardiologist. Doses of your medications were changed such as the orenitram was increased to 2 tablet of the 0.125 totalling 0.25 three times a day. In addition your revatio was also increased while here and you have tolerated that well   We stopped hydrochlorothiazide   Please continue physical therapy at the rehab facility upon discharge.  (Elevated INR likely due to hepatic congestion)         SECONDARY DISCHARGE DIAGNOSES  Diagnosis: Constipation  Assessment and Plan of Treatment: On admission you were noted to be constipated for which you were started on a bowel regimen. Your symptoms are now resolved. Keep hydrated and follow a diet that is high in fibers (fruits, grains, etc).    Diagnosis: Abnormal finding on CT scan  Assessment and Plan of Treatment: Imaging of your abdomen and pelvis on last admission showed mediastinal, pelvic, external iliac and inguinal lyphadenoapathy that are stable. You were also recommended on the last admission to see a Rheumatologist - please make sure you make that appointment   Please follow up with Primary Care Doctor for repeat imaging, monitoring and further recommendations.    Diagnosis: JAN (obstructive sleep apnea)  Assessment and Plan of Treatment: Your cpap settings are at 8, with an FiO2 of 40%      Diagnosis: Hypertension  Assessment and Plan of Treatment: Please continue taking your medication. Check your blood pressure regularly and keep a log. Follow a diet that is low in salt.  Some of your home medications were adjusted. Your Hydrochlorothiazide was discontinued.       Diagnosis: Diabetes  Assessment and Plan of Treatment: a1c 6.6  Please continue with metformin and insulin sliding scale as needed

## 2020-05-19 LAB
ALBUMIN SERPL ELPH-MCNC: 3.1 G/DL — LOW (ref 3.3–5.2)
ALP SERPL-CCNC: 116 U/L — SIGNIFICANT CHANGE UP (ref 40–120)
ALT FLD-CCNC: 12 U/L — SIGNIFICANT CHANGE UP
ANION GAP SERPL CALC-SCNC: 10 MMOL/L — SIGNIFICANT CHANGE UP (ref 5–17)
AST SERPL-CCNC: 27 U/L — SIGNIFICANT CHANGE UP
BILIRUB SERPL-MCNC: 2 MG/DL — SIGNIFICANT CHANGE UP (ref 0.4–2)
BUN SERPL-MCNC: 41 MG/DL — HIGH (ref 8–20)
CALCIUM SERPL-MCNC: 9.8 MG/DL — SIGNIFICANT CHANGE UP (ref 8.6–10.2)
CHLORIDE SERPL-SCNC: 86 MMOL/L — LOW (ref 98–107)
CO2 SERPL-SCNC: 42 MMOL/L — HIGH (ref 22–29)
CREAT SERPL-MCNC: 1.48 MG/DL — HIGH (ref 0.5–1.3)
GLUCOSE BLDC GLUCOMTR-MCNC: 109 MG/DL — HIGH (ref 70–99)
GLUCOSE BLDC GLUCOMTR-MCNC: 111 MG/DL — HIGH (ref 70–99)
GLUCOSE BLDC GLUCOMTR-MCNC: 116 MG/DL — HIGH (ref 70–99)
GLUCOSE BLDC GLUCOMTR-MCNC: 126 MG/DL — HIGH (ref 70–99)
GLUCOSE SERPL-MCNC: 103 MG/DL — HIGH (ref 70–99)
MAGNESIUM SERPL-MCNC: 1.9 MG/DL — SIGNIFICANT CHANGE UP (ref 1.6–2.6)
PHOSPHATE SERPL-MCNC: 3.9 MG/DL — SIGNIFICANT CHANGE UP (ref 2.4–4.7)
POTASSIUM SERPL-MCNC: 3.3 MMOL/L — LOW (ref 3.5–5.3)
POTASSIUM SERPL-SCNC: 3.3 MMOL/L — LOW (ref 3.5–5.3)
PROT SERPL-MCNC: 9.5 G/DL — HIGH (ref 6.6–8.7)
SODIUM SERPL-SCNC: 138 MMOL/L — SIGNIFICANT CHANGE UP (ref 135–145)

## 2020-05-19 PROCEDURE — 99233 SBSQ HOSP IP/OBS HIGH 50: CPT | Mod: GC

## 2020-05-19 PROCEDURE — 99232 SBSQ HOSP IP/OBS MODERATE 35: CPT

## 2020-05-19 PROCEDURE — 78580 LUNG PERFUSION IMAGING: CPT | Mod: 26

## 2020-05-19 PROCEDURE — 71045 X-RAY EXAM CHEST 1 VIEW: CPT | Mod: 26

## 2020-05-19 RX ORDER — POTASSIUM CHLORIDE 20 MEQ
40 PACKET (EA) ORAL EVERY 4 HOURS
Refills: 0 | Status: COMPLETED | OUTPATIENT
Start: 2020-05-19 | End: 2020-05-19

## 2020-05-19 RX ORDER — BUMETANIDE 0.25 MG/ML
1 INJECTION INTRAMUSCULAR; INTRAVENOUS EVERY 12 HOURS
Refills: 0 | Status: DISCONTINUED | OUTPATIENT
Start: 2020-05-19 | End: 2020-05-20

## 2020-05-19 RX ADMIN — Medication 10 MILLIGRAM(S): at 21:29

## 2020-05-19 RX ADMIN — Medication 20 MILLIGRAM(S): at 05:55

## 2020-05-19 RX ADMIN — BUMETANIDE 1 MILLIGRAM(S): 0.25 INJECTION INTRAMUSCULAR; INTRAVENOUS at 18:10

## 2020-05-19 RX ADMIN — HEPARIN SODIUM 5000 UNIT(S): 5000 INJECTION INTRAVENOUS; SUBCUTANEOUS at 21:29

## 2020-05-19 RX ADMIN — Medication 81 MILLIGRAM(S): at 17:13

## 2020-05-19 RX ADMIN — CARVEDILOL PHOSPHATE 3.12 MILLIGRAM(S): 80 CAPSULE, EXTENDED RELEASE ORAL at 17:15

## 2020-05-19 RX ADMIN — CARVEDILOL PHOSPHATE 3.12 MILLIGRAM(S): 80 CAPSULE, EXTENDED RELEASE ORAL at 05:51

## 2020-05-19 RX ADMIN — HEPARIN SODIUM 5000 UNIT(S): 5000 INJECTION INTRAVENOUS; SUBCUTANEOUS at 05:51

## 2020-05-19 RX ADMIN — Medication 20 MILLIGRAM(S): at 17:13

## 2020-05-19 RX ADMIN — HEPARIN SODIUM 5000 UNIT(S): 5000 INJECTION INTRAVENOUS; SUBCUTANEOUS at 14:51

## 2020-05-19 RX ADMIN — Medication 40 MILLIEQUIVALENT(S): at 17:13

## 2020-05-19 RX ADMIN — ATORVASTATIN CALCIUM 40 MILLIGRAM(S): 80 TABLET, FILM COATED ORAL at 21:30

## 2020-05-19 NOTE — PROGRESS NOTE ADULT - ASSESSMENT
A 38 y/o Male with PMH significant for Morbid obesity, JAN, HTN, CHFpEF, Pulm HTN, DM II, presents today c/o SOB and lower extremity swelling, being admitted for Acute on Chronic Respiratory Failure due to CHF exacerbation secondary to Pulmonary Hypertension. Patient has multiple admissions for heart failure exacerbation. Patient found to be covid negative, trop x 3 negative and . He was seen by cardio, received lasix 60mg ivp in er and HCTZ from home was discontinued. TTE 12/2019: LVEF 65-70%, Severely reduced RV systolic function, Right ventricular volume and pressure overload, Moderate-severe tricuspid regurgitation. Patient has been improving on a bumex drip , deescalated to PO bumex today and to be monitored for 24 hours prior to being discharged for [facility] tomorrow.     Acute on Chronic Respiratory Failure due to CHF exacerbation, Right side HF secondary to Pulmonary Hypertension   metabolic alkalosis likely due to diuresis   +elevated INR/coags likely 2/2 hepatic congestion + abdominal distention   +hypokalemia, + Elevated BUN likely 2/2 diuresis   -cardio recs appreciated, Bumex IV deescalated to PO this am. Will monitor for 24 hours prior to discharge   -Continue Metolazone, sildenafil, and coreg.   -c/w orenitram  -C/w Albuterol prn, Daily weight and I&O's - net negative.   -Cardiology consulted and appreciated  -replete K/Mg/Phos as needed    Prerenal HAYES (resolved) with mild hyponatremia   Likely due diuretics and CHF exacerbation   Base line 1 - 1.1, now 1.48 with diuresis (likely cardio renal )  will c/w monitoring . Should improve given IV switched to PO.     DM-2, A1c 6.6   -Blood glucose stable   -c/w sliding scale (c/s today 109-120)    HTN hx   -stable this am @ 116/60  -Will closely watch while in diuretics   -C/w Coreg with holding parameters     HLD  -C/w statin    JAN/OHS with morbid obesity   -C/w CPAP  -Again needs outpt sleep study for further eval to obtain CPAP for home     Constipation resolved. Monitor off meds for now as pt had BM this am.     Abnormal CT findings   -CT a/p: Mediastinal, pelvic b/l, external iliac and b/l inguinal LAD, at his last admission   -Rheum f/up on d/c     DVT ppx  -Heparin SC    Advanced directives: full code     Dispo: PT recs appreciated, Pt switched from IV bumex to PO and to be monitored for 24 hours prior to being placed in [facility] facility.     Patient high risk for readmission, A 40 y/o Male with PMH significant for Morbid obesity, JAN, HTN, CHFpEF, Pulm HTN, DM II, presents today c/o SOB and lower extremity swelling, being admitted for Acute on Chronic Respiratory Failure due to CHF exacerbation secondary to Pulmonary Hypertension. Patient has multiple admissions for heart failure exacerbation. Patient found to be covid negative, trop x 3 negative and . He was seen by cardio, received lasix 60mg ivp in er and HCTZ from home was discontinued. TTE 12/2019: LVEF 65-70%, Severely reduced RV systolic function, Right ventricular volume and pressure overload, Moderate-severe tricuspid regurgitation. Patient has been improving on a bumex drip , deescalated to PO bumex today and to be monitored for 24 hours prior to being discharged for [facility] tomorrow.     Acute on Chronic Respiratory Failure due to CHF exacerbation, Right side HF secondary to Pulmonary Hypertension   metabolic alkalosis likely due to diuresis   +elevated INR/coags likely 2/2 hepatic congestion + abdominal distention   +hypokalemia, + Elevated BUN likely 2/2 diuresis   -cardio recs appreciated, Bumex IV deescalated to PO this am. Will monitor for 24 hours prior to discharge   -Continue Metolazone, sildenafil, and coreg.   -c/w orenitram  -C/w Albuterol prn, Daily weight and I&O's - net negative.   -Cardiology consulted and appreciated  -replete K/Mg/Phos as needed  -V/Q scan negative - all his symptoms likely multifactorial and due to his Right HF, Pulm htn, OHS and JAN)    Prerenal HAYES (resolved) with mild hyponatremia   Likely due diuretics and CHF exacerbation   Base line 1 - 1.1, now 1.48 with diuresis (likely cardio renal )  will c/w monitoring . Should improve given IV switched to PO.     DM-2, A1c 6.6   -Blood glucose stable   -c/w sliding scale (c/s today 109-120)    HTN hx   -stable this am @ 116/60  -Will closely watch while in diuretics   -C/w Coreg with holding parameters     HLD  -C/w statin    JAN/OHS with morbid obesity   -C/w CPAP  -Again needs outpt sleep study for further eval to obtain CPAP for home     Constipation resolved. Monitor off meds for now as pt had BM this am.     Abnormal CT findings   -CT a/p: Mediastinal, pelvic b/l, external iliac and b/l inguinal LAD, at his last admission   -Rheum f/up on d/c     DVT ppx  -Heparin SC    Advanced directives: full code     Dispo: PT recs appreciated, Pt switched from IV bumex to PO and to be monitored for 24 hours prior to being placed in [facility] facility.     Patient high risk for readmission,

## 2020-05-19 NOTE — PROGRESS NOTE ADULT - SUBJECTIVE AND OBJECTIVE BOX
RUDY GOYAL  39y, Male    Complaints: SOB, CHF exacerbation  Subjective: Pt seen and examined at bedside. Pt states he feels better, but as per nurse pt complained of not sleeping through the night. Tolerated CPAP through the night. Pt to be switched from IV bumex to PO . Dispo planning in place, pt to be sent to [facility] tomorrow morning. No chest pain, no shortness of breath, no nausea, vomiting. ROS otherwise negative.   No events on tele. May be discontinued today      Vital Signs Last 24 Hrs  T(C): 36.4 (19 May 2020 07:38), Max: 36.8 (19 May 2020 04:29)  T(F): 97.6 (19 May 2020 07:38), Max: 98.2 (19 May 2020 04:29)  HR: 79 (19 May 2020 07:38) (72 - 83)  BP: 116/66 (19 May 2020 07:38) (104/72 - 116/66)  RR: 18 (19 May 2020 07:38) (18 - 18)  SpO2: 95% (19 May 2020 07:38) (93% - 99%)      Exam:   General: Obese, male, sitting up in bed, comfortable  HEENT: eomi, perrla, no palpable neck mass   Respiratory: Decrease air movement due body habitus, Bibasilar crackles   CV: RRR, S1S2, no murmur  Abdominal: Soft, NT, Distended, no palpable mass, +BS  : Scrotal edema, improved per patient   Extremities: 2+ pitting edema, + peripheral pulses, FROM all 4 extremity  Neurology: A&Ox3  Skin: Dry skin      LABS:  05-19    138  |  86<L>  |  41.0<H>  ----------------------------<  103<H>  3.3<L>   |  42.0<H>  |  1.48<H>    Ca    9.8      19 May 2020 06:42  Phos  3.9     05-19  Mg     1.9     05-19    TPro  9.5<H>  /  Alb  3.1<L>  /  TBili  2.0  /  DBili  x   /  AST  27  /  ALT  12  /  AlkPhos  116  05-19      MedsMEDICATIONS  (STANDING):  aspirin  chewable 81 milliGRAM(s) Oral daily  atorvastatin 40 milliGRAM(s) Oral at bedtime  buMETAnide 1 milliGRAM(s) Oral every 12 hours  carvedilol 3.125 milliGRAM(s) Oral every 12 hours  dextrose 5%. 1000 milliLiter(s) (50 mL/Hr) IV Continuous <Continuous>  dextrose 50% Injectable 12.5 Gram(s) IV Push once  dextrose 50% Injectable 25 Gram(s) IV Push once  dextrose 50% Injectable 25 Gram(s) IV Push once  heparin   Injectable 5000 Unit(s) SubCutaneous every 8 hours  insulin lispro (HumaLOG) corrective regimen sliding scale   SubCutaneous three times a day before meals  insulin lispro (HumaLOG) corrective regimen sliding scale   SubCutaneous at bedtime  metolazone 5 milliGRAM(s) Oral daily  potassium chloride    Tablet ER 40 milliEquivalent(s) Oral every 4 hours  senna 2 Tablet(s) Oral at bedtime  sildenafil (REVATIO) 20 milliGRAM(s) Oral <User Schedule>  sildenafil (REVATIO) 10 milliGRAM(s) Oral <User Schedule>  Treprostinil 0.25 milliGRAM(s) 0.25 milliGRAM(s) Oral <User Schedule>    MEDICATIONS  (PRN):  dextrose 40% Gel 15 Gram(s) Oral once PRN Blood Glucose LESS THAN 70 milliGRAM(s)/deciliter  glucagon  Injectable 1 milliGRAM(s) IntraMuscular once PRN Glucose LESS THAN 70 milligrams/deciliter  sodium chloride 0.65% Nasal 1 Spray(s) Both Nostrils every 4 hours PRN Nasal Congestion      HEALTH ISSUES - PROBLEM Dx: Complaints: SOB, CHF exacerbation    Subjective: Pt seen and examined at bedside. Pt states he feels better, but as per nurse pt complained of not sleeping through the night. Tolerated CPAP through the night. Pt to be switched from IV bumex to PO . Dispo planning in place, pt to be sent to [facility] tomorrow morning. No chest pain, no shortness of breath, no nausea, vomiting. ROS otherwise negative.     Tele: No events on tele. May be discontinued today    Vital Signs Last 24 Hrs  T(C): 36.4 (19 May 2020 07:38), Max: 36.8 (19 May 2020 04:29)  T(F): 97.6 (19 May 2020 07:38), Max: 98.2 (19 May 2020 04:29)  HR: 79 (19 May 2020 07:38) (72 - 83)  BP: 116/66 (19 May 2020 07:38) (104/72 - 116/66)  RR: 18 (19 May 2020 07:38) (18 - 18)  SpO2: 95% (19 May 2020 07:38) (93% - 99%)    Exam:   General: Obese, male, sitting up in bed, comfortable  HEENT: eomi, perrla, no palpable neck mass   Respiratory: Decrease air movement due body habitus, Bibasilar crackles   CV: RRR, S1S2, no murmur  Abdominal: Soft, NT, Distended, no palpable mass, +BS  : Scrotal edema, improved per patient   Extremities: 2+ pitting edema, + peripheral pulses, FROM all 4 extremity  Neurology: A&Ox3  Skin: Dry skin    LABS:  05-19    138  |  86<L>  |  41.0<H>  ----------------------------<  103<H>  3.3<L>   |  42.0<H>  |  1.48<H>    Ca    9.8      19 May 2020 06:42  Phos  3.9     05-19  Mg     1.9     05-19    TPro  9.5<H>  /  Alb  3.1<L>  /  TBili  2.0  /  DBili  x   /  AST  27  /  ALT  12  /  AlkPhos  116  05-19      MedsMEDICATIONS  (STANDING):  aspirin  chewable 81 milliGRAM(s) Oral daily  atorvastatin 40 milliGRAM(s) Oral at bedtime  buMETAnide 1 milliGRAM(s) Oral every 12 hours  carvedilol 3.125 milliGRAM(s) Oral every 12 hours  dextrose 5%. 1000 milliLiter(s) (50 mL/Hr) IV Continuous <Continuous>  dextrose 50% Injectable 12.5 Gram(s) IV Push once  dextrose 50% Injectable 25 Gram(s) IV Push once  dextrose 50% Injectable 25 Gram(s) IV Push once  heparin   Injectable 5000 Unit(s) SubCutaneous every 8 hours  insulin lispro (HumaLOG) corrective regimen sliding scale   SubCutaneous three times a day before meals  insulin lispro (HumaLOG) corrective regimen sliding scale   SubCutaneous at bedtime  metolazone 5 milliGRAM(s) Oral daily  potassium chloride    Tablet ER 40 milliEquivalent(s) Oral every 4 hours  senna 2 Tablet(s) Oral at bedtime  sildenafil (REVATIO) 20 milliGRAM(s) Oral <User Schedule>  sildenafil (REVATIO) 10 milliGRAM(s) Oral <User Schedule>  Treprostinil 0.25 milliGRAM(s) 0.25 milliGRAM(s) Oral <User Schedule>    MEDICATIONS  (PRN):  dextrose 40% Gel 15 Gram(s) Oral once PRN Blood Glucose LESS THAN 70 milliGRAM(s)/deciliter  glucagon  Injectable 1 milliGRAM(s) IntraMuscular once PRN Glucose LESS THAN 70 milligrams/deciliter  sodium chloride 0.65% Nasal 1 Spray(s) Both Nostrils every 4 hours PRN Nasal Congestion

## 2020-05-19 NOTE — PROGRESS NOTE ADULT - SUBJECTIVE AND OBJECTIVE BOX
[place] CARDIOLOGY-[facility]                                                               Office: [address] [address]                                                              Telephone: [phone]. Fax:[phone]                                                                             PROGRESS NOTE  Reason for follow up: Right heart failure  Overnight: No events.  Update: Patient states his breathing and legs have improved, and is back to his baseline. Still with some dyspnea.     Review of symptoms:   Cardiac:  No chest pain. +dyspnea. No palpitations.  Respiratory: No cough. +dyspnea  Gastrointestinal: No diarrhea. No abdominal pain. No bleeding.     Past medical history: No updates.   	  Vital Signs Last 24 Hrs  T(C): 36.4 (19 May 2020 07:38), Max: 36.8 (19 May 2020 04:29)  T(F): 97.6 (19 May 2020 07:38), Max: 98.2 (19 May 2020 04:29)  HR: 79 (19 May 2020 07:38) (72 - 83)  BP: 116/66 (19 May 2020 07:38) (104/72 - 116/66)  BP(mean): --  RR: 18 (19 May 2020 07:38) (18 - 18)  SpO2: 95% (19 May 2020 07:38) (93% - 99%)      PHYSICAL EXAM:  Appearance: Comfortable. No acute distress  HEENT:  Head and neck: Atraumatic. Normocephalic.  Normal oral mucosa, PERRL, Neck is supple. No JVD, No carotid bruit.   Neurologic: A&Ox 3, no focal deficits. EOMI, Cranial nerves are intact.  Lymphatic: No cervical lymphadenopathy  Cardiovascular: Normal S1 S2, No murmur, rubs/gallops. No JVD, No edema  Respiratory: Lungs clear to auscultation  Gastrointestinal: Obese, Soft, Non-tender, + BS  Lower Extremities: Resolving B/L LE edema   Psychiatry: Patient is calm. No agitation. Mood & affect appropriate  Skin: No rashes/ecchymoses/cyanosis/ulcers visualized on the face, hands or feet.    MEDICATIONS  (STANDING):  aspirin  chewable 81 milliGRAM(s) Oral daily  atorvastatin 40 milliGRAM(s) Oral at bedtime  buMETAnide 1 milliGRAM(s) Oral every 12 hours  carvedilol 3.125 milliGRAM(s) Oral every 12 hours  dextrose 5%. 1000 milliLiter(s) (50 mL/Hr) IV Continuous <Continuous>  dextrose 50% Injectable 12.5 Gram(s) IV Push once  dextrose 50% Injectable 25 Gram(s) IV Push once  dextrose 50% Injectable 25 Gram(s) IV Push once  heparin   Injectable 5000 Unit(s) SubCutaneous every 8 hours  insulin lispro (HumaLOG) corrective regimen sliding scale   SubCutaneous three times a day before meals  insulin lispro (HumaLOG) corrective regimen sliding scale   SubCutaneous at bedtime  metolazone 5 milliGRAM(s) Oral daily  potassium chloride    Tablet ER 40 milliEquivalent(s) Oral every 4 hours  senna 2 Tablet(s) Oral at bedtime  sildenafil (REVATIO) 20 milliGRAM(s) Oral <User Schedule>  sildenafil (REVATIO) 10 milliGRAM(s) Oral <User Schedule>  Treprostinil 0.25 milliGRAM(s) 0.25 milliGRAM(s) Oral <User Schedule>    MEDICATIONS  (PRN):  dextrose 40% Gel 15 Gram(s) Oral once PRN Blood Glucose LESS THAN 70 milliGRAM(s)/deciliter  glucagon  Injectable 1 milliGRAM(s) IntraMuscular once PRN Glucose LESS THAN 70 milligrams/deciliter  sodium chloride 0.65% Nasal 1 Spray(s) Both Nostrils every 4 hours PRN Nasal Congestion        DIAGNOSTIC TESTING:  [ ] Echocardiogram:     < from: TTE Echo Complete w/Doppler (12.21.19 @ 08:48) >  PHYSICIAN INTERPRETATION:  Left Ventricle: The left ventricular internal cavity size is normal.  Left ventricular ejection fraction, by visual estimation, is 65 to 70%. The interventricular septum is flattened in systole and diastole, consistent with right ventricular pressure and volume overload.  Right Ventricle: The right ventricular size is severely enlarged. RV systolic function is severely reduced. TV S' 0.2 m/s.  Left Atrium: The left atrium is normal in size.  Right Atrium: Severely enlarged right atrium.  Pericardium: There is no evidence of pericardial effusion.  Mitral Valve: The mitral valve is normal in structure. Mild mitral valve regurgitation is seen.  Tricuspid Valve: Moderate-severe tricuspid regurgitation is visualized. Estimated pulmonary artery systolic pressure is 89.6 mmHg assuming a right atrial pressure of 15 mmHg, which is consistent with severe pulmonary hypertension.  Aortic Valve: The aortic valve is normal. No evidence of aortic valve regurgitation is seen.  Pulmonic Valve: The pulmonic valve was not well visualized. Mild pulmonic valve regurgitation.  Aorta: The aortic root is normal in size and structure.  Pulmonary Artery: The pulmonary artery is moderately dilated.  Venous: The inferior vena cava was dilated, with respiratory size variation less than 50%. The inferior vena cava and the hepatic vein show a pattern of systolic flow reversal, suggestive of tricuspid regurgitation.       Summary:   1. Technically fair study.   2. Left ventricular ejection fraction, by visual estimation, is 65 to 70%.   3. There is moderate septal left ventricular hypertrophy.   4. Severely enlarged right ventricle.   5. Severely reduced RV systolic function.   6. Right ventricular volume and pressure overload.   7. Moderate-severe tricuspid regurgitation.   8. Estimated pulmonary artery systolic pressure is 89.6 mmHg assuming a right atrial pressure of 15 mmHg, which is consistent with severe pulmonary hypertension.   9. Moderately dilated pulmonary artery.    MD Josefina Electronically signed on 12/24/2019 at 2:54:21 PM    < end of copied text >    OTHER: 	      LABS:	 	                            12.3   3.84  )-----------( 176      ( 17 May 2020 08:46 )             39.1     05-19    138  |  86<L>  |  41.0<H>  ----------------------------<  103<H>  3.3<L>   |  42.0<H>  |  1.48<H>    Ca    9.8      19 May 2020 06:42  Phos  3.9     05-19  Mg     1.9     05-19    TPro  9.5<H>  /  Alb  3.1<L>  /  TBili  2.0  /  DBili  x   /  AST  27  /  ALT  12  /  AlkPhos  116  05-19    proBNP: Serum Pro-Brain Natriuretic Peptide: 710 pg/mL (05-14 @ 17:30)    TELEMETRY: Reviewed  SR, PVCs

## 2020-05-19 NOTE — PROGRESS NOTE ADULT - SUBJECTIVE AND OBJECTIVE BOX
Patient sitting at edge of bed.   Feels better.  Plans now to [facility].    REVIEW OF SYSTEMS  Constitutional - No fever,  +fatigue  HEENT - No vertigo, No neck pain  Neurological - No headaches, No memory loss, No loss of strength, No numbness, No tremors  Skin - No rashes, +lesions   Musculoskeletal - No joint pain, No joint swelling, No muscle pain  Psychiatric - +depression, No anxiety    FUNCTIONAL PROGRESS  5/18  Bed Mobility  Bed Mobility Training Sit-to-Supine: supervsion  Bed Mobility Training Supine-to-Sit: supervsion  Bed Mobility Training Limitations: decreased strength    Sit-Stand Transfer Training  Transfer Training Sit-to-Stand Transfer: contact guard;  full weight-bearing   rolling walker  Transfer Training Stand-to-Sit Transfer: contact guard;  full weight-bearing   rolling walker  Sit-to-Stand Transfer Training Transfer Safety Analysis: decreased strength;  impaired balance    Gait Training  Gait Training: supervsion;  weight-bearing as tolerated   rolling walker;  5 feet  Gait Analysis: 3-point gait   decreased step length;  increased stride width;  decreased strength;  impaired balance;  rolling walker    5/15  Bed Mobility Analysis:     · Bed Mobility Limitations	Pt OOB	    Transfer: Sit to Stand:     · Level of [place]	contact guard	  · Physical Assist/Nonphysical Assist	1 person assist	  · Weight-Bearing Restrictions	weight-bearing as tolerated	  · Assistive Device	rolling walker	    Transfer: Stand to Sit:     · Level of [place]	contact guard	  · Weight-Bearing Restrictions	weight-bearing as tolerated	  · Assistive Device	rolling walker	    Sit/Stand Transfer Safety Analysis:     · Transfer Safety Concerns Noted	losing balance	  · Impairments Contributing to Impaired Transfers	impaired balance; decreased strength; Wide MARI 2*2 edema	    Gait Skills:     · Level of [place]	minimum assist (75% patients effort)	  · Physical Assist/Nonphysical Assist	1 person assist	  · Weight-Bearing Restrictions	weight-bearing as tolerated	  · Assistive Device	rolling walker	  · Gait Distance	20 feet x2	    VITALS  T(C): 36.8 (05-19-20 @ 04:29), Max: 36.8 (05-19-20 @ 04:29)  HR: 83 (05-19-20 @ 04:29) (72 - 83)  BP: 105/72 (05-19-20 @ 04:29) (104/72 - 105/72)  RR: 18 (05-19-20 @ 04:29) (18 - 18)  SpO2: 99% (05-19-20 @ 04:29) (93% - 99%)  Wt(kg): --    MEDICATIONS   aspirin  chewable 81 milliGRAM(s) daily  atorvastatin 40 milliGRAM(s) at bedtime  buMETAnide 1 milliGRAM(s) every 12 hours  carvedilol 3.125 milliGRAM(s) every 12 hours  dextrose 40% Gel 15 Gram(s) once PRN  dextrose 5%. 1000 milliLiter(s) <Continuous>  dextrose 50% Injectable 12.5 Gram(s) once  dextrose 50% Injectable 25 Gram(s) once  dextrose 50% Injectable 25 Gram(s) once  glucagon  Injectable 1 milliGRAM(s) once PRN  heparin   Injectable 5000 Unit(s) every 8 hours  insulin lispro (HumaLOG) corrective regimen sliding scale   three times a day before meals  insulin lispro (HumaLOG) corrective regimen sliding scale   at bedtime  metolazone 5 milliGRAM(s) daily  polyethylene glycol 3350 17 Gram(s) daily  potassium chloride    Tablet ER 40 milliEquivalent(s) every 4 hours  senna 2 Tablet(s) at bedtime  sildenafil (REVATIO) 20 milliGRAM(s) <User Schedule>  sildenafil (REVATIO) 10 milliGRAM(s) <User Schedule>  sodium chloride 0.65% Nasal 1 Spray(s) every 4 hours PRN  Treprostinil 0.25 milliGRAM(s) 0.25 milliGRAM(s) <User Schedule>    RECENT LABS - Reviewed    05-19    138  |  86<L>  |  41.0<H>  ----------------------------<  103<H>  3.3<L>   |  42.0<H>  |  1.48<H>    Ca    9.8      19 May 2020 06:42  Phos  3.9     05-19  Mg     1.9     05-19    TPro  9.5<H>  /  Alb  3.1<L>  /  TBili  2.0  /  DBili  x   /  AST  27  /  ALT  12  /  AlkPhos  116  05-19                ----------------------------------------------------------------------------------------  PHYSICAL EXAM  Constitutional - NAD, Comfortable  Abdomen - Morbidly obese  Extremities - +Pitting edema, Chronic edema changes in BLE  Neurologic Exam -                    Motor -  No focal deficits     Sensory - Intact to LT  Psychiatric - Mood mildly depressed, Fatigued  ----------------------------------------------------------------------------------------  ASSESSMENT/PLAN  39yMale with functional deficits after CHF exacerbation  CAD - ASA, Lipitor  Cardiac - Coreg  CHF - Bumex  Pulm HTN - Treprostinil  Pain - Tylenol  DVT PPX - SCDs, Heparin  Rehab - Despite patient making progress for DC HOME with OUTPATIENT - CARDIO PROGRAM, patient's home situation may not be stable, patient mentioned to a  that he is homeless. Given unreliable history and frequent hospitalizations, it may be best for patient to go to [facility] to have more medical oversight of his chronic conditions and plan for outpatient long term.     Continue bedside therapy as well as OOB throughout the day with mobilization by staff to maintain cardiopulmonary function and prevention of secondary complications related to debility. 	    Will sign off at this time. Thank you for allowing me to be part of your patient's care. Please reconsult PMR for additional rehab recommendations or dispo needs if functional status changes.

## 2020-05-19 NOTE — PROGRESS NOTE ADULT - ASSESSMENT
40 y/o morbidly obese M with a PMHx of hx of R sided heart failure (normalLVEF with severely reduced RV function), severe pulmonary HTN on home O2, JAN on CPAP, and IDDM2 who was recently admitted to [facility] from (04/28-/05/04/20) for heart failure exacerbation presented to the ED today with complaints of dyspnea on exertion and leg swelling. Patient states that he only felt better for a day or 2 after discharge, and since has been experiencing worsening dyspnea on exertion, leg swelling, and abdominal distension.    5/17: Pt resting comfortably in bed. No signs of resp. distress. Lungs clear to auscultation Bilateral lower extremity edema resolving. CM NSR HR @ 70-80s. Cont. current medication regimen. Awaiting PT eval for dispo planning.  05/18:  Patient states that he feels as though he is improving at this time, still on a Bumex gtt. Plan now for home PT upon discharge, will confirm with primary team. Patient states his legs seem greatly improved. Still with some baseline dyspnea.     Severe Pulmonary HTN with Right Heart Failure  - , down from previous admission  - D/C Bumex gtt, transition to Bumex 1mg PO BID.   - Continue Metolazone, sildenafil, and coreg.   - Continue Orenitram to 2 tabs (0.25mg) PO TID   - Monitor on telemetry.    - Strict i/o and daily standing weights.    - Keep K > 4, Mg > 2.    - Monitor renal function with ongoing diuresis.    Hypokalemia  - Due to diuretic use  - Keep K>4, Mg>2  - Likely cause of ventricular ectopy. Continue supplementation and coreg.     Prognosis  - Pt with difficulty ambulating and getting up stairs. unable to properly care for self.  - Plan for D/C to [facility] tomorrow      Preliminary evaluation, please await official recommendations by Dr. Cox

## 2020-05-20 ENCOUNTER — TRANSCRIPTION ENCOUNTER (OUTPATIENT)
Age: 40
End: 2020-05-20

## 2020-05-20 VITALS
SYSTOLIC BLOOD PRESSURE: 111 MMHG | RESPIRATION RATE: 19 BRPM | HEART RATE: 76 BPM | TEMPERATURE: 98 F | DIASTOLIC BLOOD PRESSURE: 77 MMHG | OXYGEN SATURATION: 96 %

## 2020-05-20 LAB
ANION GAP SERPL CALC-SCNC: 8 MMOL/L — SIGNIFICANT CHANGE UP (ref 5–17)
BUN SERPL-MCNC: 46 MG/DL — HIGH (ref 8–20)
CALCIUM SERPL-MCNC: 9.2 MG/DL — SIGNIFICANT CHANGE UP (ref 8.6–10.2)
CHLORIDE SERPL-SCNC: 84 MMOL/L — LOW (ref 98–107)
CO2 SERPL-SCNC: 43 MMOL/L — HIGH (ref 22–29)
CREAT SERPL-MCNC: 1.19 MG/DL — SIGNIFICANT CHANGE UP (ref 0.5–1.3)
GLUCOSE BLDC GLUCOMTR-MCNC: 100 MG/DL — HIGH (ref 70–99)
GLUCOSE BLDC GLUCOMTR-MCNC: 128 MG/DL — HIGH (ref 70–99)
GLUCOSE SERPL-MCNC: 108 MG/DL — HIGH (ref 70–99)
MAGNESIUM SERPL-MCNC: 2 MG/DL — SIGNIFICANT CHANGE UP (ref 1.6–2.6)
PHOSPHATE SERPL-MCNC: 4.4 MG/DL — SIGNIFICANT CHANGE UP (ref 2.4–4.7)
POTASSIUM SERPL-MCNC: 3.7 MMOL/L — SIGNIFICANT CHANGE UP (ref 3.5–5.3)
POTASSIUM SERPL-SCNC: 3.7 MMOL/L — SIGNIFICANT CHANGE UP (ref 3.5–5.3)
SODIUM SERPL-SCNC: 135 MMOL/L — SIGNIFICANT CHANGE UP (ref 135–145)

## 2020-05-20 PROCEDURE — 99239 HOSP IP/OBS DSCHRG MGMT >30: CPT

## 2020-05-20 PROCEDURE — 99232 SBSQ HOSP IP/OBS MODERATE 35: CPT

## 2020-05-20 RX ORDER — CARVEDILOL PHOSPHATE 80 MG/1
1 CAPSULE, EXTENDED RELEASE ORAL
Qty: 0 | Refills: 0 | DISCHARGE
Start: 2020-05-20

## 2020-05-20 RX ORDER — ASPIRIN/CALCIUM CARB/MAGNESIUM 324 MG
1 TABLET ORAL
Qty: 0 | Refills: 0 | DISCHARGE
Start: 2020-05-20

## 2020-05-20 RX ORDER — SODIUM CHLORIDE 0.65 %
1 AEROSOL, SPRAY (ML) NASAL
Qty: 0 | Refills: 0 | DISCHARGE
Start: 2020-05-20

## 2020-05-20 RX ORDER — TREPROSTINIL 48 UG/1
2 INHALANT ORAL
Qty: 180 | Refills: 0
Start: 2020-05-20 | End: 2020-06-18

## 2020-05-20 RX ORDER — ATORVASTATIN CALCIUM 80 MG/1
1 TABLET, FILM COATED ORAL
Qty: 0 | Refills: 0 | DISCHARGE
Start: 2020-05-20

## 2020-05-20 RX ORDER — BUMETANIDE 0.25 MG/ML
1 INJECTION INTRAMUSCULAR; INTRAVENOUS
Qty: 0 | Refills: 0 | DISCHARGE
Start: 2020-05-20

## 2020-05-20 RX ORDER — SENNA PLUS 8.6 MG/1
2 TABLET ORAL
Qty: 0 | Refills: 0 | DISCHARGE
Start: 2020-05-20

## 2020-05-20 RX ADMIN — Medication 0: at 08:04

## 2020-05-20 RX ADMIN — Medication 0: at 11:08

## 2020-05-20 RX ADMIN — Medication 20 MILLIGRAM(S): at 05:17

## 2020-05-20 RX ADMIN — BUMETANIDE 1 MILLIGRAM(S): 0.25 INJECTION INTRAMUSCULAR; INTRAVENOUS at 05:17

## 2020-05-20 RX ADMIN — Medication 81 MILLIGRAM(S): at 11:15

## 2020-05-20 RX ADMIN — CARVEDILOL PHOSPHATE 3.12 MILLIGRAM(S): 80 CAPSULE, EXTENDED RELEASE ORAL at 05:17

## 2020-05-20 NOTE — PROGRESS NOTE ADULT - ASSESSMENT
40 y/o morbidly obese M with a PMHx of hx of R sided heart failure (normalLVEF with severely reduced RV function), severe pulmonary HTN on home O2, JAN on CPAP, and IDDM2 who was recently admitted to [facility] from (04/28-/05/04/20) for heart failure exacerbation presented to the ED today with complaints of dyspnea on exertion and leg swelling. Patient states that he only felt better for a day or 2 after discharge, and since has been experiencing worsening dyspnea on exertion, leg swelling, and abdominal distension.    5/17: Pt resting comfortably in bed. No signs of resp. distress. Lungs clear to auscultation Bilateral lower extremity edema resolving. CM NSR HR @ 70-80s. Cont. current medication regimen. Awaiting PT eval for dispo planning.  05/18:  Patient states that he feels as though he is improving at this time, still on a Bumex gtt. Plan now for home PT upon discharge, will confirm with primary team. Patient states his legs seem greatly improved. Still with some baseline dyspnea.     Severe Pulmonary HTN with Right Heart Failure  - , down from previous admission  - Send to [facility] on Bumex 1mg PO BID, Metolazone 5mg PO qday and Kdur 40 MEQ PO daily.   - Continue Orenitram 0.25mg PO TID.   - Continue sildenafil and coreg at current doses.     Hypokalemia  - Due to diuretic use  - Keep K>4, Mg>2  - Send out on Kdur 40 MEQ PO qday    D/C to [facility] today.   Preliminary evaluation, please await official recommendations by Dr. Cox

## 2020-05-20 NOTE — PROGRESS NOTE ADULT - SUBJECTIVE AND OBJECTIVE BOX
[place] CARDIOLOGY-[facility]                                                               Office: [address] [address]                                                              Telephone: [phone]. Fax:[phone]                                                                             PROGRESS NOTE  Reason for follow up: Right heart failure  Overnight: No new events.   Update: Patient's breathing and swelling have improved. Patient is going to be discharged to a [facility] today.     Review of symptoms:   Cardiac:  No chest pain. + dyspnea. No palpitations.  Respiratory: No cough. + dyspnea  Gastrointestinal: No diarrhea. No abdominal pain. No bleeding.     Past medical history: No updates.   	  Vitals:  T(C): 36.8 (05-20-20 @ 04:19), Max: 36.8 (05-20-20 @ 04:19)  HR: 76 (05-20-20 @ 04:19) (76 - 76)  BP: 111/77 (05-20-20 @ 04:19) (110/67 - 111/77)  RR: 19 (05-20-20 @ 04:19) (17 - 19)  SpO2: 96% (05-20-20 @ 04:19) (96% - 96%)  Wt(kg): --  I&O's Summary    19 May 2020 07:01  -  20 May 2020 07:00  --------------------------------------------------------  IN: 900 mL / OUT: 3600 mL / NET: -2700 mL    20 May 2020 07:01  -  20 May 2020 10:58  --------------------------------------------------------  IN: 0 mL / OUT: 350 mL / NET: -350 mL    PHYSICAL EXAM:  Appearance: Comfortable. No acute distress  HEENT:  Head and neck: Atraumatic. Normocephalic.  Normal oral mucosa, PERRL, Neck is supple. No JVD, No carotid bruit.   Neurologic: A&Ox 3, no focal deficits. EOMI, Cranial nerves are intact.  Lymphatic: No cervical lymphadenopathy  Cardiovascular: Normal S1 S2, No murmur, rubs/gallops. No JVD, No edema  Respiratory: Lungs clear to auscultation  Gastrointestinal: Obese, Soft, Non-tender, + BS  Lower Extremities: Resolving B/L LE edema   Psychiatry: Patient is calm. No agitation. Mood & affect appropriate  Skin: No rashes/ecchymoses/cyanosis/ulcers visualized on the face, hands or feet.    CURRENT MEDICATIONS:  buMETAnide 1 milliGRAM(s) Oral every 12 hours  carvedilol 3.125 milliGRAM(s) Oral every 12 hours  metolazone 5 milliGRAM(s) Oral daily  sildenafil (REVATIO) 20 milliGRAM(s) Oral <User Schedule>  sildenafil (REVATIO) 10 milliGRAM(s) Oral <User Schedule>    senna  atorvastatin  dextrose 50% Injectable  dextrose 50% Injectable  dextrose 50% Injectable  insulin lispro (HumaLOG) corrective regimen sliding scale  insulin lispro (HumaLOG) corrective regimen sliding scale  aspirin  chewable  dextrose 5%.  heparin   Injectable      DIAGNOSTIC TESTING:  [ ] Echocardiogram:     < from: TTE Echo Complete w/Doppler (12.21.19 @ 08:48) >  PHYSICIAN INTERPRETATION:  Left Ventricle: The left ventricular internal cavity size is normal.  Left ventricular ejection fraction, by visual estimation, is 65 to 70%. The interventricular septum is flattened in systole and diastole, consistent with right ventricular pressure and volume overload.  Right Ventricle: The right ventricular size is severely enlarged. RV systolic function is severely reduced. TV S' 0.2 m/s.  Left Atrium: The left atrium is normal in size.  Right Atrium: Severely enlarged right atrium.  Pericardium: There is no evidence of pericardial effusion.  Mitral Valve: The mitral valve is normal in structure. Mild mitral valve regurgitation is seen.  Tricuspid Valve: Moderate-severe tricuspid regurgitation is visualized. Estimated pulmonary artery systolic pressure is 89.6 mmHg assuming a right atrial pressure of 15 mmHg, which is consistent with severe pulmonary hypertension.  Aortic Valve: The aortic valve is normal. No evidence of aortic valve regurgitation is seen.  Pulmonic Valve: The pulmonic valve was not well visualized. Mild pulmonic valve regurgitation.  Aorta: The aortic root is normal in size and structure.  Pulmonary Artery: The pulmonary artery is moderately dilated.  Venous: The inferior vena cava was dilated, with respiratory size variation less than 50%. The inferior vena cava and the hepatic vein show a pattern of systolic flow reversal, suggestive of tricuspid regurgitation.       Summary:   1. Technically fair study.   2. Left ventricular ejection fraction, by visual estimation, is 65 to 70%.   3. There is moderate septal left ventricular hypertrophy.   4. Severely enlarged right ventricle.   5. Severely reduced RV systolic function.   6. Right ventricular volume and pressure overload.   7. Moderate-severe tricuspid regurgitation.   8. Estimated pulmonary artery systolic pressure is 89.6 mmHg assuming a right atrial pressure of 15 mmHg, which is consistent with severe pulmonary hypertension.   9. Moderately dilated pulmonary artery.    MD Josefina Electronically signed on 12/24/2019 at 2:54:21 PM    < end of copied text >    OTHER: 	      LABS:	 	        05-20    135  |  84<L>  |  46.0<H>  ----------------------------<  108<H>  3.7   |  43.0<H>  |  1.19    Ca    9.2      20 May 2020 06:52  Phos  4.4     05-20  Mg     2.0     05-20    TPro  9.5<H>  /  Alb  3.1<L>  /  TBili  2.0  /  DBili  x   /  AST  27  /  ALT  12  /  AlkPhos  116  05-19    proBNP:   Lipid Profile:   HgA1c:   TSH:

## 2020-05-20 NOTE — CHART NOTE - NSCHARTNOTEFT_GEN_A_CORE
Patient seen and examined at bedside. No acute distress and no acute overnight events   No complains   ROS negative     VS and exam done (please see discharge papers)   Spoke to patient about plan for rehab   Asked if he had questions - said they were all answered     Elevated Cr resolved with diuresis     Please see discharge papers for details.

## 2020-05-20 NOTE — DISCHARGE NOTE NURSING/CASE MANAGEMENT/SOCIAL WORK - PATIENT PORTAL LINK FT
You can access the FollowMyHealth Patient Portal offered by [facility] by registering at the following website: [email or web address]. By joining [employer]’s FollowMyHealth portal, you will also be able to view your health information using other applications (apps) compatible with our system.

## 2020-05-20 NOTE — PROGRESS NOTE ADULT - ATTENDING COMMENTS
Pt is seen, examined, chart reviewed, d/w np/pa.  Management as outlined above.
Pt is seen, examined, chart reviewed, d/w np/pa.  Management as outlined above.  Severe Pulmonary HTN with Right Heart Failure  Improved.
pt seen and examined. Doing better today.  Awaiting to go to rehab  Cr is mildly elevated. He is changed to po bumex and may need increase dose of meotlazone  Cont with orentiram and also revatio.  VQ scan today to exclude CTEPH.  I agree with a/p.
pt was seen and examined. Increasing dose of Orenitram and diuretics.   He feels better now, euvolemic.  cont with bumex and metolazone  Replace K  I asked him to call me if his weight is increased by more than 2 lbs from baseline today  Low salt diet  He is to see me in the office in 1 week.
Note addended where needed. Plan discussed with patient at bedside. Does not want anyone called. Told me that his family knows he is going to the rehab. D/c to rehab tomorrow
Note addended where needed. Plan discussed with patient, offered counselling as above
pt was seen and examined today.  Doing better, leg edema improved. no cp. tolerating higher dose of Orenitram  He will need rehab.  Cont with IV diuretics for 1 more day  Increase Orenitram  Replace K and mg, pt with episode of NSVT   I agree wih above a/p.
Note addended where needed. Plan discussed with patient at bedside. Does not want anyone called at this time with any updates
pt admitted with worsening right sided failure. He had missed a few doses of Orenitram and was advised to start on lower dose 0.125 tid.   pt has a poor living condition, sleeps with his legs dangling at the edge of the bed.  He feels better today since he was able to sleep on a bed. start IV diuretics, increase dose of orenitram. Edema is not one of the side effects cited for prostacyclin. He is auto anticoagulated with severe pulmonary htn,   pt will need rehab, asked pt to evaluate   we will follow with you.

## 2020-06-02 PROCEDURE — 81001 URINALYSIS AUTO W/SCOPE: CPT

## 2020-06-02 PROCEDURE — 85027 COMPLETE CBC AUTOMATED: CPT

## 2020-06-02 PROCEDURE — 85730 THROMBOPLASTIN TIME PARTIAL: CPT

## 2020-06-02 PROCEDURE — 97116 GAIT TRAINING THERAPY: CPT

## 2020-06-02 PROCEDURE — 80048 BASIC METABOLIC PNL TOTAL CA: CPT

## 2020-06-02 PROCEDURE — 97110 THERAPEUTIC EXERCISES: CPT

## 2020-06-02 PROCEDURE — 80053 COMPREHEN METABOLIC PANEL: CPT

## 2020-06-02 PROCEDURE — 97530 THERAPEUTIC ACTIVITIES: CPT

## 2020-06-02 PROCEDURE — 93970 EXTREMITY STUDY: CPT

## 2020-06-02 PROCEDURE — 36415 COLL VENOUS BLD VENIPUNCTURE: CPT

## 2020-06-02 PROCEDURE — 99285 EMERGENCY DEPT VISIT HI MDM: CPT | Mod: 25

## 2020-06-02 PROCEDURE — 96374 THER/PROPH/DIAG INJ IV PUSH: CPT

## 2020-06-02 PROCEDURE — A9540: CPT

## 2020-06-02 PROCEDURE — 83735 ASSAY OF MAGNESIUM: CPT

## 2020-06-02 PROCEDURE — 83880 ASSAY OF NATRIURETIC PEPTIDE: CPT

## 2020-06-02 PROCEDURE — 93005 ELECTROCARDIOGRAM TRACING: CPT

## 2020-06-02 PROCEDURE — 94760 N-INVAS EAR/PLS OXIMETRY 1: CPT

## 2020-06-02 PROCEDURE — 78580 LUNG PERFUSION IMAGING: CPT

## 2020-06-02 PROCEDURE — 84484 ASSAY OF TROPONIN QUANT: CPT

## 2020-06-02 PROCEDURE — 83690 ASSAY OF LIPASE: CPT

## 2020-06-02 PROCEDURE — 97163 PT EVAL HIGH COMPLEX 45 MIN: CPT

## 2020-06-02 PROCEDURE — 85610 PROTHROMBIN TIME: CPT

## 2020-06-02 PROCEDURE — 87635 SARS-COV-2 COVID-19 AMP PRB: CPT

## 2020-06-02 PROCEDURE — 94660 CPAP INITIATION&MGMT: CPT

## 2020-06-02 PROCEDURE — 84100 ASSAY OF PHOSPHORUS: CPT

## 2020-06-02 PROCEDURE — 82962 GLUCOSE BLOOD TEST: CPT

## 2020-06-02 PROCEDURE — 71045 X-RAY EXAM CHEST 1 VIEW: CPT

## 2020-06-03 RX ORDER — TREPROSTINIL 0.12 MG/1
0.12 TABLET, EXTENDED RELEASE ORAL
Qty: 90 | Refills: 0 | Status: ACTIVE | COMMUNITY
Start: 2020-03-13

## 2020-06-03 RX ORDER — METOLAZONE 2.5 MG/1
2.5 TABLET ORAL DAILY
Qty: 30 | Refills: 1 | Status: DISCONTINUED | COMMUNITY
Start: 2020-03-02 | End: 2020-06-03

## 2020-06-04 ENCOUNTER — APPOINTMENT (OUTPATIENT)
Dept: CARDIOLOGY | Facility: CLINIC | Age: 40
End: 2020-06-04

## 2020-06-11 ENCOUNTER — APPOINTMENT (OUTPATIENT)
Dept: CARDIOLOGY | Facility: CLINIC | Age: 40
End: 2020-06-11

## 2020-06-26 RX ORDER — CARVEDILOL 3.12 MG/1
3.12 TABLET, FILM COATED ORAL
Qty: 60 | Refills: 0 | Status: ACTIVE | COMMUNITY
Start: 1900-01-01 | End: 1900-01-01

## 2020-08-10 ENCOUNTER — APPOINTMENT (OUTPATIENT)
Dept: CARDIOLOGY | Facility: CLINIC | Age: 40
End: 2020-08-10

## 2020-11-19 ENCOUNTER — APPOINTMENT (OUTPATIENT)
Dept: CARDIOLOGY | Facility: CLINIC | Age: 40
End: 2020-11-19

## 2020-12-17 NOTE — ED ADULT NURSE NOTE - SUICIDE SCREENING QUESTION 3
Writer JUAN CARLOS for return call. Call is in regards to the below note from Michael D D'Amico, MD.    Michael D D'Amico, MD 7 minutes ago (11:01 AM)        I have not spoken to patient regarding COVID-19 kit, and I have never heard of such a thing.  He may discuss this with his pharmacist, but I will not place orders for this.         Documentation         No

## 2020-12-29 NOTE — PHARMACOTHERAPY INTERVENTION NOTE - OUTCOME
accepted Partial Purse String (Simple) Text: Given the location of the defect and the characteristics of the surrounding skin a simple purse string closure was deemed most appropriate.  Undermining was performed circumfirentially around the surgical defect.  A purse string suture was then placed and tightened. Wound tension only allowed a partial closure of the circular defect.

## 2021-04-29 ENCOUNTER — APPOINTMENT (OUTPATIENT)
Dept: CARDIOLOGY | Facility: CLINIC | Age: 41
End: 2021-04-29

## 2021-05-08 NOTE — DISCHARGE NOTE NURSING/CASE MANAGEMENT/SOCIAL WORK - NSDCPEFALRISK_GEN_ALL_CORE
Bedside shift change report given to Mert Villarreal RN (oncoming nurse) by Roosevelt Metzger RN (offgoing nurse). Report included the following information SBAR, Kardex, ED Summary, Procedure Summary, Intake/Output, MAR, Recent Results, Med Rec Status, Cardiac Rhythm NSR, Alarm Parameters , Quality Measures and Dual Neuro Assessment. 1 unit of PRBC's running, when pt received. 0940: 1 unit of PRBC's completed, pt tolerated well.     1732: Pt placed on trach collar (10L 60%) by  RT per Dr. Sabrina Pham. Bedside shift change report given to Tuan Roman (oncoming nurse) by Mert Villarreal RN (offgoing nurse). Report included the following information SBAR, Kardex, ED Summary, Procedure Summary, Intake/Output, MAR, Recent Results, Med Rec Status, Cardiac Rhythm NSR, Alarm Parameters , Quality Measures and Dual Neuro Assessment. Patient information on fall and injury prevention

## 2021-09-14 NOTE — ED ADULT TRIAGE NOTE - IDEAL BODY WEIGHT(KG)
Identify and utilize 2 coping skills that meet their needs
71
Identify and utilize 2 coping skills that meet their needs

## 2021-09-29 ENCOUNTER — NON-APPOINTMENT (OUTPATIENT)
Age: 41
End: 2021-09-29

## 2021-09-30 ENCOUNTER — RX RENEWAL (OUTPATIENT)
Age: 41
End: 2021-09-30

## 2021-10-04 ENCOUNTER — NON-APPOINTMENT (OUTPATIENT)
Age: 41
End: 2021-10-04

## 2021-10-04 RX ORDER — TREPROSTINIL 1 MG/1
1 TABLET, EXTENDED RELEASE ORAL
Qty: 270 | Refills: 0 | Status: ACTIVE | COMMUNITY
Start: 2021-09-29 | End: 1900-01-01

## 2021-11-04 ENCOUNTER — APPOINTMENT (OUTPATIENT)
Dept: CARDIOLOGY | Facility: CLINIC | Age: 41
End: 2021-11-04
Payer: MEDICARE

## 2021-11-04 VITALS
OXYGEN SATURATION: 98 % | TEMPERATURE: 99 F | WEIGHT: 285 LBS | SYSTOLIC BLOOD PRESSURE: 109 MMHG | BODY MASS INDEX: 42.21 KG/M2 | DIASTOLIC BLOOD PRESSURE: 69 MMHG | HEIGHT: 69 IN | HEART RATE: 77 BPM

## 2021-11-04 DIAGNOSIS — I50.9 HEART FAILURE, UNSPECIFIED: ICD-10-CM

## 2021-11-04 DIAGNOSIS — I27.20 PULMONARY HYPERTENSION, UNSPECIFIED: ICD-10-CM

## 2021-11-04 DIAGNOSIS — G47.33 OBSTRUCTIVE SLEEP APNEA (ADULT) (PEDIATRIC): ICD-10-CM

## 2021-11-04 PROCEDURE — 99214 OFFICE O/P EST MOD 30 MIN: CPT

## 2021-11-04 PROCEDURE — 93000 ELECTROCARDIOGRAM COMPLETE: CPT

## 2021-11-04 RX ORDER — BUMETANIDE 2 MG/1
2 TABLET ORAL TWICE DAILY
Refills: 0 | Status: ACTIVE | COMMUNITY

## 2021-11-04 RX ORDER — HYDROCHLOROTHIAZIDE 25 MG/1
25 TABLET ORAL DAILY
Qty: 30 | Refills: 2 | Status: DISCONTINUED | COMMUNITY
End: 2021-11-04

## 2021-11-04 RX ORDER — BUMETANIDE 1 MG/1
1 TABLET ORAL
Qty: 60 | Refills: 0 | Status: DISCONTINUED | COMMUNITY
End: 2021-11-04

## 2021-11-16 NOTE — PATIENT PROFILE ADULT. - SPIRITUAL CULTURAL, RELIGIOUS PRACTICES/VALUES, PROFILE
Latex:  Patient denies allergy to latex.  Medications reviewed with patient.  Tobacco use verified.  Allergies verified.    REFERRING MD:  Rebel Hart MD  Primary Medical Doctor: Rebel Hart MD   DATE OF INJURY/SURGERY:  Ongoing for years  WORK RELATED: no  OCCUPATION: sales    Patient is here for cervical spine follow up.  XRs today. Patient states he has been having neck pain and stiffness. He does have numbness and tingling in his hands. He states that 2 weekends ago, he was wrestling and his right middle finger went white for about 15 mins.  He is taking Ibuprofen PRN for pain.      none

## 2021-12-08 ENCOUNTER — NON-APPOINTMENT (OUTPATIENT)
Age: 41
End: 2021-12-08

## 2021-12-08 PROBLEM — I27.20 PULMONARY HYPERTENSION: Status: ACTIVE | Noted: 2020-03-02

## 2022-01-03 NOTE — ED ADULT NURSE NOTE - CAS TRG GEN SKIN CONDITION
"Nehemias is a 27 year old who is being evaluated via a billable video visit.      How would you like to obtain your AVS? MyChart  If the video visit is dropped, the invitation should be resent by: Text to cell phone: [phone]  Will anyone else be joining your video visit? No      Video Start Time: 3:20 PM    Assessment & Plan     Cough  - albuterol (ACCUNEB) 1.25 MG/3ML neb solution; Take 1 vial (1.25 mg) by nebulization every 6 hours as needed for shortness of breath / dyspnea or wheezing    Shortness of breath    Exacerbation of asthma, unspecified asthma severity, unspecified whether persistent  - patient completed antibiotics last week with no improvement .  Recommend face to face visit for clinical exam , chest xray , covid test .           BMI:   Estimated body mass index is 42.14 kg/m  as calculated from the following:    Height as of 12/17/21: 1.676 m (5' 6\").    Weight as of 11/10/21: 118.4 kg (261 lb 1.6 oz).           Return in about 1 day (around 1/4/2022) for in person.    Ana Melendez MD  [facility]    Araceli Del Cid is a 27 year old who presents for the following health issues     Cough  Associated symptoms include rhinorrhea, shortness of breath and wheezing.   History of Present Illness       She eats 0-1 servings of fruits and vegetables daily.She consumes 0 sweetened beverage(s) daily.She exercises with enough effort to increase her heart rate 9 or less minutes per day.  She exercises with enough effort to increase her heart rate 3 or less days per week. She is missing 1 dose(s) of medications per week.  She is not taking prescribed medications regularly due to side effects and remembering to take.           Review of Systems   HENT: Positive for congestion, postnasal drip and rhinorrhea.    Respiratory: Positive for cough, shortness of breath and wheezing.             Objective    Vitals - Patient Reported  Weight (Patient Reported): 117.9 kg (260 lb)  Height " "(Patient Reported): 167.6 cm (5' 6\")  BMI (Based on Pt Reported Ht/Wt): 41.96        Physical Exam   Virtual visit no physical exam .        Video-Visit Details    Type of service:  Video Visit    Video End Time:3:30 PM    Originating Location (pt. Location): Home    Distant Location (provider location):  [facility]     Platform used for Video Visit: [employer]  Answers for HPI/ROS submitted by the patient on 1/3/2022  If you checked off any problems, how difficult have these problems made it for you to do your work, take care of things at home, or get along with other people?: Somewhat difficult  PHQ9 TOTAL SCORE: 13      " Warm

## 2022-05-29 NOTE — PHYSICAL THERAPY INITIAL EVALUATION ADULT - PERTINENT HX OF CURRENT PROBLEM, REHAB EVAL
no 37y old  Male PMH of HTN, JAN on nocturnal CPAP, non-compliant with use, morbidly obese presents to ER with worsening sob over last 2 mths. Pt admitted for acute hypoxic hypercarbic respiratory failure 2*2 Acute on Chronic CHF and pulmonary edema.

## 2022-06-15 ENCOUNTER — EMERGENCY (EMERGENCY)
Facility: HOSPITAL | Age: 42
LOS: 1 days | Discharge: HOME | End: 2022-06-15
Admitting: EMERGENCY MEDICINE
Payer: COMMERCIAL

## 2022-06-15 VITALS — DIASTOLIC BLOOD PRESSURE: 68 MMHG | RESPIRATION RATE: 18 BRPM | HEART RATE: 102 BPM | OXYGEN SATURATION: 98 %

## 2022-06-15 VITALS
WEIGHT: 315 LBS | SYSTOLIC BLOOD PRESSURE: 114 MMHG | TEMPERATURE: 99 F | HEART RATE: 118 BPM | OXYGEN SATURATION: 95 % | HEIGHT: 69 IN | RESPIRATION RATE: 23 BRPM | DIASTOLIC BLOOD PRESSURE: 80 MMHG

## 2022-06-15 DIAGNOSIS — E11.9 TYPE 2 DIABETES MELLITUS WITHOUT COMPLICATIONS: ICD-10-CM

## 2022-06-15 DIAGNOSIS — Z20.822 CONTACT WITH AND (SUSPECTED) EXPOSURE TO COVID-19: ICD-10-CM

## 2022-06-15 DIAGNOSIS — Z79.4 LONG TERM (CURRENT) USE OF INSULIN: ICD-10-CM

## 2022-06-15 DIAGNOSIS — G47.33 OBSTRUCTIVE SLEEP APNEA (ADULT) (PEDIATRIC): ICD-10-CM

## 2022-06-15 DIAGNOSIS — R06.02 SHORTNESS OF BREATH: ICD-10-CM

## 2022-06-15 DIAGNOSIS — I50.9 HEART FAILURE, UNSPECIFIED: ICD-10-CM

## 2022-06-15 DIAGNOSIS — Z79.82 LONG TERM (CURRENT) USE OF ASPIRIN: ICD-10-CM

## 2022-06-15 DIAGNOSIS — I11.0 HYPERTENSIVE HEART DISEASE WITH HEART FAILURE: ICD-10-CM

## 2022-06-15 DIAGNOSIS — E66.01 MORBID (SEVERE) OBESITY DUE TO EXCESS CALORIES: ICD-10-CM

## 2022-06-15 DIAGNOSIS — I27.20 PULMONARY HYPERTENSION, UNSPECIFIED: ICD-10-CM

## 2022-06-15 DIAGNOSIS — R00.0 TACHYCARDIA, UNSPECIFIED: ICD-10-CM

## 2022-06-15 LAB
ALBUMIN SERPL ELPH-MCNC: 3.3 G/DL — LOW (ref 3.4–5)
ALP SERPL-CCNC: 77 U/L — SIGNIFICANT CHANGE UP (ref 40–120)
ALT FLD-CCNC: 25 U/L — SIGNIFICANT CHANGE UP (ref 12–42)
ANION GAP SERPL CALC-SCNC: 9 MMOL/L — SIGNIFICANT CHANGE UP (ref 9–16)
AST SERPL-CCNC: 19 U/L — SIGNIFICANT CHANGE UP (ref 15–37)
BASOPHILS # BLD AUTO: 0.03 K/UL — SIGNIFICANT CHANGE UP (ref 0–0.2)
BASOPHILS NFR BLD AUTO: 0.3 % — SIGNIFICANT CHANGE UP (ref 0–2)
BILIRUB SERPL-MCNC: 0.9 MG/DL — SIGNIFICANT CHANGE UP (ref 0.2–1.2)
BUN SERPL-MCNC: 22 MG/DL — SIGNIFICANT CHANGE UP (ref 7–23)
CALCIUM SERPL-MCNC: 9.3 MG/DL — SIGNIFICANT CHANGE UP (ref 8.5–10.5)
CHLORIDE SERPL-SCNC: 101 MMOL/L — SIGNIFICANT CHANGE UP (ref 96–108)
CO2 SERPL-SCNC: 28 MMOL/L — SIGNIFICANT CHANGE UP (ref 22–31)
CREAT SERPL-MCNC: 1.13 MG/DL — SIGNIFICANT CHANGE UP (ref 0.5–1.3)
EGFR: 84 ML/MIN/1.73M2 — SIGNIFICANT CHANGE UP
EOSINOPHIL # BLD AUTO: 0.12 K/UL — SIGNIFICANT CHANGE UP (ref 0–0.5)
EOSINOPHIL NFR BLD AUTO: 1.3 % — SIGNIFICANT CHANGE UP (ref 0–6)
FLUAV AG NPH QL: SIGNIFICANT CHANGE UP
FLUBV AG NPH QL: SIGNIFICANT CHANGE UP
GLUCOSE SERPL-MCNC: 118 MG/DL — HIGH (ref 70–99)
HCT VFR BLD CALC: 38.7 % — LOW (ref 39–50)
HGB BLD-MCNC: 12.8 G/DL — LOW (ref 13–17)
IMM GRANULOCYTES NFR BLD AUTO: 0.4 % — SIGNIFICANT CHANGE UP (ref 0–1.5)
LACTATE SERPL-SCNC: 0.9 MMOL/L — SIGNIFICANT CHANGE UP (ref 0.4–2)
LYMPHOCYTES # BLD AUTO: 0.81 K/UL — LOW (ref 1–3.3)
LYMPHOCYTES # BLD AUTO: 9 % — LOW (ref 13–44)
MCHC RBC-ENTMCNC: 30.2 PG — SIGNIFICANT CHANGE UP (ref 27–34)
MCHC RBC-ENTMCNC: 33.1 GM/DL — SIGNIFICANT CHANGE UP (ref 32–36)
MCV RBC AUTO: 91.3 FL — SIGNIFICANT CHANGE UP (ref 80–100)
MONOCYTES # BLD AUTO: 0.74 K/UL — SIGNIFICANT CHANGE UP (ref 0–0.9)
MONOCYTES NFR BLD AUTO: 8.2 % — SIGNIFICANT CHANGE UP (ref 2–14)
NEUTROPHILS # BLD AUTO: 7.23 K/UL — SIGNIFICANT CHANGE UP (ref 1.8–7.4)
NEUTROPHILS NFR BLD AUTO: 80.8 % — HIGH (ref 43–77)
NRBC # BLD: 0 /100 WBCS — SIGNIFICANT CHANGE UP (ref 0–0)
NT-PROBNP SERPL-SCNC: 257 PG/ML — SIGNIFICANT CHANGE UP
PLATELET # BLD AUTO: 196 K/UL — SIGNIFICANT CHANGE UP (ref 150–400)
POTASSIUM SERPL-MCNC: 3.8 MMOL/L — SIGNIFICANT CHANGE UP (ref 3.5–5.3)
POTASSIUM SERPL-SCNC: 3.8 MMOL/L — SIGNIFICANT CHANGE UP (ref 3.5–5.3)
PROT SERPL-MCNC: 9.1 G/DL — HIGH (ref 6.4–8.2)
RBC # BLD: 4.24 M/UL — SIGNIFICANT CHANGE UP (ref 4.2–5.8)
RBC # FLD: 13.9 % — SIGNIFICANT CHANGE UP (ref 10.3–14.5)
RSV RNA NPH QL NAA+NON-PROBE: SIGNIFICANT CHANGE UP
SARS-COV-2 RNA SPEC QL NAA+PROBE: SIGNIFICANT CHANGE UP
SODIUM SERPL-SCNC: 138 MMOL/L — SIGNIFICANT CHANGE UP (ref 132–145)
TROPONIN I, HIGH SENSITIVITY RESULT: 6.7 NG/L — SIGNIFICANT CHANGE UP
TROPONIN I, HIGH SENSITIVITY RESULT: 6.8 NG/L — SIGNIFICANT CHANGE UP
WBC # BLD: 8.97 K/UL — SIGNIFICANT CHANGE UP (ref 3.8–10.5)
WBC # FLD AUTO: 8.97 K/UL — SIGNIFICANT CHANGE UP (ref 3.8–10.5)

## 2022-06-15 PROCEDURE — 71045 X-RAY EXAM CHEST 1 VIEW: CPT | Mod: 26

## 2022-06-15 PROCEDURE — 93010 ELECTROCARDIOGRAM REPORT: CPT | Mod: NC

## 2022-06-15 PROCEDURE — 99285 EMERGENCY DEPT VISIT HI MDM: CPT

## 2022-06-15 RX ORDER — SODIUM CHLORIDE 9 MG/ML
1000 INJECTION INTRAMUSCULAR; INTRAVENOUS; SUBCUTANEOUS ONCE
Refills: 0 | Status: COMPLETED | OUTPATIENT
Start: 2022-06-15 | End: 2022-06-15

## 2022-06-15 RX ORDER — FUROSEMIDE 40 MG
40 TABLET ORAL ONCE
Refills: 0 | Status: COMPLETED | OUTPATIENT
Start: 2022-06-15 | End: 2022-06-15

## 2022-06-15 RX ADMIN — Medication 40 MILLIGRAM(S): at 11:20

## 2022-06-15 NOTE — ED PROVIDER NOTE - OBJECTIVE STATEMENT
40 y/o M with a PMHx of CHF, right sided heart failure (ejection fraction measured at 65-75%), DM, HTN, JAN, and pulmonary HTN fully vaccinated for COVID brought in from [facility] for SOB and weakness. Pt is on home O2 (5 liters), and states that the battery to his portable O2 tank , and he had been off O2 for about an hour prior to being brought in today. Pt denies increased weight gain or increased swelling. Pt endorses chronic mild swelling in both his lower extremities. He further denies diaphoresis or chest pain. 40 y/o M with a PMHx of CHF, right sided heart failure (ejection fraction measured at 65-75%), DM, HTN, JAN, and pulmonary HTN fully vaccinated for COVID brought in from [facility] for SOB and weakness. Pt is on home O2 (5 liters) with baseline O2 sat 95%, and states that the battery to his portable O2 tank , and he had been off O2 for about an hour prior to being brought in today. Pt denies increased weight gain or increased swelling. Pt endorses chronic mild swelling in both his lower extremities. He further denies diaphoresis or chest pain.

## 2022-06-15 NOTE — ED ADULT TRIAGE NOTE - CHIEF COMPLAINT QUOTE
states his portable O2 tank's battery  when he was traveling- reports chest tightness and dizziness- h/o CHF and pulmonary htn- normally on 5L NC

## 2022-06-15 NOTE — ED ADULT NURSE NOTE - OBJECTIVE STATEMENT
Pt aox3 BIBEMS, states his portable O2 tank's battery  when he was traveling, then recharging port broke- reports chest tightness and dizziness- h/o CHF and pulmonary htn- normally on 5L NC. Pt placed on 5L on arrival. Breathing is even and unlabored with oxygen.

## 2022-06-15 NOTE — ED PROVIDER NOTE - CARDIAC, MLM
Normal rate, regular rhythm.  Heart sounds S1, S2.  No murmurs, rubs or gallops. +1 bilateral pedal edema.

## 2022-06-15 NOTE — ED PROVIDER NOTE - PATIENT PORTAL LINK FT
You can access the FollowMyHealth Patient Portal offered by [facility] by registering at the following website: [email or web address]. By joining [employer]’s FollowMyHealth portal, you will also be able to view your health information using other applications (apps) compatible with our system.

## 2022-06-15 NOTE — ED PROVIDER NOTE - CLINICAL SUMMARY MEDICAL DECISION MAKING FREE TEXT BOX
42 y/o M with diastolic heart failure and pulmonary HTN on home O2 presents with SOB and weakness after portable O2 tank battery . Will check labs, chest X-ray, and recharge oxygen tank.

## 2022-06-15 NOTE — ED BEHAVIORAL HEALTH NOTE - BEHAVIORAL HEALTH NOTE
SW was consulted to the ED by Provider to assist PT with Transportation.  Transportation was arranged with [facility] as a bill back for 3:45pm p/u #[id number].  Team made aware and SW made available for further assistance. SW was consulted to the ED by Provider to assist PT with Transportation.  Transportation was arranged with [facility] as a bill back for 3:45pm p/u #[id number].  Team made aware and SW made available for further assistance.    Update 2:56pm:  PT request transportation be canceled because he would rather arrange for [employer] to pick him up from the ED instead.  Transportation was cancelled by SW.  Team made aware and SW made available for further assistance.

## 2022-07-12 NOTE — ED PROVIDER NOTE - OBJECTIVE STATEMENT
37 y/o M with a Hx of CHF and HTN presents to the ED c/o acute onset of SOB. Patient was standing when it occured. Denies LOC. Patient states he has swelling in legs and abd for a while and has gained weight because of it. Pt states he feels tired. Denies CP, fever, and cough. Pt does not have a PMD and does not know the name of his cardiologist. Pt is only on blood pressure medication. Denies smoking and drinking. No other complaints at this time. yes

## 2022-11-18 NOTE — ED ADULT NURSE NOTE - TEMPLATE
----- Message from Jazmin Toledo MD sent at 11/18/2022 10:44 AM CST -----  Please inform patient test results indeterminate, we need to repeat test or PPD, better would be PPD, SP  
Felecia notified.   Order placed- felecia will reach out to the home & they will call back to get her scheduled.  
Patient and caregiver were contacted and informed of results and recommendations.  Patient's caregiver inquired if chest X-ray would be a better option.  If not option they would like to repeat the blood testing.  PCP, please advise.   
We need repeat QuantiFeron, SP  
Respiratory

## 2023-02-06 NOTE — ED PROVIDER NOTE - CONSTITUTIONAL, MLM
normal... Well appearing, awake, alert, oriented to person, place, time/situation and in no apparent distress. generalized

## 2023-02-14 NOTE — ED ADULT NURSE NOTE - NSFALLRSKASSESSDT_ED_ALL_ED
Return OB Office Visit    CC:   Chief Complaint   Patient presents with    Routine Prenatal Visit     No c/o. ed       HPI:  Patient seen and examined. No concerns/complaints. Denies VB, LOF, ctx. +Fm. Denies headaches, vision changes, RUQ pain, increased LE edema. Denies chest pain, shortness of breath, fever, chills, nausea, vomiting. Review of Systems: The following ROS was otherwise negative, except as noted in the HPI: constitutional, HEENT, respiratory, cardiovascular, gastrointestinal, genitourinary, skin, musculoskeletal, neurological, psych    Objective:  /66   Wt 201 lb (91.2 kg)   LMP 2022   BMI 29.68 kg/m²     Physical Exam    Gravid, non tender, no flank pain    FHR: + by doppler    Assessment/Plan:   Joey Estrada is a 22 y.o. [id number] at 30w1d who presents for routine OB visit     Diagnosis Orders   1. Supervision of high risk pregnancy in third trimester        2. 30 weeks gestation of pregnancy        3. Previous  section            PTL s/s and FM patterns reviewed, report bldg/lof  Declines flu vaccine    Return in about 2 weeks (around 2023).     Jimbo Garcia, APRN - CNP 15-Nicanor-2022 10:05

## 2023-04-13 NOTE — ED PROVIDER NOTE - DURATION
week(s)/1 No Residual Tumor Seen Histology Text: There were no malignant cells seen in the sections examined.

## 2023-06-14 NOTE — PHYSICAL THERAPY INITIAL EVALUATION ADULT - GAIT PATTERN USED, PT EVAL
Pt has another date for surgery & it's 6/29. Can you plz set up appt   for pre admittance for 6/28, Pt needs to do labs & whatever b4 surgery   next day. Plz call pt when done Thx   2-point gait

## 2023-09-06 NOTE — ED PROVIDER NOTE - TEMPLATE
55 minutes speech pathology services.     SPEECH THERAPY    Referred by: Brayan Eid MD, [facility].    Reason for Visit: Speech therapy with a focus today on speech articulation (reducing/eliminating maladaptive compensatory articulation pattern) and learning and implementing compensatory speaking strategies for improved speech intelligibility.     SUBJECTIVE/OBJECTIVE: Brock Vanegas, age 12 years, was seen for speech therapy today on referral from Dr. Eid. He was accompanied by his mother, Ms. Humera Silva. 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 In the course of therapy for Brock's speech impairment here at [facility], it was noted by this speech pathologist that he has attention deficits (for which he has been diagnosed by his pediatrician and is on medication for ADHD), social/pragmatic communication problems, and language delay which have played a part in his participation in therapy. He additionally has a history of behavior problems that have manifested at home, school, and in therapy here. 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  For additional information re: Brock's extensive history, please refer to other medical history in his [facility] records as well as at other non-[facility] locations where he receives healthcare (e.g., [facility] [peds cardiology, pulmonary], PCP's office).    CURRENT  VISIT:  Brock performed as follows today in relation to his therapy goals and objectives:    LONG TERM SPEECH THERAPY GOALS (6 month goals) related to improving speech intelligibility, accuracy of phoneme articulation, social/pragmatic  "communication, and related areas include:    A.  Brock will use speech clarity strategies so that his speech intelligibility will average 100% in conversation with no external cues, no extra careful listening by the listener, and need for contextual cues other than the words said by Brock. STATUS: In progress. Continue goal.    B.  Brock will produce /T, D, K, G, "SH," "CH"," "J," S, and Z/ phonemes with correct tongue position, manner of articulation, and voicing including without any non-speech sound coarticulated or near coarticulated with a target phoneme in 4-5 syllable utterances with minimal to mild cueing.  STATUS: In progress. Continue goal.    C.  Social/pragmatic communication goal (associated also with speech clarity goals): Brock will demonstrate appropriate greetings, conversational turn-taking,  topic termination/topic shift, and perspective taking (e.g., what the opinions of others might be re: certain behaviors including verbal and non-verbal communicative interactions) 95% of the time with min cues.  Note that social/pragmatic communication will be addressed primarily as it relates to Brock's STATUS: In progress. Brock was diagnosed in May 2017 with autism spectrum disorder (evaluation at [facility]).  Continue goal.      SHORT TERM OBJECTIVES (2-3 months; 90% criterion unless otherwise stated) related to improving speech intelligibility, accuracy of phoneme articulation, social/pragmatic communication, and related areas include:    A. Using speaking rate and articulatory strategies, Mikhails speech intelligibility will average 98% in conversation with careful listening and known context. STATUS: He was % intelligible today with known context and careful listening. He was less intelligible if spoke more rapidly and/or if the context of his message was not known and/or if careful listening was not done. He did not always respond to cues today to use speaking rate strategies.  " Respiratory "Continue objective.    B1. Brock will produce /T, D, K, G, "SH," "CH"," "J," S, and Z/  with correct tongue position, manner of articulation, and voicing including without any non-speech sounds coarticulated or near coarticulated with the target phonemes in up to 6 syllable utterances with minimal to moderate cueing. STATUS: Deferred today in place of goal "B2" below. Continue this objective.    B2. Brock will complete the "Corrective Babbling" speech articulation program (Samir "Rona Hanson, speech-language pathologist, author) designed for people with a history of speech articulation impairment related mostly to velopharyngeal insufficiency (VPI) or cleft palate with VPI. Objective: Brock will perform at 100% accuracy on each section of the program before moving on to the next section as stipulated by program guidelines. NOTE: "Nonsense syllables" (1, 2, 3 syllables) are used in this program to try to help the patient stop using the ingrained error speech pattern; interspersed at various points in the program are sections of English words, phrases, and sentences and other stimuli to assist with generalization of what is being taught and learned in the program. Also note that he is likely to be slower with this program as he is with the traditional program because he does not do a home speech program with his mother (based on a combination of his mother's concern that her English is not good enough to help him and Brock's behavior makes it difficult for him to do a home program).  STATUS:  100% accuracy for initial /K/, 1 syllable imitative level. 68-82% accuracy for initial /K/+vowel + initial /K/ + vowel in imitated 2 syllable utterances. Continue objective.    C.  Brock will demonstrate appropriate greetings, conversational turn-taking, and topic termination/topic shift and perspective taking (e.g., what the opinions of others might be re: certain behaviors including verbal and non-verbal communicative " "interactions) 90% of the time with min cues.  STATUS: Not achieved. Brock needed min to moderate cues for turn taking, topic maintenance, topic termination, and perspective taking. Continue objective.    ASSESSMENT/IMPRESSIONS:    1. 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 There are also some slight distortions of phonemes related to place of articulation (separate from the adverse effects of the abnormal sounds he coarticulates or near-coarticulates with the phoneme). These abnormal noises made with his tongue increase significantly in his spontaneous or continuous independent speech when he talks more rapidly and/or excitedly; at those times, he needs MAX CUES to speak at a slower rate (but that is still in the normal range for speaking rate). There are some intermittent mild nasal air emissions on oral pressure consonants but they do not typically interfere with speech intelligibility at this time as they have in the past.  His speech articulation and intelligibility is much worse if he speaks too rapidly. This is not a typical developmental speech delay, but a true speech impairment.  Some progress noted today in therapy.      On a scale of 1 (100% impairment) to 7 (0% impairment), Mikhails functional speech articulation and speech intelligibility were rated as follows today:  Current status: LEVEL 5=20-40% impaired (~25% impaired, in general, though at times he sounds more impaired and sometimes less impaired [yet can make himself understood with cues to clarify himself])  Projected status in 6-12 months: LEVEL 6=1-19% impaired.    NOTE: " "Brock's initial functional status when first seen here in 2014 was LEVEL 3=60-80% impaired.  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    2. Diagnosis: Language delay/impairment (#315.32) per previous formal testing and on-going informal observation. Language skills are not being addressed directly in speech therapy at [facility] (which is focused on speech articulation to improve the clarity of Mikhails speech). Language skills are considered in therapy so that directions and explanations can be given to him at a developmental level appropriate for his comprehension.    3. 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 Brock has a recent diagnosis of AUTISM SPECTRUM DISORDER based on evaluation results from [facility]. Brock's deficits in the social use of verbal and nonverbal communication are being addressed indirectly in therapy from the standpoint that they adversely affect his behavior  in therapy as well as at home, school, and the [employer] as well as his acceptance by others to a significant degree.    4. 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     5. ADHD with variable attention and self-distractibility. He has been on medication for this with variable effectiveness " "when here for speech therapy.  His attention skills are considered as factors in his participation in speech therapy at [facility]. His mother reported today that his pediatrician (Dr. Rico Leach) took Brock off Focalin recently and put him on a new "attention medicine." She could not recall the name of the medicine.     6. History of behavioral problems (which could be related to his 22q11.2 deletion syndrome and other factors to be determined). Increased behavioral issues for Brock at school, home, and the [employer] continue to be reported by his mother (for what she sees at home and the [employer] and based on behavior infractions he has received at school). He is pending being enrolled in an applied behavioral analysis (MICHAEL) program per his mother's report.    7. Mild stridor was noted when Brock was greeted in the waiting room today and during his walk to the office.    PLAN/RECOMMENDATIONS:   1. Continue speech-language therapy (CPT code 49135)  2 times per week, 50 minute visits, for a continued estimate of at least 6 more months of therapy for speech articulation therapy. Speech therapy long term goals and short term objectives are as previously stated.     2. F/u with Dr. Brayan Eid in [facility] per her recommendation.     3. Brock will wear his Passy-Genie valve to speech therapy.     4. Brock's mother will bring the bottle of Brock's new ADHD medication so that the information can be entered in his medical record at [facility]. She reported that she keeps forgetting to bring it.    5. Brock's mother will bring a copy of his [facility] evaluation re: autism at the next visit. She reported that she keeps forgetting it.    6. Further VPI evaluation will be deferred because Brock continues to show potential to further improve his speech articulation accuracy and eliminate or reduce compensatory/maladaptive speech patterns without further information needed at this time from a VPI evaluation. " We will continue to consider a consultation with the [facility].    7. Brock's mother was told to check at his new school for the [id number] school year ([employer]) re:  if he will receive school-based speech-language therapy at school for the upcomiong year. The school-based therapy would be in addition to any therapy Brock would have at [facility].  His communication problems are significant enough that having therapy at school as well as at [facility] seems warranted to this speech pathologist. Brock will be in the 6th grade at [employer] in August 2017.    8. Brock has a need for continued psychological/behavioral intervention as well as social/pragmatic communication evaluation and intervention (including therapy with a speech language pathologist, a social  if one is available for him, and/or behavioral coaching re: social skills as needed at school and in home and other community settings). He is pending being enrolled in an applied behavioral analysis (MICHAEL) program per his mother's report.    9. Please contact [facility] at [phone] if there are any questions re: the above.     no

## 2023-10-10 NOTE — PROGRESS NOTE ADULT - ASSESSMENT
content/relaxed sleeping 40yo M with morbid obesity, R sided heart failure (normal EF), RHC at [facility] in 2008 with moderate pulmonary HTN and moderate elevated pCWP, admitted after experiencing MUÑOZ, orthopnea, fatigue and hypoxia likely due to acute on chronic diastolic heart failure, in the setting of poof compliance with meds and diet.  At the ED patient received Zithromax and Rocephin x1 and was transitioned to Levaquin, however respiratory symptoms less likely to be caused by infectious etiology, likely associated with CHF exacerbation, RVP (-)  Patient clinically improving, continues to be diuresed with Laxis, lung exam improving as well as lower extremity edema    Acute on chronic diastolic CHF (congestive heart failure), NYHA class 2.  C/w Lasix 40mg IV BID,   c/w Coreg 3.125mg BID,   c/w Hydralazine 25mg BID  On Sildenafil for pulm smooth muscle relaxation for severe Pulm HTN  Monitor urine output, daily weights, strict I's & O's,   C/w nocturnal BIPAP  TTE reading pending   Patient education provided for dietary restrictions and optimal weight management.    HTN  Dash/TLC diet  Lasix 40 mg BID,   c/w Hydralazine and Cored  Weight loss education provided and need for daily exercise.     Pulmonary Hypertension  C/w Sildenafil as per home regimen     Morbid Obesity  Weight loss counseling provided     DVT prophylaxis  Lovenox 40mg SC BID (Patient obese)     Dispo   Patient likely to be discharge in the next 24-48 hours, when medically stable sleeping content/relaxed

## 2023-11-27 NOTE — PROGRESS NOTE ADULT - ATTENDING COMMENTS
Patient seen and examined at the bedside. I was physically present for key portions of the evaluation and management services provided. Agree with the above history, physical, assessment, and plan with the necessary amendments/elaborations below:    clinically stable. improved since admit. hf exac sec to dietary + med non adherence. iv diuresis, strict i/o, daily wt. repeat echo done, report pending. cardio + pulm evals appreciated. pt counseled @ length @ bedside.    noted on admit to have ? bronchitis. clinically not present. physical exam not consistent w/ this dx either. hold abx + monitor. RLL abn on cxr more likely fluid then infiltrate. rvp pending.
Pt is seen, examined, chart reviewed, d/w NP/PA.  39M with morbid obesity, R sided heart failure (normal EF), RHC at [facility] in 2008 with moderate pulmonary HTN and moderate elevated pCWP, admitted after experiencing MUÑOZ, orthopnea, fatigue and hypoxia likely due to acute on chronic diastolic heart failure, non compliance with meds and diet    Acute on chronic diastolic and right heart failure  Still overloaded clinically  Continue Lasix 40mg IV BID today, likely transition to once daily IV tomorrow  Continue coreg, enalapril, hydralazine, aspirin, and sildenafil
Patient seen and examined at the bedside. I was physically present for key portions of the evaluation and management services provided. Agree with the above history, physical, assessment, and plan with the necessary amendments/elaborations below:    clinically stable. improved compared to prior day. new onset complaints of RT sided cp, consistent w/ pleurisy, PE ruled out. ? infection, occ cough but may be explained by volume overload, will check procal, if elevated will give short course abx. noted lymphadenopathy in hilar region, will refer to rheum on dc for sarcoid w/u.     cont iv diuresis, titrated to daily today per cardio recs, cont taper, once on po plan for dc home, likely in 2-3d. pulm trying to qualify patient for autobipap prior to dc. outpt fu w/ providers in [place]
Pt is seen, examined, chart reviewed, d/w np/pa.  39 male  with PMH of morbid obesity, R sided heart failure (normal EF), RHC at [facility] in 2008 with moderate pulmonary HTN and moderate elevated pCWP, admitted after experiencing MUÑOZ, orthopnea, fatigue and hypoxia likely due to acute on chronic diastolic heart failure, non compliance with meds and diet. CT negative for PE.     Acute on chronic diastolic CHF  Would change to po Lasix tomorrow  Lengthy conversation with patient regarding the importance of following with Cardiology and importance of weight loss  c/w coreg, enalapril and hydralazine  b/p too low to add nitrate.     Hypertension  Controlled
remains volume overloaded, continue diuresis. Appreciate pulmonary evaluation, weight loss critical to long-term improvement. No acute PE on CT.    reestablish close care with primary cardiologist in [place] after discharge.
You can access the FollowMyHealth Patient Portal offered by [facility] by registering at the following website: [email or web address]. By joining [employer]’s FollowMyHealth portal, you will also be able to view your health information using other applications (apps) compatible with our system.

## 2023-12-05 NOTE — ED PROVIDER NOTE - NSTIMEPROVIDERCAREINITIATE_GEN_ER
Occupational Therapy  Facility/Department: [facility]  Occupational Therapy Daily Note  Name: Huseyin Roper  : [id number]  MRN: [id number]  Date of Service: 2023    Discharge Recommendations:  Patient would benefit from continued therapy after discharge, 3-5 sessions per week      Huseyin Roper scored a 17/24 on the AM-PAC ADL Inpatient form. Current research shows that an AM-PAC score of 17 or less is typically not associated with a discharge to the patient's home setting. Based on the patient's AM-PAC score and their current ADL deficits, it is recommended that the patient have 3-5 sessions per week of Occupational Therapy at d/c to increase the patient's independence. Please see assessment section for further patient specific details. If patient discharges prior to next session this note will serve as a discharge summary. Please see below for the latest assessment towards goals. Patient Diagnosis(es): The primary encounter diagnosis was Necrotic toes ([address] Diagnoses of ESRD (end stage renal disease) on dialysis ([address]) and Toe gangrene ([address]) were also pertinent to this visit. Past Medical History:  has a past medical history of Atrial fibrillation ([address]), CAD (coronary artery disease), Chronic kidney disease, Depression, Diabetes mellitus ([address]), Dialysis patient ([address]), Glaucoma, Hemodialysis patient ([address]), Hyperlipidemia, Hypertension, RLL pneumonia, and Sleep apnea. Past Surgical History:  has a past surgical history that includes Pacemaker insertion; pacemaker placement; Colonoscopy; and Toe amputation (Left, 2023). Treatment Diagnosis: decreased fxl transfers/mobility and ADL status      Assessment   Performance deficits / Impairments: Decreased functional mobility ; Decreased safe awareness;Decreased balance;Decreased coordination;Decreased ADL status; Decreased cognition;Decreased high-level IADLs  Assessment: Discussed with OTR: Pt's ampac score now at 17 (from 20).  Pt 15-Mar-2019 20:34

## 2024-04-06 NOTE — DISCHARGE NOTE PROVIDER - NSDCFUADDAPPT_GEN_ALL_CORE_FT
Please follow up with your primary care physician in 3-5 days. Please follow up with your pulmonologist in 1-2 weeks and cardiologist 1-2 weeks. DC instructions

## 2024-10-31 NOTE — PHYSICAL THERAPY INITIAL EVALUATION ADULT - GENERAL OBSERVATIONS, REHAB EVAL
On lisinopril 20 mg daily   Has home cuff and readings are good-    No headaches or dizziness          Pt. rec'd in bed, 5L NC, monitor and agreeable to PT

## 2025-01-21 NOTE — ED ADULT NURSE NOTE - BREATH SOUNDS, LEFT
Addended by: JAMEY BABCOCK on: 1/21/2025 01:13 PM     Modules accepted: Orders    
clear/diminished

## 2025-02-18 NOTE — INPATIENT CERTIFICATION FOR MEDICARE PATIENTS - IN ORDER TO MEET MEDICARE REQUIREMENTS.
70 In order to meet Medicare requirements, the clinical documentation must support the information cited in the admission order.  Please be sure to provide detailed and clear documentation about the following in the admitting note/history and physical:

## 2025-02-26 NOTE — ED PROVIDER NOTE - CHIEF COMPLAINT
The patient is a 39y Male complaining of shortness of breath. Pt On Eliquis > now held and on Heparin gtt with plan for cardiac cath. CHADS2-Vasc: 6 for age, gender, HTN, DM, and carotid artery disease  Anticoagulation: Will stop Eliquis in preparation for LHC and start heparin drip at full A/C  Rate/Rhythm Control: Will continue metoprolol 50 mg BID and diltiazem 180 mg daily
